# Patient Record
Sex: MALE | Race: OTHER | NOT HISPANIC OR LATINO
[De-identification: names, ages, dates, MRNs, and addresses within clinical notes are randomized per-mention and may not be internally consistent; named-entity substitution may affect disease eponyms.]

---

## 2022-03-03 PROBLEM — Z00.00 ENCOUNTER FOR PREVENTIVE HEALTH EXAMINATION: Status: ACTIVE | Noted: 2022-03-03

## 2022-03-17 PROBLEM — Z86.79 HISTORY OF HYPERTENSION: Status: RESOLVED | Noted: 2022-03-17 | Resolved: 2022-03-17

## 2022-03-17 PROBLEM — Z86.2 HISTORY OF ANEMIA: Status: RESOLVED | Noted: 2022-03-17 | Resolved: 2022-03-17

## 2022-03-17 PROBLEM — F17.200 CURRENT SMOKER: Status: ACTIVE | Noted: 2022-03-17

## 2022-03-17 PROBLEM — Z72.89 ALCOHOL USE: Status: ACTIVE | Noted: 2022-03-17

## 2022-03-17 PROBLEM — Z87.09 HISTORY OF CHRONIC OBSTRUCTIVE LUNG DISEASE: Status: RESOLVED | Noted: 2022-03-17 | Resolved: 2022-03-17

## 2022-03-17 RX ORDER — TIOTROPIUM BROMIDE 18 UG/1
18 CAPSULE ORAL; RESPIRATORY (INHALATION)
Refills: 0 | Status: ACTIVE | COMMUNITY
Start: 2022-03-17

## 2022-03-23 ENCOUNTER — APPOINTMENT (OUTPATIENT)
Dept: CARDIOTHORACIC SURGERY | Facility: CLINIC | Age: 67
End: 2022-03-23
Payer: COMMERCIAL

## 2022-03-23 DIAGNOSIS — Z86.79 PERSONAL HISTORY OF OTHER DISEASES OF THE CIRCULATORY SYSTEM: ICD-10-CM

## 2022-03-23 DIAGNOSIS — F17.200 NICOTINE DEPENDENCE, UNSPECIFIED, UNCOMPLICATED: ICD-10-CM

## 2022-03-23 DIAGNOSIS — Z86.2 PERSONAL HISTORY OF DISEASES OF THE BLOOD AND BLOOD-FORMING ORGANS AND CERTAIN DISORDERS INVOLVING THE IMMUNE MECHANISM: ICD-10-CM

## 2022-03-23 DIAGNOSIS — Z87.09 PERSONAL HISTORY OF OTHER DISEASES OF THE RESPIRATORY SYSTEM: ICD-10-CM

## 2022-03-23 DIAGNOSIS — Z72.89 OTHER PROBLEMS RELATED TO LIFESTYLE: ICD-10-CM

## 2022-03-30 ENCOUNTER — NON-APPOINTMENT (OUTPATIENT)
Age: 67
End: 2022-03-30

## 2022-03-30 ENCOUNTER — APPOINTMENT (OUTPATIENT)
Dept: CARDIOTHORACIC SURGERY | Facility: CLINIC | Age: 67
End: 2022-03-30
Payer: COMMERCIAL

## 2022-03-30 VITALS
RESPIRATION RATE: 16 BRPM | HEIGHT: 75 IN | BODY MASS INDEX: 23.13 KG/M2 | OXYGEN SATURATION: 96 % | WEIGHT: 186 LBS | SYSTOLIC BLOOD PRESSURE: 165 MMHG | TEMPERATURE: 98.1 F | HEART RATE: 69 BPM | DIASTOLIC BLOOD PRESSURE: 71 MMHG

## 2022-03-30 DIAGNOSIS — Z82.49 FAMILY HISTORY OF ISCHEMIC HEART DISEASE AND OTHER DISEASES OF THE CIRCULATORY SYSTEM: ICD-10-CM

## 2022-03-30 DIAGNOSIS — Z83.6 FAMILY HISTORY OF OTHER DISEASES OF THE RESPIRATORY SYSTEM: ICD-10-CM

## 2022-03-30 DIAGNOSIS — Z82.79 FAMILY HISTORY OF OTHER CONGENITAL MALFORMATIONS, DEFORMATIONS AND CHROMOSOMAL ABNORMALITIES: ICD-10-CM

## 2022-03-30 DIAGNOSIS — I35.1 NONRHEUMATIC AORTIC (VALVE) INSUFFICIENCY: ICD-10-CM

## 2022-03-30 DIAGNOSIS — Z82.61 FAMILY HISTORY OF ARTHRITIS: ICD-10-CM

## 2022-03-30 PROCEDURE — 99204 OFFICE O/P NEW MOD 45 MIN: CPT

## 2022-03-31 PROBLEM — Z82.79 FAMILY HISTORY OF MARFAN SYNDROME: Status: ACTIVE | Noted: 2022-03-31

## 2022-03-31 PROBLEM — Z82.49 FAMILY HISTORY OF AORTIC ANEURYSM: Status: ACTIVE | Noted: 2022-03-31

## 2022-03-31 PROBLEM — Z83.6 FAMILY HISTORY OF PULMONARY FIBROSIS: Status: ACTIVE | Noted: 2022-03-31

## 2022-03-31 PROBLEM — I35.1 SEVERE AORTIC INSUFFICIENCY: Status: ACTIVE | Noted: 2022-03-31

## 2022-03-31 PROBLEM — Z82.61 FAMILY HISTORY OF RHEUMATOID ARTHRITIS: Status: ACTIVE | Noted: 2022-03-31

## 2022-03-31 RX ORDER — FLUTICASONE PROPIONATE AND SALMETEROL 250; 50 UG/1; UG/1
250-50 POWDER RESPIRATORY (INHALATION)
Refills: 0 | Status: ACTIVE | COMMUNITY

## 2022-04-07 ENCOUNTER — APPOINTMENT (OUTPATIENT)
Dept: CARDIOLOGY | Facility: CLINIC | Age: 67
End: 2022-04-07

## 2022-04-08 ENCOUNTER — NON-APPOINTMENT (OUTPATIENT)
Age: 67
End: 2022-04-08

## 2022-05-05 ENCOUNTER — NON-APPOINTMENT (OUTPATIENT)
Age: 67
End: 2022-05-05

## 2022-06-02 ENCOUNTER — NON-APPOINTMENT (OUTPATIENT)
Age: 67
End: 2022-06-02

## 2022-06-02 ENCOUNTER — APPOINTMENT (OUTPATIENT)
Dept: CARDIOLOGY | Facility: CLINIC | Age: 67
End: 2022-06-02

## 2022-06-09 ENCOUNTER — NON-APPOINTMENT (OUTPATIENT)
Age: 67
End: 2022-06-09

## 2022-06-10 RX ORDER — FLUTICASONE PROPIONATE AND SALMETEROL 50; 500 UG/1; UG/1
500-50 POWDER RESPIRATORY (INHALATION)
Refills: 0 | Status: COMPLETED | COMMUNITY
Start: 2022-03-17 | End: 2022-06-10

## 2022-06-20 VITALS
SYSTOLIC BLOOD PRESSURE: 144 MMHG | TEMPERATURE: 98 F | OXYGEN SATURATION: 98 % | HEIGHT: 75 IN | DIASTOLIC BLOOD PRESSURE: 75 MMHG | WEIGHT: 175.93 LBS | RESPIRATION RATE: 18 BRPM | HEART RATE: 61 BPM

## 2022-06-20 RX ORDER — CHLORHEXIDINE GLUCONATE 213 G/1000ML
1 SOLUTION TOPICAL ONCE
Refills: 0 | Status: DISCONTINUED | OUTPATIENT
Start: 2022-06-22 | End: 2022-06-23

## 2022-06-20 NOTE — H&P ADULT - NSHPSOCIALHISTORY_GEN_ALL_CORE
current smoker (½ PPD x 40 years) and former ETOH abuse (40yr hx, quit 7 years ago)  denies recreational drug use

## 2022-06-20 NOTE — H&P ADULT - NSICDXPASTMEDICALHX_GEN_ALL_CORE_FT
PAST MEDICAL HISTORY:  Anemia     Aortic regurgitation     Arthritis     COPD, mild     HTN (hypertension)

## 2022-06-20 NOTE — H&P ADULT - HISTORY OF PRESENT ILLNESS
Cardiologist: Dr. Toledo  Pharmacy:   Escort:  COVID:     *Verify Meds*    62 yo M, current smoker (½ PPD x 40 years) and former ETOH abuse (40yr hx, quit 7 years ago), FH of Marfan's Syndrome and Aortic Aneurysms, with PMHx HTN, SEVERE Aortic Regurgitation, dilated ascending aorta (4.4 cm per Dr. Toledo) and dilated descending aorta (4.9x5.5cm), anemia, Arthritis, COPD presents c/o significant fatigue, occasional SOB, and palpitations with activity since 10/2021 who presented to his PCP for pre-op clearance for minimally invasive aortic valve replacement scheduled for 6/23/22. States exercise tolerance is 2 flights of stairs and walking 1 mile, relieved w/ rest. Denies CP, dizziness, diaphoresis, palpitations, orthopnea/PND, BRBPR, hematuria, melena, LE swelling. Echocardiogram (2/3/22): moderate-severe aortic insufficiency, normal LVEF. In light of patient's risk factors and CCS angina equivalent class II sx, patient is referred for cardiac catheterization for pre op clearance prior to aortic valve replacement on 6/23/22.    CT chest 3/25/22: RLL 4mm parenchymal nodule, Triangular density w/in the LLL measuring 5mm. Linear fibrosis at the L lung base. Ascending aorta at the level w/in the proximal margin of the aotic arch measures 3.6cm. Descending aorta at the level of the L pulmonary measures 4.9x5.0 with peripheral plaque Cardiologist: Dr. Toledo  Pharmacy:   Escort:  COVID:     *Verify Meds*    64 yo M, current smoker (½ PPD x 40 years) and former ETOH abuse (40yr hx, quit 7 years ago), FH of Marfan's Syndrome and Aortic Aneurysms, with PMHx HTN, SEVERE Aortic Regurgitation, dilated ascending aorta (4.4 cm per Dr. Toledo) and dilated descending aorta (4.9x5.5cm), anemia, Arthritis, COPD presents c/o significant fatigue, occasional SOB, and palpitations with activity since 10/2021 who presented to his PCP for pre-op clearance for minimally invasive aortic valve replacement scheduled for 6/23/22. States exercise tolerance is 2 flights of stairs and walking 1 mile, relieved w/ rest. Denies CP, dizziness, diaphoresis, palpitations, orthopnea/PND, BRBPR, hematuria, melena, LE swelling. Echocardiogram (2/3/22): moderate-severe aortic insufficiency, normal LVEF. In light of patient's risk factors and CCS angina equivalent class II sx, patient is referred for cardiac catheterization for pre op clearance prior to aortic valve replacement on 6/23/22.    CT chest 3/25/22: RLL 4mm parenchymal nodule, Triangular density w/in the LLL measuring 5mm. Linear fibrosis at the L lung base. Ascending aorta at the level w/in the proximal margin of the aotic arch measures 3.6cm. Descending aorta at the level of the L pulmonary measures 4.9x5.0 with peripheral plaque Cardiologist: Dr. Toledo  Pharmacy:   COVID negative 6/20/22 ([facility])     *Verify Meds*    62 yo M, current smoker (½ PPD x 40 years), former ETOH abuse (40yr hx, quit 7 years ago), FH of Marfan's Syndrome and Aortic Aneurysms, PMHx HTN, SEVERE Aortic Regurgitation, dilated ascending aorta (4.4 cm per Dr. Toledo) and dilated descending aorta (4.9x5.5cm), anemia, arthritis, COPD presents c/o significant fatigue, occasional SOB, and palpitations with activity since 10/2021 who presented to his PCP for pre-op clearance for minimally invasive aortic valve replacement scheduled for 6/23/22. States exercise tolerance is 2 flights of stairs and walking 1 mile, relieved w/ rest. Denies CP, dizziness, diaphoresis, palpitations, orthopnea/PND, BRBPR, hematuria, melena, LE swelling. ECHO (2/3/22): moderate-severe aortic insufficiency, normal LVEF. In light of patient's risk factors and CCS angina equivalent class II sx, patient is referred for cardiac catheterization for pre op clearance prior to aortic valve replacement on 6/23/22.    CT chest 3/25/22: RLL 4mm parenchymal nodule, Triangular density w/in the LLL measuring 5mm. Linear fibrosis at the L lung base. Ascending aorta at the level w/in the proximal margin of the aotic arch measures 3.6cm. Descending aorta at the level of the L pulmonary measures 4.9x5.0 with peripheral plaque Cardiologist: Dr. Toledo  Pharmacy: [facility] [address] [id number], medications verified by pt's report (reliable)  COVID negative 6/20/22 ([facility])       62 yo M, current smoker (½ PPD x 40 years), former ETOH abuse (40yr hx, quit 7 years ago), FH of Marfan's Syndrome and Aortic Aneurysms, PMHx HTN, SEVERE Aortic Regurgitation, dilated ascending aorta (4.4 cm per Dr. Toledo) and dilated descending aorta (4.9x5.5cm), anemia, arthritis, COPD presents c/o significant fatigue, occasional SOB, and palpitations with activity since 10/2021 who presented to his PCP for pre-op clearance for minimally invasive aortic valve replacement scheduled for 6/23/22. States exercise tolerance is 2 flights of stairs and walking 1 mile, relieved w/ rest. Denies CP, dizziness, diaphoresis, palpitations, orthopnea/PND, BRBPR, hematuria, melena, LE swelling. ECHO (2/3/22): moderate-severe aortic insufficiency, normal LVEF. In light of patient's risk factors and CCS angina equivalent class II sx, patient is referred for cardiac catheterization for pre op clearance prior to aortic valve replacement on 6/23/22.    CT chest 3/25/22: RLL 4mm parenchymal nodule, Triangular density w/in the LLL measuring 5mm. Linear fibrosis at the L lung base. Ascending aorta at the level w/in the proximal margin of the aotic arch measures 3.6cm. Descending aorta at the level of the L pulmonary measures 4.9x5.0 with peripheral plaque

## 2022-06-20 NOTE — H&P ADULT - ASSESSMENT
62 yo M, current smoker (½ PPD x 40 years), former ETOH abuse (40yr hx, quit 7 years ago), FH of Marfan's Syndrome and Aortic Aneurysms, PMHx HTN, SEVERE Aortic Regurgitation, dilated ascending aorta (4.4 cm per Dr. Toledo) and dilated descending aorta (4.9x5.5cm), anemia, arthritis, COPD presents c/o significant fatigue, occasional SOB, and palpitations with activity since 10/2021 who presented to his PCP for pre-op clearance for minimally invasive aortic valve replacement scheduled for 6/23/22. States exercise tolerance is 2 flights of stairs and walking 1 mile, relieved w/ rest. In light of patient's risk factors and CCS angina equivalent class II sx, patient is referred for cardiac catheterization for pre op clearance prior to aortic valve replacement on 6/23/22.    ASA Class III    Mallampati Class III    Risks & benefits of procedure and alternative therapy have been explained to the patient including but not limited to: allergic reaction, bleeding with possible need for blood transfusion, infection, renal and vascular compromise, limb damage, arrhythmia, stroke, vessel dissection/perforation, Myocardial infarction, emergent CABG. Informed consent obtained and in chart.       Patient a candidate for sedation: Yes    Sedation Type: Moderate   62 yo M, current smoker (½ PPD x 40 years), former ETOH abuse (40yr hx, quit 7 years ago), FH of Marfan's Syndrome and Aortic Aneurysms, PMHx HTN, SEVERE Aortic Regurgitation, dilated ascending aorta (4.4 cm per Dr. Toledo) and dilated descending aorta (4.9x5.5cm), anemia, arthritis, COPD presents c/o significant fatigue, occasional SOB, and palpitations with activity since 10/2021 who presented to his PCP for pre-op clearance for minimally invasive aortic valve replacement scheduled for 6/23/22. States exercise tolerance is 2 flights of stairs and walking 1 mile, relieved w/ rest. In light of patient's risk factors and CCS angina equivalent class II sx, patient is referred for cardiac catheterization for pre op clearance prior to aortic valve replacement on 6/23/22.    ASA Class III    Mallampati Class III    No IV fluids, ASA/Plavix 2/2 severe AI    Plan to admit to Dr. Toledo's service for AVR scheduled 6/23/22.     Risks & benefits of procedure and alternative therapy have been explained to the patient including but not limited to: allergic reaction, bleeding with possible need for blood transfusion, infection, renal and vascular compromise, limb damage, arrhythmia, stroke, vessel dissection/perforation, Myocardial infarction, emergent CABG. Informed consent obtained and in chart.       Patient a candidate for sedation: Yes    Sedation Type: Moderate

## 2022-06-20 NOTE — H&P ADULT - CARDIOVASCULAR
regular rate and rhythm/S1 S2 present/no murmur/no pedal edema/vascular KAMALA auscultated at R 2nd intercostal space & mitral area/regular rate and rhythm/S1 S2 present/no pedal edema/vascular

## 2022-06-20 NOTE — H&P ADULT - NSICDXFAMILYHX_GEN_ALL_CORE_FT
FAMILY HISTORY:  Uncle  Still living? Unknown  Family history of Marfan syndrome, Age at diagnosis: Age Unknown  FH: aortic aneurysm, Age at diagnosis: Age Unknown

## 2022-06-22 ENCOUNTER — TRANSCRIPTION ENCOUNTER (OUTPATIENT)
Age: 67
End: 2022-06-22

## 2022-06-22 ENCOUNTER — INPATIENT (INPATIENT)
Facility: HOSPITAL | Age: 67
LOS: 12 days | Discharge: HOME HEALTH | DRG: 216 | End: 2022-07-05
Attending: THORACIC SURGERY (CARDIOTHORACIC VASCULAR SURGERY) | Admitting: THORACIC SURGERY (CARDIOTHORACIC VASCULAR SURGERY)
Payer: COMMERCIAL

## 2022-06-22 DIAGNOSIS — I11.0 HYPERTENSIVE HEART DISEASE WITH HEART FAILURE: ICD-10-CM

## 2022-06-22 DIAGNOSIS — Z98.49 CATARACT EXTRACTION STATUS, UNSPECIFIED EYE: Chronic | ICD-10-CM

## 2022-06-22 DIAGNOSIS — I70.0 ATHEROSCLEROSIS OF AORTA: ICD-10-CM

## 2022-06-22 DIAGNOSIS — J44.9 CHRONIC OBSTRUCTIVE PULMONARY DISEASE, UNSPECIFIED: ICD-10-CM

## 2022-06-22 DIAGNOSIS — F17.210 NICOTINE DEPENDENCE, CIGARETTES, UNCOMPLICATED: ICD-10-CM

## 2022-06-22 DIAGNOSIS — I35.1 NONRHEUMATIC AORTIC (VALVE) INSUFFICIENCY: ICD-10-CM
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
## 2022-06-22 DIAGNOSIS — Z98.49 CATARACT EXTRACTION STATUS, UNSPECIFIED EYE: ICD-10-CM

## 2022-06-22 DIAGNOSIS — Z82.49 FAMILY HISTORY OF ISCHEMIC HEART DISEASE AND OTHER DISEASES OF THE CIRCULATORY SYSTEM: ICD-10-CM

## 2022-06-22 DIAGNOSIS — G97.82 OTHER POSTPROCEDURAL COMPLICATIONS AND DISORDERS OF NERVOUS SYSTEM: ICD-10-CM

## 2022-06-22 DIAGNOSIS — G81.94 HEMIPLEGIA, UNSPECIFIED AFFECTING LEFT NONDOMINANT SIDE: ICD-10-CM

## 2022-06-22 DIAGNOSIS — R29.722 NIHSS SCORE 22: ICD-10-CM

## 2022-06-22 DIAGNOSIS — D62 ACUTE POSTHEMORRHAGIC ANEMIA: ICD-10-CM

## 2022-06-22 DIAGNOSIS — I97.820 POSTPROCEDURAL CEREBROVASCULAR INFARCTION FOLLOWING CARDIAC SURGERY: ICD-10-CM

## 2022-06-22 DIAGNOSIS — Z79.51 LONG TERM (CURRENT) USE OF INHALED STEROIDS: ICD-10-CM

## 2022-06-22 DIAGNOSIS — I50.41 ACUTE COMBINED SYSTOLIC (CONGESTIVE) AND DIASTOLIC (CONGESTIVE) HEART FAILURE: ICD-10-CM

## 2022-06-22 DIAGNOSIS — I63.231 CEREBRAL INFARCTION DUE TO UNSPECIFIED OCCLUSION OR STENOSIS OF RIGHT CAROTID ARTERIES: ICD-10-CM

## 2022-06-22 DIAGNOSIS — I25.119 ATHEROSCLEROTIC HEART DISEASE OF NATIVE CORONARY ARTERY WITH UNSPECIFIED ANGINA PECTORIS: ICD-10-CM

## 2022-06-22 DIAGNOSIS — F10.11 ALCOHOL ABUSE, IN REMISSION: ICD-10-CM

## 2022-06-22 DIAGNOSIS — I71.2 THORACIC AORTIC ANEURYSM, WITHOUT RUPTURE: ICD-10-CM
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
## 2022-06-22 LAB
A1C WITH ESTIMATED AVERAGE GLUCOSE RESULT: 4.3 % — SIGNIFICANT CHANGE UP (ref 4–5.6)
A1C WITH ESTIMATED AVERAGE GLUCOSE RESULT: 4.6 % — SIGNIFICANT CHANGE UP (ref 4–5.6)
ALBUMIN SERPL ELPH-MCNC: 4.2 G/DL — SIGNIFICANT CHANGE UP (ref 3.3–5)
ALBUMIN SERPL ELPH-MCNC: 4.5 G/DL — SIGNIFICANT CHANGE UP (ref 3.3–5)
ALP SERPL-CCNC: 59 U/L — SIGNIFICANT CHANGE UP (ref 40–120)
ALP SERPL-CCNC: 60 U/L — SIGNIFICANT CHANGE UP (ref 40–120)
ALT FLD-CCNC: 17 U/L — SIGNIFICANT CHANGE UP (ref 10–45)
ALT FLD-CCNC: 18 U/L — SIGNIFICANT CHANGE UP (ref 10–45)
ANION GAP SERPL CALC-SCNC: 10 MMOL/L — SIGNIFICANT CHANGE UP (ref 5–17)
ANION GAP SERPL CALC-SCNC: 10 MMOL/L — SIGNIFICANT CHANGE UP (ref 5–17)
ANION GAP SERPL CALC-SCNC: 9 MMOL/L — SIGNIFICANT CHANGE UP (ref 5–17)
ANISOCYTOSIS BLD QL: SLIGHT — SIGNIFICANT CHANGE UP
APPEARANCE UR: CLEAR — SIGNIFICANT CHANGE UP
APTT BLD: 26.3 SEC — LOW (ref 27.5–35.5)
APTT BLD: 27.9 SEC — SIGNIFICANT CHANGE UP (ref 27.5–35.5)
APTT BLD: 28.8 SEC — SIGNIFICANT CHANGE UP (ref 27.5–35.5)
AST SERPL-CCNC: 22 U/L — SIGNIFICANT CHANGE UP (ref 10–40)
AST SERPL-CCNC: 22 U/L — SIGNIFICANT CHANGE UP (ref 10–40)
BASOPHILS # BLD AUTO: 0.02 K/UL — SIGNIFICANT CHANGE UP (ref 0–0.2)
BASOPHILS # BLD AUTO: 0.04 K/UL — SIGNIFICANT CHANGE UP (ref 0–0.2)
BASOPHILS NFR BLD AUTO: 0.4 % — SIGNIFICANT CHANGE UP (ref 0–2)
BASOPHILS NFR BLD AUTO: 0.9 % — SIGNIFICANT CHANGE UP (ref 0–2)
BILIRUB SERPL-MCNC: 0.4 MG/DL — SIGNIFICANT CHANGE UP (ref 0.2–1.2)
BILIRUB SERPL-MCNC: 0.5 MG/DL — SIGNIFICANT CHANGE UP (ref 0.2–1.2)
BILIRUB UR-MCNC: NEGATIVE — SIGNIFICANT CHANGE UP
BLD GP AB SCN SERPL QL: NEGATIVE — SIGNIFICANT CHANGE UP
BLD GP AB SCN SERPL QL: NEGATIVE — SIGNIFICANT CHANGE UP
BUN SERPL-MCNC: 13 MG/DL — SIGNIFICANT CHANGE UP (ref 7–23)
BUN SERPL-MCNC: 13 MG/DL — SIGNIFICANT CHANGE UP (ref 7–23)
BUN SERPL-MCNC: 22 MG/DL — SIGNIFICANT CHANGE UP (ref 7–23)
CALCIUM SERPL-MCNC: 8.8 MG/DL — SIGNIFICANT CHANGE UP (ref 8.4–10.5)
CALCIUM SERPL-MCNC: 9.3 MG/DL — SIGNIFICANT CHANGE UP (ref 8.4–10.5)
CALCIUM SERPL-MCNC: 9.5 MG/DL — SIGNIFICANT CHANGE UP (ref 8.4–10.5)
CHLORIDE SERPL-SCNC: 101 MMOL/L — SIGNIFICANT CHANGE UP (ref 96–108)
CHOLEST SERPL-MCNC: 130 MG/DL — SIGNIFICANT CHANGE UP
CHOLEST SERPL-MCNC: 138 MG/DL — SIGNIFICANT CHANGE UP
CK MB CFR SERPL CALC: 2.7 NG/ML — SIGNIFICANT CHANGE UP (ref 0–6.7)
CK SERPL-CCNC: 97 U/L — SIGNIFICANT CHANGE UP (ref 30–200)
CO2 SERPL-SCNC: 24 MMOL/L — SIGNIFICANT CHANGE UP (ref 22–31)
CO2 SERPL-SCNC: 24 MMOL/L — SIGNIFICANT CHANGE UP (ref 22–31)
CO2 SERPL-SCNC: 27 MMOL/L — SIGNIFICANT CHANGE UP (ref 22–31)
COLOR SPEC: YELLOW — SIGNIFICANT CHANGE UP
CREAT SERPL-MCNC: 0.69 MG/DL — SIGNIFICANT CHANGE UP (ref 0.5–1.3)
CREAT SERPL-MCNC: 0.7 MG/DL — SIGNIFICANT CHANGE UP (ref 0.5–1.3)
CREAT SERPL-MCNC: 0.73 MG/DL — SIGNIFICANT CHANGE UP (ref 0.5–1.3)
DACRYOCYTES BLD QL SMEAR: SLIGHT — SIGNIFICANT CHANGE UP
DIFF PNL FLD: NEGATIVE — SIGNIFICANT CHANGE UP
EGFR: 100 ML/MIN/1.73M2 — SIGNIFICANT CHANGE UP
EGFR: 101 ML/MIN/1.73M2 — SIGNIFICANT CHANGE UP
EGFR: 99 ML/MIN/1.73M2 — SIGNIFICANT CHANGE UP
EOSINOPHIL # BLD AUTO: 0.04 K/UL — SIGNIFICANT CHANGE UP (ref 0–0.5)
EOSINOPHIL # BLD AUTO: 0.15 K/UL — SIGNIFICANT CHANGE UP (ref 0–0.5)
EOSINOPHIL NFR BLD AUTO: 0.8 % — SIGNIFICANT CHANGE UP (ref 0–6)
EOSINOPHIL NFR BLD AUTO: 3.2 % — SIGNIFICANT CHANGE UP (ref 0–6)
ESTIMATED AVERAGE GLUCOSE: 77 MG/DL — SIGNIFICANT CHANGE UP (ref 68–114)
ESTIMATED AVERAGE GLUCOSE: 85 MG/DL — SIGNIFICANT CHANGE UP (ref 68–114)
GIANT PLATELETS BLD QL SMEAR: PRESENT — SIGNIFICANT CHANGE UP
GLUCOSE SERPL-MCNC: 108 MG/DL — HIGH (ref 70–99)
GLUCOSE SERPL-MCNC: 108 MG/DL — HIGH (ref 70–99)
GLUCOSE SERPL-MCNC: 97 MG/DL — SIGNIFICANT CHANGE UP (ref 70–99)
GLUCOSE UR QL: NEGATIVE — SIGNIFICANT CHANGE UP
HCT VFR BLD CALC: 32.1 % — LOW (ref 39–50)
HCT VFR BLD CALC: 34.6 % — LOW (ref 39–50)
HCT VFR BLD CALC: 34.6 % — LOW (ref 39–50)
HCV AB S/CO SERPL IA: 0.04 S/CO — SIGNIFICANT CHANGE UP
HCV AB SERPL-IMP: SIGNIFICANT CHANGE UP
HDLC SERPL-MCNC: 41 MG/DL — SIGNIFICANT CHANGE UP
HDLC SERPL-MCNC: 42 MG/DL — SIGNIFICANT CHANGE UP
HGB BLD-MCNC: 10.4 G/DL — LOW (ref 13–17)
HGB BLD-MCNC: 11.2 G/DL — LOW (ref 13–17)
HGB BLD-MCNC: 11.3 G/DL — LOW (ref 13–17)
HYPOCHROMIA BLD QL: SIGNIFICANT CHANGE UP
IMM GRANULOCYTES NFR BLD AUTO: 0.4 % — SIGNIFICANT CHANGE UP (ref 0–1.5)
INR BLD: 1.17 — HIGH (ref 0.88–1.16)
INR BLD: 1.17 — HIGH (ref 0.88–1.16)
INR BLD: 1.19 — HIGH (ref 0.88–1.16)
ISTAT INR: 1 — SIGNIFICANT CHANGE UP (ref 0.88–1.16)
ISTAT PT: 11.6 SEC — SIGNIFICANT CHANGE UP (ref 10–12.9)
KETONES UR-MCNC: NEGATIVE — SIGNIFICANT CHANGE UP
LEUKOCYTE ESTERASE UR-ACNC: NEGATIVE — SIGNIFICANT CHANGE UP
LIPID PNL WITH DIRECT LDL SERPL: 76 MG/DL — SIGNIFICANT CHANGE UP
LIPID PNL WITH DIRECT LDL SERPL: 84 MG/DL — SIGNIFICANT CHANGE UP
LYMPHOCYTES # BLD AUTO: 0.17 K/UL — LOW (ref 1–3.3)
LYMPHOCYTES # BLD AUTO: 1.47 K/UL — SIGNIFICANT CHANGE UP (ref 1–3.3)
LYMPHOCYTES # BLD AUTO: 3.5 % — LOW (ref 13–44)
LYMPHOCYTES # BLD AUTO: 31.4 % — SIGNIFICANT CHANGE UP (ref 13–44)
MAGNESIUM SERPL-MCNC: 2 MG/DL — SIGNIFICANT CHANGE UP (ref 1.6–2.6)
MAGNESIUM SERPL-MCNC: 2.1 MG/DL — SIGNIFICANT CHANGE UP (ref 1.6–2.6)
MANUAL SMEAR VERIFICATION: SIGNIFICANT CHANGE UP
MCHC RBC-ENTMCNC: 31.2 PG — SIGNIFICANT CHANGE UP (ref 27–34)
MCHC RBC-ENTMCNC: 31.5 PG — SIGNIFICANT CHANGE UP (ref 27–34)
MCHC RBC-ENTMCNC: 31.6 PG — SIGNIFICANT CHANGE UP (ref 27–34)
MCHC RBC-ENTMCNC: 32.4 GM/DL — SIGNIFICANT CHANGE UP (ref 32–36)
MCHC RBC-ENTMCNC: 32.4 GM/DL — SIGNIFICANT CHANGE UP (ref 32–36)
MCHC RBC-ENTMCNC: 32.7 GM/DL — SIGNIFICANT CHANGE UP (ref 32–36)
MCV RBC AUTO: 95.6 FL — SIGNIFICANT CHANGE UP (ref 80–100)
MCV RBC AUTO: 97.2 FL — SIGNIFICANT CHANGE UP (ref 80–100)
MCV RBC AUTO: 97.6 FL — SIGNIFICANT CHANGE UP (ref 80–100)
MONOCYTES # BLD AUTO: 0.04 K/UL — SIGNIFICANT CHANGE UP (ref 0–0.9)
MONOCYTES # BLD AUTO: 0.61 K/UL — SIGNIFICANT CHANGE UP (ref 0–0.9)
MONOCYTES NFR BLD AUTO: 0.9 % — LOW (ref 2–14)
MONOCYTES NFR BLD AUTO: 13 % — SIGNIFICANT CHANGE UP (ref 2–14)
NEUTROPHILS # BLD AUTO: 2.41 K/UL — SIGNIFICANT CHANGE UP (ref 1.8–7.4)
NEUTROPHILS # BLD AUTO: 4.47 K/UL — SIGNIFICANT CHANGE UP (ref 1.8–7.4)
NEUTROPHILS NFR BLD AUTO: 51.6 % — SIGNIFICANT CHANGE UP (ref 43–77)
NEUTROPHILS NFR BLD AUTO: 93.9 % — HIGH (ref 43–77)
NITRITE UR-MCNC: NEGATIVE — SIGNIFICANT CHANGE UP
NON HDL CHOLESTEROL: 89 MG/DL — SIGNIFICANT CHANGE UP
NON HDL CHOLESTEROL: 96 MG/DL — SIGNIFICANT CHANGE UP
NRBC # BLD: 0 /100 WBCS — SIGNIFICANT CHANGE UP (ref 0–0)
NRBC # BLD: 0 /100 WBCS — SIGNIFICANT CHANGE UP (ref 0–0)
NT-PROBNP SERPL-SCNC: 286 PG/ML — SIGNIFICANT CHANGE UP (ref 0–300)
OVALOCYTES BLD QL SMEAR: SLIGHT — SIGNIFICANT CHANGE UP
PH UR: 6 — SIGNIFICANT CHANGE UP (ref 5–8)
PHOSPHATE SERPL-MCNC: 3.5 MG/DL — SIGNIFICANT CHANGE UP (ref 2.5–4.5)
PLAT MORPH BLD: NORMAL — SIGNIFICANT CHANGE UP
PLATELET # BLD AUTO: 154 K/UL — SIGNIFICANT CHANGE UP (ref 150–400)
PLATELET # BLD AUTO: 166 K/UL — SIGNIFICANT CHANGE UP (ref 150–400)
PLATELET # BLD AUTO: 179 K/UL — SIGNIFICANT CHANGE UP (ref 150–400)
POCT ISTAT CREATININE: 0.7 MG/DL — SIGNIFICANT CHANGE UP (ref 0.5–1.3)
POIKILOCYTOSIS BLD QL AUTO: SLIGHT — SIGNIFICANT CHANGE UP
POLYCHROMASIA BLD QL SMEAR: SLIGHT — SIGNIFICANT CHANGE UP
POTASSIUM SERPL-MCNC: 3.9 MMOL/L — SIGNIFICANT CHANGE UP (ref 3.5–5.3)
POTASSIUM SERPL-MCNC: 4 MMOL/L — SIGNIFICANT CHANGE UP (ref 3.5–5.3)
POTASSIUM SERPL-MCNC: 4.1 MMOL/L — SIGNIFICANT CHANGE UP (ref 3.5–5.3)
POTASSIUM SERPL-SCNC: 3.9 MMOL/L — SIGNIFICANT CHANGE UP (ref 3.5–5.3)
POTASSIUM SERPL-SCNC: 4 MMOL/L — SIGNIFICANT CHANGE UP (ref 3.5–5.3)
POTASSIUM SERPL-SCNC: 4.1 MMOL/L — SIGNIFICANT CHANGE UP (ref 3.5–5.3)
PROT SERPL-MCNC: 8 G/DL — SIGNIFICANT CHANGE UP (ref 6–8.3)
PROT SERPL-MCNC: 8 G/DL — SIGNIFICANT CHANGE UP (ref 6–8.3)
PROT UR-MCNC: NEGATIVE MG/DL — SIGNIFICANT CHANGE UP
PROTHROM AB SERPL-ACNC: 13.9 SEC — HIGH (ref 10.5–13.4)
PROTHROM AB SERPL-ACNC: 13.9 SEC — HIGH (ref 10.5–13.4)
PROTHROM AB SERPL-ACNC: 14.2 SEC — HIGH (ref 10.5–13.4)
RBC # BLD: 3.29 M/UL — LOW (ref 4.2–5.8)
RBC # BLD: 3.56 M/UL — LOW (ref 4.2–5.8)
RBC # BLD: 3.62 M/UL — LOW (ref 4.2–5.8)
RBC # FLD: 14.9 % — HIGH (ref 10.3–14.5)
RBC # FLD: 15.1 % — HIGH (ref 10.3–14.5)
RBC # FLD: 15.3 % — HIGH (ref 10.3–14.5)
RBC BLD AUTO: ABNORMAL
RH IG SCN BLD-IMP: POSITIVE — SIGNIFICANT CHANGE UP
RH IG SCN BLD-IMP: POSITIVE — SIGNIFICANT CHANGE UP
SARS-COV-2 RNA SPEC QL NAA+PROBE: SIGNIFICANT CHANGE UP
SODIUM SERPL-SCNC: 135 MMOL/L — SIGNIFICANT CHANGE UP (ref 135–145)
SODIUM SERPL-SCNC: 135 MMOL/L — SIGNIFICANT CHANGE UP (ref 135–145)
SODIUM SERPL-SCNC: 137 MMOL/L — SIGNIFICANT CHANGE UP (ref 135–145)
SP GR SPEC: <=1.005 — SIGNIFICANT CHANGE UP (ref 1–1.03)
TRIGL SERPL-MCNC: 61 MG/DL — SIGNIFICANT CHANGE UP
TRIGL SERPL-MCNC: 66 MG/DL — SIGNIFICANT CHANGE UP
TROPONIN T SERPL-MCNC: 0.01 NG/ML — SIGNIFICANT CHANGE UP (ref 0–0.01)
TSH SERPL-MCNC: 1.15 UIU/ML — SIGNIFICANT CHANGE UP (ref 0.27–4.2)
UROBILINOGEN FLD QL: 0.2 E.U./DL — SIGNIFICANT CHANGE UP
WBC # BLD: 3.85 K/UL — SIGNIFICANT CHANGE UP (ref 3.8–10.5)
WBC # BLD: 4.68 K/UL — SIGNIFICANT CHANGE UP (ref 3.8–10.5)
WBC # BLD: 4.76 K/UL — SIGNIFICANT CHANGE UP (ref 3.8–10.5)
WBC # FLD AUTO: 3.85 K/UL — SIGNIFICANT CHANGE UP (ref 3.8–10.5)
WBC # FLD AUTO: 4.68 K/UL — SIGNIFICANT CHANGE UP (ref 3.8–10.5)
WBC # FLD AUTO: 4.76 K/UL — SIGNIFICANT CHANGE UP (ref 3.8–10.5)

## 2022-06-22 PROCEDURE — 93454 CORONARY ARTERY ANGIO S&I: CPT | Mod: 26

## 2022-06-22 PROCEDURE — 94010 BREATHING CAPACITY TEST: CPT | Mod: 26

## 2022-06-22 PROCEDURE — 71046 X-RAY EXAM CHEST 2 VIEWS: CPT | Mod: 26

## 2022-06-22 PROCEDURE — 99152 MOD SED SAME PHYS/QHP 5/>YRS: CPT

## 2022-06-22 PROCEDURE — 93880 EXTRACRANIAL BILAT STUDY: CPT | Mod: 26

## 2022-06-22 RX ORDER — CHLORHEXIDINE GLUCONATE 213 G/1000ML
1 SOLUTION TOPICAL ONCE
Refills: 0 | Status: COMPLETED | OUTPATIENT
Start: 2022-06-22 | End: 2022-06-22

## 2022-06-22 RX ORDER — HEPARIN SODIUM 5000 [USP'U]/ML
5000 INJECTION INTRAVENOUS; SUBCUTANEOUS EVERY 8 HOURS
Refills: 0 | Status: DISCONTINUED | OUTPATIENT
Start: 2022-06-22 | End: 2022-06-23

## 2022-06-22 RX ORDER — FLUTICASONE PROPIONATE AND SALMETEROL 50; 250 UG/1; UG/1
1 POWDER ORAL; RESPIRATORY (INHALATION)
Qty: 0 | Refills: 0 | DISCHARGE

## 2022-06-22 RX ORDER — METOPROLOL TARTRATE 50 MG
12.5 TABLET ORAL EVERY 8 HOURS
Refills: 0 | Status: DISCONTINUED | OUTPATIENT
Start: 2022-06-22 | End: 2022-06-23

## 2022-06-22 RX ORDER — PANTOPRAZOLE SODIUM 20 MG/1
40 TABLET, DELAYED RELEASE ORAL
Refills: 0 | Status: DISCONTINUED | OUTPATIENT
Start: 2022-06-22 | End: 2022-06-23

## 2022-06-22 RX ORDER — CHLORHEXIDINE GLUCONATE 213 G/1000ML
1 SOLUTION TOPICAL ONCE
Refills: 0 | Status: DISCONTINUED | OUTPATIENT
Start: 2022-06-22 | End: 2022-06-23

## 2022-06-22 RX ORDER — INFLUENZA VIRUS VACCINE 15; 15; 15; 15 UG/.5ML; UG/.5ML; UG/.5ML; UG/.5ML
0.7 SUSPENSION INTRAMUSCULAR ONCE
Refills: 0 | Status: DISCONTINUED | OUTPATIENT
Start: 2022-06-22 | End: 2022-06-23

## 2022-06-22 RX ORDER — TIOTROPIUM BROMIDE 18 UG/1
1 CAPSULE ORAL; RESPIRATORY (INHALATION) DAILY
Refills: 0 | Status: DISCONTINUED | OUTPATIENT
Start: 2022-06-22 | End: 2022-07-05

## 2022-06-22 RX ORDER — CHLORHEXIDINE GLUCONATE 213 G/1000ML
25 SOLUTION TOPICAL ONCE
Refills: 0 | Status: COMPLETED | OUTPATIENT
Start: 2022-06-22 | End: 2022-06-23

## 2022-06-22 RX ORDER — SODIUM CHLORIDE 9 MG/ML
3 INJECTION INTRAMUSCULAR; INTRAVENOUS; SUBCUTANEOUS EVERY 8 HOURS
Refills: 0 | Status: DISCONTINUED | OUTPATIENT
Start: 2022-06-22 | End: 2022-06-23

## 2022-06-22 RX ORDER — CHLORHEXIDINE GLUCONATE 213 G/1000ML
1 SOLUTION TOPICAL ONCE
Refills: 0 | Status: COMPLETED | OUTPATIENT
Start: 2022-06-23 | End: 2022-06-23

## 2022-06-22 RX ADMIN — Medication 12.5 MILLIGRAM(S): at 23:05

## 2022-06-22 RX ADMIN — SODIUM CHLORIDE 3 MILLILITER(S): 9 INJECTION INTRAMUSCULAR; INTRAVENOUS; SUBCUTANEOUS at 21:48

## 2022-06-22 RX ADMIN — SODIUM CHLORIDE 3 MILLILITER(S): 9 INJECTION INTRAMUSCULAR; INTRAVENOUS; SUBCUTANEOUS at 13:07

## 2022-06-22 RX ADMIN — CHLORHEXIDINE GLUCONATE 1 APPLICATION(S): 213 SOLUTION TOPICAL at 21:48

## 2022-06-22 RX ADMIN — HEPARIN SODIUM 5000 UNIT(S): 5000 INJECTION INTRAVENOUS; SUBCUTANEOUS at 23:10

## 2022-06-22 RX ADMIN — Medication 12.5 MILLIGRAM(S): at 17:48

## 2022-06-22 NOTE — PROGRESS NOTE ADULT - SUBJECTIVE AND OBJECTIVE BOX
Patient discussed on morning rounds with Dr. Toledo    Operation / Date: Min invasive aortic valve replacement/ 6/23/22    SUBJECTIVE ASSESSMENT:  67y Male seen and examined at bedside post cardiac catheterization with no complaints. Pt states he spoke with Dr. Toledo and is aware of the procedure tomorrow. Pt has a TR band on his right wrist which is managed by cardiology. He has been walking without assistance and lives with his wife. He has been tolerating a PO diet. He is a current smoker but stated he decreased the amount of cigarettes from 1 ppd to 1-2 cigarettes a day. He is a social alcohol drinker but has not had anything recently.       Vital Signs Last 24 Hrs  T(C): --  T(F): --  HR: 58 (22 Jun 2022 12:01) (58 - 64)  BP: 114/57 (22 Jun 2022 12:01) (114/57 - 170/79)  BP(mean): 79 (22 Jun 2022 12:01) (79 - 114)  RR: 16 (22 Jun 2022 12:01) (16 - 18)  SpO2: 94% (22 Jun 2022 12:01) (94% - 96%)  I&O's Detail      CHEST TUBE:    EDMUNDO DRAIN:    EPICARDIAL WIRES:   TIE SOCORRO:   LEELA:     PHYSICAL EXAM:  *****    LABS:                        11.2   4.76  )-----------( 166      ( 22 Jun 2022 11:56 )             34.6       PT/INR - ( 22 Jun 2022 11:56 )   PT: 13.9 sec;   INR: 1.17          PTT - ( 22 Jun 2022 11:56 )  PTT:27.9 sec    06-22    137  |  101  |  13  ----------------------------<  108<H>  4.1   |  27  |  0.70    Ca    9.3      22 Jun 2022 11:56  Phos  3.5     06-22  Mg     2.1     06-22    TPro  8.0  /  Alb  4.2  /  TBili  0.4  /  DBili  x   /  AST  22  /  ALT  17  /  AlkPhos  60  06-22      Urinalysis Basic - ( 22 Jun 2022 11:56 )    Color: Yellow / Appearance: Clear / SG: <=1.005 / pH: x  Gluc: x / Ketone: NEGATIVE  / Bili: Negative / Urobili: 0.2 E.U./dL   Blood: x / Protein: NEGATIVE mg/dL / Nitrite: NEGATIVE   Leuk Esterase: NEGATIVE / RBC: x / WBC x   Sq Epi: x / Non Sq Epi: x / Bacteria: x        MEDICATIONS  (STANDING):  chlorhexidine 0.12% Liquid 25 milliLiter(s) Swish and Spit once  chlorhexidine 4% Liquid 1 Application(s) Topical once  chlorhexidine 4% Liquid 1 Application(s) Topical once  chlorhexidine 4% Liquid 1 Application(s) Topical once  influenza  Vaccine (HIGH DOSE) 0.7 milliLiter(s) IntraMuscular once  sodium chloride 0.9% lock flush 3 milliLiter(s) IV Push every 8 hours    MEDICATIONS  (PRN):        RADIOLOGY & ADDITIONAL TESTS:     Patient discussed on morning rounds with Dr. Toledo    Operation / Date: Min invasive aortic valve replacement/ 6/23/22    SUBJECTIVE ASSESSMENT:  67y Male seen and examined at bedside post cardiac catheterization with no complaints. Pt states he spoke with Dr. Toledo and is aware of the procedure tomorrow. Pt has a TR band on his right wrist which is managed by cardiology. He has been walking without assistance and lives with his wife. He has been tolerating a PO diet. He is a current smoker but stated he decreased the amount of cigarettes from 1 ppd to 1-2 cigarettes a day. He is a social alcohol drinker but has not had anything recently. He denies chest pain, SOB/KAUFMAN, palpitations, dizziness, lightheadedness, n/v, abd pain, palpitations or any further associated symptoms.     Vital Signs Last 24 Hrs  HR: 58 (22 Jun 2022 12:01) (58 - 64)  BP: 114/57 (22 Jun 2022 12:01) (114/57 - 170/79)  BP(mean): 79 (22 Jun 2022 12:01) (79 - 114)  RR: 16 (22 Jun 2022 12:01) (16 - 18)  SpO2: 94% (22 Jun 2022 12:01) (94% - 96%)  I&O's Detail    PHYSICAL EXAM:  GEN: NAD, looks comfortable  Psych: Mood appropriate  Neuro: A&Ox3.  No focal deficits.  Moving all extremities.   HEENT: No obvious abnormalities  CV: S1S2, regular, slight systolic murmur noted at the right second intercostal space.  No carotid bruits.  No JVD  Lungs: Clear B/L.  No wheezing, rales or rhonchi  ABD: Soft, non-tender, non-distended.  +Bowel sounds  EXT: Warm and well perfused.  No peripheral edema noted  Musculoskeletal: Moving all extremities with normal ROM, no joint swelling  PV: Pedal pulses palpable  INCISIONS: Right TR band noted for cardiac cath site, no bleeding noted     LABS:                        11.2   4.76  )-----------( 166      ( 22 Jun 2022 11:56 )             34.6       PT/INR - ( 22 Jun 2022 11:56 )   PT: 13.9 sec;   INR: 1.17          PTT - ( 22 Jun 2022 11:56 )  PTT:27.9 sec    06-22    137  |  101  |  13  ----------------------------<  108<H>  4.1   |  27  |  0.70    Ca    9.3      22 Jun 2022 11:56  Phos  3.5     06-22  Mg     2.1     06-22    TPro  8.0  /  Alb  4.2  /  TBili  0.4  /  DBili  x   /  AST  22  /  ALT  17  /  AlkPhos  60  06-22      Urinalysis Basic - ( 22 Jun 2022 11:56 )    Color: Yellow / Appearance: Clear / SG: <=1.005 / pH: x  Gluc: x / Ketone: NEGATIVE  / Bili: Negative / Urobili: 0.2 E.U./dL   Blood: x / Protein: NEGATIVE mg/dL / Nitrite: NEGATIVE   Leuk Esterase: NEGATIVE / RBC: x / WBC x   Sq Epi: x / Non Sq Epi: x / Bacteria: x        MEDICATIONS  (STANDING):  chlorhexidine 0.12% Liquid 25 milliLiter(s) Swish and Spit once  chlorhexidine 4% Liquid 1 Application(s) Topical once  chlorhexidine 4% Liquid 1 Application(s) Topical once  chlorhexidine 4% Liquid 1 Application(s) Topical once  influenza  Vaccine (HIGH DOSE) 0.7 milliLiter(s) IntraMuscular once  sodium chloride 0.9% lock flush 3 milliLiter(s) IV Push every 8 hours    MEDICATIONS  (PRN):        RADIOLOGY & ADDITIONAL TESTS:    CT chest w/o on 2/19/22:   - descending thoracic aorta of 5 cm, aortic arch and ascending thoracic aorta a re atherosclertoic and ectatic, infrarenal aortic aneurysm. Mild COPD scattered 4 mm and smaller non specific pulmonary nodules     TTE from [facility] 2/3/22  - normal left atrium   - mild left ventricular hypertrophy with overall preserved systolic function   - mildly calcified aortic valve leaflets  - normal right atrium and right ventricle   - no pericardial effusion  - aortic insufficiency appears to be moderate to severe  - mild tricuspid insufficiency     CT angiogram of the thorax and abdomen 3/25/22  Impression:  - Ectatic distal abdominal aorta (infrarenal in location) with appropriate distal tapering.   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 right hepatic cyst       Patient discussed on morning rounds with Dr. Toledo    Operation / Date: Min invasive aortic valve replacement/ 6/23/22    SUBJECTIVE ASSESSMENT:  67y Male seen and examined at bedside post cardiac catheterization with no complaints. Pt states he spoke with Dr. Toledo and is aware of the procedure tomorrow. Pt has a TR band on his right wrist which is managed by cardiology. He has been walking without assistance and lives with his wife. He has been tolerating a PO diet. He is a current smoker but stated he decreased the amount of cigarettes from 1 ppd to 1-2 cigarettes a day. He is a social alcohol drinker but has not had anything recently. He denies chest pain, SOB/KAUFMAN, palpitations, dizziness, lightheadedness, n/v, abd pain, palpitations or any further associated symptoms.     Vital Signs Last 24 Hrs  HR: 58 (22 Jun 2022 12:01) (58 - 64)  BP: 114/57 (22 Jun 2022 12:01) (114/57 - 170/79)  BP(mean): 79 (22 Jun 2022 12:01) (79 - 114)  RR: 16 (22 Jun 2022 12:01) (16 - 18)  SpO2: 94% (22 Jun 2022 12:01) (94% - 96%)  I&O's Detail    PHYSICAL EXAM:  GEN: NAD, looks comfortable  Psych: Mood appropriate  Neuro: A&Ox3.  No focal deficits.  Moving all extremities.   HEENT: No obvious abnormalities  CV: S1S2, regular, slight systolic murmur noted at the right second intercostal space.  No carotid bruits.  No JVD  Lungs: Clear B/L.  No wheezing, rales or rhonchi  ABD: Soft, non-tender, non-distended.  +Bowel sounds  EXT: Warm and well perfused.  No peripheral edema noted  Musculoskeletal: Moving all extremities with normal ROM, no joint swelling  PV: Pedal pulses palpable  INCISIONS: Right TR band noted for cardiac cath site, no bleeding noted     LABS:                        11.2   4.76  )-----------( 166      ( 22 Jun 2022 11:56 )             34.6       PT/INR - ( 22 Jun 2022 11:56 )   PT: 13.9 sec;   INR: 1.17          PTT - ( 22 Jun 2022 11:56 )  PTT:27.9 sec    06-22    137  |  101  |  13  ----------------------------<  108<H>  4.1   |  27  |  0.70    Ca    9.3      22 Jun 2022 11:56  Phos  3.5     06-22  Mg     2.1     06-22    TPro  8.0  /  Alb  4.2  /  TBili  0.4  /  DBili  x   /  AST  22  /  ALT  17  /  AlkPhos  60  06-22      Urinalysis Basic - ( 22 Jun 2022 11:56 )    Color: Yellow / Appearance: Clear / SG: <=1.005 / pH: x  Gluc: x / Ketone: NEGATIVE  / Bili: Negative / Urobili: 0.2 E.U./dL   Blood: x / Protein: NEGATIVE mg/dL / Nitrite: NEGATIVE   Leuk Esterase: NEGATIVE / RBC: x / WBC x   Sq Epi: x / Non Sq Epi: x / Bacteria: x        MEDICATIONS  (STANDING):  chlorhexidine 0.12% Liquid 25 milliLiter(s) Swish and Spit once  chlorhexidine 4% Liquid 1 Application(s) Topical once  chlorhexidine 4% Liquid 1 Application(s) Topical once  chlorhexidine 4% Liquid 1 Application(s) Topical once  influenza  Vaccine (HIGH DOSE) 0.7 milliLiter(s) IntraMuscular once  sodium chloride 0.9% lock flush 3 milliLiter(s) IV Push every 8 hours    MEDICATIONS  (PRN):        RADIOLOGY & ADDITIONAL TESTS:    CT chest w/o on 2/19/22:   - descending thoracic aorta of 5 cm, aortic arch and ascending thoracic aorta a re atherosclertoic and ectatic, infrarenal aortic aneurysm. Mild COPD scattered 4 mm and smaller non specific pulmonary nodules     TTE from [facility] 2/3/22  - normal left atrium   - mild left ventricular hypertrophy with overall preserved systolic function   - mildly calcified aortic valve leaflets  - normal right atrium and right ventricle   - no pericardial effusion  - aortic insufficiency appears to be moderate to severe  - mild tricuspid insufficiency     CT angiogram of the thorax and abdomen 3/25/22  Impression:  - Ectatic distal abdominal aorta (infrarenal in location) with appropriate distal tapering.   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 right hepatic cyst    < from: US Duplex Carotid Arteries Complete, Bilateral (06.22.22 @ 16:37) >  IMPRESSION:    Elevated peak systolic velocity within the left internal carotid artery,   consistent with 50-69% stenosis.    Mild calcified and noncalcified plaque involving the carotid bifurcations   bilaterally, left greater than right.    Measurement of carotid stenosis is based on velocity parameters that   correlate the residual internal carotid diameter with that of the more   distal vessel in accordance with a method such as the [place] [employer]   Symptomatic Carotid Endarterectomy Trial (NASCET).    < end of copied text >  Dr. Toledo aware of results

## 2022-06-22 NOTE — PATIENT PROFILE ADULT - FALL HARM RISK - HARM RISK INTERVENTIONS

## 2022-06-22 NOTE — PROGRESS NOTE ADULT - REASON FOR ADMISSION
cardiac catheterization, pre op for min invasive aortic valve replacement with Dr. Toledo tomorrow 6/23

## 2022-06-22 NOTE — PROGRESS NOTE ADULT - ASSESSMENT
Assessment :   65 y/o male with a pmhx of HTN, anemia, arthritis, COPD, and a family history of marfan disease and aortic aneurysm (nephew) who presents for an aortic valve replacement with Dr. Toledo. Pt is a current smoker who has been smoking for over 40 years (1ppd) who recently decreased his use to 1-2 cigarettes a day. 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 Prior to arrival to [facility], he expressed concerns of significant fatigue and occasional SOB with palpitations with activity since his COVID vaccination in oct/no 2021. He can climb 2 flights of stairs and walk up to 1 mile. NYHA III. He denies blurred vision, dizziness, syncope, chest pain, dyspnea, nausea, vomiting, abd pain, back pain or swelling to the legs. on 6/22/22 he arrived to [facility] to be pre op for the OR tomorrow with Dr. Toledo for a min invasive aortic valve replacement.      Plan:    Neurovascular:   -no pain at this time  -no delirium     Cardiovascular:   #HTN  -started on Metoprolol 12.5 Q8H  -holding home HCTZ   -Hemodynamically stable.   -Monitor: BP, HR, tele    Respiratory:   #COPD  -will continue pts home inhaler Spiriva   -Oxygenating well on room air  -Encourage continued use of IS 10x/hr and frequent ambulation  -CXR: pending     GI:  -GI PPX: Protonix  -PO Diet  -Bowel Regimen: senna and miralax      Renal / :  -Continue to monitor renal function: BUN/Cr- pending   -Monitor I/O's daily     Endocrine:    -No hx of DM or thyroid dx  -A1c: pending   -TSH: pending     Hematologic:  -CBC: H/H-pending   -Coagulation Panel- pending    ID:  -Temperature: afebrile   -CBC: WBC-pending   -UA: negative   -Continue to observe for SIRS/Sepsis Syndrome.    Prophylaxis:  -DVT prophylaxis with 5000 SubQ Heparin q8h.  -Continue with SCD's b/l while patient is at rest     Disposition:  -Pre op for OR tomorrow with Dr. Toledo   Assessment :   67 y/o male with a pmhx of HTN, anemia, arthritis, COPD, and a family history of marfan disease and aortic aneurysm (nephew) who presents for an aortic valve replacement with Dr. Toledo. Pt is a current smoker who has been smoking for over 40 years (1ppd) who recently decreased his use to 1-2 cigarettes a day. 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 Prior to arrival to [facility], he expressed concerns of significant fatigue and occasional SOB with palpitations with activity since his COVID vaccination in oct/no 2021. He can climb 2 flights of stairs and walk up to 1 mile. NYHA III. He denies blurred vision, dizziness, syncope, chest pain, dyspnea, nausea, vomiting, abd pain, back pain or swelling to the legs. on 6/22/22 he arrived to [facility] to be pre op for the OR tomorrow with Dr. Toledo for a min invasive aortic valve replacement.      Plan:    Neurovascular:   -no pain at this time  -no delirium     Cardiovascular:   #HTN  -started on Metoprolol 12.5 Q8H  -holding home HCTZ   -Hemodynamically stable.   -Monitor: BP, HR, tele    Respiratory:   #COPD  -will continue pts home inhaler Spiriva   -Oxygenating well on room air  -Encourage continued use of IS 10x/hr and frequent ambulation  -CXR: pending     GI:  -GI PPX: Protonix  -PO Diet  -Bowel Regimen: senna and miralax      Renal / :  -Continue to monitor renal function: BUN/Cr- 13/0.70  -Monitor I/O's daily     Endocrine:    -No hx of DM or thyroid dx  -A1c: 4.3  -TSH: 1.150     Hematologic:  -CBC: H/H-11.2/34.6   -Coagulation Panel- pending    ID:  -Temperature: afebrile   -CBC: WBC-4.76  -UA: negative   -Continue to observe for SIRS/Sepsis Syndrome.    Prophylaxis:  -DVT prophylaxis with 5000 SubQ Heparin q8h.  -Continue with SCD's b/l while patient is at rest     Disposition:  -Pre op for OR tomorrow with Dr. Toledo   Assessment :   65 y/o male with a pmhx of HTN, anemia, arthritis, COPD, and a family history of marfan disease and aortic aneurysm (nephew) who presents for an aortic valve replacement with Dr. Toledo. Pt is a current smoker who has been smoking for over 40 years (1ppd) who recently decreased his use to 1-2 cigarettes a day. 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 Prior to arrival to [facility], he expressed concerns of significant fatigue and occasional SOB with palpitations with activity since his COVID vaccination in oct/no 2021. He can climb 2 flights of stairs and walk up to 1 mile. NYHA III. He denies blurred vision, dizziness, syncope, chest pain, dyspnea, nausea, vomiting, abd pain, back pain or swelling to the legs. on 6/22/22 he arrived to [facility] to be pre op for the OR tomorrow with Dr. Toledo for a min invasive aortic valve replacement.      Plan:    Neurovascular:   -no pain at this time  -no delirium     Cardiovascular:   #HTN  -started on Metoprolol 12.5 Q8H  -holding home HCTZ   -Hemodynamically stable.   -Monitor: BP, HR, tele  -US of Carotid arteries done today 6/22: Dr. Toledo aware of the results    Respiratory:   #COPD  -will continue pts home inhaler Spiriva   -Oxygenating well on room air  -Encourage continued use of IS 10x/hr and frequent ambulation  -CXR: pending     GI:  -GI PPX: Protonix  -PO Diet  -Bowel Regimen: senna and miralax      Renal / :  -Continue to monitor renal function: BUN/Cr- 13/0.70  -Monitor I/O's daily     Endocrine:    -No hx of DM or thyroid dx  -A1c: 4.3  -TSH: 1.150     Hematologic:  -CBC: H/H-11.2/34.6   -Coagulation Panel- pending    ID:  -Temperature: afebrile   -CBC: WBC-4.76  -UA: negative   -Continue to observe for SIRS/Sepsis Syndrome.    Prophylaxis:  -DVT prophylaxis with 5000 SubQ Heparin q8h.  -Continue with SCD's b/l while patient is at rest     Disposition:  -Pre op for OR tomorrow with Dr. Toledo

## 2022-06-23 ENCOUNTER — APPOINTMENT (OUTPATIENT)
Dept: CARDIOTHORACIC SURGERY | Facility: HOSPITAL | Age: 67
End: 2022-06-23

## 2022-06-23 ENCOUNTER — TRANSCRIPTION ENCOUNTER (OUTPATIENT)
Age: 67
End: 2022-06-23

## 2022-06-23 ENCOUNTER — RESULT REVIEW (OUTPATIENT)
Age: 67
End: 2022-06-23

## 2022-06-23 LAB
ALBUMIN SERPL ELPH-MCNC: 3.5 G/DL — SIGNIFICANT CHANGE UP (ref 3.3–5)
ALBUMIN SERPL ELPH-MCNC: 3.7 G/DL — SIGNIFICANT CHANGE UP (ref 3.3–5)
ALP SERPL-CCNC: 46 U/L — SIGNIFICANT CHANGE UP (ref 40–120)
ALP SERPL-CCNC: 51 U/L — SIGNIFICANT CHANGE UP (ref 40–120)
ALT FLD-CCNC: 18 U/L — SIGNIFICANT CHANGE UP (ref 10–45)
ALT FLD-CCNC: 20 U/L — SIGNIFICANT CHANGE UP (ref 10–45)
ANION GAP SERPL CALC-SCNC: 10 MMOL/L — SIGNIFICANT CHANGE UP (ref 5–17)
ANION GAP SERPL CALC-SCNC: 11 MMOL/L — SIGNIFICANT CHANGE UP (ref 5–17)
ANION GAP SERPL CALC-SCNC: 12 MMOL/L — SIGNIFICANT CHANGE UP (ref 5–17)
ANION GAP SERPL CALC-SCNC: 9 MMOL/L — SIGNIFICANT CHANGE UP (ref 5–17)
APTT BLD: 25.6 SEC — LOW (ref 27.5–35.5)
APTT BLD: 28.1 SEC — SIGNIFICANT CHANGE UP (ref 27.5–35.5)
APTT BLD: 29.4 SEC — SIGNIFICANT CHANGE UP (ref 27.5–35.5)
APTT BLD: 31.4 SEC — SIGNIFICANT CHANGE UP (ref 27.5–35.5)
AST SERPL-CCNC: 46 U/L — HIGH (ref 10–40)
AST SERPL-CCNC: 58 U/L — HIGH (ref 10–40)
BASE EXCESS BLDV CALC-SCNC: 5.5 MMOL/L — HIGH (ref -2–3)
BASOPHILS # BLD AUTO: 0.01 K/UL — SIGNIFICANT CHANGE UP (ref 0–0.2)
BASOPHILS NFR BLD AUTO: 0.2 % — SIGNIFICANT CHANGE UP (ref 0–2)
BILIRUB SERPL-MCNC: 0.8 MG/DL — SIGNIFICANT CHANGE UP (ref 0.2–1.2)
BILIRUB SERPL-MCNC: 0.9 MG/DL — SIGNIFICANT CHANGE UP (ref 0.2–1.2)
BUN SERPL-MCNC: 11 MG/DL — SIGNIFICANT CHANGE UP (ref 7–23)
BUN SERPL-MCNC: 12 MG/DL — SIGNIFICANT CHANGE UP (ref 7–23)
BUN SERPL-MCNC: 13 MG/DL — SIGNIFICANT CHANGE UP (ref 7–23)
BUN SERPL-MCNC: 16 MG/DL — SIGNIFICANT CHANGE UP (ref 7–23)
CALCIUM SERPL-MCNC: 8.3 MG/DL — LOW (ref 8.4–10.5)
CALCIUM SERPL-MCNC: 8.3 MG/DL — LOW (ref 8.4–10.5)
CALCIUM SERPL-MCNC: 8.5 MG/DL — SIGNIFICANT CHANGE UP (ref 8.4–10.5)
CALCIUM SERPL-MCNC: 9.1 MG/DL — SIGNIFICANT CHANGE UP (ref 8.4–10.5)
CHLORIDE SERPL-SCNC: 101 MMOL/L — SIGNIFICANT CHANGE UP (ref 96–108)
CHLORIDE SERPL-SCNC: 101 MMOL/L — SIGNIFICANT CHANGE UP (ref 96–108)
CHLORIDE SERPL-SCNC: 103 MMOL/L — SIGNIFICANT CHANGE UP (ref 96–108)
CHLORIDE SERPL-SCNC: 104 MMOL/L — SIGNIFICANT CHANGE UP (ref 96–108)
CO2 BLDV-SCNC: 32.6 MMOL/L — HIGH (ref 22–26)
CO2 SERPL-SCNC: 24 MMOL/L — SIGNIFICANT CHANGE UP (ref 22–31)
CO2 SERPL-SCNC: 24 MMOL/L — SIGNIFICANT CHANGE UP (ref 22–31)
CO2 SERPL-SCNC: 26 MMOL/L — SIGNIFICANT CHANGE UP (ref 22–31)
CO2 SERPL-SCNC: 27 MMOL/L — SIGNIFICANT CHANGE UP (ref 22–31)
CREAT SERPL-MCNC: 0.63 MG/DL — SIGNIFICANT CHANGE UP (ref 0.5–1.3)
CREAT SERPL-MCNC: 0.65 MG/DL — SIGNIFICANT CHANGE UP (ref 0.5–1.3)
CREAT SERPL-MCNC: 0.68 MG/DL — SIGNIFICANT CHANGE UP (ref 0.5–1.3)
CREAT SERPL-MCNC: 0.7 MG/DL — SIGNIFICANT CHANGE UP (ref 0.5–1.3)
EGFR: 100 ML/MIN/1.73M2 — SIGNIFICANT CHANGE UP
EGFR: 101 ML/MIN/1.73M2 — SIGNIFICANT CHANGE UP
EGFR: 103 ML/MIN/1.73M2 — SIGNIFICANT CHANGE UP
EGFR: 104 ML/MIN/1.73M2 — SIGNIFICANT CHANGE UP
EOSINOPHIL # BLD AUTO: 0.01 K/UL — SIGNIFICANT CHANGE UP (ref 0–0.5)
EOSINOPHIL NFR BLD AUTO: 0.2 % — SIGNIFICANT CHANGE UP (ref 0–6)
GAS PNL BLDA: SIGNIFICANT CHANGE UP
GLUCOSE BLDC GLUCOMTR-MCNC: 126 MG/DL — HIGH (ref 70–99)
GLUCOSE BLDC GLUCOMTR-MCNC: 173 MG/DL — HIGH (ref 70–99)
GLUCOSE SERPL-MCNC: 143 MG/DL — HIGH (ref 70–99)
GLUCOSE SERPL-MCNC: 146 MG/DL — HIGH (ref 70–99)
GLUCOSE SERPL-MCNC: 158 MG/DL — HIGH (ref 70–99)
GLUCOSE SERPL-MCNC: 97 MG/DL — SIGNIFICANT CHANGE UP (ref 70–99)
HCO3 BLDV-SCNC: 31 MMOL/L — HIGH (ref 22–29)
HCT VFR BLD CALC: 26 % — LOW (ref 39–50)
HCT VFR BLD CALC: 27.4 % — LOW (ref 39–50)
HCT VFR BLD CALC: 27.5 % — LOW (ref 39–50)
HCT VFR BLD CALC: 32 % — LOW (ref 39–50)
HGB BLD-MCNC: 10.5 G/DL — LOW (ref 13–17)
HGB BLD-MCNC: 8.5 G/DL — LOW (ref 13–17)
HGB BLD-MCNC: 9 G/DL — LOW (ref 13–17)
HGB BLD-MCNC: 9.2 G/DL — LOW (ref 13–17)
IMM GRANULOCYTES NFR BLD AUTO: 0.5 % — SIGNIFICANT CHANGE UP (ref 0–1.5)
INR BLD: 0.94 — SIGNIFICANT CHANGE UP (ref 0.88–1.16)
INR BLD: 1.02 — SIGNIFICANT CHANGE UP (ref 0.88–1.16)
INR BLD: 1.08 — SIGNIFICANT CHANGE UP (ref 0.88–1.16)
INR BLD: 1.1 — SIGNIFICANT CHANGE UP (ref 0.88–1.16)
ISTAT ARTERIAL BE: 7 MMOL/L — HIGH (ref -2–3)
ISTAT ARTERIAL GLUCOSE: 140 MG/DL — HIGH (ref 70–99)
ISTAT ARTERIAL HCO3: 32 MMOL/L — HIGH (ref 22–26)
ISTAT ARTERIAL HEMATOCRIT: 27 % — LOW (ref 39–50)
ISTAT ARTERIAL HEMOGLOBIN: 9.2 G/DL — LOW (ref 13–17)
ISTAT ARTERIAL IONIZED CALCIUM: 1.11 MMOL/L — LOW (ref 1.12–1.3)
ISTAT ARTERIAL PCO2: 49 MMHG — HIGH (ref 35–45)
ISTAT ARTERIAL PH: 7.42 — SIGNIFICANT CHANGE UP (ref 7.35–7.45)
ISTAT ARTERIAL PO2: 394 MMHG — HIGH (ref 80–105)
ISTAT ARTERIAL POTASSIUM: 4 MMOL/L — SIGNIFICANT CHANGE UP (ref 3.5–5.3)
ISTAT ARTERIAL SO2: 100 % — HIGH (ref 95–98)
ISTAT ARTERIAL SODIUM: 140 MMOL/L — SIGNIFICANT CHANGE UP (ref 135–145)
ISTAT ARTERIAL TCO2: 33 MMOL/L — HIGH (ref 22–31)
LACTATE SERPL-SCNC: 0.9 MMOL/L — SIGNIFICANT CHANGE UP (ref 0.5–2)
LACTATE SERPL-SCNC: 1.5 MMOL/L — SIGNIFICANT CHANGE UP (ref 0.5–2)
LACTATE SERPL-SCNC: 1.5 MMOL/L — SIGNIFICANT CHANGE UP (ref 0.5–2)
LYMPHOCYTES # BLD AUTO: 0.45 K/UL — LOW (ref 1–3.3)
LYMPHOCYTES # BLD AUTO: 10.3 % — LOW (ref 13–44)
MAGNESIUM SERPL-MCNC: 2.1 MG/DL — SIGNIFICANT CHANGE UP (ref 1.6–2.6)
MAGNESIUM SERPL-MCNC: 2.2 MG/DL — SIGNIFICANT CHANGE UP (ref 1.6–2.6)
MAGNESIUM SERPL-MCNC: 2.4 MG/DL — SIGNIFICANT CHANGE UP (ref 1.6–2.6)
MAGNESIUM SERPL-MCNC: 2.8 MG/DL — HIGH (ref 1.6–2.6)
MCHC RBC-ENTMCNC: 31.3 PG — SIGNIFICANT CHANGE UP (ref 27–34)
MCHC RBC-ENTMCNC: 31.4 PG — SIGNIFICANT CHANGE UP (ref 27–34)
MCHC RBC-ENTMCNC: 31.6 PG — SIGNIFICANT CHANGE UP (ref 27–34)
MCHC RBC-ENTMCNC: 32 PG — SIGNIFICANT CHANGE UP (ref 27–34)
MCHC RBC-ENTMCNC: 32.7 GM/DL — SIGNIFICANT CHANGE UP (ref 32–36)
MCHC RBC-ENTMCNC: 32.8 GM/DL — SIGNIFICANT CHANGE UP (ref 32–36)
MCHC RBC-ENTMCNC: 32.8 GM/DL — SIGNIFICANT CHANGE UP (ref 32–36)
MCHC RBC-ENTMCNC: 33.5 GM/DL — SIGNIFICANT CHANGE UP (ref 32–36)
MCV RBC AUTO: 94.5 FL — SIGNIFICANT CHANGE UP (ref 80–100)
MCV RBC AUTO: 95.5 FL — SIGNIFICANT CHANGE UP (ref 80–100)
MCV RBC AUTO: 95.6 FL — SIGNIFICANT CHANGE UP (ref 80–100)
MCV RBC AUTO: 97.6 FL — SIGNIFICANT CHANGE UP (ref 80–100)
MONOCYTES # BLD AUTO: 0.42 K/UL — SIGNIFICANT CHANGE UP (ref 0–0.9)
MONOCYTES NFR BLD AUTO: 9.6 % — SIGNIFICANT CHANGE UP (ref 2–14)
NEUTROPHILS # BLD AUTO: 3.45 K/UL — SIGNIFICANT CHANGE UP (ref 1.8–7.4)
NEUTROPHILS NFR BLD AUTO: 79.2 % — HIGH (ref 43–77)
NRBC # BLD: 0 /100 WBCS — SIGNIFICANT CHANGE UP (ref 0–0)
PCO2 BLDV: 48 MMHG — SIGNIFICANT CHANGE UP (ref 42–55)
PH BLDV: 7.42 — SIGNIFICANT CHANGE UP (ref 7.32–7.43)
PHOSPHATE SERPL-MCNC: 3.1 MG/DL — SIGNIFICANT CHANGE UP (ref 2.5–4.5)
PHOSPHATE SERPL-MCNC: 3.2 MG/DL — SIGNIFICANT CHANGE UP (ref 2.5–4.5)
PHOSPHATE SERPL-MCNC: 3.6 MG/DL — SIGNIFICANT CHANGE UP (ref 2.5–4.5)
PHOSPHATE SERPL-MCNC: 3.9 MG/DL — SIGNIFICANT CHANGE UP (ref 2.5–4.5)
PLATELET # BLD AUTO: 128 K/UL — LOW (ref 150–400)
PLATELET # BLD AUTO: 132 K/UL — LOW (ref 150–400)
PLATELET # BLD AUTO: 138 K/UL — LOW (ref 150–400)
PLATELET # BLD AUTO: 163 K/UL — SIGNIFICANT CHANGE UP (ref 150–400)
PO2 BLDV: 43 MMHG — SIGNIFICANT CHANGE UP (ref 25–45)
POTASSIUM SERPL-MCNC: 3.6 MMOL/L — SIGNIFICANT CHANGE UP (ref 3.5–5.3)
POTASSIUM SERPL-MCNC: 4 MMOL/L — SIGNIFICANT CHANGE UP (ref 3.5–5.3)
POTASSIUM SERPL-MCNC: 4 MMOL/L — SIGNIFICANT CHANGE UP (ref 3.5–5.3)
POTASSIUM SERPL-MCNC: 4.2 MMOL/L — SIGNIFICANT CHANGE UP (ref 3.5–5.3)
POTASSIUM SERPL-SCNC: 3.6 MMOL/L — SIGNIFICANT CHANGE UP (ref 3.5–5.3)
POTASSIUM SERPL-SCNC: 4 MMOL/L — SIGNIFICANT CHANGE UP (ref 3.5–5.3)
POTASSIUM SERPL-SCNC: 4 MMOL/L — SIGNIFICANT CHANGE UP (ref 3.5–5.3)
POTASSIUM SERPL-SCNC: 4.2 MMOL/L — SIGNIFICANT CHANGE UP (ref 3.5–5.3)
PROT SERPL-MCNC: 6.1 G/DL — SIGNIFICANT CHANGE UP (ref 6–8.3)
PROT SERPL-MCNC: 6.9 G/DL — SIGNIFICANT CHANGE UP (ref 6–8.3)
PROTHROM AB SERPL-ACNC: 11.2 SEC — SIGNIFICANT CHANGE UP (ref 10.5–13.4)
PROTHROM AB SERPL-ACNC: 12.2 SEC — SIGNIFICANT CHANGE UP (ref 10.5–13.4)
PROTHROM AB SERPL-ACNC: 12.9 SEC — SIGNIFICANT CHANGE UP (ref 10.5–13.4)
PROTHROM AB SERPL-ACNC: 13.1 SEC — SIGNIFICANT CHANGE UP (ref 10.5–13.4)
RBC # BLD: 2.72 M/UL — LOW (ref 4.2–5.8)
RBC # BLD: 2.87 M/UL — LOW (ref 4.2–5.8)
RBC # BLD: 2.91 M/UL — LOW (ref 4.2–5.8)
RBC # BLD: 3.28 M/UL — LOW (ref 4.2–5.8)
RBC # FLD: 15.4 % — HIGH (ref 10.3–14.5)
RBC # FLD: 15.9 % — HIGH (ref 10.3–14.5)
RBC # FLD: 16.3 % — HIGH (ref 10.3–14.5)
RBC # FLD: 16.5 % — HIGH (ref 10.3–14.5)
SAO2 % BLDV: 72.4 % — SIGNIFICANT CHANGE UP (ref 67–88)
SODIUM SERPL-SCNC: 135 MMOL/L — SIGNIFICANT CHANGE UP (ref 135–145)
SODIUM SERPL-SCNC: 137 MMOL/L — SIGNIFICANT CHANGE UP (ref 135–145)
SODIUM SERPL-SCNC: 140 MMOL/L — SIGNIFICANT CHANGE UP (ref 135–145)
SODIUM SERPL-SCNC: 140 MMOL/L — SIGNIFICANT CHANGE UP (ref 135–145)
WBC # BLD: 4.36 K/UL — SIGNIFICANT CHANGE UP (ref 3.8–10.5)
WBC # BLD: 4.39 K/UL — SIGNIFICANT CHANGE UP (ref 3.8–10.5)
WBC # BLD: 4.53 K/UL — SIGNIFICANT CHANGE UP (ref 3.8–10.5)
WBC # BLD: 5.31 K/UL — SIGNIFICANT CHANGE UP (ref 3.8–10.5)
WBC # FLD AUTO: 4.36 K/UL — SIGNIFICANT CHANGE UP (ref 3.8–10.5)
WBC # FLD AUTO: 4.39 K/UL — SIGNIFICANT CHANGE UP (ref 3.8–10.5)
WBC # FLD AUTO: 4.53 K/UL — SIGNIFICANT CHANGE UP (ref 3.8–10.5)
WBC # FLD AUTO: 5.31 K/UL — SIGNIFICANT CHANGE UP (ref 3.8–10.5)

## 2022-06-23 PROCEDURE — 33405 REPLACEMENT AORTIC VALVE OPN: CPT

## 2022-06-23 PROCEDURE — 33859 AS-AORT GRF F/DS OTH/THN DSJ: CPT

## 2022-06-23 PROCEDURE — 88305 TISSUE EXAM BY PATHOLOGIST: CPT | Mod: 26

## 2022-06-23 PROCEDURE — 33859 AS-AORT GRF F/DS OTH/THN DSJ: CPT | Mod: 80

## 2022-06-23 PROCEDURE — 99291 CRITICAL CARE FIRST HOUR: CPT

## 2022-06-23 PROCEDURE — 99233 SBSQ HOSP IP/OBS HIGH 50: CPT

## 2022-06-23 PROCEDURE — 93010 ELECTROCARDIOGRAM REPORT: CPT

## 2022-06-23 PROCEDURE — 99292 CRITICAL CARE ADDL 30 MIN: CPT

## 2022-06-23 PROCEDURE — 0042T: CPT

## 2022-06-23 PROCEDURE — 33866 AORTIC HEMIARCH GRAFT: CPT

## 2022-06-23 PROCEDURE — 33405 REPLACEMENT AORTIC VALVE OPN: CPT | Mod: 80

## 2022-06-23 PROCEDURE — 70498 CT ANGIOGRAPHY NECK: CPT | Mod: 26

## 2022-06-23 PROCEDURE — 33866 AORTIC HEMIARCH GRAFT: CPT | Mod: 80

## 2022-06-23 PROCEDURE — 70496 CT ANGIOGRAPHY HEAD: CPT | Mod: 26

## 2022-06-23 PROCEDURE — 71045 X-RAY EXAM CHEST 1 VIEW: CPT | Mod: 26

## 2022-06-23 RX ORDER — CEFAZOLIN SODIUM 1 G
2000 VIAL (EA) INJECTION EVERY 8 HOURS
Refills: 0 | Status: DISCONTINUED | OUTPATIENT
Start: 2022-06-23 | End: 2022-06-23

## 2022-06-23 RX ORDER — MEPERIDINE HYDROCHLORIDE 50 MG/ML
25 INJECTION INTRAMUSCULAR; INTRAVENOUS; SUBCUTANEOUS ONCE
Refills: 0 | Status: DISCONTINUED | OUTPATIENT
Start: 2022-06-23 | End: 2022-06-23

## 2022-06-23 RX ORDER — CHLORHEXIDINE GLUCONATE 213 G/1000ML
1 SOLUTION TOPICAL
Refills: 0 | Status: DISCONTINUED | OUTPATIENT
Start: 2022-06-23 | End: 2022-07-04

## 2022-06-23 RX ORDER — DEXMEDETOMIDINE HYDROCHLORIDE IN 0.9% SODIUM CHLORIDE 4 UG/ML
0.2 INJECTION INTRAVENOUS
Qty: 400 | Refills: 0 | Status: DISCONTINUED | OUTPATIENT
Start: 2022-06-23 | End: 2022-06-24

## 2022-06-23 RX ORDER — CHLORHEXIDINE GLUCONATE 213 G/1000ML
15 SOLUTION TOPICAL EVERY 12 HOURS
Refills: 0 | Status: DISCONTINUED | OUTPATIENT
Start: 2022-06-23 | End: 2022-06-25

## 2022-06-23 RX ORDER — FENTANYL CITRATE 50 UG/ML
25 INJECTION INTRAVENOUS
Refills: 0 | Status: DISCONTINUED | OUTPATIENT
Start: 2022-06-23 | End: 2022-06-24

## 2022-06-23 RX ORDER — HEPARIN SODIUM 5000 [USP'U]/ML
5000 INJECTION INTRAVENOUS; SUBCUTANEOUS EVERY 8 HOURS
Refills: 0 | Status: DISCONTINUED | OUTPATIENT
Start: 2022-06-23 | End: 2022-07-05

## 2022-06-23 RX ORDER — ATORVASTATIN CALCIUM 80 MG/1
40 TABLET, FILM COATED ORAL AT BEDTIME
Refills: 0 | Status: DISCONTINUED | OUTPATIENT
Start: 2022-06-23 | End: 2022-06-27

## 2022-06-23 RX ORDER — DEXTROSE 50 % IN WATER 50 %
50 SYRINGE (ML) INTRAVENOUS
Refills: 0 | Status: DISCONTINUED | OUTPATIENT
Start: 2022-06-23 | End: 2022-07-05

## 2022-06-23 RX ORDER — CHLORHEXIDINE GLUCONATE 213 G/1000ML
5 SOLUTION TOPICAL
Refills: 0 | Status: DISCONTINUED | OUTPATIENT
Start: 2022-06-23 | End: 2022-06-25

## 2022-06-23 RX ORDER — VANCOMYCIN HCL 1 G
1000 VIAL (EA) INTRAVENOUS EVERY 12 HOURS
Refills: 0 | Status: COMPLETED | OUTPATIENT
Start: 2022-06-23 | End: 2022-06-24

## 2022-06-23 RX ORDER — PANTOPRAZOLE SODIUM 20 MG/1
40 TABLET, DELAYED RELEASE ORAL DAILY
Refills: 0 | Status: DISCONTINUED | OUTPATIENT
Start: 2022-06-23 | End: 2022-06-27

## 2022-06-23 RX ORDER — DIPHENHYDRAMINE HCL 50 MG
50 CAPSULE ORAL ONCE
Refills: 0 | Status: COMPLETED | OUTPATIENT
Start: 2022-06-23 | End: 2022-06-23

## 2022-06-23 RX ORDER — DIPHENHYDRAMINE HCL 50 MG
50 CAPSULE ORAL ONCE
Refills: 0 | Status: DISCONTINUED | OUTPATIENT
Start: 2022-06-23 | End: 2022-06-23

## 2022-06-23 RX ORDER — ACETAMINOPHEN 500 MG
1000 TABLET ORAL ONCE
Refills: 0 | Status: COMPLETED | OUTPATIENT
Start: 2022-06-23 | End: 2022-06-26

## 2022-06-23 RX ORDER — INSULIN HUMAN 100 [IU]/ML
1 INJECTION, SOLUTION SUBCUTANEOUS
Qty: 50 | Refills: 0 | Status: DISCONTINUED | OUTPATIENT
Start: 2022-06-23 | End: 2022-06-25

## 2022-06-23 RX ORDER — PROPOFOL 10 MG/ML
5 INJECTION, EMULSION INTRAVENOUS
Qty: 1000 | Refills: 0 | Status: DISCONTINUED | OUTPATIENT
Start: 2022-06-23 | End: 2022-06-26

## 2022-06-23 RX ORDER — DEXMEDETOMIDINE HYDROCHLORIDE IN 0.9% SODIUM CHLORIDE 4 UG/ML
0.2 INJECTION INTRAVENOUS
Qty: 200 | Refills: 0 | Status: DISCONTINUED | OUTPATIENT
Start: 2022-06-23 | End: 2022-06-23

## 2022-06-23 RX ORDER — SODIUM CHLORIDE 9 MG/ML
1000 INJECTION INTRAMUSCULAR; INTRAVENOUS; SUBCUTANEOUS
Refills: 0 | Status: DISCONTINUED | OUTPATIENT
Start: 2022-06-23 | End: 2022-07-05

## 2022-06-23 RX ORDER — NICARDIPINE HYDROCHLORIDE 30 MG/1
5 CAPSULE, EXTENDED RELEASE ORAL
Qty: 40 | Refills: 0 | Status: DISCONTINUED | OUTPATIENT
Start: 2022-06-23 | End: 2022-06-24

## 2022-06-23 RX ORDER — ASPIRIN/CALCIUM CARB/MAGNESIUM 324 MG
81 TABLET ORAL DAILY
Refills: 0 | Status: DISCONTINUED | OUTPATIENT
Start: 2022-06-24 | End: 2022-06-24

## 2022-06-23 RX ORDER — DEXTROSE 50 % IN WATER 50 %
25 SYRINGE (ML) INTRAVENOUS
Refills: 0 | Status: DISCONTINUED | OUTPATIENT
Start: 2022-06-23 | End: 2022-07-05

## 2022-06-23 RX ADMIN — SODIUM CHLORIDE 10 MILLILITER(S): 9 INJECTION INTRAMUSCULAR; INTRAVENOUS; SUBCUTANEOUS at 16:34

## 2022-06-23 RX ADMIN — Medication 120 MILLIGRAM(S): at 16:44

## 2022-06-23 RX ADMIN — PROPOFOL 2.39 MICROGRAM(S)/KG/MIN: 10 INJECTION, EMULSION INTRAVENOUS at 16:34

## 2022-06-23 RX ADMIN — FENTANYL CITRATE 25 MICROGRAM(S): 50 INJECTION INTRAVENOUS at 21:30

## 2022-06-23 RX ADMIN — FENTANYL CITRATE 25 MICROGRAM(S): 50 INJECTION INTRAVENOUS at 19:13

## 2022-06-23 RX ADMIN — Medication 250 MILLIGRAM(S): at 18:36

## 2022-06-23 RX ADMIN — FENTANYL CITRATE 25 MICROGRAM(S): 50 INJECTION INTRAVENOUS at 21:50

## 2022-06-23 RX ADMIN — CHLORHEXIDINE GLUCONATE 15 MILLILITER(S): 213 SOLUTION TOPICAL at 18:36

## 2022-06-23 RX ADMIN — CHLORHEXIDINE GLUCONATE 15 MILLILITER(S): 213 SOLUTION TOPICAL at 04:57

## 2022-06-23 RX ADMIN — CHLORHEXIDINE GLUCONATE 1 APPLICATION(S): 213 SOLUTION TOPICAL at 04:53

## 2022-06-23 RX ADMIN — FENTANYL CITRATE 25 MICROGRAM(S): 50 INJECTION INTRAVENOUS at 21:11

## 2022-06-23 RX ADMIN — Medication 50 MILLIGRAM(S): at 19:09

## 2022-06-23 RX ADMIN — NICARDIPINE HYDROCHLORIDE 25 MG/HR: 30 CAPSULE, EXTENDED RELEASE ORAL at 16:34

## 2022-06-23 RX ADMIN — DEXMEDETOMIDINE HYDROCHLORIDE IN 0.9% SODIUM CHLORIDE 3.99 MICROGRAM(S)/KG/HR: 4 INJECTION INTRAVENOUS at 19:09

## 2022-06-23 NOTE — PROVIDER CONTACT NOTE (OTHER) - SITUATION
star moore made aware, pt not moving left arm/ no squeeze and no elevation of the left arm, per star moore to monitor for now no further orders.

## 2022-06-23 NOTE — CONSULT NOTE ADULT - ASSESSMENT
62 yo M, current smoker (½ PPD x 40 years), former ETOH abuse (40yr hx, quit 7 years ago), FH of Marfan's Syndrome and Aortic Aneurysms, PMHx HTN, severe aortic regurgitation, dilated ascending aorta and dilated descending aorta, anemia, arthritis, COPD, now s/p upper hemisternotomy, AVR, ascending and hemiarch replacement on 6/23 c/b left sided paralysis, NIHSS 22. CTA significant for MELISSA occlusion with partial reconstitution of RMCA from R ACOMM. 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    - Stroke will continue to follow  - Please maintain MAP >100mm Hg and assess for improvement in exam; questioning if hypoperfusion may be contributing to symptoms  - q1h stroke neuro checks  - Continue with ASA 81mg daily  - Recommend MRI brain without contrast once stable  - Stroke education    Case discussed with Dr. Temitope Polanco 62 yo M, current smoker (½ PPD x 40 years), former ETOH abuse (40yr hx, quit 7 years ago), FH of Marfan's Syndrome and Aortic Aneurysms, PMHx HTN, severe aortic regurgitation, dilated ascending aorta and dilated descending aorta, anemia, arthritis, COPD, now s/p upper hemisternotomy, AVR, ascending and hemiarch replacement on 6/23 c/b left sided paralysis, NIHSS 22. CTA significant for MELISSA occlusion with partial reconstitution of RMCA from R ACOMM. 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    - Stroke will continue to follow  - Please maintain MAP between 100-110mmHg and assess for improvement in exam; questioning if hypoperfusion may be contributing to symptoms  - q1h stroke neuro checks  - Continue with ASA 81mg daily  - Recommend MRI brain without contrast once stable  - Stroke education    Case discussed with Dr. Temitope Polanco

## 2022-06-23 NOTE — BRIEF OPERATIVE NOTE - COMMENTS
Dr. Wilson was the first assistant for this case including but not limited to upper hemisternotomy, AVR, ascending and hemiarch replacement     I was present for this procedure and participated as first assistant as described by the PA above, unless otherwise noted below.

## 2022-06-23 NOTE — PROGRESS NOTE ADULT - SUBJECTIVE AND OBJECTIVE BOX
CTICU  CRITICAL  CARE  attending     Hand off received 					   Pertinent clinical, laboratory, radiographic, hemodynamic, echocardiographic, respiratory data, microbiologic data and chart were reviewed and analyzed frequently throughout the course of the day and night  Patient seen and examined with CTS/ SH attending at bedside    Pt is a 67y , Male, operative day s/p minimally invasive AVR; replacement of ascending aorta and rony arch    intraop:    uneventful    post op:    intubated/sedated  volume resuscitated  woke up non focal  groin lines d/cd in prep for weaning and extubation        , FAMILY HISTORY:  Family history of Marfan syndrome (Uncle)    FH: aortic aneurysm (Uncle)    PAST MEDICAL & SURGICAL HISTORY:  HTN (hypertension)      Aortic regurgitation      Anemia      Arthritis      COPD, mild      H/O cataract extraction        Patient is a 67y old  Male who presents  for min invasive aortic valve replacement  (22 Jun 2022 16:24)      14 system review unable to assess    Vital signs, hemodynamic and respiratory parameters were reviewed from the bedside nursing flowsheet.  ICU Vital Signs Last 24 Hrs  T(C): 36.1 (23 Jun 2022 17:28), Max: 36.8 (22 Jun 2022 22:20)  T(F): 97 (23 Jun 2022 17:28), Max: 98.3 (23 Jun 2022 05:08)  HR: 68 (23 Jun 2022 19:00) (49 - 82)  BP: 165/81 (23 Jun 2022 05:08) (141/63 - 165/81)  BP(mean): 117 (23 Jun 2022 04:45) (91 - 117)  ABP: 118/61 (23 Jun 2022 19:00) (109/58 - 147/71)  ABP(mean): 83 (23 Jun 2022 19:00) (76 - 102)  RR: 14 (23 Jun 2022 19:00) (14 - 21)  SpO2: 98% (23 Jun 2022 19:00) (94% - 100%)    Adult Advanced Hemodynamics Last 24 Hrs  CVP(mm Hg): 7 (23 Jun 2022 19:00) (3 - 86)  CVP(cm H2O): --  CO: --  CI: --  PA: 34/14 (23 Jun 2022 19:00) (20/9 - 265/265)  PA(mean): 21 (23 Jun 2022 19:00) (14 - 265)  PCWP: --  SVR: --  SVRI: --  PVR: --  PVRI: --, ABG - ( 23 Jun 2022 18:32 )  pH, Arterial: 7.45  pH, Blood: x     /  pCO2: 41    /  pO2: 181   / HCO3: 28    / Base Excess: 4.1   /  SaO2: 98.7              Mode: AC/ CMV (Assist Control/ Continuous Mandatory Ventilation)  RR (machine): 14  TV (machine): 600  FiO2: 50  PEEP: 5  ITime: 1  MAP: 8  PIP: 17    Intake and output was reviewed and the fluid balance was calculated  Daily Height in cm: 187.96 (23 Jun 2022 05:08)    Daily   I&O's Summary    22 Jun 2022 07:01  -  23 Jun 2022 07:00  --------------------------------------------------------  IN: 417 mL / OUT: 0 mL / NET: 417 mL    23 Jun 2022 07:01  -  23 Jun 2022 20:18  --------------------------------------------------------  IN: 456.3 mL / OUT: 1190 mL / NET: -733.7 mL        All lines and drain sites were assessed  Glycemic trend was reviewedCAPILLARY BLOOD GLUCOSE      POCT Blood Glucose.: 126 mg/dL (23 Jun 2022 18:18)    No acute change in mental status  Auscultation of the chest reveals equal bs  Abdomen is soft  Extremities are warm and well perfused  Wounds appear clean and unremarkable  Antibiotics are periop    labs  CBC Full  -  ( 23 Jun 2022 18:36 )  WBC Count : 4.53 K/uL  RBC Count : 2.87 M/uL  Hemoglobin : 9.0 g/dL  Hematocrit : 27.4 %  Platelet Count - Automated : 138 K/uL  Mean Cell Volume : 95.5 fl  Mean Cell Hemoglobin : 31.4 pg  Mean Cell Hemoglobin Concentration : 32.8 gm/dL  Auto Neutrophil # : x  Auto Lymphocyte # : x  Auto Monocyte # : x  Auto Eosinophil # : x  Auto Basophil # : x  Auto Neutrophil % : x  Auto Lymphocyte % : x  Auto Monocyte % : x  Auto Eosinophil % : x  Auto Basophil % : x    06-23    140  |  103  |  13  ----------------------------<  146<H>  4.0   |  26  |  0.65    Ca    8.5      23 Jun 2022 18:36  Phos  3.1     06-23  Mg     2.4     06-23    TPro  6.1  /  Alb  3.5  /  TBili  0.8  /  DBili  x   /  AST  46<H>  /  ALT  18  /  AlkPhos  46  06-23    PT/INR - ( 23 Jun 2022 18:36 )   PT: 12.2 sec;   INR: 1.02          PTT - ( 23 Jun 2022 18:36 )  PTT:31.4 sec  The current medications were reviewed   MEDICATIONS  (STANDING):  atorvastatin 40 milliGRAM(s) Oral at bedtime  chlorhexidine 0.12% Liquid 5 milliLiter(s) Oral Mucosa two times a day  chlorhexidine 0.12% Liquid 15 milliLiter(s) Oral Mucosa every 12 hours  chlorhexidine 2% Cloths 1 Application(s) Topical <User Schedule>  dexMEDEtomidine Infusion 0.2 MICROgram(s)/kG/Hr (3.99 mL/Hr) IV Continuous <Continuous>  dextrose 50% Injectable 50 milliLiter(s) IV Push every 15 minutes  dextrose 50% Injectable 25 milliLiter(s) IV Push every 15 minutes  fentaNYL    Injectable 25 MICROGram(s) IV Push every 3 hours  heparin   Injectable 5000 Unit(s) SubCutaneous every 8 hours  insulin regular Infusion 1 Unit(s)/Hr (1 mL/Hr) IV Continuous <Continuous>  niCARdipine Infusion 5 mG/Hr (25 mL/Hr) IV Continuous <Continuous>  pantoprazole  Injectable 40 milliGRAM(s) IV Push daily  propofol Infusion 5 MICROgram(s)/kG/Min (2.39 mL/Hr) IV Continuous <Continuous>  sodium chloride 0.9%. 1000 milliLiter(s) (10 mL/Hr) IV Continuous <Continuous>  tiotropium 18 MICROgram(s) Capsule 1 Capsule(s) Inhalation daily  vancomycin  IVPB 1000 milliGRAM(s) IV Intermittent every 12 hours    MEDICATIONS  (PRN):  acetaminophen   IVPB .. 1000 milliGRAM(s) IV Intermittent once PRN Mild Pain (1 - 3)       PROBLEM LIST/ ASSESSMENT:  HEALTH ISSUES - PROBLEM Dx:      Active:    encounter for weaning from respirator  acute post H'gic anemia  s/p AVR  s/p repl of ascending aorta    stable/established:    HTN (hypertension)  Aortic regurgitation  Anemia  Arthritis  COPD, mild  H/O cataract extraction    ,   Patient is a 67y old  Male who presents  for min invasive aortic valve replacement  (22 Jun 2022 16:24)     s/p minimally invasive AVR; replacement of ascending aorta and rony arch      My plan includes :    titrate nicardipine infusion to maintain MAP <80  wean to extubate  titrate Fio2 to maintain Sao2 >95  trend CBC; transfuse if indicated    close hemodynamic, ventilatory and drain monitoring and management per post op routine    Monitor for arrhythmias and monitor parameters for organ perfusion  monitor neurologic status  Head of the bed should remain elevated to 45 deg .   chest PT and IS will be encouraged  monitor adequacy of oxygenation and ventilation and attempt to wean oxygen  Nutritional goals will be met using po eventually , ensure adequate caloric intake and montior the same  Stress ulcer and VTE prophylaxis will be achieved    Glycemic control is satisfactory  Electrolytes have been repleted as necessary and wound care has been carried out. Pain control has been achieved.   agressive physical therapy and early mobility and ambulation goals will be met   The family was updated about the course and plan  CRITICAL CARE TIME SPENT in evaluation and management, reassessments, review and interpretation of labs and x-rays, ventilator and hemodynamic management, formulating a plan and coordinating care: ___90____ MIN.  Time does not include procedural time.  CTICU ATTENDING     					    Jamaal Figueredo MD

## 2022-06-23 NOTE — CONSULT NOTE ADULT - SUBJECTIVE AND OBJECTIVE BOX
**STROKE CODE CONSULT NOTE**    Time of onset of symptoms: 6/23 at approximately 2015    HPI: 62 yo M, current smoker (½ PPD x 40 years), former ETOH abuse (40yr hx, quit 7 years ago), FH of Marfan's Syndrome and Aortic Aneurysms, PMHx HTN, severe aortic regurgitation, dilated ascending aorta and dilated descending aorta, anemia, arthritis, COPD presents c/o significant fatigue, occasional SOB, and palpitations with activity since 10/2021 who presented to his PCP for pre-op clearance for minimally invasive aortic valve replacement scheduled for 6/23/22. States exercise tolerance is 2 flights of stairs and walking 1 mile, relieved w/ rest. Echo from February 2022 significant for moderate-severe aortic insufficiency, normal LVEF. Patient was referred for cardiac catheterization for pre op clearance prior to aortic valve replacement on 6/23/22.  Patient underwent an upper hemisternotomy, AVR, ascending and hemiarch replacement on 6/23. Patient was noted to move moving all extremities spontaenously postoperativaly, however, not to command. Stroke code was initiated at 2015 for left upper and lower extremity weakness. Initial NIHSS 22, patient was brought to CT scanner for imaging. NCHCT negative for hemorrhage, CT perfusion study with poor bolus infusion. CTA head and neck imaging significant complete occlusion of right ICA with at least partial reconstitution of flow to the right anterior and middle cerebral arteries via an enlarged anterior communicating artery. Case discussed with Dr. Polanco and decision was made to communicate findings to endovascular team and patient to be transported for possible thrombectomy. Patient remains hemodynamically stable and intubated.       T(C): 36.4 (06-23-22 @ 21:24), Max: 36.8 (06-22-22 @ 22:20)  HR: 64 (06-23-22 @ 22:00) (49 - 82)  BP: 165/81 (06-23-22 @ 05:08) (141/63 - 165/81)  RR: 12 (06-23-22 @ 22:00) (12 - 21)  SpO2: 98% (06-23-22 @ 22:00) (94% - 100%)    PAST MEDICAL & SURGICAL HISTORY:  HTN (hypertension)      Aortic regurgitation      Anemia      Arthritis      COPD, mild      H/O cataract extraction          FAMILY HISTORY:  Family history of Marfan syndrome (Uncle)    FH: aortic aneurysm (Uncle)        SOCIAL HISTORY:   Patient lives with *** at ***.   Smoking status:  Drinking:  Drug Use:     ROS: ***  Constitutional: No fever, weight loss or fatigue  Eyes: No eye pain, visual disturbances, or discharge  ENMT:  No difficulty hearing, tinnitus; No sinus or throat pain  Neck: No pain or stiffness  Respiratory: No cough, wheezing, chills or hemoptysis  Cardiovascular: No chest pain, palpitations, shortness of breath, or leg swelling  Gastrointestinal: No abdominal pain. No nausea, vomiting or hematemesis; No diarrhea or constipation. Nohematochezia.  Genitourinary: No dysuria, frequency, hematuria or incontinence  Neurological: As per HPI  Skin: No itching, burning, rashes or lesions   Endocrine: No heat or cold intolerance; No hair loss  Musculoskeletal: No joint pain or swelling; No muscle, back or extremity pain  Heme/Lymph: No easy bruising or bleeding gums    MEDICATIONS  (STANDING):  atorvastatin 40 milliGRAM(s) Oral at bedtime  chlorhexidine 0.12% Liquid 5 milliLiter(s) Oral Mucosa two times a day  chlorhexidine 0.12% Liquid 15 milliLiter(s) Oral Mucosa every 12 hours  chlorhexidine 2% Cloths 1 Application(s) Topical <User Schedule>  dexMEDEtomidine Infusion 0.2 MICROgram(s)/kG/Hr (3.99 mL/Hr) IV Continuous <Continuous>  dextrose 50% Injectable 50 milliLiter(s) IV Push every 15 minutes  dextrose 50% Injectable 25 milliLiter(s) IV Push every 15 minutes  fentaNYL    Injectable 25 MICROGram(s) IV Push every 3 hours  heparin   Injectable 5000 Unit(s) SubCutaneous every 8 hours  insulin regular Infusion 1 Unit(s)/Hr (1 mL/Hr) IV Continuous <Continuous>  niCARdipine Infusion 5 mG/Hr (25 mL/Hr) IV Continuous <Continuous>  pantoprazole  Injectable 40 milliGRAM(s) IV Push daily  propofol Infusion 5 MICROgram(s)/kG/Min (2.39 mL/Hr) IV Continuous <Continuous>  sodium chloride 0.9%. 1000 milliLiter(s) (10 mL/Hr) IV Continuous <Continuous>  tiotropium 18 MICROgram(s) Capsule 1 Capsule(s) Inhalation daily  vancomycin  IVPB 1000 milliGRAM(s) IV Intermittent every 12 hours    MEDICATIONS  (PRN):  acetaminophen   IVPB .. 1000 milliGRAM(s) IV Intermittent once PRN Mild Pain (1 - 3)    Allergies    No Known Allergies    Intolerances      Vital Signs Last 24 Hrs  T(C): 36.4 (23 Jun 2022 21:24), Max: 36.8 (22 Jun 2022 22:20)  T(F): 97.5 (23 Jun 2022 21:24), Max: 98.3 (23 Jun 2022 05:08)  HR: 64 (23 Jun 2022 22:00) (49 - 82)  BP: 165/81 (23 Jun 2022 05:08) (141/63 - 165/81)  BP(mean): 117 (23 Jun 2022 04:45) (91 - 117)  RR: 12 (23 Jun 2022 22:00) (12 - 21)  SpO2: 98% (23 Jun 2022 22:00) (94% - 100%)    Physical exam:  Constitutional: No acute distress, conversant  Eyes: Anicteric sclerae, moist conjunctivae, see below for CNs  Neck: trachea midline, FROM, supple, no thyromegaly or lymphadenopathy  Cardiovascular: Regular rate and rhythm, no murmurs, rubs, or gallops. No carotid bruits.   Pulmonary: Anterior breath sounds clear bilaterally, no crackles or wheezing. No use of accessory muscles  GI: Abdomen soft, non-distended, non-tender  Extremities: Radial and DP pulses +2, no edema    Neurologic:  -Mental status: Awake, alert, oriented to person, place, and time. Speech is fluent with intact naming, repetition, and comprehension, no dysarthria. Recent and remote memory intact. Follows commands. Attention/concentration intact. Fund of knowledge appropriate.  -Cranial nerves:   II: Visual fields are full to confrontation.  III, IV, VI: Extraocular movements are intact without nystagmus. Pupils equally round and reactive to light  V:  Facial sensation V1-V3 equal and intact   VII: Face is symmetric with normal eye closure and smile  VIII: Hearing is bilaterally intact to finger rub  IX, X: Uvula is midline and soft palate rises symmetrically  XI: Head turning and shoulder shrug are intact.  XII: Tongue protrudes midline  Motor: Normal bulk and tone. No pronator drift. Strength bilateral upper extremity 5/5, bilateral lower extremities 5/5.  Rapid alternating movements intact and symmetric  Sensation: Intact to light touch bilaterally. No neglect or extinction on double simultaneous testing.  Coordination: No dysmetria on finger-to-nose and heel-to-shin bilaterally  Reflexes: Downgoing toes bilaterally   Gait: Narrow gait and steady    NIHSS: **** ASPECT Score: ***** ICH Score: ****** (GCS)    Fingerstick Blood Glucose: CAPILLARY BLOOD GLUCOSE      POCT Blood Glucose.: 173 mg/dL (23 Jun 2022 20:18)    LABS:                        9.2    5.31  )-----------( 132      ( 23 Jun 2022 21:31 )             27.5     06-23    137  |  101  |  12  ----------------------------<  158<H>  4.0   |  24  |  0.68    Ca    8.3<L>      23 Jun 2022 21:31  Phos  3.9     06-23  Mg     2.2     06-23    TPro  6.9  /  Alb  3.7  /  TBili  0.9  /  DBili  x   /  AST  58<H>  /  ALT  20  /  AlkPhos  51  06-23    PT/INR - ( 23 Jun 2022 21:31 )   PT: 12.9 sec;   INR: 1.08          PTT - ( 23 Jun 2022 21:31 )  PTT:29.4 sec  CARDIAC MARKERS ( 22 Jun 2022 11:56 )  x     / 0.01 ng/mL / x     / x     / x      CARDIAC MARKERS ( 22 Jun 2022 07:22 )  x     / x     / 97 U/L / x     / 2.7 ng/mL      Urinalysis Basic - ( 22 Jun 2022 11:56 )    Color: Yellow / Appearance: Clear / SG: <=1.005 / pH: x  Gluc: x / Ketone: NEGATIVE  / Bili: Negative / Urobili: 0.2 E.U./dL   Blood: x / Protein: NEGATIVE mg/dL / Nitrite: NEGATIVE   Leuk Esterase: NEGATIVE / RBC: x / WBC x   Sq Epi: x / Non Sq Epi: x / Bacteria: x        RADIOLOGY & ADDITIONAL STUDIES:      -----------------------------------------------------------------------------------------------------------------  IV-tPA (Y/N):    ***                              Bolus time:    Alteplase Dose Verification w/ RN:  Reason IV-tPA not given: ***    **STROKE CODE CONSULT NOTE**    Time of onset of symptoms: 6/23 at approximately 2015    HPI: 62 yo M, current smoker (½ PPD x 40 years), former ETOH abuse (40yr hx, quit 7 years ago), FH of Marfan's Syndrome and Aortic Aneurysms, PMHx HTN, severe aortic regurgitation, dilated ascending aorta and dilated descending aorta, anemia, arthritis, COPD presents c/o significant fatigue, occasional SOB, and palpitations with activity since 10/2021 who presented to his PCP for pre-op clearance for minimally invasive aortic valve replacement scheduled for 6/23/22. States exercise tolerance is 2 flights of stairs and walking 1 mile, relieved w/ rest. Echo from February 2022 significant for moderate-severe aortic insufficiency, normal LVEF. Patient was referred for cardiac catheterization for pre op clearance prior to aortic valve replacement on 6/23/22.  Patient underwent an upper hemisternotomy, AVR, ascending and hemiarch replacement on 6/23. Patient was noted to move moving all extremities spontaenously postoperativaly, however, not to command. Stroke code was initiated at 2015 for left upper and lower extremity weakness. Initial NIHSS 22, patient was brought to CT scanner for imaging. NCHCT negative for hemorrhage, CT perfusion study with poor bolus infusion. CTA head and neck imaging significant complete occlusion of right ICA with at least partial reconstitution of flow to the right anterior and middle cerebral arteries via an enlarged anterior communicating artery. Case discussed with Dr. Polanco and decision was made to communicate findings to endovascular team and patient to be transported for possible thrombectomy. Patient remains hemodynamically stable and intubated.       T(C): 36.4 (06-23-22 @ 21:24), Max: 36.8 (06-22-22 @ 22:20)  HR: 64 (06-23-22 @ 22:00) (49 - 82)  BP: 165/81 (06-23-22 @ 05:08) (141/63 - 165/81)  RR: 12 (06-23-22 @ 22:00) (12 - 21)  SpO2: 98% (06-23-22 @ 22:00) (94% - 100%)    PAST MEDICAL & SURGICAL HISTORY:  HTN (hypertension)      Aortic regurgitation      Anemia      Arthritis      COPD, mild      H/O cataract extraction          FAMILY HISTORY:  Family history of Marfan syndrome (Uncle)    FH: aortic aneurysm (Uncle)        SOCIAL HISTORY:   Smoking status: Current smoker  Drinking: h/o ETOH abuse, quit 7 years ago    ROS: Unable to obtain 2/2 patient intubated    MEDICATIONS  (STANDING):  atorvastatin 40 milliGRAM(s) Oral at bedtime  chlorhexidine 0.12% Liquid 5 milliLiter(s) Oral Mucosa two times a day  chlorhexidine 0.12% Liquid 15 milliLiter(s) Oral Mucosa every 12 hours  chlorhexidine 2% Cloths 1 Application(s) Topical <User Schedule>  dexMEDEtomidine Infusion 0.2 MICROgram(s)/kG/Hr (3.99 mL/Hr) IV Continuous <Continuous>  dextrose 50% Injectable 50 milliLiter(s) IV Push every 15 minutes  dextrose 50% Injectable 25 milliLiter(s) IV Push every 15 minutes  fentaNYL    Injectable 25 MICROGram(s) IV Push every 3 hours  heparin   Injectable 5000 Unit(s) SubCutaneous every 8 hours  insulin regular Infusion 1 Unit(s)/Hr (1 mL/Hr) IV Continuous <Continuous>  niCARdipine Infusion 5 mG/Hr (25 mL/Hr) IV Continuous <Continuous>  pantoprazole  Injectable 40 milliGRAM(s) IV Push daily  propofol Infusion 5 MICROgram(s)/kG/Min (2.39 mL/Hr) IV Continuous <Continuous>  sodium chloride 0.9%. 1000 milliLiter(s) (10 mL/Hr) IV Continuous <Continuous>  tiotropium 18 MICROgram(s) Capsule 1 Capsule(s) Inhalation daily  vancomycin  IVPB 1000 milliGRAM(s) IV Intermittent every 12 hours    MEDICATIONS  (PRN):  acetaminophen   IVPB .. 1000 milliGRAM(s) IV Intermittent once PRN Mild Pain (1 - 3)    Allergies    No Known Allergies    Intolerances      Vital Signs Last 24 Hrs  T(C): 36.4 (23 Jun 2022 21:24), Max: 36.8 (22 Jun 2022 22:20)  T(F): 97.5 (23 Jun 2022 21:24), Max: 98.3 (23 Jun 2022 05:08)  HR: 64 (23 Jun 2022 22:00) (49 - 82)  BP: 165/81 (23 Jun 2022 05:08) (141/63 - 165/81)  BP(mean): 117 (23 Jun 2022 04:45) (91 - 117)  RR: 12 (23 Jun 2022 22:00) (12 - 21)  SpO2: 98% (23 Jun 2022 22:00) (94% - 100%)    Physical exam:  Constitutional: Intubated and sedated on propofol, easily arousable with verbal stimuli  Eyes: Anicteric sclerae, moist conjunctivae, see below for CNs  Neck: trachea midline, ET tube in place  Cardiovascular: Regular rate and rhythm on monitor, sternal incision open to air, clean and dry  Pulmonary: No use of accessory muscles  GI: Abdomen soft  Extremities: no edema    Neurologic:  -Mental status: Sedated, arousable with verbal stimuli. Able to attempt to show two fingers with right hand when examiner asks. Intermittently lifts right arm and leg to command.   -Cranial nerves:   II: Visual fields are full to confrontation.  III, IV, VI:  Pupils equally round and reactive to light, left eye ptosis present  VII: Face appears symmetrical, difficult to determine with ET tube and molina in place  Motor: Normal bulk and tone. No pronator drift. Strength bilateral upper extremity 5/5, bilateral lower extremities 5/5.  Rapid alternating movements intact and symmetric  Sensation: Intact to light touch bilaterally. No neglect or extinction on double simultaneous testing.  Coordination: No dysmetria on finger-to-nose and heel-to-shin bilaterally  Reflexes: Downgoing toes bilaterally   Gait: Narrow gait and steady    NIHSS: 22    Fingerstick Blood Glucose: CAPILLARY BLOOD GLUCOSE      POCT Blood Glucose.: 173 mg/dL (23 Jun 2022 20:18)    LABS:                        9.2    5.31  )-----------( 132      ( 23 Jun 2022 21:31 )             27.5     06-23    137  |  101  |  12  ----------------------------<  158<H>  4.0   |  24  |  0.68    Ca    8.3<L>      23 Jun 2022 21:31  Phos  3.9     06-23  Mg     2.2     06-23    TPro  6.9  /  Alb  3.7  /  TBili  0.9  /  DBili  x   /  AST  58<H>  /  ALT  20  /  AlkPhos  51  06-23    PT/INR - ( 23 Jun 2022 21:31 )   PT: 12.9 sec;   INR: 1.08          PTT - ( 23 Jun 2022 21:31 )  PTT:29.4 sec  CARDIAC MARKERS ( 22 Jun 2022 11:56 )  x     / 0.01 ng/mL / x     / x     / x      CARDIAC MARKERS ( 22 Jun 2022 07:22 )  x     / x     / 97 U/L / x     / 2.7 ng/mL      Urinalysis Basic - ( 22 Jun 2022 11:56 )    Color: Yellow / Appearance: Clear / SG: <=1.005 / pH: x  Gluc: x / Ketone: NEGATIVE  / Bili: Negative / Urobili: 0.2 E.U./dL   Blood: x / Protein: NEGATIVE mg/dL / Nitrite: NEGATIVE   Leuk Esterase: NEGATIVE / RBC: x / WBC x   Sq Epi: x / Non Sq Epi: x / Bacteria: x        RADIOLOGY & ADDITIONAL STUDIES:      -----------------------------------------------------------------------------------------------------------------  IV-tPA (Y/N):    ***                              Bolus time:    Alteplase Dose Verification w/ RN:  Reason IV-tPA not given: ***    **STROKE CODE CONSULT NOTE**    Time of onset of symptoms: 6/23 at approximately 2015    HPI: 62 yo M, current smoker (½ PPD x 40 years), former ETOH abuse (40yr hx, quit 7 years ago), FH of Marfan's Syndrome and Aortic Aneurysms, PMHx HTN, severe aortic regurgitation, dilated ascending aorta and dilated descending aorta, anemia, arthritis, COPD presents c/o significant fatigue, occasional SOB, and palpitations with activity since 10/2021 who presented to his PCP for pre-op clearance for minimally invasive aortic valve replacement scheduled for 6/23/22. States exercise tolerance is 2 flights of stairs and walking 1 mile, relieved w/ rest. Echo from February 2022 significant for moderate-severe aortic insufficiency, normal LVEF. Patient was referred for cardiac catheterization for pre op clearance prior to aortic valve replacement on 6/23/22.  Patient underwent an upper hemisternotomy, AVR, ascending and hemiarch replacement on 6/23. Patient was noted to move moving all extremities spontaenously postoperativaly, however, not to command. Stroke code was initiated at 2015 for left upper and lower extremity weakness. Initial NIHSS 22, patient was brought to CT scanner for imaging. NCHCT negative for hemorrhage, CT perfusion study with poor bolus infusion. CTA head and neck imaging significant complete occlusion of right ICA with at least partial reconstitution of flow to the right anterior and middle cerebral arteries via an enlarged anterior communicating artery. Case discussed with Dr. Polanco and decision was made to communicate findings to endovascular team and patient to be transported for possible thrombectomy. Patient remains hemodynamically stable and intubated.       T(C): 36.4 (06-23-22 @ 21:24), Max: 36.8 (06-22-22 @ 22:20)  HR: 64 (06-23-22 @ 22:00) (49 - 82)  BP: 165/81 (06-23-22 @ 05:08) (141/63 - 165/81)  RR: 12 (06-23-22 @ 22:00) (12 - 21)  SpO2: 98% (06-23-22 @ 22:00) (94% - 100%)    PAST MEDICAL & SURGICAL HISTORY:  HTN (hypertension)      Aortic regurgitation      Anemia      Arthritis      COPD, mild      H/O cataract extraction          FAMILY HISTORY:  Family history of Marfan syndrome (Uncle)    FH: aortic aneurysm (Uncle)        SOCIAL HISTORY:   Smoking status: Current smoker  Drinking: h/o ETOH abuse, quit 7 years ago    ROS: Unable to obtain 2/2 patient intubated    MEDICATIONS  (STANDING):  atorvastatin 40 milliGRAM(s) Oral at bedtime  chlorhexidine 0.12% Liquid 5 milliLiter(s) Oral Mucosa two times a day  chlorhexidine 0.12% Liquid 15 milliLiter(s) Oral Mucosa every 12 hours  chlorhexidine 2% Cloths 1 Application(s) Topical <User Schedule>  dexMEDEtomidine Infusion 0.2 MICROgram(s)/kG/Hr (3.99 mL/Hr) IV Continuous <Continuous>  dextrose 50% Injectable 50 milliLiter(s) IV Push every 15 minutes  dextrose 50% Injectable 25 milliLiter(s) IV Push every 15 minutes  fentaNYL    Injectable 25 MICROGram(s) IV Push every 3 hours  heparin   Injectable 5000 Unit(s) SubCutaneous every 8 hours  insulin regular Infusion 1 Unit(s)/Hr (1 mL/Hr) IV Continuous <Continuous>  niCARdipine Infusion 5 mG/Hr (25 mL/Hr) IV Continuous <Continuous>  pantoprazole  Injectable 40 milliGRAM(s) IV Push daily  propofol Infusion 5 MICROgram(s)/kG/Min (2.39 mL/Hr) IV Continuous <Continuous>  sodium chloride 0.9%. 1000 milliLiter(s) (10 mL/Hr) IV Continuous <Continuous>  tiotropium 18 MICROgram(s) Capsule 1 Capsule(s) Inhalation daily  vancomycin  IVPB 1000 milliGRAM(s) IV Intermittent every 12 hours    MEDICATIONS  (PRN):  acetaminophen   IVPB .. 1000 milliGRAM(s) IV Intermittent once PRN Mild Pain (1 - 3)    Allergies    No Known Allergies    Intolerances      Vital Signs Last 24 Hrs  T(C): 36.4 (23 Jun 2022 21:24), Max: 36.8 (22 Jun 2022 22:20)  T(F): 97.5 (23 Jun 2022 21:24), Max: 98.3 (23 Jun 2022 05:08)  HR: 64 (23 Jun 2022 22:00) (49 - 82)  BP: 165/81 (23 Jun 2022 05:08) (141/63 - 165/81)  BP(mean): 117 (23 Jun 2022 04:45) (91 - 117)  RR: 12 (23 Jun 2022 22:00) (12 - 21)  SpO2: 98% (23 Jun 2022 22:00) (94% - 100%)    Physical exam:  Constitutional: Intubated and sedated on propofol, easily arousable with verbal stimuli  Eyes: Anicteric sclerae, moist conjunctivae, see below for CNs  Neck: trachea midline, ET tube in place  Cardiovascular: Regular rate and rhythm on monitor, sternal incision open to air, clean and dry  Pulmonary: No use of accessory muscles  GI: Abdomen soft  Extremities: no edema    Neurologic:  -Mental status: Sedated, arousable with verbal stimuli. Able to attempt to show two fingers with right hand when examiner asks. Intermittently lifts right arm and leg to command.   -Cranial nerves:   II: Visual fields are full to confrontation.  III, IV, VI:  Pupils equally round and reactive to light, left eye ptosis present  VII: Face appears symmetrical, difficult to determine with ET tube and molina in place  Motor: Normal bulk, decreased tone on left upper and lower extremities. RUE with drift but does not hit bed 4/5, RLE 2/5, unable to maintain antigravity distally but able to bend knee. LUE and LLE 0/5.   Sensation: Absent withdrawal to noxious stimuli applied to left upper and lower extremities.   Coordination: Unable to perform FTN with left side 2/2 paralysis. Right arm unable to bend due to lines in place.     NIHSS: 22    Fingerstick Blood Glucose: CAPILLARY BLOOD GLUCOSE      POCT Blood Glucose.: 173 mg/dL (23 Jun 2022 20:18)    LABS:                        9.2    5.31  )-----------( 132      ( 23 Jun 2022 21:31 )             27.5     06-23    137  |  101  |  12  ----------------------------<  158<H>  4.0   |  24  |  0.68    Ca    8.3<L>      23 Jun 2022 21:31  Phos  3.9     06-23  Mg     2.2     06-23    TPro  6.9  /  Alb  3.7  /  TBili  0.9  /  DBili  x   /  AST  58<H>  /  ALT  20  /  AlkPhos  51  06-23    PT/INR - ( 23 Jun 2022 21:31 )   PT: 12.9 sec;   INR: 1.08          PTT - ( 23 Jun 2022 21:31 )  PTT:29.4 sec  CARDIAC MARKERS ( 22 Jun 2022 11:56 )  x     / 0.01 ng/mL / x     / x     / x      CARDIAC MARKERS ( 22 Jun 2022 07:22 )  x     / x     / 97 U/L / x     / 2.7 ng/mL      Urinalysis Basic - ( 22 Jun 2022 11:56 )    Color: Yellow / Appearance: Clear / SG: <=1.005 / pH: x  Gluc: x / Ketone: NEGATIVE  / Bili: Negative / Urobili: 0.2 E.U./dL   Blood: x / Protein: NEGATIVE mg/dL / Nitrite: NEGATIVE   Leuk Esterase: NEGATIVE / RBC: x / WBC x   Sq Epi: x / Non Sq Epi: x / Bacteria: x        RADIOLOGY & ADDITIONAL STUDIES:  < from: CT Brain Stroke Protocol (06.23.22 @ 21:12) >  IMPRESSION:    No acute intracranial hemorrhage or demarcated territorial infarction.    < end of copied text >    < from: CT Angio Head w/ IV Cont (06.23.22 @ 21:15) >    IMPRESSION:  1.  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  2.  Right greater than left pleural effusion.    < end of copied text >    < from: CT Angio Head w/ IV Cont (06.23.22 @ 21:15) >    IMPRESSION: Occlusion of the right internal carotid artery with at least   partial reconstitution of flow to the right anterior and middle cerebral   arteries via an enlarged anterior communicating artery.    < end of copied text >    -----------------------------------------------------------------------------------------------------------------  IV-tPA (Y/N):  N                       Reason IV-tPA not given: s/p major cardiac surgery

## 2022-06-23 NOTE — BRIEF OPERATIVE NOTE - NSICDXBRIEFPROCEDURE_GEN_ALL_CORE_FT
PROCEDURES:  Replacement of aortic valve with tissue valve 23-Jun-2022 17:10:34 27mm bio Insipiris Loki Tidwell  Replacement, ascending aorta and hemiarch 23-Jun-2022 17:11:48 30mm antiflow graft  Total bypass time: 179 mins   aortic clamp time: 130 mins  Circulatory arrest time 16 mins (ACP 13 mins and RCP 3 mins) Loki Tidwell   PROCEDURES:  Replacement of aortic valve with tissue valve 23-Jun-2022 17:10:34 27mm bio Inspiris, Gorman Loki Tidwell  Replacement, ascending aorta and hemiarch 23-Jun-2022 17:11:48 30mm antiflow graft  Total bypass time: 179 mins   aortic clamp time: 130 mins  Circulatory arrest time 16 mins (ACP 13 mins and RCP 3 mins) Loki Tidwell

## 2022-06-24 DIAGNOSIS — I63.9 CEREBRAL INFARCTION, UNSPECIFIED: ICD-10-CM

## 2022-06-24 LAB
ALBUMIN SERPL ELPH-MCNC: 3.5 G/DL — SIGNIFICANT CHANGE UP (ref 3.3–5)
ALP SERPL-CCNC: 46 U/L — SIGNIFICANT CHANGE UP (ref 40–120)
ALP SERPL-CCNC: 48 U/L — SIGNIFICANT CHANGE UP (ref 40–120)
ALP SERPL-CCNC: 48 U/L — SIGNIFICANT CHANGE UP (ref 40–120)
ALT FLD-CCNC: 17 U/L — SIGNIFICANT CHANGE UP (ref 10–45)
ALT FLD-CCNC: 18 U/L — SIGNIFICANT CHANGE UP (ref 10–45)
ALT FLD-CCNC: 19 U/L — SIGNIFICANT CHANGE UP (ref 10–45)
ANION GAP SERPL CALC-SCNC: 8 MMOL/L — SIGNIFICANT CHANGE UP (ref 5–17)
ANION GAP SERPL CALC-SCNC: 8 MMOL/L — SIGNIFICANT CHANGE UP (ref 5–17)
ANION GAP SERPL CALC-SCNC: 9 MMOL/L — SIGNIFICANT CHANGE UP (ref 5–17)
ANION GAP SERPL CALC-SCNC: 9 MMOL/L — SIGNIFICANT CHANGE UP (ref 5–17)
APTT BLD: 25.3 SEC — LOW (ref 27.5–35.5)
APTT BLD: 25.6 SEC — LOW (ref 27.5–35.5)
APTT BLD: 27 SEC — LOW (ref 27.5–35.5)
APTT BLD: 41.1 SEC — HIGH (ref 27.5–35.5)
AST SERPL-CCNC: 38 U/L — SIGNIFICANT CHANGE UP (ref 10–40)
AST SERPL-CCNC: 46 U/L — HIGH (ref 10–40)
AST SERPL-CCNC: 54 U/L — HIGH (ref 10–40)
BASE EXCESS BLDV CALC-SCNC: 2.2 MMOL/L — SIGNIFICANT CHANGE UP (ref -2–3)
BASE EXCESS BLDV CALC-SCNC: 2.2 MMOL/L — SIGNIFICANT CHANGE UP (ref -2–3)
BASE EXCESS BLDV CALC-SCNC: 4.5 MMOL/L — HIGH (ref -2–3)
BASE EXCESS BLDV CALC-SCNC: 6 MMOL/L — HIGH (ref -2–3)
BILIRUB SERPL-MCNC: 0.5 MG/DL — SIGNIFICANT CHANGE UP (ref 0.2–1.2)
BILIRUB SERPL-MCNC: 0.6 MG/DL — SIGNIFICANT CHANGE UP (ref 0.2–1.2)
BILIRUB SERPL-MCNC: 0.7 MG/DL — SIGNIFICANT CHANGE UP (ref 0.2–1.2)
BUN SERPL-MCNC: 10 MG/DL — SIGNIFICANT CHANGE UP (ref 7–23)
BUN SERPL-MCNC: 11 MG/DL — SIGNIFICANT CHANGE UP (ref 7–23)
BUN SERPL-MCNC: 11 MG/DL — SIGNIFICANT CHANGE UP (ref 7–23)
BUN SERPL-MCNC: 9 MG/DL — SIGNIFICANT CHANGE UP (ref 7–23)
CA-I SERPL-SCNC: 1.15 MMOL/L — SIGNIFICANT CHANGE UP (ref 1.15–1.33)
CALCIUM SERPL-MCNC: 8 MG/DL — LOW (ref 8.4–10.5)
CALCIUM SERPL-MCNC: 8.5 MG/DL — SIGNIFICANT CHANGE UP (ref 8.4–10.5)
CALCIUM SERPL-MCNC: 8.6 MG/DL — SIGNIFICANT CHANGE UP (ref 8.4–10.5)
CALCIUM SERPL-MCNC: 8.7 MG/DL — SIGNIFICANT CHANGE UP (ref 8.4–10.5)
CHLORIDE SERPL-SCNC: 102 MMOL/L — SIGNIFICANT CHANGE UP (ref 96–108)
CHLORIDE SERPL-SCNC: 104 MMOL/L — SIGNIFICANT CHANGE UP (ref 96–108)
CHLORIDE SERPL-SCNC: 104 MMOL/L — SIGNIFICANT CHANGE UP (ref 96–108)
CHLORIDE SERPL-SCNC: 105 MMOL/L — SIGNIFICANT CHANGE UP (ref 96–108)
CO2 BLDV-SCNC: 29.2 MMOL/L — HIGH (ref 22–26)
CO2 BLDV-SCNC: 29.2 MMOL/L — HIGH (ref 22–26)
CO2 BLDV-SCNC: 31.2 MMOL/L — HIGH (ref 22–26)
CO2 BLDV-SCNC: 31.9 MMOL/L — HIGH (ref 22–26)
CO2 SERPL-SCNC: 25 MMOL/L — SIGNIFICANT CHANGE UP (ref 22–31)
CO2 SERPL-SCNC: 26 MMOL/L — SIGNIFICANT CHANGE UP (ref 22–31)
CO2 SERPL-SCNC: 27 MMOL/L — SIGNIFICANT CHANGE UP (ref 22–31)
CO2 SERPL-SCNC: 29 MMOL/L — SIGNIFICANT CHANGE UP (ref 22–31)
CREAT SERPL-MCNC: 0.6 MG/DL — SIGNIFICANT CHANGE UP (ref 0.5–1.3)
CREAT SERPL-MCNC: 0.63 MG/DL — SIGNIFICANT CHANGE UP (ref 0.5–1.3)
CREAT SERPL-MCNC: 0.65 MG/DL — SIGNIFICANT CHANGE UP (ref 0.5–1.3)
CREAT SERPL-MCNC: 0.67 MG/DL — SIGNIFICANT CHANGE UP (ref 0.5–1.3)
EGFR: 102 ML/MIN/1.73M2 — SIGNIFICANT CHANGE UP
EGFR: 103 ML/MIN/1.73M2 — SIGNIFICANT CHANGE UP
EGFR: 104 ML/MIN/1.73M2 — SIGNIFICANT CHANGE UP
EGFR: 105 ML/MIN/1.73M2 — SIGNIFICANT CHANGE UP
GAS PNL BLDA: SIGNIFICANT CHANGE UP
GAS PNL BLDV: 139 MMOL/L — SIGNIFICANT CHANGE UP (ref 136–145)
GAS PNL BLDV: SIGNIFICANT CHANGE UP
GLUCOSE BLDC GLUCOMTR-MCNC: 148 MG/DL — HIGH (ref 70–99)
GLUCOSE SERPL-MCNC: 115 MG/DL — HIGH (ref 70–99)
GLUCOSE SERPL-MCNC: 120 MG/DL — HIGH (ref 70–99)
GLUCOSE SERPL-MCNC: 153 MG/DL — HIGH (ref 70–99)
GLUCOSE SERPL-MCNC: 165 MG/DL — HIGH (ref 70–99)
HCO3 BLDV-SCNC: 28 MMOL/L — SIGNIFICANT CHANGE UP (ref 22–29)
HCO3 BLDV-SCNC: 28 MMOL/L — SIGNIFICANT CHANGE UP (ref 22–29)
HCO3 BLDV-SCNC: 30 MMOL/L — HIGH (ref 22–29)
HCO3 BLDV-SCNC: 31 MMOL/L — HIGH (ref 22–29)
HCT VFR BLD CALC: 25.6 % — LOW (ref 39–50)
HCT VFR BLD CALC: 25.6 % — LOW (ref 39–50)
HCT VFR BLD CALC: 26.5 % — LOW (ref 39–50)
HCT VFR BLD CALC: 26.6 % — LOW (ref 39–50)
HGB BLD-MCNC: 8.4 G/DL — LOW (ref 13–17)
HGB BLD-MCNC: 8.6 G/DL — LOW (ref 13–17)
HGB BLD-MCNC: 8.8 G/DL — LOW (ref 13–17)
HGB BLD-MCNC: 8.8 G/DL — LOW (ref 13–17)
INR BLD: 1.12 — SIGNIFICANT CHANGE UP (ref 0.88–1.16)
INR BLD: 1.18 — HIGH (ref 0.88–1.16)
INR BLD: 1.18 — HIGH (ref 0.88–1.16)
INR BLD: 1.22 — HIGH (ref 0.88–1.16)
LACTATE SERPL-SCNC: 0.7 MMOL/L — SIGNIFICANT CHANGE UP (ref 0.5–2)
LACTATE SERPL-SCNC: 0.9 MMOL/L — SIGNIFICANT CHANGE UP (ref 0.5–2)
LACTATE SERPL-SCNC: 1 MMOL/L — SIGNIFICANT CHANGE UP (ref 0.5–2)
LACTATE SERPL-SCNC: 1.8 MMOL/L — SIGNIFICANT CHANGE UP (ref 0.5–2)
MAGNESIUM SERPL-MCNC: 2 MG/DL — SIGNIFICANT CHANGE UP (ref 1.6–2.6)
MAGNESIUM SERPL-MCNC: 2.1 MG/DL — SIGNIFICANT CHANGE UP (ref 1.6–2.6)
MCHC RBC-ENTMCNC: 31.4 PG — SIGNIFICANT CHANGE UP (ref 27–34)
MCHC RBC-ENTMCNC: 31.5 PG — SIGNIFICANT CHANGE UP (ref 27–34)
MCHC RBC-ENTMCNC: 31.6 PG — SIGNIFICANT CHANGE UP (ref 27–34)
MCHC RBC-ENTMCNC: 31.9 PG — SIGNIFICANT CHANGE UP (ref 27–34)
MCHC RBC-ENTMCNC: 32.8 GM/DL — SIGNIFICANT CHANGE UP (ref 32–36)
MCHC RBC-ENTMCNC: 33.1 GM/DL — SIGNIFICANT CHANGE UP (ref 32–36)
MCHC RBC-ENTMCNC: 33.2 GM/DL — SIGNIFICANT CHANGE UP (ref 32–36)
MCHC RBC-ENTMCNC: 33.6 GM/DL — SIGNIFICANT CHANGE UP (ref 32–36)
MCV RBC AUTO: 94.6 FL — SIGNIFICANT CHANGE UP (ref 80–100)
MCV RBC AUTO: 94.8 FL — SIGNIFICANT CHANGE UP (ref 80–100)
MCV RBC AUTO: 95.3 FL — SIGNIFICANT CHANGE UP (ref 80–100)
MCV RBC AUTO: 96.2 FL — SIGNIFICANT CHANGE UP (ref 80–100)
NRBC # BLD: 0 /100 WBCS — SIGNIFICANT CHANGE UP (ref 0–0)
PCO2 BLDV: 43 MMHG — SIGNIFICANT CHANGE UP (ref 42–55)
PCO2 BLDV: 46 MMHG — SIGNIFICANT CHANGE UP (ref 42–55)
PH BLDV: 7.39 — SIGNIFICANT CHANGE UP (ref 7.32–7.43)
PH BLDV: 7.39 — SIGNIFICANT CHANGE UP (ref 7.32–7.43)
PH BLDV: 7.42 — SIGNIFICANT CHANGE UP (ref 7.32–7.43)
PH BLDV: 7.46 — HIGH (ref 7.32–7.43)
PHOSPHATE SERPL-MCNC: 2.6 MG/DL — SIGNIFICANT CHANGE UP (ref 2.5–4.5)
PHOSPHATE SERPL-MCNC: 3.5 MG/DL — SIGNIFICANT CHANGE UP (ref 2.5–4.5)
PHOSPHATE SERPL-MCNC: 4.6 MG/DL — HIGH (ref 2.5–4.5)
PHOSPHATE SERPL-MCNC: 5.7 MG/DL — HIGH (ref 2.5–4.5)
PLATELET # BLD AUTO: 115 K/UL — LOW (ref 150–400)
PLATELET # BLD AUTO: 121 K/UL — LOW (ref 150–400)
PLATELET # BLD AUTO: 136 K/UL — LOW (ref 150–400)
PLATELET # BLD AUTO: 156 K/UL — SIGNIFICANT CHANGE UP (ref 150–400)
PO2 BLDV: 36 MMHG — SIGNIFICANT CHANGE UP (ref 25–45)
PO2 BLDV: 41 MMHG — SIGNIFICANT CHANGE UP (ref 25–45)
PO2 BLDV: 44 MMHG — SIGNIFICANT CHANGE UP (ref 25–45)
PO2 BLDV: 45 MMHG — SIGNIFICANT CHANGE UP (ref 25–45)
POTASSIUM BLDV-SCNC: 4.3 MMOL/L — SIGNIFICANT CHANGE UP (ref 3.5–5.1)
POTASSIUM SERPL-MCNC: 3.8 MMOL/L — SIGNIFICANT CHANGE UP (ref 3.5–5.3)
POTASSIUM SERPL-MCNC: 3.8 MMOL/L — SIGNIFICANT CHANGE UP (ref 3.5–5.3)
POTASSIUM SERPL-MCNC: 4 MMOL/L — SIGNIFICANT CHANGE UP (ref 3.5–5.3)
POTASSIUM SERPL-MCNC: 4.3 MMOL/L — SIGNIFICANT CHANGE UP (ref 3.5–5.3)
POTASSIUM SERPL-SCNC: 3.8 MMOL/L — SIGNIFICANT CHANGE UP (ref 3.5–5.3)
POTASSIUM SERPL-SCNC: 3.8 MMOL/L — SIGNIFICANT CHANGE UP (ref 3.5–5.3)
POTASSIUM SERPL-SCNC: 4 MMOL/L — SIGNIFICANT CHANGE UP (ref 3.5–5.3)
POTASSIUM SERPL-SCNC: 4.3 MMOL/L — SIGNIFICANT CHANGE UP (ref 3.5–5.3)
PROT SERPL-MCNC: 6.5 G/DL — SIGNIFICANT CHANGE UP (ref 6–8.3)
PROT SERPL-MCNC: 6.5 G/DL — SIGNIFICANT CHANGE UP (ref 6–8.3)
PROT SERPL-MCNC: 6.7 G/DL — SIGNIFICANT CHANGE UP (ref 6–8.3)
PROTHROM AB SERPL-ACNC: 13.4 SEC — SIGNIFICANT CHANGE UP (ref 10.5–13.4)
PROTHROM AB SERPL-ACNC: 14.1 SEC — HIGH (ref 10.5–13.4)
PROTHROM AB SERPL-ACNC: 14.1 SEC — HIGH (ref 10.5–13.4)
PROTHROM AB SERPL-ACNC: 14.6 SEC — HIGH (ref 10.5–13.4)
RBC # BLD: 2.66 M/UL — LOW (ref 4.2–5.8)
RBC # BLD: 2.7 M/UL — LOW (ref 4.2–5.8)
RBC # BLD: 2.79 M/UL — LOW (ref 4.2–5.8)
RBC # BLD: 2.8 M/UL — LOW (ref 4.2–5.8)
RBC # FLD: 16.5 % — HIGH (ref 10.3–14.5)
RBC # FLD: 16.8 % — HIGH (ref 10.3–14.5)
RBC # FLD: 16.8 % — HIGH (ref 10.3–14.5)
RBC # FLD: 17 % — HIGH (ref 10.3–14.5)
SAO2 % BLDV: 57.8 % — LOW (ref 67–88)
SAO2 % BLDV: 71.2 % — SIGNIFICANT CHANGE UP (ref 67–88)
SAO2 % BLDV: 73.5 % — SIGNIFICANT CHANGE UP (ref 67–88)
SAO2 % BLDV: 73.6 % — SIGNIFICANT CHANGE UP (ref 67–88)
SODIUM SERPL-SCNC: 138 MMOL/L — SIGNIFICANT CHANGE UP (ref 135–145)
SODIUM SERPL-SCNC: 139 MMOL/L — SIGNIFICANT CHANGE UP (ref 135–145)
SODIUM SERPL-SCNC: 139 MMOL/L — SIGNIFICANT CHANGE UP (ref 135–145)
SODIUM SERPL-SCNC: 140 MMOL/L — SIGNIFICANT CHANGE UP (ref 135–145)
WBC # BLD: 5.87 K/UL — SIGNIFICANT CHANGE UP (ref 3.8–10.5)
WBC # BLD: 7.09 K/UL — SIGNIFICANT CHANGE UP (ref 3.8–10.5)
WBC # BLD: 7.23 K/UL — SIGNIFICANT CHANGE UP (ref 3.8–10.5)
WBC # BLD: 9 K/UL — SIGNIFICANT CHANGE UP (ref 3.8–10.5)
WBC # FLD AUTO: 5.87 K/UL — SIGNIFICANT CHANGE UP (ref 3.8–10.5)
WBC # FLD AUTO: 7.09 K/UL — SIGNIFICANT CHANGE UP (ref 3.8–10.5)
WBC # FLD AUTO: 7.23 K/UL — SIGNIFICANT CHANGE UP (ref 3.8–10.5)
WBC # FLD AUTO: 9 K/UL — SIGNIFICANT CHANGE UP (ref 3.8–10.5)

## 2022-06-24 PROCEDURE — 99291 CRITICAL CARE FIRST HOUR: CPT

## 2022-06-24 PROCEDURE — 70450 CT HEAD/BRAIN W/O DYE: CPT | Mod: 26

## 2022-06-24 PROCEDURE — 99292 CRITICAL CARE ADDL 30 MIN: CPT

## 2022-06-24 PROCEDURE — 99221 1ST HOSP IP/OBS SF/LOW 40: CPT

## 2022-06-24 PROCEDURE — 0042T: CPT

## 2022-06-24 PROCEDURE — 99233 SBSQ HOSP IP/OBS HIGH 50: CPT

## 2022-06-24 PROCEDURE — 71045 X-RAY EXAM CHEST 1 VIEW: CPT | Mod: 26

## 2022-06-24 RX ORDER — FENTANYL CITRATE 50 UG/ML
25 INJECTION INTRAVENOUS
Refills: 0 | Status: DISCONTINUED | OUTPATIENT
Start: 2022-06-24 | End: 2022-06-28

## 2022-06-24 RX ORDER — NOREPINEPHRINE BITARTRATE/D5W 8 MG/250ML
0.03 PLASTIC BAG, INJECTION (ML) INTRAVENOUS
Qty: 8 | Refills: 0 | Status: DISCONTINUED | OUTPATIENT
Start: 2022-06-24 | End: 2022-06-24

## 2022-06-24 RX ORDER — MAGNESIUM SULFATE 500 MG/ML
2 VIAL (ML) INJECTION ONCE
Refills: 0 | Status: COMPLETED | OUTPATIENT
Start: 2022-06-24 | End: 2022-06-24

## 2022-06-24 RX ORDER — ASPIRIN/CALCIUM CARB/MAGNESIUM 324 MG
81 TABLET ORAL DAILY
Refills: 0 | Status: DISCONTINUED | OUTPATIENT
Start: 2022-06-24 | End: 2022-07-05

## 2022-06-24 RX ORDER — DEXMEDETOMIDINE HYDROCHLORIDE IN 0.9% SODIUM CHLORIDE 4 UG/ML
0.2 INJECTION INTRAVENOUS
Qty: 400 | Refills: 0 | Status: DISCONTINUED | OUTPATIENT
Start: 2022-06-24 | End: 2022-06-27

## 2022-06-24 RX ORDER — PHENYLEPHRINE HYDROCHLORIDE 10 MG/ML
0.2 INJECTION INTRAVENOUS
Qty: 40 | Refills: 0 | Status: DISCONTINUED | OUTPATIENT
Start: 2022-06-24 | End: 2022-06-27

## 2022-06-24 RX ORDER — SODIUM CHLORIDE 9 MG/ML
1000 INJECTION INTRAMUSCULAR; INTRAVENOUS; SUBCUTANEOUS
Refills: 0 | Status: DISCONTINUED | OUTPATIENT
Start: 2022-06-24 | End: 2022-06-24

## 2022-06-24 RX ORDER — POTASSIUM CHLORIDE 20 MEQ
20 PACKET (EA) ORAL ONCE
Refills: 0 | Status: COMPLETED | OUTPATIENT
Start: 2022-06-24 | End: 2022-06-24

## 2022-06-24 RX ORDER — NOREPINEPHRINE BITARTRATE/D5W 8 MG/250ML
0.03 PLASTIC BAG, INJECTION (ML) INTRAVENOUS
Qty: 8 | Refills: 0 | Status: DISCONTINUED | OUTPATIENT
Start: 2022-06-24 | End: 2022-06-27

## 2022-06-24 RX ORDER — CALCIUM GLUCONATE 100 MG/ML
2 VIAL (ML) INTRAVENOUS ONCE
Refills: 0 | Status: COMPLETED | OUTPATIENT
Start: 2022-06-24 | End: 2022-06-24

## 2022-06-24 RX ADMIN — FENTANYL CITRATE 25 MICROGRAM(S): 50 INJECTION INTRAVENOUS at 20:12

## 2022-06-24 RX ADMIN — PROPOFOL 2.39 MICROGRAM(S)/KG/MIN: 10 INJECTION, EMULSION INTRAVENOUS at 19:05

## 2022-06-24 RX ADMIN — Medication 25 GRAM(S): at 23:07

## 2022-06-24 RX ADMIN — PANTOPRAZOLE SODIUM 40 MILLIGRAM(S): 20 TABLET, DELAYED RELEASE ORAL at 12:56

## 2022-06-24 RX ADMIN — CHLORHEXIDINE GLUCONATE 5 MILLILITER(S): 213 SOLUTION TOPICAL at 17:52

## 2022-06-24 RX ADMIN — Medication 250 MILLIGRAM(S): at 17:42

## 2022-06-24 RX ADMIN — FENTANYL CITRATE 25 MICROGRAM(S): 50 INJECTION INTRAVENOUS at 19:57

## 2022-06-24 RX ADMIN — Medication 100 MILLIEQUIVALENT(S): at 23:08

## 2022-06-24 RX ADMIN — Medication 81 MILLIGRAM(S): at 14:35

## 2022-06-24 RX ADMIN — HEPARIN SODIUM 5000 UNIT(S): 5000 INJECTION INTRAVENOUS; SUBCUTANEOUS at 13:35

## 2022-06-24 RX ADMIN — HEPARIN SODIUM 5000 UNIT(S): 5000 INJECTION INTRAVENOUS; SUBCUTANEOUS at 21:38

## 2022-06-24 RX ADMIN — PHENYLEPHRINE HYDROCHLORIDE 5.99 MICROGRAM(S)/KG/MIN: 10 INJECTION INTRAVENOUS at 20:04

## 2022-06-24 RX ADMIN — ATORVASTATIN CALCIUM 40 MILLIGRAM(S): 80 TABLET, FILM COATED ORAL at 22:00

## 2022-06-24 RX ADMIN — HEPARIN SODIUM 5000 UNIT(S): 5000 INJECTION INTRAVENOUS; SUBCUTANEOUS at 05:14

## 2022-06-24 RX ADMIN — PROPOFOL 2.39 MICROGRAM(S)/KG/MIN: 10 INJECTION, EMULSION INTRAVENOUS at 23:19

## 2022-06-24 RX ADMIN — PROPOFOL 2.39 MICROGRAM(S)/KG/MIN: 10 INJECTION, EMULSION INTRAVENOUS at 18:35

## 2022-06-24 RX ADMIN — Medication 200 GRAM(S): at 04:49

## 2022-06-24 RX ADMIN — CHLORHEXIDINE GLUCONATE 5 MILLILITER(S): 213 SOLUTION TOPICAL at 05:12

## 2022-06-24 RX ADMIN — CHLORHEXIDINE GLUCONATE 1 APPLICATION(S): 213 SOLUTION TOPICAL at 05:12

## 2022-06-24 RX ADMIN — CHLORHEXIDINE GLUCONATE 15 MILLILITER(S): 213 SOLUTION TOPICAL at 17:52

## 2022-06-24 RX ADMIN — Medication 4.49 MICROGRAM(S)/KG/MIN: at 06:24

## 2022-06-24 RX ADMIN — Medication 250 MILLIGRAM(S): at 05:14

## 2022-06-24 NOTE — CONSULT NOTE ADULT - ASSESSMENT
64 yo M, current smoker (½ PPD x 40 years), former ETOH abuse (40yr hx, quit 7 years ago), FH of Marfan's Syndrome and Aortic Aneurysms, PMHx HTN, SEVERE Aortic Regurgitation, dilated ascending aorta (4.4 cm per Dr. [place]) and dilated descending aorta (4.9x5.5cm), anemia, arthritis, COPD presents c/o significant fatigue, occasional SOB, and palpitations with activity since 10/2021, presented for aortic valve replacement and ascending aorta/hemiarch replacement on 6/24/22 with post-op stroke code, NIHSS 22, concern for right ICA occlusion.

## 2022-06-24 NOTE — PROGRESS NOTE ADULT - ASSESSMENT
ASSESSMENT AND PLAN :    Assessment :   62 yo M, current smoker (½ PPD x 40 years), former ETOH abuse (40yr hx, quit 7 years ago), FH of Marfan's Syndrome and Aortic Aneurysms, PMHx HTN, severe aortic regurgitation, dilated ascending aorta and dilated descending aorta, anemia, arthritis, COPD, now s/p upper hemisternotomy, AVR, ascending and hemiarch replacement on 6/23 c/b left sided paralysis, NIHSS 22. CTA significant for MELISSA occlusion with partial reconstitution of RMCA from R ACOMM. 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 Pt s/p rpt CTP this am with no perfusion deficit and rpt CTH showing recent infarct @ R precentral gyrus.    - Stroke will continue to follow  - Please maintain MAP between 100-110mmHg and assess for improvement in exam; questioning if hypoperfusion may be contributing to symptoms  - q1h stroke neuro checks  - Continue with ASA 81mg daily  - Rpt CTH with recent infarct R precentral gyrus.  - Rpt CTP with no perfusion deficits.  - Recommend MRI brain without contrast once stable  - Stroke education.      The above mentioned plan was D/W Dr. Kurtz during rounds. radiology disc/DC instructions

## 2022-06-24 NOTE — BRIEF OPERATIVE NOTE - OPERATION/FINDINGS
aortic insufficiency  aortic aneurysm with calefaction in nature  circulatory arrest: 16 mins (ACP 13mins and RCP 3 mins)    Aortic cross clamp: 130 mins
Femoral cerebral angiogram performed under GA via right CFA using an 8Fr short sheath. Right CCA injection demonstrates robust ECA and absent right ICA. Right vert injection without evidence of LVO. Left ICA injection demonstrates contralateral flow into right MCA territory via robust ACOMM. No evidence of MCA occlusion. No thrombectomy performed. Right groin closed with angioseal with hemostasis obtained.    Case d/w Dr. Heath, full report to follow.

## 2022-06-24 NOTE — BRIEF OPERATIVE NOTE - NSICDXBRIEFPREOP_GEN_ALL_CORE_FT
PRE-OP DIAGNOSIS:  Internal carotid artery occlusion, right 24-Jun-2022 01:02:31  Figueroa Guerrero  
PRE-OP DIAGNOSIS:  Aortic valve insufficiency 23-Jun-2022 17:12:43  Loki Tidwell

## 2022-06-24 NOTE — PROGRESS NOTE ADULT - SUBJECTIVE AND OBJECTIVE BOX
INTERVAL HPI/OVERNIGHT EVENTS:    OpDay: Angiogram, carotid and cerebral, bilateral  6/23: Min inv AVR ascending/hemiarch placement   EF normal     68 yo Male - active tobacco use, former ETOH - quit 7 y ago), Fam Hx Marfan's/aortic Aneurysms, Mild COPD, HTN, known Severe Aortic Regurgitation & dilated ascending aorta (4.4 cm), dilated descending aorta (4.9 x 5.5cm), anemia, arthritis, COPD w/ sxs exertional fatigue/SOB/Palpitation for several months     ECHO 2/3/22: mod-severe AI, Normal EF    CT Chest 3/25: RLL 4mm parenchymal nodule, Triangular density w/in the LLL measuring 5mm. Linear fibrosis at the L lung base. Ascending aorta at the level w/in the proximal margin of the aotic arch measures 3.6cm. Descending aorta at the level of the L pulmonary measures 4.9 x 5.0 with peripheral plaque    Cath 6/22 which was Non-obstructive. Access Right Radial TR band.    OR 6/23: no intraop blood products given   cardene post-op - initially spontaneously moving all extremities - not yet following commands    femoral lines removed - off sedation post-line removal     Code stroke called - LUE/LLE not moving   CT Brain 6/23: no acute intracranial abnormality  CT perfusion 6/23: unable to perform perfusion analysis (motion artifact)    CTa H/N 6/23: Occlusion of the right internal carotid artery with at least partial reconstitution of flow to the right anterior and middle cerebral arteries via an enlarged anterior communicating artery.  Complete occlusion of the right internal carotid artery from the level of the carotid bifurcation to the carotid terminus. Right greater than left pleural effusion.    Neurosurgery emergently consulted - To OR - Angiogram, carotid and cerebral, bilateral; not a tPA candidate    OR findings : Right CCA injection demonstrates robust ECA and absent right ICA. Right vert injection without evidence of LVO. Left ICA injection demonstrates contralateral flow into right MCA territory via robust ACOMM. No evidence of MCA occlusion. No thrombectomy performed. Right groin closed with angioseal with hemostasis obtained.    No intervention performed - right side chronic ICA occlusion with collateral flow via ECA and left ICA cross-filling via ACOMM. No thrombectomy performed,    pressors to drive MAP up utilized    off sedation this am - patient continues to not move left UE or LE; able to understand and indicates no pain when asked directly     d/w Neuro/CTS - to repeat stat CT perfusion this am as exam remains significant and chronicity seems unlikely to explain sxs at this time     PMHx includes but is not limited to:   HTN (hypertension)  Aortic regurgitation  Anemia  Arthritis  COPD, mild  H/O cataract extraction    ICU Vital Signs Last 24 Hrs  T(C): 37 (24 Jun 2022 05:01), Max: 37 (24 Jun 2022 05:01)  T(F): 98.6 (24 Jun 2022 05:01), Max: 98.6 (24 Jun 2022 05:01)  HR: 64 (24 Jun 2022 07:00) (62 - 82) sinus   BP: 111/60 (24 Jun 2022 05:00) (111/60 - 111/60)  BP(mean): 81 (24 Jun 2022 05:00) (81 - 81)  ABP: 148/68 (24 Jun 2022 07:00) (108/44 - 163/75)  ABP(mean): 96 (24 Jun 2022 07:00) (69 - 108)  RR: 14 (24 Jun 2022 07:00) (12 - 20)  SpO2: 100% (24 Jun 2022 07:00) (96% - 100%) Fi02 40%    Qtts:   Levophed 0.04  Propofol     I&O's Summary    23 Jun 2022 07:01  -  24 Jun 2022 07:00  --------------------------------------------------------  IN: 1120.3 mL / OUT: 3075 mL / NET: -1954.7 mL    Vent settings: AC 14/600/40/5    Physical Exam    Heart - regular (-)rub/gallop  Lungs - BS appreciated bilaterally (-)rhonchi/wheeze  Abd - (+)BS soft NTND (-)r/r/g  Ext - warm to touch; no cyanosis/clubbing  Chest - op bandage in place  Neuro - opens eyes to voice - moving right side ; no movement on left when asked; following simple commands on right - 4-5/5 on right   Skin - no rash     LABS:                        8.6    7.09  )-----------( 136      ( 24 Jun 2022 06:08 )             25.6     06-24    139  |  105  |  11  ----------------------------<  165<H>  3.8   |  26  |  0.63    Ca    8.7      24 Jun 2022 06:08  Phos  4.6     06-24  Mg     2.1     06-24    TPro  6.5  /  Alb  3.5  /  TBili  0.7  /  DBili  x   /  AST  54<H>  /  ALT  19  /  AlkPhos  48  06-24    PT/INR - ( 24 Jun 2022 06:08 )   PT: 14.1 sec;   INR: 1.18     PTT - ( 24 Jun 2022 06:08 )  PTT:27.0 sec    ABG - ( 24 Jun 2022 06:11 )  pH, Arterial: 7.45  pH, Blood: x     /  pCO2: 41    /  pO2: 142   / HCO3: 28    / Base Excess: 4.1   /  SaO2: 99.2      RADIOLOGY & ADDITIONAL STUDIES: reviewed     Patient with known AR/aortic aneurysm now s/p operative repair - 6/23 with significant neuro deficits post-op noted few hours post - initially moving left side spontaneously by report - code stroke called - imaging demonstrating MELISSA occlusion - NS emergently took patient to OR - suspect chronic findings - no intervention - MAP driven up     1. Neuro/Neurosurgery       I have spent/provided stated minutes of critical care time to this patient: 2 hour INTERVAL HPI/OVERNIGHT EVENTS:    OpDay: Angiogram, carotid and cerebral, bilateral  6/23: Min inv AVR ascending/hemiarch placement   EF normal     68 yo Male - active tobacco use, former ETOH - quit 7 y ago), Fam Hx Marfan's/aortic Aneurysms, Mild COPD, HTN, known Severe Aortic Regurgitation & dilated ascending aorta (4.4 cm), dilated descending aorta (4.9 x 5.5cm), anemia, arthritis, COPD w/ sxs exertional fatigue/SOB/Palpitation for several months     ECHO 2/3/22: mod-severe AI, Normal EF    CT Chest 3/25: RLL 4mm parenchymal nodule, Triangular density w/in the LLL measuring 5mm. Linear fibrosis at the L lung base. Ascending aorta at the level w/in the proximal margin of the aotic arch measures 3.6cm. Descending aorta at the level of the L pulmonary measures 4.9 x 5.0 with peripheral plaque    Cath 6/22 which was Non-obstructive. Access Right Radial TR band.    OR 6/23: no intraop blood products given   cardene post-op - initially spontaneously moving all extremities - not yet following commands    femoral lines removed - off sedation post-line removal     Code stroke called - LUE/LLE not moving   CT Brain 6/23: no acute intracranial abnormality  CT perfusion 6/23: unable to perform perfusion analysis (motion artifact)    CTa H/N 6/23: Occlusion of the right internal carotid artery with at least partial reconstitution of flow to the right anterior and middle cerebral arteries via an enlarged anterior communicating artery.  Complete occlusion of the right internal carotid artery from the level of the carotid bifurcation to the carotid terminus. Right greater than left pleural effusion.    Neurosurgery emergently consulted - To OR - Angiogram, carotid and cerebral, bilateral; not a tPA candidate    OR findings : Right CCA injection demonstrates robust ECA and absent right ICA. Right vert injection without evidence of LVO. Left ICA injection demonstrates contralateral flow into right MCA territory via robust ACOMM. No evidence of MCA occlusion. No thrombectomy performed. Right groin closed with angioseal with hemostasis obtained.    No intervention performed - right side chronic ICA occlusion with collateral flow via ECA and left ICA cross-filling via ACOMM. No thrombectomy performed,    pressors to drive MAP up utilized    off sedation this am - patient continues to not move left UE or LE; able to understand and indicates no pain when asked directly     d/w Neuro/CTS - to repeat stat CT perfusion this am as exam remains significant and chronicity seems unlikely to explain sxs at this time     PMHx includes but is not limited to:   HTN (hypertension)  Aortic regurgitation  Anemia  Arthritis  COPD, mild  H/O cataract extraction    ICU Vital Signs Last 24 Hrs  T(C): 37 (24 Jun 2022 05:01), Max: 37 (24 Jun 2022 05:01)  T(F): 98.6 (24 Jun 2022 05:01), Max: 98.6 (24 Jun 2022 05:01)  HR: 64 (24 Jun 2022 07:00) (62 - 82) sinus   BP: 111/60 (24 Jun 2022 05:00) (111/60 - 111/60)  BP(mean): 81 (24 Jun 2022 05:00) (81 - 81)  ABP: 148/68 (24 Jun 2022 07:00) (108/44 - 163/75)  ABP(mean): 96 (24 Jun 2022 07:00) (69 - 108)  RR: 14 (24 Jun 2022 07:00) (12 - 20)  SpO2: 100% (24 Jun 2022 07:00) (96% - 100%) Fi02 40%    Qtts:   Levophed 0.04  Propofol     I&O's Summary    23 Jun 2022 07:01  -  24 Jun 2022 07:00  --------------------------------------------------------  IN: 1120.3 mL / OUT: 3075 mL / NET: -1954.7 mL    Vent settings: AC 14/600/40/5    Physical Exam    Heart - regular (-)rub/gallop  Lungs - BS appreciated bilaterally (-)rhonchi/wheeze  Abd - (+)BS soft NTND (-)r/r/g  Ext - warm to touch; no cyanosis/clubbing  Chest - op bandage in place  Neuro - opens eyes to voice - moving right side ; no movement on left when asked; following simple commands on right - 4-5/5 on right   Skin - no rash     LABS:                        8.6    7.09  )-----------( 136      ( 24 Jun 2022 06:08 )             25.6     06-24    139  |  105  |  11  ----------------------------<  165<H>  3.8   |  26  |  0.63    Ca    8.7      24 Jun 2022 06:08  Phos  4.6     06-24  Mg     2.1     06-24    TPro  6.5  /  Alb  3.5  /  TBili  0.7  /  DBili  x   /  AST  54<H>  /  ALT  19  /  AlkPhos  48  06-24    PT/INR - ( 24 Jun 2022 06:08 )   PT: 14.1 sec;   INR: 1.18     PTT - ( 24 Jun 2022 06:08 )  PTT:27.0 sec    ABG - ( 24 Jun 2022 06:11 )  pH, Arterial: 7.45  pH, Blood: x     /  pCO2: 41    /  pO2: 142   / HCO3: 28    / Base Excess: 4.1   /  SaO2: 99.2      RADIOLOGY & ADDITIONAL STUDIES: reviewed     Patient with known AR/aortic aneurysm now s/p operative repair - 6/23 with significant neuro deficits post-op noted few hours post - initially moving left side spontaneously by report - code stroke called - imaging demonstrating MELISSA occlusion - NS emergently took patient to OR - suspect chronic findings - no intervention - MAP driven up     1. Neuro/Neurosurgery   acute deficits post-op   code stroke called - perfusion suboptimal and CTa demonstrating MELISSA occlusion   to OR overnight for mechanical thrombectomy; not a tPA candidate  pressors as needed to inc MAP - goal per Neuro 100-110 suspecting hypoperfusion as NS suspects chronicity but no change demonstrated to date [facility] MAP   reorder CT perfusion -   Neuro consulted following    2. CV  stable hemodynamics  sinus rhythm   ASA/statin   complete periop Abx prophylaxis     3. Pulm   serial ABG to optimize oxygenation and ventilation   neuro status will remain rate limiting step to liberation from vent   monitor chest tube output    I have spent/provided stated minutes of critical care time to this patient: 2 hour INTERVAL HPI/OVERNIGHT EVENTS:    OpDay: Angiogram, carotid and cerebral, bilateral  6/23: Min inv AVR ascending/hemiarch placement   EF normal     66 yo Male - active tobacco use, former ETOH - quit 7 y ago), Fam Hx Marfan's/aortic Aneurysms, Mild COPD, HTN, known Severe Aortic Regurgitation & dilated ascending aorta (4.4 cm), dilated descending aorta (4.9 x 5.5cm), anemia, arthritis, COPD w/ sxs exertional fatigue/SOB/Palpitation for several months     ECHO 2/3/22: mod-severe AI, Normal EF    CT Chest 3/25: RLL 4mm parenchymal nodule, Triangular density w/in the LLL measuring 5mm. Linear fibrosis at the L lung base. Ascending aorta at the level w/in the proximal margin of the aotic arch measures 3.6cm. Descending aorta at the level of the L pulmonary measures 4.9 x 5.0 with peripheral plaque    Cath 6/22 which was Non-obstructive. Access Right Radial TR band.    OR 6/23: no intraop blood products given   cardene post-op - initially spontaneously moving all extremities - not yet following commands    femoral lines removed - off sedation post-line removal     Code stroke called - LUE/LLE not moving   CT Brain 6/23: no acute intracranial abnormality  CT perfusion 6/23: unable to perform perfusion analysis (motion artifact)    CTa H/N 6/23: Occlusion of the right internal carotid artery with at least partial reconstitution of flow to the right anterior and middle cerebral arteries via an enlarged anterior communicating artery.  Complete occlusion of the right internal carotid artery from the level of the carotid bifurcation to the carotid terminus. Right greater than left pleural effusion.    Neurosurgery emergently consulted - To OR - Angiogram, carotid and cerebral, bilateral; not a tPA candidate    OR findings : Right CCA injection demonstrates robust ECA and absent right ICA. Right vert injection without evidence of LVO. Left ICA injection demonstrates contralateral flow into right MCA territory via robust ACOMM. No evidence of MCA occlusion. No thrombectomy performed. Right groin closed with angioseal with hemostasis obtained.    No intervention performed - right side chronic ICA occlusion with collateral flow via ECA and left ICA cross-filling via ACOMM. No thrombectomy performed,    pressors to drive MAP up utilized    off sedation this am - patient continues to not move left UE or LE; able to understand and indicates no pain when asked directly     d/w Neuro/CTS - to repeat stat CT perfusion this am as exam remains significant and chronicity seems unlikely to explain sxs at this time     PMHx includes but is not limited to:   HTN (hypertension)  Aortic regurgitation  Anemia  Arthritis  COPD, mild  H/O cataract extraction    ICU Vital Signs Last 24 Hrs  T(C): 37 (24 Jun 2022 05:01), Max: 37 (24 Jun 2022 05:01)  T(F): 98.6 (24 Jun 2022 05:01), Max: 98.6 (24 Jun 2022 05:01)  HR: 64 (24 Jun 2022 07:00) (62 - 82) sinus   BP: 111/60 (24 Jun 2022 05:00) (111/60 - 111/60)  BP(mean): 81 (24 Jun 2022 05:00) (81 - 81)  ABP: 148/68 (24 Jun 2022 07:00) (108/44 - 163/75)  ABP(mean): 96 (24 Jun 2022 07:00) (69 - 108)  RR: 14 (24 Jun 2022 07:00) (12 - 20)  SpO2: 100% (24 Jun 2022 07:00) (96% - 100%) Fi02 40%    Qtts:   Levophed 0.04  Propofol     I&O's Summary    23 Jun 2022 07:01  -  24 Jun 2022 07:00  --------------------------------------------------------  IN: 1120.3 mL / OUT: 3075 mL / NET: -1954.7 mL    Vent settings: AC 14/600/40/5    Physical Exam    Heart - regular (-)rub/gallop  Lungs - BS appreciated bilaterally (-)rhonchi/wheeze  Abd - (+)BS soft NTND (-)r/r/g  Ext - warm to touch; no cyanosis/clubbing  Chest - op bandage in place  Neuro - opens eyes to voice - moving right side ; no movement on left when asked; following simple commands on right - 4-5/5 on right   Skin - no rash     LABS:                        8.6    7.09  )-----------( 136      ( 24 Jun 2022 06:08 )             25.6     06-24    139  |  105  |  11  ----------------------------<  165<H>  3.8   |  26  |  0.63    Ca    8.7      24 Jun 2022 06:08  Phos  4.6     06-24  Mg     2.1     06-24    TPro  6.5  /  Alb  3.5  /  TBili  0.7  /  DBili  x   /  AST  54<H>  /  ALT  19  /  AlkPhos  48  06-24    PT/INR - ( 24 Jun 2022 06:08 )   PT: 14.1 sec;   INR: 1.18     PTT - ( 24 Jun 2022 06:08 )  PTT:27.0 sec    ABG - ( 24 Jun 2022 06:11 )  pH, Arterial: 7.45  pH, Blood: x     /  pCO2: 41    /  pO2: 142   / HCO3: 28    / Base Excess: 4.1   /  SaO2: 99.2      RADIOLOGY & ADDITIONAL STUDIES: reviewed     Patient with known AR/aortic aneurysm now s/p operative repair - 6/23 with significant neuro deficits post-op noted few hours post - initially moving left side spontaneously by report - code stroke called - imaging demonstrating MELISSA occlusion - NS emergently took patient to OR - suspect chronic findings - no intervention - MAP driven up     1. Neuro/Neurosurgery   acute deficits post-op   code stroke called - perfusion suboptimal and CTa demonstrating MELISSA occlusion   to OR overnight for mechanical thrombectomy; not a tPA candidate  pressors as needed to inc MAP - goal per Neuro 100-110 suspecting hypoperfusion as NS suspects chronicity but no change demonstrated to date [facility] MAP   reorder CT perfusion -   Neuro consulted following    2. CV  stable hemodynamics  sinus rhythm   ASA/statin   complete periop Abx prophylaxis   LA 1.0    3. Pulm   serial ABG to optimize oxygenation and ventilation   neuro status will remain rate limiting step to liberation from vent   monitor chest tube output    4. Endocrine  maintain glycemic control     HgA1c 4.3    DVT and GI prophylaxis    d/w patient/staff/CTS - no d/w neuro    I have spent/provided stated minutes of critical care time to this patient: 2 hour

## 2022-06-24 NOTE — CONSULT NOTE ADULT - SUBJECTIVE AND OBJECTIVE BOX
HISTORY OF PRESENT ILLNESS: 64 yo M, current smoker (½ PPD x 40 years), former ETOH abuse (40yr hx, quit 7 years ago), FH of Marfan's Syndrome and Aortic Aneurysms, PMHx HTN, SEVERE Aortic Regurgitation, dilated ascending aorta (4.4 cm per Dr. Toledo) and dilated descending aorta (4.9x5.5cm), anemia, arthritis, COPD presents c/o significant fatigue, occasional SOB, and palpitations with activity since 10/2021, presented for aortic valve replacement and ascending aorta/hemiarch replacement on 6/24/22. Post-operatively stroke code called with left hemiplegia/neglect and sensory deficit. CTH without acute stroke and CTA concerning for a right ICA occlusion. Neuroendovascular activated for thrombectomy consideration. NIHSS 22, unable to get TPA due to recent cardiothoracic surgery.      PAST MEDICAL & SURGICAL HISTORY:  HTN (hypertension)  Aortic regurgitation  Anemia  Arthritis  COPD, mild  H/O cataract extraction    FAMILY HISTORY:  Family history of Marfan syndrome (Uncle)    FH: aortic aneurysm (Uncle)        SOCIAL HISTORY:  Tobacco Use: Current smoker  EtOH use: Quit 7 years ago, former ETOH abuse  Substance: None reported.    Allergies:  No Known Allergies      REVIEW OF SYSTEMS  See HPI, otherwise non-contributory.      MEDICATIONS:  Antibiotics:  vancomycin  IVPB 1000 milliGRAM(s) IV Intermittent every 12 hours    Neuro:  acetaminophen   IVPB .. 1000 milliGRAM(s) IV Intermittent once PRN  dexMEDEtomidine Infusion 0.2 MICROgram(s)/kG/Hr IV Continuous <Continuous>  fentaNYL    Injectable 25 MICROGram(s) IV Push every 3 hours  propofol Infusion 5 MICROgram(s)/kG/Min IV Continuous <Continuous>    Anticoagulation:  aspirin enteric coated 81 milliGRAM(s) Oral daily  heparin   Injectable 5000 Unit(s) SubCutaneous every 8 hours    OTHER:  atorvastatin 40 milliGRAM(s) Oral at bedtime  chlorhexidine 0.12% Liquid 5 milliLiter(s) Oral Mucosa two times a day  chlorhexidine 0.12% Liquid 15 milliLiter(s) Oral Mucosa every 12 hours  chlorhexidine 2% Cloths 1 Application(s) Topical <User Schedule>  dextrose 50% Injectable 50 milliLiter(s) IV Push every 15 minutes  dextrose 50% Injectable 25 milliLiter(s) IV Push every 15 minutes  insulin regular Infusion 1 Unit(s)/Hr IV Continuous <Continuous>  niCARdipine Infusion 5 mG/Hr IV Continuous <Continuous>  pantoprazole  Injectable 40 milliGRAM(s) IV Push daily  tiotropium 18 MICROgram(s) Capsule 1 Capsule(s) Inhalation daily    IVF:  sodium chloride 0.9%. 1000 milliLiter(s) IV Continuous <Continuous>  sodium chloride 0.9%. 1000 milliLiter(s) IV Continuous <Continuous>      Vital Signs Last 24 Hrs  T(C): 36.1 (24 Jun 2022 01:01), Max: 36.8 (23 Jun 2022 05:08)  T(F): 97 (24 Jun 2022 01:01), Max: 98.3 (23 Jun 2022 05:08)  HR: 67 (24 Jun 2022 01:00) (50 - 82)  BP: 165/81 (23 Jun 2022 05:08) (165/81 - 165/81)  BP(mean): 117 (23 Jun 2022 04:45) (117 - 117)  RR: 14 (24 Jun 2022 01:15) (12 - 20)  SpO2: 100% (24 Jun 2022 01:00) (94% - 100%)    PHYSICAL EXAM:  Gen: Intubated on ventilator, sedated but arousable, in NAD.  HEENT: PERRL. EOMI, no gaze preference.   Neck: Right central line  Chest: midline incision C/D/I.  Exts: Bilateral groin dressing C/D/I with good femoral pulses. 1+ DP b/l, absent PTs.  Neuro: CNs limited. Left hemiplegia. Right UE 4/5, Right LE 3/5. Following simple commands on right side. Eye opening to verbal stimulus.      LABS:                        8.8    5.87  )-----------( 121      ( 24 Jun 2022 00:53 )             26.5     06-23    137  |  101  |  12  ----------------------------<  158<H>  4.0   |  24  |  0.68    Ca    8.3<L>      23 Jun 2022 21:31  Phos  3.9     06-23  Mg     2.2     06-23    TPro  6.9  /  Alb  3.7  /  TBili  0.9  /  DBili  x   /  AST  58<H>  /  ALT  20  /  AlkPhos  51  06-23    PT/INR - ( 24 Jun 2022 00:43 )   PT: 13.4 sec;   INR: 1.12          PTT - ( 24 Jun 2022 00:43 )  PTT:41.1 sec  Urinalysis Basic - ( 22 Jun 2022 11:56 )    Color: Yellow / Appearance: Clear / SG: <=1.005 / pH: x  Gluc: x / Ketone: NEGATIVE  / Bili: Negative / Urobili: 0.2 E.U./dL   Blood: x / Protein: NEGATIVE mg/dL / Nitrite: NEGATIVE   Leuk Esterase: NEGATIVE / RBC: x / WBC x   Sq Epi: x / Non Sq Epi: x / Bacteria: x      CULTURES:      RADIOLOGY & ADDITIONAL STUDIES:   < from: CT Angio Neck w/ IV Cont (06.23.22 @ 21:16) >  IMPRESSION: Occlusion of the right internal carotid artery with at least   partial reconstitution of flow to the right anterior and middle cerebral   arteries via an enlarged anterior communicating artery.

## 2022-06-24 NOTE — BRIEF OPERATIVE NOTE - NSICDXBRIEFPOSTOP_GEN_ALL_CORE_FT
POST-OP DIAGNOSIS:  Internal carotid artery occlusion, right 24-Jun-2022 01:03:34  Figueroa Guerrero  
POST-OP DIAGNOSIS:  Severe aortic insufficiency 23-Jun-2022 17:12:58  Loki Tidwell  Aneurysm, aortic 23-Jun-2022 17:13:12 calcified in nature Loki Tidwell

## 2022-06-24 NOTE — PROGRESS NOTE ADULT - SUBJECTIVE AND OBJECTIVE BOX
Neurology Stroke Progress Note    INTERVAL HPI/OVERNIGHT EVENTS:    Patient seen and examined @ bedside. Still intubated and sedated on Propofol which was briefly held prior to exam. Exam may be confounded by sedation. Pt just returned from repeat CTH. Pt had repeat CTP this AM as the study last night stated poor bolus.    MEDICATIONS  (STANDING):  aspirin enteric coated 81 milliGRAM(s) Oral daily  atorvastatin 40 milliGRAM(s) Oral at bedtime  chlorhexidine 0.12% Liquid 5 milliLiter(s) Oral Mucosa two times a day  chlorhexidine 0.12% Liquid 15 milliLiter(s) Oral Mucosa every 12 hours  chlorhexidine 2% Cloths 1 Application(s) Topical <User Schedule>  dextrose 50% Injectable 50 milliLiter(s) IV Push every 15 minutes  dextrose 50% Injectable 25 milliLiter(s) IV Push every 15 minutes  heparin   Injectable 5000 Unit(s) SubCutaneous every 8 hours  insulin regular Infusion 1 Unit(s)/Hr (1 mL/Hr) IV Continuous <Continuous>  norepinephrine Infusion 0.03 MICROgram(s)/kG/Min (4.49 mL/Hr) IV Continuous <Continuous>  pantoprazole  Injectable 40 milliGRAM(s) IV Push daily  phenylephrine    Infusion 0.2 MICROgram(s)/kG/Min (5.99 mL/Hr) IV Continuous <Continuous>  propofol Infusion 5 MICROgram(s)/kG/Min (2.39 mL/Hr) IV Continuous <Continuous>  sodium chloride 0.9%. 1000 milliLiter(s) (10 mL/Hr) IV Continuous <Continuous>  tiotropium 18 MICROgram(s) Capsule 1 Capsule(s) Inhalation daily  vancomycin  IVPB 1000 milliGRAM(s) IV Intermittent every 12 hours    MEDICATIONS  (PRN):  acetaminophen   IVPB .. 1000 milliGRAM(s) IV Intermittent once PRN Mild Pain (1 - 3)      Allergies  No Known Allergies    Physical exam:  Constitutional: Intubated and sedated on propofol, easily arousable with verbal stimuli.  Eyes: Anicteric sclerae, moist conjunctivae, see below for CNs  Neck: trachea midline, ET tube in place.  Cardiovascular: Regular rate and rhythm on monitor, sternal incision open to air, clean and dry  Pulmonary: No use of accessory muscles  GI: Abdomen soft  Extremities: no edema    Neurologic:  -Mental status: Sedated, arousable with verbal stimuli. Spontaneous movements of RUE and RLE noted, unable to discern if it is for command. Pt AAO X 2-3 (nods head with appropriate answers for his name, age, place).  -Cranial nerves:   II: Visual fields are full to confrontation(able to track the examiner walking across the bed).  III, IV, VI:  Pupils equally round and reactive to light, left eye ptosis+.  VII: Face appears symmetrical, difficult to determine with ET tube and molina in place.  Motor: Normal bulk, decreased tone on left upper and lower extremities. RUE with drift but does not hit bed 4/5, RLE 2/5, unable to maintain antigravity distally but able to bend knee. RUE decreased  strength. LUE and LLE 0/5.   Sensation: Grimaces to noxious on RUE/RLE. Withdrawal to noxious + LUE. No withdrawal ot noxious on LLE.  Coordination: Unable to perform FTN with left side 2/2 paralysis. Right arm unable to bend due to lines in place.       Vital Signs Last 24 Hrs  T(C): 37 (24 Jun 2022 05:01), Max: 37 (24 Jun 2022 05:01)  T(F): 98.6 (24 Jun 2022 05:01), Max: 98.6 (24 Jun 2022 05:01)  HR: 71 (24 Jun 2022 13:00) (62 - 82)  BP: 111/60 (24 Jun 2022 05:00) (111/60 - 111/60)  BP(mean): 81 (24 Jun 2022 05:00) (81 - 81)  RR: 14 (24 Jun 2022 13:00) (12 - 20)  SpO2: 99% (24 Jun 2022 13:00) (96% - 100%)      LABS:                        8.8    9.00  )-----------( 156      ( 24 Jun 2022 11:57 )             26.6     06-24    140  |  104  |  10  ----------------------------<  120<H>  4.0   |  27  |  0.65    Ca    8.6      24 Jun 2022 11:57  Phos  3.5     06-24  Mg     2.1     06-24    TPro  6.7  /  Alb  3.5  /  TBili  0.6  /  DBili  x   /  AST  46<H>  /  ALT  18  /  AlkPhos  48  06-24    PT/INR - ( 24 Jun 2022 11:57 )   PT: 14.1 sec;   INR: 1.18          PTT - ( 24 Jun 2022 11:57 )  PTT:25.3 sec      RADIOLOGY & ADDITIONAL TESTS:    CT Head No Cont (06.24.22 @ 12:14)   IMPRESSION:    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   No hemorrhage or major mass effect.    CT Perfusion w/ Maps w/ IV Cont (06.24.22 @ 09:11)   IMPRESSION:    No evidence of focal perfusion deficit.

## 2022-06-24 NOTE — CONSULT NOTE ADULT - PROBLEM SELECTOR RECOMMENDATION 9
Patient was taken for emergent cerebral angiogram for mechanical thrombectomy,  Consent was obtained from patient's wife over the phone with his son present and all risks/benefits discussed,  During angiogram, found to have right side chronic ICA occlusion with collateral flow via ECA and left ICA cross-filling via ACOMM. No thrombectomy performed,  Patient to resume care under cardiothoracic surgery with further neurological monitoring,  D/w Dr. Heath and CT surgery team.

## 2022-06-25 LAB
ALBUMIN SERPL ELPH-MCNC: 3.3 G/DL — SIGNIFICANT CHANGE UP (ref 3.3–5)
ALBUMIN SERPL ELPH-MCNC: 3.4 G/DL — SIGNIFICANT CHANGE UP (ref 3.3–5)
ALP SERPL-CCNC: 48 U/L — SIGNIFICANT CHANGE UP (ref 40–120)
ALP SERPL-CCNC: 48 U/L — SIGNIFICANT CHANGE UP (ref 40–120)
ALT FLD-CCNC: 14 U/L — SIGNIFICANT CHANGE UP (ref 10–45)
ALT FLD-CCNC: 16 U/L — SIGNIFICANT CHANGE UP (ref 10–45)
ANION GAP SERPL CALC-SCNC: 8 MMOL/L — SIGNIFICANT CHANGE UP (ref 5–17)
ANION GAP SERPL CALC-SCNC: 9 MMOL/L — SIGNIFICANT CHANGE UP (ref 5–17)
APTT BLD: 26.3 SEC — LOW (ref 27.5–35.5)
APTT BLD: 27 SEC — LOW (ref 27.5–35.5)
AST SERPL-CCNC: 33 U/L — SIGNIFICANT CHANGE UP (ref 10–40)
AST SERPL-CCNC: 36 U/L — SIGNIFICANT CHANGE UP (ref 10–40)
BASE EXCESS BLDV CALC-SCNC: 7.8 MMOL/L — HIGH (ref -2–3)
BILIRUB SERPL-MCNC: 0.5 MG/DL — SIGNIFICANT CHANGE UP (ref 0.2–1.2)
BILIRUB SERPL-MCNC: 0.6 MG/DL — SIGNIFICANT CHANGE UP (ref 0.2–1.2)
BLD GP AB SCN SERPL QL: NEGATIVE — SIGNIFICANT CHANGE UP
BUN SERPL-MCNC: 11 MG/DL — SIGNIFICANT CHANGE UP (ref 7–23)
BUN SERPL-MCNC: 9 MG/DL — SIGNIFICANT CHANGE UP (ref 7–23)
CALCIUM SERPL-MCNC: 8.5 MG/DL — SIGNIFICANT CHANGE UP (ref 8.4–10.5)
CALCIUM SERPL-MCNC: 8.5 MG/DL — SIGNIFICANT CHANGE UP (ref 8.4–10.5)
CHLORIDE SERPL-SCNC: 100 MMOL/L — SIGNIFICANT CHANGE UP (ref 96–108)
CHLORIDE SERPL-SCNC: 102 MMOL/L — SIGNIFICANT CHANGE UP (ref 96–108)
CO2 BLDV-SCNC: 33.3 MMOL/L — HIGH (ref 22–26)
CO2 SERPL-SCNC: 27 MMOL/L — SIGNIFICANT CHANGE UP (ref 22–31)
CO2 SERPL-SCNC: 29 MMOL/L — SIGNIFICANT CHANGE UP (ref 22–31)
CREAT SERPL-MCNC: 0.61 MG/DL — SIGNIFICANT CHANGE UP (ref 0.5–1.3)
CREAT SERPL-MCNC: 0.67 MG/DL — SIGNIFICANT CHANGE UP (ref 0.5–1.3)
EGFR: 102 ML/MIN/1.73M2 — SIGNIFICANT CHANGE UP
EGFR: 105 ML/MIN/1.73M2 — SIGNIFICANT CHANGE UP
GAS PNL BLDA: SIGNIFICANT CHANGE UP
GAS PNL BLDA: SIGNIFICANT CHANGE UP
GAS PNL BLDV: SIGNIFICANT CHANGE UP
GLUCOSE SERPL-MCNC: 110 MG/DL — HIGH (ref 70–99)
GLUCOSE SERPL-MCNC: 110 MG/DL — HIGH (ref 70–99)
HCO3 BLDV-SCNC: 32 MMOL/L — HIGH (ref 22–29)
HCT VFR BLD CALC: 25.1 % — LOW (ref 39–50)
HCT VFR BLD CALC: 26.2 % — LOW (ref 39–50)
HGB BLD-MCNC: 8.2 G/DL — LOW (ref 13–17)
HGB BLD-MCNC: 8.6 G/DL — LOW (ref 13–17)
INR BLD: 1.22 — HIGH (ref 0.88–1.16)
INR BLD: 1.22 — HIGH (ref 0.88–1.16)
LACTATE SERPL-SCNC: 0.6 MMOL/L — SIGNIFICANT CHANGE UP (ref 0.5–2)
LACTATE SERPL-SCNC: 0.8 MMOL/L — SIGNIFICANT CHANGE UP (ref 0.5–2)
MAGNESIUM SERPL-MCNC: 2 MG/DL — SIGNIFICANT CHANGE UP (ref 1.6–2.6)
MAGNESIUM SERPL-MCNC: 2.2 MG/DL — SIGNIFICANT CHANGE UP (ref 1.6–2.6)
MCHC RBC-ENTMCNC: 31.2 PG — SIGNIFICANT CHANGE UP (ref 27–34)
MCHC RBC-ENTMCNC: 31.7 PG — SIGNIFICANT CHANGE UP (ref 27–34)
MCHC RBC-ENTMCNC: 32.7 GM/DL — SIGNIFICANT CHANGE UP (ref 32–36)
MCHC RBC-ENTMCNC: 32.8 GM/DL — SIGNIFICANT CHANGE UP (ref 32–36)
MCV RBC AUTO: 94.9 FL — SIGNIFICANT CHANGE UP (ref 80–100)
MCV RBC AUTO: 96.9 FL — SIGNIFICANT CHANGE UP (ref 80–100)
NRBC # BLD: 0 /100 WBCS — SIGNIFICANT CHANGE UP (ref 0–0)
NRBC # BLD: 0 /100 WBCS — SIGNIFICANT CHANGE UP (ref 0–0)
PCO2 BLDV: 42 MMHG — SIGNIFICANT CHANGE UP (ref 42–55)
PH BLDV: 7.49 — HIGH (ref 7.32–7.43)
PHOSPHATE SERPL-MCNC: 2.3 MG/DL — LOW (ref 2.5–4.5)
PHOSPHATE SERPL-MCNC: 2.5 MG/DL — SIGNIFICANT CHANGE UP (ref 2.5–4.5)
PLATELET # BLD AUTO: 118 K/UL — LOW (ref 150–400)
PLATELET # BLD AUTO: 131 K/UL — LOW (ref 150–400)
PO2 BLDV: 38 MMHG — SIGNIFICANT CHANGE UP (ref 25–45)
POTASSIUM SERPL-MCNC: 3.7 MMOL/L — SIGNIFICANT CHANGE UP (ref 3.5–5.3)
POTASSIUM SERPL-MCNC: 3.8 MMOL/L — SIGNIFICANT CHANGE UP (ref 3.5–5.3)
POTASSIUM SERPL-SCNC: 3.7 MMOL/L — SIGNIFICANT CHANGE UP (ref 3.5–5.3)
POTASSIUM SERPL-SCNC: 3.8 MMOL/L — SIGNIFICANT CHANGE UP (ref 3.5–5.3)
PROT SERPL-MCNC: 6.4 G/DL — SIGNIFICANT CHANGE UP (ref 6–8.3)
PROT SERPL-MCNC: 6.9 G/DL — SIGNIFICANT CHANGE UP (ref 6–8.3)
PROTHROM AB SERPL-ACNC: 14.6 SEC — HIGH (ref 10.5–13.4)
PROTHROM AB SERPL-ACNC: 14.6 SEC — HIGH (ref 10.5–13.4)
RBC # BLD: 2.59 M/UL — LOW (ref 4.2–5.8)
RBC # BLD: 2.76 M/UL — LOW (ref 4.2–5.8)
RBC # FLD: 15.9 % — HIGH (ref 10.3–14.5)
RBC # FLD: 16.6 % — HIGH (ref 10.3–14.5)
RH IG SCN BLD-IMP: POSITIVE — SIGNIFICANT CHANGE UP
SAO2 % BLDV: 69.4 % — SIGNIFICANT CHANGE UP (ref 67–88)
SODIUM SERPL-SCNC: 137 MMOL/L — SIGNIFICANT CHANGE UP (ref 135–145)
SODIUM SERPL-SCNC: 138 MMOL/L — SIGNIFICANT CHANGE UP (ref 135–145)
WBC # BLD: 9.43 K/UL — SIGNIFICANT CHANGE UP (ref 3.8–10.5)
WBC # BLD: 9.65 K/UL — SIGNIFICANT CHANGE UP (ref 3.8–10.5)
WBC # FLD AUTO: 9.43 K/UL — SIGNIFICANT CHANGE UP (ref 3.8–10.5)
WBC # FLD AUTO: 9.65 K/UL — SIGNIFICANT CHANGE UP (ref 3.8–10.5)

## 2022-06-25 PROCEDURE — 95720 EEG PHY/QHP EA INCR W/VEEG: CPT

## 2022-06-25 PROCEDURE — 99291 CRITICAL CARE FIRST HOUR: CPT

## 2022-06-25 PROCEDURE — 99233 SBSQ HOSP IP/OBS HIGH 50: CPT

## 2022-06-25 PROCEDURE — 71045 X-RAY EXAM CHEST 1 VIEW: CPT | Mod: 26

## 2022-06-25 PROCEDURE — 99292 CRITICAL CARE ADDL 30 MIN: CPT

## 2022-06-25 RX ORDER — POTASSIUM CHLORIDE 20 MEQ
20 PACKET (EA) ORAL
Refills: 0 | Status: COMPLETED | OUTPATIENT
Start: 2022-06-25 | End: 2022-06-25

## 2022-06-25 RX ORDER — FUROSEMIDE 40 MG
20 TABLET ORAL ONCE
Refills: 0 | Status: COMPLETED | OUTPATIENT
Start: 2022-06-25 | End: 2022-06-25

## 2022-06-25 RX ORDER — POTASSIUM CHLORIDE 20 MEQ
20 PACKET (EA) ORAL ONCE
Refills: 0 | Status: COMPLETED | OUTPATIENT
Start: 2022-06-25 | End: 2022-06-25

## 2022-06-25 RX ADMIN — DEXMEDETOMIDINE HYDROCHLORIDE IN 0.9% SODIUM CHLORIDE 3.99 MICROGRAM(S)/KG/HR: 4 INJECTION INTRAVENOUS at 09:31

## 2022-06-25 RX ADMIN — PROPOFOL 2.39 MICROGRAM(S)/KG/MIN: 10 INJECTION, EMULSION INTRAVENOUS at 03:26

## 2022-06-25 RX ADMIN — CHLORHEXIDINE GLUCONATE 5 MILLILITER(S): 213 SOLUTION TOPICAL at 06:04

## 2022-06-25 RX ADMIN — Medication 50 MILLIEQUIVALENT(S): at 16:46

## 2022-06-25 RX ADMIN — PROPOFOL 2.39 MICROGRAM(S)/KG/MIN: 10 INJECTION, EMULSION INTRAVENOUS at 09:31

## 2022-06-25 RX ADMIN — FENTANYL CITRATE 25 MICROGRAM(S): 50 INJECTION INTRAVENOUS at 13:07

## 2022-06-25 RX ADMIN — Medication 81 MILLIGRAM(S): at 12:03

## 2022-06-25 RX ADMIN — PHENYLEPHRINE HYDROCHLORIDE 5.99 MICROGRAM(S)/KG/MIN: 10 INJECTION INTRAVENOUS at 10:45

## 2022-06-25 RX ADMIN — Medication 20 MILLIGRAM(S): at 02:04

## 2022-06-25 RX ADMIN — ATORVASTATIN CALCIUM 40 MILLIGRAM(S): 80 TABLET, FILM COATED ORAL at 22:43

## 2022-06-25 RX ADMIN — Medication 10 MILLIGRAM(S): at 13:30

## 2022-06-25 RX ADMIN — HEPARIN SODIUM 5000 UNIT(S): 5000 INJECTION INTRAVENOUS; SUBCUTANEOUS at 06:04

## 2022-06-25 RX ADMIN — PANTOPRAZOLE SODIUM 40 MILLIGRAM(S): 20 TABLET, DELAYED RELEASE ORAL at 12:03

## 2022-06-25 RX ADMIN — HEPARIN SODIUM 5000 UNIT(S): 5000 INJECTION INTRAVENOUS; SUBCUTANEOUS at 22:43

## 2022-06-25 RX ADMIN — DEXMEDETOMIDINE HYDROCHLORIDE IN 0.9% SODIUM CHLORIDE 3.99 MICROGRAM(S)/KG/HR: 4 INJECTION INTRAVENOUS at 15:53

## 2022-06-25 RX ADMIN — CHLORHEXIDINE GLUCONATE 15 MILLILITER(S): 213 SOLUTION TOPICAL at 06:06

## 2022-06-25 RX ADMIN — Medication 50 MILLIEQUIVALENT(S): at 15:53

## 2022-06-25 RX ADMIN — CHLORHEXIDINE GLUCONATE 1 APPLICATION(S): 213 SOLUTION TOPICAL at 06:06

## 2022-06-25 RX ADMIN — PROPOFOL 2.39 MICROGRAM(S)/KG/MIN: 10 INJECTION, EMULSION INTRAVENOUS at 06:31

## 2022-06-25 RX ADMIN — FENTANYL CITRATE 25 MICROGRAM(S): 50 INJECTION INTRAVENOUS at 13:27

## 2022-06-25 RX ADMIN — HEPARIN SODIUM 5000 UNIT(S): 5000 INJECTION INTRAVENOUS; SUBCUTANEOUS at 13:31

## 2022-06-25 RX ADMIN — Medication 100 MILLIEQUIVALENT(S): at 06:05

## 2022-06-25 NOTE — PROGRESS NOTE ADULT - ASSESSMENT
ASSESSMENT AND PLAN :    Assessment :   64 yo M, current smoker (½ PPD x 40 years), former ETOH abuse (40yr hx, quit 7 years ago), FH of Marfan's Syndrome and Aortic Aneurysms, PMHx HTN, severe aortic regurgitation, dilated ascending aorta and dilated descending aorta, anemia, arthritis, COPD, now s/p upper hemisternotomy, AVR, ascending and hemiarch replacement on 6/23 c/b left sided paralysis, NIHSS 22. CTA significant for MELISSA occlusion with partial reconstitution of RMCA from R ACOMM. 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 Pt s/p rpt CTP this am with no perfusion deficit and rpt CTH showing recent infarct @ R precentral gyrus.    - Stroke will continue to follow  - Please maintain MAP between 100-110mmHg and assess for improvement in exam; questioning if hypoperfusion may be contributing to symptoms  - q1h stroke neuro checks  - Continue with ASA 81mg daily  - Rpt CTH with recent infarct R precentral gyrus.  - Rpt CTP with no perfusion deficits.  - Recommend MRI brain without contrast once stable  - Stroke education.      The above mentioned plan was D/W Dr. Polanco during rounds. ASSESSMENT AND PLAN :    Assessment :   62 yo M, current smoker (½ PPD x 40 years), former ETOH abuse (40yr hx, quit 7 years ago), FH of Marfan's Syndrome and Aortic Aneurysms, PMHx HTN, severe aortic regurgitation, dilated ascending aorta and dilated descending aorta, anemia, arthritis, COPD, now s/p upper hemisternotomy, AVR, ascending and hemiarch replacement on 6/23 c/b left sided paralysis, NIHSS 22. CTA significant for MELISSA occlusion with partial reconstitution of RMCA from R ACOMM. 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 Pt s/p rpt CTP this am with no perfusion deficit and rpt CTH showing recent infarct @ R precentral gyrus.    - Stroke will continue to follow  - Please maintain MAP between 100-110mmHg and assess for improvement in exam; questioning if hypoperfusion may be contributing to symptoms  - q1h stroke neuro checks  - Continue with ASA 81mg daily  - Rpt CTH with recent infarct R precentral gyrus.  - Rpt CTP with no perfusion deficits.  - Recommend MRI brain without contrast once stable  - EEG suggestive of mild ot moderate degree of diffuse / multifocal cerebral dysfunction and no epileptiform activity or electrographic seizures recorded.  - Stroke education.      The above mentioned plan was D/W Dr. Polanco during rounds. ASSESSMENT AND PLAN :    Assessment :   62 yo M, current smoker (½ PPD x 40 years), former ETOH abuse (40yr hx, quit 7 years ago), FH of Marfan's Syndrome and Aortic Aneurysms, PMHx HTN, severe aortic regurgitation, dilated ascending aorta and dilated descending aorta, anemia, arthritis, COPD, now s/p upper hemisternotomy, AVR, ascending and hemiarch replacement on 6/23 c/b left sided paralysis, NIHSS 22. CTA significant for MELISSA occlusion with partial reconstitution of RMCA from R ACOMM. 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 Pt s/p rpt CTP this am with no perfusion deficit and rpt CTH showing recent infarct @ R precentral gyrus.    - Stroke will continue to follow  - Please maintain MAP >95 .  - q1h stroke neuro checks  - Continue with ASA 81mg daily  - Rpt CTH with recent infarct R precentral gyrus.  - Rpt CTP with no perfusion deficits.  - Recommend MRI brain without contrast once stable  - EEG suggestive of mild ot moderate degree of diffuse / multifocal cerebral dysfunction and no epileptiform activity or electrographic seizures recorded.  - Can D/C EEG.  - Stroke education.      The above mentioned plan was D/W Dr. Polanco during rounds. ASSESSMENT AND PLAN :    Assessment :   62 yo M, current smoker (½ PPD x 40 years), former ETOH abuse (40yr hx, quit 7 years ago), FH of Marfan's Syndrome and Aortic Aneurysms, PMHx HTN, severe aortic regurgitation, dilated ascending aorta and dilated descending aorta, anemia, arthritis, COPD, now s/p upper hemisternotomy, AVR, ascending and hemiarch replacement on 6/23 c/b left sided paralysis, NIHSS 22. CTA significant for MELISSA occlusion with partial reconstitution of RMCA from R ACOMM. 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 Pt s/p rpt CTP this am with no perfusion deficit and rpt CTH showing recent infarct @ R precentral gyrus.    - Stroke will continue to follow  - Please maintain MAP >95 .  - q1h stroke neuro checks  - Continue with ASA 81mg daily  - Rpt CTH with recent infarct R precentral gyrus.  - Rpt CTP with no perfusion deficits.  - Recommend MRI brain without contrast once stable  - EEG suggestive of mild ot moderate degree of diffuse / multifocal cerebral dysfunction(possibly from sedation) and no epileptiform activity or electrographic seizures recorded.  - Can D/C EEG.  - Stroke education.      The above mentioned plan was D/W Dr. Polanco during rounds.

## 2022-06-25 NOTE — PROGRESS NOTE ADULT - SUBJECTIVE AND OBJECTIVE BOX
Neurology Stroke Progress Note    INTERVAL HPI/OVERNIGHT EVENTS:    Patient seen and examined @ bedside. Still intubated and sedated on Propofol and precedex which was briefly held prior to exam. Exam may be confounded by sedation. Neuro exam still notable for L sided weakness with no movement LUE/LLE. Pt briefly opens eyes for verbal stim. spontaneous movement noted RUE/RLE.    MEDICATIONS  (STANDING):  aspirin  chewable 81 milliGRAM(s) Oral daily  atorvastatin 40 milliGRAM(s) Oral at bedtime  chlorhexidine 2% Cloths 1 Application(s) Topical <User Schedule>  dexMEDEtomidine Infusion 0.2 MICROgram(s)/kG/Hr (3.99 mL/Hr) IV Continuous <Continuous>  dextrose 50% Injectable 50 milliLiter(s) IV Push every 15 minutes  dextrose 50% Injectable 25 milliLiter(s) IV Push every 15 minutes  heparin   Injectable 5000 Unit(s) SubCutaneous every 8 hours  norepinephrine Infusion 0.03 MICROgram(s)/kG/Min (4.49 mL/Hr) IV Continuous <Continuous>  pantoprazole  Injectable 40 milliGRAM(s) IV Push daily  phenylephrine    Infusion 0.2 MICROgram(s)/kG/Min (5.99 mL/Hr) IV Continuous <Continuous>  propofol Infusion 5 MICROgram(s)/kG/Min (2.39 mL/Hr) IV Continuous <Continuous>  sodium chloride 0.9%. 1000 milliLiter(s) (10 mL/Hr) IV Continuous <Continuous>  tiotropium 18 MICROgram(s) Capsule 1 Capsule(s) Inhalation daily    MEDICATIONS  (PRN):  acetaminophen   IVPB .. 1000 milliGRAM(s) IV Intermittent once PRN Mild Pain (1 - 3)  fentaNYL    Injectable 25 MICROGram(s) IV Push every 3 hours PRN Severe Pain (7 - 10)      Allergies  No Known Allergies    Physical exam:  Constitutional: Intubated and sedated, easily arousable with verbal stimuli.  Neck: trachea midline, ET tube in place.  Cardiovascular: Regular rate and rhythm on monitor, sternal incision open to air, clean and dry  GI: Abdomen soft      Neurologic:  -Mental status: Sedated, arousable with verbal stimuli. Spontaneous movements of RUE and RLE noted, unable to discern if it is for command. Pt sedated and exam may be confounded by sedation. But appearing to be tracking the examiner when moved over to other side of the bed and opens his eyes upon calling his name.    -Cranial nerves:   II: Visual fields are full to confrontation(able to track the examiner walking across the bed).  III, IV, VI:  Pupils equally round and reactive to light, left eye ptosis+.  VII: Face appears symmetrical, difficult to determine with ET tube and molina in place.  Motor: Normal bulk, decreased tone on left upper and lower extremities. RUE with drift but does not hit bed 4/5, RLE 2/5, unable to maintain antigravity distally but able to bend knee. RUE decreased  strength. LUE and LLE 0/5.   Sensation: Grimaces to noxious on RUE/RLE. Withdrawal to noxious + LUE. No withdrawal ot noxious on LLE.  Coordination: Unable to perform FTN with left side 2/2 paralysis. Right arm unable to bend due to lines in place.     Vital Signs Last 24 Hrs  T(C): 37.8 (25 Jun 2022 11:00), Max: 38 (25 Jun 2022 09:35)  T(F): 100 (25 Jun 2022 11:00), Max: 100.4 (25 Jun 2022 09:35)  HR: 65 (25 Jun 2022 14:00) (65 - 88)  BP: 163/82 (25 Jun 2022 13:00) (138/78 - 166/83)  BP(mean): 112 (25 Jun 2022 13:00) (96 - 116)  RR: 10 (25 Jun 2022 14:00) (10 - 22)  SpO2: 100% (25 Jun 2022 14:00) (97% - 100%)      LABS:       ICU Vital Signs Last 24 Hrs  T(C): 37.8 (25 Jun 2022 11:00), Max: 38 (25 Jun 2022 09:35)  T(F): 100 (25 Jun 2022 11:00), Max: 100.4 (25 Jun 2022 09:35)  HR: 65 (25 Jun 2022 14:00) (65 - 88)  BP: 163/82 (25 Jun 2022 13:00) (138/78 - 166/83)  BP(mean): 112 (25 Jun 2022 13:00) (96 - 116)  ABP: 163/74 (25 Jun 2022 14:00) (133/61 - 168/74)  ABP(mean): 105 (25 Jun 2022 14:00) (85 - 109)  RR: 10 (25 Jun 2022 14:00) (10 - 22)  SpO2: 100% (25 Jun 2022 14:00) (97% - 100%)      RADIOLOGY & ADDITIONAL TESTS:    CT Head No Cont (06.24.22 @ 12:14)   IMPRESSION:    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   No hemorrhage or major mass effect.    CT Perfusion w/ Maps w/ IV Cont (06.24.22 @ 09:11)   IMPRESSION:    No evidence of focal perfusion deficit.                 Neurology Stroke Progress Note    INTERVAL HPI/OVERNIGHT EVENTS:    Patient seen and examined @ bedside. Still intubated and sedated on Propofol and precedex which was briefly held prior to exam. Exam may be confounded by sedation. Neuro exam still notable for L sided weakness with no movement LUE/LLE. Pt briefly opens eyes for verbal stim. spontaneous movement noted RUE/RLE.    MEDICATIONS  (STANDING):  aspirin  chewable 81 milliGRAM(s) Oral daily  atorvastatin 40 milliGRAM(s) Oral at bedtime  chlorhexidine 2% Cloths 1 Application(s) Topical <User Schedule>  dexMEDEtomidine Infusion 0.2 MICROgram(s)/kG/Hr (3.99 mL/Hr) IV Continuous <Continuous>  dextrose 50% Injectable 50 milliLiter(s) IV Push every 15 minutes  dextrose 50% Injectable 25 milliLiter(s) IV Push every 15 minutes  heparin   Injectable 5000 Unit(s) SubCutaneous every 8 hours  norepinephrine Infusion 0.03 MICROgram(s)/kG/Min (4.49 mL/Hr) IV Continuous <Continuous>  pantoprazole  Injectable 40 milliGRAM(s) IV Push daily  phenylephrine    Infusion 0.2 MICROgram(s)/kG/Min (5.99 mL/Hr) IV Continuous <Continuous>  propofol Infusion 5 MICROgram(s)/kG/Min (2.39 mL/Hr) IV Continuous <Continuous>  sodium chloride 0.9%. 1000 milliLiter(s) (10 mL/Hr) IV Continuous <Continuous>  tiotropium 18 MICROgram(s) Capsule 1 Capsule(s) Inhalation daily    MEDICATIONS  (PRN):  acetaminophen   IVPB .. 1000 milliGRAM(s) IV Intermittent once PRN Mild Pain (1 - 3)  fentaNYL    Injectable 25 MICROGram(s) IV Push every 3 hours PRN Severe Pain (7 - 10)      Allergies  No Known Allergies    Physical exam:  Constitutional: Intubated and sedated, easily arousable with verbal stimuli.  Neck: trachea midline, ET tube in place.  Cardiovascular: Regular rate and rhythm on monitor, sternal incision open to air, clean and dry  GI: Abdomen soft      Neurologic:  -Mental status: Sedated, arousable with verbal stimuli. Spontaneous movements of RUE and RLE noted, unable to discern if it is for command. Pt sedated and exam may be confounded by sedation. But appearing to be tracking the examiner when moved over to other side of the bed and opens his eyes upon calling his name.    -Cranial nerves:   II: Visual fields are full to confrontation(able to track the examiner walking across the bed).  III, IV, VI:  Pupils equally round and reactive to light, left eye ptosis+.  VII: Face appears symmetrical, difficult to determine with ET tube and molina in place.  Motor: Normal bulk, decreased tone on left upper and lower extremities. RUE with drift but does not hit bed 4/5, RLE 2/5, unable to maintain antigravity distally but able to bend knee. RUE decreased  strength. LUE and LLE 0/5.   Sensation: Grimaces to noxious on RUE/RLE. Withdrawal to noxious + LUE. No withdrawal ot noxious on LLE.  Coordination: Unable to perform FTN with left side 2/2 paralysis. Right arm unable to bend due to lines in place.     Vital Signs Last 24 Hrs  T(C): 37.8 (25 Jun 2022 11:00), Max: 38 (25 Jun 2022 09:35)  T(F): 100 (25 Jun 2022 11:00), Max: 100.4 (25 Jun 2022 09:35)  HR: 65 (25 Jun 2022 14:00) (65 - 88)  BP: 163/82 (25 Jun 2022 13:00) (138/78 - 166/83)  BP(mean): 112 (25 Jun 2022 13:00) (96 - 116)  RR: 10 (25 Jun 2022 14:00) (10 - 22)  SpO2: 100% (25 Jun 2022 14:00) (97% - 100%)      LABS:       ICU Vital Signs Last 24 Hrs  T(C): 37.8 (25 Jun 2022 11:00), Max: 38 (25 Jun 2022 09:35)  T(F): 100 (25 Jun 2022 11:00), Max: 100.4 (25 Jun 2022 09:35)  HR: 65 (25 Jun 2022 14:00) (65 - 88)  BP: 163/82 (25 Jun 2022 13:00) (138/78 - 166/83)  BP(mean): 112 (25 Jun 2022 13:00) (96 - 116)  ABP: 163/74 (25 Jun 2022 14:00) (133/61 - 168/74)  ABP(mean): 105 (25 Jun 2022 14:00) (85 - 109)  RR: 10 (25 Jun 2022 14:00) (10 - 22)  SpO2: 100% (25 Jun 2022 14:00) (97% - 100%)      RADIOLOGY & ADDITIONAL TESTS:    CT Head No Cont (06.24.22 @ 12:14)   IMPRESSION:    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   No hemorrhage or major mass effect.    CT Perfusion w/ Maps w/ IV Cont (06.24.22 @ 09:11)   IMPRESSION:    No evidence of focal perfusion deficit.    EEG :   Daily Summary:    1)	Continuous mild-to-moderate generalized  slowing suggestive of a mild-to-moderate degree of diffuse or multifocal cerebral dysfunction.  2)	No epileptiform activity or electrographic seizures recorded.                  Neurology Stroke Progress Note    INTERVAL HPI/OVERNIGHT EVENTS:    Patient seen and examined @ bedside. Still intubated and sedated on Propofol and precedex which was briefly held prior to exam. Exam may be confounded by sedation. Neuro exam still notable for L sided weakness with no movement LUE/LLE. Pt briefly opens eyes for verbal stim. spontaneous movement noted RUE/RLE.    During rounds, pt was examined with attending, sedation (propofol and precedex) held prior to evaluation and pt noted to be responding by opening eyes to verbal stim, able to track, follows some simple commands, able to lift RUE and RLE atleast antigravity, LUE with no movement, grimaces for noxious on LUE, LLE antigravity , able to lift and hold for command.    MEDICATIONS  (STANDING):  aspirin  chewable 81 milliGRAM(s) Oral daily  atorvastatin 40 milliGRAM(s) Oral at bedtime  chlorhexidine 2% Cloths 1 Application(s) Topical <User Schedule>  dexMEDEtomidine Infusion 0.2 MICROgram(s)/kG/Hr (3.99 mL/Hr) IV Continuous <Continuous>  dextrose 50% Injectable 50 milliLiter(s) IV Push every 15 minutes  dextrose 50% Injectable 25 milliLiter(s) IV Push every 15 minutes  heparin   Injectable 5000 Unit(s) SubCutaneous every 8 hours  norepinephrine Infusion 0.03 MICROgram(s)/kG/Min (4.49 mL/Hr) IV Continuous <Continuous>  pantoprazole  Injectable 40 milliGRAM(s) IV Push daily  phenylephrine    Infusion 0.2 MICROgram(s)/kG/Min (5.99 mL/Hr) IV Continuous <Continuous>  propofol Infusion 5 MICROgram(s)/kG/Min (2.39 mL/Hr) IV Continuous <Continuous>  sodium chloride 0.9%. 1000 milliLiter(s) (10 mL/Hr) IV Continuous <Continuous>  tiotropium 18 MICROgram(s) Capsule 1 Capsule(s) Inhalation daily    MEDICATIONS  (PRN):  acetaminophen   IVPB .. 1000 milliGRAM(s) IV Intermittent once PRN Mild Pain (1 - 3)  fentaNYL    Injectable 25 MICROGram(s) IV Push every 3 hours PRN Severe Pain (7 - 10)      Allergies  No Known Allergies    Physical exam:  Constitutional: Intubated and sedated, easily arousable with verbal stimuli.  Neck: trachea midline, ET tube in place.  Cardiovascular: Regular rate and rhythm on monitor, sternal incision open to air, clean and dry  GI: Abdomen soft      Neurologic:  -Mental status: Sedated, arousable with verbal stimuli. Spontaneous movements of RUE and RLE noted, unable to discern if it is for command. Pt sedated and exam may be confounded by sedation. But appearing to be tracking the examiner when moved over to other side of the bed and opens his eyes upon calling his name.    -Cranial nerves:   II: Visual fields are full to confrontation(able to track the examiner walking across the bed).  III, IV, VI:  Pupils equally round and reactive to light, left eye ptosis+.  VII: Face appears symmetrical, difficult to determine with ET tube and molina in place.  Motor: Normal bulk, decreased tone on left upper and lower extremities. RUE with drift but does not hit bed 4/5, RLE 2/5, unable to maintain antigravity distally but able to bend knee. RUE decreased  strength. LUE and LLE 0/5.   Sensation: Grimaces to noxious on RUE/RLE. Withdrawal to noxious + LUE. No withdrawal ot noxious on LLE.  Coordination: Unable to perform FTN with left side 2/2 paralysis. Right arm unable to bend due to lines in place.     Vital Signs Last 24 Hrs  T(C): 37.8 (25 Jun 2022 11:00), Max: 38 (25 Jun 2022 09:35)  T(F): 100 (25 Jun 2022 11:00), Max: 100.4 (25 Jun 2022 09:35)  HR: 65 (25 Jun 2022 14:00) (65 - 88)  BP: 163/82 (25 Jun 2022 13:00) (138/78 - 166/83)  BP(mean): 112 (25 Jun 2022 13:00) (96 - 116)  RR: 10 (25 Jun 2022 14:00) (10 - 22)  SpO2: 100% (25 Jun 2022 14:00) (97% - 100%)      LABS:       ICU Vital Signs Last 24 Hrs  T(C): 37.8 (25 Jun 2022 11:00), Max: 38 (25 Jun 2022 09:35)  T(F): 100 (25 Jun 2022 11:00), Max: 100.4 (25 Jun 2022 09:35)  HR: 65 (25 Jun 2022 14:00) (65 - 88)  BP: 163/82 (25 Jun 2022 13:00) (138/78 - 166/83)  BP(mean): 112 (25 Jun 2022 13:00) (96 - 116)  ABP: 163/74 (25 Jun 2022 14:00) (133/61 - 168/74)  ABP(mean): 105 (25 Jun 2022 14:00) (85 - 109)  RR: 10 (25 Jun 2022 14:00) (10 - 22)  SpO2: 100% (25 Jun 2022 14:00) (97% - 100%)      RADIOLOGY & ADDITIONAL TESTS:    CT Head No Cont (06.24.22 @ 12:14)   IMPRESSION:    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   No hemorrhage or major mass effect.    CT Perfusion w/ Maps w/ IV Cont (06.24.22 @ 09:11)   IMPRESSION:    No evidence of focal perfusion deficit.    EEG :   Daily Summary:    1)	Continuous mild-to-moderate generalized  slowing suggestive of a mild-to-moderate degree of diffuse or multifocal cerebral dysfunction.  2)	No epileptiform activity or electrographic seizures recorded.                  Neurology Stroke Progress Note    INTERVAL HPI/OVERNIGHT EVENTS:    Patient seen and examined @ bedside. Still intubated and sedated on Propofol and precedex which was briefly held prior to exam. Exam may be confounded by sedation. Neuro exam still notable for L sided weakness with no movement LUE/LLE. Pt briefly opens eyes for verbal stim. spontaneous movement noted RUE/RLE.    During rounds, pt was examined with attending, sedation (propofol and precedex) held prior to evaluation and pt noted to be responding by opening eyes to verbal stim, able to track, follows some simple commands, able to lift RUE and RLE atleast antigravity, LUE with no movement, grimaces for noxious on LUE, LLE antigravity , able to lift and hold for command.    MEDICATIONS  (STANDING):  aspirin  chewable 81 milliGRAM(s) Oral daily  atorvastatin 40 milliGRAM(s) Oral at bedtime  chlorhexidine 2% Cloths 1 Application(s) Topical <User Schedule>  dexMEDEtomidine Infusion 0.2 MICROgram(s)/kG/Hr (3.99 mL/Hr) IV Continuous <Continuous>  dextrose 50% Injectable 50 milliLiter(s) IV Push every 15 minutes  dextrose 50% Injectable 25 milliLiter(s) IV Push every 15 minutes  heparin   Injectable 5000 Unit(s) SubCutaneous every 8 hours  norepinephrine Infusion 0.03 MICROgram(s)/kG/Min (4.49 mL/Hr) IV Continuous <Continuous>  pantoprazole  Injectable 40 milliGRAM(s) IV Push daily  phenylephrine    Infusion 0.2 MICROgram(s)/kG/Min (5.99 mL/Hr) IV Continuous <Continuous>  propofol Infusion 5 MICROgram(s)/kG/Min (2.39 mL/Hr) IV Continuous <Continuous>  sodium chloride 0.9%. 1000 milliLiter(s) (10 mL/Hr) IV Continuous <Continuous>  tiotropium 18 MICROgram(s) Capsule 1 Capsule(s) Inhalation daily    MEDICATIONS  (PRN):  acetaminophen   IVPB .. 1000 milliGRAM(s) IV Intermittent once PRN Mild Pain (1 - 3)  fentaNYL    Injectable 25 MICROGram(s) IV Push every 3 hours PRN Severe Pain (7 - 10)      Allergies  No Known Allergies    Physical exam:  Constitutional: Intubated and sedated, easily arousable with verbal stimuli.  Neck: trachea midline, ET tube in place.  Cardiovascular: Regular rate and rhythm on monitor, sternal incision open to air, clean and dry  GI: Abdomen soft      Neurologic:  -Mental status: Sedated, arousable with verbal stimuli. Spontaneous movements of RUE and RLE noted, unable to discern if it is for command. Pt sedated and exam may be confounded by sedation. But appearing to be tracking the examiner when moved over to other side of the bed and opens his eyes upon calling his name.    -Cranial nerves:   II: Visual fields are full to confrontation(able to track the examiner walking across the bed).  III, IV, VI:  Pupils equally round and reactive to light, left eye ptosis+.  VII: Face appears symmetrical, difficult to determine with ET tube and molina in place.  Motor: Normal bulk, decreased tone on left upper and lower extremities. RUE with drift but does not hit bed 4/5, RLE 2/5, unable to maintain antigravity distally but able to bend knee. RUE decreased  strength. LUE and LLE 0/5.   Sensation: Grimaces to noxious on all 4.  Coordination: Unable to perform FTN with left side 2/2 paralysis. Right arm unable to bend due to lines in place.     Vital Signs Last 24 Hrs  T(C): 37.8 (25 Jun 2022 11:00), Max: 38 (25 Jun 2022 09:35)  T(F): 100 (25 Jun 2022 11:00), Max: 100.4 (25 Jun 2022 09:35)  HR: 65 (25 Jun 2022 14:00) (65 - 88)  BP: 163/82 (25 Jun 2022 13:00) (138/78 - 166/83)  BP(mean): 112 (25 Jun 2022 13:00) (96 - 116)  RR: 10 (25 Jun 2022 14:00) (10 - 22)  SpO2: 100% (25 Jun 2022 14:00) (97% - 100%)      LABS:       ICU Vital Signs Last 24 Hrs  T(C): 37.8 (25 Jun 2022 11:00), Max: 38 (25 Jun 2022 09:35)  T(F): 100 (25 Jun 2022 11:00), Max: 100.4 (25 Jun 2022 09:35)  HR: 65 (25 Jun 2022 14:00) (65 - 88)  BP: 163/82 (25 Jun 2022 13:00) (138/78 - 166/83)  BP(mean): 112 (25 Jun 2022 13:00) (96 - 116)  ABP: 163/74 (25 Jun 2022 14:00) (133/61 - 168/74)  ABP(mean): 105 (25 Jun 2022 14:00) (85 - 109)  RR: 10 (25 Jun 2022 14:00) (10 - 22)  SpO2: 100% (25 Jun 2022 14:00) (97% - 100%)      RADIOLOGY & ADDITIONAL TESTS:    CT Head No Cont (06.24.22 @ 12:14)   IMPRESSION:    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   No hemorrhage or major mass effect.    CT Perfusion w/ Maps w/ IV Cont (06.24.22 @ 09:11)   IMPRESSION:    No evidence of focal perfusion deficit.    EEG :   Daily Summary:    1)	Continuous mild-to-moderate generalized  slowing suggestive of a mild-to-moderate degree of diffuse or multifocal cerebral dysfunction.  2)	No epileptiform activity or electrographic seizures recorded.                  Neurology Stroke Progress Note    INTERVAL HPI/OVERNIGHT EVENTS:    Patient seen and examined @ bedside. Still intubated and sedated on Propofol and precedex which was briefly held prior to exam. Exam may be confounded by sedation. Neuro exam still notable for L sided weakness with no movement LUE/LLE. Pt briefly opens eyes for verbal stim. spontaneous movement noted RUE/RLE.    During rounds, pt was examined with attending, sedation (propofol and precedex) held prior to evaluation and pt noted to be responding by opening eyes to verbal stim, able to track, follows some simple commands, able to lift RUE and RLE atleast antigravity, LUE with no movement, grimaces for noxious on LUE, LLE antigravity , able to lift and hold for command, bend @ knee level. Dorsi and plantar flexion weaker on L compared to R.    MEDICATIONS  (STANDING):  aspirin  chewable 81 milliGRAM(s) Oral daily  atorvastatin 40 milliGRAM(s) Oral at bedtime  chlorhexidine 2% Cloths 1 Application(s) Topical <User Schedule>  dexMEDEtomidine Infusion 0.2 MICROgram(s)/kG/Hr (3.99 mL/Hr) IV Continuous <Continuous>  dextrose 50% Injectable 50 milliLiter(s) IV Push every 15 minutes  dextrose 50% Injectable 25 milliLiter(s) IV Push every 15 minutes  heparin   Injectable 5000 Unit(s) SubCutaneous every 8 hours  norepinephrine Infusion 0.03 MICROgram(s)/kG/Min (4.49 mL/Hr) IV Continuous <Continuous>  pantoprazole  Injectable 40 milliGRAM(s) IV Push daily  phenylephrine    Infusion 0.2 MICROgram(s)/kG/Min (5.99 mL/Hr) IV Continuous <Continuous>  propofol Infusion 5 MICROgram(s)/kG/Min (2.39 mL/Hr) IV Continuous <Continuous>  sodium chloride 0.9%. 1000 milliLiter(s) (10 mL/Hr) IV Continuous <Continuous>  tiotropium 18 MICROgram(s) Capsule 1 Capsule(s) Inhalation daily    MEDICATIONS  (PRN):  acetaminophen   IVPB .. 1000 milliGRAM(s) IV Intermittent once PRN Mild Pain (1 - 3)  fentaNYL    Injectable 25 MICROGram(s) IV Push every 3 hours PRN Severe Pain (7 - 10)      Allergies  No Known Allergies    Physical exam:  Constitutional: Intubated and sedated, easily arousable with verbal stimuli.  Neck: trachea midline, ET tube in place.  Cardiovascular: Regular rate and rhythm on monitor, sternal incision open to air, clean and dry  GI: Abdomen soft      Neurologic:  -Mental status: Sedated, arousable with verbal stimuli. Spontaneous movements of RUE and RLE noted, unable to discern if it is for command. Pt sedated and exam may be confounded by sedation. But appearing to be tracking the examiner when moved over to other side of the bed and opens his eyes upon calling his name.    -Cranial nerves:   II: Visual fields are full to confrontation(able to track the examiner walking across the bed).  III, IV, VI:  Pupils equally round and reactive to light, left eye ptosis+.  VII: Face appears symmetrical, difficult to determine with ET tube and molina in place.  Motor: Normal bulk, decreased tone on left upper and lower extremities. RUE with drift but does not hit bed 4/5, RLE 2/5, unable to maintain antigravity distally but able to bend knee. RUE decreased  strength. LUE and LLE 0/5.   Sensation: Grimaces to noxious on all 4.  Coordination: Unable to perform FTN with left side 2/2 paralysis. Right arm unable to bend due to lines in place.     Vital Signs Last 24 Hrs  T(C): 37.8 (25 Jun 2022 11:00), Max: 38 (25 Jun 2022 09:35)  T(F): 100 (25 Jun 2022 11:00), Max: 100.4 (25 Jun 2022 09:35)  HR: 65 (25 Jun 2022 14:00) (65 - 88)  BP: 163/82 (25 Jun 2022 13:00) (138/78 - 166/83)  BP(mean): 112 (25 Jun 2022 13:00) (96 - 116)  RR: 10 (25 Jun 2022 14:00) (10 - 22)  SpO2: 100% (25 Jun 2022 14:00) (97% - 100%)      LABS:       ICU Vital Signs Last 24 Hrs  T(C): 37.8 (25 Jun 2022 11:00), Max: 38 (25 Jun 2022 09:35)  T(F): 100 (25 Jun 2022 11:00), Max: 100.4 (25 Jun 2022 09:35)  HR: 65 (25 Jun 2022 14:00) (65 - 88)  BP: 163/82 (25 Jun 2022 13:00) (138/78 - 166/83)  BP(mean): 112 (25 Jun 2022 13:00) (96 - 116)  ABP: 163/74 (25 Jun 2022 14:00) (133/61 - 168/74)  ABP(mean): 105 (25 Jun 2022 14:00) (85 - 109)  RR: 10 (25 Jun 2022 14:00) (10 - 22)  SpO2: 100% (25 Jun 2022 14:00) (97% - 100%)      RADIOLOGY & ADDITIONAL TESTS:    CT Head No Cont (06.24.22 @ 12:14)   IMPRESSION:    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   No hemorrhage or major mass effect.    CT Perfusion w/ Maps w/ IV Cont (06.24.22 @ 09:11)   IMPRESSION:    No evidence of focal perfusion deficit.    EEG :   Daily Summary:    1)	Continuous mild-to-moderate generalized  slowing suggestive of a mild-to-moderate degree of diffuse or multifocal cerebral dysfunction.  2)	No epileptiform activity or electrographic seizures recorded.

## 2022-06-25 NOTE — PROGRESS NOTE ADULT - SUBJECTIVE AND OBJECTIVE BOX
CTICU  CRITICAL  CARE  attending     Hand off received 					   Pertinent clinical, laboratory, radiographic, hemodynamic, echocardiographic, respiratory data, microbiologic data and chart were reviewed and analyzed frequently throughout the course of the day and night      63 years old male with HTN, anemia, arthritis, COPD.  He is a current smoker (½ PPD x 40 years), former ETOH abuse (40yr hx, quit 7 years ago),   He has Family History of Marfan's Syndrome and Aortic Aneurysms.  He has severe Aortic Regurgitation, dilated ascending aorta (4.4 cm) and dilated descending aorta (4.9x5.5cm).  He was evaluated for fatigue, worsening SOB, and palpitations with activity.  CT chest 3/25/22: Ascending aorta at the level w/in the proximal margin of the aortic arch measures 3.6cm.   Descending aorta at the level of the L pulmonary measures 4.9 x 5.0 with peripheral plaque formation.   RLL 4mm parenchymal nodule, Triangular density w/in the LLL measuring 5mm. Linear fibrosis at the L lung base.  LHC: No CAD.       S/P AVR  S/P Replacement of ascending aorta and hemiarch.        FAMILY HISTORY:  Family history of Marfan syndrome (Uncle)    FH: aortic aneurysm (Uncle)    PAST MEDICAL & SURGICAL HISTORY:  HTN (hypertension)  Aortic regurgitation  Anemia  Arthritis  COPD  H/O cataract extraction    14 system review was unremarkable    Vital signs, hemodynamic and respiratory parameters were reviewed from the bedside nursing flow sheet.  ICU Vital Signs Last 24 Hrs  T(C): 36.6 (25 Jun 2022 19:13), Max: 38 (25 Jun 2022 09:35)  T(F): 97.8 (25 Jun 2022 19:13), Max: 100.4 (25 Jun 2022 09:35)  HR: 65 (25 Jun 2022 21:00) (58 - 88)  BP: 138/73 (25 Jun 2022 21:00) (138/73 - 169/76)  BP(mean): 100 (25 Jun 2022 21:00) (96 - 116)  ABP: 148/70 (25 Jun 2022 21:00) (133/61 - 168/74)  ABP(mean): 96 (25 Jun 2022 21:00) (85 - 109)  RR: 10 (25 Jun 2022 21:00) (10 - 22)  SpO2: 98% (25 Jun 2022 21:00) (97% - 100%)    Adult Advanced Hemodynamics Last 24 Hrs  CVP(mm Hg): 15 (25 Jun 2022 21:00) (1 - 15)  CVP(cm H2O): --  CO: --  CI: --  PA: 23/11 (25 Jun 2022 12:00) (14/3 - 23/11)  PA(mean): 16 (25 Jun 2022 12:00) (7 - 16)  PCWP: --  SVR: --  SVRI: --  PVR: --  PVRI: --, ABG - ( 25 Jun 2022 14:09 )  pH, Arterial: 7.46  pH, Blood: x     /  pCO2: 42    /  pO2: 168   / HCO3: 30    / Base Excess: 5.4   /  SaO2: 99.4              Mode: AC/ CMV (Assist Control/ Continuous Mandatory Ventilation)  RR (machine): 10  TV (machine): 600  FiO2: 40  PEEP: 5  ITime: 1  MAP: 10  PIP: 27    Intake and output was reviewed and the fluid balance was calculated  Daily     Daily   I&O's Summary    24 Jun 2022 07:01  -  25 Jun 2022 07:00  --------------------------------------------------------  IN: 1866.4 mL / OUT: 3075 mL / NET: -1208.6 mL    25 Jun 2022 07:01  -  25 Jun 2022 21:40  --------------------------------------------------------  IN: 1459.6 mL / OUT: 1150 mL / NET: 309.6 mL        All lines and drain sites were assessed    Neuro: Left hemiparesis. Pupils 2 mm (Reactive) Follows simple commands. Weak hand  in right hand. Flexes right knee without any difficulty.  Neck: No JVD.  CVS: S1, S2, No S3.  Lungs: Good air entry bilaterally.  Abd: Soft. No tenderness. + Bowel sounds.  Vascular: + DP/PT.  Extremities: No edema.  Lymphatic: Normal.  Skin: No abnormalities.      labs  CBC Full  -  ( 25 Jun 2022 13:41 )  WBC Count : 9.65 K/uL  RBC Count : 2.59 M/uL  Hemoglobin : 8.2 g/dL  Hematocrit : 25.1 %  Platelet Count - Automated : 118 K/uL  Mean Cell Volume : 96.9 fl  Mean Cell Hemoglobin : 31.7 pg  Mean Cell Hemoglobin Concentration : 32.7 gm/dL  Auto Neutrophil # : x  Auto Lymphocyte # : x  Auto Monocyte # : x  Auto Eosinophil # : x  Auto Basophil # : x  Auto Neutrophil % : x  Auto Lymphocyte % : x  Auto Monocyte % : x  Auto Eosinophil % : x  Auto Basophil % : x    06-25    138  |  102  |  11  ----------------------------<  110<H>  3.7   |  27  |  0.61    Ca    8.5      25 Jun 2022 13:41  Phos  2.5     06-25  Mg     2.0     06-25    TPro  6.4  /  Alb  3.3  /  TBili  0.6  /  DBili  x   /  AST  33  /  ALT  14  /  AlkPhos  48  06-25    PT/INR - ( 25 Jun 2022 13:42 )   PT: 14.6 sec;   INR: 1.22          PTT - ( 25 Jun 2022 13:40 )  PTT:27.0 sec  The current medications were reviewed   MEDICATIONS  (STANDING):  aspirin  chewable 81 milliGRAM(s) Oral daily  atorvastatin 40 milliGRAM(s) Oral at bedtime  chlorhexidine 2% Cloths 1 Application(s) Topical <User Schedule>  dexMEDEtomidine Infusion 0.2 MICROgram(s)/kG/Hr (3.99 mL/Hr) IV Continuous <Continuous>  dextrose 50% Injectable 50 milliLiter(s) IV Push every 15 minutes  dextrose 50% Injectable 25 milliLiter(s) IV Push every 15 minutes  heparin   Injectable 5000 Unit(s) SubCutaneous every 8 hours  norepinephrine Infusion 0.03 MICROgram(s)/kG/Min (4.49 mL/Hr) IV Continuous <Continuous>  pantoprazole  Injectable 40 milliGRAM(s) IV Push daily  phenylephrine    Infusion 0.2 MICROgram(s)/kG/Min (5.99 mL/Hr) IV Continuous <Continuous>  propofol Infusion 5 MICROgram(s)/kG/Min (2.39 mL/Hr) IV Continuous <Continuous>  sodium chloride 0.9%. 1000 milliLiter(s) (10 mL/Hr) IV Continuous <Continuous>  tiotropium 18 MICROgram(s) Capsule 1 Capsule(s) Inhalation daily    MEDICATIONS  (PRN):  acetaminophen   IVPB .. 1000 milliGRAM(s) IV Intermittent once PRN Mild Pain (1 - 3)  fentaNYL    Injectable 25 MICROGram(s) IV Push every 3 hours PRN Severe Pain (7 - 10)        67y old  Male with aortic regurgitation.  S/P AVR  S/P Replacement of ascending aorta and hemiarch.  Postoperative course complicated by left hemiparesis.  Stroke Code called.  The Patient was taken for emergent cerebral angiogram for mechanical thrombectomy.  During angiogram, found to have right side chronic ICA occlusion with collateral flow via ECA and left ICA cross-filling via ACOMM.   No thrombectomy performed.  EEG: Continuous mild-to-moderate generalized  slowing suggestive of a mild-to-moderate degree of diffuse or multifocal cerebral dysfunction.  No epileptiform activity or electrographic seizures   Hemodynamically stable.  Good oxygenation.  Fair urine out put.        My plan includes :  Continue Full vent support.  Keep MAP > 90.  Statin Rx  Betablocker Rx when off vasopressors.  Enteral Nutrition.  Close hemodynamic, ventilatory and drain monitoring and management  Monitor for arrhythmias and monitor parameters for organ perfusion  Monitor neurologic status  Monitor renal function.  Head of the bed should remain elevated to 45 deg .   Chest PT and IS will be encouraged  Monitor adequacy of oxygenation and ventilation and attempt to wean oxygen  Nutritional goals will be met using po eventually , ensure adequate caloric intake and monitor the same  Stress ulcer and VTE prophylaxis will be achieved    Glycemic control is satisfactory  Electrolytes have been repleted as necessary and wound care has been carried out. Pain control has been achieved.   Aggressive physical therapy and early mobility and ambulation goals will be met   The family was updated about the course and plan  CRITICAL CARE TIME SPENT in evaluation and management, reassessments, review and interpretation of labs and x-rays, ventilator and hemodynamic management, formulating a plan and coordinating care: ___30____ MIN.  Time does not include procedural time.  CTICU ATTENDING     					    Stefan Henao MD                        	 CTICU  CRITICAL  CARE  attending     Hand off received 					   Pertinent clinical, laboratory, radiographic, hemodynamic, echocardiographic, respiratory data, microbiologic data and chart were reviewed and analyzed frequently throughout the course of the day and night      63 years old male with HTN, anemia, arthritis, COPD.  He is a current smoker (½ PPD x 40 years), former ETOH abuse (40yr hx, quit 7 years ago),   He has Family History of Marfan's Syndrome and Aortic Aneurysms.  He has severe Aortic Regurgitation, dilated ascending aorta (4.4 cm) and dilated descending aorta (4.9x5.5cm).  He was evaluated for fatigue, worsening SOB, and palpitations with activity.  CT chest 3/25/22: Ascending aorta at the level w/in the proximal margin of the aortic arch measures 3.6cm.   Descending aorta at the level of the L pulmonary measures 4.9 x 5.0 with peripheral plaque formation.   RLL 4mm parenchymal nodule, Triangular density w/in the LLL measuring 5mm. Linear fibrosis at the L lung base.  LHC: No CAD.       S/P AVR  S/P Replacement of ascending aorta and hemiarch.        FAMILY HISTORY:  Family history of Marfan syndrome (Uncle)    FH: aortic aneurysm (Uncle)    PAST MEDICAL & SURGICAL HISTORY:  HTN (hypertension)  Aortic regurgitation  Anemia  Arthritis  COPD  H/O cataract extraction    14 system review was unremarkable    Vital signs, hemodynamic and respiratory parameters were reviewed from the bedside nursing flow sheet.  ICU Vital Signs Last 24 Hrs  T(C): 36.6 (25 Jun 2022 19:13), Max: 38 (25 Jun 2022 09:35)  T(F): 97.8 (25 Jun 2022 19:13), Max: 100.4 (25 Jun 2022 09:35)  HR: 65 (25 Jun 2022 21:00) (58 - 88)  BP: 138/73 (25 Jun 2022 21:00) (138/73 - 169/76)  BP(mean): 100 (25 Jun 2022 21:00) (96 - 116)  ABP: 148/70 (25 Jun 2022 21:00) (133/61 - 168/74)  ABP(mean): 96 (25 Jun 2022 21:00) (85 - 109)  RR: 10 (25 Jun 2022 21:00) (10 - 22)  SpO2: 98% (25 Jun 2022 21:00) (97% - 100%)    Adult Advanced Hemodynamics Last 24 Hrs  CVP(mm Hg): 15 (25 Jun 2022 21:00) (1 - 15)  CVP(cm H2O): --  CO: --  CI: --  PA: 23/11 (25 Jun 2022 12:00) (14/3 - 23/11)  PA(mean): 16 (25 Jun 2022 12:00) (7 - 16)  PCWP: --  SVR: --  SVRI: --  PVR: --  PVRI: --, ABG - ( 25 Jun 2022 14:09 )  pH, Arterial: 7.46  pH, Blood: x     /  pCO2: 42    /  pO2: 168   / HCO3: 30    / Base Excess: 5.4   /  SaO2: 99.4              Mode: AC/ CMV (Assist Control/ Continuous Mandatory Ventilation)  RR (machine): 10  TV (machine): 600  FiO2: 40  PEEP: 5  ITime: 1  MAP: 10  PIP: 27    Intake and output was reviewed and the fluid balance was calculated  Daily     Daily   I&O's Summary    24 Jun 2022 07:01  -  25 Jun 2022 07:00  --------------------------------------------------------  IN: 1866.4 mL / OUT: 3075 mL / NET: -1208.6 mL    25 Jun 2022 07:01  -  25 Jun 2022 21:40  --------------------------------------------------------  IN: 1459.6 mL / OUT: 1150 mL / NET: 309.6 mL        All lines and drain sites were assessed    Neuro: Left hemiparesis. Pupils 2 mm (Reactive) Follows simple commands. Weak hand  in right hand. Flexes right knee without any difficulty.  Neck: No JVD.  CVS: S1, S2, No S3.  Lungs: Good air entry bilaterally.  Abd: Soft. No tenderness. + Bowel sounds.  Vascular: + DP/PT.  Extremities: No edema.  Lymphatic: Normal.  Skin: No abnormalities.      labs  CBC Full  -  ( 25 Jun 2022 13:41 )  WBC Count : 9.65 K/uL  RBC Count : 2.59 M/uL  Hemoglobin : 8.2 g/dL  Hematocrit : 25.1 %  Platelet Count - Automated : 118 K/uL  Mean Cell Volume : 96.9 fl  Mean Cell Hemoglobin : 31.7 pg  Mean Cell Hemoglobin Concentration : 32.7 gm/dL  Auto Neutrophil # : x  Auto Lymphocyte # : x  Auto Monocyte # : x  Auto Eosinophil # : x  Auto Basophil # : x  Auto Neutrophil % : x  Auto Lymphocyte % : x  Auto Monocyte % : x  Auto Eosinophil % : x  Auto Basophil % : x    06-25    138  |  102  |  11  ----------------------------<  110<H>  3.7   |  27  |  0.61    Ca    8.5      25 Jun 2022 13:41  Phos  2.5     06-25  Mg     2.0     06-25    TPro  6.4  /  Alb  3.3  /  TBili  0.6  /  DBili  x   /  AST  33  /  ALT  14  /  AlkPhos  48  06-25    PT/INR - ( 25 Jun 2022 13:42 )   PT: 14.6 sec;   INR: 1.22          PTT - ( 25 Jun 2022 13:40 )  PTT:27.0 sec  The current medications were reviewed   MEDICATIONS  (STANDING):  aspirin  chewable 81 milliGRAM(s) Oral daily  atorvastatin 40 milliGRAM(s) Oral at bedtime  chlorhexidine 2% Cloths 1 Application(s) Topical <User Schedule>  dexMEDEtomidine Infusion 0.2 MICROgram(s)/kG/Hr (3.99 mL/Hr) IV Continuous <Continuous>  dextrose 50% Injectable 50 milliLiter(s) IV Push every 15 minutes  dextrose 50% Injectable 25 milliLiter(s) IV Push every 15 minutes  heparin   Injectable 5000 Unit(s) SubCutaneous every 8 hours  norepinephrine Infusion 0.03 MICROgram(s)/kG/Min (4.49 mL/Hr) IV Continuous <Continuous>  pantoprazole  Injectable 40 milliGRAM(s) IV Push daily  phenylephrine    Infusion 0.2 MICROgram(s)/kG/Min (5.99 mL/Hr) IV Continuous <Continuous>  propofol Infusion 5 MICROgram(s)/kG/Min (2.39 mL/Hr) IV Continuous <Continuous>  sodium chloride 0.9%. 1000 milliLiter(s) (10 mL/Hr) IV Continuous <Continuous>  tiotropium 18 MICROgram(s) Capsule 1 Capsule(s) Inhalation daily    MEDICATIONS  (PRN):  acetaminophen   IVPB .. 1000 milliGRAM(s) IV Intermittent once PRN Mild Pain (1 - 3)  fentaNYL    Injectable 25 MICROGram(s) IV Push every 3 hours PRN Severe Pain (7 - 10)        67y old  Male with aortic regurgitation.  S/P AVR  S/P Replacement of ascending aorta and hemiarch.  Postoperative course complicated by left hemiparesis.  Stroke Code called.  CT Head: Acute Right precentral Gyrus Infarct.  The Patient was taken for emergent cerebral angiogram for mechanical thrombectomy.  During angiogram, found to have right side chronic ICA occlusion with collateral flow via ECA and left ICA cross-filling via ACOMM.   No thrombectomy performed.  EEG: Continuous mild-to-moderate generalized  slowing suggestive of a mild-to-moderate degree of diffuse or multifocal cerebral dysfunction.  No epileptiform activity or electrographic seizures   Hemodynamically stable.  Good oxygenation.  Fair urine out put.        My plan includes :  Continue Full vent support.  Keep MAP > 90.  Statin Rx  Betablocker Rx when off vasopressors.  Enteral Nutrition.  Close hemodynamic, ventilatory and drain monitoring and management  Monitor for arrhythmias and monitor parameters for organ perfusion  Monitor neurologic status  Monitor renal function.  Head of the bed should remain elevated to 45 deg .   Chest PT and IS will be encouraged  Monitor adequacy of oxygenation and ventilation and attempt to wean oxygen  Nutritional goals will be met using po eventually , ensure adequate caloric intake and monitor the same  Stress ulcer and VTE prophylaxis will be achieved    Glycemic control is satisfactory  Electrolytes have been repleted as necessary and wound care has been carried out. Pain control has been achieved.   Aggressive physical therapy and early mobility and ambulation goals will be met   The family was updated about the course and plan  CRITICAL CARE TIME SPENT in evaluation and management, reassessments, review and interpretation of labs and x-rays, ventilator and hemodynamic management, formulating a plan and coordinating care: ___30____ MIN.  Time does not include procedural time.  CTICU ATTENDING     					    Stefan Henao MD

## 2022-06-25 NOTE — PROGRESS NOTE ADULT - SUBJECTIVE AND OBJECTIVE BOX
INTERVAL HPI/OVERNIGHT EVENTS:    6/24: Angiogram, carotid and cerebral, bilateral  6/23: Min inv AVR ascending/hemiarch placement   EF normal     68 yo Male - active tobacco use, former ETOH - quit 7 y ago), Fam Hx Marfan's/aortic Aneurysms, Mild COPD, HTN, known Severe Aortic Regurgitation & dilated ascending aorta (4.4 cm), dilated descending aorta (4.9 x 5.5cm), anemia, arthritis, COPD w/ sxs exertional fatigue/SOB/Palpitation for several months     ECHO 2/3/22: mod-severe AI, Normal EF    CT Chest 3/25: RLL 4mm parenchymal nodule, Triangular density w/in the LLL measuring 5mm. Linear fibrosis at the L lung base. Ascending aorta at the level w/in the proximal margin of the aotic arch measures 3.6cm. Descending aorta at the level of the L pulmonary measures 4.9 x 5.0 with peripheral plaque    Cath 6/22 which was Non-obstructive. Access Right Radial TR band.    OR 6/23: no intraop blood products given   cardene post-op - initially spontaneously moving all extremities - not yet following commands    femoral lines removed - off sedation post-line removal     Code stroke called - LUE/LLE not moving   CT Brain 6/23: no acute intracranial abnormality  CT perfusion 6/23: unable to perform perfusion analysis (motion artifact)    CTa H/N 6/23: Occlusion of the right internal carotid artery with at least partial reconstitution of flow to the right anterior and middle cerebral arteries via an enlarged anterior communicating artery.  Complete occlusion of the right internal carotid artery from the level of the carotid bifurcation to the carotid terminus. Right greater than left pleural effusion.    Neurosurgery emergently consulted - To OR - Angiogram, carotid and cerebral, bilateral; not a tPA candidate    OR findings : Right CCA injection demonstrates robust ECA and absent right ICA. Right vert injection without evidence of LVO. Left ICA injection demonstrates contralateral flow into right MCA territory via robust ACOMM. No evidence of MCA occlusion. No thrombectomy performed. Right groin closed with angioseal with hemostasis obtained.    No intervention performed - right side chronic ICA occlusion with collateral flow via ECA and left ICA cross-filling via ACOMM. No thrombectomy performed,    pressors to drive MAP up utilized  off sedation not moving left UE or LE; able to understand and indicates no pain when asked directly     d/w Neuro/CTS - repeated perfusion -  and   CT Head 6/24: Recent infarct at the right precentral gyrus, similar to slightly more conspicuous on 06/23/2022 and likely explanation for left-sided weakness. No hemorrhage or major mass effect    EEG placed 6/24   no acute events overnight - cont not to move left side at all; able to move right to commands     PMHx includes but is not limited to:   HTN (hypertension)  Aortic regurgitation  Anemia  Arthritis  COPD, mild  H/O cataract extraction    ICU Vital Signs Last 24 Hrs  T(C): 38 (25 Jun 2022 09:35), Max: 38 (25 Jun 2022 09:35)  T(F): 100.4 (25 Jun 2022 09:35), Max: 100.4 (25 Jun 2022 09:35)  HR: 67 (25 Jun 2022 11:21) (66 - 88) sinus   BP: 142/67 (25 Jun 2022 11:00) (138/78 - 166/83)  BP(mean): 96 (25 Jun 2022 11:00) (96 - 116)  ABP: 136/63 (25 Jun 2022 11:00) (133/61 - 170/80)  ABP(mean): 87 (25 Jun 2022 11:00) (85 - 112)  RR: 14 (25 Jun 2022 11:21) (14 - 22)  SpO2: 99% (25 Jun 2022 11:21) (97% - 100%) Fi02 40%    Qtts:  Gordy 0.7  Propofol 30  Precedex 0.4    I&O's Summary    24 Jun 2022 07:01  -  25 Jun 2022 07:00  --------------------------------------------------------  IN: 1866.4 mL / OUT: 3075 mL / NET: -1208.6 mL    25 Jun 2022 07:01  -  25 Jun 2022 11:57  --------------------------------------------------------  IN: 260.4 mL / OUT: 480 mL / NET: -219.6 mL    Vent settings: AC 14/600/40/5    Physical Exam    Heart - regular (-)rub/gallop  Lungs - BS appreciated bilaterally - no rhonchi/wheeze   Abd - (+)BS soft NTND (-)r/r/g  Ext - warm to touch; no cyanosis/clubbing   Chest - op bandage in place   Neuro - sedate at this time (propofol/precedex infusing) - propofol to be held for assessment  Skin - no rash     LABS:                        8.6    9.43  )-----------( 131      ( 25 Jun 2022 04:42 )             26.2     06-25    137  |  100  |  9   ----------------------------<  110<H>  3.8   |  29  |  0.67    Ca    8.5      25 Jun 2022 04:42  Phos  2.3     06-25  Mg     2.2     06-25    TPro  6.9  /  Alb  3.4  /  TBili  0.5  /  DBili  x   /  AST  36  /  ALT  16  /  AlkPhos  48  06-25    PT/INR - ( 25 Jun 2022 04:42 )   PT: 14.6 sec;   INR: 1.22     PTT - ( 25 Jun 2022 04:42 )  PTT:26.3 sec    ABG - ( 25 Jun 2022 04:39 )  pH, Arterial: 7.52  pH, Blood: x     /  pCO2: 36    /  pO2: 132   / HCO3: 29    / Base Excess: 6.4   /  SaO2: 98.6      RADIOLOGY & ADDITIONAL STUDIES: reviewed     Patient with known AR/aortic aneurysm now s/p operative repair - 6/23 with significant neuro deficits post-op noted few hours post - initially moving left side spontaneously by report - code stroke called - imaging demonstrating MELISSA occlusion - NS emergently took patient to OR - suspect chronic findings - no intervention - MAP driven up - no improvement to date repeat imaging performed 6/24    1. Neuro/Neurosurgery   acute deficits post-op   code stroke called - perfusion suboptimal and CTa demonstrating MELISSA occlusion   to OR 6/24 for mechanical thrombectomy; not a tPA candidate - no intervention: unable to pass wire   pressors as needed to inc MAP - goal per Neuro 100-110   Neuro consulted following  repeat CT Head 6/24 - final reading - Recent infarct at the right precentral gyrus, similar to slightly more conspicuous on 6/23/2022 and likely explanation for left-sided weakness. No hemorrhage or major mass effect  await EEG reading     2. CV  stable hemodynamics  remove swan   sinus rhythm   ASA/statin   complete periop Abx prophylaxis   LA 1.0    3. Pulm   serial ABG to optimize oxygenation and ventilation   neuro status will remain rate limiting step to liberation from vent   lift sedation and assess mental status and ability to trial PS ventilation   monitor chest tube output    4. Endocrine  maintain glycemic control     HgA1c 4.3    DVT and GI prophylaxis    d/w patient and wife updated at bedside/staff/Neuro team & CTS     I have spent/provided stated minutes of critical care time to this patient: 90

## 2022-06-25 NOTE — EEG REPORT - NS EEG TEXT BOX
[facility] Department of Neurology  Inpatient Continuous video-Electroencephalogram    Patient Name:	PADMINI ABEL    :	1955  MRN:	[id number]    Study Start Date/Time:  2022, 3:00 PM  Study End Date/Time:	IN PROGRESS    Referred by: Mandeep Kurtz MD    Brief Clinical History:  PADMINI ABEL is a 67 year-old male with history of severe aortic valve regurgitation s/p AVR and aortic arch surgery with periop course c/b left hemiparesis due to R ICA occlusion and R precentral gyrus infarct; study performed to investigate for seizures or markers of epilepsy.    Diagnosis Code:   R56.9 convulsions/seizure  CPT: 58346 EEG with video 12-26h  Technical CPT: 37762 set-up +  18252 vEEG unmonitored 12-26h    Pertinent Medications:  Propofol and Precedex drips    Acquisition Details:  Electroencephalography was acquired using a minimum of 21 channels on an [employer] system v 8.5.1 with electrode placement according to the standard International 10-20 system following ACNS (American Clinical Neurophysiology Society) guidelines for Long-Term Video EEG monitoring.  Anterior temporal T1 and T2 electrodes were utilized whenever possible.   The [employer] automated spike & seizure detections were all reviewed in detail, in addition to extensive portions of raw EEG.    Day 1: 2022 @ 3:00 PM to the next morning @ 7:00 AM  Background:  continuous, with predominantly theta>delta frequencies.  Symmetry:  No persistent asymmetries of voltage or frequency.  Posterior Dominant Rhythm:  7 Hz poorly regulated.  Organization: Absent  Voltage:  Normal (20+ uV)  Variability: Yes 		Reactivity: Yes  State changes: Present without N2 sleep transients.   Spontaneous Activity:  No epileptiform discharges.  Periodic/rhythmic activity:  None  Events:  No electrographic seizures or significant clinical events.  EKG: Regular    Daily Summary:    1)	Continuous mild-to-moderate generalized  slowing suggestive of a mild-to-moderate degree of diffuse or multifocal cerebral dysfunction.  2)	No epileptiform activity or electrographic seizures recorded.     Kunal Lynn DO  CNP Fellow    Faisal Maldonado MD  Attending Neurologist, [facility]

## 2022-06-26 LAB
ALBUMIN SERPL ELPH-MCNC: 2.9 G/DL — LOW (ref 3.3–5)
ALP SERPL-CCNC: 47 U/L — SIGNIFICANT CHANGE UP (ref 40–120)
ALT FLD-CCNC: 12 U/L — SIGNIFICANT CHANGE UP (ref 10–45)
ANION GAP SERPL CALC-SCNC: 12 MMOL/L — SIGNIFICANT CHANGE UP (ref 5–17)
ANION GAP SERPL CALC-SCNC: 7 MMOL/L — SIGNIFICANT CHANGE UP (ref 5–17)
APTT BLD: 25.3 SEC — LOW (ref 27.5–35.5)
APTT BLD: 26.2 SEC — LOW (ref 27.5–35.5)
AST SERPL-CCNC: 26 U/L — SIGNIFICANT CHANGE UP (ref 10–40)
BILIRUB SERPL-MCNC: 0.4 MG/DL — SIGNIFICANT CHANGE UP (ref 0.2–1.2)
BUN SERPL-MCNC: 13 MG/DL — SIGNIFICANT CHANGE UP (ref 7–23)
BUN SERPL-MCNC: 9 MG/DL — SIGNIFICANT CHANGE UP (ref 7–23)
CALCIUM SERPL-MCNC: 8.6 MG/DL — SIGNIFICANT CHANGE UP (ref 8.4–10.5)
CALCIUM SERPL-MCNC: 9 MG/DL — SIGNIFICANT CHANGE UP (ref 8.4–10.5)
CHLORIDE SERPL-SCNC: 100 MMOL/L — SIGNIFICANT CHANGE UP (ref 96–108)
CHLORIDE SERPL-SCNC: 102 MMOL/L — SIGNIFICANT CHANGE UP (ref 96–108)
CO2 SERPL-SCNC: 24 MMOL/L — SIGNIFICANT CHANGE UP (ref 22–31)
CO2 SERPL-SCNC: 26 MMOL/L — SIGNIFICANT CHANGE UP (ref 22–31)
CREAT SERPL-MCNC: 0.47 MG/DL — LOW (ref 0.5–1.3)
CREAT SERPL-MCNC: 0.57 MG/DL — SIGNIFICANT CHANGE UP (ref 0.5–1.3)
EGFR: 107 ML/MIN/1.73M2 — SIGNIFICANT CHANGE UP
EGFR: 113 ML/MIN/1.73M2 — SIGNIFICANT CHANGE UP
GAS PNL BLDA: SIGNIFICANT CHANGE UP
GLUCOSE SERPL-MCNC: 123 MG/DL — HIGH (ref 70–99)
GLUCOSE SERPL-MCNC: 98 MG/DL — SIGNIFICANT CHANGE UP (ref 70–99)
HCT VFR BLD CALC: 24.8 % — LOW (ref 39–50)
HCT VFR BLD CALC: 25.7 % — LOW (ref 39–50)
HGB BLD-MCNC: 8.1 G/DL — LOW (ref 13–17)
HGB BLD-MCNC: 8.6 G/DL — LOW (ref 13–17)
INR BLD: 1.12 — SIGNIFICANT CHANGE UP (ref 0.88–1.16)
INR BLD: 1.2 — HIGH (ref 0.88–1.16)
LACTATE SERPL-SCNC: 0.5 MMOL/L — SIGNIFICANT CHANGE UP (ref 0.5–2)
MAGNESIUM SERPL-MCNC: 1.8 MG/DL — SIGNIFICANT CHANGE UP (ref 1.6–2.6)
MAGNESIUM SERPL-MCNC: 2 MG/DL — SIGNIFICANT CHANGE UP (ref 1.6–2.6)
MCHC RBC-ENTMCNC: 31.3 PG — SIGNIFICANT CHANGE UP (ref 27–34)
MCHC RBC-ENTMCNC: 31.7 PG — SIGNIFICANT CHANGE UP (ref 27–34)
MCHC RBC-ENTMCNC: 32.7 GM/DL — SIGNIFICANT CHANGE UP (ref 32–36)
MCHC RBC-ENTMCNC: 33.5 GM/DL — SIGNIFICANT CHANGE UP (ref 32–36)
MCV RBC AUTO: 94.8 FL — SIGNIFICANT CHANGE UP (ref 80–100)
MCV RBC AUTO: 95.8 FL — SIGNIFICANT CHANGE UP (ref 80–100)
NRBC # BLD: 0 /100 WBCS — SIGNIFICANT CHANGE UP (ref 0–0)
NRBC # BLD: 0 /100 WBCS — SIGNIFICANT CHANGE UP (ref 0–0)
PHOSPHATE SERPL-MCNC: 2.5 MG/DL — SIGNIFICANT CHANGE UP (ref 2.5–4.5)
PHOSPHATE SERPL-MCNC: 3.1 MG/DL — SIGNIFICANT CHANGE UP (ref 2.5–4.5)
PLATELET # BLD AUTO: 108 K/UL — LOW (ref 150–400)
PLATELET # BLD AUTO: 115 K/UL — LOW (ref 150–400)
POTASSIUM SERPL-MCNC: 3.7 MMOL/L — SIGNIFICANT CHANGE UP (ref 3.5–5.3)
POTASSIUM SERPL-MCNC: 3.9 MMOL/L — SIGNIFICANT CHANGE UP (ref 3.5–5.3)
POTASSIUM SERPL-SCNC: 3.7 MMOL/L — SIGNIFICANT CHANGE UP (ref 3.5–5.3)
POTASSIUM SERPL-SCNC: 3.9 MMOL/L — SIGNIFICANT CHANGE UP (ref 3.5–5.3)
PROT SERPL-MCNC: 6.1 G/DL — SIGNIFICANT CHANGE UP (ref 6–8.3)
PROTHROM AB SERPL-ACNC: 13.4 SEC — SIGNIFICANT CHANGE UP (ref 10.5–13.4)
PROTHROM AB SERPL-ACNC: 14.3 SEC — HIGH (ref 10.5–13.4)
RBC # BLD: 2.59 M/UL — LOW (ref 4.2–5.8)
RBC # BLD: 2.71 M/UL — LOW (ref 4.2–5.8)
RBC # FLD: 15.1 % — HIGH (ref 10.3–14.5)
RBC # FLD: 15.2 % — HIGH (ref 10.3–14.5)
SODIUM SERPL-SCNC: 135 MMOL/L — SIGNIFICANT CHANGE UP (ref 135–145)
SODIUM SERPL-SCNC: 136 MMOL/L — SIGNIFICANT CHANGE UP (ref 135–145)
WBC # BLD: 5.84 K/UL — SIGNIFICANT CHANGE UP (ref 3.8–10.5)
WBC # BLD: 7.99 K/UL — SIGNIFICANT CHANGE UP (ref 3.8–10.5)
WBC # FLD AUTO: 5.84 K/UL — SIGNIFICANT CHANGE UP (ref 3.8–10.5)
WBC # FLD AUTO: 7.99 K/UL — SIGNIFICANT CHANGE UP (ref 3.8–10.5)

## 2022-06-26 PROCEDURE — 99292 CRITICAL CARE ADDL 30 MIN: CPT

## 2022-06-26 PROCEDURE — 99291 CRITICAL CARE FIRST HOUR: CPT

## 2022-06-26 PROCEDURE — 71045 X-RAY EXAM CHEST 1 VIEW: CPT | Mod: 26

## 2022-06-26 PROCEDURE — ZZZZZ: CPT

## 2022-06-26 PROCEDURE — 99233 SBSQ HOSP IP/OBS HIGH 50: CPT

## 2022-06-26 RX ORDER — POTASSIUM CHLORIDE 20 MEQ
20 PACKET (EA) ORAL ONCE
Refills: 0 | Status: COMPLETED | OUTPATIENT
Start: 2022-06-26 | End: 2022-06-26

## 2022-06-26 RX ORDER — MAGNESIUM SULFATE 500 MG/ML
2 VIAL (ML) INJECTION ONCE
Refills: 0 | Status: COMPLETED | OUTPATIENT
Start: 2022-06-26 | End: 2022-06-27

## 2022-06-26 RX ORDER — FUROSEMIDE 40 MG
20 TABLET ORAL ONCE
Refills: 0 | Status: COMPLETED | OUTPATIENT
Start: 2022-06-26 | End: 2022-06-26

## 2022-06-26 RX ORDER — ACETAMINOPHEN 500 MG
1000 TABLET ORAL ONCE
Refills: 0 | Status: COMPLETED | OUTPATIENT
Start: 2022-06-26 | End: 2022-06-26

## 2022-06-26 RX ORDER — POTASSIUM CHLORIDE 20 MEQ
20 PACKET (EA) ORAL
Refills: 0 | Status: COMPLETED | OUTPATIENT
Start: 2022-06-26 | End: 2022-06-26

## 2022-06-26 RX ADMIN — PANTOPRAZOLE SODIUM 40 MILLIGRAM(S): 20 TABLET, DELAYED RELEASE ORAL at 12:28

## 2022-06-26 RX ADMIN — Medication 20 MILLIGRAM(S): at 19:13

## 2022-06-26 RX ADMIN — Medication 1000 MILLIGRAM(S): at 23:40

## 2022-06-26 RX ADMIN — DEXMEDETOMIDINE HYDROCHLORIDE IN 0.9% SODIUM CHLORIDE 3.99 MICROGRAM(S)/KG/HR: 4 INJECTION INTRAVENOUS at 09:45

## 2022-06-26 RX ADMIN — PHENYLEPHRINE HYDROCHLORIDE 5.99 MICROGRAM(S)/KG/MIN: 10 INJECTION INTRAVENOUS at 15:49

## 2022-06-26 RX ADMIN — CHLORHEXIDINE GLUCONATE 1 APPLICATION(S): 213 SOLUTION TOPICAL at 06:53

## 2022-06-26 RX ADMIN — HEPARIN SODIUM 5000 UNIT(S): 5000 INJECTION INTRAVENOUS; SUBCUTANEOUS at 13:33

## 2022-06-26 RX ADMIN — HEPARIN SODIUM 5000 UNIT(S): 5000 INJECTION INTRAVENOUS; SUBCUTANEOUS at 21:20

## 2022-06-26 RX ADMIN — TIOTROPIUM BROMIDE 1 CAPSULE(S): 18 CAPSULE ORAL; RESPIRATORY (INHALATION) at 12:50

## 2022-06-26 RX ADMIN — Medication 100 MILLIEQUIVALENT(S): at 23:14

## 2022-06-26 RX ADMIN — Medication 1000 MILLIGRAM(S): at 16:59

## 2022-06-26 RX ADMIN — HEPARIN SODIUM 5000 UNIT(S): 5000 INJECTION INTRAVENOUS; SUBCUTANEOUS at 05:29

## 2022-06-26 RX ADMIN — PHENYLEPHRINE HYDROCHLORIDE 5.99 MICROGRAM(S)/KG/MIN: 10 INJECTION INTRAVENOUS at 02:29

## 2022-06-26 RX ADMIN — Medication 100 MILLIEQUIVALENT(S): at 22:12

## 2022-06-26 RX ADMIN — Medication 400 MILLIGRAM(S): at 23:35

## 2022-06-26 RX ADMIN — Medication 400 MILLIGRAM(S): at 15:49

## 2022-06-26 RX ADMIN — Medication 10 MILLIGRAM(S): at 09:44

## 2022-06-26 RX ADMIN — DEXMEDETOMIDINE HYDROCHLORIDE IN 0.9% SODIUM CHLORIDE 3.99 MICROGRAM(S)/KG/HR: 4 INJECTION INTRAVENOUS at 02:29

## 2022-06-26 RX ADMIN — PROPOFOL 2.39 MICROGRAM(S)/KG/MIN: 10 INJECTION, EMULSION INTRAVENOUS at 02:29

## 2022-06-26 RX ADMIN — Medication 100 MILLIEQUIVALENT(S): at 05:29

## 2022-06-26 NOTE — OCCUPATIONAL THERAPY INITIAL EVALUATION ADULT - PRECAUTIONS/LIMITATIONS, REHAB EVAL
4L O2 via NC/cardiac precautions/fall precautions/oxygen therapy device and L/min/sternal precautions

## 2022-06-26 NOTE — PHYSICAL THERAPY INITIAL EVALUATION ADULT - MODALITIES TREATMENT COMMENTS
CN testing - patient does not actively track to left, can attend to left visual field with prompts, face symmetric, tongue midline, weak L shoulder shrug, decreased cervical rotation Left

## 2022-06-26 NOTE — OCCUPATIONAL THERAPY INITIAL EVALUATION ADULT - MANUAL MUSCLE TESTING RESULTS, REHAB EVAL
decreased command following, RUE >3+/5 due to functional observation against gravity; LUE 0/5; LLE 3-/5/grossly assessed due to

## 2022-06-26 NOTE — PROGRESS NOTE ADULT - SUBJECTIVE AND OBJECTIVE BOX
CTICU  CRITICAL  CARE  attending     Hand off received 					   Pertinent clinical, laboratory, radiographic, hemodynamic, echocardiographic, respiratory data, microbiologic data and chart were reviewed and analyzed frequently throughout the course of the day and night        63 years old male with HTN, anemia, arthritis, COPD.  He is a current smoker (½ PPD x 40 years), former ETOH abuse (40yr hx, quit 7 years ago),   He has Family History of Marfan's Syndrome and Aortic Aneurysms.  He has severe Aortic Regurgitation, dilated ascending aorta (4.4 cm) and dilated descending aorta (4.9x5.5cm).  He was evaluated for fatigue, worsening SOB, and palpitations with activity.  CT chest 3/25/22: Ascending aorta at the level w/in the proximal margin of the aortic arch measures 3.6cm.   Descending aorta at the level of the L pulmonary measures 4.9 x 5.0 with peripheral plaque formation.   RLL 4mm parenchymal nodule, Triangular density w/in the LLL measuring 5mm. Linear fibrosis at the L lung base.  LHC: No CAD.       S/P AVR  S/P Replacement of ascending aorta and hemiarch.      FAMILY HISTORY:  Family history of Marfan syndrome (Uncle)    FH: aortic aneurysm (Uncle)    PAST MEDICAL & SURGICAL HISTORY:  HTN (hypertension)  Aortic regurgitation  Anemia  Arthritis  COPD  H/O cataract extraction            14 system review was unremarkable    Vital signs, hemodynamic and respiratory parameters were reviewed from the bedside nursing flow sheet.  ICU Vital Signs Last 24 Hrs  T(C): 37.3 (26 Jun 2022 21:17), Max: 37.7 (26 Jun 2022 14:44)  T(F): 99.1 (26 Jun 2022 21:17), Max: 99.9 (26 Jun 2022 14:44)  HR: 80 (26 Jun 2022 21:00) (62 - 118)  BP: 129/69 (26 Jun 2022 15:20) (118/65 - 153/74)  BP(mean): 93 (26 Jun 2022 15:20) (87 - 107)  ABP: 162/63 (26 Jun 2022 21:00) (125/53 - 186/98)  ABP(mean): 96 (26 Jun 2022 21:00) (76 - 127)  RR: 18 (26 Jun 2022 21:00) (10 - 28)  SpO2: 96% (26 Jun 2022 21:00) (93% - 100%)    Adult Advanced Hemodynamics Last 24 Hrs  CVP(mm Hg): 1 (26 Jun 2022 21:00) (1 - 47)  CVP(cm H2O): --  CO: --  CI: --  PA: --  PA(mean): --  PCWP: --  SVR: --  SVRI: --  PVR: --  PVRI: --, ABG - ( 26 Jun 2022 21:06 )  pH, Arterial: 7.51  pH, Blood: x     /  pCO2: 33    /  pO2: 74    / HCO3: 26    / Base Excess: 3.6   /  SaO2: 96.6              Mode: SIMV with PS  RR (machine): 10  TV (machine): 600  FiO2: 40  PEEP: 5  PS: 15  ITime: 1  MAP: 10  PIP: 17    Intake and output was reviewed and the fluid balance was calculated  Daily     Daily   I&O's Summary    25 Jun 2022 07:01  -  26 Jun 2022 07:00  --------------------------------------------------------  IN: 2098.8 mL / OUT: 2050 mL / NET: 48.8 mL    26 Jun 2022 07:01  -  26 Jun 2022 21:47  --------------------------------------------------------  IN: 564.7 mL / OUT: 1720 mL / NET: -1155.3 mL        All lines and drain sites were assessed    Neuro: LUE paresis. LLE weakness (Now able to flex the left knee without any problem).  Neck: No JVD.  CVS: S1, S2, No S3.  Lungs: Good air entry bilaterally.  Abd: Soft. No tenderness. + Bowel sounds.  Vascular: + DP/PT.  Extremities: No edema.  Lymphatic: Normal.  Skin: No abnormalities.      labs  CBC Full  -  ( 26 Jun 2022 21:09 )  WBC Count : 5.84 K/uL  RBC Count : 2.71 M/uL  Hemoglobin : 8.6 g/dL  Hematocrit : 25.7 %  Platelet Count - Automated : 115 K/uL  Mean Cell Volume : 94.8 fl  Mean Cell Hemoglobin : 31.7 pg  Mean Cell Hemoglobin Concentration : 33.5 gm/dL  Auto Neutrophil # : x  Auto Lymphocyte # : x  Auto Monocyte # : x  Auto Eosinophil # : x  Auto Basophil # : x  Auto Neutrophil % : x  Auto Lymphocyte % : x  Auto Monocyte % : x  Auto Eosinophil % : x  Auto Basophil % : x    06-26    136  |  100  |  x   ----------------------------<  98  3.7   |  24  |  0.57    Ca    9.0      26 Jun 2022 21:09  Phos  3.1     06-26  Mg     1.8     06-26    TPro  6.1  /  Alb  2.9<L>  /  TBili  0.4  /  DBili  x   /  AST  26  /  ALT  12  /  AlkPhos  47  06-26    PT/INR - ( 26 Jun 2022 21:00 )   PT: 14.3 sec;   INR: 1.20          PTT - ( 26 Jun 2022 21:00 )  PTT:26.2 sec  The current medications were reviewed   MEDICATIONS  (STANDING):  aspirin  chewable 81 milliGRAM(s) Oral daily  atorvastatin 40 milliGRAM(s) Oral at bedtime  chlorhexidine 2% Cloths 1 Application(s) Topical <User Schedule>  dexMEDEtomidine Infusion 0.2 MICROgram(s)/kG/Hr (3.99 mL/Hr) IV Continuous <Continuous>  dextrose 50% Injectable 50 milliLiter(s) IV Push every 15 minutes  dextrose 50% Injectable 25 milliLiter(s) IV Push every 15 minutes  heparin   Injectable 5000 Unit(s) SubCutaneous every 8 hours  norepinephrine Infusion 0.03 MICROgram(s)/kG/Min (4.49 mL/Hr) IV Continuous <Continuous>  pantoprazole  Injectable 40 milliGRAM(s) IV Push daily  phenylephrine    Infusion 0.2 MICROgram(s)/kG/Min (5.99 mL/Hr) IV Continuous <Continuous>  potassium chloride  20 mEq/100 mL IVPB 20 milliEquivalent(s) IV Intermittent once  sodium chloride 0.9%. 1000 milliLiter(s) (10 mL/Hr) IV Continuous <Continuous>  tiotropium 18 MICROgram(s) Capsule 1 Capsule(s) Inhalation daily    MEDICATIONS  (PRN):  bisacodyl Suppository 10 milliGRAM(s) Rectal daily PRN Constipation  fentaNYL    Injectable 25 MICROGram(s) IV Push every 3 hours PRN Severe Pain (7 - 10)            67y old  Male with aortic regurgitation.  S/P AVR  S/P Replacement of ascending aorta and hemiarch.  Postoperative course complicated by left hemiparesis.  Stroke Code called.  CT Head: Acute Right precentral Gyrus Infarct.  CT perfusion was negative.   The Patient was taken for emergent cerebral angiogram for mechanical thrombectomy.  During angiogram, found to have right side chronic ICA occlusion with collateral flow via ECA and left ICA cross-filling via ACOMM.   No thrombectomy performed.  EEG: Continuous mild-to-moderate generalized  slowing suggestive of a mild-to-moderate degree of diffuse or multifocal cerebral dysfunction.  No epileptiform activity or electrographic seizures   Hemodynamically stable.  Good oxygenation.  Diuresing well.      My plan includes :  Repeat CT head in AM.  Bronchodilator Rx.  Statin and Betablocker.  Physical Therapy and Neuro Rehab.  Close hemodynamic, ventilatory and drain monitoring and management  Monitor for arrhythmias and monitor parameters for organ perfusion  Monitor neurologic status  Monitor renal function.  Head of the bed should remain elevated to 45 deg .   Chest PT and IS will be encouraged  Monitor adequacy of oxygenation and ventilation and attempt to wean oxygen  Nutritional goals will be met using po eventually , ensure adequate caloric intake and monitor the same  Stress ulcer and VTE prophylaxis will be achieved    Glycemic control is satisfactory  Electrolytes have been repleted as necessary and wound care has been carried out. Pain control has been achieved.   Aggressive physical therapy and early mobility and ambulation goals will be met   The family was updated about the course and plan  CRITICAL CARE TIME SPENT in evaluation and management, reassessments, review and interpretation of labs and x-rays, ventilator and hemodynamic management, formulating a plan and coordinating care: ___30____ MIN.  Time does not include procedural time.  CTICU ATTENDING     					    Stefan Henao MD

## 2022-06-26 NOTE — OCCUPATIONAL THERAPY INITIAL EVALUATION ADULT - DIAGNOSIS, OT EVAL
OT deficits include L hemiparesis, impaired balance, decreased sensation, affecting ability to complete ADL, functional mobility and transfers.

## 2022-06-26 NOTE — OCCUPATIONAL THERAPY INITIAL EVALUATION ADULT - RANGE OF MOTION EXAMINATION, LOWER EXTREMITY
Last VISIT 01/22/22    Last CPE 01/22/22     Last REFILL 06/01/21 qty 40 mL w/1 refill    Last LABS 01/15/22 CMP done    Future Appointments   Date Time Provider Margoth Jordana                               7/19/2022  5:00 PM Samira Stinson MD EMG 35 75T bilateral LE Active ROM was WNL (within normal limits)

## 2022-06-26 NOTE — PROGRESS NOTE ADULT - ASSESSMENT
ASSESSMENT AND PLAN :    Assessment :   64 yo M, current smoker (½ PPD x 40 years), former ETOH abuse (40yr hx, quit 7 years ago), FH of Marfan's Syndrome and Aortic Aneurysms, PMHx HTN, severe aortic regurgitation, dilated ascending aorta and dilated descending aorta, anemia, arthritis, COPD, now s/p upper hemisternotomy, AVR, ascending and hemiarch replacement on 6/23 c/b left sided paralysis, NIHSS 22. CTA significant for MELISSA occlusion with partial reconstitution of RMCA from R ACOMM. 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 Pt s/p rpt CTP this am with no perfusion deficit and rpt CTH showing recent infarct @ R precentral gyrus.    - Stroke will continue to follow  - Please maintain MAP >95 .  - q1h stroke neuro checks  - Continue with ASA 81mg daily  - Rpt CTH with recent infarct R precentral gyrus.  - Rpt CTP with no perfusion deficits.  - Recommend MRI brain without contrast once stable  - EEG suggestive of mild ot moderate degree of diffuse / multifocal cerebral dysfunction(possibly from sedation) and no epileptiform activity or electrographic seizures recorded.  - Can D/C EEG.  - Stroke education.      The above mentioned plan was D/W Dr. Polanco during rounds. ASSESSMENT AND PLAN :    Assessment :   62 yo M, current smoker (½ PPD x 40 years), former ETOH abuse (40yr hx, quit 7 years ago), FH of Marfan's Syndrome and Aortic Aneurysms, PMHx HTN, severe aortic regurgitation, dilated ascending aorta and dilated descending aorta, anemia, arthritis, COPD, now s/p upper hemisternotomy, AVR, ascending and hemiarch replacement on 6/23 c/b left sided paralysis, NIHSS 22. CTA significant for MELISSA occlusion with partial reconstitution of RMCA from R ACOMM. 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 Pt s/p rpt CTP this am with no perfusion deficit and rpt CTH showing recent infarct @ R precentral gyrus.    - Stroke will continue to follow  - May relax MAP goals from Neuro perspective.  - q1h stroke neuro checks  - Would recommend considering DAPT, pt is on ASA, recommend Plavix if OK from CTSx standpoint.  - Rpt CTH with recent infarct R precentral gyrus.  - Rpt CTP with no perfusion deficits.  - Recommend MRI brain without contrast once stable  - EEG suggestive of mild ot moderate degree of diffuse / multifocal cerebral dysfunction(possibly from sedation) and no epileptiform activity or electrographic seizures recorded.  - EEG D/C'd 06/25  - Stroke education.      The above mentioned plan was D/W Dr. Polanco during rounds.

## 2022-06-26 NOTE — PHYSICAL THERAPY INITIAL EVALUATION ADULT - BALANCE DISTURBANCE, SYSTEM IMPAIRMENT CONTRIBUTE, REHAB EVAL
cognitive/neuromuscular/musculoskeletal patient with brannon catheter, no drainage of urine, c/o lower abdominal pain

## 2022-06-26 NOTE — PROGRESS NOTE ADULT - SUBJECTIVE AND OBJECTIVE BOX
INTERVAL HPI/OVERNIGHT EVENTS:    6/24: Angiogram, carotid and cerebral, bilateral  6/23: Min inv AVR ascending/hemiarch placement   EF normal     68 yo Male - active tobacco use, former ETOH - quit 7 y ago), Fam Hx Marfan's/aortic Aneurysms, Mild COPD, HTN, known Severe Aortic Regurgitation & dilated ascending aorta (4.4 cm), dilated descending aorta (4.9 x 5.5cm), anemia, arthritis, COPD w/ sxs exertional fatigue/SOB/Palpitation for several months     ECHO 2/3/22: mod-severe AI, Normal EF    CT Chest 3/25: RLL 4mm parenchymal nodule, Triangular density w/in the LLL measuring 5mm. Linear fibrosis at the L lung base. Ascending aorta at the level w/in the proximal margin of the aotic arch measures 3.6cm. Descending aorta at the level of the L pulmonary measures 4.9 x 5.0 with peripheral plaque    Cath 6/22 which was Non-obstructive. Access Right Radial TR band.    OR 6/23: no intraop blood products given   cardene post-op - initially spontaneously moving all extremities - not yet following commands    femoral lines removed - off sedation post-line removal     Code stroke called - LUE/LLE not moving   CT Brain 6/23: no acute intracranial abnormality  CT perfusion 6/23: unable to perform perfusion analysis (motion artifact)    CTa H/N 6/23: Occlusion of the right internal carotid artery with at least partial reconstitution of flow to the right anterior and middle cerebral arteries via an enlarged anterior communicating artery.  Complete occlusion of the right internal carotid artery from the level of the carotid bifurcation to the carotid terminus. Right greater than left pleural effusion.    Neurosurgery emergently consulted - To OR - Angiogram, carotid and cerebral, bilateral; not a tPA candidate    OR findings : Right CCA injection demonstrates robust ECA and absent right ICA. Right vert injection without evidence of LVO. Left ICA injection demonstrates contralateral flow into right MCA territory via robust ACOMM. No evidence of MCA occlusion. No thrombectomy performed. Right groin closed with angioseal with hemostasis obtained.    No intervention performed - right side chronic ICA occlusion with collateral flow via ECA and left ICA cross-filling via ACOMM. No thrombectomy performed,    pressors to drive MAP up utilized  off sedation not moving left UE or LE; able to understand and indicates no pain when asked directly     d/w Neuro/CTS - repeated perfusion -   CT Head 6/24: Recent infarct at the right precentral gyrus, similar to slightly more conspicuous on 6/23/22 and likely explanation for left-sided weakness. No hemorrhage or major mass effect    EEG placed 6/24   no acute events overnight - cont not to move left side at all; able to move right to commands     later in afternoon - able to move left leg - wiggle toes and lift leg against gravity to command repetitively    No acute events overnight - on precedex and propofol  propofol held this am - to assess mental status and assess for ability to liberate from vent today      PMHx includes but is not limited to:   HTN (hypertension)  Aortic regurgitation  Anemia  Arthritis  COPD, mild  H/O cataract extraction    ICU Vital Signs Last 24 Hrs  T(C): 36.9 (26 Jun 2022 05:01), Max: 38 (25 Jun 2022 09:35)  T(F): 98.4 (26 Jun 2022 05:01), Max: 100.4 (25 Jun 2022 09:35)  HR: 118 (26 Jun 2022 08:10) (58 - 118) sinus   BP: 118/65 (26 Jun 2022 07:00) (118/65 - 169/76)  BP(mean): 87 (26 Jun 2022 07:00) (87 - 113)  ABP: 186/98 (26 Jun 2022 08:10) (132/61 - 186/98)  ABP(mean): 127 (26 Jun 2022 08:10) (85 - 127)  RR: 28 (26 Jun 2022 08:10) (10 - 28)  SpO2: 95% (26 Jun 2022 08:10) (95% - 100%) Fi02 40%    Qtts:   Precedex 1.2  Gordy 0.4    I&O's Summary    25 Jun 2022 07:01  -  26 Jun 2022 07:00  --------------------------------------------------------  IN: 1909 mL / OUT: 1795 mL / NET: 114 mL    26 Jun 2022 07:01  -  26 Jun 2022 08:55  --------------------------------------------------------  IN: 62.6 mL / OUT: 15 mL / NET: 47.6 mL    Vent settings: SIMV 10/600/40/5 PS 15/5    Physical Exam    Heart - regular (-)rub./gallop  Lungs - BS appreciated bilaterally - no rhonchi/wheeze  Abd - (+)BS soft NTND (-)r/r/g  Ext - warm to touch; no cyanosis/clubbing  Chest - op bandage in place  Neuro - arousable to voice - following simple commands - left LE able to move off bed against gravity to command - no movement in LUE  Skin - no rash     LABS:                        8.1    7.99  )-----------( 108      ( 26 Jun 2022 02:36 )             24.8     06-26    135  |  102  |  9   ----------------------------<  123<H>  3.9   |  26  |  0.47<L>    Ca    8.6      26 Jun 2022 02:36  Phos  2.5     06-26  Mg     2.0     06-26    TPro  6.1  /  Alb  2.9<L>  /  TBili  0.4  /  DBili  x   /  AST  26  /  ALT  12  /  AlkPhos  47  06-26    PT/INR - ( 26 Jun 2022 02:36 )   PT: 13.4 sec;   INR: 1.12          PTT - ( 26 Jun 2022 02:36 )  PTT:25.3 sec    ABG - ( 26 Jun 2022 02:36 )  pH, Arterial: 7.46  pH, Blood: x     /  pCO2: 39    /  pO2: 154   / HCO3: 28    / Base Excess: 3.7   /  SaO2: 97.2      RADIOLOGY & ADDITIONAL STUDIES:    Patient with known AR/aortic aneurysm now s/p operative repair - 6/23 with significant neuro deficits post-op noted few hours post - initially moving left side spontaneously by report - code stroke called - imaging demonstrating MELISSA occlusion - NS emergently took patient to OR - suspect chronic findings - no intervention - MAP driven up - repeat scan 6/24 - Recent infarct at the right precentral gyrus that may explain deficits per d/w neuro - improvement noted on exam as of afternoon 6/25 - able to move LLE     1. Neuro  acute deficits post-op s/p code stroke  perfusion suboptimal and CTa demonstrating MELISSA occlusion   to OR 6/24 for mechanical thrombectomy; not a tPA candidate - no intervention: unable to pass wire   pressors as needed to inc MAP - goal per Neuro    repeat CT Head 6/24 - final reading - Recent infarct at the right precentral gyrus, similar to slightly more conspicuous on 6/23/2022 and likely explanation for left-sided weakness. No hemorrhage or major mass effect  EEG - no seizures  cont to maintain higher MAP and monitor neuro exam - hope to see continued improvement  PT/OT    2. CV  stable hemodynamics  sinus rhythm   ASA/statin   LA 1.0    3. Pulm   serial ABG to optimize oxygenation and ventilation   lift sedation and trial PS today  gag intact  hope to liberate from vent which will facilitate neuro recovery  monitor chest tube output    4. Endocrine  maintain glycemic control     HgA1c 4.3    Bowel regimen - prone to constipation per wife -  suppository ordered   DVT and GI prophylaxis    d/w patient and wife updated at bedside/staff/Neuro team & CTS     I have spent/provided stated minutes of critical care time to this patient: 2 hour

## 2022-06-26 NOTE — OCCUPATIONAL THERAPY INITIAL EVALUATION ADULT - PLANNED THERAPY INTERVENTIONS, OT EVAL
Patient sees Dr Brito as PCP.  Today Ludy is calling for an IUD insert as yesterday was Day 1 of her cycle.  Ludy is on the ocp so her period is usually light and does not have heavy flow days.  Appt for IUD tomorrow afternoon.   ADL retraining/balance training/bed mobility training/neuromuscular re-education/transfer training

## 2022-06-26 NOTE — OCCUPATIONAL THERAPY INITIAL EVALUATION ADULT - PERTINENT HX OF CURRENT PROBLEM, REHAB EVAL
67 Male now s/p upper hemisternotomy, AVR, ascending and hemiarch replacement on 6/23 c/b left sided paralysis, NIHSS 22. CTA significant for MELISSA occlusion with partial reconstitution of RMCA from R ACOMM. 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 Pt s/p rpt CTP this am with no perfusion deficit and rpt CTH showing recent infarct @ R precentral gyrus.

## 2022-06-26 NOTE — PROGRESS NOTE ADULT - SUBJECTIVE AND OBJECTIVE BOX
Neurology Stroke Progress Note    INTERVAL HPI/OVERNIGHT EVENTS:    Patient seen and examined @ bedside. Still intubated and sedated only on precedex. Pt lethargic, yet easily arousable. Nods head appropriately for questions asked. Neuro exam still notable for LUE with no movement, LLE some movement atleast antigravity. Spontaneous movement + RUE/RLE. Grimaces for noxious in LUE/LLE.      MEDICATIONS  (STANDING):  aspirin  chewable 81 milliGRAM(s) Oral daily  atorvastatin 40 milliGRAM(s) Oral at bedtime  chlorhexidine 2% Cloths 1 Application(s) Topical <User Schedule>  dexMEDEtomidine Infusion 0.2 MICROgram(s)/kG/Hr (3.99 mL/Hr) IV Continuous <Continuous>  dextrose 50% Injectable 50 milliLiter(s) IV Push every 15 minutes  dextrose 50% Injectable 25 milliLiter(s) IV Push every 15 minutes  heparin   Injectable 5000 Unit(s) SubCutaneous every 8 hours  norepinephrine Infusion 0.03 MICROgram(s)/kG/Min (4.49 mL/Hr) IV Continuous <Continuous>  pantoprazole  Injectable 40 milliGRAM(s) IV Push daily  phenylephrine    Infusion 0.2 MICROgram(s)/kG/Min (5.99 mL/Hr) IV Continuous <Continuous>  propofol Infusion 5 MICROgram(s)/kG/Min (2.39 mL/Hr) IV Continuous <Continuous>  sodium chloride 0.9%. 1000 milliLiter(s) (10 mL/Hr) IV Continuous <Continuous>  tiotropium 18 MICROgram(s) Capsule 1 Capsule(s) Inhalation daily    MEDICATIONS  (PRN):  acetaminophen   IVPB .. 1000 milliGRAM(s) IV Intermittent once PRN Mild Pain (1 - 3)  fentaNYL    Injectable 25 MICROGram(s) IV Push every 3 hours PRN Severe Pain (7 - 10)      Allergies  No Known Allergies    Physical exam:  Constitutional: Intubated and sedated, easily arousable with verbal stimuli.  Neck: trachea midline, ET tube in place.  Cardiovascular: Regular rate and rhythm on monitor, sternal incision open to air, clean and dry  GI: Abdomen soft      Neurologic:  -Mental status: AAO X 3(Nods head appropriately with choices). Sedated, arousable with verbal stimuli. Spontaneous movements of RUE and RLE noted, following some simple commands. Unable to speak 2/2 intubation.  -Cranial nerves:   II: Visual fields are full to confrontation- able to track.  III, IV, VI:  Pupils equally round and reactive to light, left eye ptosis+.  VII: Face appears symmetrical, difficult to determine with ET tube and molina in place.  Motor: Normal bulk, RUE with no drift, able to keep it up for count of 10, 5/5, RLE 4/5- able to lift and keep it up for count of 5, unable to maintain antigravity distally but able to bend knee. RUE decreased  strength. LUE and LLE 0/5.   Sensation: Grimaces to noxious on all 4.  Coordination: Unable to perform FTN with left side 2/2 paralysis. Right arm unable to bend due to lines in place.     Vital Signs Last 24 Hrs  T(C): 37.8 (25 Jun 2022 11:00), Max: 38 (25 Jun 2022 09:35)  T(F): 100 (25 Jun 2022 11:00), Max: 100.4 (25 Jun 2022 09:35)  HR: 65 (25 Jun 2022 14:00) (65 - 88)  BP: 163/82 (25 Jun 2022 13:00) (138/78 - 166/83)  BP(mean): 112 (25 Jun 2022 13:00) (96 - 116)  RR: 10 (25 Jun 2022 14:00) (10 - 22)  SpO2: 100% (25 Jun 2022 14:00) (97% - 100%)      LABS:       ICU Vital Signs Last 24 Hrs  T(C): 37.8 (25 Jun 2022 11:00), Max: 38 (25 Jun 2022 09:35)  T(F): 100 (25 Jun 2022 11:00), Max: 100.4 (25 Jun 2022 09:35)  HR: 65 (25 Jun 2022 14:00) (65 - 88)  BP: 163/82 (25 Jun 2022 13:00) (138/78 - 166/83)  BP(mean): 112 (25 Jun 2022 13:00) (96 - 116)  ABP: 163/74 (25 Jun 2022 14:00) (133/61 - 168/74)  ABP(mean): 105 (25 Jun 2022 14:00) (85 - 109)  RR: 10 (25 Jun 2022 14:00) (10 - 22)  SpO2: 100% (25 Jun 2022 14:00) (97% - 100%)      RADIOLOGY & ADDITIONAL TESTS:    CT Head No Cont (06.24.22 @ 12:14)   IMPRESSION:    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   No hemorrhage or major mass effect.    CT Perfusion w/ Maps w/ IV Cont (06.24.22 @ 09:11)   IMPRESSION:    No evidence of focal perfusion deficit.    EEG :   Daily Summary:    1)	Continuous mild-to-moderate generalized  slowing suggestive of a mild-to-moderate degree of diffuse or multifocal cerebral dysfunction.  2)	No epileptiform activity or electrographic seizures recorded.                  Neurology Stroke Progress Note    INTERVAL HPI/OVERNIGHT EVENTS:    Patient seen and examined @ bedside. Still intubated and sedated only on precedex. Pt lethargic, yet easily arousable. Nods head appropriately for questions asked. Neuro exam still notable for LUE with no movement, LLE some movement atleast antigravity. Spontaneous movement + RUE/RLE. Grimaces for noxious in LUE/LLE. Plan for possible extubation by primary team today.      MEDICATIONS  (STANDING):  aspirin  chewable 81 milliGRAM(s) Oral daily  atorvastatin 40 milliGRAM(s) Oral at bedtime  chlorhexidine 2% Cloths 1 Application(s) Topical <User Schedule>  dexMEDEtomidine Infusion 0.2 MICROgram(s)/kG/Hr (3.99 mL/Hr) IV Continuous <Continuous>  dextrose 50% Injectable 50 milliLiter(s) IV Push every 15 minutes  dextrose 50% Injectable 25 milliLiter(s) IV Push every 15 minutes  heparin   Injectable 5000 Unit(s) SubCutaneous every 8 hours  norepinephrine Infusion 0.03 MICROgram(s)/kG/Min (4.49 mL/Hr) IV Continuous <Continuous>  pantoprazole  Injectable 40 milliGRAM(s) IV Push daily  phenylephrine    Infusion 0.2 MICROgram(s)/kG/Min (5.99 mL/Hr) IV Continuous <Continuous>  propofol Infusion 5 MICROgram(s)/kG/Min (2.39 mL/Hr) IV Continuous <Continuous>  sodium chloride 0.9%. 1000 milliLiter(s) (10 mL/Hr) IV Continuous <Continuous>  tiotropium 18 MICROgram(s) Capsule 1 Capsule(s) Inhalation daily    MEDICATIONS  (PRN):  acetaminophen   IVPB .. 1000 milliGRAM(s) IV Intermittent once PRN Mild Pain (1 - 3)  bisacodyl Suppository 10 milliGRAM(s) Rectal daily PRN Constipation  fentaNYL    Injectable 25 MICROGram(s) IV Push every 3 hours PRN Severe Pain (7 - 10)        Allergies  No Known Allergies    Physical exam:  Constitutional: Intubated and sedated, easily arousable with verbal stimuli.  Neck: trachea midline, ET tube in place.  Cardiovascular: Regular rate and rhythm on monitor, sternal incision open to air, clean and dry  GI: Abdomen soft      Neurologic:  -Mental status: AAO X 3(Nods head appropriately with choices). Sedated, arousable with verbal stimuli. Spontaneous movements of RUE and RLE noted, following some simple commands. Unable to speak 2/2 intubation.  -Cranial nerves:   II: Visual fields are full to confrontation- able to track.  III, IV, VI:  Pupils equally round and reactive to light, left eye ptosis+.  VII: Face appears symmetrical, difficult to determine with ET tube and molina in place.  Motor: Normal bulk, RUE with no drift, able to keep it up for count of 10, strength 5/5,RUE decreased  strength 4/5, RLE 4/5- able to lift and keep it up for count of 5. LUE 0/5. LLE :  atleast antigravity, able to lift and hold it up for count of 5, but not against gravity, able to bend @ knee.   Sensation: Grimaces to noxious on all 4.  Coordination: Unable to perform FTN with left side 2/2 paralysis. Right arm unable to bend due to lines in place.     Vital Signs Last 24 Hrs  T(C): 37.8 (25 Jun 2022 11:00), Max: 38 (25 Jun 2022 09:35)  T(F): 100 (25 Jun 2022 11:00), Max: 100.4 (25 Jun 2022 09:35)  HR: 65 (25 Jun 2022 14:00) (65 - 88)  BP: 163/82 (25 Jun 2022 13:00) (138/78 - 166/83)  BP(mean): 112 (25 Jun 2022 13:00) (96 - 116)  RR: 10 (25 Jun 2022 14:00) (10 - 22)  SpO2: 100% (25 Jun 2022 14:00) (97% - 100%)      LABS:       ICU Vital Signs Last 24 Hrs  T(C): 37.8 (25 Jun 2022 11:00), Max: 38 (25 Jun 2022 09:35)  T(F): 100 (25 Jun 2022 11:00), Max: 100.4 (25 Jun 2022 09:35)  HR: 65 (25 Jun 2022 14:00) (65 - 88)  BP: 163/82 (25 Jun 2022 13:00) (138/78 - 166/83)  BP(mean): 112 (25 Jun 2022 13:00) (96 - 116)  ABP: 163/74 (25 Jun 2022 14:00) (133/61 - 168/74)  ABP(mean): 105 (25 Jun 2022 14:00) (85 - 109)  RR: 10 (25 Jun 2022 14:00) (10 - 22)  SpO2: 100% (25 Jun 2022 14:00) (97% - 100%)      RADIOLOGY & ADDITIONAL TESTS:    CT Head No Cont (06.24.22 @ 12:14)   IMPRESSION:    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   No hemorrhage or major mass effect.    CT Perfusion w/ Maps w/ IV Cont (06.24.22 @ 09:11)   IMPRESSION:    No evidence of focal perfusion deficit.    EEG :   Daily Summary:    1)	Continuous mild-to-moderate generalized  slowing suggestive of a mild-to-moderate degree of diffuse or multifocal cerebral dysfunction.  2)	No epileptiform activity or electrographic seizures recorded.                  Neurology Stroke Progress Note    INTERVAL HPI/OVERNIGHT EVENTS:    Patient seen and examined @ bedside. Still intubated and sedated only on precedex. Pt lethargic, yet easily arousable. Nods head appropriately for questions asked. Neuro exam still notable for LUE with no movement, LLE some movement atleast antigravity. Spontaneous movement + RUE/RLE. Grimaces for noxious in LUE/LLE. Plan for possible extubation by primary team today.      Later during rounds with attending, pt was extubated, still on pressors, alert, awake, able to speak- hypophonic given recent extubation, able to state his name, month year and place. follows commands. LUE still with no movement but sensation intact. Would recommend MRI when stable from CTS standpoint and DAPT if ok with ct surgery and the same was communicated to CTICU PA.      MEDICATIONS  (STANDING):  aspirin  chewable 81 milliGRAM(s) Oral daily  atorvastatin 40 milliGRAM(s) Oral at bedtime  chlorhexidine 2% Cloths 1 Application(s) Topical <User Schedule>  dexMEDEtomidine Infusion 0.2 MICROgram(s)/kG/Hr (3.99 mL/Hr) IV Continuous <Continuous>  dextrose 50% Injectable 50 milliLiter(s) IV Push every 15 minutes  dextrose 50% Injectable 25 milliLiter(s) IV Push every 15 minutes  heparin   Injectable 5000 Unit(s) SubCutaneous every 8 hours  norepinephrine Infusion 0.03 MICROgram(s)/kG/Min (4.49 mL/Hr) IV Continuous <Continuous>  pantoprazole  Injectable 40 milliGRAM(s) IV Push daily  phenylephrine    Infusion 0.2 MICROgram(s)/kG/Min (5.99 mL/Hr) IV Continuous <Continuous>  propofol Infusion 5 MICROgram(s)/kG/Min (2.39 mL/Hr) IV Continuous <Continuous>  sodium chloride 0.9%. 1000 milliLiter(s) (10 mL/Hr) IV Continuous <Continuous>  tiotropium 18 MICROgram(s) Capsule 1 Capsule(s) Inhalation daily    MEDICATIONS  (PRN):  acetaminophen   IVPB .. 1000 milliGRAM(s) IV Intermittent once PRN Mild Pain (1 - 3)  bisacodyl Suppository 10 milliGRAM(s) Rectal daily PRN Constipation  fentaNYL    Injectable 25 MICROGram(s) IV Push every 3 hours PRN Severe Pain (7 - 10)        Allergies  No Known Allergies    Physical exam:  Constitutional: Intubated and sedated, easily arousable with verbal stimuli.  Neck: trachea midline, ET tube in place.  Cardiovascular: Regular rate and rhythm on monitor, sternal incision open to air, clean and dry  GI: Abdomen soft      Neurologic:  -Mental status: AAO X 3(Nods head appropriately with choices). Sedated, arousable with verbal stimuli. Spontaneous movements of RUE and RLE noted, following some simple commands. Unable to speak 2/2 intubation.  -Cranial nerves:   II: Visual fields are full to confrontation- able to track.  III, IV, VI:  Pupils equally round and reactive to light, left eye ptosis+.  VII: Face appears symmetrical, difficult to determine with ET tube and molina in place.  Motor: Normal bulk, RUE with no drift, able to keep it up for count of 10, strength 5/5,RUE decreased  strength 4/5, RLE 4/5- able to lift and keep it up for count of 5. LUE 0/5. LLE :  atleast antigravity, able to lift and hold it up for count of 5, but not against gravity, able to bend @ knee.   Sensation: Grimaces to noxious on all 4.  Coordination: Unable to perform FTN with left side 2/2 paralysis. Right arm unable to bend due to lines in place.     Vital Signs Last 24 Hrs  T(C): 37.8 (25 Jun 2022 11:00), Max: 38 (25 Jun 2022 09:35)  T(F): 100 (25 Jun 2022 11:00), Max: 100.4 (25 Jun 2022 09:35)  HR: 65 (25 Jun 2022 14:00) (65 - 88)  BP: 163/82 (25 Jun 2022 13:00) (138/78 - 166/83)  BP(mean): 112 (25 Jun 2022 13:00) (96 - 116)  RR: 10 (25 Jun 2022 14:00) (10 - 22)  SpO2: 100% (25 Jun 2022 14:00) (97% - 100%)      LABS:       ICU Vital Signs Last 24 Hrs  T(C): 37.8 (25 Jun 2022 11:00), Max: 38 (25 Jun 2022 09:35)  T(F): 100 (25 Jun 2022 11:00), Max: 100.4 (25 Jun 2022 09:35)  HR: 65 (25 Jun 2022 14:00) (65 - 88)  BP: 163/82 (25 Jun 2022 13:00) (138/78 - 166/83)  BP(mean): 112 (25 Jun 2022 13:00) (96 - 116)  ABP: 163/74 (25 Jun 2022 14:00) (133/61 - 168/74)  ABP(mean): 105 (25 Jun 2022 14:00) (85 - 109)  RR: 10 (25 Jun 2022 14:00) (10 - 22)  SpO2: 100% (25 Jun 2022 14:00) (97% - 100%)      RADIOLOGY & ADDITIONAL TESTS:    CT Head No Cont (06.24.22 @ 12:14)   IMPRESSION:    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   No hemorrhage or major mass effect.    CT Perfusion w/ Maps w/ IV Cont (06.24.22 @ 09:11)   IMPRESSION:    No evidence of focal perfusion deficit.    EEG :   Daily Summary:    1)	Continuous mild-to-moderate generalized  slowing suggestive of a mild-to-moderate degree of diffuse or multifocal cerebral dysfunction.  2)	No epileptiform activity or electrographic seizures recorded.

## 2022-06-26 NOTE — OCCUPATIONAL THERAPY INITIAL EVALUATION ADULT - FINE MOTOR COORDINATION, LEFT HAND, FINGER TO NOSE, OT EVAL
Foot Laceration: All Closures  A laceration is a cut through the skin. You have a cut on your foot. Deep cuts may require stitches (sutures). Minor cuts may be treated with surgical tape closures or skin glue.  X-rays may be done if something may have entered the skin through the cut. Your may also need a tetanus shot. This is given if you are not up to date on this vaccination and the object that caused the cut may lead to tetanus.    Home care  · Your healthcare provider may prescribe an antibiotic. This is to help prevent infection. Follow all instructions for taking this medicine. Take the medicine every day until it is gone or you are told to stop. You should not have any left over.  · The healthcare provider may prescribe medicines for pain. Follow instructions for taking them.  · Follow the healthcare provider’s instructions on how to care for the cut.  · You may be given instructions for keeping weight off of the area to allow the injury to heal.   · Follow the healthcare provider’s instructions on how to care for the cut.   · Keep the wound clean and dry. Do not get the wound wet until you are told it is okay to do so. If the area gets wet, gently pat it dry with a clean cloth. Replace the wet bandage with a dry one.  · To help prevent infection, wash your hands with soap and water before and after caring for the wound.   · Caring for stitches or staples: Once you no longer need to keep them dry, clean the wound daily. First, remove the bandage. Then wash the area gently with soap and warm water, or as directed by the health care provider. Use a wet cotton swab to loosen and remove any blood or crust that forms. After cleaning, apply a thin layer of antibiotic ointment if advised. Then put on a new bandage unless you are told not to.  · Caring for skin glue: Don’t put apply liquid, ointment, or cream on the wound while the glue is in place. Avoid activities that cause heavy sweating. Protect the wound  from sunlight. Do not scratch, rub, or pick at the adhesive film. Do not place tape directly over the film. The glue should peel off within 5 to 10 days.   · Caring for surgical tape: Keep the area dry. If it gets wet, blot it dry with a clean towel. Surgical tape usually falls off within 7 to 10 days. If it has not fallen off after 10 days, you can take it off yourself. Put mineral oil or petroleum jelly on a cotton ball and gently rub the tape until it is removed.  · Once you can get the wound wet, you may shower as usual but do not soak the wound in water (no tub baths or swimming)  · Even with proper treatment, a wound infection may sometimes occur. Check the wound daily for signs of infection listed below.  Follow-up care  Follow up with your healthcare provider as advised. Return to have stitches or staples removed as directed.  When to seek medical advice  Call your healthcare provider right away if any of these occur:  · Wound bleeding not controlled by direct pressure  · Signs of infection, including increasing pain in the wound, increasing wound redness or swelling, or pus or bad odor coming from the wound  · Fever of 100.4°F (38.ºC) or as directed by your healthcare provider  · Stitches or staples come apart or fall out or surgical tape falls off before 7 days  · Wound edges re-open  · Wound changes colors  · Numbness or weakness in the affected foot   · Decreased movement of the foot  © 2775-0222 [employer]. [address] [place] [address], [place] [address]. All rights reserved. This information is not intended as a substitute for professional medical care. Always follow your healthcare professional's instructions.         unable to perform

## 2022-06-26 NOTE — PHYSICAL THERAPY INITIAL EVALUATION ADULT - ADDITIONAL COMMENTS
Pt resides in private residence with steps, is right handed, wears glasses, no device use or DME prior to admission.

## 2022-06-26 NOTE — OCCUPATIONAL THERAPY INITIAL EVALUATION ADULT - GENERAL OBSERVATIONS, REHAB EVAL
Patient received in bed, +A-line, TPM, +fuchs, +B/L SCD, cordis, blakes x 3, +tele, +4L O2 via NC in NAD and agreeable to OT session.

## 2022-06-26 NOTE — OCCUPATIONAL THERAPY INITIAL EVALUATION ADULT - ADDITIONAL COMMENTS
Patient reports living in a private residence with steps to enter. Patient is right handed and reports being independent with ADL and IADL prior to this admission.

## 2022-06-26 NOTE — PHYSICAL THERAPY INITIAL EVALUATION ADULT - PERTINENT HX OF CURRENT PROBLEM, REHAB EVAL
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 CTA significant for MELISSA occlusion with partial reconstitution of RMCA from R ACOMM. 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 Pt s/p rpt CTP this am with no perfusion deficit and rpt CTH showing recent infarct @ R precentral gyrus.

## 2022-06-27 LAB
ALBUMIN SERPL ELPH-MCNC: 2.6 G/DL — LOW (ref 3.3–5)
ALBUMIN SERPL ELPH-MCNC: 3.2 G/DL — LOW (ref 3.3–5)
ALP SERPL-CCNC: 45 U/L — SIGNIFICANT CHANGE UP (ref 40–120)
ALP SERPL-CCNC: 56 U/L — SIGNIFICANT CHANGE UP (ref 40–120)
ALT FLD-CCNC: 27 U/L — SIGNIFICANT CHANGE UP (ref 10–45)
ALT FLD-CCNC: 33 U/L — SIGNIFICANT CHANGE UP (ref 10–45)
ANION GAP SERPL CALC-SCNC: 10 MMOL/L — SIGNIFICANT CHANGE UP (ref 5–17)
ANION GAP SERPL CALC-SCNC: 13 MMOL/L — SIGNIFICANT CHANGE UP (ref 5–17)
APTT BLD: 28.5 SEC — SIGNIFICANT CHANGE UP (ref 27.5–35.5)
AST SERPL-CCNC: 35 U/L — SIGNIFICANT CHANGE UP (ref 10–40)
AST SERPL-CCNC: 55 U/L — HIGH (ref 10–40)
BILIRUB SERPL-MCNC: 0.3 MG/DL — SIGNIFICANT CHANGE UP (ref 0.2–1.2)
BILIRUB SERPL-MCNC: 0.4 MG/DL — SIGNIFICANT CHANGE UP (ref 0.2–1.2)
BUN SERPL-MCNC: 15 MG/DL — SIGNIFICANT CHANGE UP (ref 7–23)
BUN SERPL-MCNC: 20 MG/DL — SIGNIFICANT CHANGE UP (ref 7–23)
CALCIUM SERPL-MCNC: 7.5 MG/DL — LOW (ref 8.4–10.5)
CALCIUM SERPL-MCNC: 9.2 MG/DL — SIGNIFICANT CHANGE UP (ref 8.4–10.5)
CHLORIDE SERPL-SCNC: 101 MMOL/L — SIGNIFICANT CHANGE UP (ref 96–108)
CHLORIDE SERPL-SCNC: 108 MMOL/L — SIGNIFICANT CHANGE UP (ref 96–108)
CO2 SERPL-SCNC: 20 MMOL/L — LOW (ref 22–31)
CO2 SERPL-SCNC: 24 MMOL/L — SIGNIFICANT CHANGE UP (ref 22–31)
CREAT SERPL-MCNC: 0.52 MG/DL — SIGNIFICANT CHANGE UP (ref 0.5–1.3)
CREAT SERPL-MCNC: 0.64 MG/DL — SIGNIFICANT CHANGE UP (ref 0.5–1.3)
EGFR: 103 ML/MIN/1.73M2 — SIGNIFICANT CHANGE UP
EGFR: 110 ML/MIN/1.73M2 — SIGNIFICANT CHANGE UP
GAS PNL BLDA: SIGNIFICANT CHANGE UP
GLUCOSE SERPL-MCNC: 101 MG/DL — HIGH (ref 70–99)
GLUCOSE SERPL-MCNC: 93 MG/DL — SIGNIFICANT CHANGE UP (ref 70–99)
HCT VFR BLD CALC: 24.4 % — LOW (ref 39–50)
HCT VFR BLD CALC: 24.5 % — LOW (ref 39–50)
HCT VFR BLD CALC: 26.2 % — LOW (ref 39–50)
HGB BLD-MCNC: 8.1 G/DL — LOW (ref 13–17)
HGB BLD-MCNC: 8.1 G/DL — LOW (ref 13–17)
HGB BLD-MCNC: 8.6 G/DL — LOW (ref 13–17)
INR BLD: 1.17 — HIGH (ref 0.88–1.16)
ISTAT ACTK (ACTIVATED CLOTTING TIME KAOLIN): 115 SEC — SIGNIFICANT CHANGE UP (ref 74–137)
ISTAT ACTK (ACTIVATED CLOTTING TIME KAOLIN): 115 SEC — SIGNIFICANT CHANGE UP (ref 74–137)
ISTAT ACTK (ACTIVATED CLOTTING TIME KAOLIN): 121 SEC — SIGNIFICANT CHANGE UP (ref 74–137)
ISTAT ACTK (ACTIVATED CLOTTING TIME KAOLIN): 121 SEC — SIGNIFICANT CHANGE UP (ref 74–137)
ISTAT ACTK (ACTIVATED CLOTTING TIME KAOLIN): 126 SEC — SIGNIFICANT CHANGE UP (ref 74–137)
ISTAT ACTK (ACTIVATED CLOTTING TIME KAOLIN): 126 SEC — SIGNIFICANT CHANGE UP (ref 74–137)
ISTAT ACTK (ACTIVATED CLOTTING TIME KAOLIN): 248 SEC — HIGH (ref 74–137)
ISTAT ACTK (ACTIVATED CLOTTING TIME KAOLIN): 248 SEC — HIGH (ref 74–137)
ISTAT ACTK (ACTIVATED CLOTTING TIME KAOLIN): 254 SEC — HIGH (ref 74–137)
ISTAT ACTK (ACTIVATED CLOTTING TIME KAOLIN): 254 SEC — HIGH (ref 74–137)
ISTAT ACTK (ACTIVATED CLOTTING TIME KAOLIN): 294 SEC — HIGH (ref 74–137)
ISTAT ACTK (ACTIVATED CLOTTING TIME KAOLIN): 294 SEC — HIGH (ref 74–137)
ISTAT ACTK (ACTIVATED CLOTTING TIME KAOLIN): 341 SEC — HIGH (ref 74–137)
ISTAT ACTK (ACTIVATED CLOTTING TIME KAOLIN): 341 SEC — HIGH (ref 74–137)
ISTAT ACTK (ACTIVATED CLOTTING TIME KAOLIN): 364 SEC — HIGH (ref 74–137)
ISTAT ACTK (ACTIVATED CLOTTING TIME KAOLIN): 364 SEC — HIGH (ref 74–137)
ISTAT ACTK (ACTIVATED CLOTTING TIME KAOLIN): 416 SEC — HIGH (ref 74–137)
ISTAT ACTK (ACTIVATED CLOTTING TIME KAOLIN): 416 SEC — HIGH (ref 74–137)
ISTAT ACTK (ACTIVATED CLOTTING TIME KAOLIN): 427 SEC — HIGH (ref 74–137)
ISTAT ACTK (ACTIVATED CLOTTING TIME KAOLIN): 427 SEC — HIGH (ref 74–137)
ISTAT ACTK (ACTIVATED CLOTTING TIME KAOLIN): 451 SEC — HIGH (ref 74–137)
ISTAT ACTK (ACTIVATED CLOTTING TIME KAOLIN): 451 SEC — HIGH (ref 74–137)
ISTAT ACTK (ACTIVATED CLOTTING TIME KAOLIN): 572 SEC — HIGH (ref 74–137)
ISTAT ACTK (ACTIVATED CLOTTING TIME KAOLIN): 572 SEC — HIGH (ref 74–137)
ISTAT ACTK (ACTIVATED CLOTTING TIME KAOLIN): 613 SEC — HIGH (ref 74–137)
ISTAT ACTK (ACTIVATED CLOTTING TIME KAOLIN): 613 SEC — HIGH (ref 74–137)
ISTAT ACTK (ACTIVATED CLOTTING TIME KAOLIN): 659 SEC — HIGH (ref 74–137)
ISTAT ACTK (ACTIVATED CLOTTING TIME KAOLIN): 659 SEC — HIGH (ref 74–137)
ISTAT ACTK (ACTIVATED CLOTTING TIME KAOLIN): 793 SEC — HIGH (ref 74–137)
ISTAT ACTK (ACTIVATED CLOTTING TIME KAOLIN): 793 SEC — HIGH (ref 74–137)
MAGNESIUM SERPL-MCNC: 1.6 MG/DL — SIGNIFICANT CHANGE UP (ref 1.6–2.6)
MAGNESIUM SERPL-MCNC: 2.2 MG/DL — SIGNIFICANT CHANGE UP (ref 1.6–2.6)
MCHC RBC-ENTMCNC: 30.8 PG — SIGNIFICANT CHANGE UP (ref 27–34)
MCHC RBC-ENTMCNC: 30.9 PG — SIGNIFICANT CHANGE UP (ref 27–34)
MCHC RBC-ENTMCNC: 31.6 PG — SIGNIFICANT CHANGE UP (ref 27–34)
MCHC RBC-ENTMCNC: 32.8 GM/DL — SIGNIFICANT CHANGE UP (ref 32–36)
MCHC RBC-ENTMCNC: 33.1 GM/DL — SIGNIFICANT CHANGE UP (ref 32–36)
MCHC RBC-ENTMCNC: 33.2 GM/DL — SIGNIFICANT CHANGE UP (ref 32–36)
MCV RBC AUTO: 93.5 FL — SIGNIFICANT CHANGE UP (ref 80–100)
MCV RBC AUTO: 93.9 FL — SIGNIFICANT CHANGE UP (ref 80–100)
MCV RBC AUTO: 95.3 FL — SIGNIFICANT CHANGE UP (ref 80–100)
NRBC # BLD: 0 /100 WBCS — SIGNIFICANT CHANGE UP (ref 0–0)
PHOSPHATE SERPL-MCNC: 3 MG/DL — SIGNIFICANT CHANGE UP (ref 2.5–4.5)
PHOSPHATE SERPL-MCNC: 3.8 MG/DL — SIGNIFICANT CHANGE UP (ref 2.5–4.5)
PLATELET # BLD AUTO: 103 K/UL — LOW (ref 150–400)
PLATELET # BLD AUTO: 107 K/UL — LOW (ref 150–400)
PLATELET # BLD AUTO: 123 K/UL — LOW (ref 150–400)
POTASSIUM SERPL-MCNC: 3.1 MMOL/L — LOW (ref 3.5–5.3)
POTASSIUM SERPL-MCNC: 3.6 MMOL/L — SIGNIFICANT CHANGE UP (ref 3.5–5.3)
POTASSIUM SERPL-SCNC: 3.1 MMOL/L — LOW (ref 3.5–5.3)
POTASSIUM SERPL-SCNC: 3.6 MMOL/L — SIGNIFICANT CHANGE UP (ref 3.5–5.3)
PROT SERPL-MCNC: 5.7 G/DL — LOW (ref 6–8.3)
PROT SERPL-MCNC: 6.9 G/DL — SIGNIFICANT CHANGE UP (ref 6–8.3)
PROTHROM AB SERPL-ACNC: 13.9 SEC — HIGH (ref 10.5–13.4)
RBC # BLD: 2.56 M/UL — LOW (ref 4.2–5.8)
RBC # BLD: 2.62 M/UL — LOW (ref 4.2–5.8)
RBC # BLD: 2.79 M/UL — LOW (ref 4.2–5.8)
RBC # FLD: 15.1 % — HIGH (ref 10.3–14.5)
RBC # FLD: 15.6 % — HIGH (ref 10.3–14.5)
RBC # FLD: 15.7 % — HIGH (ref 10.3–14.5)
SODIUM SERPL-SCNC: 138 MMOL/L — SIGNIFICANT CHANGE UP (ref 135–145)
SODIUM SERPL-SCNC: 138 MMOL/L — SIGNIFICANT CHANGE UP (ref 135–145)
WBC # BLD: 3.29 K/UL — LOW (ref 3.8–10.5)
WBC # BLD: 3.81 K/UL — SIGNIFICANT CHANGE UP (ref 3.8–10.5)
WBC # BLD: 5.46 K/UL — SIGNIFICANT CHANGE UP (ref 3.8–10.5)
WBC # FLD AUTO: 3.29 K/UL — LOW (ref 3.8–10.5)
WBC # FLD AUTO: 3.81 K/UL — SIGNIFICANT CHANGE UP (ref 3.8–10.5)
WBC # FLD AUTO: 5.46 K/UL — SIGNIFICANT CHANGE UP (ref 3.8–10.5)

## 2022-06-27 PROCEDURE — 71045 X-RAY EXAM CHEST 1 VIEW: CPT | Mod: 26,77

## 2022-06-27 PROCEDURE — 71045 X-RAY EXAM CHEST 1 VIEW: CPT | Mod: 26

## 2022-06-27 PROCEDURE — 99232 SBSQ HOSP IP/OBS MODERATE 35: CPT

## 2022-06-27 PROCEDURE — 93306 TTE W/DOPPLER COMPLETE: CPT | Mod: 26

## 2022-06-27 RX ORDER — MAGNESIUM SULFATE 500 MG/ML
2 VIAL (ML) INJECTION ONCE
Refills: 0 | Status: COMPLETED | OUTPATIENT
Start: 2022-06-27 | End: 2022-06-27

## 2022-06-27 RX ORDER — FUROSEMIDE 40 MG
20 TABLET ORAL ONCE
Refills: 0 | Status: COMPLETED | OUTPATIENT
Start: 2022-06-27 | End: 2022-06-27

## 2022-06-27 RX ORDER — POTASSIUM CHLORIDE 20 MEQ
20 PACKET (EA) ORAL
Refills: 0 | Status: COMPLETED | OUTPATIENT
Start: 2022-06-27 | End: 2022-06-27

## 2022-06-27 RX ORDER — ATORVASTATIN CALCIUM 80 MG/1
80 TABLET, FILM COATED ORAL AT BEDTIME
Refills: 0 | Status: DISCONTINUED | OUTPATIENT
Start: 2022-06-27 | End: 2022-07-05

## 2022-06-27 RX ORDER — ACETAMINOPHEN 500 MG
1000 TABLET ORAL ONCE
Refills: 0 | Status: COMPLETED | OUTPATIENT
Start: 2022-06-27 | End: 2022-06-27

## 2022-06-27 RX ORDER — PANTOPRAZOLE SODIUM 20 MG/1
40 TABLET, DELAYED RELEASE ORAL
Refills: 0 | Status: DISCONTINUED | OUTPATIENT
Start: 2022-06-28 | End: 2022-07-05

## 2022-06-27 RX ORDER — KETOROLAC TROMETHAMINE 30 MG/ML
15 SYRINGE (ML) INJECTION ONCE
Refills: 0 | Status: DISCONTINUED | OUTPATIENT
Start: 2022-06-27 | End: 2022-06-27

## 2022-06-27 RX ORDER — POTASSIUM CHLORIDE 20 MEQ
20 PACKET (EA) ORAL
Refills: 0 | Status: COMPLETED | OUTPATIENT
Start: 2022-06-27 | End: 2022-06-28

## 2022-06-27 RX ADMIN — HEPARIN SODIUM 5000 UNIT(S): 5000 INJECTION INTRAVENOUS; SUBCUTANEOUS at 14:53

## 2022-06-27 RX ADMIN — Medication 25 GRAM(S): at 00:57

## 2022-06-27 RX ADMIN — HEPARIN SODIUM 5000 UNIT(S): 5000 INJECTION INTRAVENOUS; SUBCUTANEOUS at 22:28

## 2022-06-27 RX ADMIN — Medication 10 MILLIGRAM(S): at 07:04

## 2022-06-27 RX ADMIN — TIOTROPIUM BROMIDE 1 CAPSULE(S): 18 CAPSULE ORAL; RESPIRATORY (INHALATION) at 14:53

## 2022-06-27 RX ADMIN — Medication 20 MILLIGRAM(S): at 10:27

## 2022-06-27 RX ADMIN — Medication 81 MILLIGRAM(S): at 18:16

## 2022-06-27 RX ADMIN — Medication 15 MILLIGRAM(S): at 11:28

## 2022-06-27 RX ADMIN — Medication 20 MILLIGRAM(S): at 03:10

## 2022-06-27 RX ADMIN — PANTOPRAZOLE SODIUM 40 MILLIGRAM(S): 20 TABLET, DELAYED RELEASE ORAL at 11:28

## 2022-06-27 RX ADMIN — Medication 400 MILLIGRAM(S): at 10:00

## 2022-06-27 RX ADMIN — Medication 100 MILLIEQUIVALENT(S): at 23:00

## 2022-06-27 RX ADMIN — HEPARIN SODIUM 5000 UNIT(S): 5000 INJECTION INTRAVENOUS; SUBCUTANEOUS at 07:06

## 2022-06-27 RX ADMIN — ATORVASTATIN CALCIUM 80 MILLIGRAM(S): 80 TABLET, FILM COATED ORAL at 22:28

## 2022-06-27 RX ADMIN — Medication 100 MILLIEQUIVALENT(S): at 05:08

## 2022-06-27 RX ADMIN — PHENYLEPHRINE HYDROCHLORIDE 5.99 MICROGRAM(S)/KG/MIN: 10 INJECTION INTRAVENOUS at 02:02

## 2022-06-27 RX ADMIN — Medication 25 GRAM(S): at 23:00

## 2022-06-27 RX ADMIN — Medication 15 MILLIGRAM(S): at 11:58

## 2022-06-27 RX ADMIN — Medication 100 MILLIEQUIVALENT(S): at 07:05

## 2022-06-27 RX ADMIN — CHLORHEXIDINE GLUCONATE 1 APPLICATION(S): 213 SOLUTION TOPICAL at 07:04

## 2022-06-27 RX ADMIN — Medication 1000 MILLIGRAM(S): at 10:30

## 2022-06-27 NOTE — PROGRESS NOTE ADULT - SUBJECTIVE AND OBJECTIVE BOX
Neurology Stroke Progress Note    INTERVAL HPI/OVERNIGHT EVENTS:  Patient seen and examined.     MEDICATIONS  (STANDING):  aspirin  chewable 81 milliGRAM(s) Oral daily  atorvastatin 80 milliGRAM(s) Oral at bedtime  chlorhexidine 2% Cloths 1 Application(s) Topical <User Schedule>  dextrose 50% Injectable 50 milliLiter(s) IV Push every 15 minutes  dextrose 50% Injectable 25 milliLiter(s) IV Push every 15 minutes  heparin   Injectable 5000 Unit(s) SubCutaneous every 8 hours  pantoprazole  Injectable 40 milliGRAM(s) IV Push daily  phenylephrine    Infusion 0.2 MICROgram(s)/kG/Min (5.99 mL/Hr) IV Continuous <Continuous>  sodium chloride 0.9%. 1000 milliLiter(s) (10 mL/Hr) IV Continuous <Continuous>  tiotropium 18 MICROgram(s) Capsule 1 Capsule(s) Inhalation daily    MEDICATIONS  (PRN):  bisacodyl Suppository 10 milliGRAM(s) Rectal daily PRN Constipation  fentaNYL    Injectable 25 MICROGram(s) IV Push every 3 hours PRN Severe Pain (7 - 10)      Allergies    No Known Allergies    Intolerances        ROS: As per HPI, otherwise negative    Vital Signs Last 24 Hrs  T(C): 36.7 (27 Jun 2022 14:00), Max: 37.7 (26 Jun 2022 14:44)  T(F): 98.1 (27 Jun 2022 14:00), Max: 99.9 (26 Jun 2022 14:44)  HR: 82 (27 Jun 2022 14:00) (51 - 91)  BP: 129/69 (26 Jun 2022 15:20) (129/69 - 129/69)  BP(mean): 93 (26 Jun 2022 15:20) (93 - 93)  RR: 18 (27 Jun 2022 14:00) (15 - 20)  SpO2: 99% (27 Jun 2022 14:00) (94% - 100%)    Physical exam:  General: No acute distress, awake and alert  Cardiovascular: Regular rate and rhythm, no murmurs, rubs, or gallops. No bruits  Pulmonary: Anterior breath sounds clear bilaterally, no crackles or wheezing. No use of accessory muscles  Extremities: Radial and DP pulses +2, no edema    Neurologic:  -Mental status: Awake, alert, oriented to person, place, and time. Speech is fluent with intact naming, repetition, and comprehension, no dysarthria. Recent and remote memory intact. Follows commands. Attention/concentration intact. Fund of knowledge appropriate.  -Cranial nerves:   II: Visual fields are full to confrontation.  III, IV, VI: Extraocular movements are intact without nystagmus. Pupils equally round and reactive to light  V:  Facial sensation V1-V3 equal and intact   VII: Face is symmetric with normal eye closure and smile  VIII: Hearing is bilaterally intact to finger rub  IX, X: Uvula is midline and soft palate rises symmetrically  XI: Head turning and shoulder shrug are intact.  XII: Tongue protrudes midline  Motor: Normal bulk and tone. No pronator drift. Strength bilateral upper extremity 5/5, bilateral lower extremities 5/5.  Rapid alternating movements intact and symmetric  Sensation: Intact to light touch bilaterally. No neglect or extinction on double simultaneous testing.  Coordination: No dysmetria on finger-to-nose and heel-to-shin bilaterally  Reflexes: Downgoing toes bilaterally   Gait: Narrow gait and steady    LABS:                        8.6    3.81  )-----------( 107      ( 27 Jun 2022 12:53 )             26.2     06-27    138  |  101  |  15  ----------------------------<  101<H>  3.6   |  24  |  0.64    Ca    9.2      27 Jun 2022 03:15  Phos  3.8     06-27  Mg     2.2     06-27    TPro  6.9  /  Alb  3.2<L>  /  TBili  0.4  /  DBili  x   /  AST  55<H>  /  ALT  33  /  AlkPhos  56  06-27    PT/INR - ( 27 Jun 2022 03:15 )   PT: 13.9 sec;   INR: 1.17          PTT - ( 27 Jun 2022 03:15 )  PTT:28.5 sec      RADIOLOGY & ADDITIONAL TESTS:      Assessment and Plan  62 yo M, current smoker (½ PPD x 40 years), former ETOH abuse (40yr hx, quit 7 years ago), FH of Marfan's Syndrome and Aortic Aneurysms, PMHx HTN, severe aortic regurgitation, dilated ascending aorta and dilated descending aorta, anemia, arthritis, COPD, now s/p upper hemisternotomy, AVR, ascending and hemiarch replacement on 6/23 c/b left sided paralysis, NIHSS 22. CTA significant for MELISSA occlusion with partial reconstitution of RMCA from R ACOMM. 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 Pt s/p rpt CTP this am with no perfusion deficit and rpt CTH showing recent infarct @ R precentral gyrus. EEG with no evidence of seizures.     - Stroke will continue to follow  - May relax MAP goals from Neuro perspective.  - q1h stroke neuro checks, okay to transition to q4 neurocheck from stroke perspective when patient is stepped down to telemetry   - Would recommend considering DAPT, pt is on ASA, recommend adding Plavix if OK from CTSx standpoint.  - Recommend MRI brain without contrast once stable  - Stroke education.  - PT/OT recommending acute rehab, continue PT/OT     The above mentioned plan was D/W Dr. Johnson and CT surgery during rounds.   Neurology Stroke Progress Note    INTERVAL HPI/OVERNIGHT EVENTS:  Patient seen and examined. His left leg is getting stronger. His left arm is very weak but he feels flickers of movement and feels it has potential to improve. He is motivated to work with therapy to improve strength. Denies any new or worsening symptoms.     MEDICATIONS  (STANDING):  aspirin  chewable 81 milliGRAM(s) Oral daily  atorvastatin 80 milliGRAM(s) Oral at bedtime  chlorhexidine 2% Cloths 1 Application(s) Topical <User Schedule>  dextrose 50% Injectable 50 milliLiter(s) IV Push every 15 minutes  dextrose 50% Injectable 25 milliLiter(s) IV Push every 15 minutes  heparin   Injectable 5000 Unit(s) SubCutaneous every 8 hours  pantoprazole  Injectable 40 milliGRAM(s) IV Push daily  phenylephrine    Infusion 0.2 MICROgram(s)/kG/Min (5.99 mL/Hr) IV Continuous <Continuous>  sodium chloride 0.9%. 1000 milliLiter(s) (10 mL/Hr) IV Continuous <Continuous>  tiotropium 18 MICROgram(s) Capsule 1 Capsule(s) Inhalation daily    MEDICATIONS  (PRN):  bisacodyl Suppository 10 milliGRAM(s) Rectal daily PRN Constipation  fentaNYL    Injectable 25 MICROGram(s) IV Push every 3 hours PRN Severe Pain (7 - 10)      Allergies    No Known Allergies    Intolerances        ROS: As per HPI, otherwise negative    Vital Signs Last 24 Hrs  T(C): 36.7 (27 Jun 2022 14:00), Max: 37.7 (26 Jun 2022 14:44)  T(F): 98.1 (27 Jun 2022 14:00), Max: 99.9 (26 Jun 2022 14:44)  HR: 82 (27 Jun 2022 14:00) (51 - 91)  BP: 129/69 (26 Jun 2022 15:20) (129/69 - 129/69)  BP(mean): 93 (26 Jun 2022 15:20) (93 - 93)  RR: 18 (27 Jun 2022 14:00) (15 - 20)  SpO2: 99% (27 Jun 2022 14:00) (94% - 100%)    Physical exam:  Physical exam:  Constitutional: Intubated and sedated, easily arousable with verbal stimuli.  Neck: trachea midline, ET tube in place.  Cardiovascular: Regular rate and rhythm on monitor, sternal incision open to air, clean and dry  GI: Abdomen soft      Neurologic:  -Mental status: AAO X 3(Nods head appropriately with choices). Sedated, arousable with verbal stimuli. Spontaneous movements of RUE and RLE noted, following some simple commands. Unable to speak 2/2 intubation.  -Cranial nerves:   II: Visual fields are full to confrontation- able to track.  III, IV, VI:  Pupils equally round and reactive to light, left eye ptosis+.  VII: Face appears symmetrical, difficult to determine with ET tube and molina in place.  Motor: Normal bulk, RUE with no drift, able to keep it up for count of 10, strength 5/5,RUE decreased  strength 4/5, RLE 4/5- able to lift and keep it up for count of 5. LUE 0/5. LLE: 4/5 strength, with drift does not hit bed (with effort can maintain for 5-7 seconds without drift) Sensation: Grimaces to noxious on all 4.  Coordination: Unable to perform FTN with left side 2/2 paralysis. Right arm unable to bend due to lines in place.     LABS:                        8.6    3.81  )-----------( 107      ( 27 Jun 2022 12:53 )             26.2     06-27    138  |  101  |  15  ----------------------------<  101<H>  3.6   |  24  |  0.64    Ca    9.2      27 Jun 2022 03:15  Phos  3.8     06-27  Mg     2.2     06-27    TPro  6.9  /  Alb  3.2<L>  /  TBili  0.4  /  DBili  x   /  AST  55<H>  /  ALT  33  /  AlkPhos  56  06-27    PT/INR - ( 27 Jun 2022 03:15 )   PT: 13.9 sec;   INR: 1.17          PTT - ( 27 Jun 2022 03:15 )  PTT:28.5 sec      RADIOLOGY & ADDITIONAL TESTS:      Assessment and Plan  62 yo M, current smoker (½ PPD x 40 years), former ETOH abuse (40yr hx, quit 7 years ago), FH of Marfan's Syndrome and Aortic Aneurysms, PMHx HTN, severe aortic regurgitation, dilated ascending aorta and dilated descending aorta, anemia, arthritis, COPD, now s/p upper hemisternotomy, AVR, ascending and hemiarch replacement on 6/23 c/b left sided paralysis, NIHSS 22. CTA significant for MELISSA occlusion with partial reconstitution of RMCA from R ACOMM. 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 Pt s/p rpt CTP this am with no perfusion deficit and rpt CTH showing recent infarct @ R precentral gyrus. EEG with no evidence of seizures.     - Stroke will continue to follow  - May relax MAP goals from Neuro perspective.  - q1h stroke neuro checks, okay to transition to q4 neurocheck from stroke perspective when patient is stepped down to telemetry   - Would recommend considering DAPT, pt is on ASA, recommend adding Plavix if OK from CTSx standpoint.  - Recommend MRI brain without contrast once stable  - Stroke education.  - PT/OT recommending acute rehab, continue PT/OT     The above mentioned plan was D/W Dr. Johnson and CT surgery during rounds.

## 2022-06-27 NOTE — DIETITIAN INITIAL EVALUATION ADULT - ADD RECOMMEND
1. Continue with current diet order; consistency per SLP (monitor need for ONS) 2. Encourage pt to meet nutritional needs as able 3. Encourage adherence to diet education (reinforce as able) 4. Pain and bowel regimen per team 5. Will assess/honor preferences as able

## 2022-06-27 NOTE — DIETITIAN INITIAL EVALUATION ADULT - NS FNS DIET ORDER
Diet, Minced and Moist:   Consistent Carbohydrate {No Snacks} (CSTCHO)  Moderately Thick Liquids (MODTHICKLIQS) (06-27-22 @ 13:34)

## 2022-06-27 NOTE — SWALLOW BEDSIDE ASSESSMENT ADULT - SWALLOW EVAL: DIAGNOSIS
Pt presented with mild oropharyngeal dysphagia, characterized by reduced coordination of respiration/deglutition, in setting of respiratory insufficiency. Recs for a modified oral diet with aspiration precautions. Will follow to assess tolerance vs readiness for upgrade as respiratory status improves.

## 2022-06-27 NOTE — DIETITIAN INITIAL EVALUATION ADULT - OTHER CALCULATIONS
Based on Standards of Care pt within % IBW thus actual body weight used for all calculations. Needs adjusted for advanced age and post op.

## 2022-06-27 NOTE — DIETITIAN INITIAL EVALUATION ADULT - PERTINENT MEDS FT
MEDICATIONS  (STANDING):  aspirin  chewable 81 milliGRAM(s) Oral daily  atorvastatin 80 milliGRAM(s) Oral at bedtime  chlorhexidine 2% Cloths 1 Application(s) Topical <User Schedule>  dextrose 50% Injectable 50 milliLiter(s) IV Push every 15 minutes  dextrose 50% Injectable 25 milliLiter(s) IV Push every 15 minutes  heparin   Injectable 5000 Unit(s) SubCutaneous every 8 hours  pantoprazole  Injectable 40 milliGRAM(s) IV Push daily  phenylephrine    Infusion 0.2 MICROgram(s)/kG/Min (5.99 mL/Hr) IV Continuous <Continuous>  sodium chloride 0.9%. 1000 milliLiter(s) (10 mL/Hr) IV Continuous <Continuous>  tiotropium 18 MICROgram(s) Capsule 1 Capsule(s) Inhalation daily    MEDICATIONS  (PRN):  bisacodyl Suppository 10 milliGRAM(s) Rectal daily PRN Constipation  fentaNYL    Injectable 25 MICROGram(s) IV Push every 3 hours PRN Severe Pain (7 - 10)

## 2022-06-27 NOTE — SWALLOW BEDSIDE ASSESSMENT ADULT - SLP PERTINENT HISTORY OF CURRENT PROBLEM
62 yo M, current smoker (½ PPD x 40 years), former ETOH abuse (40yr hx, quit 7 years ago), FH of Marfan's Syndrome and Aortic Aneurysms, PMHx HTN, severe aortic regurgitation, dilated ascending aorta and dilated descending aorta, anemia, arthritis, COPD, now s/p upper hemisternotomy, AVR, ascending and hemiarch replacement on 6/23 c/b left sided paralysis, NIHSS 22. CTA significant for MELISSA occlusion with partial reconstitution of RMCA from R ACOMM. 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 Pt s/p rpt CTP this am with no perfusion deficit and rpt CTH showing recent infarct @ R precentral gyrus. EEG with no evidence of seizures.

## 2022-06-27 NOTE — DIETITIAN INITIAL EVALUATION ADULT - OTHER INFO
65 y/o male with a pmhx of HTN, anemia, arthritis, COPD, and a family history of marfan disease and aortic aneurysm (nephew) who presents for an aortic valve replacement with Dr. Toledo. Pt is a current smoker who has been smoking for over 40 years (1ppd) who recently decreased his use to 1-2 cigarettes a day. 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 Prior to arrival to [facility], he expressed concerns of significant fatigue and occasional SOB with palpitations with activity since his COVID vaccination in oct/no 2021. Now s/p Min inv AVR ascending/hemiarch placement 6/23. Code stroke called 6/23; no acute intracranial abnormality per brain CT. 6/24 s/p angiogram. Per angiogram; Right CCA injection demonstrates robust ECA and absent right ICA. Right vert injection without evidence of LVO. Left ICA injection demonstrates contralateral flow into right MCA territory via robust ACOMM. No evidence of MCA occlusion. 6/24; CT head showing no hemorrhage or major mass. 6/24; EGG placed no acute events overnight. 6/26; able to wean off vent.       Pt seen at bedside for initial assessment- on NC/HF. All drips held at bedside- MAP 91. Last documented bowel movement 6/21; noted w/ constipation. Confirms NKFA. Denies change in appetite PTA. Reports usual body weight 180 pounds (relatively consistent with dosing weight 176 pounds). Left arm, left hand edema 1+. No pressure ulcers documented at this time. Surgical incisions per chart. Uvaldo score=14. Labs reviewed 6/27; electrolytes within normal limits at this time. Observed pt with no overt signs of muscle or fat wasting. Based on ASPEN guidelines, pt does not meet criteria for malnutrition at this time. Provided pt with diet education regarding importance of meeting nutritional needs post operatively ; amenable to education. Pt NPO at time of assessment, RD discussed likelihood of diet advancement pending SLP evaluation (pt diet has since been advanced); amenable to education. RD provided pt w/ diet education on role of RD and plan for follow up w/ additional diet ed as able. Made aware RD remains available. RD to follow up per protocol. See nutrition recommendations below.

## 2022-06-27 NOTE — DIETITIAN INITIAL EVALUATION ADULT - PERTINENT LABORATORY DATA
06-27    138  |  101  |  15  ----------------------------<  101<H>  3.6   |  24  |  0.64    Ca    9.2      27 Jun 2022 03:15  Phos  3.8     06-27  Mg     2.2     06-27    TPro  6.9  /  Alb  3.2<L>  /  TBili  0.4  /  DBili  x   /  AST  55<H>  /  ALT  33  /  AlkPhos  56  06-27  A1C with Estimated Average Glucose Result: 4.3 % (06-22-22 @ 11:56)  A1C with Estimated Average Glucose Result: 4.6 % (06-22-22 @ 07:22)

## 2022-06-27 NOTE — SWALLOW BEDSIDE ASSESSMENT ADULT - PHARYNGEAL PHASE
+mild SOB following PO intake, worse with chewable vs puree solids and thin liquids. Pt benefited from rest breaks. No overt signs of aspiration observed.

## 2022-06-28 LAB
ALBUMIN SERPL ELPH-MCNC: 3.2 G/DL — LOW (ref 3.3–5)
ALBUMIN SERPL ELPH-MCNC: 3.3 G/DL — SIGNIFICANT CHANGE UP (ref 3.3–5)
ALP SERPL-CCNC: 50 U/L — SIGNIFICANT CHANGE UP (ref 40–120)
ALP SERPL-CCNC: 52 U/L — SIGNIFICANT CHANGE UP (ref 40–120)
ALT FLD-CCNC: 29 U/L — SIGNIFICANT CHANGE UP (ref 10–45)
ALT FLD-CCNC: 29 U/L — SIGNIFICANT CHANGE UP (ref 10–45)
ANION GAP SERPL CALC-SCNC: 10 MMOL/L — SIGNIFICANT CHANGE UP (ref 5–17)
ANION GAP SERPL CALC-SCNC: 10 MMOL/L — SIGNIFICANT CHANGE UP (ref 5–17)
APTT BLD: 31.9 SEC — SIGNIFICANT CHANGE UP (ref 27.5–35.5)
APTT BLD: 32 SEC — SIGNIFICANT CHANGE UP (ref 27.5–35.5)
AST SERPL-CCNC: 39 U/L — SIGNIFICANT CHANGE UP (ref 10–40)
AST SERPL-CCNC: 40 U/L — SIGNIFICANT CHANGE UP (ref 10–40)
BILIRUB SERPL-MCNC: 0.4 MG/DL — SIGNIFICANT CHANGE UP (ref 0.2–1.2)
BILIRUB SERPL-MCNC: 0.5 MG/DL — SIGNIFICANT CHANGE UP (ref 0.2–1.2)
BUN SERPL-MCNC: 18 MG/DL — SIGNIFICANT CHANGE UP (ref 7–23)
BUN SERPL-MCNC: 20 MG/DL — SIGNIFICANT CHANGE UP (ref 7–23)
CALCIUM SERPL-MCNC: 8.7 MG/DL — SIGNIFICANT CHANGE UP (ref 8.4–10.5)
CALCIUM SERPL-MCNC: 8.7 MG/DL — SIGNIFICANT CHANGE UP (ref 8.4–10.5)
CHLORIDE SERPL-SCNC: 103 MMOL/L — SIGNIFICANT CHANGE UP (ref 96–108)
CHLORIDE SERPL-SCNC: 103 MMOL/L — SIGNIFICANT CHANGE UP (ref 96–108)
CO2 SERPL-SCNC: 23 MMOL/L — SIGNIFICANT CHANGE UP (ref 22–31)
CO2 SERPL-SCNC: 25 MMOL/L — SIGNIFICANT CHANGE UP (ref 22–31)
CREAT SERPL-MCNC: 0.49 MG/DL — LOW (ref 0.5–1.3)
CREAT SERPL-MCNC: 0.59 MG/DL — SIGNIFICANT CHANGE UP (ref 0.5–1.3)
EGFR: 106 ML/MIN/1.73M2 — SIGNIFICANT CHANGE UP
EGFR: 112 ML/MIN/1.73M2 — SIGNIFICANT CHANGE UP
GAS PNL BLDA: SIGNIFICANT CHANGE UP
GLUCOSE SERPL-MCNC: 109 MG/DL — HIGH (ref 70–99)
GLUCOSE SERPL-MCNC: 99 MG/DL — SIGNIFICANT CHANGE UP (ref 70–99)
HCT VFR BLD CALC: 23.9 % — LOW (ref 39–50)
HCT VFR BLD CALC: 25.5 % — LOW (ref 39–50)
HGB BLD-MCNC: 7.9 G/DL — LOW (ref 13–17)
HGB BLD-MCNC: 8.3 G/DL — LOW (ref 13–17)
INR BLD: 1.15 — SIGNIFICANT CHANGE UP (ref 0.88–1.16)
INR BLD: 1.22 — HIGH (ref 0.88–1.16)
MAGNESIUM SERPL-MCNC: 2 MG/DL — SIGNIFICANT CHANGE UP (ref 1.6–2.6)
MAGNESIUM SERPL-MCNC: 2.2 MG/DL — SIGNIFICANT CHANGE UP (ref 1.6–2.6)
MCHC RBC-ENTMCNC: 30.3 PG — SIGNIFICANT CHANGE UP (ref 27–34)
MCHC RBC-ENTMCNC: 30.7 PG — SIGNIFICANT CHANGE UP (ref 27–34)
MCHC RBC-ENTMCNC: 32.5 GM/DL — SIGNIFICANT CHANGE UP (ref 32–36)
MCHC RBC-ENTMCNC: 33.1 GM/DL — SIGNIFICANT CHANGE UP (ref 32–36)
MCV RBC AUTO: 93 FL — SIGNIFICANT CHANGE UP (ref 80–100)
MCV RBC AUTO: 93.1 FL — SIGNIFICANT CHANGE UP (ref 80–100)
NRBC # BLD: 0 /100 WBCS — SIGNIFICANT CHANGE UP (ref 0–0)
NRBC # BLD: 0 /100 WBCS — SIGNIFICANT CHANGE UP (ref 0–0)
PHOSPHATE SERPL-MCNC: 3.5 MG/DL — SIGNIFICANT CHANGE UP (ref 2.5–4.5)
PLATELET # BLD AUTO: 106 K/UL — LOW (ref 150–400)
PLATELET # BLD AUTO: 126 K/UL — LOW (ref 150–400)
POTASSIUM SERPL-MCNC: 3.9 MMOL/L — SIGNIFICANT CHANGE UP (ref 3.5–5.3)
POTASSIUM SERPL-MCNC: 4 MMOL/L — SIGNIFICANT CHANGE UP (ref 3.5–5.3)
POTASSIUM SERPL-SCNC: 3.9 MMOL/L — SIGNIFICANT CHANGE UP (ref 3.5–5.3)
POTASSIUM SERPL-SCNC: 4 MMOL/L — SIGNIFICANT CHANGE UP (ref 3.5–5.3)
PROT SERPL-MCNC: 6.7 G/DL — SIGNIFICANT CHANGE UP (ref 6–8.3)
PROT SERPL-MCNC: 6.7 G/DL — SIGNIFICANT CHANGE UP (ref 6–8.3)
PROTHROM AB SERPL-ACNC: 13.7 SEC — HIGH (ref 10.5–13.4)
PROTHROM AB SERPL-ACNC: 14.6 SEC — HIGH (ref 10.5–13.4)
RBC # BLD: 2.57 M/UL — LOW (ref 4.2–5.8)
RBC # BLD: 2.74 M/UL — LOW (ref 4.2–5.8)
RBC # FLD: 15.4 % — HIGH (ref 10.3–14.5)
RBC # FLD: 15.7 % — HIGH (ref 10.3–14.5)
SODIUM SERPL-SCNC: 136 MMOL/L — SIGNIFICANT CHANGE UP (ref 135–145)
SODIUM SERPL-SCNC: 138 MMOL/L — SIGNIFICANT CHANGE UP (ref 135–145)
WBC # BLD: 4.27 K/UL — SIGNIFICANT CHANGE UP (ref 3.8–10.5)
WBC # BLD: 4.61 K/UL — SIGNIFICANT CHANGE UP (ref 3.8–10.5)
WBC # FLD AUTO: 4.27 K/UL — SIGNIFICANT CHANGE UP (ref 3.8–10.5)
WBC # FLD AUTO: 4.61 K/UL — SIGNIFICANT CHANGE UP (ref 3.8–10.5)

## 2022-06-28 PROCEDURE — 71045 X-RAY EXAM CHEST 1 VIEW: CPT | Mod: 26

## 2022-06-28 PROCEDURE — 71045 X-RAY EXAM CHEST 1 VIEW: CPT | Mod: 26,77

## 2022-06-28 PROCEDURE — 99233 SBSQ HOSP IP/OBS HIGH 50: CPT

## 2022-06-28 PROCEDURE — 99291 CRITICAL CARE FIRST HOUR: CPT

## 2022-06-28 PROCEDURE — G0407: CPT | Mod: 95

## 2022-06-28 PROCEDURE — 99292 CRITICAL CARE ADDL 30 MIN: CPT

## 2022-06-28 RX ORDER — POLYETHYLENE GLYCOL 3350 17 G/17G
17 POWDER, FOR SOLUTION ORAL DAILY
Refills: 0 | Status: DISCONTINUED | OUTPATIENT
Start: 2022-06-28 | End: 2022-07-05

## 2022-06-28 RX ORDER — CLOPIDOGREL BISULFATE 75 MG/1
75 TABLET, FILM COATED ORAL DAILY
Refills: 0 | Status: DISCONTINUED | OUTPATIENT
Start: 2022-06-28 | End: 2022-07-05

## 2022-06-28 RX ORDER — FUROSEMIDE 40 MG
20 TABLET ORAL EVERY 12 HOURS
Refills: 0 | Status: DISCONTINUED | OUTPATIENT
Start: 2022-06-28 | End: 2022-07-01

## 2022-06-28 RX ORDER — ACETAMINOPHEN 500 MG
650 TABLET ORAL EVERY 6 HOURS
Refills: 0 | Status: DISCONTINUED | OUTPATIENT
Start: 2022-06-28 | End: 2022-07-05

## 2022-06-28 RX ORDER — ALBUMIN HUMAN 25 %
250 VIAL (ML) INTRAVENOUS
Refills: 0 | Status: COMPLETED | OUTPATIENT
Start: 2022-06-28 | End: 2022-06-28

## 2022-06-28 RX ORDER — SENNA PLUS 8.6 MG/1
2 TABLET ORAL AT BEDTIME
Refills: 0 | Status: DISCONTINUED | OUTPATIENT
Start: 2022-06-28 | End: 2022-07-05

## 2022-06-28 RX ORDER — ACETAMINOPHEN 500 MG
650 TABLET ORAL EVERY 6 HOURS
Refills: 0 | Status: DISCONTINUED | OUTPATIENT
Start: 2022-06-28 | End: 2022-06-28

## 2022-06-28 RX ORDER — OXYCODONE HYDROCHLORIDE 5 MG/1
5 TABLET ORAL EVERY 6 HOURS
Refills: 0 | Status: DISCONTINUED | OUTPATIENT
Start: 2022-06-28 | End: 2022-07-03

## 2022-06-28 RX ORDER — OXYCODONE HYDROCHLORIDE 5 MG/1
10 TABLET ORAL EVERY 6 HOURS
Refills: 0 | Status: DISCONTINUED | OUTPATIENT
Start: 2022-06-28 | End: 2022-07-04

## 2022-06-28 RX ADMIN — POLYETHYLENE GLYCOL 3350 17 GRAM(S): 17 POWDER, FOR SOLUTION ORAL at 14:01

## 2022-06-28 RX ADMIN — Medication 650 MILLIGRAM(S): at 17:01

## 2022-06-28 RX ADMIN — Medication 650 MILLIGRAM(S): at 16:01

## 2022-06-28 RX ADMIN — OXYCODONE HYDROCHLORIDE 5 MILLIGRAM(S): 5 TABLET ORAL at 11:32

## 2022-06-28 RX ADMIN — OXYCODONE HYDROCHLORIDE 5 MILLIGRAM(S): 5 TABLET ORAL at 23:30

## 2022-06-28 RX ADMIN — TIOTROPIUM BROMIDE 1 CAPSULE(S): 18 CAPSULE ORAL; RESPIRATORY (INHALATION) at 11:32

## 2022-06-28 RX ADMIN — HEPARIN SODIUM 5000 UNIT(S): 5000 INJECTION INTRAVENOUS; SUBCUTANEOUS at 06:57

## 2022-06-28 RX ADMIN — OXYCODONE HYDROCHLORIDE 5 MILLIGRAM(S): 5 TABLET ORAL at 18:34

## 2022-06-28 RX ADMIN — PANTOPRAZOLE SODIUM 40 MILLIGRAM(S): 20 TABLET, DELAYED RELEASE ORAL at 06:58

## 2022-06-28 RX ADMIN — CHLORHEXIDINE GLUCONATE 1 APPLICATION(S): 213 SOLUTION TOPICAL at 06:00

## 2022-06-28 RX ADMIN — Medication 20 MILLIGRAM(S): at 09:58

## 2022-06-28 RX ADMIN — HEPARIN SODIUM 5000 UNIT(S): 5000 INJECTION INTRAVENOUS; SUBCUTANEOUS at 21:36

## 2022-06-28 RX ADMIN — Medication 125 MILLILITER(S): at 01:10

## 2022-06-28 RX ADMIN — ATORVASTATIN CALCIUM 80 MILLIGRAM(S): 80 TABLET, FILM COATED ORAL at 21:37

## 2022-06-28 RX ADMIN — Medication 81 MILLIGRAM(S): at 17:34

## 2022-06-28 RX ADMIN — SENNA PLUS 2 TABLET(S): 8.6 TABLET ORAL at 21:36

## 2022-06-28 RX ADMIN — Medication 100 MILLIEQUIVALENT(S): at 03:26

## 2022-06-28 RX ADMIN — OXYCODONE HYDROCHLORIDE 5 MILLIGRAM(S): 5 TABLET ORAL at 17:34

## 2022-06-28 RX ADMIN — OXYCODONE HYDROCHLORIDE 5 MILLIGRAM(S): 5 TABLET ORAL at 12:32

## 2022-06-28 RX ADMIN — CLOPIDOGREL BISULFATE 75 MILLIGRAM(S): 75 TABLET, FILM COATED ORAL at 11:32

## 2022-06-28 RX ADMIN — Medication 20 MILLIGRAM(S): at 17:34

## 2022-06-28 RX ADMIN — Medication 100 MILLIEQUIVALENT(S): at 00:19

## 2022-06-28 RX ADMIN — HEPARIN SODIUM 5000 UNIT(S): 5000 INJECTION INTRAVENOUS; SUBCUTANEOUS at 14:00

## 2022-06-28 NOTE — PROGRESS NOTE ADULT - SUBJECTIVE AND OBJECTIVE BOX
· Subjective and Objective:   Neurology Stroke Progress Note    INTERVAL HPI/OVERNIGHT EVENTS:  Patient seen and examined. His left leg is getting stronger. His left arm is very weak but he feels flickers of movement and feels it has potential to improve. He is motivated to work with therapy to improve strength. Denies any new or worsening symptoms.     MEDICATIONS  (STANDING):  aspirin  chewable 81 milliGRAM(s) Oral daily  atorvastatin 80 milliGRAM(s) Oral at bedtime  chlorhexidine 2% Cloths 1 Application(s) Topical <User Schedule>  dextrose 50% Injectable 50 milliLiter(s) IV Push every 15 minutes  dextrose 50% Injectable 25 milliLiter(s) IV Push every 15 minutes  heparin   Injectable 5000 Unit(s) SubCutaneous every 8 hours  pantoprazole  Injectable 40 milliGRAM(s) IV Push daily  phenylephrine    Infusion 0.2 MICROgram(s)/kG/Min (5.99 mL/Hr) IV Continuous <Continuous>  sodium chloride 0.9%. 1000 milliLiter(s) (10 mL/Hr) IV Continuous <Continuous>  tiotropium 18 MICROgram(s) Capsule 1 Capsule(s) Inhalation daily    MEDICATIONS  (PRN):  bisacodyl Suppository 10 milliGRAM(s) Rectal daily PRN Constipation  fentaNYL    Injectable 25 MICROGram(s) IV Push every 3 hours PRN Severe Pain (7 - 10)      Allergies    No Known Allergies    Intolerances        ROS: As per HPI, otherwise negative    Vital Signs Last 24 Hrs  T(C): 36.7 (27 Jun 2022 14:00), Max: 37.7 (26 Jun 2022 14:44)  T(F): 98.1 (27 Jun 2022 14:00), Max: 99.9 (26 Jun 2022 14:44)  HR: 82 (27 Jun 2022 14:00) (51 - 91)  BP: 129/69 (26 Jun 2022 15:20) (129/69 - 129/69)  BP(mean): 93 (26 Jun 2022 15:20) (93 - 93)  RR: 18 (27 Jun 2022 14:00) (15 - 20)  SpO2: 99% (27 Jun 2022 14:00) (94% - 100%)    Physical exam:  Physical exam:  Constitutional: Intubated and sedated, easily arousable with verbal stimuli.  Neck: trachea midline, ET tube in place.  Cardiovascular: Regular rate and rhythm on monitor, sternal incision open to air, clean and dry  GI: Abdomen soft      Neurologic:  -Mental status: AAO X 3(Nods head appropriately with choices). Sedated, arousable with verbal stimuli. Spontaneous movements of RUE and RLE noted, following some simple commands. Unable to speak 2/2 intubation.  -Cranial nerves:   II: Visual fields are full to confrontation- able to track.  III, IV, VI:  Pupils equally round and reactive to light, left eye ptosis+.  VII: Face appears symmetrical, difficult to determine with ET tube and molina in place.  Motor: Normal bulk, RUE with no drift, able to keep it up for count of 10, strength 5/5,RUE decreased  strength 4/5, RLE 4/5- able to lift and keep it up for count of 5. LUE 2/5. LLE: 4/5 strength, with drift does not hit bed (with effort can maintain for 5-7 seconds without drift) Sensation: intact  Coordination: Unable to perform FTN with left side 2/2 paralysis. Right arm unable to bend due to lines in place.     LABS:                        8.6    3.81  )-----------( 107      ( 27 Jun 2022 12:53 )             26.2     06-27    138  |  101  |  15  ----------------------------<  101<H>  3.6   |  24  |  0.64    Ca    9.2      27 Jun 2022 03:15  Phos  3.8     06-27  Mg     2.2     06-27    TPro  6.9  /  Alb  3.2<L>  /  TBili  0.4  /  DBili  x   /  AST  55<H>  /  ALT  33  /  AlkPhos  56  06-27    PT/INR - ( 27 Jun 2022 03:15 )   PT: 13.9 sec;   INR: 1.17          PTT - ( 27 Jun 2022 03:15 )  PTT:28.5 sec      RADIOLOGY & ADDITIONAL TESTS:

## 2022-06-28 NOTE — PROGRESS NOTE ADULT - NS ATTEND AMEND GEN_ALL_CORE FT
The patient is a 67-year-old male with a history of tobacco dependence, hypertension, COPD, severe AR, and Marfan syndrome with secondary aortic aneurysm status post replacement 6/23.  Course complicated by postoperative left-sided hemiparesis with CT head showing a recent right precentral gyrus infarct and CTA/angiogram showing a chronic right ICA occlusion with partial reconstitution of the MCA from the right a comm.  He was taken for potential thrombectomy but given the chronic nature of the occlusion, no thrombectomy was pursued. EEG was unremarkable MAP goal >85, gradually weaning. Consider adding Plavix to patient's regimen of aspirin.
The patient is a 67-year-old male with a history of tobacco dependence, hypertension, COPD, severe AR, and Marfan syndrome with secondary aortic aneurysm status post replacement 6/23.  Course complicated by postoperative left-sided hemiparesis with CT head showing a recent right precentral gyrus infarct and CTA/angiogram showing a chronic right ICA occlusion with partial reconstitution of the MCA from the right a comm.  He was taken for potential thrombectomy but given the chronic nature of the occlusion, no thrombectomy was pursued. EEG was unremarkable MAP goal >85, gradually weaning. Continue DAPT, statin.

## 2022-06-28 NOTE — PROGRESS NOTE ADULT - ASSESSMENT
Assessment and Plan  64 yo M, current smoker (½ PPD x 40 years), former ETOH abuse (40yr hx, quit 7 years ago), FH of Marfan's Syndrome and Aortic Aneurysms, PMHx HTN, severe aortic regurgitation, dilated ascending aorta and dilated descending aorta, anemia, arthritis, COPD, now s/p upper hemisternotomy, AVR, ascending and hemiarch replacement on 6/23 c/b left sided paralysis, NIHSS 22. CTA significant for MELISSA occlusion with partial reconstitution of RMCA from R ACOMM. 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 Pt s/p rpt CTP this am with no perfusion deficit and rpt CTH showing recent infarct @ R precentral gyrus. EEG with no evidence of seizures.     - Stroke will continue to follow  - May relax MAP goals from Neuro perspective.  - q4 neurocheck from stroke perspective when patient is stepped down to telemetry   - Would recommend considering DAPT, pt is on ASA, recommend adding Plavix if OK from CTSx standpoint.  - Recommend MRI brain without contrast once stable  - Stroke education.  - PT/OT recommending acute rehab, continue PT/OT     The above mentioned plan was D/W Dr. Johnson and CT surgery during rounds.

## 2022-06-28 NOTE — PROGRESS NOTE ADULT - SUBJECTIVE AND OBJECTIVE BOX
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 Improving slowly.    FAMILY HISTORY:  Family history of Marfan syndrome (Uncle)  FH: aortic aneurysm (Uncle)  PAST MEDICAL & SURGICAL HISTORY:  HTN (hypertension)  Aortic regurgitation  Anemia  Arthritis  COPD, mild  H/O cataract extraction      Patient is a 67yr old male with PMH active smoker, prior etoh, COPD, HTN, severe AR, dilated Ascending aorta, anemia, sp AVR (bio) and ascending hemiarch replacement (6/23 [place]), hospital course cb MELISSA infarct with L sided weakness. Improving slowly.      14 system review was unremarkable. Patient reports feeling better. able to move LLE antigravity. Some difficulty with LUE.     Vital signs, hemodynamic and respiratory parameters were reviewed from the bedside nursing flowsheet.  ICU Vital Signs Last 24 Hrs  T(C): 36.7 (28 Jun 2022 14:00), Max: 37.1 (27 Jun 2022 17:07)  T(F): 98.1 (28 Jun 2022 14:00), Max: 98.8 (27 Jun 2022 17:07)  HR: 89 (28 Jun 2022 14:12) (70 - 95)  BP: 121/74 (28 Jun 2022 14:12) (110/61 - 143/68)  BP(mean): 93 (28 Jun 2022 14:12) (80 - 103)  ABP: 111/106 (28 Jun 2022 10:40) (111/106 - 175/77)  ABP(mean): 109 (28 Jun 2022 10:40) (87 - 113)  RR: 18 (28 Jun 2022 13:13) (14 - 20)  SpO2: 96% (28 Jun 2022 14:12) (93% - 100%)    Adult Advanced Hemodynamics Last 24 Hrs  CVP(mm Hg): 6 (28 Jun 2022 14:12) (-6 - 211)  CVP(cm H2O): --  CO: --  CI: --  PA: --  PA(mean): --  PCWP: --  SVR: --  SVRI: --  PVR: --  PVRI: --, ABG - ( 28 Jun 2022 02:24 )  pH, Arterial: 7.44  pH, Blood: x     /  pCO2: 39    /  pO2: 152   / HCO3: 26    / Base Excess: 2.3   /  SaO2: 99.2        Intake and output was reviewed and the fluid balance was calculated  Daily     Daily   I&O's Summary    27 Jun 2022 07:01  -  28 Jun 2022 07:00  --------------------------------------------------------  IN: 1600 mL / OUT: 1890 mL / NET: -290 mL    28 Jun 2022 07:01  -  28 Jun 2022 14:25  --------------------------------------------------------  IN: 70 mL / OUT: 1210 mL / NET: -1140 mL    All lines and drain sites were assessed  Glycemic trend was reviewed    Alert, following commands  LUE in splint, unable to move fingers  LLE moving antigravity in bed  5/5 strength rue and rle  clear breath sounds bl  abd soft, nt/nd  no le edema      labs  CBC Full  -  ( 28 Jun 2022 09:25 )  WBC Count : 4.61 K/uL  RBC Count : 2.74 M/uL  Hemoglobin : 8.3 g/dL  Hematocrit : 25.5 %  Platelet Count - Automated : 126 K/uL  Mean Cell Volume : 93.1 fl  Mean Cell Hemoglobin : 30.3 pg  Mean Cell Hemoglobin Concentration : 32.5 gm/dL  Auto Neutrophil # : x  Auto Lymphocyte # : x  Auto Monocyte # : x  Auto Eosinophil # : x  Auto Basophil # : x  Auto Neutrophil % : x  Auto Lymphocyte % : x  Auto Monocyte % : x  Auto Eosinophil % : x  Auto Basophil % : x    06-28    138  |  103  |  18  ----------------------------<  109<H>  3.9   |  25  |  0.49<L>    Ca    8.7      28 Jun 2022 09:25  Phos  3.5     06-28  Mg     2.0     06-28    TPro  6.7  /  Alb  3.2<L>  /  TBili  0.5  /  DBili  x   /  AST  40  /  ALT  29  /  AlkPhos  52  06-28    PT/INR - ( 28 Jun 2022 09:25 )   PT: 14.6 sec;   INR: 1.22          PTT - ( 28 Jun 2022 09:25 )  PTT:32.0 sec  The current medications were reviewed   MEDICATIONS  (STANDING):  aspirin  chewable 81 milliGRAM(s) Oral daily  atorvastatin 80 milliGRAM(s) Oral at bedtime  chlorhexidine 2% Cloths 1 Application(s) Topical <User Schedule>  clopidogrel Tablet 75 milliGRAM(s) Oral daily  dextrose 50% Injectable 50 milliLiter(s) IV Push every 15 minutes  dextrose 50% Injectable 25 milliLiter(s) IV Push every 15 minutes  furosemide   Injectable 20 milliGRAM(s) IV Push every 12 hours  heparin   Injectable 5000 Unit(s) SubCutaneous every 8 hours  pantoprazole    Tablet 40 milliGRAM(s) Oral before breakfast  polyethylene glycol 3350 17 Gram(s) Oral daily  senna 2 Tablet(s) Oral at bedtime  sodium chloride 0.9%. 1000 milliLiter(s) (10 mL/Hr) IV Continuous <Continuous>  tiotropium 18 MICROgram(s) Capsule 1 Capsule(s) Inhalation daily    MEDICATIONS  (PRN):  acetaminophen     Tablet .. 650 milliGRAM(s) Oral every 6 hours PRN Mild Pain (1 - 3)  bisacodyl Suppository 10 milliGRAM(s) Rectal daily PRN Constipation  oxyCODONE    IR 5 milliGRAM(s) Oral every 6 hours PRN Moderate Pain (4 - 6)  oxyCODONE    IR 10 milliGRAM(s) Oral every 6 hours PRN Severe Pain (7 - 10)      Assessment/Plan:  67yr old male with PMH active smoker, prior etoh, COPD, HTN, severe AR, dilated Ascending aorta, anemia, sp AVR (bio) and ascending hemiarch replacement (6/23 [place]), hospital course cb MELISSA infarct with L sided weakness. Improving slowly.    Sp AVR and ascending hemiarch repair 6/23 [place]  L sided stroke with LUE and LLE weakness, LLE improving,   HTN  COPD  Anemia    Acute systolic and diastolic heart failure  will treat with diuresis  Continue aspirin, plavix, statin  GI/DVT PPX  OOB with PT/OT  Appreciate neuro recs  Pain control  trend H/H  monitor urine output    My plan includes :  Close hemodynamic, ventilatory and drain monitoring and management per post op routine  Monitor for arrhythmias and monitor parameters for organ perfusion  Beta blockade not administered due to hemodynamic instability and bradycardia  Monitor neurologic status  Head of the bed should remain elevated to 45 deg   Chest PT and IS will be encouraged  Monitor adequacy of oxygenation and ventilation and attempt to wean oxygen  Antibiotic regimen will be tailored to the clinical, laboratory and microbiologic data  Nutritional goals will be met using po eventually , ensure adequate caloric intake and monitor the same  Stress ulcer and VTE prophylaxis will be achieved    Glycemic control is satisfactory  Electrolytes have been repleted as necessary and wound care has been carried out   Pain control has been achieved.   Aggressive physical therapy and early mobility and ambulation goals will be met   The family was updated about the course and plan.    CRITICAL CARE TIME personally provided by me  in evaluation and management, reassessments, review and interpretation of labs and x-rays, ventilator and hemodynamic management, formulating a plan and coordinating care: ___90____ MIN.  Time does not include procedural time.    CTICU ATTENDING     					  Godfrey Hannon MD

## 2022-06-28 NOTE — PROGRESS NOTE ADULT - SUBJECTIVE AND OBJECTIVE BOX
CTICU  CRITICAL  CARE  attending     Hand off received 					   Pertinent clinical, laboratory, radiographic, hemodynamic, echocardiographic, respiratory data, microbiologic data and chart were reviewed and analyzed frequently throughout the course of the day and night        63 years old male with HTN, anemia, arthritis, COPD.  He is a current smoker (½ PPD x 40 years), former ETOH abuse (40yr hx, quit 7 years ago),   He has Family History of Marfan's Syndrome and Aortic Aneurysms.  He has severe Aortic Regurgitation, dilated ascending aorta (4.4 cm) and dilated descending aorta (4.9x5.5cm).  He was evaluated for fatigue, worsening SOB, and palpitations with activity.  CT chest 3/25/22: Ascending aorta at the level w/in the proximal margin of the aortic arch measures 3.6cm.   Descending aorta at the level of the L pulmonary measures 4.9 x 5.0 with peripheral plaque formation.   RLL 4mm parenchymal nodule, Triangular density w/in the LLL measuring 5mm. Linear fibrosis at the L lung base.  LHC: No CAD.       S/P AVR  S/P Replacement of AA and Hemiarch.          FAMILY HISTORY:  Family history of Marfan syndrome (Uncle)    FH: aortic aneurysm (Uncle)    PAST MEDICAL & SURGICAL HISTORY:  HTN (hypertension)  Aortic regurgitation  Anemia  Arthritis  COPD,   H/O cataract extraction          14 system review was unremarkable    Vital signs, hemodynamic and respiratory parameters were reviewed from the bedside nursing flow sheet.  ICU Vital Signs Last 24 Hrs  T(C): 37.1 (28 Jun 2022 20:58), Max: 37.3 (28 Jun 2022 17:08)  T(F): 98.7 (28 Jun 2022 20:58), Max: 99.2 (28 Jun 2022 17:08)  HR: 83 (28 Jun 2022 21:00) (70 - 95)  BP: 117/61 (28 Jun 2022 21:00) (103/71 - 137/80)  BP(mean): 83 (28 Jun 2022 21:00) (80 - 103)  ABP: 111/106 (28 Jun 2022 10:40) (111/106 - 175/77)  ABP(mean): 109 (28 Jun 2022 10:40) (91 - 113)  RR: 18 (28 Jun 2022 19:00) (17 - 20)  SpO2: 97% (28 Jun 2022 21:00) (93% - 100%)    Adult Advanced Hemodynamics Last 24 Hrs  CVP(mm Hg): -11 (28 Jun 2022 16:00) (-11 - 211)  CVP(cm H2O): --  CO: --  CI: --  PA: --  PA(mean): --  PCWP: --  SVR: --  SVRI: --  PVR: --  PVRI: --, ABG - ( 28 Jun 2022 02:24 )  pH, Arterial: 7.44  pH, Blood: x     /  pCO2: 39    /  pO2: 152   / HCO3: 26    / Base Excess: 2.3   /  SaO2: 99.2                Intake and output was reviewed and the fluid balance was calculated  Daily     Daily   I&O's Summary    27 Jun 2022 07:01  -  28 Jun 2022 07:00  --------------------------------------------------------  IN: 1600 mL / OUT: 1890 mL / NET: -290 mL    28 Jun 2022 07:01  -  28 Jun 2022 22:47  --------------------------------------------------------  IN: 125 mL / OUT: 1965 mL / NET: -1840 mL        All lines and drain sites were assessed    Neuro: Barely able to lift LUE off gravity (Improved strength in LUE. Moves LLE well.  Neck: No JVD.  CVS: S1, S2, No S3.  Lungs: Good air entry bilaterally.  Abd: Soft. No tenderness. + Bowel sounds.  Vascular: + DP/PT.  Extremities: No edema.  Lymphatic: Normal.  Skin: No abnormalities.      labs  CBC Full  -  ( 28 Jun 2022 09:25 )  WBC Count : 4.61 K/uL  RBC Count : 2.74 M/uL  Hemoglobin : 8.3 g/dL  Hematocrit : 25.5 %  Platelet Count - Automated : 126 K/uL  Mean Cell Volume : 93.1 fl  Mean Cell Hemoglobin : 30.3 pg  Mean Cell Hemoglobin Concentration : 32.5 gm/dL  Auto Neutrophil # : x  Auto Lymphocyte # : x  Auto Monocyte # : x  Auto Eosinophil # : x  Auto Basophil # : x  Auto Neutrophil % : x  Auto Lymphocyte % : x  Auto Monocyte % : x  Auto Eosinophil % : x  Auto Basophil % : x    06-28    138  |  103  |  18  ----------------------------<  109<H>  3.9   |  25  |  0.49<L>    Ca    8.7      28 Jun 2022 09:25  Phos  3.5     06-28  Mg     2.0     06-28    TPro  6.7  /  Alb  3.2<L>  /  TBili  0.5  /  DBili  x   /  AST  40  /  ALT  29  /  AlkPhos  52  06-28    PT/INR - ( 28 Jun 2022 09:25 )   PT: 14.6 sec;   INR: 1.22          PTT - ( 28 Jun 2022 09:25 )  PTT:32.0 sec  The current medications were reviewed   MEDICATIONS  (STANDING):  aspirin  chewable 81 milliGRAM(s) Oral daily  atorvastatin 80 milliGRAM(s) Oral at bedtime  chlorhexidine 2% Cloths 1 Application(s) Topical <User Schedule>  clopidogrel Tablet 75 milliGRAM(s) Oral daily  dextrose 50% Injectable 50 milliLiter(s) IV Push every 15 minutes  dextrose 50% Injectable 25 milliLiter(s) IV Push every 15 minutes  furosemide   Injectable 20 milliGRAM(s) IV Push every 12 hours  heparin   Injectable 5000 Unit(s) SubCutaneous every 8 hours  pantoprazole    Tablet 40 milliGRAM(s) Oral before breakfast  polyethylene glycol 3350 17 Gram(s) Oral daily  senna 2 Tablet(s) Oral at bedtime  sodium chloride 0.9%. 1000 milliLiter(s) (10 mL/Hr) IV Continuous <Continuous>  tiotropium 18 MICROgram(s) Capsule 1 Capsule(s) Inhalation daily    MEDICATIONS  (PRN):  acetaminophen     Tablet .. 650 milliGRAM(s) Oral every 6 hours PRN Mild Pain (1 - 3)  bisacodyl Suppository 10 milliGRAM(s) Rectal daily PRN Constipation  oxyCODONE    IR 5 milliGRAM(s) Oral every 6 hours PRN Moderate Pain (4 - 6)  oxyCODONE    IR 10 milliGRAM(s) Oral every 6 hours PRN Severe Pain (7 - 10)        67y old  Male with aortic regurgitation.  S/P AVR  S/P Replacement of ascending aorta and hemiarch.  Postoperative course complicated by left hemiparesis.  Stroke Code called.  CT Head: Acute Right precentral Gyrus Infarct.  CT perfusion was negative.   Hemodynamically stable.  Good oxygenation.  Fair urine out put.  Overall doing well.      My plan includes :  Physical Therapy and Neuro Rehab.  Statin and Betablocker.  Dual antiplatelet Rx.  Gentle Diuresis  Bronchodilator Rx.  Close hemodynamic, ventilatory and drain monitoring and management  Monitor for arrhythmias and monitor parameters for organ perfusion  Monitor neurologic status  Monitor renal function.  Head of the bed should remain elevated to 45 deg .   Chest PT and IS will be encouraged  Monitor adequacy of oxygenation and ventilation and attempt to wean oxygen  Nutritional goals will be met using po eventually , ensure adequate caloric intake and monitor the same  Stress ulcer and VTE prophylaxis will be achieved    Glycemic control is satisfactory  Electrolytes have been repleted as necessary and wound care has been carried out. Pain control has been achieved.   Aggressive physical therapy and early mobility and ambulation goals will be met   The family was updated about the course and plan  CRITICAL CARE TIME SPENT in evaluation and management, reassessments, review and interpretation of labs and x-rays, ventilator and hemodynamic management, formulating a plan and coordinating care: ___30____ MIN.  Time does not include procedural time.  CTICU ATTENDING     					    Stefan Henao MD

## 2022-06-29 LAB
ALBUMIN SERPL ELPH-MCNC: 3.6 G/DL — SIGNIFICANT CHANGE UP (ref 3.3–5)
ALBUMIN SERPL ELPH-MCNC: 3.7 G/DL — SIGNIFICANT CHANGE UP (ref 3.3–5)
ALP SERPL-CCNC: 57 U/L — SIGNIFICANT CHANGE UP (ref 40–120)
ALP SERPL-CCNC: 61 U/L — SIGNIFICANT CHANGE UP (ref 40–120)
ALT FLD-CCNC: 29 U/L — SIGNIFICANT CHANGE UP (ref 10–45)
ALT FLD-CCNC: 30 U/L — SIGNIFICANT CHANGE UP (ref 10–45)
ANION GAP SERPL CALC-SCNC: 10 MMOL/L — SIGNIFICANT CHANGE UP (ref 5–17)
ANION GAP SERPL CALC-SCNC: 14 MMOL/L — SIGNIFICANT CHANGE UP (ref 5–17)
APTT BLD: 28.8 SEC — SIGNIFICANT CHANGE UP (ref 27.5–35.5)
APTT BLD: 33.6 SEC — SIGNIFICANT CHANGE UP (ref 27.5–35.5)
AST SERPL-CCNC: 36 U/L — SIGNIFICANT CHANGE UP (ref 10–40)
AST SERPL-CCNC: 37 U/L — SIGNIFICANT CHANGE UP (ref 10–40)
BILIRUB SERPL-MCNC: 0.5 MG/DL — SIGNIFICANT CHANGE UP (ref 0.2–1.2)
BILIRUB SERPL-MCNC: 0.5 MG/DL — SIGNIFICANT CHANGE UP (ref 0.2–1.2)
BUN SERPL-MCNC: 18 MG/DL — SIGNIFICANT CHANGE UP (ref 7–23)
BUN SERPL-MCNC: 18 MG/DL — SIGNIFICANT CHANGE UP (ref 7–23)
CALCIUM SERPL-MCNC: 8.9 MG/DL — SIGNIFICANT CHANGE UP (ref 8.4–10.5)
CALCIUM SERPL-MCNC: 9.4 MG/DL — SIGNIFICANT CHANGE UP (ref 8.4–10.5)
CHLORIDE SERPL-SCNC: 98 MMOL/L — SIGNIFICANT CHANGE UP (ref 96–108)
CHLORIDE SERPL-SCNC: 99 MMOL/L — SIGNIFICANT CHANGE UP (ref 96–108)
CO2 SERPL-SCNC: 23 MMOL/L — SIGNIFICANT CHANGE UP (ref 22–31)
CO2 SERPL-SCNC: 28 MMOL/L — SIGNIFICANT CHANGE UP (ref 22–31)
CREAT SERPL-MCNC: 0.59 MG/DL — SIGNIFICANT CHANGE UP (ref 0.5–1.3)
CREAT SERPL-MCNC: 0.6 MG/DL — SIGNIFICANT CHANGE UP (ref 0.5–1.3)
EGFR: 105 ML/MIN/1.73M2 — SIGNIFICANT CHANGE UP
EGFR: 106 ML/MIN/1.73M2 — SIGNIFICANT CHANGE UP
GLUCOSE SERPL-MCNC: 101 MG/DL — HIGH (ref 70–99)
GLUCOSE SERPL-MCNC: 99 MG/DL — SIGNIFICANT CHANGE UP (ref 70–99)
HCT VFR BLD CALC: 27.7 % — LOW (ref 39–50)
HCT VFR BLD CALC: 29.5 % — LOW (ref 39–50)
HGB BLD-MCNC: 9.1 G/DL — LOW (ref 13–17)
HGB BLD-MCNC: 9.5 G/DL — LOW (ref 13–17)
INR BLD: 1.12 — SIGNIFICANT CHANGE UP (ref 0.88–1.16)
INR BLD: 1.16 — SIGNIFICANT CHANGE UP (ref 0.88–1.16)
MAGNESIUM SERPL-MCNC: 1.8 MG/DL — SIGNIFICANT CHANGE UP (ref 1.6–2.6)
MAGNESIUM SERPL-MCNC: 1.8 MG/DL — SIGNIFICANT CHANGE UP (ref 1.6–2.6)
MCHC RBC-ENTMCNC: 30.2 PG — SIGNIFICANT CHANGE UP (ref 27–34)
MCHC RBC-ENTMCNC: 31.2 PG — SIGNIFICANT CHANGE UP (ref 27–34)
MCHC RBC-ENTMCNC: 32.2 GM/DL — SIGNIFICANT CHANGE UP (ref 32–36)
MCHC RBC-ENTMCNC: 32.9 GM/DL — SIGNIFICANT CHANGE UP (ref 32–36)
MCV RBC AUTO: 93.7 FL — SIGNIFICANT CHANGE UP (ref 80–100)
MCV RBC AUTO: 94.9 FL — SIGNIFICANT CHANGE UP (ref 80–100)
NRBC # BLD: 0 /100 WBCS — SIGNIFICANT CHANGE UP (ref 0–0)
NRBC # BLD: 0 /100 WBCS — SIGNIFICANT CHANGE UP (ref 0–0)
PHOSPHATE SERPL-MCNC: 3.1 MG/DL — SIGNIFICANT CHANGE UP (ref 2.5–4.5)
PHOSPHATE SERPL-MCNC: 3.5 MG/DL — SIGNIFICANT CHANGE UP (ref 2.5–4.5)
PLATELET # BLD AUTO: 143 K/UL — LOW (ref 150–400)
PLATELET # BLD AUTO: 151 K/UL — SIGNIFICANT CHANGE UP (ref 150–400)
POTASSIUM SERPL-MCNC: 3.6 MMOL/L — SIGNIFICANT CHANGE UP (ref 3.5–5.3)
POTASSIUM SERPL-MCNC: 3.8 MMOL/L — SIGNIFICANT CHANGE UP (ref 3.5–5.3)
POTASSIUM SERPL-SCNC: 3.6 MMOL/L — SIGNIFICANT CHANGE UP (ref 3.5–5.3)
POTASSIUM SERPL-SCNC: 3.8 MMOL/L — SIGNIFICANT CHANGE UP (ref 3.5–5.3)
PROT SERPL-MCNC: 7.4 G/DL — SIGNIFICANT CHANGE UP (ref 6–8.3)
PROT SERPL-MCNC: 7.9 G/DL — SIGNIFICANT CHANGE UP (ref 6–8.3)
PROTHROM AB SERPL-ACNC: 13.3 SEC — SIGNIFICANT CHANGE UP (ref 10.5–13.4)
PROTHROM AB SERPL-ACNC: 13.8 SEC — HIGH (ref 10.5–13.4)
RBC # BLD: 2.92 M/UL — LOW (ref 4.2–5.8)
RBC # BLD: 3.15 M/UL — LOW (ref 4.2–5.8)
RBC # FLD: 15.1 % — HIGH (ref 10.3–14.5)
RBC # FLD: 15.2 % — HIGH (ref 10.3–14.5)
SODIUM SERPL-SCNC: 135 MMOL/L — SIGNIFICANT CHANGE UP (ref 135–145)
SODIUM SERPL-SCNC: 137 MMOL/L — SIGNIFICANT CHANGE UP (ref 135–145)
WBC # BLD: 5.51 K/UL — SIGNIFICANT CHANGE UP (ref 3.8–10.5)
WBC # BLD: 5.96 K/UL — SIGNIFICANT CHANGE UP (ref 3.8–10.5)
WBC # FLD AUTO: 5.51 K/UL — SIGNIFICANT CHANGE UP (ref 3.8–10.5)
WBC # FLD AUTO: 5.96 K/UL — SIGNIFICANT CHANGE UP (ref 3.8–10.5)

## 2022-06-29 PROCEDURE — 71045 X-RAY EXAM CHEST 1 VIEW: CPT | Mod: 26

## 2022-06-29 PROCEDURE — 99232 SBSQ HOSP IP/OBS MODERATE 35: CPT

## 2022-06-29 PROCEDURE — 99233 SBSQ HOSP IP/OBS HIGH 50: CPT

## 2022-06-29 RX ORDER — POTASSIUM CHLORIDE 20 MEQ
20 PACKET (EA) ORAL ONCE
Refills: 0 | Status: COMPLETED | OUTPATIENT
Start: 2022-06-29 | End: 2022-06-29

## 2022-06-29 RX ORDER — MAGNESIUM OXIDE 400 MG ORAL TABLET 241.3 MG
800 TABLET ORAL ONCE
Refills: 0 | Status: COMPLETED | OUTPATIENT
Start: 2022-06-29 | End: 2022-06-29

## 2022-06-29 RX ORDER — POTASSIUM CHLORIDE 20 MEQ
40 PACKET (EA) ORAL ONCE
Refills: 0 | Status: COMPLETED | OUTPATIENT
Start: 2022-06-29 | End: 2022-06-29

## 2022-06-29 RX ADMIN — OXYCODONE HYDROCHLORIDE 10 MILLIGRAM(S): 5 TABLET ORAL at 23:40

## 2022-06-29 RX ADMIN — PANTOPRAZOLE SODIUM 40 MILLIGRAM(S): 20 TABLET, DELAYED RELEASE ORAL at 07:55

## 2022-06-29 RX ADMIN — ATORVASTATIN CALCIUM 80 MILLIGRAM(S): 80 TABLET, FILM COATED ORAL at 22:45

## 2022-06-29 RX ADMIN — Medication 40 MILLIEQUIVALENT(S): at 14:11

## 2022-06-29 RX ADMIN — OXYCODONE HYDROCHLORIDE 10 MILLIGRAM(S): 5 TABLET ORAL at 22:44

## 2022-06-29 RX ADMIN — HEPARIN SODIUM 5000 UNIT(S): 5000 INJECTION INTRAVENOUS; SUBCUTANEOUS at 22:44

## 2022-06-29 RX ADMIN — HEPARIN SODIUM 5000 UNIT(S): 5000 INJECTION INTRAVENOUS; SUBCUTANEOUS at 05:12

## 2022-06-29 RX ADMIN — POLYETHYLENE GLYCOL 3350 17 GRAM(S): 17 POWDER, FOR SOLUTION ORAL at 11:01

## 2022-06-29 RX ADMIN — OXYCODONE HYDROCHLORIDE 10 MILLIGRAM(S): 5 TABLET ORAL at 07:00

## 2022-06-29 RX ADMIN — OXYCODONE HYDROCHLORIDE 5 MILLIGRAM(S): 5 TABLET ORAL at 00:30

## 2022-06-29 RX ADMIN — CLOPIDOGREL BISULFATE 75 MILLIGRAM(S): 75 TABLET, FILM COATED ORAL at 11:01

## 2022-06-29 RX ADMIN — MAGNESIUM OXIDE 400 MG ORAL TABLET 800 MILLIGRAM(S): 241.3 TABLET ORAL at 09:45

## 2022-06-29 RX ADMIN — HEPARIN SODIUM 5000 UNIT(S): 5000 INJECTION INTRAVENOUS; SUBCUTANEOUS at 14:10

## 2022-06-29 RX ADMIN — SENNA PLUS 2 TABLET(S): 8.6 TABLET ORAL at 22:45

## 2022-06-29 RX ADMIN — Medication 40 MILLIEQUIVALENT(S): at 05:16

## 2022-06-29 RX ADMIN — CHLORHEXIDINE GLUCONATE 1 APPLICATION(S): 213 SOLUTION TOPICAL at 06:00

## 2022-06-29 RX ADMIN — Medication 20 MILLIGRAM(S): at 17:09

## 2022-06-29 RX ADMIN — TIOTROPIUM BROMIDE 1 CAPSULE(S): 18 CAPSULE ORAL; RESPIRATORY (INHALATION) at 11:02

## 2022-06-29 RX ADMIN — MAGNESIUM OXIDE 400 MG ORAL TABLET 800 MILLIGRAM(S): 241.3 TABLET ORAL at 14:11

## 2022-06-29 RX ADMIN — Medication 81 MILLIGRAM(S): at 17:09

## 2022-06-29 RX ADMIN — Medication 20 MILLIGRAM(S): at 05:15

## 2022-06-29 RX ADMIN — OXYCODONE HYDROCHLORIDE 10 MILLIGRAM(S): 5 TABLET ORAL at 06:00

## 2022-06-29 NOTE — PROGRESS NOTE ADULT - SUBJECTIVE AND OBJECTIVE BOX
· Subjective and Objective:   Neurology Stroke Progress Note    INTERVAL HPI/OVERNIGHT EVENTS:  Patients arm is improving and leg almost at full strength. Patient expressing frustration with his condition but hopefulness     MEDICATIONS  (STANDING):  aspirin  chewable 81 milliGRAM(s) Oral daily  atorvastatin 80 milliGRAM(s) Oral at bedtime  chlorhexidine 2% Cloths 1 Application(s) Topical <User Schedule>  dextrose 50% Injectable 50 milliLiter(s) IV Push every 15 minutes  dextrose 50% Injectable 25 milliLiter(s) IV Push every 15 minutes  heparin   Injectable 5000 Unit(s) SubCutaneous every 8 hours  pantoprazole  Injectable 40 milliGRAM(s) IV Push daily  phenylephrine    Infusion 0.2 MICROgram(s)/kG/Min (5.99 mL/Hr) IV Continuous <Continuous>  sodium chloride 0.9%. 1000 milliLiter(s) (10 mL/Hr) IV Continuous <Continuous>  tiotropium 18 MICROgram(s) Capsule 1 Capsule(s) Inhalation daily    MEDICATIONS  (PRN):  bisacodyl Suppository 10 milliGRAM(s) Rectal daily PRN Constipation  fentaNYL    Injectable 25 MICROGram(s) IV Push every 3 hours PRN Severe Pain (7 - 10)      Allergies    No Known Allergies    Intolerances        ROS: As per HPI, otherwise negative    Vital Signs Last 24 Hrs  T(C): 36.7 (27 Jun 2022 14:00), Max: 37.7 (26 Jun 2022 14:44)  T(F): 98.1 (27 Jun 2022 14:00), Max: 99.9 (26 Jun 2022 14:44)  HR: 82 (27 Jun 2022 14:00) (51 - 91)  BP: 129/69 (26 Jun 2022 15:20) (129/69 - 129/69)  BP(mean): 93 (26 Jun 2022 15:20) (93 - 93)  RR: 18 (27 Jun 2022 14:00) (15 - 20)  SpO2: 99% (27 Jun 2022 14:00) (94% - 100%)    Physical exam:  Physical exam:  Constitutional: Intubated and sedated, easily arousable with verbal stimuli.  Neck: trachea midline, ET tube in place.  Cardiovascular: Regular rate and rhythm on monitor, sternal incision open to air, clean and dry  GI: Abdomen soft      Neurologic:  -Mental status: AAO X 3(Nods head appropriately with choices). Sedated, arousable with verbal stimuli. Spontaneous movements of RUE and RLE noted, following some simple commands. Unable to speak 2/2 intubation.  -Cranial nerves:   II: Visual fields are full to confrontation- able to track.  III, IV, VI:  Pupils equally round and reactive to light, left eye ptosis+.  VII: Face appears symmetrical, difficult to determine with ET tube and molina in place.  Motor: Normal bulk, RUE with no drift, able to keep it up for count of 10, strength 5/5,  LUE: decreased  strength 4/5, wrist can maintain a second when lifted, fingers 2/5, elbow 4+/5, deltoid forward flexion 3/5, 4/5 when moving horizontal extension  RLE 5/5 able to lift and keep it up for count of 5.   LLE: 4/5    Sensation: intact  Coordination: Unable to perform FTN with left side 2/2 paralysis. Right arm unable to bend due to lines in place.     LABS:                        8.6    3.81  )-----------( 107      ( 27 Jun 2022 12:53 )             26.2     06-27    138  |  101  |  15  ----------------------------<  101<H>  3.6   |  24  |  0.64    Ca    9.2      27 Jun 2022 03:15  Phos  3.8     06-27  Mg     2.2     06-27    TPro  6.9  /  Alb  3.2<L>  /  TBili  0.4  /  DBili  x   /  AST  55<H>  /  ALT  33  /  AlkPhos  56  06-27    PT/INR - ( 27 Jun 2022 03:15 )   PT: 13.9 sec;   INR: 1.17          PTT - ( 27 Jun 2022 03:15 )  PTT:28.5 sec      RADIOLOGY & ADDITIONAL TESTS:

## 2022-06-29 NOTE — PROGRESS NOTE ADULT - SUBJECTIVE AND OBJECTIVE BOX
CTICU  CRITICAL  CARE  attending     Hand off received 					   Pertinent clinical, laboratory, radiographic, hemodynamic, echocardiographic, respiratory data, microbiologic data and chart were reviewed and analyzed frequently throughout the course of the day and night          63 years old male with HTN, anemia, arthritis, COPD.  He is a current smoker (½ PPD x 40 years), former ETOH abuse (40yr hx, quit 7 years ago),   He has Family History of Marfan's Syndrome and Aortic Aneurysms.  He has severe Aortic Regurgitation, dilated ascending aorta (4.4 cm) and dilated descending aorta (4.9x5.5cm).  He was evaluated for fatigue, worsening SOB, and palpitations with activity.  CT chest 3/25/22: Ascending aorta at the level w/in the proximal margin of the aortic arch measures 3.6cm.   Descending aorta at the level of the L pulmonary measures 4.9 x 5.0 with peripheral plaque formation.   RLL 4mm parenchymal nodule, Triangular density w/in the LLL measuring 5mm. Linear fibrosis at the L lung base.  LHC: No CAD.       S/P AVR  S/P Replacement of AA and Hemiarch.        FAMILY HISTORY:  Family history of Marfan syndrome (Uncle)    FH: aortic aneurysm (Uncle)    PAST MEDICAL & SURGICAL HISTORY:  HTN (hypertension)  Aortic regurgitation  Anemia  Arthritis  COPD  H/O cataract extraction        14 system review was unremarkable    Vital signs, hemodynamic and respiratory parameters were reviewed from the bedside nursing flow sheet.  ICU Vital Signs Last 24 Hrs  T(C): 36.8 (29 Jun 2022 20:56), Max: 37.5 (29 Jun 2022 04:54)  T(F): 98.3 (29 Jun 2022 20:56), Max: 99.5 (29 Jun 2022 04:54)  HR: 77 (29 Jun 2022 22:00) (65 - 85)  BP: 139/72 (29 Jun 2022 22:00) (109/66 - 155/76)  BP(mean): 101 (29 Jun 2022 22:00) (81 - 108)  ABP: --  ABP(mean): --  RR: 16 (29 Jun 2022 22:00) (12 - 20)  SpO2: 97% (29 Jun 2022 22:00) (92% - 100%)    Adult Advanced Hemodynamics Last 24 Hrs  CVP(mm Hg): --  CVP(cm H2O): --  CO: --  CI: --  PA: --  PA(mean): --  PCWP: --  SVR: --  SVRI: --  PVR: --  PVRI: --, ABG - ( 28 Jun 2022 02:24 )  pH, Arterial: 7.44  pH, Blood: x     /  pCO2: 39    /  pO2: 152   / HCO3: 26    / Base Excess: 2.3   /  SaO2: 99.2                Intake and output was reviewed and the fluid balance was calculated  Daily     Daily   I&O's Summary    28 Jun 2022 07:01  -  29 Jun 2022 07:00  --------------------------------------------------------  IN: 845 mL / OUT: 2740 mL / NET: -1895 mL    29 Jun 2022 07:01  -  29 Jun 2022 22:33  --------------------------------------------------------  IN: 700 mL / OUT: 415 mL / NET: 285 mL        All lines and drain sites were assessed    Neuro: Improved strength in LUE  Neck: No JVD.  CVS: S1, S2, No S3.  Lungs: Good air entry bilaterally.  Abd: Soft. No tenderness. + Bowel sounds.  Vascular: + DP/PT.  Extremities: No edema.  Lymphatic: Normal.  Skin: No abnormalities.      labs  CBC Full  -  ( 29 Jun 2022 12:03 )  WBC Count : 5.51 K/uL  RBC Count : 3.15 M/uL  Hemoglobin : 9.5 g/dL  Hematocrit : 29.5 %  Platelet Count - Automated : 151 K/uL  Mean Cell Volume : 93.7 fl  Mean Cell Hemoglobin : 30.2 pg  Mean Cell Hemoglobin Concentration : 32.2 gm/dL  Auto Neutrophil # : x  Auto Lymphocyte # : x  Auto Monocyte # : x  Auto Eosinophil # : x  Auto Basophil # : x  Auto Neutrophil % : x  Auto Lymphocyte % : x  Auto Monocyte % : x  Auto Eosinophil % : x  Auto Basophil % : x    06-29    135  |  98  |  18  ----------------------------<  99  3.8   |  23  |  0.60    Ca    9.4      29 Jun 2022 12:03  Phos  3.5     06-29  Mg     1.8     06-29    TPro  7.9  /  Alb  3.6  /  TBili  0.5  /  DBili  x   /  AST  37  /  ALT  29  /  AlkPhos  61  06-29    PT/INR - ( 29 Jun 2022 12:03 )   PT: 13.3 sec;   INR: 1.12          PTT - ( 29 Jun 2022 12:03 )  PTT:28.8 sec  The current medications were reviewed   MEDICATIONS  (STANDING):  aspirin  chewable 81 milliGRAM(s) Oral daily  atorvastatin 80 milliGRAM(s) Oral at bedtime  chlorhexidine 2% Cloths 1 Application(s) Topical <User Schedule>  clopidogrel Tablet 75 milliGRAM(s) Oral daily  dextrose 50% Injectable 50 milliLiter(s) IV Push every 15 minutes  dextrose 50% Injectable 25 milliLiter(s) IV Push every 15 minutes  furosemide   Injectable 20 milliGRAM(s) IV Push every 12 hours  heparin   Injectable 5000 Unit(s) SubCutaneous every 8 hours  pantoprazole    Tablet 40 milliGRAM(s) Oral before breakfast  polyethylene glycol 3350 17 Gram(s) Oral daily  senna 2 Tablet(s) Oral at bedtime  sodium chloride 0.9%. 1000 milliLiter(s) (10 mL/Hr) IV Continuous <Continuous>  tiotropium 18 MICROgram(s) Capsule 1 Capsule(s) Inhalation daily    MEDICATIONS  (PRN):  acetaminophen     Tablet .. 650 milliGRAM(s) Oral every 6 hours PRN Mild Pain (1 - 3)  bisacodyl Suppository 10 milliGRAM(s) Rectal daily PRN Constipation  oxyCODONE    IR 5 milliGRAM(s) Oral every 6 hours PRN Moderate Pain (4 - 6)  oxyCODONE    IR 10 milliGRAM(s) Oral every 6 hours PRN Severe Pain (7 - 10)          67y old  Male with aortic regurgitation.  S/P AVR  S/P Replacement of ascending aorta and hemiarch.  Postoperative course complicated by CVA.  Improving motor strength on the left side.  Hemodynamically stable.  Good oxygenation.  Fair urine out put.  .      My plan includes :  Statin and Betablocker.  Dual antiplatelet Rx.  Neuro Rehab.  Bronchodilator Rx.  Close hemodynamic, ventilatory and drain monitoring and management  Monitor for arrhythmias and monitor parameters for organ perfusion  Monitor neurologic status  Monitor renal function.  Head of the bed should remain elevated to 45 deg .   Chest PT and IS will be encouraged  Monitor adequacy of oxygenation and ventilation and attempt to wean oxygen  Nutritional goals will be met using po eventually , ensure adequate caloric intake and monitor the same  Stress ulcer and VTE prophylaxis will be achieved    Glycemic control is satisfactory  Electrolytes have been repleted as necessary and wound care has been carried out. Pain control has been achieved.   Aggressive physical therapy and early mobility and ambulation goals will be met   The family was updated about the course and plan  CRITICAL CARE TIME SPENT in evaluation and management, reassessments, review and interpretation of labs and x-rays, ventilator and hemodynamic management, formulating a plan and coordinating care: ___30____ MIN.  Time does not include procedural time.  CTICU ATTENDING     					    Stefan Henao MD

## 2022-06-29 NOTE — PROGRESS NOTE ADULT - SUBJECTIVE AND OBJECTIVE BOX
CTICU  CRITICAL  CARE  attending     Hand off received 					   Pertinent clinical, laboratory, radiographic, hemodynamic, echocardiographic, respiratory data, microbiologic data and chart were reviewed and analyzed frequently throughout the course of the day and night  Patient seen and examined with CTS/ SH attending at bedside    Pt is a 67y , Male, post op day # 6 s/p min invasive AVR; replacement of ascending and rony arch    post op c/b    CVA with left hemiparesis  improving motor strength  hemodynamics stable      CVA (cerebrovascular accident)        , FAMILY HISTORY:  Family history of Marfan syndrome (Uncle)    FH: aortic aneurysm (Uncle)    PAST MEDICAL & SURGICAL HISTORY:  HTN (hypertension)      Aortic regurgitation      Anemia      Arthritis      COPD, mild      H/O cataract extraction        Patient is a 67y old  Male who presents with a chief complaint of aortic aneurysm (28 Jun 2022 22:47)      14 system review limited 2/2 post op morbidity    Vital signs, hemodynamic and respiratory parameters were reviewed from the bedside nursing flowsheet.  ICU Vital Signs Last 24 Hrs  T(C): 36.4 (29 Jun 2022 16:56), Max: 37.5 (29 Jun 2022 04:54)  T(F): 97.5 (29 Jun 2022 16:56), Max: 99.5 (29 Jun 2022 04:54)  HR: 78 (29 Jun 2022 16:00) (65 - 91)  BP: 113/83 (29 Jun 2022 16:00) (109/76 - 155/76)  BP(mean): 91 (29 Jun 2022 16:00) (82 - 108)  ABP: --  ABP(mean): --  RR: 16 (29 Jun 2022 16:00) (12 - 20)  SpO2: 96% (29 Jun 2022 16:00) (92% - 100%)    Adult Advanced Hemodynamics Last 24 Hrs  CVP(mm Hg): --  CVP(cm H2O): --  CO: --  CI: --  PA: --  PA(mean): --  PCWP: --  SVR: --  SVRI: --  PVR: --  PVRI: --, ABG - ( 28 Jun 2022 02:24 )  pH, Arterial: 7.44  pH, Blood: x     /  pCO2: 39    /  pO2: 152   / HCO3: 26    / Base Excess: 2.3   /  SaO2: 99.2                Intake and output was reviewed and the fluid balance was calculated  Daily     Daily   I&O's Summary    28 Jun 2022 07:01  -  29 Jun 2022 07:00  --------------------------------------------------------  IN: 845 mL / OUT: 2740 mL / NET: -1895 mL    29 Jun 2022 07:01  -  29 Jun 2022 17:01  --------------------------------------------------------  IN: 500 mL / OUT: 215 mL / NET: 285 mL        All lines and drain sites were assessed  Glycemic trend was reviewedCAPILLARY BLOOD GLUCOSE        No acute change in mental status  Auscultation of the chest reveals equal bs  Abdomen is soft  Extremities are warm and well perfused  Wounds appear clean and unremarkable  Antibiotics are periop    labs  CBC Full  -  ( 29 Jun 2022 12:03 )  WBC Count : 5.51 K/uL  RBC Count : 3.15 M/uL  Hemoglobin : 9.5 g/dL  Hematocrit : 29.5 %  Platelet Count - Automated : 151 K/uL  Mean Cell Volume : 93.7 fl  Mean Cell Hemoglobin : 30.2 pg  Mean Cell Hemoglobin Concentration : 32.2 gm/dL  Auto Neutrophil # : x  Auto Lymphocyte # : x  Auto Monocyte # : x  Auto Eosinophil # : x  Auto Basophil # : x  Auto Neutrophil % : x  Auto Lymphocyte % : x  Auto Monocyte % : x  Auto Eosinophil % : x  Auto Basophil % : x    06-29    135  |  98  |  18  ----------------------------<  99  3.8   |  23  |  0.60    Ca    9.4      29 Jun 2022 12:03  Phos  3.5     06-29  Mg     1.8     06-29    TPro  7.9  /  Alb  3.6  /  TBili  0.5  /  DBili  x   /  AST  37  /  ALT  29  /  AlkPhos  61  06-29    PT/INR - ( 29 Jun 2022 12:03 )   PT: 13.3 sec;   INR: 1.12          PTT - ( 29 Jun 2022 12:03 )  PTT:28.8 sec  The current medications were reviewed   MEDICATIONS  (STANDING):  aspirin  chewable 81 milliGRAM(s) Oral daily  atorvastatin 80 milliGRAM(s) Oral at bedtime  chlorhexidine 2% Cloths 1 Application(s) Topical <User Schedule>  clopidogrel Tablet 75 milliGRAM(s) Oral daily  dextrose 50% Injectable 50 milliLiter(s) IV Push every 15 minutes  dextrose 50% Injectable 25 milliLiter(s) IV Push every 15 minutes  furosemide   Injectable 20 milliGRAM(s) IV Push every 12 hours  heparin   Injectable 5000 Unit(s) SubCutaneous every 8 hours  pantoprazole    Tablet 40 milliGRAM(s) Oral before breakfast  polyethylene glycol 3350 17 Gram(s) Oral daily  senna 2 Tablet(s) Oral at bedtime  sodium chloride 0.9%. 1000 milliLiter(s) (10 mL/Hr) IV Continuous <Continuous>  tiotropium 18 MICROgram(s) Capsule 1 Capsule(s) Inhalation daily    MEDICATIONS  (PRN):  acetaminophen     Tablet .. 650 milliGRAM(s) Oral every 6 hours PRN Mild Pain (1 - 3)  bisacodyl Suppository 10 milliGRAM(s) Rectal daily PRN Constipation  oxyCODONE    IR 5 milliGRAM(s) Oral every 6 hours PRN Moderate Pain (4 - 6)  oxyCODONE    IR 10 milliGRAM(s) Oral every 6 hours PRN Severe Pain (7 - 10)       PROBLEM LIST/ ASSESSMENT:  HEALTH ISSUES - PROBLEM Dx:    Active:    CVA (cerebrovascular accident)  s/p AVR    stable/established:    HTN (hypertension)  Aortic regurgitation  Anemia  Arthritis  COPD, mild  H/O cataract extraction              ,   Patient is a 67y old  Male who presents with a chief complaint of aortic aneurysms (28 Jun 2022 22:47)     s/p min invasive AVR; replacement of ascending and rony arch      My plan includes :    titrate beta blockers to maintain MAP >70  OT/PT  plan for acute neuro rehab by end of the week    close hemodynamic, ventilatory and drain monitoring and management per post op routine    Monitor for arrhythmias and monitor parameters for organ perfusion  monitor neurologic status  Head of the bed should remain elevated to 45 deg .   chest PT and IS will be encouraged  monitor adequacy of oxygenation and ventilation and attempt to wean oxygen  Nutritional goals will be met using po eventually , ensure adequate caloric intake and montior the same  Stress ulcer and VTE prophylaxis will be achieved    Glycemic control is satisfactory  Electrolytes have been repleted as necessary and wound care has been carried out. Pain control has been achieved.   agressive physical therapy and early mobility and ambulation goals will be met   The family was updated about the course and plan  CRITICAL CARE TIME SPENT in evaluation and management, reassessments, review and interpretation of labs and x-rays, ventilator and hemodynamic management, formulating a plan and coordinating care: __25___ MIN.  Time does not include procedural time.  CTICU ATTENDING     					    Jamaal Figueredo MD

## 2022-06-29 NOTE — PROGRESS NOTE ADULT - ASSESSMENT
Assessment and Plan  62 yo M, current smoker (½ PPD x 40 years), former ETOH abuse (40yr hx, quit 7 years ago), FH of Marfan's Syndrome and Aortic Aneurysms, PMHx HTN, severe aortic regurgitation, dilated ascending aorta and dilated descending aorta, anemia, arthritis, COPD, now s/p upper hemisternotomy, AVR, ascending and hemiarch replacement on 6/23 c/b left sided paralysis, NIHSS 22. CTA significant for MELISSA occlusion with partial reconstitution of RMCA from R ACOMM. 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 Pt s/p rpt CTP this am with no perfusion deficit and rpt CTH showing recent infarct @ R precentral gyrus. EEG with no evidence of seizures.     - Stroke will continue to follow  - May relax MAP goals from Neuro perspective.  - q4 neurocheck from stroke perspective when patient is stepped down to telemetry   - Would recommend considering DAPT, pt is on ASA, recommend adding Plavix if OK from CTSx standpoint.  - Recommend MRI brain without contrast once stable  - Stroke education.  - PT/OT recommending acute rehab, continue PT/OT   -monitor mood for stroke related depression   -Small amount of evidence patient could benefit from fluoxitine as per FLAME

## 2022-06-30 LAB
ALBUMIN SERPL ELPH-MCNC: 3.5 G/DL — SIGNIFICANT CHANGE UP (ref 3.3–5)
ALP SERPL-CCNC: 60 U/L — SIGNIFICANT CHANGE UP (ref 40–120)
ALT FLD-CCNC: 34 U/L — SIGNIFICANT CHANGE UP (ref 10–45)
ANION GAP SERPL CALC-SCNC: 11 MMOL/L — SIGNIFICANT CHANGE UP (ref 5–17)
APTT BLD: 27.2 SEC — LOW (ref 27.5–35.5)
AST SERPL-CCNC: 46 U/L — HIGH (ref 10–40)
BILIRUB SERPL-MCNC: 0.4 MG/DL — SIGNIFICANT CHANGE UP (ref 0.2–1.2)
BUN SERPL-MCNC: 19 MG/DL — SIGNIFICANT CHANGE UP (ref 7–23)
CALCIUM SERPL-MCNC: 9.3 MG/DL — SIGNIFICANT CHANGE UP (ref 8.4–10.5)
CHLORIDE SERPL-SCNC: 99 MMOL/L — SIGNIFICANT CHANGE UP (ref 96–108)
CO2 SERPL-SCNC: 25 MMOL/L — SIGNIFICANT CHANGE UP (ref 22–31)
CREAT SERPL-MCNC: 0.56 MG/DL — SIGNIFICANT CHANGE UP (ref 0.5–1.3)
EGFR: 107 ML/MIN/1.73M2 — SIGNIFICANT CHANGE UP
GLUCOSE SERPL-MCNC: 115 MG/DL — HIGH (ref 70–99)
HCT VFR BLD CALC: 26.7 % — LOW (ref 39–50)
HGB BLD-MCNC: 8.9 G/DL — LOW (ref 13–17)
INR BLD: 1.16 — SIGNIFICANT CHANGE UP (ref 0.88–1.16)
MAGNESIUM SERPL-MCNC: 1.9 MG/DL — SIGNIFICANT CHANGE UP (ref 1.6–2.6)
MCHC RBC-ENTMCNC: 30.7 PG — SIGNIFICANT CHANGE UP (ref 27–34)
MCHC RBC-ENTMCNC: 33.3 GM/DL — SIGNIFICANT CHANGE UP (ref 32–36)
MCV RBC AUTO: 92.1 FL — SIGNIFICANT CHANGE UP (ref 80–100)
NRBC # BLD: 0 /100 WBCS — SIGNIFICANT CHANGE UP (ref 0–0)
PHOSPHATE SERPL-MCNC: 3.3 MG/DL — SIGNIFICANT CHANGE UP (ref 2.5–4.5)
PLATELET # BLD AUTO: 163 K/UL — SIGNIFICANT CHANGE UP (ref 150–400)
POTASSIUM SERPL-MCNC: 3.9 MMOL/L — SIGNIFICANT CHANGE UP (ref 3.5–5.3)
POTASSIUM SERPL-SCNC: 3.9 MMOL/L — SIGNIFICANT CHANGE UP (ref 3.5–5.3)
PROT SERPL-MCNC: 7.5 G/DL — SIGNIFICANT CHANGE UP (ref 6–8.3)
PROTHROM AB SERPL-ACNC: 13.8 SEC — HIGH (ref 10.5–13.4)
RBC # BLD: 2.9 M/UL — LOW (ref 4.2–5.8)
RBC # FLD: 14.7 % — HIGH (ref 10.3–14.5)
SODIUM SERPL-SCNC: 135 MMOL/L — SIGNIFICANT CHANGE UP (ref 135–145)
SURGICAL PATHOLOGY STUDY: SIGNIFICANT CHANGE UP
WBC # BLD: 5.72 K/UL — SIGNIFICANT CHANGE UP (ref 3.8–10.5)
WBC # FLD AUTO: 5.72 K/UL — SIGNIFICANT CHANGE UP (ref 3.8–10.5)

## 2022-06-30 PROCEDURE — 99232 SBSQ HOSP IP/OBS MODERATE 35: CPT

## 2022-06-30 PROCEDURE — 71045 X-RAY EXAM CHEST 1 VIEW: CPT | Mod: 26

## 2022-06-30 RX ORDER — MAGNESIUM OXIDE 400 MG ORAL TABLET 241.3 MG
400 TABLET ORAL ONCE
Refills: 0 | Status: COMPLETED | OUTPATIENT
Start: 2022-06-30 | End: 2022-06-30

## 2022-06-30 RX ORDER — POTASSIUM CHLORIDE 20 MEQ
20 PACKET (EA) ORAL ONCE
Refills: 0 | Status: COMPLETED | OUTPATIENT
Start: 2022-06-30 | End: 2022-06-30

## 2022-06-30 RX ADMIN — HEPARIN SODIUM 5000 UNIT(S): 5000 INJECTION INTRAVENOUS; SUBCUTANEOUS at 22:35

## 2022-06-30 RX ADMIN — CHLORHEXIDINE GLUCONATE 1 APPLICATION(S): 213 SOLUTION TOPICAL at 06:56

## 2022-06-30 RX ADMIN — HEPARIN SODIUM 5000 UNIT(S): 5000 INJECTION INTRAVENOUS; SUBCUTANEOUS at 05:40

## 2022-06-30 RX ADMIN — CLOPIDOGREL BISULFATE 75 MILLIGRAM(S): 75 TABLET, FILM COATED ORAL at 12:42

## 2022-06-30 RX ADMIN — Medication 20 MILLIEQUIVALENT(S): at 05:40

## 2022-06-30 RX ADMIN — SENNA PLUS 2 TABLET(S): 8.6 TABLET ORAL at 22:35

## 2022-06-30 RX ADMIN — Medication 81 MILLIGRAM(S): at 18:23

## 2022-06-30 RX ADMIN — Medication 20 MILLIGRAM(S): at 05:40

## 2022-06-30 RX ADMIN — OXYCODONE HYDROCHLORIDE 10 MILLIGRAM(S): 5 TABLET ORAL at 05:41

## 2022-06-30 RX ADMIN — OXYCODONE HYDROCHLORIDE 10 MILLIGRAM(S): 5 TABLET ORAL at 22:36

## 2022-06-30 RX ADMIN — HEPARIN SODIUM 5000 UNIT(S): 5000 INJECTION INTRAVENOUS; SUBCUTANEOUS at 14:38

## 2022-06-30 RX ADMIN — Medication 20 MILLIGRAM(S): at 18:23

## 2022-06-30 RX ADMIN — POLYETHYLENE GLYCOL 3350 17 GRAM(S): 17 POWDER, FOR SOLUTION ORAL at 12:41

## 2022-06-30 RX ADMIN — OXYCODONE HYDROCHLORIDE 10 MILLIGRAM(S): 5 TABLET ORAL at 06:56

## 2022-06-30 RX ADMIN — TIOTROPIUM BROMIDE 1 CAPSULE(S): 18 CAPSULE ORAL; RESPIRATORY (INHALATION) at 12:42

## 2022-06-30 RX ADMIN — PANTOPRAZOLE SODIUM 40 MILLIGRAM(S): 20 TABLET, DELAYED RELEASE ORAL at 05:40

## 2022-06-30 RX ADMIN — ATORVASTATIN CALCIUM 80 MILLIGRAM(S): 80 TABLET, FILM COATED ORAL at 22:35

## 2022-06-30 RX ADMIN — MAGNESIUM OXIDE 400 MG ORAL TABLET 400 MILLIGRAM(S): 241.3 TABLET ORAL at 05:40

## 2022-06-30 RX ADMIN — OXYCODONE HYDROCHLORIDE 10 MILLIGRAM(S): 5 TABLET ORAL at 23:30

## 2022-06-30 NOTE — PROGRESS NOTE ADULT - ASSESSMENT
62 yo M, current smoker (½ PPD x 40 years), former ETOH abuse (40yr hx, quit 7 years ago), FH of Marfan's Syndrome and Aortic Aneurysms, PMHx HTN, severe aortic regurgitation, dilated ascending aorta and dilated descending aorta, anemia, arthritis, COPD, now s/p upper hemisternotomy, AVR, ascending and hemiarch replacement on 6/23 c/b left sided paralysis, NIHSS 22. CTA significant for MELISSA occlusion with partial reconstitution of RMCA from R ACOMM. 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 Repeat CTH showing recent infarct @ R precentral gyrus. EEG with no evidence of seizures.   Stroke etiology possibly from large vessel disease.    1)Secondary stroke prevention  - continue ASA 81mg PO daily and Plavix 75mg PO daily  - continue Atorvastatin 80mg PO daily    2) Stroke risk factors  - A1C: 4.3  - LDL: 76    3) Further management  - MAP goal >80 and gradually weaning  - recommend q4hr stroke neuro checks  - may need outpt neurology follow up  - provide stroke education  -monitor mood for stroke related depression   -Small amount of evidence patient could benefit from fluoxetine as per FLAME  -please call stroke ACP phone [phone] on day before or morning of discharge so stroke team can facilitate discharge NIHSS score and follow up appointment.    DVT prophylaxis   -heparin SQ and SCDs    Case discussed with Dr. Johnson

## 2022-06-30 NOTE — PROGRESS NOTE ADULT - SUBJECTIVE AND OBJECTIVE BOX
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 Improving slowly.  POD 7      FAMILY HISTORY:  Family history of Marfan syndrome (Uncle)  FH: aortic aneurysm (Uncle)    PAST MEDICAL & SURGICAL HISTORY:  HTN (hypertension)  Aortic regurgitation  Anemia  Arthritis  COPD, mild  H/O cataract extraction        Patient is a 67yr old male with PMH active smoker, prior etoh, COPD, HTN, severe AR, dilated Ascending aorta, anemia, sp AVR (bio) and ascending hemiarch replacement (6/23 [place]), hospital course cb MELISSA infarct with L sided weakness. Improving slowly.          14 system review was unremarkable. denies complaints.    Vital signs, hemodynamic and respiratory parameters were reviewed from the bedside nursing flowsheet.  ICU Vital Signs Last 24 Hrs  T(C): 36.2 (30 Jun 2022 09:00), Max: 36.8 (29 Jun 2022 20:56)  T(F): 97.2 (30 Jun 2022 09:00), Max: 98.3 (29 Jun 2022 20:56)  HR: 77 (30 Jun 2022 12:00) (69 - 86)  BP: 119/71 (30 Jun 2022 12:00) (101/68 - 154/65)  BP(mean): 89 (30 Jun 2022 12:00) (76 - 107)  ABP: --  ABP(mean): --  RR: 20 (30 Jun 2022 12:00) (12 - 27)  SpO2: 96% (30 Jun 2022 12:00) (94% - 100%)      Intake and output was reviewed and the fluid balance was calculated  Daily     Daily   I&O's Summary    29 Jun 2022 07:01  -  30 Jun 2022 07:00  --------------------------------------------------------  IN: 940 mL / OUT: 935 mL / NET: 5 mL    30 Jun 2022 07:01  -  30 Jun 2022 13:04  --------------------------------------------------------  IN: 0 mL / OUT: 135 mL / NET: -135 mL        All lines and drain sites were assessed  Glycemic trend was reviewed      Neuro: alert, follows commands, moving bl le, weakness lue  Heart: s1, s2  Lungs: clear bl  Abdomen: soft, nt/nd  Extremities: no le edema      labs  CBC Full  -  ( 30 Jun 2022 02:40 )  WBC Count : 5.72 K/uL  RBC Count : 2.90 M/uL  Hemoglobin : 8.9 g/dL  Hematocrit : 26.7 %  Platelet Count - Automated : 163 K/uL  Mean Cell Volume : 92.1 fl  Mean Cell Hemoglobin : 30.7 pg  Mean Cell Hemoglobin Concentration : 33.3 gm/dL  Auto Neutrophil # : x  Auto Lymphocyte # : x  Auto Monocyte # : x  Auto Eosinophil # : x  Auto Basophil # : x  Auto Neutrophil % : x  Auto Lymphocyte % : x  Auto Monocyte % : x  Auto Eosinophil % : x  Auto Basophil % : x    06-30    135  |  99  |  19  ----------------------------<  115<H>  3.9   |  25  |  0.56    Ca    9.3      30 Jun 2022 02:40  Phos  3.3     06-30  Mg     1.9     06-30    TPro  7.5  /  Alb  3.5  /  TBili  0.4  /  DBili  x   /  AST  46<H>  /  ALT  34  /  AlkPhos  60  06-30    PT/INR - ( 30 Jun 2022 02:40 )   PT: 13.8 sec;   INR: 1.16          PTT - ( 30 Jun 2022 02:40 )  PTT:27.2 sec  The current medications were reviewed   MEDICATIONS  (STANDING):  aspirin  chewable 81 milliGRAM(s) Oral daily  atorvastatin 80 milliGRAM(s) Oral at bedtime  chlorhexidine 2% Cloths 1 Application(s) Topical <User Schedule>  clopidogrel Tablet 75 milliGRAM(s) Oral daily  dextrose 50% Injectable 50 milliLiter(s) IV Push every 15 minutes  dextrose 50% Injectable 25 milliLiter(s) IV Push every 15 minutes  furosemide   Injectable 20 milliGRAM(s) IV Push every 12 hours  heparin   Injectable 5000 Unit(s) SubCutaneous every 8 hours  pantoprazole    Tablet 40 milliGRAM(s) Oral before breakfast  polyethylene glycol 3350 17 Gram(s) Oral daily  senna 2 Tablet(s) Oral at bedtime  sodium chloride 0.9%. 1000 milliLiter(s) (10 mL/Hr) IV Continuous <Continuous>  tiotropium 18 MICROgram(s) Capsule 1 Capsule(s) Inhalation daily    MEDICATIONS  (PRN):  acetaminophen     Tablet .. 650 milliGRAM(s) Oral every 6 hours PRN Mild Pain (1 - 3)  bisacodyl Suppository 10 milliGRAM(s) Rectal daily PRN Constipation  oxyCODONE    IR 5 milliGRAM(s) Oral every 6 hours PRN Moderate Pain (4 - 6)  oxyCODONE    IR 10 milliGRAM(s) Oral every 6 hours PRN Severe Pain (7 - 10)      Assessment/Plan:  67yr old male with PMH active smoker, prior etoh, COPD, HTN, severe AR, dilated Ascending aorta, anemia, sp AVR (bio) and ascending hemiarch replacement (6/23 [place]), hospital course cb MELISSA infarct with L sided weakness. Improving slowly.    POD 7 AVR and ascending hemiarch repair 6/23 [place]   Post op stroke with LUE weakness  HTN  COPD  Anemia  DAPT  High dose statin  Lasix BID  Diet as tolerated  Replete lytes prn  Monitor CT output  GI/DVT PPX  Pain control  OOB with PT  DC to neuro rehab when bed available  Close hemodynamic, ventilatory and drain monitoring and management per post op routine  Monitor for arrhythmias and monitor parameters for organ perfusion  Monitor neurologic status  Head of the bed should remain elevated to 45 deg   Chest PT and IS will be encouraged  Monitor adequacy of oxygenation and ventilation and attempt to wean oxygen  Antibiotic regimen will be tailored to the clinical, laboratory and microbiologic data  Nutritional goals will be met using po eventually , ensure adequate caloric intake and monitor the same  Stress ulcer and VTE prophylaxis will be achieved    Glycemic control is satisfactory  Electrolytes have been repleted as necessary and wound care has been carried out   Pain control has been achieved.   Aggressive physical therapy and early mobility and ambulation goals will be met   The family was updated about the course and plan.    CRITICAL CARE TIME personally provided by me  in evaluation and management, reassessments, review and interpretation of labs and x-rays, ventilator and hemodynamic management, formulating a plan and coordinating care: ___25___ MIN.  Time does not include procedural time.    CTICU ATTENDING     					  Godfrey Hannon MD

## 2022-06-30 NOTE — PROGRESS NOTE ADULT - SUBJECTIVE AND OBJECTIVE BOX
Neurology Stroke Progress Note    INTERVAL HPI/OVERNIGHT EVENTS:  Patient seen and examined. Pt states he is feeling well, wants to get rehab done and is looking for a place in the [place] since that is where he lives.     MEDICATIONS  (STANDING):  aspirin  chewable 81 milliGRAM(s) Oral daily  atorvastatin 80 milliGRAM(s) Oral at bedtime  chlorhexidine 2% Cloths 1 Application(s) Topical <User Schedule>  clopidogrel Tablet 75 milliGRAM(s) Oral daily  dextrose 50% Injectable 50 milliLiter(s) IV Push every 15 minutes  dextrose 50% Injectable 25 milliLiter(s) IV Push every 15 minutes  furosemide   Injectable 20 milliGRAM(s) IV Push every 12 hours  heparin   Injectable 5000 Unit(s) SubCutaneous every 8 hours  pantoprazole    Tablet 40 milliGRAM(s) Oral before breakfast  polyethylene glycol 3350 17 Gram(s) Oral daily  senna 2 Tablet(s) Oral at bedtime  sodium chloride 0.9%. 1000 milliLiter(s) (10 mL/Hr) IV Continuous <Continuous>  tiotropium 18 MICROgram(s) Capsule 1 Capsule(s) Inhalation daily    MEDICATIONS  (PRN):  acetaminophen     Tablet .. 650 milliGRAM(s) Oral every 6 hours PRN Mild Pain (1 - 3)  bisacodyl Suppository 10 milliGRAM(s) Rectal daily PRN Constipation  oxyCODONE    IR 5 milliGRAM(s) Oral every 6 hours PRN Moderate Pain (4 - 6)  oxyCODONE    IR 10 milliGRAM(s) Oral every 6 hours PRN Severe Pain (7 - 10)      Allergies    No Known Allergies    Intolerances        Vital Signs Last 24 Hrs  T(C): 36.2 (30 Jun 2022 09:00), Max: 36.8 (29 Jun 2022 20:56)  T(F): 97.2 (30 Jun 2022 09:00), Max: 98.3 (29 Jun 2022 20:56)  HR: 77 (30 Jun 2022 12:00) (69 - 86)  BP: 119/71 (30 Jun 2022 12:00) (101/68 - 154/65)  BP(mean): 89 (30 Jun 2022 12:00) (76 - 107)  RR: 20 (30 Jun 2022 12:00) (12 - 27)  SpO2: 96% (30 Jun 2022 12:00) (94% - 100%)    Physical exam:  General: No acute distress, awake and alert  Eyes: Anicteric sclerae, moist conjunctivae, see below for CNs  Neck: trachea midline    Neurologic:  -Mental status: Awake, alert, oriented to person, place, and time. Speech is fluent with intact naming, no dysarthria. Follows commands. Attention/concentration intact. Fund of knowledge appropriate.  -Cranial nerves:   II: Visual fields are full to confrontation.  III, IV, VI: Extraocular movements are intact without nystagmus. Pupils equally round and reactive to light  V:  Facial sensation V1-V3 equal and intact   VII: Face appears symmetric   Motor: Normal bulk, left arm decreased tone. Left UE deltoid 4/5,  2+/5, finger extension 0/5. Left leg 4/5. Right side 5/5  Sensation: Intact to light touch bilaterally. No neglect or extinction on double simultaneous testing.  Coordination: No dysmetria on finger-to-nose and heel-to-shin bilaterally  Reflexes: Downgoing toes bilaterally   Gait: Narrow gait and steady    LABS:                        8.9    5.72  )-----------( 163      ( 30 Jun 2022 02:40 )             26.7     06-30    135  |  99  |  19  ----------------------------<  115<H>  3.9   |  25  |  0.56    Ca    9.3      30 Jun 2022 02:40  Phos  3.3     06-30  Mg     1.9     06-30    TPro  7.5  /  Alb  3.5  /  TBili  0.4  /  DBili  x   /  AST  46<H>  /  ALT  34  /  AlkPhos  60  06-30    PT/INR - ( 30 Jun 2022 02:40 )   PT: 13.8 sec;   INR: 1.16          PTT - ( 30 Jun 2022 02:40 )  PTT:27.2 sec      RADIOLOGY & ADDITIONAL TESTS:     Neurology Stroke Progress Note    INTERVAL HPI/OVERNIGHT EVENTS:  Patient seen and examined. Pt states he is feeling well, wants to get rehab done and is looking for a place in the [place] since that is where he lives.     MEDICATIONS  (STANDING):  aspirin  chewable 81 milliGRAM(s) Oral daily  atorvastatin 80 milliGRAM(s) Oral at bedtime  chlorhexidine 2% Cloths 1 Application(s) Topical <User Schedule>  clopidogrel Tablet 75 milliGRAM(s) Oral daily  dextrose 50% Injectable 50 milliLiter(s) IV Push every 15 minutes  dextrose 50% Injectable 25 milliLiter(s) IV Push every 15 minutes  furosemide   Injectable 20 milliGRAM(s) IV Push every 12 hours  heparin   Injectable 5000 Unit(s) SubCutaneous every 8 hours  pantoprazole    Tablet 40 milliGRAM(s) Oral before breakfast  polyethylene glycol 3350 17 Gram(s) Oral daily  senna 2 Tablet(s) Oral at bedtime  sodium chloride 0.9%. 1000 milliLiter(s) (10 mL/Hr) IV Continuous <Continuous>  tiotropium 18 MICROgram(s) Capsule 1 Capsule(s) Inhalation daily    MEDICATIONS  (PRN):  acetaminophen     Tablet .. 650 milliGRAM(s) Oral every 6 hours PRN Mild Pain (1 - 3)  bisacodyl Suppository 10 milliGRAM(s) Rectal daily PRN Constipation  oxyCODONE    IR 5 milliGRAM(s) Oral every 6 hours PRN Moderate Pain (4 - 6)  oxyCODONE    IR 10 milliGRAM(s) Oral every 6 hours PRN Severe Pain (7 - 10)      Allergies    No Known Allergies    Intolerances        Vital Signs Last 24 Hrs  T(C): 36.2 (30 Jun 2022 09:00), Max: 36.8 (29 Jun 2022 20:56)  T(F): 97.2 (30 Jun 2022 09:00), Max: 98.3 (29 Jun 2022 20:56)  HR: 77 (30 Jun 2022 12:00) (69 - 86)  BP: 119/71 (30 Jun 2022 12:00) (101/68 - 154/65)  BP(mean): 89 (30 Jun 2022 12:00) (76 - 107)  RR: 20 (30 Jun 2022 12:00) (12 - 27)  SpO2: 96% (30 Jun 2022 12:00) (94% - 100%)    Physical exam:  General: No acute distress, awake and alert  Eyes: Anicteric sclerae, moist conjunctivae, see below for CNs  Neck: trachea midline    Neurologic:  -Mental status: Awake, alert, oriented to person, place, and time. Speech is fluent with intact naming, no dysarthria. Follows commands. Attention/concentration intact. Fund of knowledge appropriate.  -Cranial nerves:   II: Visual fields are full to confrontation.  III, IV, VI: Extraocular movements are intact without nystagmus. Pupils equally round and reactive to light  V:  Facial sensation V1-V3 equal and intact   VII: Face appears symmetric   Motor: Normal bulk, left arm decreased tone. Left UE deltoid 4/5,  2+/5, finger extension 0/5. Left leg 4/5. Right side 5/5  Sensation: Intact to light touch bilaterally. No neglect or extinction on double simultaneous testing.  Coordination: No dysmetria on finger-to-nose of right side    LABS:                        8.9    5.72  )-----------( 163      ( 30 Jun 2022 02:40 )             26.7     06-30    135  |  99  |  19  ----------------------------<  115<H>  3.9   |  25  |  0.56    Ca    9.3      30 Jun 2022 02:40  Phos  3.3     06-30  Mg     1.9     06-30    TPro  7.5  /  Alb  3.5  /  TBili  0.4  /  DBili  x   /  AST  46<H>  /  ALT  34  /  AlkPhos  60  06-30    PT/INR - ( 30 Jun 2022 02:40 )   PT: 13.8 sec;   INR: 1.16          PTT - ( 30 Jun 2022 02:40 )  PTT:27.2 sec      RADIOLOGY & ADDITIONAL TESTS:

## 2022-07-01 PROBLEM — J44.9 CHRONIC OBSTRUCTIVE PULMONARY DISEASE, UNSPECIFIED: Chronic | Status: ACTIVE | Noted: 2022-06-20

## 2022-07-01 PROBLEM — D64.9 ANEMIA, UNSPECIFIED: Chronic | Status: ACTIVE | Noted: 2022-06-20

## 2022-07-01 PROBLEM — M19.90 UNSPECIFIED OSTEOARTHRITIS, UNSPECIFIED SITE: Chronic | Status: ACTIVE | Noted: 2022-06-20

## 2022-07-01 PROBLEM — I35.1 NONRHEUMATIC AORTIC (VALVE) INSUFFICIENCY: Chronic | Status: ACTIVE | Noted: 2022-06-20

## 2022-07-01 PROBLEM — I10 ESSENTIAL (PRIMARY) HYPERTENSION: Chronic | Status: ACTIVE | Noted: 2022-06-20

## 2022-07-01 LAB
ALBUMIN SERPL ELPH-MCNC: 3.3 G/DL — SIGNIFICANT CHANGE UP (ref 3.3–5)
ALP SERPL-CCNC: 64 U/L — SIGNIFICANT CHANGE UP (ref 40–120)
ALT FLD-CCNC: 34 U/L — SIGNIFICANT CHANGE UP (ref 10–45)
ANION GAP SERPL CALC-SCNC: 14 MMOL/L — SIGNIFICANT CHANGE UP (ref 5–17)
APTT BLD: 28.2 SEC — SIGNIFICANT CHANGE UP (ref 27.5–35.5)
AST SERPL-CCNC: 39 U/L — SIGNIFICANT CHANGE UP (ref 10–40)
BILIRUB SERPL-MCNC: 0.5 MG/DL — SIGNIFICANT CHANGE UP (ref 0.2–1.2)
BUN SERPL-MCNC: 16 MG/DL — SIGNIFICANT CHANGE UP (ref 7–23)
CALCIUM SERPL-MCNC: 9.3 MG/DL — SIGNIFICANT CHANGE UP (ref 8.4–10.5)
CHLORIDE SERPL-SCNC: 98 MMOL/L — SIGNIFICANT CHANGE UP (ref 96–108)
CO2 SERPL-SCNC: 22 MMOL/L — SIGNIFICANT CHANGE UP (ref 22–31)
CREAT SERPL-MCNC: 0.63 MG/DL — SIGNIFICANT CHANGE UP (ref 0.5–1.3)
EGFR: 104 ML/MIN/1.73M2 — SIGNIFICANT CHANGE UP
GLUCOSE SERPL-MCNC: 101 MG/DL — HIGH (ref 70–99)
HCT VFR BLD CALC: 28.1 % — LOW (ref 39–50)
HGB BLD-MCNC: 9.4 G/DL — LOW (ref 13–17)
INR BLD: 1.18 — HIGH (ref 0.88–1.16)
MAGNESIUM SERPL-MCNC: 1.9 MG/DL — SIGNIFICANT CHANGE UP (ref 1.6–2.6)
MCHC RBC-ENTMCNC: 30.9 PG — SIGNIFICANT CHANGE UP (ref 27–34)
MCHC RBC-ENTMCNC: 33.5 GM/DL — SIGNIFICANT CHANGE UP (ref 32–36)
MCV RBC AUTO: 92.4 FL — SIGNIFICANT CHANGE UP (ref 80–100)
NRBC # BLD: 0 /100 WBCS — SIGNIFICANT CHANGE UP (ref 0–0)
PHOSPHATE SERPL-MCNC: 3.6 MG/DL — SIGNIFICANT CHANGE UP (ref 2.5–4.5)
PLATELET # BLD AUTO: 196 K/UL — SIGNIFICANT CHANGE UP (ref 150–400)
POTASSIUM SERPL-MCNC: 4 MMOL/L — SIGNIFICANT CHANGE UP (ref 3.5–5.3)
POTASSIUM SERPL-SCNC: 4 MMOL/L — SIGNIFICANT CHANGE UP (ref 3.5–5.3)
PROT SERPL-MCNC: 7.6 G/DL — SIGNIFICANT CHANGE UP (ref 6–8.3)
PROTHROM AB SERPL-ACNC: 14.1 SEC — HIGH (ref 10.5–13.4)
RBC # BLD: 3.04 M/UL — LOW (ref 4.2–5.8)
RBC # FLD: 14.7 % — HIGH (ref 10.3–14.5)
SODIUM SERPL-SCNC: 134 MMOL/L — LOW (ref 135–145)
WBC # BLD: 5.42 K/UL — SIGNIFICANT CHANGE UP (ref 3.8–10.5)
WBC # FLD AUTO: 5.42 K/UL — SIGNIFICANT CHANGE UP (ref 3.8–10.5)

## 2022-07-01 PROCEDURE — 71045 X-RAY EXAM CHEST 1 VIEW: CPT | Mod: 26

## 2022-07-01 PROCEDURE — 99232 SBSQ HOSP IP/OBS MODERATE 35: CPT

## 2022-07-01 RX ORDER — METOPROLOL TARTRATE 50 MG
12.5 TABLET ORAL
Refills: 0 | Status: DISCONTINUED | OUTPATIENT
Start: 2022-07-01 | End: 2022-07-04

## 2022-07-01 RX ORDER — FUROSEMIDE 40 MG
40 TABLET ORAL
Refills: 0 | Status: DISCONTINUED | OUTPATIENT
Start: 2022-07-01 | End: 2022-07-05

## 2022-07-01 RX ORDER — POTASSIUM CHLORIDE 20 MEQ
20 PACKET (EA) ORAL ONCE
Refills: 0 | Status: COMPLETED | OUTPATIENT
Start: 2022-07-01 | End: 2022-07-01

## 2022-07-01 RX ORDER — MAGNESIUM OXIDE 400 MG ORAL TABLET 241.3 MG
400 TABLET ORAL ONCE
Refills: 0 | Status: COMPLETED | OUTPATIENT
Start: 2022-07-01 | End: 2022-07-01

## 2022-07-01 RX ORDER — POTASSIUM CHLORIDE 20 MEQ
40 PACKET (EA) ORAL DAILY
Refills: 0 | Status: DISCONTINUED | OUTPATIENT
Start: 2022-07-01 | End: 2022-07-05

## 2022-07-01 RX ADMIN — Medication 40 MILLIGRAM(S): at 13:38

## 2022-07-01 RX ADMIN — POLYETHYLENE GLYCOL 3350 17 GRAM(S): 17 POWDER, FOR SOLUTION ORAL at 09:49

## 2022-07-01 RX ADMIN — CHLORHEXIDINE GLUCONATE 1 APPLICATION(S): 213 SOLUTION TOPICAL at 06:30

## 2022-07-01 RX ADMIN — SENNA PLUS 2 TABLET(S): 8.6 TABLET ORAL at 21:26

## 2022-07-01 RX ADMIN — HEPARIN SODIUM 5000 UNIT(S): 5000 INJECTION INTRAVENOUS; SUBCUTANEOUS at 13:39

## 2022-07-01 RX ADMIN — Medication 20 MILLIGRAM(S): at 06:34

## 2022-07-01 RX ADMIN — TIOTROPIUM BROMIDE 1 CAPSULE(S): 18 CAPSULE ORAL; RESPIRATORY (INHALATION) at 20:14

## 2022-07-01 RX ADMIN — Medication 81 MILLIGRAM(S): at 13:38

## 2022-07-01 RX ADMIN — Medication 12.5 MILLIGRAM(S): at 13:47

## 2022-07-01 RX ADMIN — Medication 40 MILLIEQUIVALENT(S): at 13:39

## 2022-07-01 RX ADMIN — OXYCODONE HYDROCHLORIDE 10 MILLIGRAM(S): 5 TABLET ORAL at 07:53

## 2022-07-01 RX ADMIN — CLOPIDOGREL BISULFATE 75 MILLIGRAM(S): 75 TABLET, FILM COATED ORAL at 09:50

## 2022-07-01 RX ADMIN — MAGNESIUM OXIDE 400 MG ORAL TABLET 400 MILLIGRAM(S): 241.3 TABLET ORAL at 06:35

## 2022-07-01 RX ADMIN — Medication 20 MILLIEQUIVALENT(S): at 06:34

## 2022-07-01 RX ADMIN — HEPARIN SODIUM 5000 UNIT(S): 5000 INJECTION INTRAVENOUS; SUBCUTANEOUS at 21:26

## 2022-07-01 RX ADMIN — ATORVASTATIN CALCIUM 80 MILLIGRAM(S): 80 TABLET, FILM COATED ORAL at 21:26

## 2022-07-01 RX ADMIN — PANTOPRAZOLE SODIUM 40 MILLIGRAM(S): 20 TABLET, DELAYED RELEASE ORAL at 06:34

## 2022-07-01 RX ADMIN — HEPARIN SODIUM 5000 UNIT(S): 5000 INJECTION INTRAVENOUS; SUBCUTANEOUS at 06:34

## 2022-07-01 NOTE — CHART NOTE - NSCHARTNOTEFT_GEN_A_CORE
Admitting Diagnosis:   Patient is a 67y old  Male who presents with a chief complaint of cardiac catheterization (29 Jun 2022 22:33)      PAST MEDICAL & SURGICAL HISTORY:  HTN (hypertension)      Aortic regurgitation      Anemia      Arthritis      COPD, mild      H/O cataract extraction    Current Nutrition Order: Soft and Bite Sized    PO Intake: Good (%) [   ]  Fair (50-75%) [   ] Poor (<25%) [ x  ]    GI Issues: No nausea/vomiting documented at this time. Last documented bowel movement 6/23; noted w/ constipation (bowel regimen ordered).    Pain: No noted pain at time of RD interview.     Skin Integrity: B/L arm edema 2+. MSI surgical incisions per chart. Uvaldo score: 18.    Labs:   06-30    135  |  99  |  19  ----------------------------<  115<H>  3.9   |  25  |  0.56    Ca    9.3      30 Jun 2022 02:40  Phos  3.3     06-30  Mg     1.9     06-30    TPro  7.5  /  Alb  3.5  /  TBili  0.4  /  DBili  x   /  AST  46<H>  /  ALT  34  /  AlkPhos  60  06-30    CAPILLARY BLOOD GLUCOSE          Medications:  MEDICATIONS  (STANDING):  aspirin  chewable 81 milliGRAM(s) Oral daily  atorvastatin 80 milliGRAM(s) Oral at bedtime  chlorhexidine 2% Cloths 1 Application(s) Topical <User Schedule>  clopidogrel Tablet 75 milliGRAM(s) Oral daily  dextrose 50% Injectable 50 milliLiter(s) IV Push every 15 minutes  dextrose 50% Injectable 25 milliLiter(s) IV Push every 15 minutes  furosemide   Injectable 20 milliGRAM(s) IV Push every 12 hours  heparin   Injectable 5000 Unit(s) SubCutaneous every 8 hours  pantoprazole    Tablet 40 milliGRAM(s) Oral before breakfast  polyethylene glycol 3350 17 Gram(s) Oral daily  senna 2 Tablet(s) Oral at bedtime  sodium chloride 0.9%. 1000 milliLiter(s) (10 mL/Hr) IV Continuous <Continuous>  tiotropium 18 MICROgram(s) Capsule 1 Capsule(s) Inhalation daily    MEDICATIONS  (PRN):  acetaminophen     Tablet .. 650 milliGRAM(s) Oral every 6 hours PRN Mild Pain (1 - 3)  bisacodyl Suppository 10 milliGRAM(s) Rectal daily PRN Constipation  oxyCODONE    IR 5 milliGRAM(s) Oral every 6 hours PRN Moderate Pain (4 - 6)  oxyCODONE    IR 10 milliGRAM(s) Oral every 6 hours PRN Severe Pain (7 - 10)      Dosing Anthropometrics:  Height for BMI (FEET)	6 Feet  Height for BMI (INCHES)	3 Inch(s)  Height for BMI (CENTIMETERS)	190.5 Centimeter(s)  Weight for BMI (lbs)	176 lb  Weight for BMI (kg)	79.8 kg  Body Mass Index	21.9  IBW: 196 pounds   %IBW: 90% IBW    Weight Change: No new documented weights in EMR. Obtain biweekly weights to assess changes/trends.    Estimated energy needs: Based on Standards of Care pt within % IBW thus actual body weight used for all calculations. Needs adjusted for advanced age and post op.    Estimated Energy Needs From (robson/kg) 22  Estimated Energy Needs To (robson/kg)	27  Estimated Energy Needs Calculated From (robson/kg) 1755  Estimated Energy Needs Calculated To (robson/kg)	2154    Estimated Protein Needs From (g/kg)	1.1  Estimated Protein Needs To (g/kg)	1.3  Estimated Protein Needs Calculated From (g/kg) 87.78  Estimated Protein Needs Calculated To (g/kg)	103.74    Estimated Fluid Needs From (ml/kg)	30  Estimated Fluid Needs To (ml/kg)	35  Estimated Fluid Needs Calculated From (ml/kg)	2394  Estimated Fluid Needs Calculated To (ml/kg)	2793    Subjective: 65 y/o male with a pmhx of HTN, anemia, arthritis, COPD, and a family history of marfan disease and aortic aneurysm (nephew) who presents for an aortic valve replacement with Dr. Toledo. Pt is a current smoker who has been smoking for over 40 years (1ppd) who recently decreased his use to 1-2 cigarettes a day. 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 Prior to arrival to [facility], he expressed concerns of significant fatigue and occasional SOB with palpitations with activity since his COVID vaccination in oct/no 2021. Now s/p Min inv AVR ascending/hemiarch placement 6/23. Code stroke called 6/23; no acute intracranial abnormality per brain CT. 6/24 s/p angiogram. Per angiogram; Right CCA injection demonstrates robust ECA and absent right ICA. Right vert injection without evidence of LVO. Left ICA injection demonstrates contralateral flow into right MCA territory via robust ACOMM. No evidence of MCA occlusion. 6/24; CT head showing no hemorrhage or major mass. 6/24; EGG placed no acute events overnight. 6/26; able to wean off vent. SLP 6/27; recommending minced and moist diet w/ thin liquids.    Pt seen at bedside for follow up assessment-on room air. Labs reviewed 6/30; electrolytes within normal limits at this time. Pt reports poor PO intake since last RD interview due to dislike of diet consistency (of note, pt was on minced and moist diet w/ moderately thickened liquids at time of assessment; diet has since been advanced to soft and bite sized w/ thin liquids). RD discussed w/ pt likelihood of diet advancement pending SLP recommendations; amenable to education. Pt reports interest in cream of wheat w/ milk and yogurt; RD to honor preferences at as able. Deferred additional diet ed at this time. Made aware RD remains available. RD to follow up per protocol. See nutrition recommendations below.     Previous Nutrition Diagnosis: Increased protein/kcal needs r/t physiological demand for nutrient AEB post op    Active [ x  ]  Resolved [   ]    If resolved, new PES:     Goal: Pt to meet at least 75% of nutritional needs during hospital stay consistently    Recommendations:  1. Consider changing diet to DASH/TLC; consistency per SLP (monitor need for ONS)   2. Encourage pt to meet nutritional needs as able   3. Encourage adherence to diet education (reinforce as able)   4. Pain and bowel regimen per team   5. Will continue to assess/honor preferences as able    Education: Current diet order consistency; importance of adequate protein intake    Risk Level: High [ x  ] Moderate [   ] Low [   ]
CT Removal:    Pt seen and examined at bedside.  Case discussed with Dr. Toledo.  Minimal output from CTs.  No air leak appreciated.  CT removed without incident per Dr. Toledo request.  Occlusive DSD placed.  CXR no obvious PTX noted.  Pt tolerated procedure well.
Galen Removal:    Pt seen and examined at bedside.  Case discussed with Dr. Wilson and is recommending removal of Galen.  Minimal output from Galen.  No air leak appreciated.  Galen removed without incident.  Occlusive dressing placed. Follow up CXR with no obvious PTX noted.  Pt tolerated procedure well and remained hemodynamically stable.
Pt exhibiting intrinsic heart rate and rhythm.  Per Dr. Toledo pacing wires removed at bedside.  No acute issues.  Pt tolerated the procedure well.

## 2022-07-01 NOTE — PROGRESS NOTE ADULT - SUBJECTIVE AND OBJECTIVE BOX
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 LUE strength improving.          FAMILY HISTORY:  Family history of Marfan syndrome (Uncle)  FH: aortic aneurysm (Uncle)    PAST MEDICAL & SURGICAL HISTORY:  HTN (hypertension)  Aortic regurgitation  Anemia  Arthritis  COPD, mild  H/O cataract extraction        Patient is a 67yr old male with PMH active smoker, prior etoh, COPD, HTN, severe AR, dilated Ascending aorta, anemia, sp AVR (bio) and ascending hemiarch replacement (6/23 [place]), hospital course cb MELISSA infarct with L sided weakness. LUE strength improving.      14 system review was unremarkable    Vital signs, hemodynamic and respiratory parameters were reviewed from the bedside nursing flowsheet.  ICU Vital Signs Last 24 Hrs  T(C): 36.3 (01 Jul 2022 05:01), Max: 36.8 (30 Jun 2022 16:58)  T(F): 97.4 (01 Jul 2022 05:01), Max: 98.2 (30 Jun 2022 16:58)  HR: 77 (01 Jul 2022 08:00) (64 - 90)  BP: 143/76 (01 Jul 2022 08:00) (92/51 - 159/85)  BP(mean): 102 (01 Jul 2022 08:00) (67 - 115)  ABP: --  ABP(mean): --  RR: 18 (01 Jul 2022 08:00) (16 - 27)  SpO2: 97% (01 Jul 2022 08:00) (92% - 97%)    Intake and output was reviewed and the fluid balance was calculated  Daily     Daily   I&O's Summary    30 Jun 2022 07:01  -  01 Jul 2022 07:00  --------------------------------------------------------  IN: 800 mL / OUT: 1360 mL / NET: -560 mL    01 Jul 2022 07:01  -  01 Jul 2022 08:30  --------------------------------------------------------  IN: 100 mL / OUT: 140 mL / NET: -40 mL        All lines and drain sites were assessed  Glycemic trend was reviewed      Neuro:  HEENT:  Heart:  Lungs:  Abdomen:  Extremities:      labs  CBC Full  -  ( 01 Jul 2022 03:15 )  WBC Count : 5.42 K/uL  RBC Count : 3.04 M/uL  Hemoglobin : 9.4 g/dL  Hematocrit : 28.1 %  Platelet Count - Automated : 196 K/uL  Mean Cell Volume : 92.4 fl  Mean Cell Hemoglobin : 30.9 pg  Mean Cell Hemoglobin Concentration : 33.5 gm/dL  Auto Neutrophil # : x  Auto Lymphocyte # : x  Auto Monocyte # : x  Auto Eosinophil # : x  Auto Basophil # : x  Auto Neutrophil % : x  Auto Lymphocyte % : x  Auto Monocyte % : x  Auto Eosinophil % : x  Auto Basophil % : x    07-01    134<L>  |  98  |  16  ----------------------------<  101<H>  4.0   |  22  |  0.63    Ca    9.3      01 Jul 2022 03:15  Phos  3.6     07-01  Mg     1.9     07-01    TPro  7.6  /  Alb  3.3  /  TBili  0.5  /  DBili  x   /  AST  39  /  ALT  34  /  AlkPhos  64  07-01    PT/INR - ( 01 Jul 2022 03:15 )   PT: 14.1 sec;   INR: 1.18          PTT - ( 01 Jul 2022 03:15 )  PTT:28.2 sec  The current medications were reviewed   MEDICATIONS  (STANDING):  aspirin  chewable 81 milliGRAM(s) Oral daily  atorvastatin 80 milliGRAM(s) Oral at bedtime  chlorhexidine 2% Cloths 1 Application(s) Topical <User Schedule>  clopidogrel Tablet 75 milliGRAM(s) Oral daily  dextrose 50% Injectable 50 milliLiter(s) IV Push every 15 minutes  dextrose 50% Injectable 25 milliLiter(s) IV Push every 15 minutes  furosemide   Injectable 20 milliGRAM(s) IV Push every 12 hours  heparin   Injectable 5000 Unit(s) SubCutaneous every 8 hours  pantoprazole    Tablet 40 milliGRAM(s) Oral before breakfast  polyethylene glycol 3350 17 Gram(s) Oral daily  senna 2 Tablet(s) Oral at bedtime  sodium chloride 0.9%. 1000 milliLiter(s) (10 mL/Hr) IV Continuous <Continuous>  tiotropium 18 MICROgram(s) Capsule 1 Capsule(s) Inhalation daily    MEDICATIONS  (PRN):  acetaminophen     Tablet .. 650 milliGRAM(s) Oral every 6 hours PRN Mild Pain (1 - 3)  bisacodyl Suppository 10 milliGRAM(s) Rectal daily PRN Constipation  oxyCODONE    IR 5 milliGRAM(s) Oral every 6 hours PRN Moderate Pain (4 - 6)  oxyCODONE    IR 10 milliGRAM(s) Oral every 6 hours PRN Severe Pain (7 - 10)      Assessment/Plan:  67yr old male with PMH active smoker, prior etoh, COPD, HTN, severe AR, dilated Ascending aorta, anemia, sp AVR (bio) and ascending hemiarch replacement (6/23 [place]), hospital course cb MELISSA infarct with L sided weakness. LUE strength improving.    POD 8 AVR and ascending hemiarch repair 6/23 [place]   Post op stroke with LUE weakness  HTN  COPD  Anemia  DAPT  High dose statin  Lasix BID  Diet as tolerated  Replete lytes prn  Monitor CT output  GI/DVT PPX  Pain control  OOB with PT  Appreciate neuro recs  Plan for dc to neuro rehab, call stroke team for appropriate follow up prior to dc      Close hemodynamic, ventilatory and drain monitoring and management per post op routine  Monitor for arrhythmias and monitor parameters for organ perfusion  Beta blockade not administered due to hemodynamic instability and bradycardia  Monitor neurologic status  Head of the bed should remain elevated to 45 deg   Chest PT and IS will be encouraged  Monitor adequacy of oxygenation and ventilation and attempt to wean oxygen  Antibiotic regimen will be tailored to the clinical, laboratory and microbiologic data  Nutritional goals will be met using po eventually , ensure adequate caloric intake and monitor the same  Stress ulcer and VTE prophylaxis will be achieved    Glycemic control is satisfactory  Electrolytes have been repleted as necessary and wound care has been carried out   Pain control has been achieved.   Aggressive physical therapy and early mobility and ambulation goals will be met   The family was updated about the course and plan.    CRITICAL CARE TIME personally provided by me  in evaluation and management, reassessments, review and interpretation of labs and x-rays, ventilator and hemodynamic management, formulating a plan and coordinating care: ___25____ MIN.  Time does not include procedural time.    CTICU ATTENDING     					  Godfrey Hannon MD CTICU  CRITICAL  CARE  attending     Hand off received 					   Pertinent clinical, laboratory, radiographic, hemodynamic, echocardiographic, respiratory data, microbiologic data and chart were reviewed and analyzed frequently throughout the course of the day  Patient seen and examined with CTS/ SH attending at bedside  Pt is a 67yr old male with PMH active smoker, prior etoh, COPD, HTN, severe AR, dilated Ascending aorta, anemia, sp AVR (bio) and ascending hemiarch replacement (6/23 [place]), hospital course cb MELISSA infarct with L sided weakness. LUE strength improving.      FAMILY HISTORY:  Family history of Marfan syndrome (Uncle)  FH: aortic aneurysm (Uncle)    PAST MEDICAL & SURGICAL HISTORY:  HTN (hypertension)  Aortic regurgitation  Anemia  Arthritis  COPD, mild  H/O cataract extraction        Patient is a 67yr old male with PMH active smoker, prior etoh, COPD, HTN, severe AR, dilated Ascending aorta, anemia, sp AVR (bio) and ascending hemiarch replacement (6/23 [place]), hospital course cb MELISSA infarct with L sided weakness. LUE strength improving.      14 system review was unremarkable. R foot pain resolved.     Vital signs, hemodynamic and respiratory parameters were reviewed from the bedside nursing flowsheet.  ICU Vital Signs Last 24 Hrs  T(C): 36.3 (01 Jul 2022 05:01), Max: 36.8 (30 Jun 2022 16:58)  T(F): 97.4 (01 Jul 2022 05:01), Max: 98.2 (30 Jun 2022 16:58)  HR: 77 (01 Jul 2022 08:00) (64 - 90)  BP: 143/76 (01 Jul 2022 08:00) (92/51 - 159/85)  BP(mean): 102 (01 Jul 2022 08:00) (67 - 115)  ABP: --  ABP(mean): --  RR: 18 (01 Jul 2022 08:00) (16 - 27)  SpO2: 97% (01 Jul 2022 08:00) (92% - 97%)    Intake and output was reviewed and the fluid balance was calculated  Daily     Daily   I&O's Summary    30 Jun 2022 07:01  -  01 Jul 2022 07:00  --------------------------------------------------------  IN: 800 mL / OUT: 1360 mL / NET: -560 mL    01 Jul 2022 07:01  -  01 Jul 2022 08:30  --------------------------------------------------------  IN: 100 mL / OUT: 140 mL / NET: -40 mL        All lines and drain sites were assessed  Glycemic trend was reviewed      Neuro: alert, sitting in bed, strength 5/5 RUE RLE, mildly weak LLE, perhaps 4/5, no strength below wrist LUE, sensation intact throughout  HEENT: NAD  Heart: s1, s2  Lungs: clear bl  Abdomen: abd soft, nt/nd  Extremities: no le edema      labs  CBC Full  -  ( 01 Jul 2022 03:15 )  WBC Count : 5.42 K/uL  RBC Count : 3.04 M/uL  Hemoglobin : 9.4 g/dL  Hematocrit : 28.1 %  Platelet Count - Automated : 196 K/uL  Mean Cell Volume : 92.4 fl  Mean Cell Hemoglobin : 30.9 pg  Mean Cell Hemoglobin Concentration : 33.5 gm/dL  Auto Neutrophil # : x  Auto Lymphocyte # : x  Auto Monocyte # : x  Auto Eosinophil # : x  Auto Basophil # : x  Auto Neutrophil % : x  Auto Lymphocyte % : x  Auto Monocyte % : x  Auto Eosinophil % : x  Auto Basophil % : x    07-01    134<L>  |  98  |  16  ----------------------------<  101<H>  4.0   |  22  |  0.63    Ca    9.3      01 Jul 2022 03:15  Phos  3.6     07-01  Mg     1.9     07-01    TPro  7.6  /  Alb  3.3  /  TBili  0.5  /  DBili  x   /  AST  39  /  ALT  34  /  AlkPhos  64  07-01    PT/INR - ( 01 Jul 2022 03:15 )   PT: 14.1 sec;   INR: 1.18          PTT - ( 01 Jul 2022 03:15 )  PTT:28.2 sec  The current medications were reviewed   MEDICATIONS  (STANDING):  aspirin  chewable 81 milliGRAM(s) Oral daily  atorvastatin 80 milliGRAM(s) Oral at bedtime  chlorhexidine 2% Cloths 1 Application(s) Topical <User Schedule>  clopidogrel Tablet 75 milliGRAM(s) Oral daily  dextrose 50% Injectable 50 milliLiter(s) IV Push every 15 minutes  dextrose 50% Injectable 25 milliLiter(s) IV Push every 15 minutes  furosemide   Injectable 20 milliGRAM(s) IV Push every 12 hours  heparin   Injectable 5000 Unit(s) SubCutaneous every 8 hours  pantoprazole    Tablet 40 milliGRAM(s) Oral before breakfast  polyethylene glycol 3350 17 Gram(s) Oral daily  senna 2 Tablet(s) Oral at bedtime  sodium chloride 0.9%. 1000 milliLiter(s) (10 mL/Hr) IV Continuous <Continuous>  tiotropium 18 MICROgram(s) Capsule 1 Capsule(s) Inhalation daily    MEDICATIONS  (PRN):  acetaminophen     Tablet .. 650 milliGRAM(s) Oral every 6 hours PRN Mild Pain (1 - 3)  bisacodyl Suppository 10 milliGRAM(s) Rectal daily PRN Constipation  oxyCODONE    IR 5 milliGRAM(s) Oral every 6 hours PRN Moderate Pain (4 - 6)  oxyCODONE    IR 10 milliGRAM(s) Oral every 6 hours PRN Severe Pain (7 - 10)      Assessment/Plan:  67yr old male with PMH active smoker, prior etoh, COPD, HTN, severe AR, dilated Ascending aorta, anemia, sp AVR (bio) and ascending hemiarch replacement (6/23 [place]), hospital course cb MELISSA infarct with L sided weakness. LUE strength improving.    POD 8 AVR and ascending hemiarch repair 6/23 [place]   Post op stroke with LUE weakness  HTN  COPD  Anemia  DAPT  High dose statin  Lasix BID switch to PO  Start bblocker  Diet as tolerated  Replete lytes prn  Monitor CT output  GI/DVT PPX  Pain control  OOB with PT  Appreciate neuro recs  Plan for dc to neuro rehab, call stroke team for appropriate follow up prior to dc  Close hemodynamic, ventilatory and drain monitoring and management per post op routine  Monitor for arrhythmias and monitor parameters for organ perfusion  Monitor neurologic status  Head of the bed should remain elevated to 45 deg   Chest PT and IS will be encouraged  Monitor adequacy of oxygenation and ventilation and attempt to wean oxygen  Antibiotic regimen will be tailored to the clinical, laboratory and microbiologic data  Nutritional goals will be met using po eventually , ensure adequate caloric intake and monitor the same  Stress ulcer and VTE prophylaxis will be achieved    Glycemic control is satisfactory  Electrolytes have been repleted as necessary and wound care has been carried out   Pain control has been achieved.   Aggressive physical therapy and early mobility and ambulation goals will be met   The family was updated about the course and plan.    CRITICAL CARE TIME personally provided by me  in evaluation and management, reassessments, review and interpretation of labs and x-rays, ventilator and hemodynamic management, formulating a plan and coordinating care: ___25____ MIN.  Time does not include procedural time.    CTICU ATTENDING     					  Godfrey Hannon MD CTICU  CRITICAL  CARE  attending     Hand off received 					   Pertinent clinical, laboratory, radiographic, hemodynamic, echocardiographic, respiratory data, microbiologic data and chart were reviewed and analyzed frequently throughout the course of the day  Patient seen and examined with CTS/ SH attending at bedside  Pt is a 67yr old male with PMH active smoker, prior etoh, COPD, HTN, severe AR, dilated Ascending aorta, anemia, sp AVR (bio) and ascending hemiarch replacement (6/23 [place]), hospital course cb MELISSA infarct with L sided weakness. LUE strength improving.      FAMILY HISTORY:  Family history of Marfan syndrome (Uncle)  FH: aortic aneurysm (Uncle)    PAST MEDICAL & SURGICAL HISTORY:  HTN (hypertension)  Aortic regurgitation  Anemia  Arthritis  COPD, mild  H/O cataract extraction        Patient is a 67yr old male with PMH active smoker, prior etoh, COPD, HTN, severe AR, dilated Ascending aorta, anemia, sp AVR (bio) and ascending hemiarch replacement (6/23 [place]), hospital course cb MELISSA infarct with L sided weakness. LUE strength improving.      14 system review was unremarkable.    Vital signs, hemodynamic and respiratory parameters were reviewed from the bedside nursing flowsheet.  ICU Vital Signs Last 24 Hrs  T(C): 36.3 (01 Jul 2022 05:01), Max: 36.8 (30 Jun 2022 16:58)  T(F): 97.4 (01 Jul 2022 05:01), Max: 98.2 (30 Jun 2022 16:58)  HR: 77 (01 Jul 2022 08:00) (64 - 90)  BP: 143/76 (01 Jul 2022 08:00) (92/51 - 159/85)  BP(mean): 102 (01 Jul 2022 08:00) (67 - 115)  ABP: --  ABP(mean): --  RR: 18 (01 Jul 2022 08:00) (16 - 27)  SpO2: 97% (01 Jul 2022 08:00) (92% - 97%)    Intake and output was reviewed and the fluid balance was calculated  Daily     Daily   I&O's Summary    30 Jun 2022 07:01  -  01 Jul 2022 07:00  --------------------------------------------------------  IN: 800 mL / OUT: 1360 mL / NET: -560 mL    01 Jul 2022 07:01  -  01 Jul 2022 08:30  --------------------------------------------------------  IN: 100 mL / OUT: 140 mL / NET: -40 mL        All lines and drain sites were assessed  Glycemic trend was reviewed      Neuro: alert, sitting in bed, strength 5/5 RUE RLE, mildly weak LLE, perhaps 4/5, no strength below wrist LUE, sensation intact throughout  HEENT: NAD  Heart: s1, s2  Lungs: clear bl  Abdomen: abd soft, nt/nd  Extremities: no le edema      labs  CBC Full  -  ( 01 Jul 2022 03:15 )  WBC Count : 5.42 K/uL  RBC Count : 3.04 M/uL  Hemoglobin : 9.4 g/dL  Hematocrit : 28.1 %  Platelet Count - Automated : 196 K/uL  Mean Cell Volume : 92.4 fl  Mean Cell Hemoglobin : 30.9 pg  Mean Cell Hemoglobin Concentration : 33.5 gm/dL  Auto Neutrophil # : x  Auto Lymphocyte # : x  Auto Monocyte # : x  Auto Eosinophil # : x  Auto Basophil # : x  Auto Neutrophil % : x  Auto Lymphocyte % : x  Auto Monocyte % : x  Auto Eosinophil % : x  Auto Basophil % : x    07-01    134<L>  |  98  |  16  ----------------------------<  101<H>  4.0   |  22  |  0.63    Ca    9.3      01 Jul 2022 03:15  Phos  3.6     07-01  Mg     1.9     07-01    TPro  7.6  /  Alb  3.3  /  TBili  0.5  /  DBili  x   /  AST  39  /  ALT  34  /  AlkPhos  64  07-01    PT/INR - ( 01 Jul 2022 03:15 )   PT: 14.1 sec;   INR: 1.18          PTT - ( 01 Jul 2022 03:15 )  PTT:28.2 sec  The current medications were reviewed   MEDICATIONS  (STANDING):  aspirin  chewable 81 milliGRAM(s) Oral daily  atorvastatin 80 milliGRAM(s) Oral at bedtime  chlorhexidine 2% Cloths 1 Application(s) Topical <User Schedule>  clopidogrel Tablet 75 milliGRAM(s) Oral daily  dextrose 50% Injectable 50 milliLiter(s) IV Push every 15 minutes  dextrose 50% Injectable 25 milliLiter(s) IV Push every 15 minutes  furosemide   Injectable 20 milliGRAM(s) IV Push every 12 hours  heparin   Injectable 5000 Unit(s) SubCutaneous every 8 hours  pantoprazole    Tablet 40 milliGRAM(s) Oral before breakfast  polyethylene glycol 3350 17 Gram(s) Oral daily  senna 2 Tablet(s) Oral at bedtime  sodium chloride 0.9%. 1000 milliLiter(s) (10 mL/Hr) IV Continuous <Continuous>  tiotropium 18 MICROgram(s) Capsule 1 Capsule(s) Inhalation daily    MEDICATIONS  (PRN):  acetaminophen     Tablet .. 650 milliGRAM(s) Oral every 6 hours PRN Mild Pain (1 - 3)  bisacodyl Suppository 10 milliGRAM(s) Rectal daily PRN Constipation  oxyCODONE    IR 5 milliGRAM(s) Oral every 6 hours PRN Moderate Pain (4 - 6)  oxyCODONE    IR 10 milliGRAM(s) Oral every 6 hours PRN Severe Pain (7 - 10)      Assessment/Plan:  67yr old male with PMH active smoker, prior etoh, COPD, HTN, severe AR, dilated Ascending aorta, anemia, sp AVR (bio) and ascending hemiarch replacement (6/23 [place]), hospital course cb MELISSA infarct with L sided weakness. LUE strength improving.    POD 8 AVR and ascending hemiarch repair 6/23 [place]   Post op stroke with LUE weakness  HTN  COPD  Anemia  DAPT  High dose statin  Lasix BID switch to PO  Start bblocker  Diet as tolerated  Replete lytes prn  Monitor CT output  GI/DVT PPX  Pain control  OOB with PT  Appreciate neuro recs  Plan for dc to neuro rehab, call stroke team for appropriate follow up prior to dc  Close hemodynamic, ventilatory and drain monitoring and management per post op routine  Monitor for arrhythmias and monitor parameters for organ perfusion  Monitor neurologic status  Head of the bed should remain elevated to 45 deg   Chest PT and IS will be encouraged  Monitor adequacy of oxygenation and ventilation and attempt to wean oxygen  Antibiotic regimen will be tailored to the clinical, laboratory and microbiologic data  Nutritional goals will be met using po eventually , ensure adequate caloric intake and monitor the same  Stress ulcer and VTE prophylaxis will be achieved    Glycemic control is satisfactory  Electrolytes have been repleted as necessary and wound care has been carried out   Pain control has been achieved.   Aggressive physical therapy and early mobility and ambulation goals will be met   The family was updated about the course and plan.    CRITICAL CARE TIME personally provided by me  in evaluation and management, reassessments, review and interpretation of labs and x-rays, ventilator and hemodynamic management, formulating a plan and coordinating care: ___25____ MIN.  Time does not include procedural time.    CTICU ATTENDING     					  Godfrey Hannon MD

## 2022-07-02 LAB
ALBUMIN SERPL ELPH-MCNC: 3.4 G/DL — SIGNIFICANT CHANGE UP (ref 3.3–5)
ALP SERPL-CCNC: 65 U/L — SIGNIFICANT CHANGE UP (ref 40–120)
ALT FLD-CCNC: 34 U/L — SIGNIFICANT CHANGE UP (ref 10–45)
ANION GAP SERPL CALC-SCNC: 12 MMOL/L — SIGNIFICANT CHANGE UP (ref 5–17)
APTT BLD: 24.5 SEC — LOW (ref 27.5–35.5)
AST SERPL-CCNC: 37 U/L — SIGNIFICANT CHANGE UP (ref 10–40)
BILIRUB SERPL-MCNC: 0.4 MG/DL — SIGNIFICANT CHANGE UP (ref 0.2–1.2)
BUN SERPL-MCNC: 17 MG/DL — SIGNIFICANT CHANGE UP (ref 7–23)
CALCIUM SERPL-MCNC: 9.2 MG/DL — SIGNIFICANT CHANGE UP (ref 8.4–10.5)
CHLORIDE SERPL-SCNC: 100 MMOL/L — SIGNIFICANT CHANGE UP (ref 96–108)
CO2 SERPL-SCNC: 23 MMOL/L — SIGNIFICANT CHANGE UP (ref 22–31)
CREAT SERPL-MCNC: 0.72 MG/DL — SIGNIFICANT CHANGE UP (ref 0.5–1.3)
EGFR: 100 ML/MIN/1.73M2 — SIGNIFICANT CHANGE UP
GLUCOSE SERPL-MCNC: 117 MG/DL — HIGH (ref 70–99)
HCT VFR BLD CALC: 25.9 % — LOW (ref 39–50)
HGB BLD-MCNC: 8.7 G/DL — LOW (ref 13–17)
INR BLD: 1.21 — HIGH (ref 0.88–1.16)
MAGNESIUM SERPL-MCNC: 1.9 MG/DL — SIGNIFICANT CHANGE UP (ref 1.6–2.6)
MCHC RBC-ENTMCNC: 30.6 PG — SIGNIFICANT CHANGE UP (ref 27–34)
MCHC RBC-ENTMCNC: 33.6 GM/DL — SIGNIFICANT CHANGE UP (ref 32–36)
MCV RBC AUTO: 91.2 FL — SIGNIFICANT CHANGE UP (ref 80–100)
NRBC # BLD: 0 /100 WBCS — SIGNIFICANT CHANGE UP (ref 0–0)
PHOSPHATE SERPL-MCNC: 3.4 MG/DL — SIGNIFICANT CHANGE UP (ref 2.5–4.5)
PLATELET # BLD AUTO: 214 K/UL — SIGNIFICANT CHANGE UP (ref 150–400)
POTASSIUM SERPL-MCNC: 4.2 MMOL/L — SIGNIFICANT CHANGE UP (ref 3.5–5.3)
POTASSIUM SERPL-SCNC: 4.2 MMOL/L — SIGNIFICANT CHANGE UP (ref 3.5–5.3)
PROT SERPL-MCNC: 7.5 G/DL — SIGNIFICANT CHANGE UP (ref 6–8.3)
PROTHROM AB SERPL-ACNC: 14.4 SEC — HIGH (ref 10.5–13.4)
RBC # BLD: 2.84 M/UL — LOW (ref 4.2–5.8)
RBC # FLD: 14.6 % — HIGH (ref 10.3–14.5)
SODIUM SERPL-SCNC: 135 MMOL/L — SIGNIFICANT CHANGE UP (ref 135–145)
WBC # BLD: 6.5 K/UL — SIGNIFICANT CHANGE UP (ref 3.8–10.5)
WBC # FLD AUTO: 6.5 K/UL — SIGNIFICANT CHANGE UP (ref 3.8–10.5)

## 2022-07-02 PROCEDURE — 99232 SBSQ HOSP IP/OBS MODERATE 35: CPT

## 2022-07-02 PROCEDURE — 71045 X-RAY EXAM CHEST 1 VIEW: CPT | Mod: 26

## 2022-07-02 RX ORDER — MAGNESIUM OXIDE 400 MG ORAL TABLET 241.3 MG
800 TABLET ORAL ONCE
Refills: 0 | Status: COMPLETED | OUTPATIENT
Start: 2022-07-02 | End: 2022-07-02

## 2022-07-02 RX ORDER — SODIUM CHLORIDE 9 MG/ML
3 INJECTION INTRAMUSCULAR; INTRAVENOUS; SUBCUTANEOUS EVERY 8 HOURS
Refills: 0 | Status: DISCONTINUED | OUTPATIENT
Start: 2022-07-02 | End: 2022-07-05

## 2022-07-02 RX ADMIN — Medication 1 ENEMA: at 11:25

## 2022-07-02 RX ADMIN — OXYCODONE HYDROCHLORIDE 5 MILLIGRAM(S): 5 TABLET ORAL at 03:40

## 2022-07-02 RX ADMIN — HEPARIN SODIUM 5000 UNIT(S): 5000 INJECTION INTRAVENOUS; SUBCUTANEOUS at 15:22

## 2022-07-02 RX ADMIN — Medication 40 MILLIGRAM(S): at 15:22

## 2022-07-02 RX ADMIN — SODIUM CHLORIDE 3 MILLILITER(S): 9 INJECTION INTRAMUSCULAR; INTRAVENOUS; SUBCUTANEOUS at 21:11

## 2022-07-02 RX ADMIN — OXYCODONE HYDROCHLORIDE 10 MILLIGRAM(S): 5 TABLET ORAL at 18:49

## 2022-07-02 RX ADMIN — CHLORHEXIDINE GLUCONATE 1 APPLICATION(S): 213 SOLUTION TOPICAL at 07:46

## 2022-07-02 RX ADMIN — Medication 81 MILLIGRAM(S): at 10:10

## 2022-07-02 RX ADMIN — Medication 40 MILLIEQUIVALENT(S): at 10:09

## 2022-07-02 RX ADMIN — OXYCODONE HYDROCHLORIDE 5 MILLIGRAM(S): 5 TABLET ORAL at 03:11

## 2022-07-02 RX ADMIN — Medication 12.5 MILLIGRAM(S): at 17:18

## 2022-07-02 RX ADMIN — Medication 10 MILLIGRAM(S): at 10:10

## 2022-07-02 RX ADMIN — Medication 12.5 MILLIGRAM(S): at 06:52

## 2022-07-02 RX ADMIN — ATORVASTATIN CALCIUM 80 MILLIGRAM(S): 80 TABLET, FILM COATED ORAL at 21:26

## 2022-07-02 RX ADMIN — HEPARIN SODIUM 5000 UNIT(S): 5000 INJECTION INTRAVENOUS; SUBCUTANEOUS at 21:26

## 2022-07-02 RX ADMIN — Medication 40 MILLIGRAM(S): at 06:51

## 2022-07-02 RX ADMIN — MAGNESIUM OXIDE 400 MG ORAL TABLET 800 MILLIGRAM(S): 241.3 TABLET ORAL at 10:09

## 2022-07-02 RX ADMIN — OXYCODONE HYDROCHLORIDE 10 MILLIGRAM(S): 5 TABLET ORAL at 17:18

## 2022-07-02 RX ADMIN — HEPARIN SODIUM 5000 UNIT(S): 5000 INJECTION INTRAVENOUS; SUBCUTANEOUS at 06:51

## 2022-07-02 RX ADMIN — SODIUM CHLORIDE 3 MILLILITER(S): 9 INJECTION INTRAMUSCULAR; INTRAVENOUS; SUBCUTANEOUS at 14:37

## 2022-07-02 RX ADMIN — PANTOPRAZOLE SODIUM 40 MILLIGRAM(S): 20 TABLET, DELAYED RELEASE ORAL at 06:52

## 2022-07-02 RX ADMIN — TIOTROPIUM BROMIDE 1 CAPSULE(S): 18 CAPSULE ORAL; RESPIRATORY (INHALATION) at 15:21

## 2022-07-02 RX ADMIN — POLYETHYLENE GLYCOL 3350 17 GRAM(S): 17 POWDER, FOR SOLUTION ORAL at 10:10

## 2022-07-02 RX ADMIN — CLOPIDOGREL BISULFATE 75 MILLIGRAM(S): 75 TABLET, FILM COATED ORAL at 10:10

## 2022-07-02 NOTE — PROGRESS NOTE ADULT - SUBJECTIVE AND OBJECTIVE BOX
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 LUE strength improving.    FAMILY HISTORY:  Family history of Marfan syndrome (Uncle)    FH: aortic aneurysm (Uncle)  PAST MEDICAL & SURGICAL HISTORY:  HTN (hypertension)  Aortic regurgitation  Anemia  Arthritis  COPD, mild  H/O cataract extraction    Patient is a 67yr old male with PMH active smoker, prior etoh, COPD, HTN, severe AR, dilated Ascending aorta, anemia, sp AVR (bio) and ascending hemiarch replacement (6/23 [place]), hospital course cb MELISSA infarct with L sided weakness. LUE strength improving. No complaints.    14 point ROS unremarkable.       Vital signs, hemodynamic and respiratory parameters were reviewed from the bedside nursing flowsheet.  ICU Vital Signs Last 24 Hrs  T(C): 36.6 (02 Jul 2022 05:01), Max: 36.7 (02 Jul 2022 01:01)  T(F): 97.9 (02 Jul 2022 05:01), Max: 98.1 (02 Jul 2022 01:01)  HR: 75 (02 Jul 2022 08:00) (68 - 82)  BP: 119/66 (02 Jul 2022 08:00) (89/51 - 156/74)  BP(mean): 87 (02 Jul 2022 08:00) (64 - 104)  ABP: --  ABP(mean): --  RR: 18 (02 Jul 2022 08:00) (12 - 20)  SpO2: 97% (02 Jul 2022 08:00) (94% - 98%)      Intake and output was reviewed and the fluid balance was calculated  Daily     Daily   I&O's Summary    01 Jul 2022 07:01  -  02 Jul 2022 07:00  --------------------------------------------------------  IN: 100 mL / OUT: 540 mL / NET: -440 mL    02 Jul 2022 07:01  -  02 Jul 2022 08:37  --------------------------------------------------------  IN: 350 mL / OUT: 0 mL / NET: 350 mL        All lines and drain sites were assessed  Glycemic trend was reviewed    Neuro:  HEENT:  Heart:  Lungs:  Abdomen:  Extremities:      labs  CBC Full  -  ( 02 Jul 2022 02:33 )  WBC Count : 6.50 K/uL  RBC Count : 2.84 M/uL  Hemoglobin : 8.7 g/dL  Hematocrit : 25.9 %  Platelet Count - Automated : 214 K/uL  Mean Cell Volume : 91.2 fl  Mean Cell Hemoglobin : 30.6 pg  Mean Cell Hemoglobin Concentration : 33.6 gm/dL  Auto Neutrophil # : x  Auto Lymphocyte # : x  Auto Monocyte # : x  Auto Eosinophil # : x  Auto Basophil # : x  Auto Neutrophil % : x  Auto Lymphocyte % : x  Auto Monocyte % : x  Auto Eosinophil % : x  Auto Basophil % : x    07-02    135  |  100  |  17  ----------------------------<  117<H>  4.2   |  23  |  0.72    Ca    9.2      02 Jul 2022 02:33  Phos  3.4     07-02  Mg     1.9     07-02    TPro  7.5  /  Alb  3.4  /  TBili  0.4  /  DBili  x   /  AST  37  /  ALT  34  /  AlkPhos  65  07-02    PT/INR - ( 02 Jul 2022 02:33 )   PT: 14.4 sec;   INR: 1.21          PTT - ( 02 Jul 2022 02:33 )  PTT:24.5 sec  The current medications were reviewed   MEDICATIONS  (STANDING):  aspirin  chewable 81 milliGRAM(s) Oral daily  atorvastatin 80 milliGRAM(s) Oral at bedtime  chlorhexidine 2% Cloths 1 Application(s) Topical <User Schedule>  clopidogrel Tablet 75 milliGRAM(s) Oral daily  dextrose 50% Injectable 50 milliLiter(s) IV Push every 15 minutes  dextrose 50% Injectable 25 milliLiter(s) IV Push every 15 minutes  furosemide    Tablet 40 milliGRAM(s) Oral <User Schedule>  heparin   Injectable 5000 Unit(s) SubCutaneous every 8 hours  metoprolol tartrate 12.5 milliGRAM(s) Oral <User Schedule>  pantoprazole    Tablet 40 milliGRAM(s) Oral before breakfast  polyethylene glycol 3350 17 Gram(s) Oral daily  potassium chloride    Tablet ER 40 milliEquivalent(s) Oral daily  senna 2 Tablet(s) Oral at bedtime  sodium chloride 0.9%. 1000 milliLiter(s) (10 mL/Hr) IV Continuous <Continuous>  tiotropium 18 MICROgram(s) Capsule 1 Capsule(s) Inhalation daily    MEDICATIONS  (PRN):  acetaminophen     Tablet .. 650 milliGRAM(s) Oral every 6 hours PRN Mild Pain (1 - 3)  bisacodyl Suppository 10 milliGRAM(s) Rectal daily PRN Constipation  oxyCODONE    IR 5 milliGRAM(s) Oral every 6 hours PRN Moderate Pain (4 - 6)  oxyCODONE    IR 10 milliGRAM(s) Oral every 6 hours PRN Severe Pain (7 - 10)      Assessment/Plan:  67yr old male with PMH active smoker, prior etoh, COPD, HTN, severe AR, dilated Ascending aorta, anemia, sp AVR (bio) and ascending hemiarch replacement (6/23 [place]), hospital course cb MELISSA infarct with L sided weakness. LUE strength improving.    POD 9 AVR and ascending hemiarch repair 6/23 [place]   Post op stroke with LUE weakness  HTN  COPD  Anemia  DAPT  High dose statin  Lasix twice a day  Metoprolol twice a day  Diet as tolerated  Replete lytes prn  GI/DVT PPX  Pain control  OOB with PT  Appreciate neuro recs  Plan for dc to neuro rehab, call stroke team for appropriate follow up prior to dc  Close hemodynamic, ventilatory and drain monitoring and management per post op routine  Monitor for arrhythmias and monitor parameters for organ perfusion  Monitor neurologic status  Head of the bed should remain elevated to 45 deg   Chest PT and IS will be encouraged  Monitor adequacy of oxygenation and ventilation  Nutritional goals will be met using po eventually , ensure adequate caloric intake and monitor the same  Stress ulcer and VTE prophylaxis will be achieved    Glycemic control is satisfactory  Electrolytes have been repleted as necessary and wound care has been carried out   Pain control has been achieved.   Aggressive physical therapy and early mobility and ambulation goals will be met   The family was updated about the course and plan.    CRITICAL CARE TIME personally provided by me  in evaluation and management, reassessments, review and interpretation of labs and x-rays, ventilator and hemodynamic management, formulating a plan and coordinating care: ___25____ MIN.  Time does not include procedural time.    CTICU ATTENDING     					  Godfrey Hannon MD CTICU  CRITICAL  CARE  attending     Hand off received 					   Pertinent clinical, laboratory, radiographic, hemodynamic, echocardiographic, respiratory data, microbiologic data and chart were reviewed and analyzed frequently throughout the course of the day  Patient seen and examined with CTS/ SH attending at bedside  Pt is a 67yr old male with PMH active smoker, prior etoh, COPD, HTN, severe AR, dilated Ascending aorta, anemia, sp AVR (bio) and ascending hemiarch replacement (6/23 [place]), hospital course cb MELISSA infarct with L sided weakness. LUE strength improving.    FAMILY HISTORY:  Family history of Marfan syndrome (Uncle)    FH: aortic aneurysm (Uncle)  PAST MEDICAL & SURGICAL HISTORY:  HTN (hypertension)  Aortic regurgitation  Anemia  Arthritis  COPD, mild  H/O cataract extraction    Patient is a 67yr old male with PMH active smoker, prior etoh, COPD, HTN, severe AR, dilated Ascending aorta, anemia, sp AVR (bio) and ascending hemiarch replacement (6/23 [place]), hospital course cb MELISSA infarct with L sided weakness. LUE strength improving. No complaints.    14 point ROS unremarkable.       Vital signs, hemodynamic and respiratory parameters were reviewed from the bedside nursing flowsheet.  ICU Vital Signs Last 24 Hrs  T(C): 36.6 (02 Jul 2022 05:01), Max: 36.7 (02 Jul 2022 01:01)  T(F): 97.9 (02 Jul 2022 05:01), Max: 98.1 (02 Jul 2022 01:01)  HR: 75 (02 Jul 2022 08:00) (68 - 82)  BP: 119/66 (02 Jul 2022 08:00) (89/51 - 156/74)  BP(mean): 87 (02 Jul 2022 08:00) (64 - 104)  ABP: --  ABP(mean): --  RR: 18 (02 Jul 2022 08:00) (12 - 20)  SpO2: 97% (02 Jul 2022 08:00) (94% - 98%)      Intake and output was reviewed and the fluid balance was calculated  Daily     Daily   I&O's Summary    01 Jul 2022 07:01  -  02 Jul 2022 07:00  --------------------------------------------------------  IN: 100 mL / OUT: 540 mL / NET: -440 mL    02 Jul 2022 07:01  -  02 Jul 2022 08:37  --------------------------------------------------------  IN: 350 mL / OUT: 0 mL / NET: 350 mL        All lines and drain sites were assessed  Glycemic trend was reviewed    Neuro: alert, follows commands, weakness below wrist L hand  Heart: s1, s2  Lungs: clear bl  Abdomen: abd soft, nt/nd  Extremities: no le edema      labs  CBC Full  -  ( 02 Jul 2022 02:33 )  WBC Count : 6.50 K/uL  RBC Count : 2.84 M/uL  Hemoglobin : 8.7 g/dL  Hematocrit : 25.9 %  Platelet Count - Automated : 214 K/uL  Mean Cell Volume : 91.2 fl  Mean Cell Hemoglobin : 30.6 pg  Mean Cell Hemoglobin Concentration : 33.6 gm/dL  Auto Neutrophil # : x  Auto Lymphocyte # : x  Auto Monocyte # : x  Auto Eosinophil # : x  Auto Basophil # : x  Auto Neutrophil % : x  Auto Lymphocyte % : x  Auto Monocyte % : x  Auto Eosinophil % : x  Auto Basophil % : x    07-02    135  |  100  |  17  ----------------------------<  117<H>  4.2   |  23  |  0.72    Ca    9.2      02 Jul 2022 02:33  Phos  3.4     07-02  Mg     1.9     07-02    TPro  7.5  /  Alb  3.4  /  TBili  0.4  /  DBili  x   /  AST  37  /  ALT  34  /  AlkPhos  65  07-02    PT/INR - ( 02 Jul 2022 02:33 )   PT: 14.4 sec;   INR: 1.21          PTT - ( 02 Jul 2022 02:33 )  PTT:24.5 sec  The current medications were reviewed   MEDICATIONS  (STANDING):  aspirin  chewable 81 milliGRAM(s) Oral daily  atorvastatin 80 milliGRAM(s) Oral at bedtime  chlorhexidine 2% Cloths 1 Application(s) Topical <User Schedule>  clopidogrel Tablet 75 milliGRAM(s) Oral daily  dextrose 50% Injectable 50 milliLiter(s) IV Push every 15 minutes  dextrose 50% Injectable 25 milliLiter(s) IV Push every 15 minutes  furosemide    Tablet 40 milliGRAM(s) Oral <User Schedule>  heparin   Injectable 5000 Unit(s) SubCutaneous every 8 hours  metoprolol tartrate 12.5 milliGRAM(s) Oral <User Schedule>  pantoprazole    Tablet 40 milliGRAM(s) Oral before breakfast  polyethylene glycol 3350 17 Gram(s) Oral daily  potassium chloride    Tablet ER 40 milliEquivalent(s) Oral daily  senna 2 Tablet(s) Oral at bedtime  sodium chloride 0.9%. 1000 milliLiter(s) (10 mL/Hr) IV Continuous <Continuous>  tiotropium 18 MICROgram(s) Capsule 1 Capsule(s) Inhalation daily    MEDICATIONS  (PRN):  acetaminophen     Tablet .. 650 milliGRAM(s) Oral every 6 hours PRN Mild Pain (1 - 3)  bisacodyl Suppository 10 milliGRAM(s) Rectal daily PRN Constipation  oxyCODONE    IR 5 milliGRAM(s) Oral every 6 hours PRN Moderate Pain (4 - 6)  oxyCODONE    IR 10 milliGRAM(s) Oral every 6 hours PRN Severe Pain (7 - 10)      Assessment/Plan:  67yr old male with PMH active smoker, prior etoh, COPD, HTN, severe AR, dilated Ascending aorta, anemia, sp AVR (bio) and ascending hemiarch replacement (6/23 [place]), hospital course cb MELISSA infarct with L sided weakness. LUE strength improving.    POD 9 AVR and ascending hemiarch repair 6/23 [place]   Post op stroke with LUE weakness  HTN  COPD  Anemia  DAPT  High dose statin  Lasix twice a day  Metoprolol twice a day  Diet as tolerated  Replete lytes prn  GI/DVT PPX  Pain control  OOB with PT  Appreciate neuro recs  Plan for dc to neuro rehab, call stroke team for appropriate follow up prior to dc  Close hemodynamic, ventilatory and drain monitoring and management per post op routine  Monitor for arrhythmias and monitor parameters for organ perfusion  Monitor neurologic status  Head of the bed should remain elevated to 45 deg   Chest PT and IS will be encouraged  Monitor adequacy of oxygenation and ventilation  Nutritional goals will be met using po eventually , ensure adequate caloric intake and monitor the same  Stress ulcer and VTE prophylaxis will be achieved    Glycemic control is satisfactory  Electrolytes have been repleted as necessary and wound care has been carried out   Pain control has been achieved.   Aggressive physical therapy and early mobility and ambulation goals will be met   The family was updated about the course and plan.    CRITICAL CARE TIME personally provided by me  in evaluation and management, reassessments, review and interpretation of labs and x-rays, ventilator and hemodynamic management, formulating a plan and coordinating care: ___25____ MIN.  Time does not include procedural time.    CTICU ATTENDING     					  Godfrey Hannon MD

## 2022-07-03 PROCEDURE — 71045 X-RAY EXAM CHEST 1 VIEW: CPT | Mod: 26

## 2022-07-03 RX ADMIN — SODIUM CHLORIDE 3 MILLILITER(S): 9 INJECTION INTRAMUSCULAR; INTRAVENOUS; SUBCUTANEOUS at 08:49

## 2022-07-03 RX ADMIN — PANTOPRAZOLE SODIUM 40 MILLIGRAM(S): 20 TABLET, DELAYED RELEASE ORAL at 06:16

## 2022-07-03 RX ADMIN — Medication 650 MILLIGRAM(S): at 07:00

## 2022-07-03 RX ADMIN — Medication 81 MILLIGRAM(S): at 19:15

## 2022-07-03 RX ADMIN — Medication 40 MILLIGRAM(S): at 06:16

## 2022-07-03 RX ADMIN — Medication 40 MILLIEQUIVALENT(S): at 12:06

## 2022-07-03 RX ADMIN — OXYCODONE HYDROCHLORIDE 5 MILLIGRAM(S): 5 TABLET ORAL at 15:58

## 2022-07-03 RX ADMIN — SENNA PLUS 2 TABLET(S): 8.6 TABLET ORAL at 21:43

## 2022-07-03 RX ADMIN — SODIUM CHLORIDE 3 MILLILITER(S): 9 INJECTION INTRAMUSCULAR; INTRAVENOUS; SUBCUTANEOUS at 22:10

## 2022-07-03 RX ADMIN — OXYCODONE HYDROCHLORIDE 10 MILLIGRAM(S): 5 TABLET ORAL at 21:43

## 2022-07-03 RX ADMIN — ATORVASTATIN CALCIUM 80 MILLIGRAM(S): 80 TABLET, FILM COATED ORAL at 21:43

## 2022-07-03 RX ADMIN — SODIUM CHLORIDE 3 MILLILITER(S): 9 INJECTION INTRAMUSCULAR; INTRAVENOUS; SUBCUTANEOUS at 13:01

## 2022-07-03 RX ADMIN — Medication 12.5 MILLIGRAM(S): at 19:15

## 2022-07-03 RX ADMIN — Medication 12.5 MILLIGRAM(S): at 06:16

## 2022-07-03 RX ADMIN — HEPARIN SODIUM 5000 UNIT(S): 5000 INJECTION INTRAVENOUS; SUBCUTANEOUS at 06:17

## 2022-07-03 RX ADMIN — HEPARIN SODIUM 5000 UNIT(S): 5000 INJECTION INTRAVENOUS; SUBCUTANEOUS at 15:42

## 2022-07-03 RX ADMIN — OXYCODONE HYDROCHLORIDE 5 MILLIGRAM(S): 5 TABLET ORAL at 16:28

## 2022-07-03 RX ADMIN — Medication 40 MILLIGRAM(S): at 15:41

## 2022-07-03 RX ADMIN — Medication 650 MILLIGRAM(S): at 06:16

## 2022-07-03 RX ADMIN — OXYCODONE HYDROCHLORIDE 10 MILLIGRAM(S): 5 TABLET ORAL at 22:43

## 2022-07-03 RX ADMIN — CLOPIDOGREL BISULFATE 75 MILLIGRAM(S): 75 TABLET, FILM COATED ORAL at 12:06

## 2022-07-03 RX ADMIN — CHLORHEXIDINE GLUCONATE 1 APPLICATION(S): 213 SOLUTION TOPICAL at 06:25

## 2022-07-03 RX ADMIN — HEPARIN SODIUM 5000 UNIT(S): 5000 INJECTION INTRAVENOUS; SUBCUTANEOUS at 21:44

## 2022-07-03 NOTE — PROGRESS NOTE ADULT - ASSESSMENT
65 y/o male with a pmhx of HTN, anemia, arthritis, COPD, and a family history of marfan disease and aortic aneurysm (nephew) who presents for an aortic valve replacement with Dr. Toledo. On 6/23/22 he underwent a mini-AVR (27mm bio), ascending and hemiarch replacement EF normal with Dr. Toledo. Intraop course was uncomplicated and he was brought to CTICU. Upon waking up noted to not move LUE/LLE and a stroke code was called. CTA revealed occlusion of R ICA. Overnight into POD1 he was brought emergently to OR by neurosurgery for angiogram which revealed chronic occlusion of R ICA. No thrombectomy performed. POD1 a repeat CT-head revealed recent infarct to right precentral gyrus. EEG placed which was negative for seizures. POD2 neuro exam improved and MAPs kept higher for cerebral perfusion using pressor support. On POD3 he was extubated and able to move LLE. POD4 he passed speech and swallow. LUE remained weak, LLE improved. POD5 weaned from pressors. POD6-9 continued to improve, ambulated with PT. Delined, started on low dose BB. Transferred to stepdown unit. On POD10 awaiting neuro rehab.    Plan:    Neurovascular:   -Pain well controlled with current regimen.   -continue tylenol, oxy5/10 PRN  R ICA stroke immediately postop 6/23  -neurology following  -no intervention  -continue ASA/plavix  -continue atorvastatin    Cardiovascular:   POD10 mini-AVR (27mm bio), ascending and hemiarch replacement EF normal  -continue ASA/Plavix  -continue atorvastatin  -continue low dose beta blocker  -continue lasix BID  -Hemodynamically stable.   -Monitor: BP, HR, tele    Respiratory:   COPD, current smoker  -continue spiriva  -Oxygenating well on room air  -Encourage continued use of IS 10x/hr and frequent ambulation    GI:  -GI PPX: continue protonix  -PO Diet  -Bowel Regimen: continue senna/miralax/dulcolax     Renal / :  -Continue to monitor renal function: BUN/Cr: lab holiday  -Monitor I/O's daily     Endocrine:    -No hx of DM or thyroid dx  -A1c: 4.3  -TSH: 1.50    Hematologic:  -CBC: H/H- lab holiday  -previously stable, labs in AM    ID:  -Temperature: afebrile  -CBC: WBC- previously stable  -Continue to observe for SIRS/Sepsis Syndrome.    Prophylaxis:  -DVT prophylaxis with 5000 SubQ Heparin q8h.  -Continue with SCD's b/l while patient is at rest     Disposition:  -Discharge to neuro rehab

## 2022-07-03 NOTE — PROGRESS NOTE ADULT - SUBJECTIVE AND OBJECTIVE BOX
Patient discussed on morning rounds with Dr. Carrera    Operation / Date: : 6/23/22 mini-AVR (27mm bio), ascending and hemiarch replacement EF normal     SUBJECTIVE ASSESSMENT:  67y Male seen and examined. Patient feels well, has no pain today. Pending neuro rehab. No complaints. Tolerating PO diet, satting well on room air, ambulating in halls with walker. Denies lightheadedness, headache, CP, palpitations, SOB, abdominal pain, nausea, vomiting, fever, chills.      Vital Signs Last 24 Hrs  T(C): 36.6 (03 Jul 2022 09:54), Max: 37.1 (02 Jul 2022 18:02)  T(F): 97.8 (03 Jul 2022 09:54), Max: 98.8 (02 Jul 2022 18:02)  HR: 80 (03 Jul 2022 09:42) (64 - 88)  BP: 112/81 (03 Jul 2022 09:42) (108/51 - 141/63)  BP(mean): 94 (03 Jul 2022 09:42) (81 - 106)  RR: 15 (03 Jul 2022 09:42) (12 - 16)  SpO2: 97% (03 Jul 2022 09:42) (97% - 100%)  I&O's Detail    02 Jul 2022 07:01  -  03 Jul 2022 07:00  --------------------------------------------------------  IN:    Oral Fluid: 350 mL    sodium chloride 0.9%: 110 mL  Total IN: 460 mL    OUT:    Voided (mL): 1150 mL  Total OUT: 1150 mL    Total NET: -690 mL      CHEST TUBE:  No  EDMUNDO DRAIN:  No.  EPICARDIAL WIRES: No.  TIE DOWNS: No.  SWENSON: No.    PHYSICAL EXAM:    General: NAD, sitting comfortably in bed, conversing appropriately  Neurological: alert and oriented, LUE 1/5, RUE 5/5, LLE 4/5, RLE 5/5, facial asymmetry - drooping to right side  Cardiovascular: RRR, Clear S1 and S2, no murmurs appreciated  Respiratory: chest expansion symmetrical, CTA b/l, no wheezing noted  Gastrointestinal: +BS, soft, NT, ND  Extremities: moving spontaneously, no calf tenderness or edema.  Vascular: warm, well perfused. DP/PT pulses palpable b/l.  Incisions: MSI C/d/I, no drainage or purulence      LABS:                        8.7    6.50  )-----------( 214      ( 02 Jul 2022 02:33 )             25.9       COUMADIN:  no    PT/INR - ( 02 Jul 2022 02:33 )   PT: 14.4 sec;   INR: 1.21          PTT - ( 02 Jul 2022 02:33 )  PTT:24.5 sec    07-02    135  |  100  |  17  ----------------------------<  117<H>  4.2   |  23  |  0.72    Ca    9.2      02 Jul 2022 02:33  Phos  3.4     07-02  Mg     1.9     07-02    TPro  7.5  /  Alb  3.4  /  TBili  0.4  /  DBili  x   /  AST  37  /  ALT  34  /  AlkPhos  65  07-02        MEDICATIONS  (STANDING):  aspirin  chewable 81 milliGRAM(s) Oral daily  atorvastatin 80 milliGRAM(s) Oral at bedtime  chlorhexidine 2% Cloths 1 Application(s) Topical <User Schedule>  clopidogrel Tablet 75 milliGRAM(s) Oral daily  dextrose 50% Injectable 50 milliLiter(s) IV Push every 15 minutes  dextrose 50% Injectable 25 milliLiter(s) IV Push every 15 minutes  furosemide    Tablet 40 milliGRAM(s) Oral <User Schedule>  heparin   Injectable 5000 Unit(s) SubCutaneous every 8 hours  metoprolol tartrate 12.5 milliGRAM(s) Oral <User Schedule>  pantoprazole    Tablet 40 milliGRAM(s) Oral before breakfast  polyethylene glycol 3350 17 Gram(s) Oral daily  potassium chloride    Tablet ER 40 milliEquivalent(s) Oral daily  senna 2 Tablet(s) Oral at bedtime  sodium chloride 0.9% lock flush 3 milliLiter(s) IV Push every 8 hours  sodium chloride 0.9%. 1000 milliLiter(s) (10 mL/Hr) IV Continuous <Continuous>  tiotropium 18 MICROgram(s) Capsule 1 Capsule(s) Inhalation daily    MEDICATIONS  (PRN):  acetaminophen     Tablet .. 650 milliGRAM(s) Oral every 6 hours PRN Mild Pain (1 - 3)  bisacodyl Suppository 10 milliGRAM(s) Rectal daily PRN Constipation  oxyCODONE    IR 5 milliGRAM(s) Oral every 6 hours PRN Moderate Pain (4 - 6)  oxyCODONE    IR 10 milliGRAM(s) Oral every 6 hours PRN Severe Pain (7 - 10)      RADIOLOGY & ADDITIONAL TESTS:    < from: Xray Chest 1 View- PORTABLE-Routine (Xray Chest 1 View- PORTABLE-Routine in AM.) (07.03.22 @ 04:08) >  INTERPRETATION:  Clinical History: Postop    Frontal examination of the chest demonstrates the heart to be within   normal limits in transverse diameter. No acute infiltrates. Status post   sternotomy and valvular replacement. Calcification aortic knob. Mild   dextroscoliosis thoracic spine.    IMPRESSION: No acute infiltrates    < end of copied text >

## 2022-07-04 LAB
ANION GAP SERPL CALC-SCNC: 10 MMOL/L — SIGNIFICANT CHANGE UP (ref 5–17)
BUN SERPL-MCNC: 14 MG/DL — SIGNIFICANT CHANGE UP (ref 7–23)
CALCIUM SERPL-MCNC: 9.3 MG/DL — SIGNIFICANT CHANGE UP (ref 8.4–10.5)
CHLORIDE SERPL-SCNC: 100 MMOL/L — SIGNIFICANT CHANGE UP (ref 96–108)
CO2 SERPL-SCNC: 23 MMOL/L — SIGNIFICANT CHANGE UP (ref 22–31)
CREAT SERPL-MCNC: 0.72 MG/DL — SIGNIFICANT CHANGE UP (ref 0.5–1.3)
EGFR: 100 ML/MIN/1.73M2 — SIGNIFICANT CHANGE UP
GLUCOSE SERPL-MCNC: 111 MG/DL — HIGH (ref 70–99)
HCT VFR BLD CALC: 27.9 % — LOW (ref 39–50)
HGB BLD-MCNC: 9 G/DL — LOW (ref 13–17)
MAGNESIUM SERPL-MCNC: 2 MG/DL — SIGNIFICANT CHANGE UP (ref 1.6–2.6)
MCHC RBC-ENTMCNC: 29.9 PG — SIGNIFICANT CHANGE UP (ref 27–34)
MCHC RBC-ENTMCNC: 32.3 GM/DL — SIGNIFICANT CHANGE UP (ref 32–36)
MCV RBC AUTO: 92.7 FL — SIGNIFICANT CHANGE UP (ref 80–100)
NRBC # BLD: 0 /100 WBCS — SIGNIFICANT CHANGE UP (ref 0–0)
PLATELET # BLD AUTO: 304 K/UL — SIGNIFICANT CHANGE UP (ref 150–400)
POTASSIUM SERPL-MCNC: 3.9 MMOL/L — SIGNIFICANT CHANGE UP (ref 3.5–5.3)
POTASSIUM SERPL-SCNC: 3.9 MMOL/L — SIGNIFICANT CHANGE UP (ref 3.5–5.3)
RBC # BLD: 3.01 M/UL — LOW (ref 4.2–5.8)
RBC # FLD: 14.9 % — HIGH (ref 10.3–14.5)
SODIUM SERPL-SCNC: 133 MMOL/L — LOW (ref 135–145)
WBC # BLD: 8.79 K/UL — SIGNIFICANT CHANGE UP (ref 3.8–10.5)
WBC # FLD AUTO: 8.79 K/UL — SIGNIFICANT CHANGE UP (ref 3.8–10.5)

## 2022-07-04 PROCEDURE — 71045 X-RAY EXAM CHEST 1 VIEW: CPT | Mod: 26

## 2022-07-04 RX ORDER — METOPROLOL TARTRATE 50 MG
25 TABLET ORAL EVERY 12 HOURS
Refills: 0 | Status: DISCONTINUED | OUTPATIENT
Start: 2022-07-04 | End: 2022-07-05

## 2022-07-04 RX ORDER — CHLORHEXIDINE GLUCONATE 213 G/1000ML
1 SOLUTION TOPICAL
Refills: 0 | Status: DISCONTINUED | OUTPATIENT
Start: 2022-07-04 | End: 2022-07-05

## 2022-07-04 RX ADMIN — HEPARIN SODIUM 5000 UNIT(S): 5000 INJECTION INTRAVENOUS; SUBCUTANEOUS at 13:02

## 2022-07-04 RX ADMIN — SODIUM CHLORIDE 3 MILLILITER(S): 9 INJECTION INTRAMUSCULAR; INTRAVENOUS; SUBCUTANEOUS at 15:40

## 2022-07-04 RX ADMIN — Medication 40 MILLIGRAM(S): at 17:47

## 2022-07-04 RX ADMIN — Medication 40 MILLIGRAM(S): at 05:49

## 2022-07-04 RX ADMIN — SODIUM CHLORIDE 3 MILLILITER(S): 9 INJECTION INTRAMUSCULAR; INTRAVENOUS; SUBCUTANEOUS at 06:11

## 2022-07-04 RX ADMIN — HEPARIN SODIUM 5000 UNIT(S): 5000 INJECTION INTRAVENOUS; SUBCUTANEOUS at 05:49

## 2022-07-04 RX ADMIN — OXYCODONE HYDROCHLORIDE 10 MILLIGRAM(S): 5 TABLET ORAL at 22:00

## 2022-07-04 RX ADMIN — ATORVASTATIN CALCIUM 80 MILLIGRAM(S): 80 TABLET, FILM COATED ORAL at 21:53

## 2022-07-04 RX ADMIN — Medication 40 MILLIEQUIVALENT(S): at 13:02

## 2022-07-04 RX ADMIN — HEPARIN SODIUM 5000 UNIT(S): 5000 INJECTION INTRAVENOUS; SUBCUTANEOUS at 21:53

## 2022-07-04 RX ADMIN — PANTOPRAZOLE SODIUM 40 MILLIGRAM(S): 20 TABLET, DELAYED RELEASE ORAL at 06:10

## 2022-07-04 RX ADMIN — CHLORHEXIDINE GLUCONATE 1 APPLICATION(S): 213 SOLUTION TOPICAL at 05:49

## 2022-07-04 RX ADMIN — OXYCODONE HYDROCHLORIDE 10 MILLIGRAM(S): 5 TABLET ORAL at 21:29

## 2022-07-04 RX ADMIN — SENNA PLUS 2 TABLET(S): 8.6 TABLET ORAL at 21:53

## 2022-07-04 RX ADMIN — Medication 25 MILLIGRAM(S): at 17:47

## 2022-07-04 RX ADMIN — CLOPIDOGREL BISULFATE 75 MILLIGRAM(S): 75 TABLET, FILM COATED ORAL at 13:02

## 2022-07-04 RX ADMIN — Medication 12.5 MILLIGRAM(S): at 05:49

## 2022-07-04 RX ADMIN — Medication 81 MILLIGRAM(S): at 17:51

## 2022-07-04 RX ADMIN — SODIUM CHLORIDE 3 MILLILITER(S): 9 INJECTION INTRAMUSCULAR; INTRAVENOUS; SUBCUTANEOUS at 22:09

## 2022-07-04 NOTE — PROGRESS NOTE ADULT - ASSESSMENT
Assessment:       Plan:    Neurovascular:   -Pain well controlled with current regimen.  -Continue Tylenol PRN    Cardiovascular:   #R ICA stroke immediately postop 6/23  -neurology following  -no intervention  -continue ASA/plavix  -continue atorvastatin  -Hemodynamically stable.   -Monitor: BP, HR, tele    Respiratory:   -Oxygenating well on room air  -Encourage continued use of IS 10x/hr and frequent ambulation  -CXR:    GI:  -GI PPX:  -PO Diet  -Bowel Regimen:     Renal / :  -Continue to monitor renal function: BUN/Cr  -Monitor I/O's daily     Endocrine:    -No hx of DM or thyroid dx  -A1c:  -TSH:    Hematologic:  -CBC: H/H-  -Coagulation Panel.    ID:  -Temperature:   -CBC: WBC-  -Continue to observe for SIRS/Sepsis Syndrome.    Prophylaxis:  -DVT prophylaxis with 5000 SubQ Heparin q8h.  -Continue with SCD's b/l while patient is at rest     Disposition:  -Discharge home once patient is medically ready     Assessment:       Plan:    Neurovascular:   #R ICA stroke immediately postop 6/23  -neurology following  -no intervention per neurosurgery   -continue ASA 81 QD and plavix 75 mg QD   -continue atorvastatin 80 QD   -Pain well controlled with current regimen.  -Continue Tylenol PRN    Cardiovascular:   #POD11 mini-AVR (27mm bio), ascending and hemiarch replacement EF normal  -continue ASA 81 QD and Plavix 75 mg QD  -continue atorvastatin 80 QD   -Increased Metoprolol to 25 Q12H   -continue lasix 40 mg BID   -continue to replete with Potassium 40 meq while taking lasix   -Hemodynamically stable.   -Monitor: BP, HR, tele    Respiratory:   #COPD, Current smoker   -Oxygenating well on room air  -continue Spiriva 1 inhalation QD   -Encourage continued use of IS 10x/hr and frequent ambulation    GI:  -GI PPX: Protonix 40 mg QD  -PO Diet  -Bowel Regimen: Miralax and senna daily, Dulcolax PRN    Renal / :  -Continue to monitor renal function: BUN/Cr 14/0.72  -Monitor I/O's daily     Endocrine:    -No hx of DM or thyroid dx  -A1c: 4.3  -TSH:1.50    Hematologic:  -CBC: H/H-9.0/27.9    ID:  -Temperature: afebrile   -CBC: WBC-8.79  -Continue to observe for SIRS/Sepsis Syndrome.    Prophylaxis:  -DVT prophylaxis with 5000 SubQ Heparin q8h.  -Continue with SCD's b/l while patient is at rest     Disposition:  -pending acceptance to a rehab facility      Assessment:   65 y/o male with a pmhx of HTN, anemia, arthritis, COPD, and a family history of marfan disease and aortic aneurysm (nephew) who presented for an aortic valve replacement with Dr. Toledo. On 6/23/22 he underwent a mini-AVR (27mm bio), ascending and hemiarch replacement EF normal with Dr. Toledo. Intraop course was uncomplicated and he was brought to CTICU. Upon waking up noted to not move LUE/LLE and a stroke code was called. CTA revealed occlusion of R ICA. Overnight into POD1 he was brought emergently to OR by neurosurgery for angiogram which revealed chronic occlusion of R ICA. No thrombectomy performed. POD1 a repeat CT-head revealed recent infarct to right precentral gyrus. EEG placed which was negative for seizures. POD2 neuro exam improved and MAPs kept higher for cerebral perfusion using pressor support. On POD3 he was extubated and able to move LLE. POD4 he passed speech and swallow. LUE remained weak, LLE improved. POD5 weaned from pressors. POD6-9 continued to improve, ambulated with PT. He was Delined, started on low dose BB and Transferred to stepdown unit. On PAD23-40 continued improvement of function of the LUE, he remains awaiting neuro rehab.    Plan:    Neurovascular:   #R ICA stroke immediately postop 6/23  -neurology following  -no intervention per neurosurgery   -continue ASA 81 QD and plavix 75 mg QD   -continue atorvastatin 80 QD   -Pain well controlled with current regimen.  -Continue Tylenol PRN    Cardiovascular:   #POD11 mini-AVR (27mm bio), ascending and hemiarch replacement EF normal  -continue ASA 81 QD and Plavix 75 mg QD  -continue atorvastatin 80 QD   -Increased Metoprolol to 25 Q12H   -continue lasix 40 mg BID   -continue to replete with Potassium 40 meq while taking lasix   -Hemodynamically stable.   -Monitor: BP, HR, tele    Respiratory:   #COPD, Current smoker   -Oxygenating well on room air  -continue Spiriva 1 inhalation QD   -Encourage continued use of IS 10x/hr and frequent ambulation    GI:  -GI PPX: Protonix 40 mg QD  -PO Diet  -Bowel Regimen: Miralax and senna daily, Dulcolax PRN    Renal / :  -Continue to monitor renal function: BUN/Cr 14/0.72  -Monitor I/O's daily     Endocrine:    -No hx of DM or thyroid dx  -A1c: 4.3  -TSH:1.50    Hematologic:  -CBC: H/H-9.0/27.9    ID:  -Temperature: afebrile   -CBC: WBC-8.79  -Continue to observe for SIRS/Sepsis Syndrome.    Prophylaxis:  -DVT prophylaxis with 5000 SubQ Heparin q8h.  -Continue with SCD's b/l while patient is at rest     Disposition:  -pending acceptance to a rehab facility

## 2022-07-04 NOTE — PROGRESS NOTE ADULT - SUBJECTIVE AND OBJECTIVE BOX
Patient discussed on morning rounds with       Operation / Date:     SUBJECTIVE ASSESSMENT:  67y Male       Vital Signs Last 24 Hrs  T(C): 36.6 (04 Jul 2022 09:33), Max: 37.2 (03 Jul 2022 17:16)  T(F): 97.8 (04 Jul 2022 09:33), Max: 98.9 (03 Jul 2022 17:16)  HR: 85 (04 Jul 2022 11:00) (72 - 93)  BP: 142/72 (04 Jul 2022 11:00) (119/64 - 154/77)  BP(mean): 102 (04 Jul 2022 09:19) (85 - 108)  RR: 17 (04 Jul 2022 09:19) (14 - 17)  SpO2: 96% (04 Jul 2022 11:00) (94% - 97%)  I&O's Detail    03 Jul 2022 07:01  -  04 Jul 2022 07:00  --------------------------------------------------------  IN:    Oral Fluid: 240 mL  Total IN: 240 mL    OUT:    Voided (mL): 1125 mL  Total OUT: 1125 mL    Total NET: -885 mL          CHEST TUBE:    EDMUNDO DRAIN:    EPICARDIAL WIRES:   TIE SOCORRO:   LEELA:     PHYSICAL EXAM:  *****    LABS:                        9.0    8.79  )-----------( 304      ( 04 Jul 2022 05:36 )             27.9           07-04    133<L>  |  100  |  14  ----------------------------<  111<H>  3.9   |  23  |  0.72    Ca    9.3      04 Jul 2022 05:36  Mg     2.0     07-04            MEDICATIONS  (STANDING):  aspirin  chewable 81 milliGRAM(s) Oral daily  atorvastatin 80 milliGRAM(s) Oral at bedtime  chlorhexidine 2% Cloths 1 Application(s) Topical <User Schedule>  clopidogrel Tablet 75 milliGRAM(s) Oral daily  dextrose 50% Injectable 50 milliLiter(s) IV Push every 15 minutes  dextrose 50% Injectable 25 milliLiter(s) IV Push every 15 minutes  furosemide    Tablet 40 milliGRAM(s) Oral <User Schedule>  heparin   Injectable 5000 Unit(s) SubCutaneous every 8 hours  metoprolol tartrate 12.5 milliGRAM(s) Oral <User Schedule>  pantoprazole    Tablet 40 milliGRAM(s) Oral before breakfast  polyethylene glycol 3350 17 Gram(s) Oral daily  potassium chloride    Tablet ER 40 milliEquivalent(s) Oral daily  senna 2 Tablet(s) Oral at bedtime  sodium chloride 0.9% lock flush 3 milliLiter(s) IV Push every 8 hours  sodium chloride 0.9%. 1000 milliLiter(s) (10 mL/Hr) IV Continuous <Continuous>  tiotropium 18 MICROgram(s) Capsule 1 Capsule(s) Inhalation daily    MEDICATIONS  (PRN):  acetaminophen     Tablet .. 650 milliGRAM(s) Oral every 6 hours PRN Mild Pain (1 - 3)  bisacodyl Suppository 10 milliGRAM(s) Rectal daily PRN Constipation  oxyCODONE    IR 5 milliGRAM(s) Oral every 6 hours PRN Moderate Pain (4 - 6)  oxyCODONE    IR 10 milliGRAM(s) Oral every 6 hours PRN Severe Pain (7 - 10)        RADIOLOGY & ADDITIONAL TESTS:     Patient discussed on morning rounds with Dr. Carrera     Operation / Date: Mini AVR (27 mm bio), ascending and hemiarch replacement, EF normal / 6/23/22    SUBJECTIVE ASSESSMENT:  67y Male seen and examined at bedside with improvement in his left hand movement/strength. He stated today he was able to better open and close his left hand today. He has been walking the halls with nursing staff with no issues. He has been using his IS regularly. He has regular BM and tolerated a PO diet. He denies chest pain, SOB/KAUFMAN, nausea, vomiting, headache, palpitations, dizziness, change in vision or any further associated symptoms.        Vital Signs Last 24 Hrs  T(C): 36.6 (04 Jul 2022 09:33), Max: 37.2 (03 Jul 2022 17:16)  T(F): 97.8 (04 Jul 2022 09:33), Max: 98.9 (03 Jul 2022 17:16)  HR: 85 (04 Jul 2022 11:00) (72 - 93)  BP: 142/72 (04 Jul 2022 11:00) (119/64 - 154/77)  BP(mean): 102 (04 Jul 2022 09:19) (85 - 108)  RR: 17 (04 Jul 2022 09:19) (14 - 17)  SpO2: 96% (04 Jul 2022 11:00) (94% - 97%)  I&O's Detail    03 Jul 2022 07:01  -  04 Jul 2022 07:00  --------------------------------------------------------  IN:    Oral Fluid: 240 mL  Total IN: 240 mL    OUT:    Voided (mL): 1125 mL  Total OUT: 1125 mL    Total NET: -885 mL    CHEST TUBE:  No  EDMUNDO DRAIN:  No  EPICARDIAL WIRES: No   TIE DOWNS: No  SWENSON: No    PHYSICAL EXAM:  GEN: NAD, looks comfortable  Psych: Mood appropriate  Neuro: A&Ox3. Left upper extremity weakness when compared to the right, able to weakly  when compared to the right. able to lift elbow off of bed. LUE and bilat lower extremities are 5/5 strength.  No facial asymmetry, no motor drift, no gaze preference, No focal deficits.  HEENT: No obvious abnormalities  CV: S1S2, regular, no murmurs appreciated.  No carotid bruits.  No JVD  Lungs: Clear B/L.  No wheezing, rales or rhonchi  ABD: Soft, non-tender, non-distended.  +Bowel sounds  EXT: Warm and well perfused.  No peripheral edema noted  Musculoskeletal: Moving all extremities with normal ROM, no joint swelling  PV: Pedal pulses palpable  Incisions: sternal incision site clean dry and intact with no drainage or infectious processes.     LABS:                        9.0    8.79  )-----------( 304      ( 04 Jul 2022 05:36 )             27.9         07-04    133<L>  |  100  |  14  ----------------------------<  111<H>  3.9   |  23  |  0.72    Ca    9.3      04 Jul 2022 05:36  Mg     2.0     07-04            MEDICATIONS  (STANDING):  aspirin  chewable 81 milliGRAM(s) Oral daily  atorvastatin 80 milliGRAM(s) Oral at bedtime  chlorhexidine 2% Cloths 1 Application(s) Topical <User Schedule>  clopidogrel Tablet 75 milliGRAM(s) Oral daily  dextrose 50% Injectable 50 milliLiter(s) IV Push every 15 minutes  dextrose 50% Injectable 25 milliLiter(s) IV Push every 15 minutes  furosemide    Tablet 40 milliGRAM(s) Oral <User Schedule>  heparin   Injectable 5000 Unit(s) SubCutaneous every 8 hours  metoprolol tartrate 12.5 milliGRAM(s) Oral <User Schedule>  pantoprazole    Tablet 40 milliGRAM(s) Oral before breakfast  polyethylene glycol 3350 17 Gram(s) Oral daily  potassium chloride    Tablet ER 40 milliEquivalent(s) Oral daily  senna 2 Tablet(s) Oral at bedtime  sodium chloride 0.9% lock flush 3 milliLiter(s) IV Push every 8 hours  sodium chloride 0.9%. 1000 milliLiter(s) (10 mL/Hr) IV Continuous <Continuous>  tiotropium 18 MICROgram(s) Capsule 1 Capsule(s) Inhalation daily    MEDICATIONS  (PRN):  acetaminophen     Tablet .. 650 milliGRAM(s) Oral every 6 hours PRN Mild Pain (1 - 3)  bisacodyl Suppository 10 milliGRAM(s) Rectal daily PRN Constipation  oxyCODONE    IR 5 milliGRAM(s) Oral every 6 hours PRN Moderate Pain (4 - 6)  oxyCODONE    IR 10 milliGRAM(s) Oral every 6 hours PRN Severe Pain (7 - 10)      RADIOLOGY & ADDITIONAL TESTS:  < from: Xray Chest 1 View- PORTABLE-Routine (Xray Chest 1 View- PORTABLE-Routine in AM.) (07.04.22 @ 05:31) >  IMPRESSION: No acute disease. No significant change.    --- End of Report ---    < end of copied text >

## 2022-07-05 ENCOUNTER — TRANSCRIPTION ENCOUNTER (OUTPATIENT)
Age: 67
End: 2022-07-05

## 2022-07-05 VITALS
OXYGEN SATURATION: 98 % | SYSTOLIC BLOOD PRESSURE: 120 MMHG | DIASTOLIC BLOOD PRESSURE: 78 MMHG | HEART RATE: 86 BPM | RESPIRATION RATE: 18 BRPM

## 2022-07-05 RX ORDER — POTASSIUM CHLORIDE 20 MEQ
1 PACKET (EA) ORAL
Qty: 30 | Refills: 0
Start: 2022-07-05 | End: 2022-08-03

## 2022-07-05 RX ORDER — PANTOPRAZOLE SODIUM 20 MG/1
1 TABLET, DELAYED RELEASE ORAL
Qty: 30 | Refills: 0
Start: 2022-07-05 | End: 2022-08-03

## 2022-07-05 RX ORDER — ATORVASTATIN CALCIUM 80 MG/1
1 TABLET, FILM COATED ORAL
Qty: 30 | Refills: 0
Start: 2022-07-05 | End: 2022-08-03

## 2022-07-05 RX ORDER — ACETAMINOPHEN 500 MG
2 TABLET ORAL
Qty: 56 | Refills: 0
Start: 2022-07-05 | End: 2022-07-11

## 2022-07-05 RX ORDER — CLOPIDOGREL BISULFATE 75 MG/1
1 TABLET, FILM COATED ORAL
Qty: 30 | Refills: 0
Start: 2022-07-05 | End: 2022-08-03

## 2022-07-05 RX ORDER — SENNA PLUS 8.6 MG/1
2 TABLET ORAL
Qty: 60 | Refills: 0
Start: 2022-07-05 | End: 2022-08-03

## 2022-07-05 RX ORDER — FUROSEMIDE 40 MG
1 TABLET ORAL
Qty: 30 | Refills: 0
Start: 2022-07-05 | End: 2022-08-03

## 2022-07-05 RX ORDER — POLYETHYLENE GLYCOL 3350 17 G/17G
17 POWDER, FOR SOLUTION ORAL
Qty: 0 | Refills: 0 | DISCHARGE
Start: 2022-07-05

## 2022-07-05 RX ORDER — ASPIRIN/CALCIUM CARB/MAGNESIUM 324 MG
1 TABLET ORAL
Qty: 30 | Refills: 0
Start: 2022-07-05 | End: 2022-08-03

## 2022-07-05 RX ORDER — METOPROLOL TARTRATE 50 MG
1 TABLET ORAL
Qty: 60 | Refills: 0
Start: 2022-07-05 | End: 2022-08-03

## 2022-07-05 RX ADMIN — CHLORHEXIDINE GLUCONATE 1 APPLICATION(S): 213 SOLUTION TOPICAL at 06:30

## 2022-07-05 RX ADMIN — Medication 40 MILLIEQUIVALENT(S): at 11:34

## 2022-07-05 RX ADMIN — Medication 650 MILLIGRAM(S): at 11:35

## 2022-07-05 RX ADMIN — Medication 40 MILLIGRAM(S): at 06:07

## 2022-07-05 RX ADMIN — Medication 40 MILLIGRAM(S): at 14:43

## 2022-07-05 RX ADMIN — Medication 25 MILLIGRAM(S): at 06:08

## 2022-07-05 RX ADMIN — HEPARIN SODIUM 5000 UNIT(S): 5000 INJECTION INTRAVENOUS; SUBCUTANEOUS at 06:07

## 2022-07-05 RX ADMIN — SODIUM CHLORIDE 3 MILLILITER(S): 9 INJECTION INTRAMUSCULAR; INTRAVENOUS; SUBCUTANEOUS at 11:08

## 2022-07-05 RX ADMIN — PANTOPRAZOLE SODIUM 40 MILLIGRAM(S): 20 TABLET, DELAYED RELEASE ORAL at 06:07

## 2022-07-05 RX ADMIN — CLOPIDOGREL BISULFATE 75 MILLIGRAM(S): 75 TABLET, FILM COATED ORAL at 11:35

## 2022-07-05 RX ADMIN — Medication 81 MILLIGRAM(S): at 16:10

## 2022-07-05 RX ADMIN — TIOTROPIUM BROMIDE 1 CAPSULE(S): 18 CAPSULE ORAL; RESPIRATORY (INHALATION) at 11:58

## 2022-07-05 RX ADMIN — Medication 25 MILLIGRAM(S): at 16:10

## 2022-07-05 RX ADMIN — HEPARIN SODIUM 5000 UNIT(S): 5000 INJECTION INTRAVENOUS; SUBCUTANEOUS at 14:43

## 2022-07-05 RX ADMIN — SODIUM CHLORIDE 3 MILLILITER(S): 9 INJECTION INTRAMUSCULAR; INTRAVENOUS; SUBCUTANEOUS at 06:30

## 2022-07-05 NOTE — DISCHARGE NOTE PROVIDER - PROVIDER TOKENS
PROVIDER:[TOKEN:[[id number]],SCHEDULEDAPPT:[07/14/2022],SCHEDULEDAPPTTIME:[02:00 AM]],PROVIDER:[TOKEN:[[id number]],SCHEDULEDAPPT:[07/15/2022],SCHEDULEDAPPTTIME:[11:00 AM]]

## 2022-07-05 NOTE — DISCHARGE NOTE PROVIDER - NSDCFUADDINST_GEN_ALL_CORE_FT
-Walk daily as tolerated and use your incentive spirometer 10 times every hour while you are awake.     -Please weigh yourself daily. If you notice over a 3 pound weight gain in 3 days, this is a sign you are likely retaining too much fluid. It is imperative you call our right away with unexplained rapid weight gain.      -Please continue to wear the compression stockings given to you in the hospital at home. This is a way to prevent fluid from building up in your legs.     -No driving or strenuous activity/exercise until cleared by your surgeon.    -Gently clean your incisions with unscented/antibacterial soap and water, pat dry.  You may leave them open to air.    -Call your doctor if you have shortness of breath, chest pain not relieved by pain medication, dizziness, fever >100.5, or increased redness or drainage from incisions.

## 2022-07-05 NOTE — PROGRESS NOTE ADULT - SUBJECTIVE AND OBJECTIVE BOX
Patient discussed on morning rounds with       Operation / Date:     Surgeon:    Referring Physician:    SUBJECTIVE ASSESSMENT AND HOSPITAL COURSE:  67y Male       Vital Signs Last 24 Hrs  T(C): 36.6 (05 Jul 2022 09:29), Max: 36.8 (04 Jul 2022 17:28)  T(F): 97.9 (05 Jul 2022 09:29), Max: 98.2 (04 Jul 2022 17:28)  HR: 95 (05 Jul 2022 09:48) (72 - 103)  BP: 135/74 (05 Jul 2022 09:48) (115/69 - 152/84)  BP(mean): 98 (05 Jul 2022 09:48) (86 - 112)  RR: 18 (05 Jul 2022 05:00) (16 - 18)  SpO2: 98% (05 Jul 2022 09:48) (95% - 100%)    EPICARDIAL WIRES REMOVED: Yes/No.  TIE DOWNS REMOVED: Yes/No.    PHYSICAL EXAM:    General:     Neurological:    Cardiovascular:    Respiratory:    Gastrointestinal:    Extremities:    Vascular:    Incision Sites:    LABS:                        9.0    8.79  )-----------( 304      ( 04 Jul 2022 05:36 )             27.9       COUMADIN:  Yes/No.        DOSE:                  INDICATION:                GOAL INR:        07-04    133<L>  |  100  |  14  ----------------------------<  111<H>  3.9   |  23  |  0.72    Ca    9.3      04 Jul 2022 05:36  Mg     2.0     07-04            Discharge CXR:    Discharge ECHO:     Patient discussed on morning rounds with Dr. Wilson     Operation / Date:     Surgeon:    Referring Physician:    SUBJECTIVE ASSESSMENT AND HOSPITAL COURSE:  67y Male       Vital Signs Last 24 Hrs  T(C): 36.6 (05 Jul 2022 09:29), Max: 36.8 (04 Jul 2022 17:28)  T(F): 97.9 (05 Jul 2022 09:29), Max: 98.2 (04 Jul 2022 17:28)  HR: 95 (05 Jul 2022 09:48) (72 - 103)  BP: 135/74 (05 Jul 2022 09:48) (115/69 - 152/84)  BP(mean): 98 (05 Jul 2022 09:48) (86 - 112)  RR: 18 (05 Jul 2022 05:00) (16 - 18)  SpO2: 98% (05 Jul 2022 09:48) (95% - 100%)    EPICARDIAL WIRES REMOVED: Yes/No.  TIE DOWNS REMOVED: Yes/No.    PHYSICAL EXAM:    General:     Neurological:    Cardiovascular:    Respiratory:    Gastrointestinal:    Extremities:    Vascular:    Incision Sites:    LABS:                        9.0    8.79  )-----------( 304      ( 04 Jul 2022 05:36 )             27.9       COUMADIN:  Yes/No.        DOSE:                  INDICATION:                GOAL INR:        07-04    133<L>  |  100  |  14  ----------------------------<  111<H>  3.9   |  23  |  0.72    Ca    9.3      04 Jul 2022 05:36  Mg     2.0     07-04            Discharge CXR:    Discharge ECHO:     Patient discussed on morning rounds with Dr. Wilson     Operation / Date: Mini AVR- (27 mm bio), ascending and hemiarch replacement, Normal EF 6/23/22    Surgeon: Dr. Toledo     Referring Physician: Dr. Juan Manuel Rod     SUBJECTIVE ASSESSMENT   67y Male seen and examined at [facility] who stated he has no complaints and is excited to go home and begin his out patient rehabilitation He stated he feels safe at home and already has a rolling walker and all needed equipment. He stated that his left hand has been improving daily. He had been walking the halls with PT and nursing staff with no issues. He uses his IS regularly. He had BM regularly most recent being today and has been tolerating a PO diet. He denies n/v/d/c, abd pain, chest pain, SOB/KAUFMAN, headache, dizziness, or any further associated symptoms.     HOSPITAL COURSE:  67 y/o male with a pmhx of HTN, anemia, arthritis, COPD, and a family history of marfan disease and aortic aneurysm (nephew) who presented for an aortic valve replacement with Dr. Toledo. On 6/23/22 he underwent a mini-AVR (27mm bio), ascending and hemiarch replacement EF normal with Dr. Toledo. Intraop course was uncomplicated and he was brought to CTICU. Upon waking up noted to not move LUE/LLE and a stroke code was called. CTA revealed occlusion of R ICA. Overnight into POD1 he was brought emergently to OR by neurosurgery for angiogram which revealed chronic occlusion of R ICA. No thrombectomy performed. POD1 a repeat CT-head revealed recent infarct to right precentral gyrus. EEG placed which was negative for seizures. POD2 neuro exam improved and MAPs kept higher for cerebral perfusion using pressor support. On POD3 he was extubated and able to move LLE. POD4 he passed speech and swallow. LUE remained weak, LLE improved. POD5 weaned from pressors. POD6-9 continued to improve, ambulated with PT. He was Delined, started on low dose BB and Transferred to stepdown unit. On [id number] continued improvement of function of the LUE. On POD 12 he was re evaluated by PT/OT who found him stable for discharge home and for outpatient rehabilitation rather then Neuro rehab. He agreed to the plan and is excited to go home and begin his out patient rehab. He stated he feels safe and home and he has a rolling walker at home and does not need any further equipment. He had ambulated the halls with no issues, tolerated a PO diet, and had regular BM. He is medically cleared for discharge home.     Over 35 minutes was spent with the patient reviewing the discharge material including medications, follow up appointments, recovery, concerning symptoms, and how to contact their health care providers if they have questions    Vital Signs Last 24 Hrs  T(C): 36.6 (05 Jul 2022 09:29), Max: 36.8 (04 Jul 2022 17:28)  T(F): 97.9 (05 Jul 2022 09:29), Max: 98.2 (04 Jul 2022 17:28)  HR: 95 (05 Jul 2022 09:48) (72 - 103)  BP: 135/74 (05 Jul 2022 09:48) (115/69 - 152/84)  BP(mean): 98 (05 Jul 2022 09:48) (86 - 112)  RR: 18 (05 Jul 2022 05:00) (16 - 18)  SpO2: 98% (05 Jul 2022 09:48) (95% - 100%)    EPICARDIAL WIRES REMOVED: Yes/No.  TIE DOWNS REMOVED: Yes/No.    PHYSICAL EXAM:    General:     Neurological:    Cardiovascular:    Respiratory:    Gastrointestinal:    Extremities:    Vascular:    Incision Sites:    LABS:                        9.0    8.79  )-----------( 304      ( 04 Jul 2022 05:36 )             27.9       COUMADIN:  Yes/No.        DOSE:                  INDICATION:                GOAL INR:        07-04    133<L>  |  100  |  14  ----------------------------<  111<H>  3.9   |  23  |  0.72    Ca    9.3      04 Jul 2022 05:36  Mg     2.0     07-04            Discharge CXR:    Discharge ECHO:     Patient discussed on morning rounds with Dr. Wilson     Operation / Date: Mini AVR- (27 mm bio), ascending and hemiarch replacement, Normal EF 6/23/22    Surgeon: Dr. Toledo     Referring Physician: Dr. Juan Manuel Rod     SUBJECTIVE ASSESSMENT   67y Male seen and examined at [facility] who stated he has no complaints and is excited to go home and begin his out patient rehabilitation He stated he feels safe at home and already has a rolling walker and all needed equipment. He stated that his left hand has been improving daily. He had been walking the halls with PT and nursing staff with no issues. He uses his IS regularly. He had BM regularly most recent being today and has been tolerating a PO diet. He denies n/v/d/c, abd pain, chest pain, SOB/KAUFMAN, headache, dizziness, or any further associated symptoms.     HOSPITAL COURSE:  67 y/o male with a pmhx of HTN, anemia, arthritis, COPD, and a family history of marfan disease and aortic aneurysm (nephew) who presented for an aortic valve replacement with Dr. Toledo. On 6/23/22 he underwent a mini-AVR (27mm bio), ascending and hemiarch replacement EF normal with Dr. Toledo. Intraop course was uncomplicated and he was brought to CTICU. Upon waking up noted to not move LUE/LLE and a stroke code was called. CTA revealed occlusion of R ICA. Overnight into POD1 he was brought emergently to OR by neurosurgery for angiogram which revealed chronic occlusion of R ICA. No thrombectomy performed. POD1 a repeat CT-head revealed recent infarct to right precentral gyrus. EEG placed which was negative for seizures. POD2 neuro exam improved and MAPs kept higher for cerebral perfusion using pressor support. On POD3 he was extubated and able to move LLE. POD4 he passed speech and swallow. LUE remained weak, LLE improved. POD5 weaned from pressors. POD6-9 continued to improve, ambulated with PT. He was Delined, started on low dose BB and Transferred to stepdown unit. On [id number] continued improvement of function of the LUE. On POD 12 he was re evaluated by PT/OT who found him stable for discharge home and for outpatient rehabilitation rather then Neuro rehab. He agreed to the plan and is excited to go home and begin his out patient rehab. He stated he feels safe and home and he has been sent home with a rolling walker and does not need any further equipment. He had ambulated the halls with no issues, tolerated a PO diet, and had regular BM. He is medically cleared for discharge home.     Over 35 minutes was spent with the patient reviewing the discharge material including medications, follow up appointments, recovery, concerning symptoms, and how to contact their health care providers if they have questions    Vital Signs Last 24 Hrs  T(C): 36.6 (05 Jul 2022 09:29), Max: 36.8 (04 Jul 2022 17:28)  T(F): 97.9 (05 Jul 2022 09:29), Max: 98.2 (04 Jul 2022 17:28)  HR: 95 (05 Jul 2022 09:48) (72 - 103)  BP: 135/74 (05 Jul 2022 09:48) (115/69 - 152/84)  BP(mean): 98 (05 Jul 2022 09:48) (86 - 112)  RR: 18 (05 Jul 2022 05:00) (16 - 18)  SpO2: 98% (05 Jul 2022 09:48) (95% - 100%)    EPICARDIAL WIRES REMOVED: Yes  TIE DOWNS REMOVED: Yes    PHYSICAL EXAM:  GEN: NAD, looks comfortable  Psych: Mood appropriate  Neuro: A&Ox3. LUE hand  is weaker when compared to the RUE hand  which is improving. No focal deficits.  Moving all extremities.   HEENT: No obvious abnormalities  CV: S1S2, regular, no murmurs appreciated.  No carotid bruits.  No JVD  Lungs: Clear B/L.  No wheezing, rales or rhonchi  ABD: Soft, non-tender, non-distended.  +Bowel sounds  EXT: Warm and well perfused.  No peripheral edema noted  Musculoskeletal: Moving all extremities with normal ROM, no joint swelling  PV: Pedal pulses palpable  Incision Sites: sternal incision site is clean dry and intact and open to air, sutures from oliver sites removed, sites are clean dry and intact with no purulent drainage.     LABS:                        9.0    8.79  )-----------( 304      ( 04 Jul 2022 05:36 )             27.9       COUMADIN:  Yes/No.        DOSE:                  INDICATION:                GOAL INR:        07-04    133<L>  |  100  |  14  ----------------------------<  111<H>  3.9   |  23  |  0.72    Ca    9.3      04 Jul 2022 05:36  Mg     2.0     07-04          Discharge CXR:  < from: Xray Chest 1 View- PORTABLE-Routine (Xray Chest 1 View- PORTABLE-Routine in AM.) (07.04.22 @ 05:31) >    IMPRESSION: No acute disease. No significant change.    --- End of Report ---    < end of copied text >    Discharge ECHO:  < from: TTE Echo Complete w/o Contrast w/ Doppler (06.27.22 @ 11:37) >  ----  CONCLUSIONS:     1. Limited study obtained for evaluation of pericardial effusion.   2. No pericardial effusion.    < end of copied text >

## 2022-07-05 NOTE — DISCHARGE NOTE PROVIDER - NSDCCPCAREPLAN_GEN_ALL_CORE_FT
PRINCIPAL DISCHARGE DIAGNOSIS  Diagnosis: Aortic insufficiency  Assessment and Plan of Treatment:       SECONDARY DISCHARGE DIAGNOSES  Diagnosis: CVA (cerebrovascular accident)  Assessment and Plan of Treatment:     Diagnosis: S/P aortic aneurysm repair  Assessment and Plan of Treatment:

## 2022-07-05 NOTE — DISCHARGE NOTE PROVIDER - REASON FOR ADMISSION
mini- AVR (27mm bio), ascending and hemiarch replacement, normal EF  cardiac catheterization mini-AVR (27 mm bio), ascending and hemiarch replacement normal EF

## 2022-07-05 NOTE — DISCHARGE NOTE PROVIDER - HOSPITAL COURSE
65 y/o male with a pmhx of HTN, anemia, arthritis, COPD, and a family history of marfan disease and aortic aneurysm (nephew) who presented for an aortic valve replacement with Dr. Toledo. On 6/23/22 he underwent a mini-AVR (27mm bio), ascending and hemiarch replacement EF normal with Dr. Toledo. Intraop course was uncomplicated and he was brought to CTICU. Upon waking up noted to not move LUE/LLE and a stroke code was called. CTA revealed occlusion of R ICA. Overnight into POD1 he was brought emergently to OR by neurosurgery for angiogram which revealed chronic occlusion of R ICA. No thrombectomy performed. POD1 a repeat CT-head revealed recent infarct to right precentral gyrus. EEG placed which was negative for seizures. POD2 neuro exam improved and MAPs kept higher for cerebral perfusion using pressor support. On POD3 he was extubated and able to move LLE. POD4 he passed speech and swallow. LUE remained weak, LLE improved. POD5 weaned from pressors. POD6-9 continued to improve, ambulated with PT. He was Delined, started on low dose BB and Transferred to stepdown unit. On [id number] continued improvement of function of the LUE. On POD 10 he was re evaluated by PT/OT who found him stable for discharge home and for outpatient rehabilitation rather then Neuro rehab. He agreed to the plan and is excited to go home and begin his out patient rehab. He had ambulated the halls with no issues, tolerated a PO diet, and had regular BM. He is medically cleared for discharge home.     Over 35 minutes was spent with the patient reviewing the discharge material including medications, follow up appointments, recovery, concerning symptoms, and how to contact their health care providers if they have questions     65 y/o male with a pmhx of HTN, anemia, arthritis, COPD, and a family history of marfan disease and aortic aneurysm (nephew) who presented for an aortic valve replacement with Dr. Toledo. On 6/23/22 he underwent a mini-AVR (27mm bio), ascending and hemiarch replacement EF normal with Dr. Toledo. Intraop course was uncomplicated and he was brought to CTICU. Upon waking up noted to not move LUE/LLE and a stroke code was called. CTA revealed occlusion of R ICA. Overnight into POD1 he was brought emergently to OR by neurosurgery for angiogram which revealed chronic occlusion of R ICA. No thrombectomy performed. POD1 a repeat CT-head revealed recent infarct to right precentral gyrus. EEG placed which was negative for seizures. POD2 neuro exam improved and MAPs kept higher for cerebral perfusion using pressor support. On POD3 he was extubated and able to move LLE. POD4 he passed speech and swallow. LUE remained weak, LLE improved. POD5 weaned from pressors. POD6-9 continued to improve, ambulated with PT. He was Delined, started on low dose BB and Transferred to stepdown unit. On [id number] continued improvement of function of the LUE. On POD 12 he was re evaluated by PT/OT who found him stable for discharge home and for outpatient rehabilitation rather then Neuro rehab. He agreed to the plan and is excited to go home and begin his out patient rehab. He had ambulated the halls with no issues, tolerated a PO diet, and had regular BM. He is medically cleared for discharge home.     Over 35 minutes was spent with the patient reviewing the discharge material including medications, follow up appointments, recovery, concerning symptoms, and how to contact their health care providers if they have questions     65 y/o male with a pmhx of HTN, anemia, arthritis, COPD, and a family history of marfan disease and aortic aneurysm (nephew) who presented for an aortic valve replacement with Dr. Toledo. On 6/23/22 he underwent a mini-AVR (27mm bio), ascending and hemiarch replacement EF normal with Dr. Toledo. Intraop course was uncomplicated and he was brought to CTICU. Upon waking up noted to not move LUE/LLE and a stroke code was called. CTA revealed occlusion of R ICA. Overnight into POD1 he was brought emergently to OR by neurosurgery for angiogram which revealed chronic occlusion of R ICA. No thrombectomy performed. POD1 a repeat CT-head revealed recent infarct to right precentral gyrus. EEG placed which was negative for seizures. POD2 neuro exam improved and MAPs kept higher for cerebral perfusion using pressor support. On POD3 he was extubated and able to move LLE. POD4 he passed speech and swallow. LUE remained weak, LLE improved. POD5 weaned from pressors. POD6-9 continued to improve, ambulated with PT. He was Delined, started on low dose BB and Transferred to stepdown unit. On [id number] continued improvement of function of the LUE. On POD 12 he was re evaluated by PT/OT who found him stable for discharge home and for outpatient rehabilitation rather then Neuro rehab. He agreed to the plan and is excited to go home and begin his out patient rehab. He stated he feels safe and home and he has a rolling walker at home and does not need any further equipment. He had ambulated the halls with no issues, tolerated a PO diet, and had regular BM. He is medically cleared for discharge home.     Over 35 minutes was spent with the patient reviewing the discharge material including medications, follow up appointments, recovery, concerning symptoms, and how to contact their health care providers if they have questions     67 y/o male with a pmhx of HTN, anemia, arthritis, COPD, and a family history of marfan disease and aortic aneurysm (nephew) who presented for an aortic valve replacement with Dr. Toledo. On 6/23/22 he underwent a mini-AVR (27mm bio), ascending and hemiarch replacement EF normal with Dr. Toledo. Intraop course was uncomplicated and he was brought to CTICU. Upon waking up noted to not move LUE/LLE and a stroke code was called. CTA revealed occlusion of R ICA. Overnight into POD1 he was brought emergently to OR by neurosurgery for angiogram which revealed chronic occlusion of R ICA. No thrombectomy performed. POD1 a repeat CT-head revealed recent infarct to right precentral gyrus. EEG placed which was negative for seizures. POD2 neuro exam improved and MAPs kept higher for cerebral perfusion using pressor support. On POD3 he was extubated and able to move LLE. POD4 he passed speech and swallow. LUE remained weak, LLE improved. POD5 weaned from pressors. POD6-9 continued to improve, ambulated with PT. He was Delined, started on low dose BB and Transferred to stepdown unit. On [id number] continued improvement of function of the LUE. On POD 12 he was re evaluated by PT/OT who found him stable for discharge home and for outpatient rehabilitation rather then Neuro rehab. He agreed to the plan and is excited to go home and begin his out patient rehab. He stated he feels safe and home and was given a prescription for a rolling walker for home. He had ambulated the halls with no issues, tolerated a PO diet, and had regular BM. He is medically cleared for discharge home.     Over 35 minutes was spent with the patient reviewing the discharge material including medications, follow up appointments, recovery, concerning symptoms, and how to contact their health care providers if they have questions     65 y/o male with a pmhx of HTN, anemia, arthritis, COPD, and a family history of marfan disease and aortic aneurysm (nephew) who presented for an aortic valve replacement with Dr. Toledo. On 6/23/22 he underwent a mini-AVR (27mm bio), ascending and hemiarch replacement EF normal with Dr. Toledo. Intraop course was uncomplicated and he was brought to CTICU. Upon waking up noted to not move LUE/LLE and a stroke code was called. CTA revealed occlusion of R ICA. Overnight into POD1 he was brought emergently to OR by neurosurgery for angiogram which revealed chronic occlusion of R ICA. No thrombectomy performed. POD1 a repeat CT-head revealed recent infarct to right precentral gyrus. EEG placed which was negative for seizures. POD2 neuro exam improved and MAPs kept higher for cerebral perfusion using pressor support. On POD3 he was extubated and able to move LLE. POD4 he passed speech and swallow. LUE remained weak, LLE improved. POD5 weaned from pressors. POD6-9 continued to improve, ambulated with PT. He was Delined, started on low dose BB and Transferred to stepdown unit. On ZLS61-42 continued improvement of function of the LUE. On POD 12 he was re evaluated by PT/OT who found him stable for discharge home and for outpatient rehabilitation rather then Neuro rehab. He agreed to the plan and is excited to go home and begin his out patient rehab. He stated he feels safe and home and was sent home with a rolling walker for home. He had ambulated the halls with no issues, tolerated a PO diet, and had regular BM. He is medically cleared for discharge home.     Over 35 minutes was spent with the patient reviewing the discharge material including medications, follow up appointments, recovery, concerning symptoms, and how to contact their health care providers if they have questions

## 2022-07-05 NOTE — PROGRESS NOTE ADULT - ASSESSMENT
Med Reconciliation:  Medication Reconciliation Status	Admission Reconciliation is Completed  Discharge Reconciliation is Completed  Discharge Medications	acetaminophen 325 mg oral tablet: 2 tab(s) orally every 6 hours, As needed, Mild Pain (1 - 3)  aspirin 81 mg oral tablet, chewable: 1 tab(s) orally once a day  atorvastatin 80 mg oral tablet: 1 tab(s) orally once a day (at bedtime)  clopidogrel 75 mg oral tablet: 1 tab(s) orally once a day  furosemide 40 mg oral tablet: 1 tab(s) orally once a day   metoprolol tartrate 25 mg oral tablet: 1 tab(s) orally every 12 hours  pantoprazole 40 mg oral delayed release tablet: 1 tab(s) orally once a day (before a meal)  polyethylene glycol 3350 oral powder for reconstitution: 17 gram(s) orally once a day  potassium chloride 20 mEq oral tablet, extended release: 1 tab(s) orally once a day   rolling walker : Dispense one ICD code: I25.10 Height: 190.5cm Weight: 80.00 kg  senna leaf extract oral tablet: 2 tab(s) orally once a day (at bedtime)  Spiriva 18 mcg inhalation capsule: 1 cap(s) inhaled once a day  Wixela Inhub 250 mcg-50 mcg inhalation powder: 1 puff(s) inhaled 2 times a day  ,  ,     Care Plan/Procedures:  Discharge Diagnoses, Assessment and Plan of Treatment	PRINCIPAL DISCHARGE DIAGNOSIS  Diagnosis: Aortic insufficiency  Assessment and Plan of Treatment:       SECONDARY DISCHARGE DIAGNOSES  Diagnosis: CVA (cerebrovascular accident)  Assessment and Plan of Treatment:     Diagnosis: S/P aortic aneurysm repair  Assessment and Plan of Treatment:  Discharge Procedures, Findings and Treatment	PRINCIPAL PROCEDURE  Procedure: Replacement of aortic valve with tissue valve  Findings and Treatment: 27mm bio Inspiris, Gorman      SECONDARY PROCEDURE  Procedure: Replacement, ascending aorta and hemiarch  Findings and Treatment: 30mm antiflow graft  Total bypass time: 179 mins   aortic clamp time: 130 mins  Circulatory arrest time 16 mins (ACP 13 mins and RCP 3 mins)  Goal(s)	To get better and follow your care plan as instructed.     Follow Up:  Care Providers for Follow up (PCP/Outpatient Provider)	Faisal Toledo)  Surgery; Thoracic and Cardiac Surgery  [address] [place] [address]  Phone: [phone]  Fax: [phone]  Scheduled Appointment: 07/14/2022 02:00 AM    TAI VIZCAINO  Internal Medicine  [address] [facility] [place] [address]  Phone: ([phone]  Fax: ([phone]  Scheduled Appointment: 07/15/2022 11:00 AM  Patient's Scheduled Appointments	[facility]  [facility] [address]  Scheduled Appointment: 07/14/2022  Additional Scheduled Appointments	Any issues regarding your appointments please call our office at [phone]  Discharge Diet	Regular Diet - No restrictions  Activity	No heavy lifting/straining, Stairs allowed, Walking - Indoors allowed, Walking - Outdoors allowed, Follow Instructions Provided by your Surgical Team  Additional Instructions	-Walk daily as tolerated and use your incentive spirometer 10 times every hour while you are awake.     -Please weigh yourself daily. If you notice over a 3 pound weight gain in 3 days, this is a sign you are likely retaining too much fluid. It is imperative you call our right away with unexplained rapid weight gain.      -Please continue to wear the compression stockings given to you in the hospital at home. This is a way to prevent fluid from building up in your legs.     -No driving or strenuous activity/exercise until cleared by your surgeon.    -Gently clean your incisions with unscented/antibacterial soap and water, pat dry.  You may leave them open to air.    -Call your doctor if you have shortness of breath, chest pain not relieved by pain medication, dizziness, fever >100.5, or increased redness or drainage from incisions.     Quality Measures:  Patient Condition	Stable  Hospice Patient	No  Does the patient have difficulty running errands alone like visiting a doctor’s office or shopping?	No  Does the patient have difficulty climbing stairs?	No  Cognition: The patient has	No difficulties  Does the patient have a principal diagnosis of ischemic stroke, hemorrhagic stroke, or TIA?	No  Does the patient have a principal diagnosis of Acute Myocardial Infarction?	No  Has the patient had a Percutaneous Coronary Intervention?	No     Home Health:  Discharged with Home Health Care Services?	Yes

## 2022-07-05 NOTE — DISCHARGE NOTE PROVIDER - NSDCCPTREATMENT_GEN_ALL_CORE_FT
PRINCIPAL PROCEDURE  Procedure: Replacement of aortic valve with tissue valve  Findings and Treatment: 27mm bio Inspiris, Gorman      SECONDARY PROCEDURE  Procedure: Replacement, ascending aorta and hemiarch  Findings and Treatment: 30mm antiflow graft  Total bypass time: 179 mins   aortic clamp time: 130 mins  Circulatory arrest time 16 mins (ACP 13 mins and RCP 3 mins)

## 2022-07-05 NOTE — DISCHARGE NOTE PROVIDER - CARE PROVIDERS DIRECT ADDRESSES
,[email or web address],DirectAddress_Unknown

## 2022-07-05 NOTE — DISCHARGE NOTE NURSING/CASE MANAGEMENT/SOCIAL WORK - PATIENT PORTAL LINK FT
You can access the FollowMyHealth Patient Portal offered by [facility] by registering at the following website: [email or web address]. By joining [employer]’s FollowMyHealth portal, you will also be able to view your health information using other applications (apps) compatible with our system.

## 2022-07-05 NOTE — DISCHARGE NOTE PROVIDER - NSDCHHCONTACT_GEN_ALL_CORE_FT
Monitor summary  SR [id number]  Rare PVCs, rare PACs, run down to 40, 1.9 second pause.   .16/.10/.38  Per monitor room strip summary.    As certified below, I, or a nurse practitioner or physician assistant working with me, had a face-to-face encounter that meets the physician face-to-face encounter requirements.

## 2022-07-05 NOTE — DISCHARGE NOTE PROVIDER - CARE PROVIDER_API CALL
Faisal Toledo (MD)  Surgery; Thoracic and Cardiac Surgery  [address] [place] [address]  Phone: [phone]  Fax: [phone]  Scheduled Appointment: 07/14/2022 02:00 AM    TAI VIZCAINO  Internal Medicine  [address] [place] [address]  Phone: ([phone]  Fax: ()-  Scheduled Appointment: 07/15/2022 11:00 AM

## 2022-07-05 NOTE — DISCHARGE NOTE NURSING/CASE MANAGEMENT/SOCIAL WORK - NSDCPEFALRISK_GEN_ALL_CORE
For information on Fall & Injury Prevention, visit: [email or web address] OR  [email or web address] OR  https://www.cdc.gov/steadi/patient.html

## 2022-07-05 NOTE — DISCHARGE NOTE PROVIDER - NSDCMRMEDTOKEN_GEN_ALL_CORE_FT
acetaminophen 325 mg oral tablet: 2 tab(s) orally every 6 hours, As needed, Mild Pain (1 - 3)  aspirin 81 mg oral tablet, chewable: 1 tab(s) orally once a day  atorvastatin 80 mg oral tablet: 1 tab(s) orally once a day (at bedtime)  clopidogrel 75 mg oral tablet: 1 tab(s) orally once a day  furosemide 40 mg oral tablet: 1 tab(s) orally once a day   metoprolol tartrate 25 mg oral tablet: 1 tab(s) orally every 12 hours  pantoprazole 40 mg oral delayed release tablet: 1 tab(s) orally once a day (before a meal)  polyethylene glycol 3350 oral powder for reconstitution: 17 gram(s) orally once a day  potassium chloride 20 mEq oral tablet, extended release: 1 tab(s) orally once a day   senna leaf extract oral tablet: 2 tab(s) orally once a day (at bedtime)  Spiriva 18 mcg inhalation capsule: 1 cap(s) inhaled once a day  Wixela Inhub 250 mcg-50 mcg inhalation powder: 1 puff(s) inhaled 2 times a day   acetaminophen 325 mg oral tablet: 2 tab(s) orally every 6 hours, As needed, Mild Pain (1 - 3)  aspirin 81 mg oral tablet, chewable: 1 tab(s) orally once a day  atorvastatin 80 mg oral tablet: 1 tab(s) orally once a day (at bedtime)  clopidogrel 75 mg oral tablet: 1 tab(s) orally once a day  furosemide 40 mg oral tablet: 1 tab(s) orally once a day   metoprolol tartrate 25 mg oral tablet: 1 tab(s) orally every 12 hours  pantoprazole 40 mg oral delayed release tablet: 1 tab(s) orally once a day (before a meal)  polyethylene glycol 3350 oral powder for reconstitution: 17 gram(s) orally once a day  potassium chloride 20 mEq oral tablet, extended release: 1 tab(s) orally once a day   rolling walker : Dispense one ICD code: I25.10 Height: 190.5cm Weight: 80.00 kg  senna leaf extract oral tablet: 2 tab(s) orally once a day (at bedtime)  Spiriva 18 mcg inhalation capsule: 1 cap(s) inhaled once a day  Wixela Inhub 250 mcg-50 mcg inhalation powder: 1 puff(s) inhaled 2 times a day

## 2022-07-06 ENCOUNTER — TRANSCRIPTION ENCOUNTER (OUTPATIENT)
Age: 67
End: 2022-07-06

## 2022-07-06 PROCEDURE — P9059: CPT

## 2022-07-06 PROCEDURE — 85347 COAGULATION TIME ACTIVATED: CPT

## 2022-07-06 PROCEDURE — C1894: CPT

## 2022-07-06 PROCEDURE — C1757: CPT

## 2022-07-06 PROCEDURE — 71046 X-RAY EXAM CHEST 2 VIEWS: CPT

## 2022-07-06 PROCEDURE — 93306 TTE W/DOPPLER COMPLETE: CPT

## 2022-07-06 PROCEDURE — 83605 ASSAY OF LACTIC ACID: CPT

## 2022-07-06 PROCEDURE — C1760: CPT

## 2022-07-06 PROCEDURE — 87635 SARS-COV-2 COVID-19 AMP PRB: CPT

## 2022-07-06 PROCEDURE — 82330 ASSAY OF CALCIUM: CPT

## 2022-07-06 PROCEDURE — C1768: CPT

## 2022-07-06 PROCEDURE — 80048 BASIC METABOLIC PNL TOTAL CA: CPT

## 2022-07-06 PROCEDURE — 95708 EEG WO VID EA 12-26HR UNMNTR: CPT

## 2022-07-06 PROCEDURE — 95705 EEG W/O VID 2-12 HR UNMNTR: CPT

## 2022-07-06 PROCEDURE — 83735 ASSAY OF MAGNESIUM: CPT

## 2022-07-06 PROCEDURE — C1889: CPT

## 2022-07-06 PROCEDURE — 85610 PROTHROMBIN TIME: CPT

## 2022-07-06 PROCEDURE — 70450 CT HEAD/BRAIN W/O DYE: CPT

## 2022-07-06 PROCEDURE — 97112 NEUROMUSCULAR REEDUCATION: CPT

## 2022-07-06 PROCEDURE — 88305 TISSUE EXAM BY PATHOLOGIST: CPT

## 2022-07-06 PROCEDURE — 36415 COLL VENOUS BLD VENIPUNCTURE: CPT

## 2022-07-06 PROCEDURE — 85730 THROMBOPLASTIN TIME PARTIAL: CPT

## 2022-07-06 PROCEDURE — P9037: CPT

## 2022-07-06 PROCEDURE — C1751: CPT

## 2022-07-06 PROCEDURE — 71045 X-RAY EXAM CHEST 1 VIEW: CPT

## 2022-07-06 PROCEDURE — 84132 ASSAY OF SERUM POTASSIUM: CPT

## 2022-07-06 PROCEDURE — 86891 AUTOLOGOUS BLOOD OP SALVAGE: CPT

## 2022-07-06 PROCEDURE — 86965 POOLING BLOOD PLATELETS: CPT

## 2022-07-06 PROCEDURE — 97530 THERAPEUTIC ACTIVITIES: CPT

## 2022-07-06 PROCEDURE — 83036 HEMOGLOBIN GLYCOSYLATED A1C: CPT

## 2022-07-06 PROCEDURE — 70498 CT ANGIOGRAPHY NECK: CPT

## 2022-07-06 PROCEDURE — 70496 CT ANGIOGRAPHY HEAD: CPT

## 2022-07-06 PROCEDURE — 80053 COMPREHEN METABOLIC PANEL: CPT

## 2022-07-06 PROCEDURE — 86923 COMPATIBILITY TEST ELECTRIC: CPT

## 2022-07-06 PROCEDURE — 94640 AIRWAY INHALATION TREATMENT: CPT

## 2022-07-06 PROCEDURE — 86901 BLOOD TYPING SEROLOGIC RH(D): CPT

## 2022-07-06 PROCEDURE — 82550 ASSAY OF CK (CPK): CPT

## 2022-07-06 PROCEDURE — 85014 HEMATOCRIT: CPT

## 2022-07-06 PROCEDURE — 36430 TRANSFUSION BLD/BLD COMPNT: CPT

## 2022-07-06 PROCEDURE — P9100: CPT

## 2022-07-06 PROCEDURE — 97163 PT EVAL HIGH COMPLEX 45 MIN: CPT

## 2022-07-06 PROCEDURE — 82962 GLUCOSE BLOOD TEST: CPT

## 2022-07-06 PROCEDURE — 0042T: CPT

## 2022-07-06 PROCEDURE — 86900 BLOOD TYPING SEROLOGIC ABO: CPT

## 2022-07-06 PROCEDURE — 86803 HEPATITIS C AB TEST: CPT

## 2022-07-06 PROCEDURE — 80061 LIPID PANEL: CPT

## 2022-07-06 PROCEDURE — 82803 BLOOD GASES ANY COMBINATION: CPT

## 2022-07-06 PROCEDURE — 85025 COMPLETE CBC W/AUTO DIFF WBC: CPT

## 2022-07-06 PROCEDURE — 82553 CREATINE MB FRACTION: CPT

## 2022-07-06 PROCEDURE — 84484 ASSAY OF TROPONIN QUANT: CPT

## 2022-07-06 PROCEDURE — 84100 ASSAY OF PHOSPHORUS: CPT

## 2022-07-06 PROCEDURE — 82565 ASSAY OF CREATININE: CPT

## 2022-07-06 PROCEDURE — 83880 ASSAY OF NATRIURETIC PEPTIDE: CPT

## 2022-07-06 PROCEDURE — 92610 EVALUATE SWALLOWING FUNCTION: CPT

## 2022-07-06 PROCEDURE — 81003 URINALYSIS AUTO W/O SCOPE: CPT

## 2022-07-06 PROCEDURE — P9016: CPT

## 2022-07-06 PROCEDURE — C1887: CPT

## 2022-07-06 PROCEDURE — 97162 PT EVAL MOD COMPLEX 30 MIN: CPT

## 2022-07-06 PROCEDURE — 93005 ELECTROCARDIOGRAM TRACING: CPT

## 2022-07-06 PROCEDURE — 85027 COMPLETE CBC AUTOMATED: CPT

## 2022-07-06 PROCEDURE — P9045: CPT

## 2022-07-06 PROCEDURE — 84443 ASSAY THYROID STIM HORMONE: CPT

## 2022-07-06 PROCEDURE — 82947 ASSAY GLUCOSE BLOOD QUANT: CPT

## 2022-07-06 PROCEDURE — 86850 RBC ANTIBODY SCREEN: CPT

## 2022-07-06 PROCEDURE — C1769: CPT

## 2022-07-06 PROCEDURE — 92526 ORAL FUNCTION THERAPY: CPT

## 2022-07-06 PROCEDURE — 94150 VITAL CAPACITY TEST: CPT

## 2022-07-06 PROCEDURE — 93880 EXTRACRANIAL BILAT STUDY: CPT

## 2022-07-06 PROCEDURE — 95700 EEG CONT REC W/VID EEG TECH: CPT

## 2022-07-06 PROCEDURE — 97535 SELF CARE MNGMENT TRAINING: CPT

## 2022-07-06 PROCEDURE — 97116 GAIT TRAINING THERAPY: CPT

## 2022-07-06 PROCEDURE — P9012: CPT

## 2022-07-06 PROCEDURE — 84295 ASSAY OF SERUM SODIUM: CPT

## 2022-07-06 PROCEDURE — 94002 VENT MGMT INPAT INIT DAY: CPT

## 2022-07-07 ENCOUNTER — APPOINTMENT (OUTPATIENT)
Dept: CARE COORDINATION | Facility: HOME HEALTH | Age: 67
End: 2022-07-07

## 2022-07-07 ENCOUNTER — TRANSCRIPTION ENCOUNTER (OUTPATIENT)
Age: 67
End: 2022-07-07

## 2022-07-07 PROCEDURE — 99024 POSTOP FOLLOW-UP VISIT: CPT

## 2022-07-07 RX ORDER — HYDROCHLOROTHIAZIDE 25 MG/1
25 TABLET ORAL DAILY
Qty: 30 | Refills: 3 | Status: DISCONTINUED | COMMUNITY
Start: 2022-03-17 | End: 2022-07-07

## 2022-07-07 RX ORDER — ASPIRIN 81 MG/1
81 TABLET, COATED ORAL DAILY
Refills: 0 | Status: ACTIVE | COMMUNITY
Start: 2022-07-07

## 2022-07-07 RX ORDER — ACETAMINOPHEN 325 MG/1
325 TABLET ORAL EVERY 6 HOURS
Refills: 0 | Status: ACTIVE | COMMUNITY
Start: 2022-07-07

## 2022-07-07 RX ORDER — OXYCODONE HYDROCHLORIDE 5 MG/1
1 TABLET ORAL
Qty: 28 | Refills: 0
Start: 2022-07-07 | End: 2022-07-13

## 2022-07-07 NOTE — PHYSICAL EXAM
[FreeTextEntry1] : Upon visualization MT incision CDI & CAIN, CT scabbed no edema noted, RSVG CDI & CAIN, trace of edema x2BLE

## 2022-07-07 NOTE — HISTORY OF PRESENT ILLNESS
[FreeTextEntry1] : FOLLOW YOUR HEART HOME VISIT-[facility]\par Telehealth Visit made to  Mr. Fried for post discharge transitional care management and post follow up.    Patient of Dr. Toledo.   S/P  mini-AVR (27mm bio), ascending and hemiarch replacement on 6/23/22        Discharge on 7/5/22 from [facility]\par \par Mr. Fried is a 66 year old male with a PMH of HTN, anemia, arthritis, COPD, and a family history of marfan disease and aortic aneurysm (nephew) who presented for an aortic valve replacement with Dr. Toledo. On 6/23/22 he underwent a mini-AVR (27mm bio), ascending and hemiarch replacement EF normal with Dr. Toledo. Intraop course was uncomplicated and he was brought to CTICU. Upon waking up noted to not move LUE/LLE and a stroke code was called. CTA revealed occlusion of R ICA. Post-op coarse, overnight into POD1 he was brought emergently to OR by neurosurgery for angiogram which revealed chronic occlusion of R ICA. No thrombectomy performed. POD1 a repeat CT-head revealed recent infarct to right precentral gyrus. EEG placed which was negative for seizures. On POD3 he was extubated and able to move LLE. On [id number] continued improvement of function of the LUE. On POD 12 he was re evaluated by PT/OT who found him stable for discharge home and for outpatient rehabilitation rather then Neuro rehab. He agreed to the plan and discharged to home.   Pt. states hes tired & has midsternal incisional pain with no relief from Tylenol PRN, OXY PRN ordered for pain control.  Pt. denies any SOB, chest pain, N/V, fevr, chills, or abdominal pain. Medications reviewed as per discharge order with +teach back.  Reminded of CN role and contact number was provided to the patient.  Advised to call with any questions/concerns, verbalized understanding.\par \par

## 2022-07-12 ENCOUNTER — TRANSCRIPTION ENCOUNTER (OUTPATIENT)
Age: 67
End: 2022-07-12

## 2022-07-14 ENCOUNTER — INPATIENT (INPATIENT)
Facility: HOSPITAL | Age: 67
LOS: 0 days | Discharge: HOME HEALTH | DRG: 696 | End: 2022-07-15
Attending: THORACIC SURGERY (CARDIOTHORACIC VASCULAR SURGERY) | Admitting: THORACIC SURGERY (CARDIOTHORACIC VASCULAR SURGERY)
Payer: COMMERCIAL

## 2022-07-14 ENCOUNTER — TRANSCRIPTION ENCOUNTER (OUTPATIENT)
Age: 67
End: 2022-07-14

## 2022-07-14 ENCOUNTER — APPOINTMENT (OUTPATIENT)
Dept: CARDIOTHORACIC SURGERY | Facility: CLINIC | Age: 67
End: 2022-07-14

## 2022-07-14 VITALS
WEIGHT: 169.98 LBS | HEART RATE: 105 BPM | TEMPERATURE: 98 F | OXYGEN SATURATION: 98 % | HEIGHT: 75 IN | SYSTOLIC BLOOD PRESSURE: 161 MMHG | DIASTOLIC BLOOD PRESSURE: 95 MMHG | RESPIRATION RATE: 18 BRPM

## 2022-07-14 DIAGNOSIS — Z98.49 CATARACT EXTRACTION STATUS, UNSPECIFIED EYE: Chronic | ICD-10-CM

## 2022-07-14 LAB
ALBUMIN SERPL ELPH-MCNC: 3.8 G/DL — SIGNIFICANT CHANGE UP (ref 3.3–5)
ALP SERPL-CCNC: 105 U/L — SIGNIFICANT CHANGE UP (ref 40–120)
ALT FLD-CCNC: 29 U/L — SIGNIFICANT CHANGE UP (ref 10–45)
ANION GAP SERPL CALC-SCNC: 11 MMOL/L — SIGNIFICANT CHANGE UP (ref 5–17)
ANION GAP SERPL CALC-SCNC: 12 MMOL/L — SIGNIFICANT CHANGE UP (ref 5–17)
APPEARANCE UR: ABNORMAL
APPEARANCE UR: CLEAR — SIGNIFICANT CHANGE UP
APTT BLD: 26 SEC — LOW (ref 27.5–35.5)
AST SERPL-CCNC: 34 U/L — SIGNIFICANT CHANGE UP (ref 10–40)
BACTERIA # UR AUTO: PRESENT /HPF
BACTERIA # UR AUTO: PRESENT /HPF
BASOPHILS # BLD AUTO: 0.02 K/UL — SIGNIFICANT CHANGE UP (ref 0–0.2)
BASOPHILS NFR BLD AUTO: 0.2 % — SIGNIFICANT CHANGE UP (ref 0–2)
BILIRUB SERPL-MCNC: 0.6 MG/DL — SIGNIFICANT CHANGE UP (ref 0.2–1.2)
BILIRUB UR-MCNC: NEGATIVE — SIGNIFICANT CHANGE UP
BILIRUB UR-MCNC: NEGATIVE — SIGNIFICANT CHANGE UP
BUN SERPL-MCNC: 14 MG/DL — SIGNIFICANT CHANGE UP (ref 7–23)
BUN SERPL-MCNC: 19 MG/DL — SIGNIFICANT CHANGE UP (ref 7–23)
CALCIUM SERPL-MCNC: 10.1 MG/DL — SIGNIFICANT CHANGE UP (ref 8.4–10.5)
CALCIUM SERPL-MCNC: 9.8 MG/DL — SIGNIFICANT CHANGE UP (ref 8.4–10.5)
CHLORIDE SERPL-SCNC: 100 MMOL/L — SIGNIFICANT CHANGE UP (ref 96–108)
CHLORIDE SERPL-SCNC: 93 MMOL/L — LOW (ref 96–108)
CO2 SERPL-SCNC: 23 MMOL/L — SIGNIFICANT CHANGE UP (ref 22–31)
CO2 SERPL-SCNC: 26 MMOL/L — SIGNIFICANT CHANGE UP (ref 22–31)
COLOR SPEC: ABNORMAL
COLOR SPEC: YELLOW — SIGNIFICANT CHANGE UP
CREAT SERPL-MCNC: 0.86 MG/DL — SIGNIFICANT CHANGE UP (ref 0.5–1.3)
CREAT SERPL-MCNC: 1.28 MG/DL — SIGNIFICANT CHANGE UP (ref 0.5–1.3)
DIFF PNL FLD: ABNORMAL
DIFF PNL FLD: ABNORMAL
EGFR: 61 ML/MIN/1.73M2 — SIGNIFICANT CHANGE UP
EGFR: 94 ML/MIN/1.73M2 — SIGNIFICANT CHANGE UP
EOSINOPHIL # BLD AUTO: 0.16 K/UL — SIGNIFICANT CHANGE UP (ref 0–0.5)
EOSINOPHIL NFR BLD AUTO: 1.9 % — SIGNIFICANT CHANGE UP (ref 0–6)
EPI CELLS # UR: SIGNIFICANT CHANGE UP /HPF (ref 0–5)
GLUCOSE SERPL-MCNC: 113 MG/DL — HIGH (ref 70–99)
GLUCOSE SERPL-MCNC: 117 MG/DL — HIGH (ref 70–99)
GLUCOSE UR QL: NEGATIVE — SIGNIFICANT CHANGE UP
GLUCOSE UR QL: NEGATIVE — SIGNIFICANT CHANGE UP
HCT VFR BLD CALC: 27.6 % — LOW (ref 39–50)
HGB BLD-MCNC: 9.1 G/DL — LOW (ref 13–17)
IMM GRANULOCYTES NFR BLD AUTO: 0.4 % — SIGNIFICANT CHANGE UP (ref 0–1.5)
INR BLD: 1.27 — HIGH (ref 0.88–1.16)
KETONES UR-MCNC: NEGATIVE — SIGNIFICANT CHANGE UP
KETONES UR-MCNC: NEGATIVE — SIGNIFICANT CHANGE UP
LEUKOCYTE ESTERASE UR-ACNC: ABNORMAL
LEUKOCYTE ESTERASE UR-ACNC: NEGATIVE — SIGNIFICANT CHANGE UP
LYMPHOCYTES # BLD AUTO: 0.76 K/UL — LOW (ref 1–3.3)
LYMPHOCYTES # BLD AUTO: 8.9 % — LOW (ref 13–44)
MCHC RBC-ENTMCNC: 29.7 PG — SIGNIFICANT CHANGE UP (ref 27–34)
MCHC RBC-ENTMCNC: 33 GM/DL — SIGNIFICANT CHANGE UP (ref 32–36)
MCV RBC AUTO: 90.2 FL — SIGNIFICANT CHANGE UP (ref 80–100)
MONOCYTES # BLD AUTO: 0.9 K/UL — SIGNIFICANT CHANGE UP (ref 0–0.9)
MONOCYTES NFR BLD AUTO: 10.6 % — SIGNIFICANT CHANGE UP (ref 2–14)
NEUTROPHILS # BLD AUTO: 6.63 K/UL — SIGNIFICANT CHANGE UP (ref 1.8–7.4)
NEUTROPHILS NFR BLD AUTO: 78 % — HIGH (ref 43–77)
NITRITE UR-MCNC: NEGATIVE — SIGNIFICANT CHANGE UP
NITRITE UR-MCNC: NEGATIVE — SIGNIFICANT CHANGE UP
NRBC # BLD: 0 /100 WBCS — SIGNIFICANT CHANGE UP (ref 0–0)
OSMOLALITY UR: 216 MOSM/KG — LOW (ref 300–900)
OSMOLALITY UR: 373 MOSM/KG — SIGNIFICANT CHANGE UP (ref 300–900)
PH UR: 6 — SIGNIFICANT CHANGE UP (ref 5–8)
PH UR: 6 — SIGNIFICANT CHANGE UP (ref 5–8)
PLATELET # BLD AUTO: 277 K/UL — SIGNIFICANT CHANGE UP (ref 150–400)
POTASSIUM SERPL-MCNC: 3.8 MMOL/L — SIGNIFICANT CHANGE UP (ref 3.5–5.3)
POTASSIUM SERPL-MCNC: 3.8 MMOL/L — SIGNIFICANT CHANGE UP (ref 3.5–5.3)
POTASSIUM SERPL-SCNC: 3.8 MMOL/L — SIGNIFICANT CHANGE UP (ref 3.5–5.3)
POTASSIUM SERPL-SCNC: 3.8 MMOL/L — SIGNIFICANT CHANGE UP (ref 3.5–5.3)
PROT SERPL-MCNC: 8.7 G/DL — HIGH (ref 6–8.3)
PROT UR-MCNC: 30 MG/DL
PROT UR-MCNC: NEGATIVE MG/DL — SIGNIFICANT CHANGE UP
PROTHROM AB SERPL-ACNC: 15.2 SEC — HIGH (ref 10.5–13.4)
RBC # BLD: 3.06 M/UL — LOW (ref 4.2–5.8)
RBC # FLD: 15.3 % — HIGH (ref 10.3–14.5)
RBC CASTS # UR COMP ASSIST: ABNORMAL /HPF
RBC CASTS # UR COMP ASSIST: ABNORMAL /HPF
SARS-COV-2 RNA SPEC QL NAA+PROBE: NEGATIVE — SIGNIFICANT CHANGE UP
SODIUM SERPL-SCNC: 127 MMOL/L — LOW (ref 135–145)
SODIUM SERPL-SCNC: 138 MMOL/L — SIGNIFICANT CHANGE UP (ref 135–145)
SODIUM UR-SCNC: 20 MMOL/L — SIGNIFICANT CHANGE UP
SODIUM UR-SCNC: 38 MMOL/L — SIGNIFICANT CHANGE UP
SP GR SPEC: 1.01 — SIGNIFICANT CHANGE UP (ref 1–1.03)
SP GR SPEC: 1.02 — SIGNIFICANT CHANGE UP (ref 1–1.03)
UROBILINOGEN FLD QL: 0.2 E.U./DL — SIGNIFICANT CHANGE UP
UROBILINOGEN FLD QL: 0.2 E.U./DL — SIGNIFICANT CHANGE UP
WBC # BLD: 8.5 K/UL — SIGNIFICANT CHANGE UP (ref 3.8–10.5)
WBC # FLD AUTO: 8.5 K/UL — SIGNIFICANT CHANGE UP (ref 3.8–10.5)
WBC UR QL: < 5 /HPF — SIGNIFICANT CHANGE UP
WBC UR QL: ABNORMAL /HPF

## 2022-07-14 PROCEDURE — 99285 EMERGENCY DEPT VISIT HI MDM: CPT

## 2022-07-14 PROCEDURE — 99221 1ST HOSP IP/OBS SF/LOW 40: CPT

## 2022-07-14 PROCEDURE — 74019 RADEX ABDOMEN 2 VIEWS: CPT | Mod: 26

## 2022-07-14 RX ORDER — CLOPIDOGREL BISULFATE 75 MG/1
75 TABLET, FILM COATED ORAL DAILY
Refills: 0 | Status: DISCONTINUED | OUTPATIENT
Start: 2022-07-14 | End: 2022-07-15

## 2022-07-14 RX ORDER — POLYETHYLENE GLYCOL 3350 17 G/17G
17 POWDER, FOR SOLUTION ORAL DAILY
Refills: 0 | Status: DISCONTINUED | OUTPATIENT
Start: 2022-07-14 | End: 2022-07-15

## 2022-07-14 RX ORDER — METOPROLOL TARTRATE 50 MG
25 TABLET ORAL
Refills: 0 | Status: DISCONTINUED | OUTPATIENT
Start: 2022-07-14 | End: 2022-07-15

## 2022-07-14 RX ORDER — BUDESONIDE AND FORMOTEROL FUMARATE DIHYDRATE 160; 4.5 UG/1; UG/1
2 AEROSOL RESPIRATORY (INHALATION)
Refills: 0 | Status: DISCONTINUED | OUTPATIENT
Start: 2022-07-14 | End: 2022-07-15

## 2022-07-14 RX ORDER — SODIUM CHLORIDE 9 MG/ML
1000 INJECTION, SOLUTION INTRAVENOUS
Refills: 0 | Status: DISCONTINUED | OUTPATIENT
Start: 2022-07-14 | End: 2022-07-15

## 2022-07-14 RX ORDER — TIOTROPIUM BROMIDE 18 UG/1
1 CAPSULE ORAL; RESPIRATORY (INHALATION) DAILY
Refills: 0 | Status: DISCONTINUED | OUTPATIENT
Start: 2022-07-14 | End: 2022-07-15

## 2022-07-14 RX ORDER — ATORVASTATIN CALCIUM 80 MG/1
80 TABLET, FILM COATED ORAL AT BEDTIME
Refills: 0 | Status: DISCONTINUED | OUTPATIENT
Start: 2022-07-14 | End: 2022-07-15

## 2022-07-14 RX ORDER — SODIUM CHLORIDE 9 MG/ML
3 INJECTION INTRAMUSCULAR; INTRAVENOUS; SUBCUTANEOUS EVERY 8 HOURS
Refills: 0 | Status: DISCONTINUED | OUTPATIENT
Start: 2022-07-14 | End: 2022-07-15

## 2022-07-14 RX ORDER — LACTULOSE 10 G/15ML
5 SOLUTION ORAL ONCE
Refills: 0 | Status: COMPLETED | OUTPATIENT
Start: 2022-07-14 | End: 2022-07-14

## 2022-07-14 RX ORDER — SENNA PLUS 8.6 MG/1
2 TABLET ORAL AT BEDTIME
Refills: 0 | Status: DISCONTINUED | OUTPATIENT
Start: 2022-07-14 | End: 2022-07-15

## 2022-07-14 RX ORDER — ASPIRIN/CALCIUM CARB/MAGNESIUM 324 MG
81 TABLET ORAL DAILY
Refills: 0 | Status: DISCONTINUED | OUTPATIENT
Start: 2022-07-14 | End: 2022-07-15

## 2022-07-14 RX ORDER — ACETAMINOPHEN 500 MG
650 TABLET ORAL EVERY 6 HOURS
Refills: 0 | Status: DISCONTINUED | OUTPATIENT
Start: 2022-07-14 | End: 2022-07-15

## 2022-07-14 RX ORDER — SODIUM CHLORIDE 9 MG/ML
1000 INJECTION INTRAMUSCULAR; INTRAVENOUS; SUBCUTANEOUS
Refills: 0 | Status: DISCONTINUED | OUTPATIENT
Start: 2022-07-14 | End: 2022-07-14

## 2022-07-14 RX ORDER — TAMSULOSIN HYDROCHLORIDE 0.4 MG/1
0.4 CAPSULE ORAL AT BEDTIME
Refills: 0 | Status: DISCONTINUED | OUTPATIENT
Start: 2022-07-14 | End: 2022-07-15

## 2022-07-14 RX ORDER — PANTOPRAZOLE SODIUM 20 MG/1
40 TABLET, DELAYED RELEASE ORAL
Refills: 0 | Status: DISCONTINUED | OUTPATIENT
Start: 2022-07-14 | End: 2022-07-15

## 2022-07-14 RX ORDER — HEPARIN SODIUM 5000 [USP'U]/ML
5000 INJECTION INTRAVENOUS; SUBCUTANEOUS EVERY 8 HOURS
Refills: 0 | Status: DISCONTINUED | OUTPATIENT
Start: 2022-07-14 | End: 2022-07-15

## 2022-07-14 RX ADMIN — HEPARIN SODIUM 5000 UNIT(S): 5000 INJECTION INTRAVENOUS; SUBCUTANEOUS at 22:13

## 2022-07-14 RX ADMIN — ATORVASTATIN CALCIUM 80 MILLIGRAM(S): 80 TABLET, FILM COATED ORAL at 22:13

## 2022-07-14 RX ADMIN — LACTULOSE 5 GRAM(S): 10 SOLUTION ORAL at 17:47

## 2022-07-14 RX ADMIN — SODIUM CHLORIDE 3 MILLILITER(S): 9 INJECTION INTRAMUSCULAR; INTRAVENOUS; SUBCUTANEOUS at 15:30

## 2022-07-14 RX ADMIN — TAMSULOSIN HYDROCHLORIDE 0.4 MILLIGRAM(S): 0.4 CAPSULE ORAL at 22:13

## 2022-07-14 RX ADMIN — Medication 650 MILLIGRAM(S): at 23:00

## 2022-07-14 RX ADMIN — Medication 650 MILLIGRAM(S): at 22:13

## 2022-07-14 RX ADMIN — SODIUM CHLORIDE 3 MILLILITER(S): 9 INJECTION INTRAMUSCULAR; INTRAVENOUS; SUBCUTANEOUS at 21:45

## 2022-07-14 RX ADMIN — BUDESONIDE AND FORMOTEROL FUMARATE DIHYDRATE 2 PUFF(S): 160; 4.5 AEROSOL RESPIRATORY (INHALATION) at 22:36

## 2022-07-14 RX ADMIN — Medication 25 MILLIGRAM(S): at 17:48

## 2022-07-14 RX ADMIN — SENNA PLUS 2 TABLET(S): 8.6 TABLET ORAL at 22:13

## 2022-07-14 RX ADMIN — SODIUM CHLORIDE 100 MILLILITER(S): 9 INJECTION, SOLUTION INTRAVENOUS at 20:56

## 2022-07-14 NOTE — CONSULT NOTE ADULT - ATTENDING COMMENTS
urinary retention with ANKITA and resultant hyponatremia, s/p fuchs with polyuria, likely to autocorrect Na, repeat Na this evening, start D5W if overcorrecting

## 2022-07-14 NOTE — ED PROVIDER NOTE - PHYSICAL EXAMINATION
VITAL SIGNS: I have reviewed nursing notes and confirm.  CONSTITUTIONAL: Well appearing, in no acute distress.   SKIN:  warm and dry, no acute rash.   HEAD:  normocephalic, atraumatic.  EYES: EOM intact; conjunctiva and sclera clear.  ENT: No nasal discharge; airway clear.   NECK: Supple; non tender.  CARD: S1, S2 normal; no murmurs, gallops, or rubs. Regular rate and rhythm.   RESP:  Clear to auscultation b/l, no wheezes, rales or rhonchi.  ABD: Normal bowel sounds; soft; distended w/h bladder full and tender; surgical scars are clean and intact. no guarding/ rebound.  EXT: Normal ROM. No clubbing, cyanosis or edema. 2+ pulses to b/l ue/le.  NEURO: Alert, oriented, grossly unremarkable  PSYCH: Cooperative, mood and affect appropriate. VITAL SIGNS: I have reviewed nursing notes and confirm.  CONSTITUTIONAL: Well appearing, in no acute distress.   SKIN:  warm and dry, no acute rash.   HEAD:  normocephalic, atraumatic.  EYES: EOM intact; conjunctiva and sclera clear.  ENT: No nasal discharge; airway clear.   NECK: Supple; non tender.  CARD: S1, S2 normal; no murmurs, gallops, or rubs. Regular rate and rhythm.   RESP:  Clear to auscultation b/l, no wheezes, rales or rhonchi.  ABD: Normal bowel sounds; soft; + distended bladder, tender; surgical scars are clean, dry and intact. no guarding/ rebound.  EXT: Normal ROM. No clubbing, cyanosis or edema. 2+ pulses to b/l ue/le.  NEURO: Alert, oriented, grossly unremarkable  PSYCH: Cooperative, mood and affect appropriate.

## 2022-07-14 NOTE — PATIENT PROFILE ADULT - FALL HARM RISK - HARM RISK INTERVENTIONS

## 2022-07-14 NOTE — ED ADULT TRIAGE NOTE - CHIEF COMPLAINT QUOTE
Pt presents to the ED c/o urinary retention that began yesterday associated w/ suprapubic discomfort. Pt reports last urinating last night, but very little. Pt w/ hx of valve replacement on 6/23/22 w/ Dr. Toledo. Denies any other sx. Pt states, "I also had a stroke after the surgery and have some weakness in my left hand still."

## 2022-07-14 NOTE — H&P ADULT - HISTORY OF PRESENT ILLNESS
65 y/o male with a pmhx of HTN, anemia, arthritis, COPD, and a family history of marfan disease and aortic aneurysm (nephew) who presented for an aortic valve replacement with Dr. Toledo. On 6/23/22 he underwent a mini-AVR (27mm bio), ascending and hemiarch replacement EF normal with Dr. Toledo. Intraop course was uncomplicated and he was brought to CTICU. Upon waking up noted to not move LUE/LLE and a stroke code was called. CTA revealed occlusion of R ICA. Overnight into POD1 he was brought emergently to OR by neurosurgery for angiogram which revealed chronic occlusion of R ICA. No thrombectomy performed. POD1 a repeat CT-head revealed recent infarct to right precentral gyrus. EEG placed which was negative for seizures. Remainder of post operative care was stable with improvement of neuro exam and he was discharged home on with home physical therapy and rolling walker. Prior to discharge he had post operative BMs and was ambulating and tolerated a PO diet. On this admission on 7/14/22 he came via ED with complaints of urinary retention and constipation He stated that he has not been able to pass urine for one day and despite enemas and suppositories he had not had a BM in two days but passing gas. A fuchs catheter was placed in the ED with that drained approximately 2 L of clear non concentrated urine. Lab results showed increased creatine, electrolyte abnormalities and possible hydronephrosis. He was admitted to the floor [address] for further management and observation given recent cardiac surgery.     ICU Vital Signs Last 24 Hrs  T(C): 36.9 (14 Jul 2022 12:26), Max: 36.9 (14 Jul 2022 12:26)  T(F): 98.5 (14 Jul 2022 12:26), Max: 98.5 (14 Jul 2022 12:26)  HR: 85 (14 Jul 2022 12:26) (85 - 105)  BP: 132/82 (14 Jul 2022 12:26) (132/82 - 161/95)  BP(mean): --  ABP: --  ABP(mean): --  RR: 18 (14 Jul 2022 12:26) (18 - 18)  SpO2: 98% (14 Jul 2022 12:26) (98% - 98%)    O2 Parameters below as of 14 Jul 2022 12:26  Patient On (Oxygen Delivery Method): room air

## 2022-07-14 NOTE — H&P ADULT - NSCORESITESY/N_GEN_A_CORE_RD
Discharge Note    Progress reporting period is from initial evaluation date (please see noted date below) to Jun 12, 2018.  Linked Episodes   Type: Episode: Status: Noted: Resolved: Last update: Updated by:   PHYSICAL THERAPY Neck and Knee pain 4/11/18 Active 4/11/2018 9/18/2018  3:15 PM Enrique Sena, PT      Comments:       Carlos failed to follow up and current status is unknown.  Please see information below for last relevant information on current status.  Patient seen for 9 visits.    SUBJECTIVE  Subjective changes noted by patient:  Irma reports that she is feeling better. States that she has had very little pain going down stairs.    .  Current pain level is  .     Previous pain level was   .   Changes in function:  Yes (See Goal flowsheet attached for changes in current functional level)  Adverse reaction to treatment or activity: None    OBJECTIVE  Changes noted in objective findings: reviewed HEP, patient will try do HEP on own will keep next appt if needed.      ASSESSMENT/PLAN  Diagnosis: Neck and R knee pain   Updated problem list and treatment plan:   Pain - HEP  Decreased ROM/flexibility - HEP  Decreased function - HEP  Decreased strength - HEP  Inflammation - HEP  Impaired muscle performance - HEP  Impaired posture - HEP  STG/LTGs have been met or progress has been made towards goals:  Yes, please see goal flowsheet for most current information  Assessment of Progress: current status is unknown.    Last current status:     Self Management Plans:  HEP  I have re-evaluated this patient and find that the nature, scope, duration and intensity of the therapy is appropriate for the medical condition of the patient.  Carlos continues to require the following intervention to meet STG and LTG's:  HEP.    Recommendations:  Discharge with current home program.  Patient to follow up with MD as needed.    Please refer to the daily flowsheet for treatment today, total treatment time and time spent performing  1:1 timed codes.       No

## 2022-07-14 NOTE — ED ADULT NURSE NOTE - CAS TRG GENERAL AIRWAY, MLM
MEDICARE WELLNESS VISIT + NOTE    CHIEF COMPLAINT:  Blank Cheek presents for her Subsequent Annual Medicare Wellness Visit.   Her additional complaints or concerns are addressed below.      Patient Care Team:  Martha Breen DO as PCP - General (Family Practice)        Patient Active Problem List   Diagnosis   • Acquired hypothyroidism   • Age-related osteoporosis without current pathological fracture   • Polyarthritis   • Personal history of meningioma of the brain   • Meniere's disease   • Dyslipidemia   • Nonexudative age-related macular degeneration, bilateral, early dry stage   • Bilateral dry eyes   • Meningioma (CMS/HCC)   • Chronic idiopathic constipation         Past Medical History:   Diagnosis Date   • Acquired hypothyroidism    • Age related osteoporosis    • History of recurrent UTI (urinary tract infection)    • Meningioma (CMS/HCC)          Past Surgical History:   Procedure Laterality Date   • Appendectomy     •  section, classic      x6   • Cholecystectomy     • Incisional hernia repair     • Leg surgery Bilateral    • Thyroidectomy           Social History     Tobacco Use   • Smoking status: Never Smoker   • Smokeless tobacco: Never Used   Substance Use Topics   • Alcohol use: No   • Drug use: No     Family History   Problem Relation Age of Onset   • Patient is unaware of any medical problems Mother    • Patient is unaware of any medical problems Father          Current Outpatient Medications   Medication Sig Dispense Refill   • simvastatin (ZOCOR) 20 MG tablet Take 1 tablet by mouth daily. 90 tablet 1   • levothyroxine 100 MCG tablet Take 1 tablet by mouth daily. 90 tablet 1   • acetic acid-hydroCORTisone (ACETASOL-HC) otic solution Place 3 drops into right ear 2 times daily. 10 mL 1   • diclofenac (VOLTAREN) 1 % gel Apply 1 g topically 2 times daily as needed (muscle cramp). 150 g 1   • Multiple Vitamins-Minerals (Hair/Skin/Nails/Biotin) Tab Take 1 tablet by mouth daily. 90 tablet 3    • zoster vaccine recomb adjuvanted (SHINGRIX) 50 MCG/0.5ML injection Inject 0.5 mLs into the muscle 1 time. Repeat dose in 2 to 6 months (unless 1 dose already given), for a total of 2 doses. 1 each 1   • Aspirin Buf,CaCarb-MgCarb-MgO, 81 MG Tab Take 81 mg by mouth daily.       No current facility-administered medications for this visit.        The following items on the Medicare Health Risk Assessment were found to be positive  4.) During the past 4 weeks, what was the hardest physical activity you could do for at least 2 minutes?: Very Light     6 a.) How many servings of Fruits and Vegetables do you have each day ( 1 serving = 1 piece of fruit, 1/2 cup fruits or vegetables): 1 per day     6 b.) How many servings of High Fiber / Whole Grain Foods to you have each day ( 1 serving = 1 cup cold cereal, 1/2 cup cooked cereal, 1 slice bread): 1 per day     11b.) Bowel control problems: Sometimes         Vision and Hearing screens: Both screenings were performed and reviewed    Advance Directive:   The patient has the following documents:  No Advance Directives on file. Patient offered documents.    Cognitive/Functional Status: no evidence of cognitive dysfunction by direct observation    Opioid Review: Blank is not taking opioid medications.    Recent PHQ 2/9 Score:    PHQ 2:  Date Adult PHQ 2 Score Adult PHQ 2 Interpretation   10/25/2021 0 No further screening needed       PHQ 9:       DEPRESSION ASSESSMENT/PLAN:  Depression screening is negative no further plan needed. Depression Screening performed. Screening time with patient was 5 minutes.     Body mass index is 25.09 kg/m².    BMI ASSESSMENT/PLAN:  Patient BMI is within normal range.     Health Maintenance Due   Topic Date Due   • Shingles Vaccine (1 of 2) Never done   • Medicare Wellness Visit  08/06/2021   • COVID-19 Vaccine (3 - Pfizer booster) 10/10/2021        See Patient Instructions section.   Return in about 6 months (around 4/25/2022) for Follow  up.      OUTPATIENT PROGRESS NOTE    Subjective   Chief Complaint Medicare Wellness Visit and Flu Vaccine    Here for routine follow up of chronic conditions. Feels overall healthy, has no new complaints.        Medications  Medications were reviewed and updated today.    Histories  I have personally reviewed and updated the patient's past medical, past surgical, family and social histories during today's visit.    Review of Systems   Constitutional: Negative.    HENT: Negative.    Eyes: Negative.    Respiratory: Negative.    Cardiovascular: Negative.    Gastrointestinal: Negative.    Endocrine: Negative.    Skin: Negative.    All other systems reviewed and are negative.      Objective   Visit Vitals  /60 (BP Location: LUE - Left upper extremity, Patient Position: Sitting, Cuff Size: Regular)   Pulse 61   Temp 97.7 °F (36.5 °C) (Temporal)   Resp 14   Ht 4' 10\" (1.473 m)   Wt 54.5 kg (120 lb 0.7 oz)   SpO2 98%   BMI 25.09 kg/m²     Physical Exam  Vitals and nursing note reviewed.   Constitutional:       Appearance: She is well-developed.   HENT:      Head: Normocephalic and atraumatic.      Right Ear: External ear normal.      Left Ear: External ear normal.      Nose: Nose normal.   Eyes:      Conjunctiva/sclera: Conjunctivae normal.      Pupils: Pupils are equal, round, and reactive to light.   Cardiovascular:      Rate and Rhythm: Normal rate and regular rhythm.      Heart sounds: Normal heart sounds.   Pulmonary:      Effort: Pulmonary effort is normal.      Breath sounds: Normal breath sounds.   Abdominal:      General: Bowel sounds are normal.      Palpations: Abdomen is soft.      Tenderness: There is no abdominal tenderness.   Musculoskeletal:         General: Normal range of motion.      Cervical back: Normal range of motion and neck supple.   Skin:     General: Skin is warm and dry.   Neurological:      Mental Status: She is alert and oriented to person, place, and time.   Psychiatric:          Behavior: Behavior normal.         Laboratory  I have reviewed the pertinent laboratory tests. These are the pertinent findings:  · Labs pending    Imaging  I have reviewed the pertinent imaging study reports. These are the pertinent findings:  · none    Assessment & Plan   Diagnoses and associated orders for this visit:  1. Medicare annual wellness visit, subsequent  -     CBC with Automated Differential  -     Comprehensive Metabolic Panel  -     Lipid Panel With Reflex  -     Thyroid Stimulating Hormone Reflex  -     Urinalysis & Reflex Microscopy  -     Urinalysis & Reflex Microscopy  -     Thyroid Stimulating Hormone Reflex  -     Lipid Panel With Reflex  -     Comprehensive Metabolic Panel  -     CBC with Automated Differential  2. Age-related osteoporosis without current pathological fracture  Assessment & Plan:  counseled on weight bearing exercises, Ca/vit D supplement, adherence with tx plan.   3. Acquired hypothyroidism  Assessment & Plan:  Urge compliance with med, lab testings.  No heat/cold intolerance, weight changes   Orders:  -     CBC with Automated Differential  -     Comprehensive Metabolic Panel  -     Lipid Panel With Reflex  -     Thyroid Stimulating Hormone Reflex  -     Urinalysis & Reflex Microscopy  -     Urinalysis & Reflex Microscopy  -     Thyroid Stimulating Hormone Reflex  -     Lipid Panel With Reflex  -     Comprehensive Metabolic Panel  -     CBC with Automated Differential  -     Levothyroxine Sodium  4. Dyslipidemia  Assessment & Plan:  No myalgia reported on statin.  Reinforced low fat/chol diet; cardio exercises, compliance with treatment plan.  Follow lipid, LFT's.   Orders:  -     CBC with Automated Differential  -     Comprehensive Metabolic Panel  -     Lipid Panel With Reflex  -     Thyroid Stimulating Hormone Reflex  -     Urinalysis & Reflex Microscopy  -     Urinalysis & Reflex Microscopy  -     Thyroid Stimulating Hormone Reflex  -     Lipid Panel With Reflex  -      Comprehensive Metabolic Panel  -     CBC with Automated Differential  -     Simvastatin  5. Immunization due  -     INFLUENZA QUADRIVALENT HIGH DOSE PRES FREE 0.7 ML VACC,IM  -     PNEUMOCOCCAL POLYSACCHARIDE ADULT VACC, IM/SQ (PNEUMOVAX 23)     Patent

## 2022-07-14 NOTE — PATIENT PROFILE ADULT - SURGICAL SITE INCISION
26 y/o male with no PMHx who is otherwise healthy is present in the ER c/o right foot pain x1 day. Pt reports he was lifting up a heavy object at work with his hands when he placed the majority of his weight on his right foot. He felt sudden pain and noticed swelling and bruising. Pain is located on the right lateral side of his foot without radiation. He denies self-medication to help alleviate his discomfort. Despite the pain, pt was able to walk without difficulty. He denies the following: numbness/tingling, skin breaks, prior hx of injury to his foot. yes

## 2022-07-14 NOTE — ED PROVIDER NOTE - CLINICAL SUMMARY MEDICAL DECISION MAKING FREE TEXT BOX
Impression: difficulty urinating x few days w/ acute retention since last night, likely due to percocet use/ constipation. AVSS. No signs of abd obstruction on axr. House placed w/ outpt of ~ 1500mL clear urine. + mild increase in cr and hyponatremia to 127. Pt seen by ct surgery; will admit to their svc for monitoring of electrolytes s/p obstructive diuresis.

## 2022-07-14 NOTE — ED CLERICAL - NS ED CLERK NOTE PRE-ARRIVAL INFORMATION; ADDITIONAL PRE-ARRIVAL INFORMATION
This patient is enrolled in the [facility] program and has undergone a cardiac surgery procedure and has active care navigation. This patient can be followed up by the care navigation team within 24 hours. To arrange close follow-up or to obtain additional clinical information about this patient, please call the contact number above. Please call the cardiac surgery team once patient is registered at [phone] for consultation PRIOR to disposition decision.  The patient recently underwent a cardiac surgery procedure and the team can assist in acute medical management.

## 2022-07-14 NOTE — H&P ADULT - ASSESSMENT
65 y/o male with a pmhx of HTN, anemia, arthritis, COPD, and a family history of marfan disease and aortic aneurysm (nephew) who presented for an aortic valve replacement with Dr. Toledo. On 6/23/22 he underwent a mini-AVR (27mm bio), ascending and hemiarch replacement EF normal with Dr. Toledo. Intraop course was uncomplicated and he was brought to CTICU. Upon waking up noted to not move LUE/LLE and a stroke code was called. CTA revealed occlusion of R ICA. Overnight into POD1 he was brought emergently to OR by neurosurgery for angiogram which revealed chronic occlusion of R ICA. No thrombectomy performed. POD1 a repeat CT-head revealed recent infarct to right precentral gyrus. EEG placed which was negative for seizures. Remainder of post operative care was stable with improvement of neuro exam and he was discharged home on with home physical therapy and rolling walker. Prior to discharge he had post operative BMs and was ambulating and tolerated a PO diet. On this admission on 7/14/22 he came via ED with complaints of urinary retention and constipation He stated that he has not been able to pass urine for one day and despite enemas and suppositories he had not had a BM in two days but passing gas. A fuchs catheter was placed in the ED with that drained approximately 2 L of clear non concentrated urine. Lab results showed increased creatine, electrolyte abnormalities and possible hydronephrosis. He was admitted to the floor [facility] for further management and observation given recent cardiac surgery.    Plan:    Neurovascular:   #CVA  -continue Plavix 75 mg QD   -Pain well controlled with current regimen. PRN's:    Cardiovascular:   #s/p ascending and hemiarch replacment and Mini-AVR 6/23/22  #HTN  -continue home Metoprolol 25 Q12H  -continue ASA 81 mg QD   -Hemodynamically stable.   -Monitor: BP, HR, tele    Respiratory:   #COPD  -continue home inhalers: Spiriva 18 one inhalation QD and Fluticasone/Salmetrol 250/50 1 inhalation BID   -Oxygenating well on room air  -Encourage continued use of IS 10x/hr and frequent ambulation    GI:  #Constipation  -holding Narcotics at this time  -Lactulose syrup x1  -GI PPX: Protonix 40 mg QD   -PO Diet  -Bowel Regimen: Miralax and Senna 2 mg QD    Renal / :  #urinary retention  -fuchs placed  -holding home dose lasix 40 mg QD and potassium   -UA, urine sodium and urine osmoles sent pending results   -nephrology consulted, pending recommendations   -Continue to monitor renal function: BUN/Cr 19/1.28  -Monitor I/O's daily     Endocrine:    -No hx of DM or thyroid dx  -A1c: 4.3  -TSH:1.150    Hematologic:  -CBC: H/H-9.1/27.6  -Coagulation Panel PTT: 26.0, INR: 1.27.    ID:  -Temperature: afebrile   -CBC: WBC-8.50  -Continue to observe for SIRS/Sepsis Syndrome.    Prophylaxis:  -DVT prophylaxis with 5000 SubQ Heparin q8h.  -Continue with SCD's b/l while patient is at rest     Disposition:  -Discharge home once patient is medically ready   67 y/o male with a pmhx of HTN, anemia, arthritis, COPD, and a family history of marfan disease and aortic aneurysm (nephew) who presented for an aortic valve replacement with Dr. Toledo. On 6/23/22 he underwent a mini-AVR (27mm bio), ascending and hemiarch replacement EF normal with Dr. Toledo. Intraop course was uncomplicated and he was brought to CTICU. Upon waking up noted to not move LUE/LLE and a stroke code was called. CTA revealed occlusion of R ICA. Overnight into POD1 he was brought emergently to OR by neurosurgery for angiogram which revealed chronic occlusion of R ICA. No thrombectomy performed. POD1 a repeat CT-head revealed recent infarct to right precentral gyrus. EEG placed which was negative for seizures. Remainder of post operative care was stable with improvement of neuro exam and he was discharged home on with home physical therapy and rolling walker. Prior to discharge he had post operative BMs and was ambulating and tolerated a PO diet. On this admission on 7/14/22 he came via ED with complaints of urinary retention and constipation He stated that he has not been able to pass urine for one day and despite enemas and suppositories he had not had a BM in two days but passing gas. A fuchs catheter was placed in the ED with that drained approximately 2 L of clear non concentrated urine. Lab results showed increased creatine, electrolyte abnormalities and possible hydronephrosis. He was admitted to the floor [facility] for further management and observation given recent cardiac surgery.    Plan:    Neurovascular:   #CVA  -continue Plavix 75 mg QD     Cardiovascular:   #s/p ascending and hemiarch replacment and Mini-AVR 6/23/22  #HTN  -continue home Metoprolol 25 Q12H  -continue ASA 81 mg QD   -Hemodynamically stable.   -Monitor: BP, HR, tele    Respiratory:   #COPD  -continue home inhalers: Spiriva 18 one inhalation QD and Fluticasone/Salmetrol 250/50 1 inhalation BID   -Oxygenating well on room air  -Encourage continued use of IS 10x/hr and frequent ambulation    GI:  #Constipation  -holding Narcotics at this time  -Lactulose syrup x1, no BM   -GI PPX: Protonix 40 mg QD   -PO Diet  -Bowel Regimen: Miralax and Senna 2 mg QD    Renal / :  #urinary retention  -fuchs placed  -holding home dose lasix 40 mg QD and potassium   -UA, urine sodium and urine osmoles sent pending results   -nephrology consulted, pending recommendations   -Continue to monitor renal function: BUN/Cr 19/1.28  -Monitor I/O's daily     Endocrine:    -No hx of DM or thyroid dx  -A1c: 4.3  -TSH:1.150    Hematologic:  -CBC: H/H-9.1/27.6  -Coagulation Panel PTT: 26.0, INR: 1.27.    ID:  -Temperature: afebrile   -CBC: WBC-8.50  -Continue to observe for SIRS/Sepsis Syndrome.    Prophylaxis:  -DVT prophylaxis with 5000 SubQ Heparin q8h.  -Continue with SCD's b/l while patient is at rest     Disposition:  -Admit to CT surgery

## 2022-07-14 NOTE — H&P ADULT - NSHPREVIEWOFSYSTEMS_GEN_ALL_CORE
Review of Systems  CONSTITUTIONAL:  Denies Fevers / chills, sweats, fatigue, weight loss, weight gain                                      NEURO:  stated improved weakness of left hand, Denies changes in sensation, seizures, syncope, confusion                                                                            EYES:  Denies Blurry vision, discharge, pain, loss of vision                                                                                    ENMT:  Denies Difficulty hearing, vertigo, dysphagia, epistaxis, recent dental work                                       CV:  Denies Chest pain, palpitations, KAUFMAN, orthopnea                                                                                          RESPIRATORY:  Denies Wheezing, SOB, cough / sputum, hemoptysis                                                                GI:  c/o constipation, Denies Nausea, vomiting, diarrhea, melena, difficulty swallowing                                               : c/o urinary retention, Denies Hematuria, dysuria, urgency, incontinence                                                                                         MUSCULOSKELETAL:  Denies arthritis, joint swelling, muscle weakness                                                             SKIN/BREAST:  Denies rash, itching, hair loss, masses                                                                                            PSYCH:  Denies depression, anxiety, suicidal ideation                                                                                               HEME/LYMPH:  Denies bruises easily, enlarged lymph nodes, tender lymph nodes                                        ENDOCRINE:  Denies cold intolerance, heat intolerance, polydipsia

## 2022-07-14 NOTE — ED PROVIDER NOTE - OBJECTIVE STATEMENT
66 y/o M, PMHx of HTN, anemia, arthritis, COPD, s/p aortic valve replacement, s/p mini-AVR, ascending and hemiarch replacement by Dr. Toledo on 6/23/22 now p/w difficulty urinating over past few days. Pt reports not having urine output since last night. Pt is also complaining of bladder distension. Otherwise, he denies hematuria, dysuria, flank pain, n/v/d, fevers and chills. Pt reports taking multiple meds since surgery and is constipated. His last BM was a few days ago, despite taking stool softeners, laxatives, and enema. Pt Is able to pass flatus, last this morning. He denies chest pain, SOB, and weakness. 66 y/o M, PMHx of HTN, anemia, arthritis, COPD, s/p aortic valve replacement, s/p mini-AVR, ascending and hemiarch replacement by Dr. Toledo on 6/23/22 now p/w difficulty urinating over past few days. Pt reports not having urine output since last night. Pt is also complaining of bladder distension. Otherwise, he denies hematuria, dysuria, flank pain, n/v/d, fevers and chills. Pt reports taking multiple meds since surgery including percocet and is constipated. His last BM was a few days ago, despite taking stool softeners, laxatives, and enema. Pt Is able to pass flatus, last time this morning. He denies chest pain, SOB, and weakness.

## 2022-07-14 NOTE — ED ADULT NURSE NOTE - OBJECTIVE STATEMENT
Pt presents to ED c/o urinary retention since last night. pt endorses suprapubic discomfort with sensation of needing to void. Pt endorses passing small amounts of urine at times. pt recently had valve replacement complicated by CVA while hosptialized, denies cp or sob. EKG done in triage. No fevers.

## 2022-07-15 ENCOUNTER — TRANSCRIPTION ENCOUNTER (OUTPATIENT)
Age: 67
End: 2022-07-15

## 2022-07-15 VITALS
SYSTOLIC BLOOD PRESSURE: 141 MMHG | OXYGEN SATURATION: 100 % | DIASTOLIC BLOOD PRESSURE: 77 MMHG | HEART RATE: 82 BPM | RESPIRATION RATE: 17 BRPM

## 2022-07-15 LAB
ANION GAP SERPL CALC-SCNC: 8 MMOL/L — SIGNIFICANT CHANGE UP (ref 5–17)
ANION GAP SERPL CALC-SCNC: 9 MMOL/L — SIGNIFICANT CHANGE UP (ref 5–17)
BUN SERPL-MCNC: 12 MG/DL — SIGNIFICANT CHANGE UP (ref 7–23)
BUN SERPL-MCNC: 14 MG/DL — SIGNIFICANT CHANGE UP (ref 7–23)
CALCIUM SERPL-MCNC: 10.1 MG/DL — SIGNIFICANT CHANGE UP (ref 8.4–10.5)
CALCIUM SERPL-MCNC: 9.9 MG/DL — SIGNIFICANT CHANGE UP (ref 8.4–10.5)
CHLORIDE SERPL-SCNC: 100 MMOL/L — SIGNIFICANT CHANGE UP (ref 96–108)
CHLORIDE SERPL-SCNC: 98 MMOL/L — SIGNIFICANT CHANGE UP (ref 96–108)
CO2 SERPL-SCNC: 26 MMOL/L — SIGNIFICANT CHANGE UP (ref 22–31)
CO2 SERPL-SCNC: 28 MMOL/L — SIGNIFICANT CHANGE UP (ref 22–31)
CREAT SERPL-MCNC: 0.79 MG/DL — SIGNIFICANT CHANGE UP (ref 0.5–1.3)
CREAT SERPL-MCNC: 0.88 MG/DL — SIGNIFICANT CHANGE UP (ref 0.5–1.3)
EGFR: 94 ML/MIN/1.73M2 — SIGNIFICANT CHANGE UP
EGFR: 97 ML/MIN/1.73M2 — SIGNIFICANT CHANGE UP
GLUCOSE SERPL-MCNC: 111 MG/DL — HIGH (ref 70–99)
GLUCOSE SERPL-MCNC: 119 MG/DL — HIGH (ref 70–99)
HCT VFR BLD CALC: 29.1 % — LOW (ref 39–50)
HGB BLD-MCNC: 9.2 G/DL — LOW (ref 13–17)
MAGNESIUM SERPL-MCNC: 2.1 MG/DL — SIGNIFICANT CHANGE UP (ref 1.6–2.6)
MCHC RBC-ENTMCNC: 29.4 PG — SIGNIFICANT CHANGE UP (ref 27–34)
MCHC RBC-ENTMCNC: 31.6 GM/DL — LOW (ref 32–36)
MCV RBC AUTO: 93 FL — SIGNIFICANT CHANGE UP (ref 80–100)
NRBC # BLD: 0 /100 WBCS — SIGNIFICANT CHANGE UP (ref 0–0)
PLATELET # BLD AUTO: 266 K/UL — SIGNIFICANT CHANGE UP (ref 150–400)
POTASSIUM SERPL-MCNC: 4 MMOL/L — SIGNIFICANT CHANGE UP (ref 3.5–5.3)
POTASSIUM SERPL-MCNC: 4.3 MMOL/L — SIGNIFICANT CHANGE UP (ref 3.5–5.3)
POTASSIUM SERPL-SCNC: 4 MMOL/L — SIGNIFICANT CHANGE UP (ref 3.5–5.3)
POTASSIUM SERPL-SCNC: 4.3 MMOL/L — SIGNIFICANT CHANGE UP (ref 3.5–5.3)
RBC # BLD: 3.13 M/UL — LOW (ref 4.2–5.8)
RBC # FLD: 15.5 % — HIGH (ref 10.3–14.5)
SODIUM SERPL-SCNC: 133 MMOL/L — LOW (ref 135–145)
SODIUM SERPL-SCNC: 136 MMOL/L — SIGNIFICANT CHANGE UP (ref 135–145)
WBC # BLD: 6.21 K/UL — SIGNIFICANT CHANGE UP (ref 3.8–10.5)
WBC # FLD AUTO: 6.21 K/UL — SIGNIFICANT CHANGE UP (ref 3.8–10.5)

## 2022-07-15 PROCEDURE — 96372 THER/PROPH/DIAG INJ SC/IM: CPT | Mod: XU

## 2022-07-15 PROCEDURE — 81001 URINALYSIS AUTO W/SCOPE: CPT

## 2022-07-15 PROCEDURE — 85730 THROMBOPLASTIN TIME PARTIAL: CPT

## 2022-07-15 PROCEDURE — 99024 POSTOP FOLLOW-UP VISIT: CPT

## 2022-07-15 PROCEDURE — 94640 AIRWAY INHALATION TREATMENT: CPT

## 2022-07-15 PROCEDURE — 84300 ASSAY OF URINE SODIUM: CPT

## 2022-07-15 PROCEDURE — 87635 SARS-COV-2 COVID-19 AMP PRB: CPT

## 2022-07-15 PROCEDURE — 74019 RADEX ABDOMEN 2 VIEWS: CPT

## 2022-07-15 PROCEDURE — 80048 BASIC METABOLIC PNL TOTAL CA: CPT

## 2022-07-15 PROCEDURE — 85027 COMPLETE CBC AUTOMATED: CPT

## 2022-07-15 PROCEDURE — 85025 COMPLETE CBC W/AUTO DIFF WBC: CPT

## 2022-07-15 PROCEDURE — 99285 EMERGENCY DEPT VISIT HI MDM: CPT

## 2022-07-15 PROCEDURE — 85610 PROTHROMBIN TIME: CPT

## 2022-07-15 PROCEDURE — 83935 ASSAY OF URINE OSMOLALITY: CPT

## 2022-07-15 PROCEDURE — 83735 ASSAY OF MAGNESIUM: CPT

## 2022-07-15 PROCEDURE — 80053 COMPREHEN METABOLIC PANEL: CPT

## 2022-07-15 PROCEDURE — 36415 COLL VENOUS BLD VENIPUNCTURE: CPT

## 2022-07-15 PROCEDURE — 99232 SBSQ HOSP IP/OBS MODERATE 35: CPT | Mod: GC

## 2022-07-15 PROCEDURE — 96374 THER/PROPH/DIAG INJ IV PUSH: CPT

## 2022-07-15 RX ORDER — TAMSULOSIN HYDROCHLORIDE 0.4 MG/1
1 CAPSULE ORAL
Qty: 30 | Refills: 0
Start: 2022-07-15 | End: 2022-08-13

## 2022-07-15 RX ORDER — METOPROLOL TARTRATE 50 MG
1 TABLET ORAL
Qty: 0 | Refills: 0 | DISCHARGE
Start: 2022-07-15

## 2022-07-15 RX ORDER — CLOPIDOGREL BISULFATE 75 MG/1
1 TABLET, FILM COATED ORAL
Qty: 0 | Refills: 0 | DISCHARGE
Start: 2022-07-15

## 2022-07-15 RX ORDER — SENNA PLUS 8.6 MG/1
2 TABLET ORAL
Qty: 0 | Refills: 0 | DISCHARGE
Start: 2022-07-15

## 2022-07-15 RX ORDER — POLYETHYLENE GLYCOL 3350 17 G/17G
17 POWDER, FOR SOLUTION ORAL
Qty: 0 | Refills: 0 | DISCHARGE
Start: 2022-07-15

## 2022-07-15 RX ORDER — PANTOPRAZOLE SODIUM 20 MG/1
1 TABLET, DELAYED RELEASE ORAL
Qty: 0 | Refills: 0 | DISCHARGE
Start: 2022-07-15

## 2022-07-15 RX ORDER — ATORVASTATIN CALCIUM 80 MG/1
1 TABLET, FILM COATED ORAL
Qty: 0 | Refills: 0 | DISCHARGE
Start: 2022-07-15

## 2022-07-15 RX ORDER — ASPIRIN/CALCIUM CARB/MAGNESIUM 324 MG
1 TABLET ORAL
Qty: 0 | Refills: 0 | DISCHARGE
Start: 2022-07-15

## 2022-07-15 RX ADMIN — Medication 25 MILLIGRAM(S): at 05:35

## 2022-07-15 RX ADMIN — Medication 650 MILLIGRAM(S): at 09:20

## 2022-07-15 RX ADMIN — SODIUM CHLORIDE 3 MILLILITER(S): 9 INJECTION INTRAMUSCULAR; INTRAVENOUS; SUBCUTANEOUS at 05:16

## 2022-07-15 RX ADMIN — Medication 650 MILLIGRAM(S): at 08:53

## 2022-07-15 RX ADMIN — CLOPIDOGREL BISULFATE 75 MILLIGRAM(S): 75 TABLET, FILM COATED ORAL at 13:10

## 2022-07-15 RX ADMIN — SODIUM CHLORIDE 3 MILLILITER(S): 9 INJECTION INTRAMUSCULAR; INTRAVENOUS; SUBCUTANEOUS at 13:28

## 2022-07-15 RX ADMIN — HEPARIN SODIUM 5000 UNIT(S): 5000 INJECTION INTRAVENOUS; SUBCUTANEOUS at 05:34

## 2022-07-15 RX ADMIN — PANTOPRAZOLE SODIUM 40 MILLIGRAM(S): 20 TABLET, DELAYED RELEASE ORAL at 05:35

## 2022-07-15 RX ADMIN — Medication 81 MILLIGRAM(S): at 13:10

## 2022-07-15 NOTE — DISCHARGE NOTE PROVIDER - CARE PROVIDER_API CALL
Faisal Toledo)  Surgery; Thoracic and Cardiac Surgery  [address] [place] [address]  Phone: [phone]  Fax: [phone]  Follow Up Time: 2 weeks    Edy Roth)  Urology  [address], [address] [place] [id number]  Phone: [phone]  Fax: [phone]  Follow Up Time: 1 week

## 2022-07-15 NOTE — PROGRESS NOTE ADULT - SUBJECTIVE AND OBJECTIVE BOX
Patient discussed on morning rounds with       Operation / Date:     Surgeon:    Referring Physician:    SUBJECTIVE ASSESSMENT AND HOSPITAL COURSE:  67y Male       Vital Signs Last 24 Hrs  T(C): 36.1 (15 Jul 2022 14:04), Max: 36.7 (2022 21:30)  T(F): 97 (15 Jul 2022 14:04), Max: 98 (2022 21:30)  HR: 83 (15 Jul 2022 13:22) (57 - 86)  BP: 139/69 (15 Jul 2022 13:22) (98/51 - 141/74)  BP(mean): 94 (15 Jul 2022 13:22) (70 - 101)  RR: 16 (15 Jul 2022 13:22) (16 - 18)  SpO2: 100% (15 Jul 2022 13:22) (97% - 100%)    Parameters below as of 15 Jul 2022 13:22  Patient On (Oxygen Delivery Method): room air        EPICARDIAL WIRES REMOVED: Yes/No.  TIE DOWNS REMOVED: Yes/No.    PHYSICAL EXAM:    General:     Neurological:    Cardiovascular:    Respiratory:    Gastrointestinal:    Extremities:    Vascular:    Incision Sites:    LABS:                        9.2    6.21  )-----------( 266      ( 15 Jul 2022 05:30 )             29.1       COUMADIN: No.        PT/INR - ( 2022 11:44 )   PT: 15.2 sec;   INR: 1.27          PTT - ( 2022 11:44 )  PTT:26.0 sec    07-15    133<L>  |  98  |  14  ----------------------------<  119<H>  4.3   |  26  |  0.79    Ca    9.9      15 Jul 2022 11:08  Mg     2.1     07-15    TPro  8.7<H>  /  Alb  3.8  /  TBili  0.6  /  DBili  x   /  AST  34  /  ALT  29  /  AlkPhos  105  07-14      Urinalysis Basic - ( 2022 18:34 )    Color: Red / Appearance: Turbid / S.020 / pH: x  Gluc: x / Ketone: NEGATIVE  / Bili: Negative / Urobili: 0.2 E.U./dL   Blood: x / Protein: 30 mg/dL / Nitrite: NEGATIVE   Leuk Esterase: Small / RBC: Many /HPF / WBC 5-10 /HPF   Sq Epi: x / Non Sq Epi: x / Bacteria: Present /HPF        Discharge CXR:    Discharge ECHO:     Patient discussed on morning rounds with Dr. Menon      Operation / Date:     Surgeon:    Referring Physician:    SUBJECTIVE ASSESSMENT AND HOSPITAL COURSE:  67y Male       Vital Signs Last 24 Hrs  T(C): 36.1 (15 Jul 2022 14:04), Max: 36.7 (2022 21:30)  T(F): 97 (15 Jul 2022 14:04), Max: 98 (2022 21:30)  HR: 83 (15 Jul 2022 13:22) (57 - 86)  BP: 139/69 (15 Jul 2022 13:22) (98/51 - 141/74)  BP(mean): 94 (15 Jul 2022 13:22) (70 - 101)  RR: 16 (15 Jul 2022 13:22) (16 - 18)  SpO2: 100% (15 Jul 2022 13:22) (97% - 100%)    Parameters below as of 15 Jul 2022 13:22  Patient On (Oxygen Delivery Method): room air        EPICARDIAL WIRES REMOVED: Yes/No.  TIE DOWNS REMOVED: Yes/No.    PHYSICAL EXAM:    General:     Neurological:    Cardiovascular:    Respiratory:    Gastrointestinal:    Extremities:    Vascular:    Incision Sites:    LABS:                        9.2    6.21  )-----------( 266      ( 15 Jul 2022 05:30 )             29.1       COUMADIN: No.        PT/INR - ( 2022 11:44 )   PT: 15.2 sec;   INR: 1.27          PTT - ( 2022 11:44 )  PTT:26.0 sec    07-15    133<L>  |  98  |  14  ----------------------------<  119<H>  4.3   |  26  |  0.79    Ca    9.9      15 Jul 2022 11:08  Mg     2.1     07-15    TPro  8.7<H>  /  Alb  3.8  /  TBili  0.6  /  DBili  x   /  AST  34  /  ALT  29  /  AlkPhos  105  07-14      Urinalysis Basic - ( 2022 18:34 )    Color: Red / Appearance: Turbid / S.020 / pH: x  Gluc: x / Ketone: NEGATIVE  / Bili: Negative / Urobili: 0.2 E.U./dL   Blood: x / Protein: 30 mg/dL / Nitrite: NEGATIVE   Leuk Esterase: Small / RBC: Many /HPF / WBC 5-10 /HPF   Sq Epi: x / Non Sq Epi: x / Bacteria: Present /HPF        Discharge CXR:    Discharge ECHO:     Patient discussed on morning rounds with Dr. Toledo    Operation / Date: 22 he underwent a mini-AVR (27mm bio), ascending and hemiarch replacement EF normal    Surgeon: Dr. Toledo    SUBJECTIVE ASSESSMENT AND HOSPITAL COURSE:  67 y/o male with a pmhx of HTN, anemia, arthritis, COPD, and a family history of marfan disease and aortic aneurysm (nephew) who presented for an aortic valve replacement with Dr. Toledo. On 22 he underwent a mini-AVR (27mm bio), ascending and hemiarch replacement EF normal with Dr. Toledo. Intraop course was uncomplicated and he was brought to CTICU. Upon waking up noted to not move LUE/LLE and a stroke code was called. CTA revealed occlusion of R ICA. Overnight into POD1 he was brought emergently to OR by neurosurgery for angiogram which revealed chronic occlusion of R ICA. No thrombectomy performed. POD1 a repeat CT-head revealed recent infarct to right precentral gyrus. EEG placed which was negative for seizures. Remainder of post operative care was stable with improvement of neuro exam and he was discharged home on with home physical therapy and rolling walker. Prior to discharge he had post operative BMs and was ambulating and tolerated a PO diet. On this admission on 22 he came via ED with complaints of urinary retention and constipation He stated that he has not been able to pass urine for one day and despite enemas and suppositories he had not had a BM in two days but passing gas. A fuchs catheter was placed in the ED with that drained approximately 2 L of clear non concentrated urine. Lab results showed increased creatine, electrolyte abnormalities and possible hydronephrosis. He was admitted to the floor [address] [facility] for further management and observation given recent cardiac surgery. Nephrology and Urology were consulted to evaluate the patient. HD1 after serial BMPs, his electrolytes normalized and nephrology was comfortable with discharge home with a fuchs catheter. Urology also evaluated the patient and agreed with a discharge home with a fuchs. He will follow up with Urologist Dr. Edy Roth in 1 week to attempt trial of void. Hematuria has resolved since insertion of the fuchs, no need for irrigation.  The fuchs was attached to leg bag and the patient and his wife were instructed on how to maintain the fuchs until his follow up appointment with Urology.     Over 35 minutes was spent with the patient reviewing the discharge material including medications, follow up appointments, recovery, concerning symptoms, and how to contact their health care providers if they have questions    Vital Signs Last 24 Hrs  T(C): 36.1 (15 Jul 2022 14:04), Max: 36.7 (2022 21:30)  T(F): 97 (15 Jul 2022 14:04), Max: 98 (2022 21:30)  HR: 83 (15 Jul 2022 13:22) (57 - 86)  BP: 139/69 (15 Jul 2022 13:22) (98/51 - 141/74)  BP(mean): 94 (15 Jul 2022 13:) (70 - 101)  RR: 16 (15 Jul 2022 13:) (16 - 18)  SpO2: 100% (15 Jul 2022 13:22) (97% - 100%)    Parameters below as of 15 Jul 2022 13:22  Patient On (Oxygen Delivery Method): room air    EPICARDIAL WIRES REMOVED: Yes.  TIE DOWNS REMOVED: Yes.    PHYSICAL EXAM:  GEN: NAD, looks comfortable  Psych: Mood appropriate  Neuro: A&Ox3.  No focal deficits.  Moving all extremities, LUE weaker then RUE which is baseline   HEENT: No obvious abnormalities  CV: S1S2, regular, no murmurs appreciated.  No carotid bruits.  No JVD  Lungs: Clear B/L.  No wheezing, rales or rhonchi  ABD: Soft, non-tender, non-distended.  +Bowel sounds  : fuchs catheter in place attached to leg bag   EXT: Warm and well perfused.  No peripheral edema noted  Musculoskeletal: Moving all extremities with normal ROM, no joint swelling  Incisions: previous sternotomy incision is clean dry and intact healing well, previous drain sites are clean dry and intact, healing well.LABS:                        9.2    6.21  )-----------( 266      ( 15 Jul 2022 05:30 )             29.1     COUMADIN: No.        PT/INR - ( 2022 11:44 )   PT: 15.2 sec;   INR: 1.27          PTT - ( 2022 11:44 )  PTT:26.0 sec    07-15    133<L>  |  98  |  14  ----------------------------<  119<H>  4.3   |  26  |  0.79    Ca    9.9      15 Jul 2022 11:08  Mg     2.1     -15    TPro  8.7<H>  /  Alb  3.8  /  TBili  0.6  /  DBili  x   /  AST  34  /  ALT  29  /  AlkPhos  105  07-14      Urinalysis Basic - ( 2022 18:34 )    Color: Red / Appearance: Turbid / S.020 / pH: x  Gluc: x / Ketone: NEGATIVE  / Bili: Negative / Urobili: 0.2 E.U./dL   Blood: x / Protein: 30 mg/dL / Nitrite: NEGATIVE   Leuk Esterase: Small / RBC: Many /HPF / WBC 5-10 /HPF   Sq Epi: x / Non Sq Epi: x / Bacteria: Present /HPF    Discharge CXR:  < from: Xray Abdomen 2 Views (22 @ 12:16) >  INTERPRETATION:  Clinical history/reason for exam: Constipation.    Abdomen supine and upright.    No comparison.    Findings/  impression: No abnormalbowel dilatation or pneumoperitoneum. Lumbar   spine degenerative changes. Aortic valve replacement.    < end of copied text >

## 2022-07-15 NOTE — DISCHARGE NOTE PROVIDER - HOSPITAL COURSE
65 y/o male with a pmhx of HTN, anemia, arthritis, COPD, and a family history of marfan disease and aortic aneurysm (nephew) who presented for an aortic valve replacement with Dr. Toledo. On 6/23/22 he underwent a mini-AVR (27mm bio), ascending and hemiarch replacement EF normal with Dr. Toledo. Intraop course was uncomplicated and he was brought to CTICU. Upon waking up noted to not move LUE/LLE and a stroke code was called. CTA revealed occlusion of R ICA. Overnight into POD1 he was brought emergently to OR by neurosurgery for angiogram which revealed chronic occlusion of R ICA. No thrombectomy performed. POD1 a repeat CT-head revealed recent infarct to right precentral gyrus. EEG placed which was negative for seizures. Remainder of post operative care was stable with improvement of neuro exam and he was discharged home on with home physical therapy and rolling walker. Prior to discharge he had post operative BMs and was ambulating and tolerated a PO diet. On this admission on 7/14/22 he came via ED with complaints of urinary retention and constipation He stated that he has not been able to pass urine for one day and despite enemas and suppositories he had not had a BM in two days but passing gas. A fuchs catheter was placed in the ED with that drained approximately 2 L of clear non concentrated urine. Lab results showed increased creatine, electrolyte abnormalities and possible hydronephrosis. He was admitted to the floor [facility] for further management and observation given recent cardiac surgery. Nephrology and Urology were consulted to evaluate the patient. HD1 after serial BMPs, his electrolytes normalized and nephrology was comfortable with discharge home with a fuchs catheter. Urology also evaluated the patient and agreed with a discharge home with a fuchs. He will follow up with Urologist Dr. Edy Roth in 1 week to attempt trial of void. Hematuria has resolved since insertion of the fucsh, no need for irrigation.  The fuchs was attached to leg bag and the patient and his wife were instructed on how to maintain the fuchs until his follow up appointment with Urology.     Over 35 minutes was spent with the patient reviewing the discharge material including medications, follow up appointments, recovery, concerning symptoms, and how to contact their health care providers if they have questions

## 2022-07-15 NOTE — DISCHARGE NOTE PROVIDER - CARE PROVIDERS DIRECT ADDRESSES
[email or web address]

## 2022-07-15 NOTE — CONSULT NOTE ADULT - ASSESSMENT
67 y/o male with a pmhx of HTN, anemia, arthritis, COPD, and a family history of marfan disease and aortic aneurysm (nephew) who presented for an aortic valve replacement with Dr. Toledo. On 6/23/22 he underwent a mini-AVR (27mm bio), ascending and hemiarch replacement EF normal with Dr. Toledo. On this admission on 7/14/22 he came via ED with complaints of urinary retention and constipation.  consulted for urinary retention. Fuchs placed in ED, drained >2L. Urine is blood tinged after fuchs catheter insertion. on ASA/plavis/hepSQ. No issues voiding in the past, no known hx BPH, no f/c/n/v. States has prostate biopsy 4 years ago (doesn't remember Urologist) which was normal. States was taking oxycodone for arthritis pain, caused him to be constipated, and then he was unable to urinate. Has been ambulating. No leukocytosis, Cr has improved (1.28->0.88 today) UA neg nit/small leukocyte esterase.    Plan:  -c/w fuchs catheter for at least 5-7 days, if being discharged, dc with fuchs to legbag  -flomax  -encourage ambulation/hydration  -bowel regimen  -limit narcotics  -f/u Dr. Roth office next week for TOV
66M with pmhx of HTN, anemia, arthritis, COPD, who presents to the hospital today in the setting of urinary retention and constipation. Nephrology consulted for elevated creatinine and hyponatremia likely in the setting of urinary obstruction.     Assessment/Plan:   #Acute urinary retention  #Elevated Creatinine  #Asymptomatic Euvolemic Hyponatremia  PT with baseline creatinine of 0.6, now presenting with creatinine of 1.28. House placed in ED with U/O of 2.4L. Lab derangements likely from obstruction. PT states never been told he has any prostate issues which could be a culprit to current presentation. He does use a tiotropium inhaler which has a side effect of urinary retention. Expect creatinine to improve now that obstruction resolved, similarly urine studies w/ osm of 216 and sodium of 20. Sodium will likely correct on its own.   -Please repeat BMP at 4PM with urine sodium and osm  -Given 2.4L of urine output, if U/O >3L by 10 pm tonight, would start on 100cc/hr of 1/2NS to replace volume  -Start flomax 0.4mg Qday  -Consider urology consult  -Goal serum sodium by 11AM on 7/15 133-135  -Avoid NSAID's  -Strict I/O's  -Hold tiotropium inhaler for now     Thank you for the opportunity to participate in the care of your patient. The nephrology service remains available to assist with any questions or concerns. Please feel free to reach us by paging the on-call nephrology fellow for urgent issues or as below.     Nic Epperson D.O.  PGY 5 - Nephrology Fellow  [phone]

## 2022-07-15 NOTE — DISCHARGE NOTE NURSING/CASE MANAGEMENT/SOCIAL WORK - NSDCFUADDAPPT_GEN_ALL_CORE_FT
-Please follow up with Dr. Toledo in 1-2 weeks.  The office is located at [facility], [facility], 4th floor. Call us with any questions #[phone].    -Please follow up with Dr. Roht in 1 week to remove your fuchs catheter. Their office is located at [address] [place] [address].

## 2022-07-15 NOTE — DISCHARGE NOTE NURSING/CASE MANAGEMENT/SOCIAL WORK - PATIENT PORTAL LINK FT
You can access the FollowMyHealth Patient Portal offered by [facility] by registering at the following website: [email or web address]. By joining [employer]’s FollowMyHealth portal, you will also be able to view your health information using other applications (apps) compatible with our system.

## 2022-07-15 NOTE — DISCHARGE NOTE PROVIDER - NSDCCPCAREPLAN_GEN_ALL_CORE_FT
PRINCIPAL DISCHARGE DIAGNOSIS  Diagnosis: Urinary retention  Assessment and Plan of Treatment:       SECONDARY DISCHARGE DIAGNOSES  Diagnosis: Hyponatremia  Assessment and Plan of Treatment:

## 2022-07-15 NOTE — DISCHARGE NOTE PROVIDER - NSDCFUADDINST_GEN_ALL_CORE_FT
-Walk daily as tolerated and use your incentive spirometer every hour.    -No driving or strenuous activity/exercise for 6 weeks, or until cleared by your surgeon.    -Gently clean your incisions with anti-bacterial soap and water, pat dry.  You may leave them open to air.    -Call your doctor if you have shortness of breath, chest pain not relieved by pain medication, dizziness, fever >101.5, or increased  redness or drainage from incisions.

## 2022-07-15 NOTE — DISCHARGE NOTE NURSING/CASE MANAGEMENT/SOCIAL WORK - NSDCPEFALRISK_GEN_ALL_CORE
For information on Fall & Injury Prevention, visit: [email or web address] OR  [email or web address] OR  https://www.cdc.gov/steadi/patient.html

## 2022-07-15 NOTE — CONSULT NOTE ADULT - SUBJECTIVE AND OBJECTIVE BOX
HPI:  66M with pmhx of HTN, anemia, arthritis, COPD, who presents to the hospital today in the setting of urinary retention and constipation. Per patient had procedure with Dr. Toledo about 3 weeks ago, but around 1.5 days ago started to notice he was having difficulty urinating. Endorses he was having a little dribbling and has been dealing with constipation. States last BM was yesterday, but very small and thinks this may be due to reduced appetite. At time of evaluation, states he is not constipated at the moment, in addition had urinary fuchs placed with 2.4L U/O. Urine is pink tinged in the fcuhs. He has not taken any new medications or recently taken Benadryl (which could be a cause of urinary retention). He has never had this problem in the past and never has been told he has a problem with his prostate. Nephrology consulted for elevated creatinine in the setting of acute urinary retention.     ICU Vital Signs Last 24 Hrs  T(C): 36.9 (2022 12:26), Max: 36.9 (2022 12:26)  T(F): 98.5 (2022 12:26), Max: 98.5 (2022 12:26)  HR: 85 (2022 12:26) (85 - 105)  BP: 132/82 (2022 12:26) (132/82 - 161/95)  BP(mean): --  ABP: --  ABP(mean): --  RR: 18 (2022 12:26) (18 - 18)  SpO2: 98% (2022 12:26) (98% - 98%)    O2 Parameters below as of 2022 12:26  Patient On (Oxygen Delivery Method): room air         (2022 14:14)      PAST MEDICAL & SURGICAL HISTORY:  HTN (hypertension)      Aortic regurgitation      Anemia      Arthritis      COPD, mild      H/O cataract extraction        Allergies:  No Known Allergies      Home Medications:   aspirin  chewable 81 milliGRAM(s) Oral daily  atorvastatin 80 milliGRAM(s) Oral at bedtime  budesonide 160 MICROgram(s)/formoterol 4.5 MICROgram(s) Inhaler 2 Puff(s) Inhalation two times a day  clopidogrel Tablet 75 milliGRAM(s) Oral daily  heparin   Injectable 5000 Unit(s) SubCutaneous every 8 hours  lactulose Syrup 5 Gram(s) Oral once  metoprolol tartrate 25 milliGRAM(s) Oral two times a day  pantoprazole    Tablet 40 milliGRAM(s) Oral before breakfast  polyethylene glycol 3350 17 Gram(s) Oral daily  senna 2 Tablet(s) Oral at bedtime  sodium chloride 0.9% lock flush 3 milliLiter(s) IV Push every 8 hours  sodium chloride 0.9%. 1000 milliLiter(s) IV Continuous <Continuous>  tiotropium 18 MICROgram(s) Capsule 1 Capsule(s) Inhalation daily      Hospital Medications:   MEDICATIONS  (STANDING):  aspirin  chewable 81 milliGRAM(s) Oral daily  atorvastatin 80 milliGRAM(s) Oral at bedtime  budesonide 160 MICROgram(s)/formoterol 4.5 MICROgram(s) Inhaler 2 Puff(s) Inhalation two times a day  clopidogrel Tablet 75 milliGRAM(s) Oral daily  heparin   Injectable 5000 Unit(s) SubCutaneous every 8 hours  lactulose Syrup 5 Gram(s) Oral once  metoprolol tartrate 25 milliGRAM(s) Oral two times a day  pantoprazole    Tablet 40 milliGRAM(s) Oral before breakfast  polyethylene glycol 3350 17 Gram(s) Oral daily  senna 2 Tablet(s) Oral at bedtime  sodium chloride 0.9% lock flush 3 milliLiter(s) IV Push every 8 hours  sodium chloride 0.9%. 1000 milliLiter(s) (75 mL/Hr) IV Continuous <Continuous>  tiotropium 18 MICROgram(s) Capsule 1 Capsule(s) Inhalation daily      SOCIAL HISTORY:  Denies ETOh, Smoking    Family History:  FAMILY HISTORY:  Family history of Marfan syndrome (Uncle)    FH: aortic aneurysm (Uncle)      VITALS:  T(F): 97.5 (22 @ 14:27), Max: 98.5 (22 @ 12:26)  HR: 57 (22 @ 14:27)  BP: 120/73 (22 @ 14:27)  RR: 18 (22 @ 14:27)  SpO2: 97% (22 @ 14:27)  Wt(kg): --     @ 07:01  -   @ 15:07  --------------------------------------------------------  IN: 0 mL / OUT: 2400 mL / NET: -2400 mL      Height (cm): 190.5 ( @ 10:24)  Weight (kg): 77.1 ( @ 10:24)  BMI (kg/m2): 21.2 ( @ 10:24)  BSA (m2): 2.05 ( @ 10:24)  CAPILLARY BLOOD GLUCOSE      Review of Systems:  ROS negative except as per HPI    PHYSICAL EXAM:  GENERAL: Alert, awake, oriented x3   HEENT: KATE, EOMI, neck supple, no JVP  CHEST/LUNG: Bilateral clear breath sounds  HEART: Regular rate and rhythm, no murmur, no gallops, no rub   ABDOMEN: Soft, nontender, non distended  : No flank or supra pubic tenderness, fuchs in place draining pink tinged urine  EXTREMITIES: no pedal edema  Neurology: AAOx3, no focal neurological deficit  SKIN: No rash or skin lesion     LABS:      127<L>  |  93<L>  |  19  ----------------------------<  117<H>  3.8   |  23  |  1.28    Ca    9.8      2022 11:44    TPro  8.7<H>  /  Alb  3.8  /  TBili  0.6  /  DBili      /  AST  34  /  ALT  29  /  AlkPhos  105      Creatinine Trend: 1.28 <--                        9.1    8.50  )-----------( 277      ( 2022 11:44 )             27.6     Urine Studies:  Urinalysis Basic - ( 2022 11:44 )    Color: Yellow / Appearance: Clear / S.010 / pH:   Gluc:  / Ketone: NEGATIVE  / Bili: Negative / Urobili: 0.2 E.U./dL   Blood:  / Protein: NEGATIVE mg/dL / Nitrite: NEGATIVE   Leuk Esterase: NEGATIVE / RBC: 5-10 /HPF / WBC < 5 /HPF   Sq Epi:  / Non Sq Epi: 0-5 /HPF / Bacteria: Present /HPF      Osmolality, Random Urine: 216 mosm/kg ( @ 13:12)  Sodium, Random Urine: 20 mmol/L ( @ 13:12)          
  HPI:  65 y/o male with a pmhx of HTN, anemia, arthritis, COPD, and a family history of marfan disease and aortic aneurysm (nephew) who presented for an aortic valve replacement with Dr. Toledo. On 6/23/22 he underwent a mini-AVR (27mm bio), ascending and hemiarch replacement EF normal with Dr. Toledo. Intraop course was uncomplicated and he was brought to CTICU. Upon waking up noted to not move LUE/LLE and a stroke code was called. CTA revealed occlusion of R ICA. Overnight into POD1 he was brought emergently to OR by neurosurgery for angiogram which revealed chronic occlusion of R ICA. No thrombectomy performed. POD1 a repeat CT-head revealed recent infarct to right precentral gyrus. EEG placed which was negative for seizures. Remainder of post operative care was stable with improvement of neuro exam and he was discharged home on with home physical therapy and rolling walker. Prior to discharge he had post operative BMs and was ambulating and tolerated a PO diet. On this admission on 7/14/22 he came via ED with complaints of urinary retention and constipation He stated that he has not been able to pass urine for one day and despite enemas and suppositories he had not had a BM in two days but passing gas. A fuchs catheter was placed in the ED with that drained approximately 2 L of clear non concentrated urine. Lab results showed increased creatine, electrolyte abnormalities and possible hydronephrosis. He was admitted to the floor [address] for further management and observation given recent cardiac surgery.     :   consulted for urinary retention. Fuchs placed in ED, drained >2L. Urine is blood tinged after fuchs catheter insertion. on ASA/plavis/hepSQ. No issues voiding in the past, no known hx BPH, no f/c/n/v. States has prostate biopsy 4 years ago (doesn't remember Urologist) which was normal. States was taking oxycodone for arthritis pain, caused him to be constipated, and then he was unable to urinate. Has been ambulating.    ICU Vital Signs Last 24 Hrs  T(C): 36.9 (14 Jul 2022 12:26), Max: 36.9 (14 Jul 2022 12:26)  T(F): 98.5 (14 Jul 2022 12:26), Max: 98.5 (14 Jul 2022 12:26)  HR: 85 (14 Jul 2022 12:26) (85 - 105)  BP: 132/82 (14 Jul 2022 12:26) (132/82 - 161/95)  BP(mean): --  ABP: --  ABP(mean): --  RR: 18 (14 Jul 2022 12:26) (18 - 18)  SpO2: 98% (14 Jul 2022 12:26) (98% - 98%)    O2 Parameters below as of 14 Jul 2022 12:26  Patient On (Oxygen Delivery Method): room air               (14 Jul 2022 14:14)      Vital Signs Last 24 Hrs  T(C): 36.1 (15 Jul 2022 09:54), Max: 36.9 (14 Jul 2022 12:26)  T(F): 97 (15 Jul 2022 09:54), Max: 98.5 (14 Jul 2022 12:26)  HR: 72 (15 Jul 2022 08:28) (57 - 86)  BP: 136/70 (15 Jul 2022 08:28) (98/51 - 141/74)  BP(mean): 97 (15 Jul 2022 08:28) (70 - 101)  RR: 17 (15 Jul 2022 08:28) (16 - 18)  SpO2: 100% (15 Jul 2022 08:28) (97% - 100%)    Parameters below as of 15 Jul 2022 08:28  Patient On (Oxygen Delivery Method): room air      I&O's Summary    14 Jul 2022 07:01  -  15 Jul 2022 07:00  --------------------------------------------------------  IN: 800 mL / OUT: 4685 mL / NET: -3885 mL    15 Jul 2022 07:01  -  15 Jul 2022 11:08  --------------------------------------------------------  IN: 0 mL / OUT: 100 mL / NET: -100 mL        PE:  Gen:  Abd:  :  JULIET:    LABS:                        9.2    6.21  )-----------( 266      ( 15 Jul 2022 05:30 )             29.1     07-15    136  |  100  |  12  ----------------------------<  111<H>  4.0   |  28  |  0.88    Ca    10.1      15 Jul 2022 05:30  Mg     2.1     07-15    TPro  8.7<H>  /  Alb  3.8  /  TBili  0.6  /  DBili  x   /  AST  34  /  ALT  29  /  AlkPhos  105  07-14    PT/INR - ( 14 Jul 2022 11:44 )   PT: 15.2 sec;   INR: 1.27          PTT - ( 14 Jul 2022 11:44 )  PTT:26.0 sec  Cultures      A/P

## 2022-07-15 NOTE — PROGRESS NOTE ADULT - ASSESSMENT
66M with pmhx of HTN, anemia, arthritis, COPD, who presents to the hospital today in the setting of urinary retention and constipation. Nephrology consulted for elevated creatinine and hyponatremia likely in the setting of urinary obstruction.     Assessment/Plan:   #Acute urinary retention  Most likely due to constipation from opioid use  urinary fuchs placed with 2.4L U/O, total of ~4L in 24hrs   PT with baseline creatinine of 0.6, now improved  with creatinine of .79.     Plan:  Resolved   Continue with Tamsulosin   Follow up with urology   Limit use of opioids   Bowel regimen         #Asymptomatic Euvolemic Hyponatremia  PT with baseline creatinine of 0.6, now improved  with creatinine of .79.   U/O around ~4L, Patient was over corrected to 138  and started on D5, repeat Na+ now at goal of 133.   Lab derangements  from obstruction.      Plan:   Resolved   Continue with Fuchs and to follow up with urology for TOV in one week     
 Follow Up:  Care Providers for Follow up (PCP/Outpatient Provider)	Faisal Toledo)  Surgery; Thoracic and Cardiac Surgery  [address], [address]th [place] [address]  Phone: [phone]  Fax: [phone]  Follow Up Time: 2 weeks    Edy Roth)  Urology  [address], [address] [place] [address]  Phone: [phone]  Fax: [phone]  Follow Up Time: 1 week  Patient's Scheduled Appointments	Faisal Toledo  [facility]  [facility] [address]  Scheduled Appointment: 07/27/2022  Additional Scheduled Appointments	-Please follow up with Dr. Toledo in 1-2 weeks.  The office is located at [facility], 4th floor. Call us with any questions #[phone].    -Please follow up with Dr. Roth in 1 week to remove your fuchs catheter. Their office is located at [address] [place] [address].  Discharge Diet	DASH Diet  Activity	Do not drive or operate machinery, No heavy lifting/straining, Return to Work/School allowed, Showering allowed, Stairs allowed, Walking - Indoors allowed, Walking - Outdoors allowed, Follow Instructions Provided by your Surgical Team  Additional Instructions	-Walk daily as tolerated and use your incentive spirometer every hour.    -No driving or strenuous activity/exercise for 6 weeks, or until cleared by your surgeon.    -Gently clean your incisions with anti-bacterial soap and water, pat dry.  You may leave them open to air.    -Call your doctor if you have shortness of breath, chest pain not relieved by pain medication, dizziness, fever >101.5, or increased  redness or drainage from incisions.

## 2022-07-15 NOTE — DISCHARGE NOTE PROVIDER - NSDCFUADDAPPT_GEN_ALL_CORE_FT
-Please follow up with Dr. Toledo in 1-2 weeks.  The office is located at [facility], [facility], 4th floor. Call us with any questions #[phone].    -Please follow up with Dr. Roth in 1 week to remove your fuchs catheter. Their office is located at [address] [place] [address].

## 2022-07-15 NOTE — DISCHARGE NOTE PROVIDER - NSDCFUSCHEDAPPT_GEN_ALL_CORE_FT
Ordered, thanks  
Pt is here for a nurse visit for blood pressure check and the appt note states Meningitis B... is pt due for this? If so, please place order ASAP as pt is here in clinic currently.   
[facility]  [facility] [address]  Scheduled Appointment: 07/27/2022

## 2022-07-15 NOTE — DISCHARGE NOTE PROVIDER - NSDCMRMEDTOKEN_GEN_ALL_CORE_FT
acetaminophen 325 mg oral tablet: 2 tab(s) orally every 6 hours, As needed, Mild Pain (1 - 3)  aspirin 81 mg oral tablet, chewable: 1 tab(s) orally once a day  atorvastatin 80 mg oral tablet: 1 tab(s) orally once a day (at bedtime)  clopidogrel 75 mg oral tablet: 1 tab(s) orally once a day  metoprolol tartrate 25 mg oral tablet: 1 tab(s) orally 2 times a day  oxyCODONE 5 mg oral tablet: 1 tab(s) orally every 6 hours MDD:4  pantoprazole 40 mg oral delayed release tablet: 1 tab(s) orally once a day (before a meal)  polyethylene glycol 3350 oral powder for reconstitution: 17 gram(s) orally once a day  senna leaf extract oral tablet: 2 tab(s) orally once a day (at bedtime)  Spiriva 18 mcg inhalation capsule: 1 cap(s) inhaled once a day  tamsulosin 0.4 mg oral capsule: 1 cap(s) orally once a day (at bedtime)  Wixela Inhub 250 mcg-50 mcg inhalation powder: 1 puff(s) inhaled 2 times a day

## 2022-07-15 NOTE — PROGRESS NOTE ADULT - ATTENDING COMMENTS
urinary retention with ANKITA and resultant hyponatremia, s/p fuchs with polyuria that has now improved, Na 133 with D5W. Ok from nephrology standpoint to discharge home with fuchs and f/u with urology.

## 2022-07-15 NOTE — PROGRESS NOTE ADULT - SUBJECTIVE AND OBJECTIVE BOX
Patient is a 67y Male seen and evaluated at bedside. Patient in no acute distress. States that he feels much better.       Meds:    acetaminophen     Tablet .. 650 every 6 hours PRN  aspirin  chewable 81 daily  atorvastatin 80 at bedtime  budesonide 160 MICROgram(s)/formoterol 4.5 MICROgram(s) Inhaler 2 two times a day  clopidogrel Tablet 75 daily  dextrose 5%. 1000 <Continuous>  heparin   Injectable 5000 every 8 hours  metoprolol tartrate 25 two times a day  pantoprazole    Tablet 40 before breakfast  polyethylene glycol 3350 17 daily  senna 2 at bedtime  sodium chloride 0.9% lock flush 3 every 8 hours  tamsulosin 0.4 at bedtime  tiotropium 18 MICROgram(s) Capsule 1 daily      T(C): , Max: 36.9 (22 @ 12:26)  T(F): , Max: 98.5 (22 @ 12:26)  HR: 72 (07-15-22 @ 08:28)  BP: 136/70 (07-15-22 @ 08:28)  BP(mean): 97 (07-15-22 @ 08:28)  RR: 17 (07-15-22 @ 08:28)  SpO2: 100% (07-15-22 @ 08:28)  Wt(kg): --     @ 07:  -  07-15 @ 07:00  --------------------------------------------------------  IN: 800 mL / OUT: 4685 mL / NET: -3885 mL    07-15 @ 07:  -  07-15 @ 12:23  --------------------------------------------------------  IN: 0 mL / OUT: 100 mL / NET: -100 mL          Review of Systems:  ROS negative except as per HPI      PHYSICAL EXAM:  GENERAL: NAD  NECK: supple, No JVD  CHEST/LUNG: Clear to auscultation bilaterally  HEART: normal S1S2, RRR  ABDOMEN: Soft, Nontender, +BS, No flank tenderness bilateral  EXTREMITIES: No clubbing, cyanosis, or edema   NEUROLOGY: AAO x3, no focal neurological deficit        LABS:                        9.2    6.21  )-----------( 266      ( 15 Jul 2022 05:30 )             29.1     07-15    133<L>  |  98  |  14  ----------------------------<  119<H>  4.3   |  26  |  0.79    Ca    9.9      15 Jul 2022 11:08  Mg     2.1     07-15    TPro  8.7<H>  /  Alb  3.8  /  TBili  0.6  /  DBili  x   /  AST  34  /  ALT  29  /  AlkPhos  105        PT/INR - ( 2022 11:44 )   PT: 15.2 sec;   INR: 1.27          PTT - ( 2022 11:44 )  PTT:26.0 sec  Urinalysis Basic - ( 2022 18:34 )    Color: Red / Appearance: Turbid / S.020 / pH: x  Gluc: x / Ketone: NEGATIVE  / Bili: Negative / Urobili: 0.2 E.U./dL   Blood: x / Protein: 30 mg/dL / Nitrite: NEGATIVE   Leuk Esterase: Small / RBC: Many /HPF / WBC 5-10 /HPF   Sq Epi: x / Non Sq Epi: x / Bacteria: Present /HPF      Sodium, Random Urine: 38 mmol/L ( @ 18:34)  Osmolality, Random Urine: 373 mosm/kg ( @ 18:34)  Osmolality, Random Urine: 216 mosm/kg ( @ 13:12)  Sodium, Random Urine: 20 mmol/L ( @ 13:12)        RADIOLOGY & ADDITIONAL STUDIES:           Patient is a 67y Male seen and evaluated at bedside. Patient in no acute distress. States that he feels much better.       Meds:    acetaminophen     Tablet .. 650 every 6 hours PRN  aspirin  chewable 81 daily  atorvastatin 80 at bedtime  budesonide 160 MICROgram(s)/formoterol 4.5 MICROgram(s) Inhaler 2 two times a day  clopidogrel Tablet 75 daily  dextrose 5%. 1000 <Continuous>  heparin   Injectable 5000 every 8 hours  metoprolol tartrate 25 two times a day  pantoprazole    Tablet 40 before breakfast  polyethylene glycol 3350 17 daily  senna 2 at bedtime  sodium chloride 0.9% lock flush 3 every 8 hours  tamsulosin 0.4 at bedtime  tiotropium 18 MICROgram(s) Capsule 1 daily      T(C): , Max: 36.9 (22 @ 12:26)  T(F): , Max: 98.5 (22 @ 12:26)  HR: 72 (07-15-22 @ 08:28)  BP: 136/70 (07-15-22 @ 08:28)  BP(mean): 97 (07-15-22 @ 08:28)  RR: 17 (07-15-22 @ 08:28)  SpO2: 100% (07-15-22 @ 08:28)  Wt(kg): --     @ 07:  -  07-15 @ 07:00  --------------------------------------------------------  IN: 800 mL / OUT: 4685 mL / NET: -3885 mL    07-15 @ 07:  -  07-15 @ 12:23  --------------------------------------------------------  IN: 0 mL / OUT: 100 mL / NET: -100 mL          Review of Systems:  ROS negative except as per HPI      PHYSICAL EXAM:  GENERAL: NAD  NECK: supple, No JVD  CHEST/LUNG: Clear to auscultation bilaterally  HEART: normal S1S2, RRR  ABDOMEN: Soft, Nontender, +BS, No flank tenderness bilateral  EXTREMITIES: No clubbing, cyanosis, or edema   NEUROLOGY: AAO x3, no focal neurological deficit        LABS:                        9.2    6.21  )-----------( 266      ( 15 Jul 2022 05:30 )             29.1     07-15    133<L>  |  98  |  14  ----------------------------<  119<H>  4.3   |  26  |  0.79    Ca    9.9      15 Jul 2022 11:08  Mg     2.1     07-15    TPro  8.7<H>  /  Alb  3.8  /  TBili  0.6  /  DBili  x   /  AST  34  /  ALT  29  /  AlkPhos  105        PT/INR - ( 2022 11:44 )   PT: 15.2 sec;   INR: 1.27          PTT - ( 2022 11:44 )  PTT:26.0 sec  Urinalysis Basic - ( 2022 18:34 )    Color: Red / Appearance: Turbid / S.020 / pH: x  Gluc: x / Ketone: NEGATIVE  / Bili: Negative / Urobili: 0.2 E.U./dL   Blood: x / Protein: 30 mg/dL / Nitrite: NEGATIVE   Leuk Esterase: Small / RBC: Many /HPF / WBC 5-10 /HPF   Sq Epi: x / Non Sq Epi: x / Bacteria: Present /HPF      Sodium, Random Urine: 38 mmol/L ( @ 18:34)  Osmolality, Random Urine: 373 mosm/kg ( @ 18:34)  Osmolality, Random Urine: 216 mosm/kg ( @ 13:12)  Sodium, Random Urine: 20 mmol/L ( @ 13:12)        RADIOLOGY & ADDITIONAL STUDIES:

## 2022-07-15 NOTE — DISCHARGE NOTE PROVIDER - PROVIDER TOKENS
PROVIDER:[TOKEN:[[id number]],FOLLOWUP:[2 weeks]],PROVIDER:[TOKEN:[[id number]],FOLLOWUP:[1 week]]

## 2022-07-16 ENCOUNTER — TRANSCRIPTION ENCOUNTER (OUTPATIENT)
Age: 67
End: 2022-07-16

## 2022-07-17 ENCOUNTER — TRANSCRIPTION ENCOUNTER (OUTPATIENT)
Age: 67
End: 2022-07-17

## 2022-07-18 ENCOUNTER — TRANSCRIPTION ENCOUNTER (OUTPATIENT)
Age: 67
End: 2022-07-18

## 2022-07-18 ENCOUNTER — NON-APPOINTMENT (OUTPATIENT)
Age: 67
End: 2022-07-18

## 2022-07-19 ENCOUNTER — TRANSCRIPTION ENCOUNTER (OUTPATIENT)
Age: 67
End: 2022-07-19

## 2022-07-20 ENCOUNTER — OUTPATIENT (OUTPATIENT)
Dept: OUTPATIENT SERVICES | Facility: HOSPITAL | Age: 67
LOS: 1 days | End: 2022-07-20
Payer: COMMERCIAL

## 2022-07-20 ENCOUNTER — APPOINTMENT (OUTPATIENT)
Dept: CARDIOTHORACIC SURGERY | Facility: CLINIC | Age: 67
End: 2022-07-20

## 2022-07-20 ENCOUNTER — TRANSCRIPTION ENCOUNTER (OUTPATIENT)
Age: 67
End: 2022-07-20

## 2022-07-20 VITALS
BODY MASS INDEX: 20.89 KG/M2 | OXYGEN SATURATION: 97 % | HEIGHT: 75 IN | SYSTOLIC BLOOD PRESSURE: 143 MMHG | TEMPERATURE: 97.2 F | HEART RATE: 94 BPM | DIASTOLIC BLOOD PRESSURE: 80 MMHG | WEIGHT: 168 LBS | RESPIRATION RATE: 17 BRPM

## 2022-07-20 DIAGNOSIS — Z79.51 LONG TERM (CURRENT) USE OF INHALED STEROIDS: ICD-10-CM

## 2022-07-20 DIAGNOSIS — K59.03 DRUG INDUCED CONSTIPATION: ICD-10-CM

## 2022-07-20 DIAGNOSIS — N13.30 UNSPECIFIED HYDRONEPHROSIS: ICD-10-CM

## 2022-07-20 DIAGNOSIS — E87.1 HYPO-OSMOLALITY AND HYPONATREMIA: ICD-10-CM

## 2022-07-20 DIAGNOSIS — R33.9 RETENTION OF URINE, UNSPECIFIED: ICD-10-CM

## 2022-07-20 DIAGNOSIS — Z98.49 CATARACT EXTRACTION STATUS, UNSPECIFIED EYE: Chronic | ICD-10-CM
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
## 2022-07-20 DIAGNOSIS — M13.80 OTHER SPECIFIED ARTHRITIS, UNSPECIFIED SITE: ICD-10-CM

## 2022-07-20 DIAGNOSIS — R31.9 HEMATURIA, UNSPECIFIED: ICD-10-CM

## 2022-07-20 DIAGNOSIS — N17.9 ACUTE KIDNEY FAILURE, UNSPECIFIED: ICD-10-CM

## 2022-07-20 DIAGNOSIS — J44.9 CHRONIC OBSTRUCTIVE PULMONARY DISEASE, UNSPECIFIED: ICD-10-CM

## 2022-07-20 DIAGNOSIS — I10 ESSENTIAL (PRIMARY) HYPERTENSION: ICD-10-CM

## 2022-07-20 DIAGNOSIS — Z86.73 PERSONAL HISTORY OF TRANSIENT ISCHEMIC ATTACK (TIA), AND CEREBRAL INFARCTION WITHOUT RESIDUAL DEFICITS: ICD-10-CM

## 2022-07-20 DIAGNOSIS — Z79.891 LONG TERM (CURRENT) USE OF OPIATE ANALGESIC: ICD-10-CM

## 2022-07-20 DIAGNOSIS — Z95.2 PRESENCE OF PROSTHETIC HEART VALVE: ICD-10-CM

## 2022-07-20 DIAGNOSIS — T40.2X5A ADVERSE EFFECT OF OTHER OPIOIDS, INITIAL ENCOUNTER: ICD-10-CM

## 2022-07-20 DIAGNOSIS — Z79.82 LONG TERM (CURRENT) USE OF ASPIRIN: ICD-10-CM

## 2022-07-20 PROCEDURE — 71046 X-RAY EXAM CHEST 2 VIEWS: CPT

## 2022-07-20 PROCEDURE — 71046 X-RAY EXAM CHEST 2 VIEWS: CPT | Mod: 26

## 2022-07-20 PROCEDURE — 99024 POSTOP FOLLOW-UP VISIT: CPT

## 2022-07-21 ENCOUNTER — TRANSCRIPTION ENCOUNTER (OUTPATIENT)
Age: 67
End: 2022-07-21

## 2022-07-21 ENCOUNTER — APPOINTMENT (OUTPATIENT)
Dept: UROLOGY | Facility: CLINIC | Age: 67
End: 2022-07-21

## 2022-07-21 VITALS
DIASTOLIC BLOOD PRESSURE: 87 MMHG | RESPIRATION RATE: 18 BRPM | WEIGHT: 167 LBS | BODY MASS INDEX: 20.76 KG/M2 | HEART RATE: 83 BPM | TEMPERATURE: 97.9 F | OXYGEN SATURATION: 98 % | SYSTOLIC BLOOD PRESSURE: 146 MMHG | HEIGHT: 75 IN

## 2022-07-21 PROCEDURE — 99204 OFFICE O/P NEW MOD 45 MIN: CPT

## 2022-07-21 PROCEDURE — 51798 US URINE CAPACITY MEASURE: CPT

## 2022-07-21 NOTE — HISTORY OF PRESENT ILLNESS
[FreeTextEntry1] : 67 year old man with history of HTN, Marfan's syndrome, aortic aneurysm presents with urinary retention after recent AVR. His AVR was on 6/23/22. His post-op course was complicated by a stroke and occlusion of the right ICA. No thrombectomy was performed and management was concervative. He has some residual left sided weakness and continues to work with PT.\par \par He presented several weeks after the AVR with difficulty urinating and constipation. He had 2 L in his bladder, bilateral hydronephrosis and ANKITA. A catheter was placed with resolution of the ANKITA. Initial hematuria resolved. He has chronic constipation, but this worsened in setting narcotic use. He is using enemas and supossitories to help with constipation.\par \par Prior to this episode, he denies any difficulty urinating or bothersome urinary symptoms.\par \par He reports history of elevated PSA, most recently above 6 earlier this year. He had a negative prostate biopsy several years ago for PSA > 4. He does not believe he has had an MRI of the prostate.

## 2022-07-21 NOTE — ASSESSMENT
[FreeTextEntry1] : 67 year old man with urinary retention a few weeks after AVR, likely secondary to constipation and narcotic use. 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 It is possible stroke could affect urinary function, however, this seems less likely given urinary retention occurred several weeks after stroke. Catheter was removed today and patient was able to void about 200 cc with  cc. He felt he was voiding comfortably. \par \par He has no baseline urinary complaints. He has history of elevated PSA with negative prostate biopsy 4 years ago, no prior prostate MRI.\par  - F/U in 2 weeks for PVR check and PSA\par  - Continue flomax

## 2022-07-23 ENCOUNTER — TRANSCRIPTION ENCOUNTER (OUTPATIENT)
Age: 67
End: 2022-07-23

## 2022-07-24 ENCOUNTER — TRANSCRIPTION ENCOUNTER (OUTPATIENT)
Age: 67
End: 2022-07-24

## 2022-07-24 ENCOUNTER — EMERGENCY (EMERGENCY)
Facility: HOSPITAL | Age: 67
LOS: 1 days | Discharge: HOME | End: 2022-07-24
Attending: STUDENT IN AN ORGANIZED HEALTH CARE EDUCATION/TRAINING PROGRAM | Admitting: STUDENT IN AN ORGANIZED HEALTH CARE EDUCATION/TRAINING PROGRAM
Payer: COMMERCIAL

## 2022-07-24 VITALS
SYSTOLIC BLOOD PRESSURE: 179 MMHG | WEIGHT: 166.89 LBS | HEART RATE: 109 BPM | TEMPERATURE: 98 F | DIASTOLIC BLOOD PRESSURE: 110 MMHG | HEIGHT: 75 IN | OXYGEN SATURATION: 94 % | RESPIRATION RATE: 17 BRPM

## 2022-07-24 VITALS
DIASTOLIC BLOOD PRESSURE: 79 MMHG | SYSTOLIC BLOOD PRESSURE: 130 MMHG | TEMPERATURE: 98 F | HEART RATE: 78 BPM | RESPIRATION RATE: 17 BRPM | OXYGEN SATURATION: 97 %

## 2022-07-24 DIAGNOSIS — Z98.49 CATARACT EXTRACTION STATUS, UNSPECIFIED EYE: Chronic | ICD-10-CM

## 2022-07-24 LAB
ALBUMIN SERPL ELPH-MCNC: 3.7 G/DL — SIGNIFICANT CHANGE UP (ref 3.3–5)
ALP SERPL-CCNC: 113 U/L — SIGNIFICANT CHANGE UP (ref 40–120)
ALT FLD-CCNC: 20 U/L — SIGNIFICANT CHANGE UP (ref 10–45)
ANION GAP SERPL CALC-SCNC: 13 MMOL/L — SIGNIFICANT CHANGE UP (ref 5–17)
APPEARANCE UR: CLEAR — SIGNIFICANT CHANGE UP
AST SERPL-CCNC: 23 U/L — SIGNIFICANT CHANGE UP (ref 10–40)
BACTERIA # UR AUTO: SIGNIFICANT CHANGE UP /HPF
BASOPHILS # BLD AUTO: 0.02 K/UL — SIGNIFICANT CHANGE UP (ref 0–0.2)
BASOPHILS NFR BLD AUTO: 0.3 % — SIGNIFICANT CHANGE UP (ref 0–2)
BILIRUB SERPL-MCNC: 0.4 MG/DL — SIGNIFICANT CHANGE UP (ref 0.2–1.2)
BILIRUB UR-MCNC: NEGATIVE — SIGNIFICANT CHANGE UP
BUN SERPL-MCNC: 13 MG/DL — SIGNIFICANT CHANGE UP (ref 7–23)
CALCIUM SERPL-MCNC: 10.6 MG/DL — HIGH (ref 8.4–10.5)
CHLORIDE SERPL-SCNC: 98 MMOL/L — SIGNIFICANT CHANGE UP (ref 96–108)
CO2 SERPL-SCNC: 22 MMOL/L — SIGNIFICANT CHANGE UP (ref 22–31)
COLOR SPEC: YELLOW — SIGNIFICANT CHANGE UP
COMMENT - URINE: SIGNIFICANT CHANGE UP
CREAT SERPL-MCNC: 1.18 MG/DL — SIGNIFICANT CHANGE UP (ref 0.5–1.3)
DIFF PNL FLD: ABNORMAL
EGFR: 67 ML/MIN/1.73M2 — SIGNIFICANT CHANGE UP
EOSINOPHIL # BLD AUTO: 0.09 K/UL — SIGNIFICANT CHANGE UP (ref 0–0.5)
EOSINOPHIL NFR BLD AUTO: 1.3 % — SIGNIFICANT CHANGE UP (ref 0–6)
EPI CELLS # UR: SIGNIFICANT CHANGE UP /HPF (ref 0–5)
GLUCOSE SERPL-MCNC: 122 MG/DL — HIGH (ref 70–99)
GLUCOSE UR QL: NEGATIVE — SIGNIFICANT CHANGE UP
HCT VFR BLD CALC: 28.3 % — LOW (ref 39–50)
HGB BLD-MCNC: 9 G/DL — LOW (ref 13–17)
IMM GRANULOCYTES NFR BLD AUTO: 0.3 % — SIGNIFICANT CHANGE UP (ref 0–1.5)
KETONES UR-MCNC: NEGATIVE — SIGNIFICANT CHANGE UP
LEUKOCYTE ESTERASE UR-ACNC: ABNORMAL
LYMPHOCYTES # BLD AUTO: 0.75 K/UL — LOW (ref 1–3.3)
LYMPHOCYTES # BLD AUTO: 11 % — LOW (ref 13–44)
MAGNESIUM SERPL-MCNC: 1.8 MG/DL — SIGNIFICANT CHANGE UP (ref 1.6–2.6)
MCHC RBC-ENTMCNC: 29.5 PG — SIGNIFICANT CHANGE UP (ref 27–34)
MCHC RBC-ENTMCNC: 31.8 GM/DL — LOW (ref 32–36)
MCV RBC AUTO: 92.8 FL — SIGNIFICANT CHANGE UP (ref 80–100)
MONOCYTES # BLD AUTO: 0.8 K/UL — SIGNIFICANT CHANGE UP (ref 0–0.9)
MONOCYTES NFR BLD AUTO: 11.7 % — SIGNIFICANT CHANGE UP (ref 2–14)
NEUTROPHILS # BLD AUTO: 5.14 K/UL — SIGNIFICANT CHANGE UP (ref 1.8–7.4)
NEUTROPHILS NFR BLD AUTO: 75.4 % — SIGNIFICANT CHANGE UP (ref 43–77)
NITRITE UR-MCNC: NEGATIVE — SIGNIFICANT CHANGE UP
NRBC # BLD: 0 /100 WBCS — SIGNIFICANT CHANGE UP (ref 0–0)
PH UR: 6 — SIGNIFICANT CHANGE UP (ref 5–8)
PHOSPHATE SERPL-MCNC: 4.1 MG/DL — SIGNIFICANT CHANGE UP (ref 2.5–4.5)
PLATELET # BLD AUTO: 293 K/UL — SIGNIFICANT CHANGE UP (ref 150–400)
POTASSIUM SERPL-MCNC: 3.5 MMOL/L — SIGNIFICANT CHANGE UP (ref 3.5–5.3)
POTASSIUM SERPL-SCNC: 3.5 MMOL/L — SIGNIFICANT CHANGE UP (ref 3.5–5.3)
PROT SERPL-MCNC: 8.7 G/DL — HIGH (ref 6–8.3)
PROT UR-MCNC: NEGATIVE MG/DL — SIGNIFICANT CHANGE UP
RBC # BLD: 3.05 M/UL — LOW (ref 4.2–5.8)
RBC # FLD: 15.9 % — HIGH (ref 10.3–14.5)
RBC CASTS # UR COMP ASSIST: < 5 /HPF — SIGNIFICANT CHANGE UP
SARS-COV-2 RNA SPEC QL NAA+PROBE: NEGATIVE — SIGNIFICANT CHANGE UP
SODIUM SERPL-SCNC: 133 MMOL/L — LOW (ref 135–145)
SP GR SPEC: 1.01 — SIGNIFICANT CHANGE UP (ref 1–1.03)
UROBILINOGEN FLD QL: 0.2 E.U./DL — SIGNIFICANT CHANGE UP
WBC # BLD: 6.82 K/UL — SIGNIFICANT CHANGE UP (ref 3.8–10.5)
WBC # FLD AUTO: 6.82 K/UL — SIGNIFICANT CHANGE UP (ref 3.8–10.5)
WBC UR QL: ABNORMAL /HPF

## 2022-07-24 PROCEDURE — 93005 ELECTROCARDIOGRAM TRACING: CPT

## 2022-07-24 PROCEDURE — 99283 EMERGENCY DEPT VISIT LOW MDM: CPT

## 2022-07-24 PROCEDURE — 84100 ASSAY OF PHOSPHORUS: CPT

## 2022-07-24 PROCEDURE — 87086 URINE CULTURE/COLONY COUNT: CPT

## 2022-07-24 PROCEDURE — 99284 EMERGENCY DEPT VISIT MOD MDM: CPT

## 2022-07-24 PROCEDURE — 51702 INSERT TEMP BLADDER CATH: CPT

## 2022-07-24 PROCEDURE — 81001 URINALYSIS AUTO W/SCOPE: CPT

## 2022-07-24 PROCEDURE — 93010 ELECTROCARDIOGRAM REPORT: CPT

## 2022-07-24 PROCEDURE — 36415 COLL VENOUS BLD VENIPUNCTURE: CPT

## 2022-07-24 PROCEDURE — 87635 SARS-COV-2 COVID-19 AMP PRB: CPT

## 2022-07-24 PROCEDURE — 83735 ASSAY OF MAGNESIUM: CPT

## 2022-07-24 PROCEDURE — 85025 COMPLETE CBC W/AUTO DIFF WBC: CPT

## 2022-07-24 PROCEDURE — 80053 COMPREHEN METABOLIC PANEL: CPT

## 2022-07-24 RX ORDER — ACETAMINOPHEN 500 MG
650 TABLET ORAL ONCE
Refills: 0 | Status: COMPLETED | OUTPATIENT
Start: 2022-07-24 | End: 2022-07-24

## 2022-07-24 RX ADMIN — Medication 650 MILLIGRAM(S): at 20:44

## 2022-07-24 NOTE — ED PROVIDER NOTE - CLINICAL SUMMARY MEDICAL DECISION MAKING FREE TEXT BOX
Urinary retention, indwelling fuchs recently removed  Will place fuchs, check for ANKITA, likely dc w uro f/u  R/o UTI w UA, on abx  Low susp of cord compression or other dangerous cause of retention given history of recent post-op retention, no back pain or paresthesias.  Pt has appt w Dr. Roth (uro) later this week.

## 2022-07-24 NOTE — ED PROVIDER NOTE - CARE PROVIDER_API CALL
Edy Roth)  Urology  [address] [place] [address]  Phone: [phone]  Fax: [phone]  Follow Up Time:

## 2022-07-24 NOTE — ED ADULT NURSE REASSESSMENT NOTE - NS ED NURSE REASSESS COMMENT FT1
Pt catheter bag changed to leg bag. Pt and wife present at time of bag change, verbalized understanding on how to change bag.

## 2022-07-24 NOTE — ED PROVIDER NOTE - NSFOLLOWUPINSTRUCTIONS_ED_ALL_ED_FT
Follow up with your primary medical doctor as soon as possible.  Follow up with Dr. Roth this week.  Return to the emergency department if your symptoms worsen or if you develop new symptoms.

## 2022-07-24 NOTE — ED ADULT NURSE NOTE - NSFALLRSKPASTHIST_ED_ALL_ED
Arthritis    Prostate mass  benign non nodular prostatic hyperplasia without urinary tract symptoms  Spermatocele no

## 2022-07-24 NOTE — ED PROVIDER NOTE - PHYSICAL EXAMINATION
CONSTITUTIONAL: Non-toxic; in no apparent distress  HEAD: Normocephalic; atraumatic  EYES: PERRL; EOM intact   ENMT: External appears normal  NECK: Supple; non-tender  CARD: Normal S1, S2; no murmurs, rubs, or gallops  RESP: Normal chest excursion with respiration; breath sounds clear and equal bilaterally  ABD: + ttp of suprapubic abd, + fullness of suprapubic abd, no CVA ttp, no rebound or guarding  EXT: Normal ROM in all four extremities; non-tender to palpation  SKIN: Warm, dry, no rash  NEURO:  No focal neurological deficiencies.

## 2022-07-24 NOTE — ED PROVIDER NOTE - OBJECTIVE STATEMENT
66 yo M w PMH htn, marfan's, aortic aneurysm, recent AVR, indwelling fuchs catheter placed d/t urinary retention & ANKITA - fuchs removed 7/21, pt voided. Now presenting with urinary retention since several hours ago. States he has suprapubic abd pain, no flank pain. No fever. 68 yo M w PMH htn, marfan's, aortic aneurysm, recent AVR, indwelling fuchs catheter placed d/t urinary retention & ANKITA - fuchs removed 7/21, pt voided. Now presenting with urinary retention since several hours ago. States he has suprapubic abd pain, no flank pain. No fever, hematuria, penile dc.

## 2022-07-24 NOTE — ED CLERICAL - NS ED CLERK NOTE PRE-ARRIVAL INFORMATION; ADDITIONAL PRE-ARRIVAL INFORMATION
This patient is enrolled in the Follow Your Heart program and has undergone a cardiac surgery procedure and has active care navigation. This patient can be followed up by the care navigation team within 24 hours. To arrange close follow-up or to obtain additional clinical information about this patient, please call the contact number above.     Please call the cardiac surgery team once patient is registered at [phone] for consultation PRIOR to disposition decision.  The patient recently underwent a cardiac surgery procedure and the team can assist in acute medical management.

## 2022-07-24 NOTE — ED ADULT NURSE NOTE - OBJECTIVE STATEMENT
67 y.o male a&ox3, speaking in full sentences, uncomfortable in stretcher. Pt reports to ED for urinary retention x this morning. Pt states this is not the first time, has enlarged prostate. C/o mild burning when urine does come out. Denies hematuria, penile discharge, n/v/d. PMH valve replacement, HTN, denies allergies. 67 y.o male a&ox3, speaking in full sentences, uncomfortable in stretcher. Pt reports to ED for urinary retention x this morning. Pt states this is not the first time, has enlarged prostate. C/o mild burning when urine does come out. Abdomen distended. Denies hematuria, penile discharge, n/v/d. PMH valve replacement, HTN, denies allergies.

## 2022-07-24 NOTE — ED PROVIDER NOTE - PATIENT PORTAL LINK FT
You can access the FollowMyHealth Patient Portal offered by [facility] by registering at the following website: [email or web address]. By joining [facility]’s FollowMyHealth portal, you will also be able to view your health information using other applications (apps) compatible with our system.

## 2022-07-24 NOTE — ED ADULT NURSE REASSESSMENT NOTE - NS ED NURSE REASSESS COMMENT FT1
18 fr coude fuchs catheter inserted as ordered by MD Lowry. Pt states he is feeling relief, pain subsided. no

## 2022-07-24 NOTE — ED PROVIDER NOTE - PROGRESS NOTE DETAILS
Feels improved after fuchs placed, 900ml urine drained, UA sent - neg. No ANKITA on labs, will dc w urology f/u.

## 2022-07-24 NOTE — ED ADULT TRIAGE NOTE - CHIEF COMPLAINT QUOTE
Pt has hx of enlarged prostate, had fuchs catheter removed "a couple days ago" reports he was able to urinate today, however "as the day went on I couldn't pee." Pt also reports suprapubic pain, denies fevers, n/v. Pt was placed on ciprofloxacin when fuchs was removed "because they saw some white blood cells."

## 2022-07-24 NOTE — ED ADULT NURSE NOTE - NSIMPLEMENTINTERV_GEN_ALL_ED
Implemented All Universal Safety Interventions:  [place] to call system. Call bell, personal items and telephone within reach. Instruct patient to call for assistance. Room bathroom lighting operational. Non-slip footwear when patient is off stretcher. Physically safe environment: no spills, clutter or unnecessary equipment. Stretcher in lowest position, wheels locked, appropriate side rails in place.

## 2022-07-24 NOTE — ED PROVIDER NOTE - MDM ORDERS SUBMITTED SELECTION
[General Appearance - Well Developed] : well developed [Normal Appearance] : normal appearance [Well Groomed] : well groomed [General Appearance - Well Nourished] : well nourished [No Deformities] : no deformities [General Appearance - In No Acute Distress] : no acute distress [Normal Conjunctiva] : the conjunctiva exhibited no abnormalities [Eyelids - No Xanthelasma] : the eyelids demonstrated no xanthelasmas [Normal Oral Mucosa] : normal oral mucosa Labs [No Oral Pallor] : no oral pallor [No Oral Cyanosis] : no oral cyanosis [Normal Jugular Venous A Waves Present] : normal jugular venous A waves present [Normal Jugular Venous V Waves Present] : normal jugular venous V waves present [No Jugular Venous Miranda A Waves] : no jugular venous miranda A waves [Heart Rate And Rhythm] : heart rate and rhythm were normal [Heart Sounds] : normal S1 and S2 [Murmurs] : no murmurs present [Edema] : no peripheral edema present [Respiration, Rhythm And Depth] : normal respiratory rhythm and effort [Exaggerated Use Of Accessory Muscles For Inspiration] : no accessory muscle use [Auscultation Breath Sounds / Voice Sounds] : lungs were clear to auscultation bilaterally [Abdomen Soft] : soft [Abdomen Tenderness] : non-tender [Abdomen Mass (___ Cm)] : no abdominal mass palpated [Gait - Sufficient For Exercise Testing] : the gait was sufficient for exercise testing [Abnormal Walk] : normal gait [Nail Clubbing] : no clubbing of the fingernails [Cyanosis, Localized] : no localized cyanosis [Petechial Hemorrhages (___cm)] : no petechial hemorrhages [Skin Color & Pigmentation] : normal skin color and pigmentation [] : no rash [Skin Lesions] : no skin lesions [No Venous Stasis] : no venous stasis [No Skin Ulcers] : no skin ulcer [Oriented To Time, Place, And Person] : oriented to person, place, and time Labs/EKG [No Xanthoma] : no  xanthoma was observed [Affect] : the affect was normal [Mood] : the mood was normal [No Anxiety] : not feeling anxious

## 2022-07-25 ENCOUNTER — NON-APPOINTMENT (OUTPATIENT)
Age: 67
End: 2022-07-25

## 2022-07-25 RX ORDER — POTASSIUM CHLORIDE 750 MG/1
10 TABLET, FILM COATED, EXTENDED RELEASE ORAL DAILY
Refills: 0 | Status: COMPLETED | COMMUNITY
Start: 2022-07-07 | End: 2022-07-25

## 2022-07-25 RX ORDER — FUROSEMIDE 40 MG/1
40 TABLET ORAL DAILY
Refills: 0 | Status: COMPLETED | COMMUNITY
Start: 2022-07-07 | End: 2022-07-25

## 2022-07-26 ENCOUNTER — APPOINTMENT (OUTPATIENT)
Dept: UROLOGY | Facility: CLINIC | Age: 67
End: 2022-07-26

## 2022-07-26 ENCOUNTER — APPOINTMENT (OUTPATIENT)
Dept: CARDIOTHORACIC SURGERY | Facility: CLINIC | Age: 67
End: 2022-07-26

## 2022-07-26 LAB
CULTURE RESULTS: NO GROWTH — SIGNIFICANT CHANGE UP
SPECIMEN SOURCE: SIGNIFICANT CHANGE UP

## 2022-07-26 PROCEDURE — 99214 OFFICE O/P EST MOD 30 MIN: CPT

## 2022-07-26 NOTE — PHYSICAL EXAM
[General Appearance - Well Developed] : well developed [General Appearance - Well Nourished] : well nourished [Normal Appearance] : normal appearance [Well Groomed] : well groomed [General Appearance - In No Acute Distress] : no acute distress [Abdomen Soft] : soft [Abdomen Tenderness] : non-tender [Costovertebral Angle Tenderness] : no ~M costovertebral angle tenderness [Urethral Meatus] : meatus normal [Urinary Bladder Findings] : the bladder was normal on palpation [Scrotum] : the scrotum was normal [Testes Mass (___cm)] : there were no testicular masses [No Prostate Nodules] : no prostate nodules [FreeTextEntry1] : Catheter in place [Edema] : no peripheral edema [] : no respiratory distress [Respiration, Rhythm And Depth] : normal respiratory rhythm and effort [Exaggerated Use Of Accessory Muscles For Inspiration] : no accessory muscle use [Oriented To Time, Place, And Person] : oriented to person, place, and time [Affect] : the affect was normal [Mood] : the mood was normal [Not Anxious] : not anxious [Normal Station and Gait] : the gait and station were normal for the patient's age [No Focal Deficits] : no focal deficits

## 2022-07-26 NOTE — ASSESSMENT
[FreeTextEntry1] : 67 year old man with urinary retention a few weeks after AVR, likely secondary to constipation and narcotic use. 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 It is possible stroke could affect urinary function, however, this seems less likely given urinary retention occurred several weeks after stroke. Catheter was removed last week, and he was initially able to void. Catheter replaced 3 days after removal due to recurrent urinary retention. 2 L drained from bladder. \par \par He has no baseline urinary complaints. He has history of elevated PSA with negative prostate biopsy 4 years ago, no prior prostate MRI.\par  - F/U in 1 week for void trial. Given significant distension of bladder, will allow some time for bladder relaxation\par  - Continue flomax. \par

## 2022-07-26 NOTE — HISTORY OF PRESENT ILLNESS
[FreeTextEntry1] : 7/21/22: 67 year old man with history of HTN, Marfan's syndrome, aortic aneurysm presents with urinary retention after recent AVR. His AVR was on 6/23/22. His post-op course was complicated by a stroke and occlusion of the right ICA. No thrombectomy was performed and management was concervative. He has some residual left sided weakness and continues to work with PT.\par \par He presented several weeks after the AVR with difficulty urinating and constipation. He had 2 L in his bladder, bilateral hydronephrosis and ANKITA. A catheter was placed with resolution of the ANKITA. Initial hematuria resolved. He has chronic constipation, but this worsened in setting narcotic use. He is using enemas and supossitories to help with constipation.\par \par Prior to this episode, he denies any difficulty urinating or bothersome urinary symptoms.\par \par He reports history of elevated PSA, most recently above 6 earlier this year. He had a negative prostate biopsy several years ago for PSA > 4. He does not believe he has had an MRI of the prostate.\par \par 7/26/22: Patient had been voiding after catheter removal for three days. He subsequently was unable to urinate and returned to ED where catheter was replaced. 2 L of urine was drained from bladder. He had been placed on antibiotics by PCP last week for bacteriuria. Urine culture from ED was negative. Doing well with catheter. Continues to have issues with constipation.

## 2022-07-27 ENCOUNTER — TRANSCRIPTION ENCOUNTER (OUTPATIENT)
Age: 67
End: 2022-07-27

## 2022-07-27 DIAGNOSIS — Z79.82 LONG TERM (CURRENT) USE OF ASPIRIN: ICD-10-CM

## 2022-07-27 DIAGNOSIS — I10 ESSENTIAL (PRIMARY) HYPERTENSION: ICD-10-CM

## 2022-07-27 DIAGNOSIS — Z79.02 LONG TERM (CURRENT) USE OF ANTITHROMBOTICS/ANTIPLATELETS: ICD-10-CM

## 2022-07-27 DIAGNOSIS — Z98.890 OTHER SPECIFIED POSTPROCEDURAL STATES: ICD-10-CM

## 2022-07-27 DIAGNOSIS — R33.9 RETENTION OF URINE, UNSPECIFIED: ICD-10-CM

## 2022-07-27 DIAGNOSIS — D64.9 ANEMIA, UNSPECIFIED: ICD-10-CM

## 2022-07-27 DIAGNOSIS — I71.9 AORTIC ANEURYSM OF UNSPECIFIED SITE, WITHOUT RUPTURE: ICD-10-CM

## 2022-07-27 DIAGNOSIS — Z20.822 CONTACT WITH AND (SUSPECTED) EXPOSURE TO COVID-19: ICD-10-CM

## 2022-07-27 DIAGNOSIS — J44.9 CHRONIC OBSTRUCTIVE PULMONARY DISEASE, UNSPECIFIED: ICD-10-CM

## 2022-07-27 DIAGNOSIS — M19.90 UNSPECIFIED OSTEOARTHRITIS, UNSPECIFIED SITE: ICD-10-CM

## 2022-07-27 DIAGNOSIS — Q87.40 MARFAN SYNDROME, UNSPECIFIED: ICD-10-CM

## 2022-07-29 ENCOUNTER — NON-APPOINTMENT (OUTPATIENT)
Age: 67
End: 2022-07-29

## 2022-07-29 ENCOUNTER — TRANSCRIPTION ENCOUNTER (OUTPATIENT)
Age: 67
End: 2022-07-29

## 2022-07-29 ENCOUNTER — EMERGENCY (EMERGENCY)
Facility: HOSPITAL | Age: 67
LOS: 1 days | Discharge: HOME | End: 2022-07-29
Attending: EMERGENCY MEDICINE | Admitting: EMERGENCY MEDICINE
Payer: COMMERCIAL

## 2022-07-29 VITALS
TEMPERATURE: 99 F | SYSTOLIC BLOOD PRESSURE: 191 MMHG | RESPIRATION RATE: 22 BRPM | HEIGHT: 75 IN | DIASTOLIC BLOOD PRESSURE: 101 MMHG | OXYGEN SATURATION: 98 % | HEART RATE: 104 BPM | WEIGHT: 158.95 LBS

## 2022-07-29 VITALS
DIASTOLIC BLOOD PRESSURE: 85 MMHG | RESPIRATION RATE: 18 BRPM | OXYGEN SATURATION: 98 % | SYSTOLIC BLOOD PRESSURE: 146 MMHG | HEART RATE: 91 BPM

## 2022-07-29 DIAGNOSIS — Z98.49 CATARACT EXTRACTION STATUS, UNSPECIFIED EYE: Chronic | ICD-10-CM

## 2022-07-29 LAB
APPEARANCE UR: CLEAR — SIGNIFICANT CHANGE UP
BACTERIA # UR AUTO: PRESENT /HPF
BILIRUB UR-MCNC: NEGATIVE — SIGNIFICANT CHANGE UP
COD CRY URNS QL: ABNORMAL /HPF
COLOR SPEC: YELLOW — SIGNIFICANT CHANGE UP
DIFF PNL FLD: ABNORMAL
EPI CELLS # UR: SIGNIFICANT CHANGE UP /HPF (ref 0–5)
GLUCOSE UR QL: NEGATIVE — SIGNIFICANT CHANGE UP
KETONES UR-MCNC: NEGATIVE — SIGNIFICANT CHANGE UP
LEUKOCYTE ESTERASE UR-ACNC: ABNORMAL
NITRITE UR-MCNC: NEGATIVE — SIGNIFICANT CHANGE UP
PH UR: 6.5 — SIGNIFICANT CHANGE UP (ref 5–8)
PROT UR-MCNC: ABNORMAL MG/DL
RBC CASTS # UR COMP ASSIST: ABNORMAL /HPF
SP GR SPEC: 1.01 — SIGNIFICANT CHANGE UP (ref 1–1.03)
UROBILINOGEN FLD QL: 0.2 E.U./DL — SIGNIFICANT CHANGE UP
WBC UR QL: < 5 /HPF — SIGNIFICANT CHANGE UP

## 2022-07-29 PROCEDURE — 81001 URINALYSIS AUTO W/SCOPE: CPT

## 2022-07-29 PROCEDURE — 99284 EMERGENCY DEPT VISIT MOD MDM: CPT

## 2022-07-29 PROCEDURE — 99283 EMERGENCY DEPT VISIT LOW MDM: CPT

## 2022-07-29 PROCEDURE — 87086 URINE CULTURE/COLONY COUNT: CPT

## 2022-07-29 PROCEDURE — 51702 INSERT TEMP BLADDER CATH: CPT

## 2022-07-29 NOTE — ED PROVIDER NOTE - PHYSICAL EXAMINATION
CONSTITUTIONAL: Awake, alert.  Nontoxic, appears uncomfortable    HEAD: Normocephalic, atraumatic.    EYES: Conjunctivae clear without exudates or hemorrhage. Sclera is non-icteric.    HEART:  Normal rate, regular rhythm.  Heart sounds S1, S2.  No murmurs, rubs or gallops.    LUNGS:  No acute respiratory distress.  Non-tachypneic and non-labored.  Lungs are clear bilaterally with good aeration.  No wheezing, rales, rhonchi.    ABDOMEN: Normal appearing skin without lesions, rashes.  Normal bowel sounds x 4.  +suprapubic ttp, distention. No rebound or guarding. No CVA tenderness b/l.  House in place without output    MUSCULOSKELETAL:  Moving all extremities without issue.    SKIN: Skin in warm, dry and intact without rashes or lesions.  Appropriate color for ethnicity.    NEUROLOGICAL:  Patient is alert, oriented x person, place and time.    PSYCH: Appropriate mood and affect. Good judgment and insight.

## 2022-07-29 NOTE — ED ADULT TRIAGE NOTE - CHIEF COMPLAINT QUOTE
Patient reporting urinary catheter placed 7/24/22, last noted urine output this morning.  Appears uncomfortable.

## 2022-07-29 NOTE — ED ADULT TRIAGE NOTE - GLASGOW COMA SCALE: BEST VERBAL RESPONSE, MLM
Patient Seen in: THE [facility] Emergency Department In [place]    History   Patient presents with:  Ear Problem Pain (neurosensory)    Stated Complaint: LEFT EAR ACHE     HPI    8year-old female presents emergency room for evaluation of left-sided earache. Neck is supple, there is no nuchal rigidity. No cervical lymphadenopathy. HEART: Regular rate and rhythm, no murmurs. LUNGS: Clear to auscultation bilaterally. No Rales, no rhonchi, no wheezing, no stridor.   ABDOMEN: Soft, nondistended,non tender  SK Pt received sitting at edge of stretcher in [facility], NAD, pt agreeable to PT evaluation. (V5) oriented

## 2022-07-29 NOTE — ED PROVIDER NOTE - CLINICAL SUMMARY MEDICAL DECISION MAKING FREE TEXT BOX
66 y/o male w/ above hx, recent fuchs 66 y/o male w/ above hx, recent fuchs placed 7/24/22 p/w no output from fuchs and suprapubic pain/distention x approx 5 h c/f fuchs malfxn  Fuchs replaced --> pt feeling much better.  No further ttp/ pain to abd  UA sent, without overt signs infx, +blood  Pt has f/u with urologist scheduled on 8/1/22.  Advised to keep this appt  Given strict return precautions  DC with fuchs

## 2022-07-29 NOTE — ED PROVIDER NOTE - NS ED ATTENDING STATEMENT MOD
This was a shared visit with the GERARDO. I reviewed and verified the documentation and independently performed the documented:

## 2022-07-29 NOTE — ED PROVIDER NOTE - PATIENT PORTAL LINK FT
You can access the FollowMyHealth Patient Portal offered by [facility] by registering at the following website: [email or web address]. By joining [facility]’s FollowMyHealth portal, you will also be able to view your health information using other applications (apps) compatible with our system.

## 2022-07-29 NOTE — ED PROVIDER NOTE - NSFOLLOWUPINSTRUCTIONS_ED_ALL_ED_FT
Thank you for visiting [facility] Emergency Department.      We saw you today for urinary retention/ fuchs malfunction.    Please keep your appointment with urology.  Please know that no emergency visit is complete without follow-up with your primary care provider in 1 week.  Please bring copies of all discharge papers and results and show to your doctor.      Please continue taking all previous medications as instructed unless we discussed otherwise.     I appreciated your patience and hope you feel better soon.     Return to ER immediately if you develop fevers, chills, chest pain, shortness of breath, urinary retention, flank pain, worsening and/or any concerning symptoms.

## 2022-07-29 NOTE — ED PROVIDER NOTE - OBJECTIVE STATEMENT
66 y/o male w/ hx htn, marfan's, aortic aneurysm, recent AVR, indwelling fuchs catheter placed d/t urinary retention & ANKITA - fuchs removed 7/21, seen in ED on 7/24/22 for urinary retention with fuchs replaced presents to ED c/o no urine output from fuchs and suprapubic pain x approx 5h.    Denies f/c, n/v/d, new back pain (has chronic at baseline, unchanged), constipation, saddle anesthesia.

## 2022-07-29 NOTE — ED ADULT NURSE NOTE - OBJECTIVE STATEMENT
67 y.o male a&ox3, speaking in full sentences, no acute distress. Pt reports to ED c/o urinary retention x this morning after changing bag on fuchs catheter. Pt previously in ED 5 days ago to replace catheter due to UTI and urinary retention. Pt noticeably uncomfortable, bladder distended. New fuchs catheter placed, 18 fr coude.

## 2022-07-29 NOTE — ED PROVIDER NOTE - NS ED ROS FT
CONSTITUTIONAL: Denies fever and chills    HEENT: Denies changes in vision and hearing.    RESPIRATORY: Denies SOB and cough.    CARDIOVASCULAR: Denies palpitations and chest pain.    GASTROINTESTINAL: +suprapubic pain, nausea, vomiting and diarrhea.    GENITOURINARY: No urine output from fuchs x few hrs    MUSCULOSKELETAL: Denies myalgia and joint pain.    SKIN: Denies rash and pruritus.    ?NEUROLOGICAL: Denies headache and syncope.    PSYCHIATRIC: Denies recent changes in mood. Denies anxiety and depression.

## 2022-07-31 LAB
CULTURE RESULTS: NO GROWTH — SIGNIFICANT CHANGE UP
SPECIMEN SOURCE: SIGNIFICANT CHANGE UP

## 2022-08-01 ENCOUNTER — NON-APPOINTMENT (OUTPATIENT)
Age: 67
End: 2022-08-01

## 2022-08-02 ENCOUNTER — APPOINTMENT (OUTPATIENT)
Dept: UROLOGY | Facility: CLINIC | Age: 67
End: 2022-08-02

## 2022-08-02 VITALS
HEIGHT: 75 IN | WEIGHT: 167 LBS | TEMPERATURE: 98.2 F | SYSTOLIC BLOOD PRESSURE: 148 MMHG | BODY MASS INDEX: 20.76 KG/M2 | HEART RATE: 88 BPM | DIASTOLIC BLOOD PRESSURE: 90 MMHG

## 2022-08-02 DIAGNOSIS — D64.9 ANEMIA, UNSPECIFIED: ICD-10-CM

## 2022-08-02 DIAGNOSIS — Z79.01 LONG TERM (CURRENT) USE OF ANTICOAGULANTS: ICD-10-CM

## 2022-08-02 DIAGNOSIS — Q87.40 MARFAN SYNDROME, UNSPECIFIED: ICD-10-CM

## 2022-08-02 DIAGNOSIS — M19.90 UNSPECIFIED OSTEOARTHRITIS, UNSPECIFIED SITE: ICD-10-CM

## 2022-08-02 DIAGNOSIS — J44.9 CHRONIC OBSTRUCTIVE PULMONARY DISEASE, UNSPECIFIED: ICD-10-CM

## 2022-08-02 DIAGNOSIS — R33.9 RETENTION OF URINE, UNSPECIFIED: ICD-10-CM

## 2022-08-02 DIAGNOSIS — I35.1 NONRHEUMATIC AORTIC (VALVE) INSUFFICIENCY: ICD-10-CM

## 2022-08-02 DIAGNOSIS — I10 ESSENTIAL (PRIMARY) HYPERTENSION: ICD-10-CM

## 2022-08-02 DIAGNOSIS — Z79.82 LONG TERM (CURRENT) USE OF ASPIRIN: ICD-10-CM

## 2022-08-02 DIAGNOSIS — Z98.49 CATARACT EXTRACTION STATUS, UNSPECIFIED EYE: ICD-10-CM

## 2022-08-02 PROCEDURE — 51798 US URINE CAPACITY MEASURE: CPT

## 2022-08-02 PROCEDURE — 99214 OFFICE O/P EST MOD 30 MIN: CPT | Mod: 25

## 2022-08-02 PROCEDURE — 51702 INSERT TEMP BLADDER CATH: CPT

## 2022-08-02 NOTE — LETTER BODY
[Dear  ___] : Dear  [unfilled], [Courtesy Letter:] : I had the pleasure of seeing your patient, [unfilled], in my office today. [Please see my note below.] : Please see my note below. [Consult Closing:] : Thank you very much for allowing me to participate in the care of this patient.  If you have any questions, please do not hesitate to contact me. [Sincerely,] : Sincerely, [FreeTextEntry3] : Edy Roth MD

## 2022-08-02 NOTE — PHYSICAL EXAM
[General Appearance - Well Developed] : well developed [General Appearance - Well Nourished] : well nourished [Normal Appearance] : normal appearance [Well Groomed] : well groomed [General Appearance - In No Acute Distress] : no acute distress [Abdomen Soft] : soft [Abdomen Tenderness] : non-tender [Costovertebral Angle Tenderness] : no ~M costovertebral angle tenderness [Edema] : no peripheral edema [] : no respiratory distress [Respiration, Rhythm And Depth] : normal respiratory rhythm and effort [Exaggerated Use Of Accessory Muscles For Inspiration] : no accessory muscle use [Oriented To Time, Place, And Person] : oriented to person, place, and time [Affect] : the affect was normal [Mood] : the mood was normal [Not Anxious] : not anxious [Normal Station and Gait] : the gait and station were normal for the patient's age [No Focal Deficits] : no focal deficits [FreeTextEntry1] : Catheter in place

## 2022-08-02 NOTE — HISTORY OF PRESENT ILLNESS
[FreeTextEntry1] : 7/21/22: 67 year old man with history of HTN, Marfan's syndrome, aortic aneurysm presents with urinary retention after recent AVR. His AVR was on 6/23/22. His post-op course was complicated by a stroke and occlusion of the right ICA. No thrombectomy was performed and management was concervative. He has some residual left sided weakness and continues to work with PT.\par \par He presented several weeks after the AVR with difficulty urinating and constipation. He had 2 L in his bladder, bilateral hydronephrosis and ANKITA. A catheter was placed with resolution of the ANKITA. Initial hematuria resolved. He has chronic constipation, but this worsened in setting narcotic use. He is using enemas and supossitories to help with constipation.\par \par Prior to this episode, he denies any difficulty urinating or bothersome urinary symptoms.\par \par He reports history of elevated PSA, most recently above 6 earlier this year. He had a negative prostate biopsy several years ago for PSA > 4. He does not believe he has had an MRI of the prostate.\par \par 7/26/22: Patient had been voiding after catheter removal for three days. He subsequently was unable to urinate and returned to ED where catheter was replaced. 2 L of urine was drained from bladder. He had been placed on antibiotics by PCP last week for bacteriuria. Urine culture from ED was negative. Doing well with catheter. Continues to have issues with constipation. \par \par 8/2/22: Presents for another void trial. Was in ED last Friday with obstructed catheter. Catheter was exchanged. Urine culture was negative. No fevers, chills, dysuria, hematuria.\par \par Bladder filled with 200 cc. Patient able to void small amounts with increasingly high PVRs, up to 650 cc. Catheter replaced.

## 2022-08-02 NOTE — ASSESSMENT
[FreeTextEntry1] : 67 year old man with urinary retention a few weeks after AVR, likely secondary to constipation and narcotic use. 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 It is possible stroke could affect urinary function, however, this seems less likely given urinary retention occurred several weeks after stroke. Catheter was removed two weeks ago, and he was initially able to void. Catheter replaced 3 days after removal due to recurrent urinary retention. 2 L drained from bladder. Presents today for second void trial as outpatient. Catheter was removed, and he was able to void with increasingly high PVRs, up to 650 cc. Catheter was replaced.\par \par He has no baseline urinary complaints. He has history of elevated PSA with negative prostate biopsy 4 years ago, no prior prostate MRI.\par  - Continue fuchs catheter\par  - F/U for UDS, cystoscopy to determine etiology of retention\par

## 2022-08-04 ENCOUNTER — TRANSCRIPTION ENCOUNTER (OUTPATIENT)
Age: 67
End: 2022-08-04

## 2022-08-04 ENCOUNTER — APPOINTMENT (OUTPATIENT)
Dept: CARDIOLOGY | Facility: CLINIC | Age: 67
End: 2022-08-04

## 2022-08-04 DIAGNOSIS — Z82.49 FAMILY HISTORY OF ISCHEMIC HEART DISEASE AND OTHER DISEASES OF THE CIRCULATORY SYSTEM: ICD-10-CM

## 2022-08-04 PROCEDURE — 99204 OFFICE O/P NEW MOD 45 MIN: CPT | Mod: 95

## 2022-08-04 NOTE — HISTORY OF PRESENT ILLNESS
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 \par complicated by a CVA LUE and LLE plegia now recovered\par \par he has another aortic anuerysm in descending aorta\par \par \par his family history detailed below  is positive for marfan syndrome and aortic anuerysm \par --\par PATIENT NOT SEEN 4/7 HE LEFT AS HE DID NOT HAVE A REFERRAL FROM HIS PCP\par requested a referral from his pcp\par I also sent his PCP a fax requesting referral\par \par now rescheduled for 5/5 at the patients request, patient finally seen 8/4/22\par \par

## 2022-08-04 NOTE — FAMILY HISTORY
[FreeTextEntry1] : FamilyHistory_20_twCiteListControlStart FamilyHistory_20_twCiteListControlEnd [id number] \par A four-generation family history was constructed and scanned into [employer]. \par Family history is significant for: \par siblings\par sister 79 yo : her ex  dec 57 he had a hx anuerysms: twin sons, one twin dec hx valve change and anuerysm (possible MFS) CVA dec , twin 60 yo hx aortic anuerysm , son dec in [place] to care for an aortic anuerysm unable to hold the flesh of the aorta together, dec 37 yo: no children\par daughter with 2nd  + healthy children\par brother dec 37 yo  hx enlarged heart, hx etoh: daughter 30s med hx unk\par \par Mother dec 85 yo grossly healthy , dec old age\par 9 siiblings total med hx unk all dec 80s\par Maternal aunts \par Maternal uncles\par Maternal Grandmother dec 80s\par Maternal Grandfather dec 80s\par \par Father dec 101, fall out of bed in rehab ctr\par Paternal aunts  x1 dec 80s med hx unk\par Paternal uncles x1 dec 80s med hx unk\par Paternal Grandfather dec\par Paternal Grandmother dec\par \par children: 3\par son 43yo : son 1yr, \par daughter 44yo : son 20s , son 18, daughter 17, daughter 16\par daughter 42 yo : son 17, daughter 16\par all children and grandchildren healthy \par \par his maternal families originate from [place] and paternal families originate from [place]. \par No Ashkenazi Mandaeism ancestry. \par Family history was negative for consanguinity  \par No family history of SIDS\par  \par \par  [FreeTextEntry2] : [place] [FreeTextEntry3] : [place]

## 2022-08-04 NOTE — REASON FOR VISIT
[FreeTextEntry3] : \par Dear Dr. Toledo      . I saw your patient PADMINI ABEL on 08/04/2022 . Please see the note below for the assessment and plan. \par \par PADMINI ABEL  was seen  for an initial consultation at the [facility] at [facility] on 04/07/2022.   Mr. ABEL was referred by Dr. Toledo for hereditary cardiac predisposition risk assessment and counseling, due to positive family history of Marfan syndrome and aortic aneurysm\par \par

## 2022-08-09 ENCOUNTER — APPOINTMENT (OUTPATIENT)
Dept: UROLOGY | Facility: CLINIC | Age: 67
End: 2022-08-09

## 2022-08-09 ENCOUNTER — NON-APPOINTMENT (OUTPATIENT)
Age: 67
End: 2022-08-09

## 2022-08-09 VITALS
WEIGHT: 167 LBS | BODY MASS INDEX: 20.76 KG/M2 | SYSTOLIC BLOOD PRESSURE: 150 MMHG | DIASTOLIC BLOOD PRESSURE: 90 MMHG | HEIGHT: 75 IN | TEMPERATURE: 98.2 F | HEART RATE: 88 BPM

## 2022-08-09 PROCEDURE — 51798 US URINE CAPACITY MEASURE: CPT

## 2022-08-09 PROCEDURE — 99214 OFFICE O/P EST MOD 30 MIN: CPT

## 2022-08-09 PROCEDURE — 51741 ELECTRO-UROFLOWMETRY FIRST: CPT

## 2022-08-09 NOTE — ASSESSMENT
[FreeTextEntry1] : 67 year old man with urinary retention a few weeks after AVR, likely secondary to constipation and narcotic use. 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 It is possible stroke could affect urinary function, however, this seems less likely given urinary retention occurred several weeks after stroke. Catheter was removed two weeks ago, and he was initially able to void. Catheter replaced 3 days after removal due to recurrent urinary retention. 2 L drained from bladder. He failed second void trial in the office last week. Presents today for third void trial as outpatient. Catheter was removed, and he was able to void in small volumes with PVR between 300-400 cc. He was comfortable and elected to go home to monitor. If he has any issues overnight, he will present to the emergency room or come to the office in the morning.\par \par He has no baseline urinary complaints. He has history of elevated PSA with negative prostate biopsy 4 years ago, no prior prostate MRI. PSA obtianed today prior to catheter removal.\par  - Continue fuchs catheter\par  - F/U for UDS, cystoscopy in one week to determine etiology of retention\par

## 2022-08-09 NOTE — HISTORY OF PRESENT ILLNESS
[FreeTextEntry1] : 7/21/22: 67 year old man with history of HTN, Marfan's syndrome, aortic aneurysm presents with urinary retention after recent AVR. His AVR was on 6/23/22. His post-op course was complicated by a stroke and occlusion of the right ICA. No thrombectomy was performed and management was conservative. He has some residual left sided weakness and continues to work with PT.\par \par He presented several weeks after the AVR with difficulty urinating and constipation. He had 2 L in his bladder, bilateral hydronephrosis and ANKITA. A catheter was placed with resolution of the ANKITA. Initial hematuria resolved. He has chronic constipation, but this worsened in setting narcotic use. He is using enemas and supossitories to help with constipation.\par \par Prior to this episode, he denies any difficulty urinating or bothersome urinary symptoms.\par \par He reports history of elevated PSA, most recently above 6 earlier this year. He had a negative prostate biopsy several years ago for PSA > 4. He does not believe he has had an MRI of the prostate.\par \par 7/26/22: Patient had been voiding after catheter removal for three days. He subsequently was unable to urinate and returned to ED where catheter was replaced. 2 L of urine was drained from bladder. He had been placed on antibiotics by PCP last week for bacteriuria. Urine culture from ED was negative. Doing well with catheter. Continues to have issues with constipation. \par \par 8/2/22: Presents for another void trial. Was in ED last Friday with obstructed catheter. Catheter was exchanged. Urine culture was negative. No fevers, chills, dysuria, hematuria.\par \par Bladder filled with 200 cc. Patient able to void small amounts with increasingly high PVRs, up to 650 cc. Catheter replaced.\par \par 8/9/22: Patient came in for repeat void trial. Bladder filled and catheter removed. Patient voided several times in small volumes with PVR between 300-400cc. He felt stream was getting stronger.\par \par Uroflow: \par Qmax 3.9, Qavg 2.2, voided volume 101.4 cc, 1 interval

## 2022-08-12 ENCOUNTER — EMERGENCY (EMERGENCY)
Facility: HOSPITAL | Age: 67
LOS: 1 days | Discharge: HOME | End: 2022-08-12
Attending: EMERGENCY MEDICINE | Admitting: EMERGENCY MEDICINE
Payer: COMMERCIAL

## 2022-08-12 VITALS
OXYGEN SATURATION: 98 % | HEART RATE: 82 BPM | DIASTOLIC BLOOD PRESSURE: 73 MMHG | RESPIRATION RATE: 16 BRPM | TEMPERATURE: 98 F | SYSTOLIC BLOOD PRESSURE: 114 MMHG

## 2022-08-12 VITALS
OXYGEN SATURATION: 98 % | RESPIRATION RATE: 18 BRPM | HEART RATE: 86 BPM | TEMPERATURE: 98 F | HEIGHT: 75 IN | DIASTOLIC BLOOD PRESSURE: 80 MMHG | WEIGHT: 166.89 LBS | SYSTOLIC BLOOD PRESSURE: 128 MMHG

## 2022-08-12 DIAGNOSIS — Z98.49 CATARACT EXTRACTION STATUS, UNSPECIFIED EYE: ICD-10-CM

## 2022-08-12 DIAGNOSIS — R33.9 RETENTION OF URINE, UNSPECIFIED: ICD-10-CM

## 2022-08-12 DIAGNOSIS — M19.90 UNSPECIFIED OSTEOARTHRITIS, UNSPECIFIED SITE: ICD-10-CM

## 2022-08-12 DIAGNOSIS — Z95.2 PRESENCE OF PROSTHETIC HEART VALVE: ICD-10-CM

## 2022-08-12 DIAGNOSIS — Z79.02 LONG TERM (CURRENT) USE OF ANTITHROMBOTICS/ANTIPLATELETS: ICD-10-CM

## 2022-08-12 DIAGNOSIS — N39.0 URINARY TRACT INFECTION, SITE NOT SPECIFIED: ICD-10-CM

## 2022-08-12 DIAGNOSIS — Z86.2 PERSONAL HISTORY OF DISEASES OF THE BLOOD AND BLOOD-FORMING ORGANS AND CERTAIN DISORDERS INVOLVING THE IMMUNE MECHANISM: ICD-10-CM

## 2022-08-12 DIAGNOSIS — J44.9 CHRONIC OBSTRUCTIVE PULMONARY DISEASE, UNSPECIFIED: ICD-10-CM

## 2022-08-12 DIAGNOSIS — Z98.49 CATARACT EXTRACTION STATUS, UNSPECIFIED EYE: Chronic | ICD-10-CM

## 2022-08-12 DIAGNOSIS — I10 ESSENTIAL (PRIMARY) HYPERTENSION: ICD-10-CM

## 2022-08-12 DIAGNOSIS — Z79.82 LONG TERM (CURRENT) USE OF ASPIRIN: ICD-10-CM

## 2022-08-12 LAB
APPEARANCE UR: ABNORMAL
BACTERIA # UR AUTO: ABNORMAL /HPF
BILIRUB UR-MCNC: NEGATIVE — SIGNIFICANT CHANGE UP
COLOR SPEC: YELLOW — SIGNIFICANT CHANGE UP
DIFF PNL FLD: ABNORMAL
EPI CELLS # UR: SIGNIFICANT CHANGE UP /HPF (ref 0–5)
GLUCOSE UR QL: NEGATIVE — SIGNIFICANT CHANGE UP
KETONES UR-MCNC: NEGATIVE — SIGNIFICANT CHANGE UP
LEUKOCYTE ESTERASE UR-ACNC: ABNORMAL
NITRITE UR-MCNC: NEGATIVE — SIGNIFICANT CHANGE UP
PH UR: 6.5 — SIGNIFICANT CHANGE UP (ref 5–8)
PROT UR-MCNC: ABNORMAL MG/DL
RBC CASTS # UR COMP ASSIST: < 5 /HPF — SIGNIFICANT CHANGE UP
SP GR SPEC: 1.01 — SIGNIFICANT CHANGE UP (ref 1–1.03)
UROBILINOGEN FLD QL: 0.2 E.U./DL — SIGNIFICANT CHANGE UP
WBC UR QL: ABNORMAL /HPF

## 2022-08-12 PROCEDURE — 87086 URINE CULTURE/COLONY COUNT: CPT

## 2022-08-12 PROCEDURE — 99284 EMERGENCY DEPT VISIT MOD MDM: CPT

## 2022-08-12 PROCEDURE — 99283 EMERGENCY DEPT VISIT LOW MDM: CPT

## 2022-08-12 PROCEDURE — 87186 SC STD MICRODIL/AGAR DIL: CPT

## 2022-08-12 PROCEDURE — 51702 INSERT TEMP BLADDER CATH: CPT

## 2022-08-12 PROCEDURE — 81001 URINALYSIS AUTO W/SCOPE: CPT

## 2022-08-12 RX ORDER — NITROFURANTOIN MACROCRYSTAL 50 MG
1 CAPSULE ORAL
Qty: 10 | Refills: 0
Start: 2022-08-12 | End: 2022-08-16

## 2022-08-12 NOTE — ED PROVIDER NOTE - LOCATION
suprapubic region General Sunscreen Counseling: I recommend a broad spectrum sunscreen with a SPF of 30 or higher.  I explained that SPF 30 sunscreens block approximately 97 percent of the sun's harmful rays.  Sunscreens should be applied at least 15 minutes prior to expected sun exposure and then every 2 hours after that as long as sun exposure continues. If swimming or exercising sunscreen should be reapplied every 45 minutes to an hour after getting wet or sweating.  I also recommend a lip balm with a sunscreen as well. Sun protective clothing can be used in lieu of sunscreen but must be worn the entire time you are exposed to the sun's rays. Detail Level: Detailed

## 2022-08-12 NOTE — ED PROVIDER NOTE - CLINICAL SUMMARY MEDICAL DECISION MAKING FREE TEXT BOX
66 y/o M with hx overly distended bladder causing chronic urinary retention. S/p recent House removal 3 days ago,now with urinary retention  and suprapubic tenderness. Concerning from retention and UTI. Neuro intact. Will replace House check ua and dc with outpatient urology f/u.

## 2022-08-12 NOTE — ED ADULT TRIAGE NOTE - CHIEF COMPLAINT QUOTE
Pt c/o suprapubic pain and dysuria since Tuesday. Reports having House catheter placed last week d/t urinary rentention and had it removed on Tuesday. Denies f/c, n/v, SOB, chest pain, hematuria. States "I urinated this morning but I could only get a little out"

## 2022-08-12 NOTE — ED PROVIDER NOTE - NSFOLLOWUPINSTRUCTIONS_ED_ALL_ED_FT
Urinary Tract Infection    A urinary tract infection (UTI) is an infection of any part of the urinary tract, which includes the kidneys, ureters, bladder, and urethra. Risk factors include ignoring your need to urinate, wiping back to front if female, being an uncircumcised male, and having diabetes or a weak immune system. Symptoms include frequent urination, pain or burning with urination, foul smelling urine, cloudy urine, pain in the lower abdomen, blood in the urine, and fever. If you were prescribed an antibiotic medicine, take it as told by your health care provider. Do not stop taking the antibiotic even if you start to feel better.    SEEK IMMEDIATE MEDICAL CARE IF YOU HAVE ANY OF THE FOLLOWING SYMPTOMS: severe back or abdominal pain, fever, inability to keep fluids or medicine down, dizziness/lightheadedness, or a change in mental status.     FOLLOW UP WITH YOUR UROLOGIST MONDAY AS SCHEDULED.

## 2022-08-12 NOTE — ED ADULT NURSE NOTE - NSIMPLEMENTINTERV_GEN_ALL_ED
Implemented All Fall with Harm Risk Interventions:  [place] to call system. Call bell, personal items and telephone within reach. Instruct patient to call for assistance. Room bathroom lighting operational. Non-slip footwear when patient is off stretcher. Physically safe environment: no spills, clutter or unnecessary equipment. Stretcher in lowest position, wheels locked, appropriate side rails in place. Provide visual cue, wrist band, yellow gown, etc. Monitor gait and stability. Monitor for mental status changes and reorient to person, place, and time. Review medications for side effects contributing to fall risk. Reinforce activity limits and safety measures with patient and family. Provide visual clues: red socks.

## 2022-08-12 NOTE — ED ADULT NURSE NOTE - ISAR MEDICATION
"Chief Complaint   Patient presents with     Throat Problem     Heartburn       Initial /78 (BP Location: Right arm, Cuff Size: Adult Regular)  Pulse 83  Temp 98.2  F (36.8  C) (Oral)  Ht 6' 1\" (1.854 m)  Wt 188 lb (85.3 kg)  SpO2 97%  BMI 24.8 kg/m2 Estimated body mass index is 24.8 kg/(m^2) as calculated from the following:    Height as of this encounter: 6' 1\" (1.854 m).    Weight as of this encounter: 188 lb (85.3 kg).  Medication Reconciliation: complete     Ruth Marcus MA   August 31, 2017,  1:57 PM    "
Yes

## 2022-08-12 NOTE — ED PROVIDER NOTE - PATIENT PORTAL LINK FT
You can access the FollowMyHealth Patient Portal offered by [facility] by registering at the following website: [email or web address]. By joining [facility]’s FollowMyHealth portal, you will also be able to view your health information using other applications (apps) compatible with our system.

## 2022-08-12 NOTE — ED ADULT NURSE NOTE - OBJECTIVE STATEMENT
.  67years male alert mental state (AOX3) received on foot with his wife.  -complain of urinary retention.  Hx of stroke, HTN, aortic regurgitation, COPD.  pt visited his PCP on Tuesday. His House cath was removed. pt started on urinary retention and, painful voiding since last night.   -denied chest pain, SOB, dizziness, headache, abdomen pain, n/v/d, fever, chills.  Pt is in the bed comfortably at this time. Will continue to monitor and document any changes.

## 2022-08-12 NOTE — ED PROVIDER NOTE - NSICDXPASTMEDICALHX_GEN_ALL_CORE_FT
PAST MEDICAL HISTORY:  Anemia     Aortic regurgitation     Arthritis     COPD, mild     HTN (hypertension)       Bladder distention

## 2022-08-12 NOTE — ED ADULT TRIAGE NOTE - DOMESTIC TRAVEL HIGH RISK QUESTION
Problem: Safety  Goal: Will remain free from injury  Outcome: PROGRESSING SLOWER THAN EXPECTED  Note:   Pt is a high fall risk. Pt call light within reach. Charge nurse made aware, awaiting desk bed availablility.      Problem: Bowel/Gastric:  Goal: Normal bowel function is maintained or improved  Outcome: PROGRESSING SLOWER THAN EXPECTED  Note:   Two episodes of loose bloody bm. GI consulted. Pt down to GI bleed study.       No

## 2022-08-12 NOTE — ED PROVIDER NOTE - OBJECTIVE STATEMENT
68 y/o M with PMHx. of overly distended bladder presents to the ED c/o urinary retention for approx. 1 month. Pt had open heart surgery about 1.5 months ago and had a stroke during surgery. Upon returning home, pt began experiencing difficulty urinating. He had a catheter removed 3 days ago and is requesting a new one to be installed. Pt denies fever or chills.

## 2022-08-13 NOTE — ED ADULT NURSE NOTE - NS ED NURSE DISCH DISPOSITION
PULMONARY & CCM PROGRESS NOTE    SUBJECTIVE   Seen and examined  Currently on room air  Noted changes with blister forming in the tip of the finger    ACTIVE MEDS:  Current Facility-Administered Medications   Medication Dose Route Frequency Provider Last Rate Last Admin   • sodium chloride 0.9 % flush bag 25 mL  25 mL Intravenous PRN Calvin Cao MD       • acetaminophen (TYLENOL) tablet 650 mg  650 mg Oral Q8H PRN Anju Mireles MD       • HYDROcodone-acetaminophen (NORCO) 5-325 MG per tablet 1 tablet  1 tablet Oral Q6H PRN Anju Mireles MD       • nicotine (NICODERM) 14 MG/24HR patch 1 patch  1 patch Transdermal Daily PRN Melquiades Singh CNP       • heparin (porcine) injection 5,100 Units  80 Units/kg (Dosing Weight) Intravenous PRN Ronaldo Brumfield MD       • heparin (porcine) injection 2,600 Units  40 Units/kg (Dosing Weight) Intravenous PRN Ronaldo Brumfield MD   2,600 Units at 08/12/22 1400     Current Facility-Administered Medications   Medication Dose Route Frequency Provider Last Rate Last Admin   • magnesium oxide (MAG-OX) tablet 400 mg  400 mg Oral Once Calvin Cao MD       • magnesium oxide (MAG-OX) tablet 400 mg  400 mg Oral Once Calvin Cao MD       • Magnesium Standard Replacement Protocol   Does not apply See Admin Instructions Calvin Cao MD       • Potassium Standard Replacement Protocol (Levels 3.5 and lower)   Does not apply See Admin Instructions Calvin Cao MD       • Potassium Replacement (Levels 3.6 - 4)   Does not apply See Admin Instructions Calvin Cao MD       • atorvastatin (LIPITOR) tablet 20 mg  20 mg Oral Daily Calvin Cao MD   20 mg at 08/12/22 1439   • NIFEdipine XL (PROCARDIA XL) ER tablet 30 mg  30 mg Oral Daily Calvin Cao MD   30 mg at 08/12/22 1439   • latanoprost (XALATAN) 0.005 % ophthalmic solution 1 drop  1 drop Both Eyes Nightly Anju Mireles MD   1 drop at 08/12/22 2039   • timolol (TIMOPTIC) 0.5 % ophthalmic solution 1 drop  1 drop Both Eyes  BID Anju Mireles [facility] at the   thoracic inlet could be could represent postsurgical fibrosis and scarring.    However, recurrent disease is not entirely excluded at this level and   therefore comparison with prior breast imaging including MRI if available   is recommended.      A nodular and curvilinear region of opacity in the right upper lobe   measuring 2.2 x 1.3 x 1.5 cm, may represent sequelae of scarring but   underlying nodule/malignancy is not entirely excluded.      Two additional nodules in the right lower lobe measuring up to 5 mm.      Evaluation of the scar tissue along the chest wall and right apical nodular   focus can be considered with PET/CT.      Emphysema.      Soft tissue edema of the right upper extremity particularly involving the   forearm and hand.      Electronically Signed by: AC FAJARDO MD    Signed on: 8/10/2022 11:19 PM          US VASC EXTREMITY UPPER VENOUS DUPLEX   Final Result by Ac Fajardo MD (08/10 2017)   No evidence of acute DVT of the investigated right upper extremity.      There is subcutaneous edema in the right upper extremity.      Electronically Signed by: AC FAJARDO MD    Signed on: 8/10/2022 8:17 PM          US VASC UPPER EXTREMITY ARTERIAL DUPLEX   Final Result by Ac Fajardo MD (08/10 2023)    Monophasic arterial waveforms are noted throughout the right upper   extremity, may represent poor cardiac output or more proximal/central   stenosis near the subclavian origin, not visualized on this exam      Electronically Signed by: AC FAJARDO MD    Signed on: 8/10/2022 8:23 PM          XR HAND MIN 3 VIEWS RIGHT   Final Result by Herb Junior DO (08/10 1902)      1.    Diffuse osteopenia without bone destruction.  Extensive soft tissue   swelling of the dorsum of the hand.  No gas foci is present.  Cellulitis is   a consideration.       Electronically Signed by: HERB JUNIOR D.O.    Signed on: 8/10/2022 7:02 PM          US VASC UPPER EXTREMITY SEGMENTAL PRESSURES BILATERAL     (Discharged

## 2022-08-14 LAB
-  AMPICILLIN/SULBACTAM: SIGNIFICANT CHANGE UP
-  AMPICILLIN: SIGNIFICANT CHANGE UP
-  CEFAZOLIN: SIGNIFICANT CHANGE UP
-  CEFTRIAXONE: SIGNIFICANT CHANGE UP
-  CIPROFLOXACIN: SIGNIFICANT CHANGE UP
-  ERTAPENEM: SIGNIFICANT CHANGE UP
-  GENTAMICIN: SIGNIFICANT CHANGE UP
-  NITROFURANTOIN: SIGNIFICANT CHANGE UP
-  PIPERACILLIN/TAZOBACTAM: SIGNIFICANT CHANGE UP
-  TOBRAMYCIN: SIGNIFICANT CHANGE UP
-  TRIMETHOPRIM/SULFAMETHOXAZOLE: SIGNIFICANT CHANGE UP
CULTURE RESULTS: SIGNIFICANT CHANGE UP
METHOD TYPE: SIGNIFICANT CHANGE UP
ORGANISM # SPEC MICROSCOPIC CNT: SIGNIFICANT CHANGE UP
ORGANISM # SPEC MICROSCOPIC CNT: SIGNIFICANT CHANGE UP
SPECIMEN SOURCE: SIGNIFICANT CHANGE UP

## 2022-08-17 ENCOUNTER — APPOINTMENT (OUTPATIENT)
Dept: UROLOGY | Facility: CLINIC | Age: 67
End: 2022-08-17

## 2022-08-17 VITALS
WEIGHT: 167 LBS | DIASTOLIC BLOOD PRESSURE: 74 MMHG | BODY MASS INDEX: 20.76 KG/M2 | HEART RATE: 89 BPM | HEIGHT: 75 IN | SYSTOLIC BLOOD PRESSURE: 125 MMHG | TEMPERATURE: 97.7 F

## 2022-08-17 PROCEDURE — 99214 OFFICE O/P EST MOD 30 MIN: CPT

## 2022-08-17 NOTE — ASSESSMENT
[FreeTextEntry1] : 67 year old man with urinary retention a few weeks after AVR, likely secondary to constipation and narcotic use. 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 It is possible stroke could affect urinary function, however, this seems less likely given urinary retention occurred several weeks after stroke. Catheter was removed two weeks ago, and he was initially able to void. Catheter replaced 3 days after removal due to recurrent urinary retention. 2 L drained from bladder. He failed second void trial in the office last week. Presents today for third void trial as outpatient. Catheter was removed, and he was able to void in small volumes with PVR between 300-400 cc. He went into retention again on 8/12 with fevers. Catheter was replaced and he was discharged with macrobid x 5 days. He continued to have fevers on macrobid that he has been controlling with tylenol and aspirin. Macrobid is not sufficient for treating febrile UTIs in men. Cipro x 14 days will be prescribed for UTI and possible prostatitis. \par \par He has no baseline urinary complaints. He has history of elevated PSA with negative prostate biopsy 4 years ago, no prior prostate MRI. PSA is 6.13 in setting of catheterization.\par  - Continue fuchs catheter\par  - F/U for UDS, cystoscopy in one week to determine etiology of retention\par  - Cipro x 14 days\par

## 2022-08-17 NOTE — HISTORY OF PRESENT ILLNESS
[FreeTextEntry1] : 7/21/22: 67 year old man with history of HTN, Marfan's syndrome, aortic aneurysm presents with urinary retention after recent AVR. His AVR was on 6/23/22. His post-op course was complicated by a stroke and occlusion of the right ICA. No thrombectomy was performed and management was conservative. He has some residual left sided weakness and continues to work with PT.\par \par He presented several weeks after the AVR with difficulty urinating and constipation. He had 2 L in his bladder, bilateral hydronephrosis and ANKITA. A catheter was placed with resolution of the ANKITA. Initial hematuria resolved. He has chronic constipation, but this worsened in setting narcotic use. He is using enemas and supossitories to help with constipation.\par \par Prior to this episode, he denies any difficulty urinating or bothersome urinary symptoms.\par \par He reports history of elevated PSA, most recently above 6 earlier this year. He had a negative prostate biopsy several years ago for PSA > 4. He does not believe he has had an MRI of the prostate.\par \par 7/26/22: Patient had been voiding after catheter removal for three days. He subsequently was unable to urinate and returned to ED where catheter was replaced. 2 L of urine was drained from bladder. He had been placed on antibiotics by PCP last week for bacteriuria. Urine culture from ED was negative. Doing well with catheter. Continues to have issues with constipation. \par \par 8/2/22: Presents for another void trial. Was in ED last Friday with obstructed catheter. Catheter was exchanged. Urine culture was negative. No fevers, chills, dysuria, hematuria.\par \par Bladder filled with 200 cc. Patient able to void small amounts with increasingly high PVRs, up to 650 cc. Catheter replaced.\par \par 8/9/22: Patient came in for repeat void trial. Bladder filled and catheter removed. Patient voided several times in small volumes with PVR between 300-400cc. He felt stream was getting stronger.\par \par Uroflow: \par Qmax 3.9, Qavg 2.2, voided volume 101.4 cc, 1 interval\par \par 8/17/22: Ended up in ED on 8/12/22 with urinary retention and fevers. Catheter was placed with drainage of 1 L. He was discharged with macrobid x 5 days. He continued to have fevers at home that he has been controlling with tylenol and aspirin. Urine culture grew klebsiella.

## 2022-08-23 PROBLEM — N32.89 OTHER SPECIFIED DISORDERS OF BLADDER: Chronic | Status: ACTIVE | Noted: 2022-08-19

## 2022-08-24 ENCOUNTER — APPOINTMENT (OUTPATIENT)
Dept: UROLOGY | Facility: CLINIC | Age: 67
End: 2022-08-24

## 2022-08-24 VITALS
HEIGHT: 75 IN | SYSTOLIC BLOOD PRESSURE: 137 MMHG | BODY MASS INDEX: 20.76 KG/M2 | HEART RATE: 83 BPM | TEMPERATURE: 97.8 F | DIASTOLIC BLOOD PRESSURE: 75 MMHG | WEIGHT: 167 LBS

## 2022-08-24 DIAGNOSIS — N39.0 URINARY TRACT INFECTION, SITE NOT SPECIFIED: ICD-10-CM

## 2022-08-24 PROCEDURE — 51728 CYSTOMETROGRAM W/VP: CPT

## 2022-08-24 PROCEDURE — 51741 ELECTRO-UROFLOWMETRY FIRST: CPT

## 2022-08-24 PROCEDURE — 51798 US URINE CAPACITY MEASURE: CPT | Mod: 59

## 2022-08-24 PROCEDURE — 52000 CYSTOURETHROSCOPY: CPT

## 2022-08-24 PROCEDURE — 51784 ANAL/URINARY MUSCLE STUDY: CPT

## 2022-08-24 PROCEDURE — 51797 INTRAABDOMINAL PRESSURE TEST: CPT

## 2022-08-25 ENCOUNTER — NON-APPOINTMENT (OUTPATIENT)
Age: 67
End: 2022-08-25

## 2022-08-31 ENCOUNTER — APPOINTMENT (OUTPATIENT)
Dept: CT IMAGING | Facility: HOSPITAL | Age: 67
End: 2022-08-31

## 2022-08-31 ENCOUNTER — NON-APPOINTMENT (OUTPATIENT)
Age: 67
End: 2022-08-31

## 2022-08-31 ENCOUNTER — APPOINTMENT (OUTPATIENT)
Dept: CARDIOTHORACIC SURGERY | Facility: CLINIC | Age: 67
End: 2022-08-31

## 2022-08-31 ENCOUNTER — APPOINTMENT (OUTPATIENT)
Dept: UROLOGY | Facility: CLINIC | Age: 67
End: 2022-08-31

## 2022-08-31 ENCOUNTER — OUTPATIENT (OUTPATIENT)
Dept: OUTPATIENT SERVICES | Facility: HOSPITAL | Age: 67
LOS: 1 days | End: 2022-08-31
Payer: COMMERCIAL

## 2022-08-31 VITALS
BODY MASS INDEX: 21.01 KG/M2 | TEMPERATURE: 97.3 F | SYSTOLIC BLOOD PRESSURE: 152 MMHG | OXYGEN SATURATION: 97 % | HEIGHT: 75 IN | WEIGHT: 169 LBS | HEART RATE: 71 BPM | DIASTOLIC BLOOD PRESSURE: 76 MMHG | RESPIRATION RATE: 18 BRPM

## 2022-08-31 DIAGNOSIS — Z98.49 CATARACT EXTRACTION STATUS, UNSPECIFIED EYE: Chronic | ICD-10-CM

## 2022-08-31 LAB
BILIRUB UR QL STRIP: NORMAL
GLUCOSE UR-MCNC: NORMAL
HCG UR QL: 0.2 EU/DL
HGB UR QL STRIP.AUTO: NORMAL
KETONES UR-MCNC: NORMAL
LEUKOCYTE ESTERASE UR QL STRIP: NORMAL
NITRITE UR QL STRIP: NORMAL
PH UR STRIP: 5
POCT ISTAT CREATININE: 1.3 MG/DL — SIGNIFICANT CHANGE UP (ref 0.5–1.3)
PROT UR STRIP-MCNC: NORMAL
SP GR UR STRIP: 1

## 2022-08-31 PROCEDURE — 99213 OFFICE O/P EST LOW 20 MIN: CPT | Mod: 25

## 2022-08-31 PROCEDURE — 74174 CTA ABD&PLVS W/CONTRAST: CPT

## 2022-08-31 PROCEDURE — 81003 URINALYSIS AUTO W/O SCOPE: CPT | Mod: QW

## 2022-08-31 PROCEDURE — 99024 POSTOP FOLLOW-UP VISIT: CPT

## 2022-08-31 PROCEDURE — 71275 CT ANGIOGRAPHY CHEST: CPT

## 2022-08-31 PROCEDURE — 71275 CT ANGIOGRAPHY CHEST: CPT | Mod: 26

## 2022-08-31 PROCEDURE — 74174 CTA ABD&PLVS W/CONTRAST: CPT | Mod: 26

## 2022-08-31 PROCEDURE — 51702 INSERT TEMP BLADDER CATH: CPT

## 2022-08-31 PROCEDURE — 82565 ASSAY OF CREATININE: CPT

## 2022-08-31 NOTE — HISTORY OF PRESENT ILLNESS
[FreeTextEntry1] : 68 yo male patient of Dr. Roth presents to clinic in acute urinary retention.  He has been in and out of retention over the last few weeks.   He underwent a CT angiogram earlier today, ordered by [facility], which showed a very distended bladder.  He has been feeling significant pressure in his bladder over the last couple of days and his urination has been very difficult.  He has been abx in the last couple of week for treatment of a UTI\par \par \par

## 2022-08-31 NOTE — ASSESSMENT
[FreeTextEntry1] : 68 yo male in AUR.  Catheter was placed today which drained > 1 liter of urine.  He will return next week to see Dr. Roth to discuss next steps in management of his retention. He will go home today with a catheter to leg bag drainage.

## 2022-08-31 NOTE — PHYSICAL EXAM
[General Appearance - Well Developed] : well developed [Normal Appearance] : normal appearance [Urethral Meatus] : meatus normal [Epididymis] : the epididymides were normal [Testes Tenderness] : no tenderness of the testes [Testes Mass (___cm)] : there were no testicular masses [Skin Color & Pigmentation] : normal skin color and pigmentation [] : no respiratory distress [Oriented To Time, Place, And Person] : oriented to person, place, and time [FreeTextEntry1] : Cane for ambulation [No Focal Deficits] : no focal deficits

## 2022-09-13 ENCOUNTER — APPOINTMENT (OUTPATIENT)
Dept: UROLOGY | Facility: CLINIC | Age: 67
End: 2022-09-13

## 2022-09-13 VITALS
HEIGHT: 75 IN | BODY MASS INDEX: 21.01 KG/M2 | DIASTOLIC BLOOD PRESSURE: 93 MMHG | WEIGHT: 169 LBS | SYSTOLIC BLOOD PRESSURE: 156 MMHG | HEART RATE: 57 BPM | TEMPERATURE: 97.8 F

## 2022-09-13 LAB
PSA FREE FLD-MCNC: 15 %
PSA FREE SERPL-MCNC: 0.93 NG/ML
PSA SERPL-MCNC: 6.13 NG/ML

## 2022-09-13 PROCEDURE — 51702 INSERT TEMP BLADDER CATH: CPT

## 2022-09-13 PROCEDURE — 99215 OFFICE O/P EST HI 40 MIN: CPT | Mod: 25

## 2022-09-13 NOTE — ASSESSMENT
[FreeTextEntry1] : 67 year old man with urinary retention a few weeks after AVR. 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 It is possible stroke could affect urinary function, however, this seems less likely given urinary retention occurred several weeks after stroke. He has failed several void trials, although he is usually initially able to void and then goes into urinary retention over several days. \par \par He has no baseline urinary complaints. He has history of elevated PSA with negative prostate biopsy 4 years ago, no prior prostate MRI. PSA is 6.13 in setting of catheterization. Will repeat today.\par \par UDS demonstrated a small capacity bladder with poor compliance, elevated voiding pressures consistent with bladder outlet obstruction. He was able to urinate without difficulty, however, with low post-void residual. This is a change from a two weeks prior. Cystoscopy showed an enlarged prostate with bladder outlet obstruction and grade 3 trabeculations. I suspect he does have chronic bladder outlet obstruction from BPH. While UDS did show that he was able to urinate, he subseuqently went into urinary retention again. At this point, we should consider a prostate debulking procedure to allow for catheter removal. \par \par We discussed different therapeutic options including TURP, PVP, simple prostatectomy (open, laparoscopic or transurethral laser enucleation), Aquablation, Urolift therapy, and Rezum in addition to full continuation of medical therapy. Discussed the issues of incontinence, retrograde ejaculation, erectile dysfunction, irritative voiding symptoms, injury to urethra and bladder, persistence of irritative voiding symptoms, urethral stricture or bladder neck contracture, need for open surgery to repair the injury, erectile dysfunction and infertility.\par \par Mr. ABEL decided to proceed with a laser enucleation of prostate followed by prostate morcellation. Therefore, I spent a good amount of time discussing the risks and benefits of this procedure. We discussed potential risks of incontinence, retrograde ejaculation and infertility, erectile dysfunction, irritative voiding symptoms, injury to the urethra and bladder, rare need to convert to an open surgery.\par  - Plan for HoLEP[facility]ThuLEP at [facility]\par  - Will need pre-op clearance from hematology, cardiology, PCP\par  - He takes xarelto, ideally can hold emily-op. \par  - Pre-op bloodwork and urine culture obtained today

## 2022-09-15 ENCOUNTER — EMERGENCY (EMERGENCY)
Facility: HOSPITAL | Age: 67
LOS: 1 days | Discharge: HOME | End: 2022-09-15
Attending: EMERGENCY MEDICINE | Admitting: EMERGENCY MEDICINE
Payer: COMMERCIAL

## 2022-09-15 ENCOUNTER — NON-APPOINTMENT (OUTPATIENT)
Age: 67
End: 2022-09-15

## 2022-09-15 VITALS
SYSTOLIC BLOOD PRESSURE: 168 MMHG | OXYGEN SATURATION: 98 % | TEMPERATURE: 98 F | HEART RATE: 60 BPM | RESPIRATION RATE: 18 BRPM | DIASTOLIC BLOOD PRESSURE: 82 MMHG

## 2022-09-15 VITALS
OXYGEN SATURATION: 97 % | HEART RATE: 90 BPM | SYSTOLIC BLOOD PRESSURE: 199 MMHG | TEMPERATURE: 99 F | DIASTOLIC BLOOD PRESSURE: 116 MMHG | HEIGHT: 75 IN | RESPIRATION RATE: 18 BRPM

## 2022-09-15 DIAGNOSIS — Z98.49 CATARACT EXTRACTION STATUS, UNSPECIFIED EYE: Chronic | ICD-10-CM

## 2022-09-15 DIAGNOSIS — R31.9 HEMATURIA, UNSPECIFIED: ICD-10-CM

## 2022-09-15 DIAGNOSIS — Z95.2 PRESENCE OF PROSTHETIC HEART VALVE: Chronic | ICD-10-CM

## 2022-09-15 LAB
ALBUMIN SERPL ELPH-MCNC: 3.7 G/DL — SIGNIFICANT CHANGE UP (ref 3.3–5)
ALP SERPL-CCNC: 113 U/L — SIGNIFICANT CHANGE UP (ref 40–120)
ALT FLD-CCNC: 8 U/L — LOW (ref 10–45)
ANION GAP SERPL CALC-SCNC: 13 MMOL/L — SIGNIFICANT CHANGE UP (ref 5–17)
APPEARANCE UR: ABNORMAL
APTT BLD: 27.7 SEC — SIGNIFICANT CHANGE UP (ref 27.5–35.5)
AST SERPL-CCNC: 16 U/L — SIGNIFICANT CHANGE UP (ref 10–40)
BACTERIA # UR AUTO: PRESENT /HPF
BASOPHILS # BLD AUTO: 0.03 K/UL — SIGNIFICANT CHANGE UP (ref 0–0.2)
BASOPHILS NFR BLD AUTO: 0.5 % — SIGNIFICANT CHANGE UP (ref 0–2)
BILIRUB SERPL-MCNC: 0.4 MG/DL — SIGNIFICANT CHANGE UP (ref 0.2–1.2)
BILIRUB UR-MCNC: NEGATIVE — SIGNIFICANT CHANGE UP
BUN SERPL-MCNC: 13 MG/DL — SIGNIFICANT CHANGE UP (ref 7–23)
CALCIUM SERPL-MCNC: 9.6 MG/DL — SIGNIFICANT CHANGE UP (ref 8.4–10.5)
CHLORIDE SERPL-SCNC: 100 MMOL/L — SIGNIFICANT CHANGE UP (ref 96–108)
CO2 SERPL-SCNC: 23 MMOL/L — SIGNIFICANT CHANGE UP (ref 22–31)
COLOR SPEC: ABNORMAL
CREAT SERPL-MCNC: 0.74 MG/DL — SIGNIFICANT CHANGE UP (ref 0.5–1.3)
DIFF PNL FLD: ABNORMAL
EGFR: 99 ML/MIN/1.73M2 — SIGNIFICANT CHANGE UP
EOSINOPHIL # BLD AUTO: 0.15 K/UL — SIGNIFICANT CHANGE UP (ref 0–0.5)
EOSINOPHIL NFR BLD AUTO: 2.4 % — SIGNIFICANT CHANGE UP (ref 0–6)
EPI CELLS # UR: SIGNIFICANT CHANGE UP /HPF (ref 0–5)
GLUCOSE SERPL-MCNC: 118 MG/DL — HIGH (ref 70–99)
GLUCOSE UR QL: NEGATIVE — SIGNIFICANT CHANGE UP
HCT VFR BLD CALC: 30.4 % — LOW (ref 39–50)
HGB BLD-MCNC: 9.4 G/DL — LOW (ref 13–17)
IMM GRANULOCYTES NFR BLD AUTO: 0.5 % — SIGNIFICANT CHANGE UP (ref 0–1.5)
INR BLD: 1.32 — HIGH (ref 0.88–1.16)
KETONES UR-MCNC: NEGATIVE — SIGNIFICANT CHANGE UP
LEUKOCYTE ESTERASE UR-ACNC: ABNORMAL
LYMPHOCYTES # BLD AUTO: 1.15 K/UL — SIGNIFICANT CHANGE UP (ref 1–3.3)
LYMPHOCYTES # BLD AUTO: 18.7 % — SIGNIFICANT CHANGE UP (ref 13–44)
MCHC RBC-ENTMCNC: 28.1 PG — SIGNIFICANT CHANGE UP (ref 27–34)
MCHC RBC-ENTMCNC: 30.9 GM/DL — LOW (ref 32–36)
MCV RBC AUTO: 90.7 FL — SIGNIFICANT CHANGE UP (ref 80–100)
MONOCYTES # BLD AUTO: 0.63 K/UL — SIGNIFICANT CHANGE UP (ref 0–0.9)
MONOCYTES NFR BLD AUTO: 10.2 % — SIGNIFICANT CHANGE UP (ref 2–14)
NEUTROPHILS # BLD AUTO: 4.16 K/UL — SIGNIFICANT CHANGE UP (ref 1.8–7.4)
NEUTROPHILS NFR BLD AUTO: 67.7 % — SIGNIFICANT CHANGE UP (ref 43–77)
NITRITE UR-MCNC: NEGATIVE — SIGNIFICANT CHANGE UP
NRBC # BLD: 0 /100 WBCS — SIGNIFICANT CHANGE UP (ref 0–0)
PH UR: 6.5 — SIGNIFICANT CHANGE UP (ref 5–8)
PLATELET # BLD AUTO: 249 K/UL — SIGNIFICANT CHANGE UP (ref 150–400)
POTASSIUM SERPL-MCNC: 3.3 MMOL/L — LOW (ref 3.5–5.3)
POTASSIUM SERPL-SCNC: 3.3 MMOL/L — LOW (ref 3.5–5.3)
PROT SERPL-MCNC: 8.3 G/DL — SIGNIFICANT CHANGE UP (ref 6–8.3)
PROT UR-MCNC: 100 MG/DL
PROTHROM AB SERPL-ACNC: 15.7 SEC — HIGH (ref 10.5–13.4)
RBC # BLD: 3.35 M/UL — LOW (ref 4.2–5.8)
RBC # FLD: 18.8 % — HIGH (ref 10.3–14.5)
RBC CASTS # UR COMP ASSIST: ABNORMAL /HPF
SODIUM SERPL-SCNC: 136 MMOL/L — SIGNIFICANT CHANGE UP (ref 135–145)
SP GR SPEC: 1.02 — SIGNIFICANT CHANGE UP (ref 1–1.03)
UROBILINOGEN FLD QL: 0.2 E.U./DL — SIGNIFICANT CHANGE UP
WBC # BLD: 6.15 K/UL — SIGNIFICANT CHANGE UP (ref 3.8–10.5)
WBC # FLD AUTO: 6.15 K/UL — SIGNIFICANT CHANGE UP (ref 3.8–10.5)
WBC UR QL: < 5 /HPF — SIGNIFICANT CHANGE UP

## 2022-09-15 PROCEDURE — 99284 EMERGENCY DEPT VISIT MOD MDM: CPT

## 2022-09-15 PROCEDURE — 81001 URINALYSIS AUTO W/SCOPE: CPT

## 2022-09-15 PROCEDURE — 80053 COMPREHEN METABOLIC PANEL: CPT

## 2022-09-15 PROCEDURE — 85730 THROMBOPLASTIN TIME PARTIAL: CPT

## 2022-09-15 PROCEDURE — 85025 COMPLETE CBC W/AUTO DIFF WBC: CPT

## 2022-09-15 PROCEDURE — 87086 URINE CULTURE/COLONY COUNT: CPT

## 2022-09-15 PROCEDURE — 85610 PROTHROMBIN TIME: CPT

## 2022-09-15 PROCEDURE — 36415 COLL VENOUS BLD VENIPUNCTURE: CPT

## 2022-09-15 RX ORDER — POTASSIUM CHLORIDE 20 MEQ
40 PACKET (EA) ORAL ONCE
Refills: 0 | Status: COMPLETED | OUTPATIENT
Start: 2022-09-15 | End: 2022-09-15

## 2022-09-15 RX ORDER — SODIUM CHLORIDE 9 MG/ML
500 INJECTION INTRAMUSCULAR; INTRAVENOUS; SUBCUTANEOUS ONCE
Refills: 0 | Status: COMPLETED | OUTPATIENT
Start: 2022-09-15 | End: 2022-09-15

## 2022-09-15 RX ADMIN — Medication 40 MILLIEQUIVALENT(S): at 06:53

## 2022-09-15 RX ADMIN — SODIUM CHLORIDE 1000 MILLILITER(S): 9 INJECTION INTRAMUSCULAR; INTRAVENOUS; SUBCUTANEOUS at 07:02

## 2022-09-15 NOTE — ED PROVIDER NOTE - ATTENDING APP SHARED VISIT CONTRIBUTION OF CARE
67M hx htn, bph, copd, c/o hematuria. pt has indwelling fuchs, last changed 2 days ago. noted blood and clots in fuchs tonight. no fevers. no vomiting. no abd pain.   gen- nad  heent- ncat, clear conj  abd - soft, nt, nd  ext -wwp  neuro -aox3, steady gait, dunbar   hematuria, pt seen by  - fuchs flushed with improvement of hematuria. pt can f/u with Dr. Roth

## 2022-09-15 NOTE — ED ADULT NURSE NOTE - OBJECTIVE STATEMENT
pt presents to the ED for dysuria/hematuria onset on 09/14/22. Pt states he has a chronic fuchs (hx of BPH and enlarged bladder) pt is planning to have procedure performed to "widen the urethra" and had fuchs changed on 09/14/22, reports the changing of the fuchs was painful and he has been experiencing suprapubic pain and hematuria ever since.

## 2022-09-15 NOTE — ED PROVIDER NOTE - CLINICAL SUMMARY MEDICAL DECISION MAKING FREE TEXT BOX
68 yo male with h/o HTN, HLD, anemia, COPD, BPH  present to Er c/o hematuria. Pt has been on and off with House catheter for the past month. Last catheter was replaced 2 days ago. Pt concerned that he noted hematuria and small blood clots in the leg bag. Pt also c/o pain in his penis. Currently on oral abx for few weeks for recurrent uti.  Pt denies fever, chills, nausea vomiting, diarrhea constipations.  pt afebrile, non-toxic appearing. will check labs and call urology consult.

## 2022-09-15 NOTE — CONSULT NOTE ADULT - PROBLEM SELECTOR RECOMMENDATION 9
-s/p manual irrigation in ED  -urine pink clear at this time  -chronic anemia at baseline unlikely to be worsened by acute urine status  -please observe for 1 hr to ensure no return onset of hematuria  -can d/c with fuchs catheter and follow up with Dr. Roth  -rest of care and d.c planning as per ED

## 2022-09-15 NOTE — ED PROVIDER NOTE - PATIENT PORTAL LINK FT
You can access the FollowMyHealth Patient Portal offered by [facility] by registering at the following website: [email or web address]. By joining [facility]’s FollowMyHealth portal, you will also be able to view your health information using other applications (apps) compatible with our system.

## 2022-09-15 NOTE — ED ADULT TRIAGE NOTE - NS ED NURSE BANDS TYPE
Yes, ok to fill school form - normal physical exam portion - for the needs/modificaitons portion, please write \"continue therapies\". Thanks.    Name band;

## 2022-09-15 NOTE — ED ADULT NURSE NOTE - NSICDXPASTMEDICALHX_GEN_ALL_CORE_FT
PAST MEDICAL HISTORY:  Anemia     Aortic regurgitation     Arthritis     Bladder distention     BPH (benign prostatic hyperplasia)     COPD, mild     HTN (hypertension)

## 2022-09-15 NOTE — ED PROVIDER NOTE - NSFOLLOWUPINSTRUCTIONS_ED_ALL_ED_FT
Hematuria, Adult       Hematuria is blood in the urine. Blood may be visible in the urine, or it may be identified with a test. This condition can be caused by infections of the bladder, urethra, kidney, or prostate. Other possible causes include:  •Kidney stones.      •Cancer of the urinary tract.      •Too much calcium in the urine.      •Conditions that are passed from parent to child (inherited conditions).       •Exercise that requires a lot of energy.      Infections can usually be treated with medicine, and a kidney stone usually will pass through your urine. If neither of these is the cause of your hematuria, more tests may be needed to identify the cause of your symptoms.    It is very important to tell your health care provider about any blood in your urine, even if it is painless or the blood stops without treatment. Blood in the urine, when it happens and then stops and then happens again, can be a symptom of a very serious condition, including cancer. There is no pain in the initial stages of many urinary cancers.      Follow these instructions at home:    Medicines     •Take over-the-counter and prescription medicines only as told by your health care provider.      •If you were prescribed an antibiotic medicine, take it as told by your health care provider. Do not stop taking the antibiotic even if you start to feel better.      Eating and drinking     •Drink enough fluid to keep your urine pale yellow. It is recommended that you drink 3–4 quarts (2.8–3.8 L) a day. If you have been diagnosed with an infection, drinking cranberry juice in addition to large amounts of water is recommended.      •Avoid caffeine, tea, and carbonated beverages. These tend to irritate the bladder.      •Avoid alcohol because it may irritate the prostate (in males).      General instructions     •If you have been diagnosed with a kidney stone, follow your health care provider's instructions about straining your urine to catch the stone.      •Empty your bladder often. Avoid holding urine for long periods of time.    •If you are female:  •After a bowel movement, wipe from front to back and use each piece of toilet paper only once.      •Empty your bladder before and after sex.        •Pay attention to any changes in your symptoms. Tell your health care provider about any changes or any new symptoms.      •It is up to you to get the results of any tests. Ask your health care provider, or the department that is doing the test, when your results will be ready.      •Keep all follow-up visits. This is important.        Contact a health care provider if:    •You develop back pain.      •You have a fever or chills.      •You have nausea or vomiting.      •Your symptoms do not improve after 3 days.      •Your symptoms get worse.        Get help right away if:    •You develop severe vomiting and are unable to take medicine without vomiting.      •You develop severe pain in your back or abdomen even though you are taking medicine.      •You pass a large amount of blood in your urine.      •You pass blood clots in your urine.      •You feel very weak or like you might faint.      •You faint.        Summary    •Hematuria is blood in the urine. It has many possible causes.      •It is very important that you tell your health care provider about any blood in your urine, even if it is painless or the blood stops without treatment.      •Take over-the-counter and prescription medicines only as told by your health care provider.      •Drink enough fluid to keep your urine pale yellow.      This information is not intended to replace advice given to you by your health care provider. Make sure you discuss any questions you have with your health care provider. Follow up with your urologist.       Hematuria, Adult       Hematuria is blood in the urine. Blood may be visible in the urine, or it may be identified with a test. This condition can be caused by infections of the bladder, urethra, kidney, or prostate. Other possible causes include:  •Kidney stones.      •Cancer of the urinary tract.      •Too much calcium in the urine.      •Conditions that are passed from parent to child (inherited conditions).       •Exercise that requires a lot of energy.      Infections can usually be treated with medicine, and a kidney stone usually will pass through your urine. If neither of these is the cause of your hematuria, more tests may be needed to identify the cause of your symptoms.    It is very important to tell your health care provider about any blood in your urine, even if it is painless or the blood stops without treatment. Blood in the urine, when it happens and then stops and then happens again, can be a symptom of a very serious condition, including cancer. There is no pain in the initial stages of many urinary cancers.      Follow these instructions at home:    Medicines     •Take over-the-counter and prescription medicines only as told by your health care provider.      •If you were prescribed an antibiotic medicine, take it as told by your health care provider. Do not stop taking the antibiotic even if you start to feel better.      Eating and drinking     •Drink enough fluid to keep your urine pale yellow. It is recommended that you drink 3–4 quarts (2.8–3.8 L) a day. If you have been diagnosed with an infection, drinking cranberry juice in addition to large amounts of water is recommended.      •Avoid caffeine, tea, and carbonated beverages. These tend to irritate the bladder.      •Avoid alcohol because it may irritate the prostate (in males).      General instructions     •If you have been diagnosed with a kidney stone, follow your health care provider's instructions about straining your urine to catch the stone.      •Empty your bladder often. Avoid holding urine for long periods of time.    •If you are female:  •After a bowel movement, wipe from front to back and use each piece of toilet paper only once.      •Empty your bladder before and after sex.        •Pay attention to any changes in your symptoms. Tell your health care provider about any changes or any new symptoms.      •It is up to you to get the results of any tests. Ask your health care provider, or the department that is doing the test, when your results will be ready.      •Keep all follow-up visits. This is important.        Contact a health care provider if:    •You develop back pain.      •You have a fever or chills.      •You have nausea or vomiting.      •Your symptoms do not improve after 3 days.      •Your symptoms get worse.        Get help right away if:    •You develop severe vomiting and are unable to take medicine without vomiting.      •You develop severe pain in your back or abdomen even though you are taking medicine.      •You pass a large amount of blood in your urine.      •You pass blood clots in your urine.      •You feel very weak or like you might faint.      •You faint.        Summary    •Hematuria is blood in the urine. It has many possible causes.      •It is very important that you tell your health care provider about any blood in your urine, even if it is painless or the blood stops without treatment.      •Take over-the-counter and prescription medicines only as told by your health care provider.      •Drink enough fluid to keep your urine pale yellow.      This information is not intended to replace advice given to you by your health care provider. Make sure you discuss any questions you have with your health care provider.

## 2022-09-15 NOTE — ED PROVIDER NOTE - OBJECTIVE STATEMENT
66 yo male with h/o HTN, HLD, anemia, COPD, BPH  present to Er c/o hematuria. Pt has been on and off with House catheter for the past month. Last catheter was replaced 2 days ago. Pt concerned that he noted hematuria and small blood clots in the leg bag. Pt also c/o pain in his penis. Currently on oral abx for few weeks for recurrent uti.  Pt denies fever, chills, nausea vomiting, diarrhea constipations.

## 2022-09-15 NOTE — CONSULT NOTE ADULT - SUBJECTIVE AND OBJECTIVE BOX
Patient is a 67y old  Male who presents with a chief complaint of hematuria and clogged catheter    HPI: 68 yo male with h/o HTN, HLD, anemia, COPD, BPH  present to Er c/o hematuria. Pt has been on and off with House catheter for the past month. Last catheter was replaced 2 days ago. Pt concerned that he noted hematuria and small blood clots in the leg bag. Pt also c/o pain in his penis. Currently on oral abx for few weeks for recurrent uti.  Pt denies fever, chills, nausea vomiting, diarrhea constipations.    Gu Note: above as discussed. Patient evaluated at bedside. he follows up with Dr. Roth for routine  care. Recent catheter exchanged by NP at Dr. Roth's office. Today pt reports to onset of hematuria and later lack of urine in catheter tubing. Dark fruit punch color urine in catheter tubing. Catheter irrigated with 2L of sterile solution in ED with clot expulsion and clearing of urine color. Catheter advanced with balloon deflated and inflated prior to irrigation. Patient tolerated well. urine pink clear post manual irrigation      Vital Signs Last 24 Hrs  T(C): 37.1 (15 Sep 2022 03:16), Max: 37.1 (15 Sep 2022 03:16)  T(F): 98.8 (15 Sep 2022 03:16), Max: 98.8 (15 Sep 2022 03:16)  HR: 63 (15 Sep 2022 05:20) (63 - 90)  BP: 162/79 (15 Sep 2022 05:20) (162/79 - 199/116)  BP(mean): --  RR: 18 (15 Sep 2022 05:20) (18 - 18)  SpO2: 96% (15 Sep 2022 05:20) (96% - 97%)    Parameters below as of 15 Sep 2022 03:16  Patient On (Oxygen Delivery Method): room air      I&O's Summary      PE:  Gen: NAD. AOx3, VSS  Abd: soft, nt, nd  : 16 fr catheter in place, urine pink clear, suprapubic pressure resolved post irrigation, no CVAT      LABS:                        9.4    6.15  )-----------( 249      ( 15 Sep 2022 03:43 )             30.4     09-15    136  |  100  |  13  ----------------------------<  118<H>  3.3<L>   |  23  |  0.74    Ca    9.6      15 Sep 2022 03:43    TPro  8.3  /  Alb  3.7  /  TBili  0.4  /  DBili  x   /  AST  16  /  ALT  8<L>  /  AlkPhos  113  09-15    PT/INR - ( 15 Sep 2022 03:43 )   PT: 15.7 sec;   INR: 1.32          PTT - ( 15 Sep 2022 03:43 )  PTT:27.7 sec  Cultures      A/P

## 2022-09-16 LAB
CULTURE RESULTS: NO GROWTH — SIGNIFICANT CHANGE UP
SPECIMEN SOURCE: SIGNIFICANT CHANGE UP

## 2022-09-17 DIAGNOSIS — R31.9 HEMATURIA, UNSPECIFIED: ICD-10-CM

## 2022-09-17 DIAGNOSIS — Z86.2 PERSONAL HISTORY OF DISEASES OF THE BLOOD AND BLOOD-FORMING ORGANS AND CERTAIN DISORDERS INVOLVING THE IMMUNE MECHANISM: ICD-10-CM
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
## 2022-09-17 DIAGNOSIS — M19.90 UNSPECIFIED OSTEOARTHRITIS, UNSPECIFIED SITE: ICD-10-CM

## 2022-09-17 DIAGNOSIS — N40.0 BENIGN PROSTATIC HYPERPLASIA WITHOUT LOWER URINARY TRACT SYMPTOMS: ICD-10-CM
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
## 2022-09-17 DIAGNOSIS — Z79.82 LONG TERM (CURRENT) USE OF ASPIRIN: ICD-10-CM

## 2022-09-17 DIAGNOSIS — J44.9 CHRONIC OBSTRUCTIVE PULMONARY DISEASE, UNSPECIFIED: ICD-10-CM

## 2022-09-17 DIAGNOSIS — Z79.02 LONG TERM (CURRENT) USE OF ANTITHROMBOTICS/ANTIPLATELETS: ICD-10-CM

## 2022-09-17 DIAGNOSIS — I10 ESSENTIAL (PRIMARY) HYPERTENSION: ICD-10-CM

## 2022-09-17 DIAGNOSIS — T83.9XXA UNSPECIFIED COMPLICATION OF GENITOURINARY PROSTHETIC DEVICE, IMPLANT AND GRAFT, INITIAL ENCOUNTER: ICD-10-CM

## 2022-09-17 DIAGNOSIS — E78.5 HYPERLIPIDEMIA, UNSPECIFIED: ICD-10-CM

## 2022-09-21 ENCOUNTER — APPOINTMENT (OUTPATIENT)
Dept: UROLOGY | Facility: CLINIC | Age: 67
End: 2022-09-21

## 2022-09-21 PROBLEM — N40.0 BENIGN PROSTATIC HYPERPLASIA WITHOUT LOWER URINARY TRACT SYMPTOMS: Chronic | Status: ACTIVE | Noted: 2022-09-15

## 2022-09-27 ENCOUNTER — APPOINTMENT (OUTPATIENT)
Dept: UROLOGY | Facility: CLINIC | Age: 67
End: 2022-09-27

## 2022-09-27 LAB
ALBUMIN SERPL ELPH-MCNC: 4 G/DL
ALP BLD-CCNC: 119 U/L
ALT SERPL-CCNC: 11 U/L
ANION GAP SERPL CALC-SCNC: 14 MMOL/L
APTT BLD: 29.6 SEC
AST SERPL-CCNC: 18 U/L
BACTERIA UR CULT: NORMAL
BASOPHILS # BLD AUTO: 0.04 K/UL
BASOPHILS NFR BLD AUTO: 0.8 %
BILIRUB SERPL-MCNC: 0.5 MG/DL
BUN SERPL-MCNC: 14 MG/DL
CALCIUM SERPL-MCNC: 10.2 MG/DL
CHLORIDE SERPL-SCNC: 102 MMOL/L
CO2 SERPL-SCNC: 24 MMOL/L
CREAT SERPL-MCNC: 0.72 MG/DL
EGFR: 100 ML/MIN/1.73M2
EOSINOPHIL # BLD AUTO: 0.19 K/UL
EOSINOPHIL NFR BLD AUTO: 3.6 %
GLUCOSE SERPL-MCNC: 85 MG/DL
HCT VFR BLD CALC: 33.3 %
HGB BLD-MCNC: 9.5 G/DL
IMM GRANULOCYTES NFR BLD AUTO: 0.2 %
INR PPP: 1.14 RATIO
LYMPHOCYTES # BLD AUTO: 1.02 K/UL
LYMPHOCYTES NFR BLD AUTO: 19.5 %
MAN DIFF?: NORMAL
MCHC RBC-ENTMCNC: 28.5 GM/DL
MCHC RBC-ENTMCNC: 28.5 PG
MCV RBC AUTO: 100 FL
MONOCYTES # BLD AUTO: 0.55 K/UL
MONOCYTES NFR BLD AUTO: 10.5 %
NEUTROPHILS # BLD AUTO: 3.43 K/UL
NEUTROPHILS NFR BLD AUTO: 65.4 %
PLATELET # BLD AUTO: 272 K/UL
POTASSIUM SERPL-SCNC: 4.1 MMOL/L
PROT SERPL-MCNC: 8.5 G/DL
PSA FREE FLD-MCNC: 14 %
PSA FREE SERPL-MCNC: 0.77 NG/ML
PSA SERPL-MCNC: 5.57 NG/ML
PT BLD: 13.3 SEC
RBC # BLD: 3.33 M/UL
RBC # FLD: 19.8 %
SODIUM SERPL-SCNC: 141 MMOL/L
WBC # FLD AUTO: 5.24 K/UL

## 2022-09-27 PROCEDURE — 99214 OFFICE O/P EST MOD 30 MIN: CPT | Mod: 95

## 2022-09-27 NOTE — ASSESSMENT
[FreeTextEntry1] : 67 year old man with urinary retention a few weeks after AVR. 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 It is possible stroke could affect urinary function, however, this seems less likely given urinary retention occurred several weeks after stroke. He has failed several void trials, although he is usually initially able to void and then goes into urinary retention over several days. \par \par He has no baseline urinary complaints. He has history of elevated PSA with negative prostate biopsy 4 years ago, no prior prostate MRI. PSA is 6.13 in setting of catheterization, down to 5.6 on repeat. Given stability of PSA and negative prostate biopsy prior, I do not think MRI prostate is necessary at this time. Patient would like to proceed with a prostate debulking procedure now in order to rid himself of catheter.\par \par UDS demonstrated a small capacity bladder with poor compliance, elevated voiding pressures consistent with bladder outlet obstruction. He was able to urinate without difficulty, however, with low post-void residual. This is a change from a two weeks prior. Cystoscopy showed an enlarged prostate with bladder outlet obstruction and grade 3 trabeculations. I suspect he does have chronic bladder outlet obstruction from BPH. While UDS did show that he was able to urinate, he subseuqently went into urinary retention again. \par \par We discussed different therapeutic options including TURP, PVP, simple prostatectomy (open, laparoscopic or transurethral laser enucleation), Aquablation, Urolift therapy, and Rezum in addition to full continuation of medical therapy. Discussed the issues of incontinence, retrograde ejaculation, erectile dysfunction, irritative voiding symptoms, injury to urethra and bladder, persistence of irritative voiding symptoms, urethral stricture or bladder neck contracture, need for open surgery to repair the injury, erectile dysfunction and infertility.\par \par Mr. ABEL decided to proceed with a laser enucleation of prostate followed by prostate morcellation. Therefore, I spent a good amount of time discussing the risks and benefits of this procedure. We discussed potential risks of incontinence, retrograde ejaculation and infertility, erectile dysfunction, irritative voiding symptoms, injury to the urethra and bladder, rare need to convert to an open surgery.\par  - Plan for [facility]LEP[facility]ThuLEP at [facility]\par  - Will need pre-op clearance from hematology, cardiology, PCP\par  - He takes xarelto, ideally can hold emily-op. \par  - Pre-op bloodwork and urine culture obtained today. \par

## 2022-09-27 NOTE — HISTORY OF PRESENT ILLNESS
[Home] : at home, [unfilled] , at the time of the visit. [Medical Office: ([facility])___] : at the medical office located in  [Spouse] : spouse [Verbal consent obtained from patient] : the patient, [unfilled] [FreeTextEntry1] : 7/21/22: 67 year old man with history of HTN, Marfan's syndrome, aortic aneurysm presents with urinary retention after recent AVR. His AVR was on 6/23/22. His post-op course was complicated by a stroke and occlusion of the right ICA. No thrombectomy was performed and management was conservative. He has some residual left sided weakness and continues to work with PT.\par \par He presented several weeks after the AVR with difficulty urinating and constipation. He had 2 L in his bladder, bilateral hydronephrosis and ANKITA. A catheter was placed with resolution of the ANKITA. Initial hematuria resolved. He has chronic constipation, but this worsened in setting narcotic use. He is using enemas and supossitories to help with constipation.\par \par Prior to this episode, he denies any difficulty urinating or bothersome urinary symptoms.\par \par He reports history of elevated PSA, most recently above 6 earlier this year. He had a negative prostate biopsy several years ago for PSA > 4. He does not believe he has had an MRI of the prostate.\par \par 7/26/22: Patient had been voiding after catheter removal for three days. He subsequently was unable to urinate and returned to ED where catheter was replaced. 2 L of urine was drained from bladder. He had been placed on antibiotics by PCP last week for bacteriuria. Urine culture from ED was negative. Doing well with catheter. Continues to have issues with constipation. \par \par 8/2/22: Presents for another void trial. Was in ED last Friday with obstructed catheter. Catheter was exchanged. Urine culture was negative. No fevers, chills, dysuria, hematuria.\par \par Bladder filled with 200 cc. Patient able to void small amounts with increasingly high PVRs, up to 650 cc. Catheter replaced.\par \par 8/9/22: Patient came in for repeat void trial. Bladder filled and catheter removed. Patient voided several times in small volumes with PVR between 300-400cc. He felt stream was getting stronger.\par \par Uroflow: \par Qmax 3.9, Qavg 2.2, voided volume 101.4 cc, 1 interval\par \par 8/17/22: Ended up in ED on 8/12/22 with urinary retention and fevers. Catheter was placed with drainage of 1 L. He was discharged with macrobid x 5 days. He continued to have fevers at home that he has been controlling with tylenol and aspirin. Urine culture grew klebsiella. \par \par UDS 8/24/22: Urodynamic Interpretation : \par Normal bladder sensation. Decreased bladder capacity. Patient experiencing no detrusor instability. The uroflow rate is decreased. The uroflow pattern is normal. The detrusor contractility is normal. The patient has bladder outlet obstruction. The intravesical voiding pressure is increased. The patient has complete bladder emptying. The spincter activity is normal synergic. \par \par Cysto 8/24/22: enlarged prostate with MSOES and grade 3 trabeculations\par \par 8/31/22: Presented urgently with urinary retention noted on CT angiogram performed for post-op follow up from AVR.\par \par 9/13/22: Catheter remains in place. Occasional hematuria. No fevers, chills, dysuria, hematuria. He has anemia and received blood transfusion two weeks ago and iron infusion. Seeing hematologist tomorrow.\par \par 9/27/22: Telehealth to discuss prostate procedure. He reports he received blood transfusion and iron transfusion and feels much better now. Hematologist and cardiologist have given him verbal clearance to proceed with surgery. Catheter remains in place, no hematuria, draining well. No fevers, chills, flank pain.\par \par

## 2022-09-28 ENCOUNTER — TRANSCRIPTION ENCOUNTER (OUTPATIENT)
Age: 67
End: 2022-09-28

## 2022-10-05 ENCOUNTER — TRANSCRIPTION ENCOUNTER (OUTPATIENT)
Age: 67
End: 2022-10-05

## 2022-10-05 VITALS
OXYGEN SATURATION: 100 % | WEIGHT: 158.29 LBS | TEMPERATURE: 97 F | RESPIRATION RATE: 16 BRPM | HEART RATE: 80 BPM | HEIGHT: 75 IN | SYSTOLIC BLOOD PRESSURE: 123 MMHG | DIASTOLIC BLOOD PRESSURE: 77 MMHG

## 2022-10-05 NOTE — ASU PATIENT PROFILE, ADULT - FALL HARM RISK - HARM RISK INTERVENTIONS

## 2022-10-06 ENCOUNTER — TRANSCRIPTION ENCOUNTER (OUTPATIENT)
Age: 67
End: 2022-10-06

## 2022-10-06 ENCOUNTER — INPATIENT (INPATIENT)
Facility: HOSPITAL | Age: 67
LOS: 0 days | Discharge: HOME | DRG: 714 | End: 2022-10-07
Attending: SURGERY | Admitting: SURGERY
Payer: COMMERCIAL

## 2022-10-06 ENCOUNTER — RESULT REVIEW (OUTPATIENT)
Age: 67
End: 2022-10-06

## 2022-10-06 ENCOUNTER — APPOINTMENT (OUTPATIENT)
Dept: UROLOGY | Facility: HOSPITAL | Age: 67
End: 2022-10-06

## 2022-10-06 DIAGNOSIS — Z95.2 PRESENCE OF PROSTHETIC HEART VALVE: Chronic | ICD-10-CM

## 2022-10-06 DIAGNOSIS — Z98.49 CATARACT EXTRACTION STATUS, UNSPECIFIED EYE: Chronic | ICD-10-CM

## 2022-10-06 LAB
BLD GP AB SCN SERPL QL: NEGATIVE — SIGNIFICANT CHANGE UP
RH IG SCN BLD-IMP: POSITIVE — SIGNIFICANT CHANGE UP

## 2022-10-06 PROCEDURE — 88305 TISSUE EXAM BY PATHOLOGIST: CPT | Mod: 26

## 2022-10-06 PROCEDURE — 88344 IMHCHEM/IMCYTCHM EA MLT ANTB: CPT | Mod: 26

## 2022-10-06 PROCEDURE — 52649 PROSTATE LASER ENUCLEATION: CPT

## 2022-10-06 DEVICE — MORCELLATOR ROTATION 4.8MM: Type: IMPLANTABLE DEVICE | Status: FUNCTIONAL

## 2022-10-06 DEVICE — LASER FIBER SOLTIVE 550: Type: IMPLANTABLE DEVICE | Status: FUNCTIONAL

## 2022-10-06 RX ORDER — SODIUM CHLORIDE 9 MG/ML
1000 INJECTION, SOLUTION INTRAVENOUS
Refills: 0 | Status: DISCONTINUED | OUTPATIENT
Start: 2022-10-06 | End: 2022-10-06

## 2022-10-06 RX ORDER — AMPICILLIN TRIHYDRATE 250 MG
2 CAPSULE ORAL EVERY 6 HOURS
Refills: 0 | Status: DISCONTINUED | OUTPATIENT
Start: 2022-10-06 | End: 2022-10-07

## 2022-10-06 RX ORDER — CIPROFLOXACIN LACTATE 400MG/40ML
1 VIAL (ML) INTRAVENOUS
Qty: 0 | Refills: 0 | DISCHARGE

## 2022-10-06 RX ORDER — TIOTROPIUM BROMIDE 18 UG/1
1 CAPSULE ORAL; RESPIRATORY (INHALATION) DAILY
Refills: 0 | Status: DISCONTINUED | OUTPATIENT
Start: 2022-10-06 | End: 2022-10-07

## 2022-10-06 RX ORDER — ONDANSETRON 8 MG/1
4 TABLET, FILM COATED ORAL ONCE
Refills: 0 | Status: DISCONTINUED | OUTPATIENT
Start: 2022-10-06 | End: 2022-10-07

## 2022-10-06 RX ORDER — LIDOCAINE 4 G/100G
1 CREAM TOPICAL DAILY
Refills: 0 | Status: DISCONTINUED | OUTPATIENT
Start: 2022-10-06 | End: 2022-10-07

## 2022-10-06 RX ORDER — ACETAMINOPHEN 500 MG
1000 TABLET ORAL ONCE
Refills: 0 | Status: DISCONTINUED | OUTPATIENT
Start: 2022-10-06 | End: 2022-10-07

## 2022-10-06 RX ORDER — TIOTROPIUM BROMIDE 18 UG/1
1 CAPSULE ORAL; RESPIRATORY (INHALATION)
Qty: 0 | Refills: 0 | DISCHARGE

## 2022-10-06 RX ORDER — ACETAMINOPHEN 500 MG
650 TABLET ORAL EVERY 6 HOURS
Refills: 0 | Status: DISCONTINUED | OUTPATIENT
Start: 2022-10-06 | End: 2022-10-07

## 2022-10-06 RX ORDER — ATORVASTATIN CALCIUM 80 MG/1
80 TABLET, FILM COATED ORAL AT BEDTIME
Refills: 0 | Status: DISCONTINUED | OUTPATIENT
Start: 2022-10-06 | End: 2022-10-07

## 2022-10-06 RX ADMIN — ATORVASTATIN CALCIUM 80 MILLIGRAM(S): 80 TABLET, FILM COATED ORAL at 21:41

## 2022-10-06 RX ADMIN — Medication 216 GRAM(S): at 20:47

## 2022-10-06 RX ADMIN — SODIUM CHLORIDE 75 MILLILITER(S): 9 INJECTION, SOLUTION INTRAVENOUS at 16:32

## 2022-10-06 NOTE — BRIEF OPERATIVE NOTE - OPERATION/FINDINGS
Patient was placed in dorsal lithotomy position, and prepped and draped in usual sterile fashion. The laser endoscope was inserted into the bladder. Cystoscopy was performed, which showed a trilobar prostate, otherwise unremarkable. Incisions were made at the 5 and 7 o'clock positions and carried down to the surgical capsule. The adenoma was dissected off the capsule without issues and the enucleated tissue was released into the bladder. Hemostasis was achieved with bipolar. Morcellation was performed. 22F 3-way House was inserted into the bladder and continuous bladder irrigation was started. Patient tolerated the procedure well and was transferred to PACU in stable condition.

## 2022-10-06 NOTE — PACU DISCHARGE NOTE - NS MD DISCHARGE NOTE DISCHARGE
[place] [facility] [place] clinic back at [phone] regarding normal test results. ----- Message from Yasmine Hooks CNM sent at 4/27/2022  4:23 PM CDT -----  Informed via portal Cardiac Tele

## 2022-10-06 NOTE — PRE-ANESTHESIA EVALUATION ADULT - NSANTHADDINFOFT_GEN_ALL_CORE
I had a detailed discussion with patient, wife and surgeon outlining risks of post operative stroke risk, brain death, coma and stroke. patient has had numerous infections, is in urinary retention and would be at increased risk of sepsis in the settings of recently grafted aortic arch and prosthetic aortic valve. Pts cardiologist, cardiovascular surgeon agree that he should undergo this urological procedure and agree that risk of post operative stroke is outweighed by benefits of proceeding.

## 2022-10-07 ENCOUNTER — TRANSCRIPTION ENCOUNTER (OUTPATIENT)
Age: 67
End: 2022-10-07

## 2022-10-07 VITALS
RESPIRATION RATE: 18 BRPM | SYSTOLIC BLOOD PRESSURE: 157 MMHG | OXYGEN SATURATION: 97 % | DIASTOLIC BLOOD PRESSURE: 80 MMHG | HEART RATE: 78 BPM | TEMPERATURE: 98 F

## 2022-10-07 DIAGNOSIS — N40.0 BENIGN PROSTATIC HYPERPLASIA WITHOUT LOWER URINARY TRACT SYMPTOMS: ICD-10-CM

## 2022-10-07 LAB
ANION GAP SERPL CALC-SCNC: 9 MMOL/L — SIGNIFICANT CHANGE UP (ref 5–17)
BUN SERPL-MCNC: 9 MG/DL — SIGNIFICANT CHANGE UP (ref 7–23)
CALCIUM SERPL-MCNC: 9.8 MG/DL — SIGNIFICANT CHANGE UP (ref 8.4–10.5)
CHLORIDE SERPL-SCNC: 103 MMOL/L — SIGNIFICANT CHANGE UP (ref 96–108)
CO2 SERPL-SCNC: 27 MMOL/L — SIGNIFICANT CHANGE UP (ref 22–31)
CREAT SERPL-MCNC: 0.59 MG/DL — SIGNIFICANT CHANGE UP (ref 0.5–1.3)
EGFR: 106 ML/MIN/1.73M2 — SIGNIFICANT CHANGE UP
GLUCOSE SERPL-MCNC: 90 MG/DL — SIGNIFICANT CHANGE UP (ref 70–99)
HCT VFR BLD CALC: 28.4 % — LOW (ref 39–50)
HGB BLD-MCNC: 9 G/DL — LOW (ref 13–17)
MAGNESIUM SERPL-MCNC: 1.6 MG/DL — SIGNIFICANT CHANGE UP (ref 1.6–2.6)
MCHC RBC-ENTMCNC: 29.4 PG — SIGNIFICANT CHANGE UP (ref 27–34)
MCHC RBC-ENTMCNC: 31.7 GM/DL — LOW (ref 32–36)
MCV RBC AUTO: 92.8 FL — SIGNIFICANT CHANGE UP (ref 80–100)
NRBC # BLD: 0 /100 WBCS — SIGNIFICANT CHANGE UP (ref 0–0)
PHOSPHATE SERPL-MCNC: 3.6 MG/DL — SIGNIFICANT CHANGE UP (ref 2.5–4.5)
PLATELET # BLD AUTO: 115 K/UL — LOW (ref 150–400)
POTASSIUM SERPL-MCNC: 3.8 MMOL/L — SIGNIFICANT CHANGE UP (ref 3.5–5.3)
POTASSIUM SERPL-SCNC: 3.8 MMOL/L — SIGNIFICANT CHANGE UP (ref 3.5–5.3)
RBC # BLD: 3.06 M/UL — LOW (ref 4.2–5.8)
RBC # FLD: 19.2 % — HIGH (ref 10.3–14.5)
SARS-COV-2 N GENE NPH QL NAA+PROBE: NOT DETECTED
SODIUM SERPL-SCNC: 139 MMOL/L — SIGNIFICANT CHANGE UP (ref 135–145)
WBC # BLD: 4.74 K/UL — SIGNIFICANT CHANGE UP (ref 3.8–10.5)
WBC # FLD AUTO: 4.74 K/UL — SIGNIFICANT CHANGE UP (ref 3.8–10.5)

## 2022-10-07 PROCEDURE — C1889: CPT

## 2022-10-07 PROCEDURE — 86850 RBC ANTIBODY SCREEN: CPT

## 2022-10-07 PROCEDURE — 85027 COMPLETE CBC AUTOMATED: CPT

## 2022-10-07 PROCEDURE — 36415 COLL VENOUS BLD VENIPUNCTURE: CPT

## 2022-10-07 PROCEDURE — 80048 BASIC METABOLIC PNL TOTAL CA: CPT

## 2022-10-07 PROCEDURE — 88305 TISSUE EXAM BY PATHOLOGIST: CPT

## 2022-10-07 PROCEDURE — 87186 SC STD MICRODIL/AGAR DIL: CPT

## 2022-10-07 PROCEDURE — 86900 BLOOD TYPING SEROLOGIC ABO: CPT

## 2022-10-07 PROCEDURE — 88344 IMHCHEM/IMCYTCHM EA MLT ANTB: CPT

## 2022-10-07 PROCEDURE — 87086 URINE CULTURE/COLONY COUNT: CPT

## 2022-10-07 PROCEDURE — 83735 ASSAY OF MAGNESIUM: CPT

## 2022-10-07 PROCEDURE — C1782: CPT

## 2022-10-07 PROCEDURE — 86901 BLOOD TYPING SEROLOGIC RH(D): CPT

## 2022-10-07 PROCEDURE — 84100 ASSAY OF PHOSPHORUS: CPT

## 2022-10-07 RX ORDER — FLUTICASONE PROPIONATE AND SALMETEROL 50; 250 UG/1; UG/1
1 POWDER ORAL; RESPIRATORY (INHALATION)
Qty: 0 | Refills: 0 | DISCHARGE

## 2022-10-07 RX ORDER — GENTAMICIN SULFATE 40 MG/ML
325 VIAL (ML) INJECTION EVERY 24 HOURS
Refills: 0 | Status: DISCONTINUED | OUTPATIENT
Start: 2022-10-07 | End: 2022-10-07

## 2022-10-07 RX ORDER — MAGNESIUM OXIDE 400 MG ORAL TABLET 241.3 MG
400 TABLET ORAL ONCE
Refills: 0 | Status: COMPLETED | OUTPATIENT
Start: 2022-10-07 | End: 2022-10-07

## 2022-10-07 RX ADMIN — Medication 216 GRAM(S): at 03:11

## 2022-10-07 RX ADMIN — Medication 216 GRAM(S): at 10:55

## 2022-10-07 NOTE — DISCHARGE NOTE NURSING/CASE MANAGEMENT/SOCIAL WORK - PATIENT PORTAL LINK FT
You can access the FollowMyHealth Patient Portal offered by [facility] by registering at the following website: [email or web address]. By joining [facility]’s FollowMyHealth portal, you will also be able to view your health information using other applications (apps) compatible with our system.

## 2022-10-07 NOTE — DISCHARGE NOTE NURSING/CASE MANAGEMENT/SOCIAL WORK - TOBACCO CESSATION EDUCATION/COUNSELLING(PROVIDED IF TOBACCO USED IN THE PAST 30 DAYS) NON CORE MEASURE SITES
Instructions: This plan will send the code FBSD to the PM system.  DO NOT or CHANGE the price.
Price (Do Not Change): 0.00
Detail Level: Simple
Offered and patient declined

## 2022-10-07 NOTE — DISCHARGE NOTE PROVIDER - CARE PROVIDER_API CALL
Edy Roth)  Urology  [address] [place] [address]  Phone: [phone]  Fax: [phone]  Follow Up Time:

## 2022-10-07 NOTE — DISCHARGE NOTE NURSING/CASE MANAGEMENT/SOCIAL WORK - NSDCPEFALRISK_GEN_ALL_CORE
For information on Fall & Injury Prevention, visit: [email or web address] OR  [email or web address] OR  https://www.cdc.gov/steadi/patient.html

## 2022-10-07 NOTE — PROGRESS NOTE ADULT - PROBLEM SELECTOR PLAN 1
-stable  -OOB  -SCD's, IS  -f/u labs  -for TOV this am, f/u PVR's  -d/c planning  -continue present care

## 2022-10-07 NOTE — DISCHARGE NOTE PROVIDER - NSDCMRMEDTOKEN_GEN_ALL_CORE_FT
atorvastatin 80 mg oral tablet: 1 tab(s) orally once a day (at bedtime)  ciprofloxacin 500 mg oral tablet: 1 tab(s) orally every 12 hours  metoprolol tartrate 50 mg oral tablet: 1 tab(s) orally 2 times a day  NIFEdipine 30 mg oral tablet, extended release: 1 tab(s) orally once a day  Spiriva 18 mcg inhalation capsule: 1 cap(s) inhaled once a day  tamsulosin 0.4 mg oral capsule: 1 cap(s) orally once a day (at bedtime)

## 2022-10-07 NOTE — DISCHARGE NOTE PROVIDER - HOSPITAL COURSE
68 yo male s/p laser enucleation prostate 10/6. Pt is hemodynamically stable and optimized for discharge.

## 2022-10-07 NOTE — DISCHARGE NOTE PROVIDER - NSDCCPCAREPLAN_GEN_ALL_CORE_FT
PRINCIPAL DISCHARGE DIAGNOSIS  Diagnosis: BPH (benign prostatic hyperplasia)  Assessment and Plan of Treatment:       SECONDARY DISCHARGE DIAGNOSES  Diagnosis: CVA (cerebrovascular accident)  Assessment and Plan of Treatment:     Diagnosis: Hypertension  Assessment and Plan of Treatment:     Diagnosis: History of COPD  Assessment and Plan of Treatment:

## 2022-10-07 NOTE — PROGRESS NOTE ADULT - ASSESSMENT
67M hx HTN, BPH s/p 10/06 laser enucleation    Plan:  -c/w fuchs and CBI  -SCD's  -OOB  -Monitor I's and O's  -TOV in AM  -c/w home meds  
68 yo male s/p laser enucleation prostate

## 2022-10-07 NOTE — DISCHARGE NOTE PROVIDER - NSDCFUSCHEDAPPT_GEN_ALL_CORE_FT
Kash Paniagua  [facility]  CARDIOLOGY [address]  Scheduled Appointment: 10/19/2022    [facility]  CARDIOLOGY [address]  Scheduled Appointment: 10/19/2022    Faisal Toledo  [facility]  CTSURG [address]  Scheduled Appointment: 11/30/2022

## 2022-10-07 NOTE — PROGRESS NOTE ADULT - SUBJECTIVE AND OBJECTIVE BOX
UROLOGY POST OP NOTE (PAGER # [phone])    PROCEDURE: 10/06 laser enucleation    T(C): 36.2 (10-06-22 @ 16:06), Max: 36.2 (10-06-22 @ 16:06)  HR: 78 (10-06-22 @ 18:06) (72 - 84)  BP: 130/63 (10-06-22 @ 18:06) (113/57 - 130/63)  RR: 16 (10-06-22 @ 16:21) (16 - 18)  SpO2: 100% (10-06-22 @ 18:06) (95% - 100%)  Wt(kg): --    Subjective: pain controlled. Denies fevers, chills, nausea, vomiting.    ON PE: awake and alert    Abdomen: nontender, nondistended    : no surpapubic/CVAT. FC intact; CBI on draining clear liquid              A/P:
INTERVAL HPI/OVERNIGHT EVENTS:  No acute events overnight.    VITALS:    T(F): 97.6 (10-07-22 @ 04:35), Max: 98.5 (10-06-22 @ 20:52)  HR: 68 (10-07-22 @ 04:35) (68 - 84)  BP: 119/74 (10-07-22 @ 04:35) (113/57 - 130/63)  RR: 16 (10-07-22 @ 04:35) (16 - 18)  SpO2: 98% (10-07-22 @ 04:35) (95% - 100%)  Wt(kg): --    I&O's Detail    06 Oct 2022 07:01  -  07 Oct 2022 05:33  --------------------------------------------------------  IN:    Continuous Bladder Irrigation (mL): 63496 mL    IV PiggyBack: 200 mL    Lactated Ringers: 2075 mL    Oral Fluid: 440 mL  Total IN: 02179 mL    OUT:    Continuous Bladder Irrigation (mL): 84556 mL  Total OUT: 10173 mL    Total NET: 3215 mL          MEDICATIONS:    ANTIBIOTICS:  ampicillin  IVPB 2 Gram(s) IV Intermittent every 6 hours      PAIN CONTROL:  acetaminophen     Tablet .. 650 milliGRAM(s) Oral every 6 hours PRN  acetaminophen   IVPB .. 1000 milliGRAM(s) IV Intermittent once PRN  ondansetron Injectable 4 milliGRAM(s) IV Push once PRN       MEDS:      HEME/ONC        PHYSICAL EXAM:  General: No acute distress.  Alert and Oriented  Abdominal Exam: soft, NT, ND   Exam: CBI clamped, FC intact, urine clear      LABS:                RADIOLOGY & ADDITIONAL TESTS:

## 2022-10-07 NOTE — DISCHARGE NOTE PROVIDER - NSDCFUADDINST_GEN_ALL_CORE_FT
You may disconnect your fuchs catheter from the drainage bag, and let it drain freely while showering.  Make sure your incision sites are clean and dry after showering, it is not necessary to scrub, just let water and soap wash over incision sites.     Advance diet as tolerated, activity as tolerated. No heavy lifting or straining. Fuchs to leg bag, Stay well hydrated. If fever >100.4 or any change or worsening of symptoms please call doctor or report to ED. Make follow up appointment with Dr Roth.

## 2022-10-07 NOTE — PROVIDER CONTACT NOTE (OTHER) - ACTION/TREATMENT ORDERED:
No new intervention ordered; Avery BOLANOS called back and said that "attending is okay to discharge pt without fuchs"

## 2022-10-08 LAB
-  LINEZOLID: SIGNIFICANT CHANGE UP
-  NITROFURANTOIN: SIGNIFICANT CHANGE UP
-  OXACILLIN: SIGNIFICANT CHANGE UP
-  RIFAMPIN: SIGNIFICANT CHANGE UP
-  TRIMETHOPRIM/SULFAMETHOXAZOLE: SIGNIFICANT CHANGE UP
-  VANCOMYCIN: SIGNIFICANT CHANGE UP
METHOD TYPE: SIGNIFICANT CHANGE UP

## 2022-10-09 LAB
METHOD TYPE: SIGNIFICANT CHANGE UP
METHOD TYPE: SIGNIFICANT CHANGE UP

## 2022-10-10 LAB
CULTURE RESULTS: SIGNIFICANT CHANGE UP
ORGANISM # SPEC MICROSCOPIC CNT: SIGNIFICANT CHANGE UP
SPECIMEN SOURCE: SIGNIFICANT CHANGE UP

## 2022-10-12 ENCOUNTER — APPOINTMENT (OUTPATIENT)
Dept: UROLOGY | Facility: CLINIC | Age: 67
End: 2022-10-12

## 2022-10-12 DIAGNOSIS — R33.8 OTHER RETENTION OF URINE: ICD-10-CM

## 2022-10-12 PROCEDURE — 51798 US URINE CAPACITY MEASURE: CPT

## 2022-10-12 PROCEDURE — 99214 OFFICE O/P EST MOD 30 MIN: CPT | Mod: 24

## 2022-10-12 NOTE — ASSESSMENT
[FreeTextEntry1] : 67 year old man with urinary retention a few weeks after AVR. 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 It is possible stroke could affect urinary function, however, this seems less likely given urinary retention occurred several weeks after stroke. He has failed several void trials, although he is usually initially able to void and then goes into urinary retention over several days. \par \par He has no baseline urinary complaints. He has history of elevated PSA with negative prostate biopsy 4 years ago, no prior prostate MRI. PSA is 6.13 in setting of catheterization, down to 5.6 on repeat. Given stability of PSA and negative prostate biopsy prior, I do not think MRI prostate is necessary at this time. Patient would like to proceed with a prostate debulking procedure now in order to rid himself of catheter.\par \par UDS demonstrated a small capacity bladder with poor compliance, elevated voiding pressures consistent with bladder outlet obstruction. He was able to urinate without difficulty, however, with low post-void residual. This is a change from a two weeks prior. Cystoscopy showed an enlarged prostate with bladder outlet obstruction and grade 3 trabeculations. I suspect he does have chronic bladder outlet obstruction from BPH. While UDS did show that he was able to urinate, he subseuqently went into urinary retention again. \par \par He underwent ThuLEP on 10/6/22, passed void trial POD 1. Doing well with strong stream, more complete emptying, no leakage. Intermittent hematuria.\par  - F/U in 6 weeks for UA, IPSS, PVR

## 2022-10-12 NOTE — HISTORY OF PRESENT ILLNESS
[FreeTextEntry1] : 7/21/22: 67 year old man with history of HTN, Marfan's syndrome, aortic aneurysm presents with urinary retention after recent AVR. His AVR was on 6/23/22. His post-op course was complicated by a stroke and occlusion of the right ICA. No thrombectomy was performed and management was conservative. He has some residual left sided weakness and continues to work with PT.\par \par He presented several weeks after the AVR with difficulty urinating and constipation. He had 2 L in his bladder, bilateral hydronephrosis and ANKITA. A catheter was placed with resolution of the ANKITA. Initial hematuria resolved. He has chronic constipation, but this worsened in setting narcotic use. He is using enemas and supossitories to help with constipation.\par \par Prior to this episode, he denies any difficulty urinating or bothersome urinary symptoms.\par \par He reports history of elevated PSA, most recently above 6 earlier this year. He had a negative prostate biopsy several years ago for PSA > 4. He does not believe he has had an MRI of the prostate.\par \par 7/26/22: Patient had been voiding after catheter removal for three days. He subsequently was unable to urinate and returned to ED where catheter was replaced. 2 L of urine was drained from bladder. He had been placed on antibiotics by PCP last week for bacteriuria. Urine culture from ED was negative. Doing well with catheter. Continues to have issues with constipation. \par \par 8/2/22: Presents for another void trial. Was in ED last Friday with obstructed catheter. Catheter was exchanged. Urine culture was negative. No fevers, chills, dysuria, hematuria.\par \par Bladder filled with 200 cc. Patient able to void small amounts with increasingly high PVRs, up to 650 cc. Catheter replaced.\par \par 8/9/22: Patient came in for repeat void trial. Bladder filled and catheter removed. Patient voided several times in small volumes with PVR between 300-400cc. He felt stream was getting stronger.\par \par Uroflow: \par Qmax 3.9, Qavg 2.2, voided volume 101.4 cc, 1 interval\par \par 8/17/22: Ended up in ED on 8/12/22 with urinary retention and fevers. Catheter was placed with drainage of 1 L. He was discharged with macrobid x 5 days. He continued to have fevers at home that he has been controlling with tylenol and aspirin. Urine culture grew klebsiella. \par \par UDS 8/24/22: Urodynamic Interpretation : \par Normal bladder sensation. Decreased bladder capacity. Patient experiencing no detrusor instability. The uroflow rate is decreased. The uroflow pattern is normal. The detrusor contractility is normal. The patient has bladder outlet obstruction. The intravesical voiding pressure is increased. The patient has complete bladder emptying. The spincter activity is normal synergic. \par \par Cysto 8/24/22: enlarged prostate with MOSES and grade 3 trabeculations\par \par 8/31/22: Presented urgently with urinary retention noted on CT angiogram performed for post-op follow up from AVR.\par \par 9/13/22: Catheter remains in place. Occasional hematuria. No fevers, chills, dysuria, hematuria. He has anemia and received blood transfusion two weeks ago and iron infusion. Seeing hematologist tomorrow.\par \par 9/27/22: Telehealth to discuss prostate procedure. He reports he received blood transfusion and iron transfusion and feels much better now. Hematologist and cardiologist have given him verbal clearance to proceed with surgery. Catheter remains in place, no hematuria, draining well. No fevers, chills, flank pain.\par \par 10/6/22: ThuLEP procedure uncomplicated. Passed void trial POD 1.\par \par 10/12/22: Doing well. Voiding with strong stream, more complete emptying, decreased freqeuncy and urgency. Intermittent hematuria with occasional clot.  No leakage\par \par  cc\par \par

## 2022-10-14 LAB — SURGICAL PATHOLOGY STUDY: SIGNIFICANT CHANGE UP

## 2022-10-19 ENCOUNTER — APPOINTMENT (OUTPATIENT)
Dept: CARDIOLOGY | Facility: CLINIC | Age: 67
End: 2022-10-19

## 2022-10-19 DIAGNOSIS — J44.9 CHRONIC OBSTRUCTIVE PULMONARY DISEASE, UNSPECIFIED: ICD-10-CM

## 2022-10-19 DIAGNOSIS — D64.9 ANEMIA, UNSPECIFIED: ICD-10-CM

## 2022-10-19 DIAGNOSIS — N40.1 BENIGN PROSTATIC HYPERPLASIA WITH LOWER URINARY TRACT SYMPTOMS: ICD-10-CM

## 2022-10-19 DIAGNOSIS — Z86.73 PERSONAL HISTORY OF TRANSIENT ISCHEMIC ATTACK (TIA), AND CEREBRAL INFARCTION WITHOUT RESIDUAL DEFICITS: ICD-10-CM

## 2022-10-19 DIAGNOSIS — R33.8 OTHER RETENTION OF URINE: ICD-10-CM

## 2022-10-19 DIAGNOSIS — I10 ESSENTIAL (PRIMARY) HYPERTENSION: ICD-10-CM

## 2022-10-19 DIAGNOSIS — F17.210 NICOTINE DEPENDENCE, CIGARETTES, UNCOMPLICATED: ICD-10-CM

## 2022-10-19 DIAGNOSIS — Z95.2 PRESENCE OF PROSTHETIC HEART VALVE: ICD-10-CM

## 2022-10-19 PROCEDURE — 99215 OFFICE O/P EST HI 40 MIN: CPT | Mod: 95

## 2022-10-19 NOTE — HISTORY OF PRESENT ILLNESS
[Home] : at home, [unfilled] , at the time of the visit. [Medical Office: ([facility])___] : at the medical office located in  [Verbal consent obtained from patient] : the patient, [unfilled] [FreeTextEntry1] : PADMINI ABEL is a 66 yo M  PMH aortic aneurysm , moderate to severe AI aortic insufficiency now s/p replacement , HTN, anemia, arthritis, COPD\par he was initially diagnosed during a workup in 11/2021 for tachycardia after his covid vaccination\par \par This identified that he had an aortic aneurysm 5.6cm \par \par he is now s/p miniAVR and ascending and hemiarch replacement. \par complicated by a CVA LUE and LLE plegia now recovered\par \par he has another aortic aneurysm in descending aorta\par \par \par his family history detailed below  is positive for marfan syndrome and aortic aneurysm \par --\par PATIENT NOT SEEN 4/7 HE LEFT AS HE DID NOT HAVE A REFERRAL FROM HIS PCP\par requested a referral from his pcp\par I also sent his PCP a fax requesting referral\par \par now rescheduled for 5/5 at the patients request, patient finally seen 8/4/22\par \par he underwent genetic testing and today presents for results \par \par

## 2022-10-19 NOTE — REASON FOR VISIT
[FreeTextEntry3] : Dear Dr. Toledo      . I saw your patient PADMINI ABEL on 10/19/2022 . Please see the note below for the assessment and plan. \par \par PADMINI ABEL  was seen  for an initial consultation at the [facility] at [facility] on 08/042022.   Mr. ABEL was referred by Dr. Toledo for hereditary cardiac predisposition risk assessment and counseling, due to positive family history of Marfan syndrome and aortic aneurysm\par \par

## 2022-10-19 NOTE — FAMILY HISTORY
[FreeTextEntry1] : FamilyHistory_20_twCiteListControlStart FamilyHistory_20_twCiteListControlEnd [id number] \par A four-generation family history was constructed and scanned into [facility]. \par Family history is significant for: \par siblings\par sister 79 yo : her ex  dec 57 he had a hx aneurysms: twin sons, one twin dec hx valve change and aneurysm (possible MFS) CVA dec , twin 60 yo hx aortic aneurysm , son dec in OR to care for an aortic aneurysm unable to hold the flesh of the aorta together, dec 37 yo: no children\par daughter with 2nd  + healthy children\par brother dec 37 yo  hx enlarged heart, hx etoh: daughter 30s med hx unk\par \par Mother dec 87 yo grossly healthy , dec old age\par 9 siblings total med hx unk all dec 80s\par Maternal aunts \par Maternal uncles\par Maternal Grandmother dec 80s\par Maternal Grandfather dec 80s\par \par Father dec 101, fall out of bed in rehab ctr\par Paternal aunts  x1 dec 80s med hx unk\par Paternal uncles x1 dec 80s med hx unk\par Paternal Grandfather dec\par Paternal Grandmother dec\par \par children: 3\par son 41yo : son 1yr, \par daughter 44yo : son 20s , son 18, daughter 17, daughter 16\par daughter 42 yo : son 17, daughter 16\par all children and grandchildren healthy \par \par his maternal families originate from [place] and paternal families originate from [place]. \par No Ashkenazi Sabianist ancestry. \par Family history was negative for consanguinity  \par No family history of SIDS\par  \par \par  [FreeTextEntry2] : [place] [FreeTextEntry3] : [place]

## 2022-11-01 ENCOUNTER — NON-APPOINTMENT (OUTPATIENT)
Age: 67
End: 2022-11-01

## 2022-11-10 ENCOUNTER — NON-APPOINTMENT (OUTPATIENT)
Age: 67
End: 2022-11-10

## 2022-11-29 ENCOUNTER — NON-APPOINTMENT (OUTPATIENT)
Age: 67
End: 2022-11-29

## 2022-12-01 ENCOUNTER — NON-APPOINTMENT (OUTPATIENT)
Age: 67
End: 2022-12-01

## 2022-12-06 ENCOUNTER — APPOINTMENT (OUTPATIENT)
Dept: UROLOGY | Facility: CLINIC | Age: 67
End: 2022-12-06

## 2022-12-07 ENCOUNTER — APPOINTMENT (OUTPATIENT)
Dept: CARDIOTHORACIC SURGERY | Facility: CLINIC | Age: 67
End: 2022-12-07
Payer: COMMERCIAL

## 2022-12-07 ENCOUNTER — NON-APPOINTMENT (OUTPATIENT)
Age: 67
End: 2022-12-07

## 2022-12-14 ENCOUNTER — NON-APPOINTMENT (OUTPATIENT)
Age: 67
End: 2022-12-14

## 2022-12-21 ENCOUNTER — APPOINTMENT (OUTPATIENT)
Dept: CARDIOTHORACIC SURGERY | Facility: CLINIC | Age: 67
End: 2022-12-21
Payer: COMMERCIAL

## 2023-01-04 ENCOUNTER — NON-APPOINTMENT (OUTPATIENT)
Age: 68
End: 2023-01-04

## 2023-01-04 ENCOUNTER — APPOINTMENT (OUTPATIENT)
Dept: CARDIOTHORACIC SURGERY | Facility: CLINIC | Age: 68
End: 2023-01-04
Payer: COMMERCIAL

## 2023-01-18 ENCOUNTER — APPOINTMENT (OUTPATIENT)
Dept: CT IMAGING | Facility: HOSPITAL | Age: 68
End: 2023-01-18
Payer: COMMERCIAL

## 2023-01-18 ENCOUNTER — APPOINTMENT (OUTPATIENT)
Dept: CARDIOTHORACIC SURGERY | Facility: CLINIC | Age: 68
End: 2023-01-18
Payer: COMMERCIAL

## 2023-01-18 ENCOUNTER — OUTPATIENT (OUTPATIENT)
Dept: OUTPATIENT SERVICES | Facility: HOSPITAL | Age: 68
LOS: 1 days | End: 2023-01-18
Payer: COMMERCIAL

## 2023-01-18 VITALS
SYSTOLIC BLOOD PRESSURE: 137 MMHG | DIASTOLIC BLOOD PRESSURE: 73 MMHG | HEART RATE: 69 BPM | RESPIRATION RATE: 16 BRPM | OXYGEN SATURATION: 97 %

## 2023-01-18 DIAGNOSIS — Z98.49 CATARACT EXTRACTION STATUS, UNSPECIFIED EYE: Chronic | ICD-10-CM

## 2023-01-18 DIAGNOSIS — Z95.2 PRESENCE OF PROSTHETIC HEART VALVE: Chronic | ICD-10-CM

## 2023-01-18 LAB — POCT ISTAT CREATININE: 0.9 MG/DL — SIGNIFICANT CHANGE UP (ref 0.5–1.3)

## 2023-01-18 PROCEDURE — 74174 CTA ABD&PLVS W/CONTRAST: CPT

## 2023-01-18 PROCEDURE — 99213 OFFICE O/P EST LOW 20 MIN: CPT

## 2023-01-18 PROCEDURE — 74174 CTA ABD&PLVS W/CONTRAST: CPT | Mod: 26

## 2023-01-18 PROCEDURE — 71275 CT ANGIOGRAPHY CHEST: CPT

## 2023-01-18 PROCEDURE — 71275 CT ANGIOGRAPHY CHEST: CPT | Mod: 26

## 2023-01-18 PROCEDURE — 82565 ASSAY OF CREATININE: CPT

## 2023-01-19 RX ORDER — NIFEDIPINE 30 MG/1
30 TABLET, EXTENDED RELEASE ORAL
Refills: 0 | Status: ACTIVE | COMMUNITY

## 2023-01-24 NOTE — PROCEDURE
[FreeTextEntry1] : \par Patient was advised to view the educational video prior to this visit regarding aortic pathology, risk factors, surgical procedures, and lifestyle modifications. Video can be retrieved at [email or web address].\par

## 2023-01-24 NOTE — CONSULT LETTER
[Dear  ___] : Dear  [unfilled], [Please see my note below.] : Please see my note below. [FreeTextEntry2] : Dr. Bharat Romano MD [FreeTextEntry1] : Please find attached our consultation on your patient, Mr. PADMINI ABEL . \par \par We take a multidisciplinary team approach to patient care and consider you, the referring physician, an extension of our team. We will maintain an open line of communication with you throughout your patient's treatment course.  \par \par It is our commitment to provide your patient with the highest quality of advanced therapeutic options. We thank you for allowing us to participate in the care of your patient.\par \par Please do not hesitate to contact our team with any questions or concerns at [phone]. \par \par \par  [FreeTextEntry3] : Sincerely, \par \par \par \par \par \par Faisal Toledo M.D.\par Professor of Cardiovascular and Thoracic Surgery\par Minimally Invasive Valve Surgeon\par Director of Aortic Surgery, [facility]\par Cell: [phone]\par Email: [email or web address] \par \par [facility]:\par [address], [address] Floor, [place] [address]\par Office: [phone]\par Fax: [phone]\par \par [facility]:\par Department of Cardiovascular and Thoracic Surgery\par [address] [place] [address], [address]\par Office: [phone]\par Fax: [phone]\par \par Practice Manager: Ms. Roz Medina\par Email: [email or web address]\par Phone: [phone]\par \par \par \par

## 2023-01-24 NOTE — PHYSICAL EXAM
[Sclera] : the sclera and conjunctiva were normal [Neck Appearance] : the appearance of the neck was normal [] : the neck was supple [Respiration, Rhythm And Depth] : normal respiratory rhythm and effort [Exaggerated Use Of Accessory Muscles For Inspiration] : no accessory muscle use [Heart Rate And Rhythm] : heart rate was normal and rhythm regular [Heart Sounds] : normal S1 and S2 [Examination Of The Chest] : the chest was normal in appearance [2+] : left 2+ [Bowel Sounds] : normal bowel sounds [No CVA Tenderness] : no ~M costovertebral angle tenderness [Involuntary Movements] : no involuntary movements were seen [Skin Color & Pigmentation] : normal skin color and pigmentation [No Focal Deficits] : no focal deficits [Oriented To Time, Place, And Person] : oriented to person, place, and time [FreeTextEntry1] : deferred

## 2023-01-24 NOTE — END OF VISIT
[Time Spent: ___ minutes] : I have spent [unfilled] minutes of time on the encounter. [FreeTextEntry3] : \par I, NIDHI SALAZARU , am scribing for and in the presence of JESSICA OGLESBY the following sections: History of present illness, past Medical/family/surgical/family/social history, review of systems, vital signs, physical exam and disposition.\par \par I personally performed the services described in the documentation, reviewed the documentation recorded by the scribe in my presence and it accurately and completely records my words and actions.\par

## 2023-01-24 NOTE — HISTORY OF PRESENT ILLNESS
[FreeTextEntry1] : 67 year old male, current smoker (40 yr hx,1/2PPD) and former ETOH misuse, with a past medical hx of HTN, anemia, arthritis, COPD and aortic pathology/aortic insufficiency s/p mini-AVR (27mmbio), ascending and hemiarch replacement EF normal on 6/23/22, (postop course includes chronic occlusion of right ICA, infarct to right precentral gyrus - no intervention; medical management), urinary retention now s/p ThuLEP procedure on 10/6/22, incidental fall 11/2022 s/p left hip replacement, presents for a follow up visit with repeat diagnostic imaging and known descending aortic aneurysm. \par Positive family history for Marfan's and aortic aneurysm (Nephew).\par Patient underwent genetic testing with Dr. Paniagua and result was negative. \par \par CTA chest, abdomen and pelvis today: stable descending thoracic aortic aneurysm. \par Ulcerative plaques along the descending thoracic aorta. Proximal descending aorta 4.5 cm just distal to the origin of left subclavian artery on sagittal images. Midportion of descending thoracic aorta 5.2 cm in transverse diameter on axial images without significant change.\par

## 2023-01-24 NOTE — DATA REVIEWED
[FreeTextEntry1] : CTA 8/31/22\par \par FINDINGS:\par CHEST:\par LUNGS AND LARGE AIRWAYS: Patent central airways. Mild linear atelectatic changes. Stable 4 mm nodule right lower lobe image 61 series 10. New 7 mm nodule left upper lobe image 10 series 22, likely due to bronchial mucous plugging. Stable 4 mm nodule lingula segment left upper lobe image 7 series 10.\par PLEURA: No pleural effusion.\par VESSELS: Status post bioprosthetic aortic valve replacement and graft replacement of the ascending aorta and portion of aortic arch. 4.5 cm aortic root at the level of sinus of Valsalva. 3.3 cm ascending aorta at the level of the graft. 3.5 cm aortic arch. Patent branches of the aortic arch. Ulcerative plaques along the descending thoracic aorta. Proximal descending aorta 4.5 cm just distal to the origin of left subclavian artery on sagittal images. Midportion of descending thoracic aorta 5.2 cm in transverse diameter on axial images without significant change. Distal descending thoracic aorta 3.2 cm. No pulmonary emboli.\par HEART: Aortic valve replacement. Normal heart size. No pericardial effusion.\par MEDIASTINUM AND AREN: No lymphadenopathy.\par CHEST WALL AND LOWER NECK: Within normal limits.\par \par ABDOMEN AND PELVIS:\par LIVER: 1.5 cm cyst segment 8.\par BILE DUCTS: Normal caliber.\par GALLBLADDER: Within normal limits.\par SPLEEN: Borderline splenomegaly 14 cm, unchanged.\par PANCREAS: Within normal limits.\par ADRENALS: Within normal limits.\par KIDNEYS/URETERS: Mild bilateral hydronephrosis likely due to distended urinary bladder.\par \par BLADDER: Bladder is distended with the dome above the umbilicus.\par REPRODUCTIVE ORGANS: Enlarged prostate.\par \par BOWEL: No bowel obstruction. Appendix is normal.\par PERITONEUM: No ascites.\par VESSELS: Bilobed infrarenal aortic aneurysm with the largest segment measuring 5 cm in AP diameter and 5 cm transverse diameter below the origin of inferior mesenteric artery. Aneurysm measures about 9 cm in length and starts about 1 cm distal to the origin of the renal arteries. Patent celiac artery. Patent SMA. Patent bilateral renal arteries. Accessory artery present to the upper pole of the left kidney. DIMITRI is patent. Bilateral common, external and internal iliac arteries are patent.\par RETROPERITONEUM/LYMPH NODES: No lymphadenopathy.\par ABDOMINAL WALL: Within normal limits.\par BONES: Median sternotomy. Moderate compression deformity of L2 and L3 vertebral bodies, new since prior studies.\par \par IMPRESSION:\par \par Status post aortic valve replacement and graft replacement of the ascending aorta and portion of aortic arch.\par \par Descending thoracic aortic aneurysm measuring up to 5.2 cm with ulcerated plaques.\par \par Infrarenal abdominal aortic aneurysm measuring up to 5 cm.\par \par 7 mm left upper lobe lung nodule, new, probably due to bronchial mucoid impaction. Recommend follow-up CT in 6 months.\par \par Enlarged prostate. Distended urinary bladder. Mild bilateral hydronephrosis.\par \par New compression deformity of L3 and L4 vertebral bodies.

## 2023-01-24 NOTE — ASSESSMENT
[FreeTextEntry1] : 67 year old male, current smoker (40 yr hx,1/2PPD) and former ETOH misuse, with a past medical hx of HTN, anemia, arthritis, COPD and aortic pathology/aortic insufficiency s/p mini-AVR (27mmbio), ascending and hemiarch replacement EF normal on 6/23/22, (postop course includes chronic occlusion of right ICA, infarct to right precentral gyrus - no intervention; medical management), urinary retention now s/p ThuLEP procedure on 10/6/22, incidental fall 11/2022 s/p left hip replacement, presents for a follow up visit with repeat diagnostic imaging and known descending aortic aneurysm. \par Positive family history for Marfan's and aortic aneurysm (Nephew).\par Patient underwent genetic testing with Dr. Paniagua and result was negative. \par \par I have reviewed the patient's medical records, diagnostic images during the time of this office consultation and have made the following recommendation. Patient will require a TEVAR for his descending aortic aneurysm in the future. However, since he is still recovering from his recent hip surgery and repeat CTA today demonstrated stable descending aortic aneurysm, I have recommended the patient to follow up in 3 months with repeat CTA chest, abdomen and pelvis and for clinical recheck. \par \par Plan\par \par - [facility] with CTA chest, abdomen and pelvis. \par - Continue medication regimen.\par - Follow up with cardiologist and PCP.\par - Blood pressure management.\par \par \par

## 2023-02-09 NOTE — ED ADULT TRIAGE NOTE - HEART RATE (BEATS/MIN)
86 Nsaids Counseling: NSAID Counseling: I discussed with the patient that NSAIDs should be taken with food. Prolonged use of NSAIDs can result in the development of stomach ulcers.  Patient advised to stop taking NSAIDs if abdominal pain occurs.  The patient verbalized understanding of the proper use and possible adverse effects of NSAIDs.  All of the patient's questions and concerns were addressed.

## 2023-03-08 NOTE — ED ADULT TRIAGE NOTE - BSA (M2)
----- Message from Tiana Castillo sent at 3/8/2023  1:14 PM CST -----  Regarding: cancel  appt  Who Called:GARCÍA BOUCHER [[id number]]         What is the reqeust in detail: Requesting call back to discuss canceling colonoscopy appt. Please advise         Can the clinic reply by [facility]?no         Best Call Back Number:[phone]           Additional Information:      2.05

## 2023-04-18 ENCOUNTER — APPOINTMENT (OUTPATIENT)
Dept: CT IMAGING | Facility: HOSPITAL | Age: 68
End: 2023-04-18

## 2023-04-18 ENCOUNTER — OUTPATIENT (OUTPATIENT)
Dept: OUTPATIENT SERVICES | Facility: HOSPITAL | Age: 68
LOS: 1 days | End: 2023-04-18
Payer: COMMERCIAL

## 2023-04-18 DIAGNOSIS — Z98.49 CATARACT EXTRACTION STATUS, UNSPECIFIED EYE: Chronic | ICD-10-CM

## 2023-04-18 DIAGNOSIS — Z95.2 PRESENCE OF PROSTHETIC HEART VALVE: Chronic | ICD-10-CM

## 2023-04-18 LAB — POCT ISTAT CREATININE: 0.7 MG/DL — SIGNIFICANT CHANGE UP (ref 0.5–1.3)

## 2023-04-18 PROCEDURE — 74174 CTA ABD&PLVS W/CONTRAST: CPT | Mod: 26

## 2023-04-18 PROCEDURE — 74174 CTA ABD&PLVS W/CONTRAST: CPT

## 2023-04-18 PROCEDURE — 71275 CT ANGIOGRAPHY CHEST: CPT | Mod: 26

## 2023-04-18 PROCEDURE — 71275 CT ANGIOGRAPHY CHEST: CPT

## 2023-04-18 PROCEDURE — 82565 ASSAY OF CREATININE: CPT

## 2023-04-19 ENCOUNTER — APPOINTMENT (OUTPATIENT)
Dept: CARDIOTHORACIC SURGERY | Facility: CLINIC | Age: 68
End: 2023-04-19

## 2023-05-22 ENCOUNTER — RESULT REVIEW (OUTPATIENT)
Age: 68
End: 2023-05-22

## 2023-05-23 ENCOUNTER — NON-APPOINTMENT (OUTPATIENT)
Age: 68
End: 2023-05-23

## 2023-05-24 ENCOUNTER — APPOINTMENT (OUTPATIENT)
Dept: CARDIOTHORACIC SURGERY | Facility: CLINIC | Age: 68
End: 2023-05-24
Payer: COMMERCIAL

## 2023-05-31 ENCOUNTER — APPOINTMENT (OUTPATIENT)
Dept: CARDIOTHORACIC SURGERY | Facility: CLINIC | Age: 68
End: 2023-05-31
Payer: COMMERCIAL

## 2023-05-31 VITALS
TEMPERATURE: 97 F | OXYGEN SATURATION: 95 % | BODY MASS INDEX: 20.51 KG/M2 | SYSTOLIC BLOOD PRESSURE: 140 MMHG | HEART RATE: 63 BPM | WEIGHT: 165 LBS | HEIGHT: 75 IN | RESPIRATION RATE: 16 BRPM | DIASTOLIC BLOOD PRESSURE: 73 MMHG

## 2023-05-31 DIAGNOSIS — Z01.818 ENCOUNTER FOR OTHER PREPROCEDURAL EXAMINATION: ICD-10-CM

## 2023-05-31 PROCEDURE — 99214 OFFICE O/P EST MOD 30 MIN: CPT

## 2023-06-01 RX ORDER — TAMSULOSIN HYDROCHLORIDE 0.4 MG/1
0.4 CAPSULE ORAL
Qty: 90 | Refills: 3 | Status: COMPLETED | COMMUNITY
Start: 2022-07-15 | End: 2023-06-01

## 2023-06-01 RX ORDER — SENNOSIDES 8.6 MG TABLETS 8.6 MG/1
8.6 TABLET ORAL AT BEDTIME
Refills: 0 | Status: COMPLETED | COMMUNITY
Start: 2022-07-07 | End: 2023-06-01

## 2023-06-01 RX ORDER — CIPROFLOXACIN HYDROCHLORIDE 500 MG/1
500 TABLET, FILM COATED ORAL
Qty: 10 | Refills: 0 | Status: COMPLETED | COMMUNITY
Start: 2022-08-31 | End: 2023-06-01

## 2023-06-01 RX ORDER — SERTRALINE HYDROCHLORIDE 25 MG/1
25 TABLET, FILM COATED ORAL DAILY
Refills: 0 | Status: ACTIVE | COMMUNITY

## 2023-06-01 RX ORDER — CIPROFLOXACIN HYDROCHLORIDE 500 MG/1
500 TABLET, FILM COATED ORAL
Qty: 20 | Refills: 0 | Status: COMPLETED | COMMUNITY
Start: 2022-08-17 | End: 2023-06-01

## 2023-06-01 RX ORDER — OXYCODONE 5 MG/1
5 TABLET ORAL EVERY 6 HOURS
Refills: 0 | Status: COMPLETED | COMMUNITY
Start: 2022-07-07 | End: 2023-06-01

## 2023-06-01 RX ORDER — CLOPIDOGREL BISULFATE 75 MG/1
75 TABLET, FILM COATED ORAL DAILY
Qty: 90 | Refills: 0 | Status: COMPLETED | COMMUNITY
Start: 2022-07-07 | End: 2023-06-01

## 2023-06-01 RX ORDER — SULFAMETHOXAZOLE AND TRIMETHOPRIM 800; 160 MG/1; MG/1
800-160 TABLET ORAL
Qty: 14 | Refills: 0 | Status: COMPLETED | COMMUNITY
Start: 2022-10-08 | End: 2023-06-01

## 2023-06-01 RX ORDER — CIPROFLOXACIN HYDROCHLORIDE 500 MG/1
500 TABLET, FILM COATED ORAL
Qty: 28 | Refills: 0 | Status: COMPLETED | COMMUNITY
Start: 2022-08-31 | End: 2023-06-01

## 2023-06-01 NOTE — PHYSICAL EXAM
[Sclera] : the sclera and conjunctiva were normal [PERRL With Normal Accommodation] : pupils were equal in size, round, and reactive to light [Extraocular Movements] : extraocular movements were intact [Neck Appearance] : the appearance of the neck was normal [Neck Cervical Mass (___cm)] : no neck mass was observed [Respiration, Rhythm And Depth] : normal respiratory rhythm and effort [Exaggerated Use Of Accessory Muscles For Inspiration] : no accessory muscle use [Auscultation Breath Sounds / Voice Sounds] : lungs were clear to auscultation bilaterally [Apical Impulse] : the apical impulse was normal [Heart Rate And Rhythm] : heart rate was normal and rhythm regular [Heart Sounds] : normal S1 and S2 [Examination Of The Chest] : the chest was normal in appearance [2+] : left 2+ [Bowel Sounds] : normal bowel sounds [Abdomen Soft] : soft [Abdomen Tenderness] : non-tender [No CVA Tenderness] : no ~M costovertebral angle tenderness [Nail Clubbing] : no clubbing  or cyanosis of the fingernails [Musculoskeletal - Swelling] : no joint swelling seen [Skin Color & Pigmentation] : normal skin color and pigmentation [Skin Turgor] : normal skin turgor [] : no rash [Cranial Nerves] : cranial nerves 2-12 were intact [Deep Tendon Reflexes (DTR)] : deep tendon reflexes were 2+ and symmetric [Sensation] : the sensory exam was normal to light touch and pinprick [Oriented To Time, Place, And Person] : oriented to person, place, and time [Impaired Insight] : insight and judgment were intact [Affect] : the affect was normal

## 2023-06-02 NOTE — END OF VISIT
[Time Spent: ___ minutes] : I have spent [unfilled] minutes of time on the encounter. [FreeTextEntry3] : I, GLORY BILLINGSLEY , am scribing for and in the presence of JESSICA OGLESBY the following sections: History of present illness, past Medical/family/surgical/family/social history, review of systems, vital signs, physical exam and disposition.\par  \par I personally performed the services described in the documentation, reviewed the documentation recorded by the scribe in my presence and it accurately and completely records my words and actions.\par

## 2023-06-02 NOTE — ASSESSMENT
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 \par Positive family history for Marfan's and aortic aneurysm (Nephew).\par Patient underwent genetic testing with Dr. Paniagua and result was negative. \par \par I had a lengthy discussion with the patient and his wife regarding his aneurysmal disease. I have recommended that the patient is a candidate for a Thoracic Endovascular Aortic Repair (TEVAR). I have discussed the risks, benefits and alternatives to surgery. I have explained the risks of the surgery, including approximately a 2-3% major mortality or morbidity including stroke, infection, paralysis, spinal cord ischemia, bleeding, death, renal failure and heart attack. All questions were addressed, patient would like to move forward with surgery in August. \par \par Plan: \par 1.Schedule stress test \par 2. Obtain CT head \par 3. Lumbar and chest x-rays\par 4. Admit 1 day before for lumbar drain placement and PST \par 5. Refer to neurology (hx of right precentral gyrus infarct)\par

## 2023-06-02 NOTE — CONSULT LETTER
[Dear  ___] : Dear  [unfilled], [Please see my note below.] : Please see my note below. [Sincerely,] : Sincerely, [DrJayden  ___] : Dr. TOMAS [DrJayden ___] : Dr. TOMAS [FreeTextEntry2] : Bahrat Romano MD\par [address]\par [address]\[place] [address]\par PHONE; [phone]\par FAX; [phone] [FreeTextEntry1] : Please find attached our consultation on your patient, Mr. PADMINI ABEL. \par \par We take a multidisciplinary team approach to patient care and consider you, the referring physician, an extension of our team. We will maintain an open line of communication with you throughout your patient's treatment course. \par \par It is our commitment to provide your patient with the highest quality of advanced therapeutic options. We thank you for allowing us to participate in the care of your patient.\par \par Please do not hesitate to contact our team with any questions or concerns at [phone]\par \par \par  [FreeTextEntry3] : Faisal Toledo M.D.\par Professor of Cardiovascular and Thoracic Surgery\par Minimally Invasive Valve Surgeon\par Director of Aortic Surgery, [facility]\par Cell: [phone]\par Email: [email or web address] \par \par [facility]:\par [address], [address] Floor, [place] [address]\par Office: [phone]\par Fax: [phone]\par \par [facility]:\par Department of Cardiovascular and Thoracic Surgery\par [address] [place] [address], [address]\par Office: [phone]\par Fax: [phone]\par \par \par Practice Manager: Ms. Roz Medina\par Email: [email or web address] \par Phone: [phone]

## 2023-06-02 NOTE — HISTORY OF PRESENT ILLNESS
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 \par Positive family history for Marfan's and aortic aneurysm (Nephew).\par Patient underwent genetic testing with Dr. Paniagua and result was negative. \par \par Patient seen in office 1/2023 and will require a TEVAR for his descending aortic aneurysm in the future. However, since he is still recovering from his recent hip surgery. Patient recommended for a 3 month follow up visit with  repeat CTA chest, abdomen and pelvis and for clinical recheck. \par \par Patient states that he has been feeling tired and has lack of motivation. He was recently started on Zoloft. He  denies fever, chills, headache, blurred vision, dizziness, syncope, chest pain, palpitations, shortness of breath, orthopnea, paroxysmal nocturnal dyspnea, nausea, vomiting, abdominal pain, back pain, BRBPR or swelling to legs.\par

## 2023-06-02 NOTE — DATA REVIEWED
[FreeTextEntry1] : CTA 8/31/22\par \par FINDINGS:\par CHEST:\par LUNGS AND LARGE AIRWAYS: Patent central airways. Mild linear atelectatic changes. Stable 4 mm nodule right lower lobe image 61 series 10. New 7 mm nodule left upper lobe image 10 series 22, likely due to bronchial mucous plugging. Stable 4 mm nodule lingula segment left upper lobe image 7 series 10.\par PLEURA: No pleural effusion.\par VESSELS: Status post bioprosthetic aortic valve replacement and graft replacement of the ascending aorta and portion of aortic arch. 4.5 cm aortic root at the level of sinus of Valsalva. 3.3 cm ascending aorta at the level of the graft. 3.5 cm aortic arch. Patent branches of the aortic arch. Ulcerative plaques along the descending thoracic aorta. Proximal descending aorta 4.5 cm just distal to the origin of left subclavian artery on sagittal images. Midportion of descending thoracic aorta 5.2 cm in transverse diameter on axial images without significant change. Distal descending thoracic aorta 3.2 cm. No pulmonary emboli.\par HEART: Aortic valve replacement. Normal heart size. No pericardial effusion.\par MEDIASTINUM AND AREN: No lymphadenopathy.\par CHEST WALL AND LOWER NECK: Within normal limits.\par \par ABDOMEN AND PELVIS:\par LIVER: 1.5 cm cyst segment 8.\par BILE DUCTS: Normal caliber.\par GALLBLADDER: Within normal limits.\par SPLEEN: Borderline splenomegaly 14 cm, unchanged.\par PANCREAS: Within normal limits.\par ADRENALS: Within normal limits.\par KIDNEYS/URETERS: Mild bilateral hydronephrosis likely due to distended urinary bladder.\par \par BLADDER: Bladder is distended with the dome above the umbilicus.\par REPRODUCTIVE ORGANS: Enlarged prostate.\par \par BOWEL: No bowel obstruction. Appendix is normal.\par PERITONEUM: No ascites.\par VESSELS: Bilobed infrarenal aortic aneurysm with the largest segment measuring 5 cm in AP diameter and 5 cm transverse diameter below the origin of inferior mesenteric artery. Aneurysm measures about 9 cm in length and starts about 1 cm distal to the origin of the renal arteries. Patent celiac artery. Patent SMA. Patent bilateral renal arteries. Accessory artery present to the upper pole of the left kidney. DIMITRI is patent. Bilateral common, external and internal iliac arteries are patent.\par RETROPERITONEUM/LYMPH NODES: No lymphadenopathy.\par ABDOMINAL WALL: Within normal limits.\par BONES: Median sternotomy. Moderate compression deformity of L2 and L3 vertebral bodies, new since prior studies.\par \par IMPRESSION:\par \par Status post aortic valve replacement and graft replacement of the ascending aorta and portion of aortic arch.\par \par Descending thoracic aortic aneurysm measuring up to 5.2 cm with ulcerated plaques.\par \par Infrarenal abdominal aortic aneurysm measuring up to 5 cm.\par \par 7 mm left upper lobe lung nodule, new, probably due to bronchial mucoid impaction. Recommend follow-up CT in 6 months.\par \par Enlarged prostate. Distended urinary bladder. Mild bilateral hydronephrosis.\par \par New compression deformity of L3 and L4 vertebral bodies.\par \par \par CTA chest, abdomen and pelvis 1-18-23: stable descending thoracic aortic aneurysm. \par Ulcerative plaques along the descending thoracic aorta. Proximal descending aorta 4.5 cm just distal to the origin of left subclavian artery on sagittal images. Midportion of descending thoracic aorta 5.2 cm in transverse diameter on axial images without significant change.\par \par \par \par CTA The diameter of the aorta was measured at the following levels:\par Aortic root (sinuses of Valsalva):: 4.5 x 4.9 cm (stable)\par Ascending aorta: 3.4 x 3.7 cm (stable)\par Aortic arch: 3.5 cm (stable)\par Descending thoracic aorta, mid 5.9 x 5.0 cm (slightly increased, previously, 5.0 x 5.0 cm)\par Suprarenal abdominal aorta: 2.7 x 3.0 cm (stable)\par Infrarenal abdominal aorta: 4.1 x 5.2 cm (stable)\par \par As noted previously post aortic valve replacement and ascending aortic replacement. Stable postoperative bed. No aortic dissection. Trace perigraft fluid noted. Extensive calcified and noncalcified plaque throughout the aorta. As noted previously the left gastric artery arises directly from the aorta. The infrarenal abdominal aorta is fusiform.\par

## 2023-06-08 PROBLEM — Z01.818 PREOP TESTING: Status: ACTIVE | Noted: 2023-06-08

## 2023-06-22 ENCOUNTER — FORM ENCOUNTER (OUTPATIENT)
Age: 68
End: 2023-06-22

## 2023-06-26 ENCOUNTER — FORM ENCOUNTER (OUTPATIENT)
Age: 68
End: 2023-06-26

## 2023-06-29 ENCOUNTER — APPOINTMENT (OUTPATIENT)
Dept: NEUROLOGY | Facility: CLINIC | Age: 68
End: 2023-06-29
Payer: COMMERCIAL

## 2023-06-29 VITALS
BODY MASS INDEX: 21.14 KG/M2 | DIASTOLIC BLOOD PRESSURE: 84 MMHG | WEIGHT: 170 LBS | HEART RATE: 62 BPM | OXYGEN SATURATION: 98 % | SYSTOLIC BLOOD PRESSURE: 134 MMHG | HEIGHT: 75 IN | TEMPERATURE: 97.5 F

## 2023-06-29 DIAGNOSIS — I63.9 CEREBRAL INFARCTION, UNSPECIFIED: ICD-10-CM

## 2023-06-29 PROCEDURE — 99214 OFFICE O/P EST MOD 30 MIN: CPT

## 2023-06-29 RX ORDER — PANTOPRAZOLE 40 MG/1
40 TABLET, DELAYED RELEASE ORAL DAILY
Qty: 90 | Refills: 0 | Status: DISCONTINUED | COMMUNITY
Start: 2022-07-07 | End: 2023-06-29

## 2023-06-29 NOTE — ASSESSMENT
[FreeTextEntry1] : The patient is a 68 year old male with a PMH of HTN, COPD, aortic disease s/p AVR, ascending and hemiarch replacement in 06/2023 with course complicated by left-sided weakness in due to right precentral gyrus in setting of chronic/congenital R ICA occlusion and family history of Marfan's syndrome (patient genetic testing was negative). Etiology of stroke was unclear- it appeared embolic more than watershed in nature but the absence of the R ICA would imply the thrombus most likely came from the left circulation.  Prior to undergoing planned TEVAR, to best understand prior mechanism of stroke and also better risk stratefy for upcoming procedure, would recommended he have MRA head/neck NOVA with MRI Brain.\par \par Patient recently had labs done and will send over a copy when they can.

## 2023-06-29 NOTE — HISTORY OF PRESENT ILLNESS
[FreeTextEntry1] : The patient is a 68 year old male with a PMH of HTN, COPD, aortic disease s/p AVR, ascending and hemiarch replacement in 06/2023 with course complicated by left-sided weakness in due to right precentral gyrus in setting of chronic/congenital R ICA occlusion, recent hip replacement, tobacco dependence, and family history of Marfan's syndrome (patient genetic testing was negative). He presents for clearance give history of stroke following aortic surgery last year and need for TEVAR for descending aortic aneurysm in near future.\par \par With regard to the patient's former stroke, he was noted to have new left hemiparesis following his aortic surgery 06/2022. 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 Etiology of stroke was unclear- it appeared embolic more than watershed in nature but the absence of the R ICA would imply the thrombus most likely came from the left circulation.  No acute intervention was offered given chronicity of the occlusion, but he was recommended to continue aspirin, statin therapy indefinitely. \par \par Over the last year, the patient has had no new stroke symptoms. He remains on aspirin/statin therapy and is tolerating well. Patient states that he quit smoking 1 year ago. His left-sided weakness has improved *****  BP is well controlled.

## 2023-06-29 NOTE — PHYSICAL EXAM
[FreeTextEntry1] : The patient is alert and oriented x3, follows commands, and is able to participate fully in the history taking.\par Speech is normal with no evidence of dysarthria.\par Memory is intact: Immediate recall 3 out of 3, short-term 3 out of 3, remote memory intact.\par Cranial nerves II through XII intact.\par Motor exam: 4 out of 5 strength in the left upper extremity, Left lower extremity and Right upper and lower extremities 5 out of 5 power, normal tone. No abnormal movements noted.\par Sensory exam: Intact to light touch and pinprick. Romberg negative.\par Coordination and vestibular exam: Finger to nose intact, no evidence of ataxia, nystagmus and no vestibular symptoms elicited with head turning during ambulation.\par Gait: Normal gait walking without a cane. \par Reflexes: One to 2+ in upper and lower extremities. No pathological reflexes. Downgoing toes.

## 2023-07-24 ENCOUNTER — RESULT REVIEW (OUTPATIENT)
Age: 68
End: 2023-07-24

## 2023-07-24 ENCOUNTER — OUTPATIENT (OUTPATIENT)
Dept: OUTPATIENT SERVICES | Facility: HOSPITAL | Age: 68
LOS: 1 days | End: 2023-07-24
Payer: COMMERCIAL

## 2023-07-24 ENCOUNTER — APPOINTMENT (OUTPATIENT)
Dept: MRI IMAGING | Facility: HOSPITAL | Age: 68
End: 2023-07-24

## 2023-07-24 DIAGNOSIS — Z95.2 PRESENCE OF PROSTHETIC HEART VALVE: Chronic | ICD-10-CM

## 2023-07-24 DIAGNOSIS — Z98.49 CATARACT EXTRACTION STATUS, UNSPECIFIED EYE: Chronic | ICD-10-CM

## 2023-07-24 PROCEDURE — 70544 MR ANGIOGRAPHY HEAD W/O DYE: CPT

## 2023-07-24 PROCEDURE — 70544 MR ANGIOGRAPHY HEAD W/O DYE: CPT | Mod: 26

## 2023-07-24 PROCEDURE — 70547 MR ANGIOGRAPHY NECK W/O DYE: CPT | Mod: 26

## 2023-07-24 PROCEDURE — 70547 MR ANGIOGRAPHY NECK W/O DYE: CPT

## 2023-07-28 ENCOUNTER — NON-APPOINTMENT (OUTPATIENT)
Age: 68
End: 2023-07-28

## 2023-09-06 ENCOUNTER — RESULT REVIEW (OUTPATIENT)
Age: 68
End: 2023-09-06

## 2023-09-06 ENCOUNTER — OUTPATIENT (OUTPATIENT)
Dept: OUTPATIENT SERVICES | Facility: HOSPITAL | Age: 68
LOS: 1 days | End: 2023-09-06
Payer: COMMERCIAL

## 2023-09-06 DIAGNOSIS — Z95.2 PRESENCE OF PROSTHETIC HEART VALVE: Chronic | ICD-10-CM

## 2023-09-06 DIAGNOSIS — Z95.2 PRESENCE OF PROSTHETIC HEART VALVE: ICD-10-CM

## 2023-09-06 DIAGNOSIS — Z98.49 CATARACT EXTRACTION STATUS, UNSPECIFIED EYE: Chronic | ICD-10-CM

## 2023-09-06 DIAGNOSIS — Z95.828 PRESENCE OF OTHER VASCULAR IMPLANTS AND GRAFTS: ICD-10-CM

## 2023-09-06 PROCEDURE — 93306 TTE W/DOPPLER COMPLETE: CPT

## 2023-09-06 PROCEDURE — 71046 X-RAY EXAM CHEST 2 VIEWS: CPT | Mod: 26

## 2023-09-06 PROCEDURE — 93306 TTE W/DOPPLER COMPLETE: CPT | Mod: 26

## 2023-09-06 PROCEDURE — 71046 X-RAY EXAM CHEST 2 VIEWS: CPT

## 2023-09-06 PROCEDURE — 72100 X-RAY EXAM L-S SPINE 2/3 VWS: CPT

## 2023-09-06 PROCEDURE — 72100 X-RAY EXAM L-S SPINE 2/3 VWS: CPT | Mod: 26

## 2023-09-14 ENCOUNTER — TRANSCRIPTION ENCOUNTER (OUTPATIENT)
Age: 68
End: 2023-09-14

## 2023-09-14 ENCOUNTER — INPATIENT (INPATIENT)
Facility: HOSPITAL | Age: 68
LOS: 8 days | Discharge: HOME | DRG: 268 | End: 2023-09-23
Attending: THORACIC SURGERY (CARDIOTHORACIC VASCULAR SURGERY) | Admitting: THORACIC SURGERY (CARDIOTHORACIC VASCULAR SURGERY)
Payer: COMMERCIAL

## 2023-09-14 VITALS
DIASTOLIC BLOOD PRESSURE: 75 MMHG | TEMPERATURE: 96 F | SYSTOLIC BLOOD PRESSURE: 138 MMHG | OXYGEN SATURATION: 95 % | HEART RATE: 57 BPM | RESPIRATION RATE: 18 BRPM

## 2023-09-14 DIAGNOSIS — Z95.2 PRESENCE OF PROSTHETIC HEART VALVE: Chronic | ICD-10-CM

## 2023-09-14 DIAGNOSIS — Z98.49 CATARACT EXTRACTION STATUS, UNSPECIFIED EYE: Chronic | ICD-10-CM

## 2023-09-14 LAB
A1C WITH ESTIMATED AVERAGE GLUCOSE RESULT: 5.3 % — SIGNIFICANT CHANGE UP (ref 4–5.6)
ALBUMIN SERPL ELPH-MCNC: 4.6 G/DL — SIGNIFICANT CHANGE UP (ref 3.3–5)
ALP SERPL-CCNC: 98 U/L — SIGNIFICANT CHANGE UP (ref 40–120)
ALT FLD-CCNC: 39 U/L — SIGNIFICANT CHANGE UP (ref 10–45)
ANION GAP SERPL CALC-SCNC: 13 MMOL/L — SIGNIFICANT CHANGE UP (ref 5–17)
APPEARANCE UR: CLEAR — SIGNIFICANT CHANGE UP
APTT BLD: 26.5 SEC — SIGNIFICANT CHANGE UP (ref 24.5–35.6)
AST SERPL-CCNC: 29 U/L — SIGNIFICANT CHANGE UP (ref 10–40)
BASOPHILS # BLD AUTO: 0.03 K/UL — SIGNIFICANT CHANGE UP (ref 0–0.2)
BASOPHILS NFR BLD AUTO: 0.6 % — SIGNIFICANT CHANGE UP (ref 0–2)
BILIRUB SERPL-MCNC: 0.4 MG/DL — SIGNIFICANT CHANGE UP (ref 0.2–1.2)
BILIRUB UR-MCNC: NEGATIVE — SIGNIFICANT CHANGE UP
BLD GP AB SCN SERPL QL: NEGATIVE — SIGNIFICANT CHANGE UP
BUN SERPL-MCNC: 19 MG/DL — SIGNIFICANT CHANGE UP (ref 7–23)
CALCIUM SERPL-MCNC: 9.8 MG/DL — SIGNIFICANT CHANGE UP (ref 8.4–10.5)
CHLORIDE SERPL-SCNC: 100 MMOL/L — SIGNIFICANT CHANGE UP (ref 96–108)
CHOLEST SERPL-MCNC: 102 MG/DL — SIGNIFICANT CHANGE UP
CK MB CFR SERPL CALC: 1.2 NG/ML — SIGNIFICANT CHANGE UP (ref 0–6.7)
CK SERPL-CCNC: 54 U/L — SIGNIFICANT CHANGE UP (ref 30–200)
CO2 SERPL-SCNC: 25 MMOL/L — SIGNIFICANT CHANGE UP (ref 22–31)
COLOR SPEC: YELLOW — SIGNIFICANT CHANGE UP
CREAT SERPL-MCNC: 0.76 MG/DL — SIGNIFICANT CHANGE UP (ref 0.5–1.3)
DIFF PNL FLD: NEGATIVE — SIGNIFICANT CHANGE UP
EGFR: 97 ML/MIN/1.73M2 — SIGNIFICANT CHANGE UP
EOSINOPHIL # BLD AUTO: 0.16 K/UL — SIGNIFICANT CHANGE UP (ref 0–0.5)
EOSINOPHIL NFR BLD AUTO: 3 % — SIGNIFICANT CHANGE UP (ref 0–6)
ESTIMATED AVERAGE GLUCOSE: 105 MG/DL — SIGNIFICANT CHANGE UP (ref 68–114)
GLUCOSE SERPL-MCNC: 95 MG/DL — SIGNIFICANT CHANGE UP (ref 70–99)
GLUCOSE UR QL: NEGATIVE — SIGNIFICANT CHANGE UP
HCT VFR BLD CALC: 34.6 % — LOW (ref 39–50)
HDLC SERPL-MCNC: 39 MG/DL — LOW
HGB BLD-MCNC: 11.8 G/DL — LOW (ref 13–17)
IMM GRANULOCYTES NFR BLD AUTO: 0.2 % — SIGNIFICANT CHANGE UP (ref 0–0.9)
INR BLD: 1.05 — SIGNIFICANT CHANGE UP (ref 0.85–1.18)
KETONES UR-MCNC: NEGATIVE — SIGNIFICANT CHANGE UP
LEUKOCYTE ESTERASE UR-ACNC: NEGATIVE — SIGNIFICANT CHANGE UP
LIPID PNL WITH DIRECT LDL SERPL: 40 MG/DL — SIGNIFICANT CHANGE UP
LYMPHOCYTES # BLD AUTO: 1.47 K/UL — SIGNIFICANT CHANGE UP (ref 1–3.3)
LYMPHOCYTES # BLD AUTO: 27.7 % — SIGNIFICANT CHANGE UP (ref 13–44)
MCHC RBC-ENTMCNC: 33.5 PG — SIGNIFICANT CHANGE UP (ref 27–34)
MCHC RBC-ENTMCNC: 34.1 GM/DL — SIGNIFICANT CHANGE UP (ref 32–36)
MCV RBC AUTO: 98.3 FL — SIGNIFICANT CHANGE UP (ref 80–100)
MONOCYTES # BLD AUTO: 0.73 K/UL — SIGNIFICANT CHANGE UP (ref 0–0.9)
MONOCYTES NFR BLD AUTO: 13.8 % — SIGNIFICANT CHANGE UP (ref 2–14)
NEUTROPHILS # BLD AUTO: 2.9 K/UL — SIGNIFICANT CHANGE UP (ref 1.8–7.4)
NEUTROPHILS NFR BLD AUTO: 54.7 % — SIGNIFICANT CHANGE UP (ref 43–77)
NITRITE UR-MCNC: NEGATIVE — SIGNIFICANT CHANGE UP
NON HDL CHOLESTEROL: 63 MG/DL — SIGNIFICANT CHANGE UP
NRBC # BLD: 0 /100 WBCS — SIGNIFICANT CHANGE UP (ref 0–0)
NT-PROBNP SERPL-SCNC: 320 PG/ML — HIGH (ref 0–300)
PH UR: 6.5 — SIGNIFICANT CHANGE UP (ref 5–8)
PLATELET # BLD AUTO: 171 K/UL — SIGNIFICANT CHANGE UP (ref 150–400)
POTASSIUM SERPL-MCNC: 3.7 MMOL/L — SIGNIFICANT CHANGE UP (ref 3.5–5.3)
POTASSIUM SERPL-SCNC: 3.7 MMOL/L — SIGNIFICANT CHANGE UP (ref 3.5–5.3)
PROT SERPL-MCNC: 8.4 G/DL — HIGH (ref 6–8.3)
PROT UR-MCNC: NEGATIVE MG/DL — SIGNIFICANT CHANGE UP
PROTHROM AB SERPL-ACNC: 12 SEC — SIGNIFICANT CHANGE UP (ref 9.5–13)
RBC # BLD: 3.52 M/UL — LOW (ref 4.2–5.8)
RBC # FLD: 13.7 % — SIGNIFICANT CHANGE UP (ref 10.3–14.5)
RH IG SCN BLD-IMP: POSITIVE — SIGNIFICANT CHANGE UP
SODIUM SERPL-SCNC: 138 MMOL/L — SIGNIFICANT CHANGE UP (ref 135–145)
SP GR SPEC: 1.01 — SIGNIFICANT CHANGE UP (ref 1–1.03)
TRIGL SERPL-MCNC: 113 MG/DL — SIGNIFICANT CHANGE UP
TROPONIN T, HIGH SENSITIVITY RESULT: 13 NG/L — SIGNIFICANT CHANGE UP (ref 0–51)
TSH SERPL-MCNC: 2.51 UIU/ML — SIGNIFICANT CHANGE UP (ref 0.27–4.2)
UROBILINOGEN FLD QL: 0.2 E.U./DL — SIGNIFICANT CHANGE UP
WBC # BLD: 5.3 K/UL — SIGNIFICANT CHANGE UP (ref 3.8–10.5)
WBC # FLD AUTO: 5.3 K/UL — SIGNIFICANT CHANGE UP (ref 3.8–10.5)

## 2023-09-14 PROCEDURE — 76937 US GUIDE VASCULAR ACCESS: CPT | Mod: 26

## 2023-09-14 PROCEDURE — 62272 THER SPI PNXR DRG CSF: CPT

## 2023-09-14 PROCEDURE — 99292 CRITICAL CARE ADDL 30 MIN: CPT | Mod: 25

## 2023-09-14 PROCEDURE — 36620 INSERTION CATHETER ARTERY: CPT

## 2023-09-14 PROCEDURE — 99291 CRITICAL CARE FIRST HOUR: CPT | Mod: 25

## 2023-09-14 RX ORDER — FENTANYL CITRATE 50 UG/ML
50 INJECTION INTRAVENOUS ONCE
Refills: 0 | Status: DISCONTINUED | OUTPATIENT
Start: 2023-09-14 | End: 2023-09-14

## 2023-09-14 RX ORDER — IPRATROPIUM/ALBUTEROL SULFATE 18-103MCG
3 AEROSOL WITH ADAPTER (GRAM) INHALATION EVERY 6 HOURS
Refills: 0 | Status: DISCONTINUED | OUTPATIENT
Start: 2023-09-14 | End: 2023-09-15

## 2023-09-14 RX ORDER — MIDAZOLAM HYDROCHLORIDE 1 MG/ML
2 INJECTION, SOLUTION INTRAMUSCULAR; INTRAVENOUS ONCE
Refills: 0 | Status: DISCONTINUED | OUTPATIENT
Start: 2023-09-14 | End: 2023-09-14

## 2023-09-14 RX ORDER — FLUTICASONE PROPIONATE AND SALMETEROL 50; 250 UG/1; UG/1
1 POWDER ORAL; RESPIRATORY (INHALATION)
Refills: 0 | DISCHARGE

## 2023-09-14 RX ORDER — CHLORHEXIDINE GLUCONATE 213 G/1000ML
1 SOLUTION TOPICAL ONCE
Refills: 0 | Status: COMPLETED | OUTPATIENT
Start: 2023-09-14 | End: 2023-09-14

## 2023-09-14 RX ORDER — METOPROLOL TARTRATE 50 MG
50 TABLET ORAL
Refills: 0 | Status: DISCONTINUED | OUTPATIENT
Start: 2023-09-14 | End: 2023-09-15

## 2023-09-14 RX ORDER — ACETAMINOPHEN 500 MG
650 TABLET ORAL EVERY 6 HOURS
Refills: 0 | Status: DISCONTINUED | OUTPATIENT
Start: 2023-09-14 | End: 2023-09-15

## 2023-09-14 RX ORDER — PANTOPRAZOLE SODIUM 20 MG/1
40 TABLET, DELAYED RELEASE ORAL
Refills: 0 | Status: DISCONTINUED | OUTPATIENT
Start: 2023-09-14 | End: 2023-09-15

## 2023-09-14 RX ORDER — SERTRALINE 25 MG/1
1 TABLET, FILM COATED ORAL
Refills: 0 | DISCHARGE

## 2023-09-14 RX ORDER — INFLUENZA VIRUS VACCINE 15; 15; 15; 15 UG/.5ML; UG/.5ML; UG/.5ML; UG/.5ML
0.7 SUSPENSION INTRAMUSCULAR ONCE
Refills: 0 | Status: DISCONTINUED | OUTPATIENT
Start: 2023-09-14 | End: 2023-09-15

## 2023-09-14 RX ORDER — CHLORHEXIDINE GLUCONATE 213 G/1000ML
12.5 SOLUTION TOPICAL ONCE
Refills: 0 | Status: COMPLETED | OUTPATIENT
Start: 2023-09-14 | End: 2023-09-15

## 2023-09-14 RX ORDER — ATORVASTATIN CALCIUM 80 MG/1
80 TABLET, FILM COATED ORAL AT BEDTIME
Refills: 0 | Status: DISCONTINUED | OUTPATIENT
Start: 2023-09-14 | End: 2023-09-15

## 2023-09-14 RX ORDER — CHLORHEXIDINE GLUCONATE 213 G/1000ML
1 SOLUTION TOPICAL ONCE
Refills: 0 | Status: COMPLETED | OUTPATIENT
Start: 2023-09-15 | End: 2023-09-15

## 2023-09-14 RX ORDER — SERTRALINE 25 MG/1
25 TABLET, FILM COATED ORAL DAILY
Refills: 0 | Status: DISCONTINUED | OUTPATIENT
Start: 2023-09-14 | End: 2023-09-15

## 2023-09-14 RX ORDER — SODIUM CHLORIDE 9 MG/ML
3 INJECTION INTRAMUSCULAR; INTRAVENOUS; SUBCUTANEOUS EVERY 8 HOURS
Refills: 0 | Status: DISCONTINUED | OUTPATIENT
Start: 2023-09-14 | End: 2023-09-15

## 2023-09-14 RX ADMIN — MIDAZOLAM HYDROCHLORIDE 2 MILLIGRAM(S): 1 INJECTION, SOLUTION INTRAMUSCULAR; INTRAVENOUS at 14:00

## 2023-09-14 RX ADMIN — ATORVASTATIN CALCIUM 80 MILLIGRAM(S): 80 TABLET, FILM COATED ORAL at 21:36

## 2023-09-14 RX ADMIN — CHLORHEXIDINE GLUCONATE 1 APPLICATION(S): 213 SOLUTION TOPICAL at 22:22

## 2023-09-14 RX ADMIN — SODIUM CHLORIDE 3 MILLILITER(S): 9 INJECTION INTRAMUSCULAR; INTRAVENOUS; SUBCUTANEOUS at 21:32

## 2023-09-14 RX ADMIN — FENTANYL CITRATE 50 MICROGRAM(S): 50 INJECTION INTRAVENOUS at 14:30

## 2023-09-14 RX ADMIN — FENTANYL CITRATE 50 MICROGRAM(S): 50 INJECTION INTRAVENOUS at 14:00

## 2023-09-14 RX ADMIN — SODIUM CHLORIDE 3 MILLILITER(S): 9 INJECTION INTRAMUSCULAR; INTRAVENOUS; SUBCUTANEOUS at 13:11

## 2023-09-14 NOTE — H&P ADULT - HISTORY OF PRESENT ILLNESS
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 He is followed by Dr. Toledo as an outpatient for his descending aortic aneurysm. He presented to [facility] on 9/14/23 for a pre-operative lumbar drain placement with plan for TEVAR tomorrow.

## 2023-09-14 NOTE — PATIENT PROFILE ADULT - NSPROSPHOSPCHAPLAINYN_GEN_A_NUR
"Labs are all okay/ stable    Hemoglobin a1c still in the \"prediabetes\" range    Keep working on healthy diet/exercise     David Morse MD  "
no

## 2023-09-14 NOTE — PATIENT PROFILE ADULT - FUNCTIONAL ASSESSMENT - DAILY ACTIVITY 3.
Left message on both numbers in chart [phone](Dad) and [phone]. Pt's name came up on [facility]... A message was also left on November 29,2018 but no one called office back.  
4 = No assist / stand by assistance

## 2023-09-14 NOTE — PATIENT PROFILE ADULT - MONEY FOR FOOD
Subjective:     Lonnie Stone  who presentsto the office today for a preoperative consultation at the request of surgeon Dr. Imani Berry who plans on performing cataract surgery  on 10/20/20. Thisconsultation is requested for the specific conditions prompting preoperative evaluation(i.e. because of potential affect on operative risk): DM, HTN, ILD. Planned anesthesia isTopical anesthesia. The patient has the following known anesthesiaissues: none  Patient has a bleeding risk of : no recent abnormal bleeding, no remote history of abnormal bleeding, no use of Ca-channel blockers    Sloan Steward is also in the office for follow up with her son. She has been having weight loss and the family is concerned about the 20 lbs weight loss since her husbands passed. She admits that she does not have any appetite and tries to eat but does not eat much. She is consistent with breakfast and always has a large first meal complete with eggs, reyna, fruit, bagel, and OJ. At lunch she only eats a few bites of cottage cheese and fruit, at dinner she eats maybe half a small chicken breast. She tries to drink ensures when she has them. She is not currently arranged with [employer], but does have assistance from family and hospice from when her  passed. She states that she is interested in meals on wheels. Her son is worried about safety with driving. States that the opthal told her to stop driving due to eyelids impairing her vision. Patient's medications, allergies, past medical, surgical,social and family histories were reviewed and updated as appropriate.     Past Medical History:   Diagnosis Date    Diabetes mellitus ([facility] [address]     Essential hypertension, benign     GERD (gastroesophageal reflux disease)     Hyperlipidemia     Interstitial lung disease (HCC)     Osteoarthrosis, unspecified whether generalized or localized, unspecified site     osteoarthritis lower leg    Osteopenia     Shingles      Past Surgical History:   Procedure diverticulosis, follow up in 5 years   [address]  8/16/2012    DR. Ramos - with mesh     Social History     Socioeconomic History    Marital status:      Spouse name: Indira Hollis Number of children: 3    Years of education: 15    Highest education level: Not on file   Occupational History    Occupation: Retired   Social Needs    Financial resource strain: Not on file    Food insecurity     Worry: Not on file     Inability: Not on file   [employer] needs     Medical: Not on file     Non-medical: Not on file   Tobacco Use    Smoking status: Never Smoker    Smokeless tobacco: Never Used   Substance and Sexual Activity    Alcohol use: Yes     Alcohol/week: 0.0 standard drinks     Comment: one vodka tonic nightly    Drug use: No    Sexual activity: Never     Partners: Male     Comment:     Lifestyle    Physical activity     Days per week: Not on file     Minutes per session: Not on file    Stress: Not on file   Relationships    Social connections     Talks on phone: Not on file     Gets together: Not on file     Attends Rest[employer] service: Not on file     Active member of club or organization: Not on file     Attends meetings of clubs or organizations: Not on file     Relationship status: Not on file    Intimate partner violence     Fear of current or ex partner: Not on file     Emotionally abused: Not on file     Physically abused: Not on file     Forced sexual activity: Not on file   Other Topics Concern    Not on file   Social History Narrative    Not on file       Review of Systems  Review of Systems   Constitutional: Positive for unexpected weight change (20 lbs weight loss). Negative for chills, fatigue and fever. HENT: Negative for congestion, ear pain, postnasal drip, rhinorrhea, sinus pressure, sneezing and sore throat. Eyes: Negative for redness and itching. Respiratory: Negative for cough, chest tightness, shortness of breath and wheezing. Cardiovascular: Negative for chest pain and palpitations. Gastrointestinal: Negative for abdominal pain, blood in stool, constipation, diarrhea, nausea and vomiting. Endocrine: Negative for cold intolerance and heat intolerance. Genitourinary: Negative for difficulty urinating, dysuria, flank pain, frequency, hematuria and urgency. Musculoskeletal: Negative for arthralgias, back pain, joint swelling and myalgias. Skin: Negative for color change, pallor, rash and wound. Allergic/Immunologic: Negative for environmental allergies and food allergies. Neurological: Negative for dizziness, seizures, syncope, weakness, light-headedness, numbness and headaches. Hematological: Negative for adenopathy. Does not bruise/bleed easily. Psychiatric/Behavioral: Negative for confusion, sleep disturbance and suicidal ideas. The patient is not nervous/anxious and is not hyperactive. Objective:     Vitals:    10/13/20 1012   BP: 128/64   Pulse: 68   Temp: 97.1 °F (36.2 °C)   SpO2: 94%        Physical Exam  Physical Exam  Constitutional:       Appearance: She is well-developed. HENT:      Head: Normocephalic. Right Ear: External ear normal.      Left Ear: External ear normal.   Eyes:      Conjunctiva/sclera: Conjunctivae normal.      Pupils: Pupils are equal, round, and reactive to light. Neck:      Musculoskeletal: Normal range of motion and neck supple. Vascular: No JVD. Cardiovascular:      Rate and Rhythm: Normal rate and regular rhythm. Heart sounds: No murmur. No friction rub. No gallop. Pulmonary:      Effort: Pulmonary effort is normal. No respiratory distress. Breath sounds: Normal breath sounds. No wheezing. Abdominal:      General: Bowel sounds are normal. There is no distension. Palpations: Abdomen is soft. There is no mass. Tenderness: There is no abdominal tenderness. There is no guarding or rebound. Musculoskeletal: Normal range of motion. General: No tenderness. Skin:     General: Skin is warm and dry. Neurological:      Mental Status: She is alert and oriented to person, place, and time. Deep Tendon Reflexes: Reflexes are normal and symmetric. Psychiatric:         Behavior: Behavior normal.           Medication Review  Current Outpatient Medications on File Prior to Visit   Medication Sig Dispense Refill    metFORMIN (GLUCOPHAGE) 500 MG tablet TAKE ONE TABLET BY MOUTH EVERY MORNING AND TAKE TWO TABLETS BY MOUTH AT NIGHT WITH DINNER 270 tablet 0    escitalopram (LEXAPRO) 20 MG tablet TAKE ONE TABLET BY MOUTH DAILY 90 tablet 0    Diclofenac Sodium POWD 2 g by Does not apply route 4 times daily Formula #8E Baclo 2% Diclo 3% DMSO 5% Evans 6% Lido 2% Prilo 2% 240 g 11    omeprazole (PRILOSEC) 20 MG delayed release capsule Take 20 mg by mouth daily      irbesartan (AVAPRO) 300 MG tablet Take 300 mg by mouth nightly      ondansetron (ZOFRAN) 4 MG tablet Take 1 tablet by mouth every 8 hours as needed for Nausea or Vomiting 10 tablet 0    blood glucose test strips (ASCENSIA AUTODISC VI;ONE TOUCH ULTRA TEST VI) strip One Touch Ultra test Strips testing daily per E11.9 100 each 3    TRADJENTA 5 MG tablet TAKE ONE TABLET BY MOUTH DAILY 90 tablet 0    rosuvastatin (CRESTOR) 40 MG tablet TAKE ONE TABLET BY MOUTH DAILY 90 tablet 1    lidocaine (LIDODERM) 5 % Place 1 patch onto the skin every 12 hours 12 hours on, 12 hours off.  30 patch 0    traZODone (DESYREL) 100 MG tablet Take 1 tablet by mouth nightly as needed for Sleep 90 tablet 1    amLODIPine (NORVASC) 10 MG tablet Take 1 tablet by mouth daily 90 tablet 1    meloxicam (MOBIC) 15 MG tablet Take 1 tablet by mouth daily 30 tablet 3    FREESTYLE LANCETS MISC 1 each by Does not apply route daily 100 each 3    blood glucose test strips (FREESTYLE LITE) strip 1 each by In Vitro route daily Testing 1-2 times daily per e11.9 100 each 3    blood glucose monitor strips Test 2 times a day & as needed for symptoms of irregular blood glucose. One Touch Verio strips 100 strip 2    Lancets MISC 1 each by Does not apply route 2 times daily 100 each 5    ONETOUCH DELICA LANCETS 73B MISC USE ONE LANCET TO TEST TWICE A DAY per E11.9 100 each 5    Cyanocobalamin (B-12 PO) Take by mouth daily       Probiotic Product (PROBIOTIC ACIDOPHILUS) CAPS Take 1 capsule by mouth daily      VITAMIN D, CHOLECALCIFEROL, PO Take 1 tablet by mouth daily      vitamin E 1000 UNITS capsule Take 1,000 Units by mouth daily. No current facility-administered medications on file prior to visit. Assessment:       1. Diabetes mellitus due to underlying condition with diabetic nephropathy, without long-term current use of insulin ([address]    2. Preop exam for internal medicine    3. Other age-related cataract of right eye    4. Essential hypertension, benign    5. Anxiety    6. Decreased appetite           Plan:        Preop exam for internal medicine  Perioperative risk related to the patient's upcoming surgery is considered low. she is cleared for surgery.   Pre-op exam was completed on 10/13/20 11:56 AM.      Other age-related cataract  Surgery as planned      Essential hypertension, benign  Stable, controlled  No changes  Continue current treatment plan       DM (diabetes mellitus) ([address]  Stable, controlled  No changes  Continue current treatment plan       Anxiety  Will increase lexapro to 40 mg   Follow up in 1 months time  Long conversation about grief and what this looks like for her  Support provided       Decreased appetite  Will refer to [facility] for assistance with [facility] for possible meals on wheels no

## 2023-09-14 NOTE — H&P ADULT - NSHPREVIEWOFSYSTEMS_GEN_ALL_CORE
Review of Systems  CONSTITUTIONAL:  Denies Fevers / chills, sweats, fatigue, weight loss, weight gain                                      NEURO:  + LUE weakness. Denies parathesias, seizures, syncope, confusion                                                                                EYES:  Denies Blurry vision, discharge, pain, loss of vision                                                                                    ENMT: + Vertigo. Denies Difficulty hearing, dysphagia, epistaxis, recent dental work                                       CV:  Denies Chest pain, palpitations, KAUFMAN, orthopnea                                                                                          RESPIRATORY:  Denies Wheezing, SOB, cough / sputum, hemoptysis                                                                GI:  Denies Nausea, vomiting, diarrhea, constipation, melena, difficulty swallowing                                               : Denies Hematuria, dysuria, urgency, incontinence                                                                                         MUSCULOSKELETAL: + Lower back pain. + LUE weakness. Denies arthritis, joint swelling.                                                             SKIN/BREAST:  Denies rash, itching, hair loss, masses                                                                                            PSYCH:  Denies depression, anxiety, suicidal ideation                                                                                               HEME/LYMPH:  Denies bruises easily, enlarged lymph nodes, tender lymph nodes                                        ENDOCRINE:  Denies cold intolerance, heat intolerance, polydipsia

## 2023-09-14 NOTE — H&P ADULT - NSHPLABSRESULTS_GEN_ALL_CORE
< from: Xray Lumbar Spine AP + Lateral (09.06.23 @ 15:02) >  Impression:  Stable compression fractures L1-L3 and L5. No acute fracture or   malalignment.      < from: Xray Chest PA/Lat Pre Surgical Testing 2 Views (09.06.23 @ 15:01) >  IMPRESSION:  No acute pathology.      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      < from: CT Angio Chest Aorta w/wo IV Cont (04.18.23 @ 15:41) >  IMPRESSION:  1.  Slight increase in greatest dimensions of a descending thoracic   aortic aneurysm. Thoracic/abdominal aortic measurements are otherwise   stable. Fusiform infrarenal abdominal aortic aneurysm.  2.  Stable benign-appearing lung nodules.

## 2023-09-14 NOTE — PATIENT PROFILE ADULT - FALL HARM RISK - HARM RISK INTERVENTIONS

## 2023-09-14 NOTE — H&P ADULT - ASSESSMENT
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 He is followed by Dr. Toledo as an outpatient for his descending aortic aneurysm. He presented to [facility] on 9/14/23 for a pre-operative lumbar drain placement with plan for TEVAR tomorrow.     Plan:    Neurovascular:   -No delirium.   -Pain regimen, PRN's: Tylenol     Cardiovascular: Descending aortic aneurysm   - Lumbar drain placement today with neurosurgery   - OR tomorrow for TEVAR with Dr. Toledo   - hx HTN: Continue Metoprolol 50mg BID   - hx HLD: Continue Lipitor 80   -Hemodynamically stable. HR controlled.  -Continue to monitor HR, BP, telemetry      Respiratory: COPD; former smoker  - Reports not consistently using spiriva/Wixela at home  - PRN duoneb  -Oxygenating well on RA   -Encourage coughing and use of IS 10x / hr while awake.    GI: stable  -PPX: Protonix  -PO Diet: DASH/TLC: NPO after midnight    Renal / : Stable  -Monitor renal function.  -Monitor I/O's.  -BUN/Cr: Pending    Endocrine:  No hx DM or thyroid disease  -A1c: pending  -TSH: pending    Hematologic: hx anemia  -H/H: pending  -Coagulation Panel: pending     ID: stable  -Temperature: afebrile   -WBC: pending  -Observe for SIRS/Sepsis Syndrome.    Prophylaxis:  - holding chemical dvt prophylaxis i/s/o lumbar drain placement   -Continue with SCD's    Disposition:  -Discharge home when medically appropriate

## 2023-09-14 NOTE — CONSULT NOTE ADULT - SUBJECTIVE AND OBJECTIVE BOX
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 Neurosurgery consulted for LD placement prior to TEVAR.         PAST MEDICAL & SURGICAL HISTORY:  HTN (hypertension)  Aortic regurgitation  Anemia  Arthritis  COPD, mild  Bladder distention  BPH (benign prostatic hyperplasia)  H/O cataract extraction  B/LAortic valve replaced    FAMILY HISTORY:  Family history of Marfan syndrome (Uncle)  FH: aortic aneurysm (Uncle)    Allergies  No Known Allergies    Intolerances    REVIEW OF SYSTEMS      General: no recent illnesses, no recent wt gain/loss  Skin/Breast:  intact  Ophthalmologic:  negative  ENMT:	negative  Respiratory and Thorax: no coughing, wheezing, recent URI  Cardiovascular: no chest pain, KAUFMAN  Gastrointestinal:	soft, non tender  Genitourinary: no frequency, dysuria  Musculoskeletal:	negative  Neurological:	see HPI  Psychiatric:	negative  Hematology/Lymphatics:	negative  Endocrine:  	negative  Allergic/Immunologic:  Negative      MEDICATIONS:  Antibiotics:    Neuro:  acetaminophen     Tablet .. 650 milliGRAM(s) Oral every 6 hours PRN  sertraline 25 milliGRAM(s) Oral daily    Anticoagulation:    OTHER:  albuterol/ipratropium for Nebulization 3 milliLiter(s) Nebulizer every 6 hours PRN  atorvastatin 80 milliGRAM(s) Oral at bedtime  influenza  Vaccine (HIGH DOSE) 0.7 milliLiter(s) IntraMuscular once  metoprolol tartrate 50 milliGRAM(s) Oral two times a day  pantoprazole    Tablet 40 milliGRAM(s) Oral before breakfast    IVF:  sodium chloride 0.9% lock flush 3 milliLiter(s) IV Push every 8 hours      Vital Signs Last 24 Hrs  T(C): 36.8 (14 Sep 2023 12:57), Max: 36.8 (14 Sep 2023 12:57)  T(F): 98.3 (14 Sep 2023 12:57), Max: 98.3 (14 Sep 2023 12:57)  HR: 52 (14 Sep 2023 12:48) (52 - 57)  BP: 125/65 (14 Sep 2023 12:48) (125/65 - 138/75)  BP(mean): 89 (14 Sep 2023 12:48) (89 - 100)  RR: 16 (14 Sep 2023 12:48) (16 - 18)  SpO2: 96% (14 Sep 2023 12:48) (95% - 96%)    Parameters below as of 14 Sep 2023 12:48  Patient On (Oxygen Delivery Method): room air        PHYSICAL EXAM:  Constitutional: Alert, awake, NAD  Eyes: PERRLA, EOMI  Neck: C-spine midline, Full range of motion of neck, no obvious deformities  Back: L-spine no obvious deformities, no tenderness, no erythema or swelling  Respiratory: breathing comfortably on room air  Cardiovascular: S1 S2 RRR  Gastrointestinal: Abd soft, non-tender, non-distended  Vascular: Warm well perfused in all extremities  Neurological: AAOx3. NAD. PERRLA. EOMI. Strength 5/5 UE and LE. Sensation intact. No pronator drift. Finger to nose intact.   Skin: intact     LABS:                        11.8   5.30  )-----------( 171      ( 14 Sep 2023 12:49 )             34.6           PT/INR - ( 14 Sep 2023 12:49 )   PT: 12.0 sec;   INR: 1.05          PTT - ( 14 Sep 2023 12:49 )  PTT:26.5 sec

## 2023-09-14 NOTE — CONSULT NOTE ADULT - ASSESSMENT
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 for treatment of descending aortic artery aneurysm. Neurosurgery consulted for LD placement prior to TEVAR. Labs and imaging reviewed.    -Consent for lumbar drain placement obtained and placed in chart. Risks and benefits explained and discussed.  - Drain 10cc/hr  - Rest of care as per primary team  - Discussed with Dr. Jarvis

## 2023-09-14 NOTE — H&P ADULT - NSHPPHYSICALEXAM_GEN_ALL_CORE
Appearance: No acute distress.  Neurologic: + LUE weakness 4/5.  All other extremities 5/5 strength. AAOx3, no AMS.  Responds appropriately to verbal and physical stimuli; exhibits purposeful movement in all extremities.  HEENT:   MMM, PERRLA, EOMI	b/l  Neck: Supple  Cardiovascular: RRR, S1 S2. No m/r/g.  Respiratory: No acute respiratory distress. CTA b/l, no w/r/r.   Gastrointestinal:  Soft, non-tender, non-distended, + BS.	  Skin: No rashes. No ecchymoses. No cyanosis.  Extremities: Exhibits normal range of motion, no clubbing, cyanosis or edema.  Vascular: Peripheral pulses palpable 2+ bilaterally.

## 2023-09-14 NOTE — PROGRESS NOTE ADULT - SUBJECTIVE AND OBJECTIVE BOX
CTICU  CRITICAL  CARE  attending     Hand off received 					   Pertinent clinical, laboratory, radiographic, hemodynamic, echocardiographic, respiratory data, microbiologic data and chart were reviewed and analyzed frequently throughout the course of the day and night  Patient seen and examined with CTS/ SH attending at bedside  Pt is a 68y , Male, HEALTH ISSUES - PROBLEM Dx:      , FAMILY HISTORY:  Family history of Marfan syndrome (Uncle)    FH: aortic aneurysm (Uncle)    PAST MEDICAL & SURGICAL HISTORY:  HTN (hypertension)      Aortic regurgitation      Anemia      Arthritis      COPD, mild      Bladder distention      BPH (benign prostatic hyperplasia)      H/O cataract extraction  B/L      Aortic valve replaced        Patient is a 68y old  Male who presents with a chief complaint of Thoracic Aortic Aneurysm (14 Sep 2023 13:35)      14 system review was unremarkable    Vital signs, hemodynamic and respiratory parameters were reviewed from the bedside nursing flowsheet.  ICU Vital Signs Last 24 Hrs  T(C): 36.6 (14 Sep 2023 18:18), Max: 36.8 (14 Sep 2023 12:57)  T(F): 97.8 (14 Sep 2023 18:18), Max: 98.3 (14 Sep 2023 12:57)  HR: 54 (14 Sep 2023 20:00) (50 - 57)  BP: 117/63 (14 Sep 2023 16:00) (117/63 - 138/75)  BP(mean): 85 (14 Sep 2023 16:00) (85 - 100)  ABP: 119/54 (14 Sep 2023 20:00) (112/46 - 128/55)  ABP(mean): 77 (14 Sep 2023 20:00) (69 - 82)  RR: 20 (14 Sep 2023 20:00) (16 - 20)  SpO2: 97% (14 Sep 2023 20:00) (94% - 99%)    O2 Parameters below as of 14 Sep 2023 20:00  Patient On (Oxygen Delivery Method): room air          Adult Advanced Hemodynamics Last 24 Hrs  CVP(mm Hg): --  CVP(cm H2O): --  CO: --  CI: --  PA: --  PA(mean): --  PCWP: --  SVR: --  SVRI: --  PVR: --  PVRI: --,     Intake and output was reviewed and the fluid balance was calculated  Daily     Daily   I&O's Summary    14 Sep 2023 07:01  -  14 Sep 2023 20:20  --------------------------------------------------------  IN: 180 mL / OUT: 630 mL / NET: -450 mL        All lines and drain sites were assessed  Glycemic trend was reviewedCAPILLARY BLOOD GLUCOSE        No acute change in mental status  Auscultation of the chest reveals equal bs  Abdomen is soft  Extremities are warm and well perfused  Wounds appear clean and unremarkable  Antibiotics are periop    labs  CBC Full  -  ( 14 Sep 2023 12:49 )  WBC Count : 5.30 K/uL  RBC Count : 3.52 M/uL  Hemoglobin : 11.8 g/dL  Hematocrit : 34.6 %  Platelet Count - Automated : 171 K/uL  Mean Cell Volume : 98.3 fl  Mean Cell Hemoglobin : 33.5 pg  Mean Cell Hemoglobin Concentration : 34.1 gm/dL  Auto Neutrophil # : 2.90 K/uL  Auto Lymphocyte # : 1.47 K/uL  Auto Monocyte # : 0.73 K/uL  Auto Eosinophil # : 0.16 K/uL  Auto Basophil # : 0.03 K/uL  Auto Neutrophil % : 54.7 %  Auto Lymphocyte % : 27.7 %  Auto Monocyte % : 13.8 %  Auto Eosinophil % : 3.0 %  Auto Basophil % : 0.6 %    09-14    138  |  100  |  19  ----------------------------<  95  3.7   |  25  |  0.76    Ca    9.8      14 Sep 2023 12:49    TPro  8.4<H>  /  Alb  4.6  /  TBili  0.4  /  DBili  x   /  AST  29  /  ALT  39  /  AlkPhos  98  09-14    PT/INR - ( 14 Sep 2023 12:49 )   PT: 12.0 sec;   INR: 1.05          PTT - ( 14 Sep 2023 12:49 )  PTT:26.5 sec  The current medications were reviewed   MEDICATIONS  (STANDING):  atorvastatin 80 milliGRAM(s) Oral at bedtime  influenza  Vaccine (HIGH DOSE) 0.7 milliLiter(s) IntraMuscular once  metoprolol tartrate 50 milliGRAM(s) Oral two times a day  pantoprazole    Tablet 40 milliGRAM(s) Oral before breakfast  sertraline 25 milliGRAM(s) Oral daily  sodium chloride 0.9% lock flush 3 milliLiter(s) IV Push every 8 hours    MEDICATIONS  (PRN):  acetaminophen     Tablet .. 650 milliGRAM(s) Oral every 6 hours PRN Mild Pain (1 - 3)  albuterol/ipratropium for Nebulization 3 milliLiter(s) Nebulizer every 6 hours PRN Shortness of Breath and/or Wheezing       PROBLEM LIST/ ASSESSMENT:  HEALTH ISSUES - PROBLEM Dx:      ,   Patient is a 68y old  Male who presents with a chief complaint of Thoracic Aortic Aneurysm (14 Sep 2023 13:35)     preop for tevar           My plan includes :  close hemodynamic, ventilatory and drain monitoring and management per post op routine    Monitor for arrhythmias and monitor parameters for organ perfusion  beta blockade not administered due to hemodynamic instability and bradycardia  monitor neurologic status  Head of the bed should remain elevated to 45 deg .   chest PT and IS will be encouraged  monitor adequacy of oxygenation and ventilation and attempt to wean oxygen  antibiotic regimen will be tailored to the clinical, laboratory and microbiologic data  Nutritional goals will be met using po eventually , ensure adequate caloric intake and montior the same  Stress ulcer and VTE prophylaxis will be achieved    Glycemic control is satisfactory  Electrolytes have been repleted as necessary and wound care has been carried out. Pain control has been achieved.   agressive physical therapy and early mobility and ambulation goals will be met   The family was updated about the course and plan  CRITICAL CARE TIME Upon my evaluation, this patient had a high probability of imminent or life-threatening deterioration due to the above problems which required my direct attention, intervention, and personal management.  I have personally provided 110 minutes of critical care time exclusive of time spent on separately billable procedures. Time included review of laboratory data, radiology results, discussion with consultants, and monitoring for potential decompensation. Interventions were performed as documented abovepersonally provided by me  in evaluation and management, reassessments, review and interpretation of labs and x-rays, ventilator and hemodynamic management, formulating a plan and coordinating care: ___110___ MIN.  Time does not include procedural time.    CTICU ATTENDING     					    Brando Castaneda MD

## 2023-09-14 NOTE — PROCEDURE NOTE - NSPROCDETAILS_GEN_ALL_CORE
location identified, draped/prepped, sterile technique used, needle inserted/introduced/sutured in place/Seldinger technique/all materials/supplies accounted for at end of procedure

## 2023-09-15 ENCOUNTER — APPOINTMENT (OUTPATIENT)
Dept: CARDIOTHORACIC SURGERY | Facility: HOSPITAL | Age: 68
End: 2023-09-15

## 2023-09-15 ENCOUNTER — TRANSCRIPTION ENCOUNTER (OUTPATIENT)
Age: 68
End: 2023-09-15

## 2023-09-15 LAB
ALBUMIN SERPL ELPH-MCNC: 3.9 G/DL — SIGNIFICANT CHANGE UP (ref 3.3–5)
ALBUMIN SERPL ELPH-MCNC: 4.1 G/DL — SIGNIFICANT CHANGE UP (ref 3.3–5)
ALP SERPL-CCNC: 92 U/L — SIGNIFICANT CHANGE UP (ref 40–120)
ALP SERPL-CCNC: 93 U/L — SIGNIFICANT CHANGE UP (ref 40–120)
ALT FLD-CCNC: 29 U/L — SIGNIFICANT CHANGE UP (ref 10–45)
ALT FLD-CCNC: 36 U/L — SIGNIFICANT CHANGE UP (ref 10–45)
ANION GAP SERPL CALC-SCNC: 11 MMOL/L — SIGNIFICANT CHANGE UP (ref 5–17)
ANION GAP SERPL CALC-SCNC: 12 MMOL/L — SIGNIFICANT CHANGE UP (ref 5–17)
APTT BLD: 26.9 SEC — SIGNIFICANT CHANGE UP (ref 24.5–35.6)
APTT BLD: 31.1 SEC — SIGNIFICANT CHANGE UP (ref 24.5–35.6)
AST SERPL-CCNC: 20 U/L — SIGNIFICANT CHANGE UP (ref 10–40)
AST SERPL-CCNC: 27 U/L — SIGNIFICANT CHANGE UP (ref 10–40)
BASOPHILS # BLD AUTO: 0.01 K/UL — SIGNIFICANT CHANGE UP (ref 0–0.2)
BASOPHILS NFR BLD AUTO: 0.1 % — SIGNIFICANT CHANGE UP (ref 0–2)
BILIRUB SERPL-MCNC: 0.3 MG/DL — SIGNIFICANT CHANGE UP (ref 0.2–1.2)
BILIRUB SERPL-MCNC: 0.4 MG/DL — SIGNIFICANT CHANGE UP (ref 0.2–1.2)
BUN SERPL-MCNC: 18 MG/DL — SIGNIFICANT CHANGE UP (ref 7–23)
BUN SERPL-MCNC: 20 MG/DL — SIGNIFICANT CHANGE UP (ref 7–23)
CALCIUM SERPL-MCNC: 8.8 MG/DL — SIGNIFICANT CHANGE UP (ref 8.4–10.5)
CALCIUM SERPL-MCNC: 9.6 MG/DL — SIGNIFICANT CHANGE UP (ref 8.4–10.5)
CHLORIDE SERPL-SCNC: 100 MMOL/L — SIGNIFICANT CHANGE UP (ref 96–108)
CHLORIDE SERPL-SCNC: 104 MMOL/L — SIGNIFICANT CHANGE UP (ref 96–108)
CO2 SERPL-SCNC: 22 MMOL/L — SIGNIFICANT CHANGE UP (ref 22–31)
CO2 SERPL-SCNC: 24 MMOL/L — SIGNIFICANT CHANGE UP (ref 22–31)
CREAT SERPL-MCNC: 0.73 MG/DL — SIGNIFICANT CHANGE UP (ref 0.5–1.3)
CREAT SERPL-MCNC: 0.75 MG/DL — SIGNIFICANT CHANGE UP (ref 0.5–1.3)
EGFR: 98 ML/MIN/1.73M2 — SIGNIFICANT CHANGE UP
EGFR: 98 ML/MIN/1.73M2 — SIGNIFICANT CHANGE UP
EOSINOPHIL # BLD AUTO: 0.16 K/UL — SIGNIFICANT CHANGE UP (ref 0–0.5)
EOSINOPHIL NFR BLD AUTO: 2.3 % — SIGNIFICANT CHANGE UP (ref 0–6)
GAS PNL BLDA: SIGNIFICANT CHANGE UP
GAS PNL BLDA: SIGNIFICANT CHANGE UP
GLUCOSE BLDC GLUCOMTR-MCNC: 85 MG/DL — SIGNIFICANT CHANGE UP (ref 70–99)
GLUCOSE SERPL-MCNC: 100 MG/DL — HIGH (ref 70–99)
GLUCOSE SERPL-MCNC: 98 MG/DL — SIGNIFICANT CHANGE UP (ref 70–99)
HCT VFR BLD CALC: 32.3 % — LOW (ref 39–50)
HCT VFR BLD CALC: 33.6 % — LOW (ref 39–50)
HGB BLD-MCNC: 11.1 G/DL — LOW (ref 13–17)
HGB BLD-MCNC: 11.6 G/DL — LOW (ref 13–17)
IMM GRANULOCYTES NFR BLD AUTO: 0.4 % — SIGNIFICANT CHANGE UP (ref 0–0.9)
INR BLD: 1.09 — SIGNIFICANT CHANGE UP (ref 0.85–1.18)
INR BLD: 1.19 — HIGH (ref 0.85–1.18)
ISTAT ARTERIAL BE: -3 MMOL/L — LOW (ref -2–3)
ISTAT ARTERIAL GLUCOSE: 91 MG/DL — SIGNIFICANT CHANGE UP (ref 70–99)
ISTAT ARTERIAL HCO3: 22 MMOL/L — SIGNIFICANT CHANGE UP (ref 22–26)
ISTAT ARTERIAL HEMATOCRIT: 33 % — LOW (ref 39–50)
ISTAT ARTERIAL HEMOGLOBIN: 11.2 G/DL — LOW (ref 13–17)
ISTAT ARTERIAL IONIZED CALCIUM: 1.21 MMOL/L — SIGNIFICANT CHANGE UP (ref 1.12–1.3)
ISTAT ARTERIAL PCO2: 36 MMHG — SIGNIFICANT CHANGE UP (ref 35–45)
ISTAT ARTERIAL PH: 7.39 — SIGNIFICANT CHANGE UP (ref 7.35–7.45)
ISTAT ARTERIAL PO2: 84 MMHG — SIGNIFICANT CHANGE UP (ref 80–105)
ISTAT ARTERIAL POTASSIUM: 3.7 MMOL/L — SIGNIFICANT CHANGE UP (ref 3.5–5.3)
ISTAT ARTERIAL SO2: 96 % — SIGNIFICANT CHANGE UP (ref 95–98)
ISTAT ARTERIAL SODIUM: 138 MMOL/L — SIGNIFICANT CHANGE UP (ref 135–145)
ISTAT ARTERIAL TCO2: 23 MMOL/L — SIGNIFICANT CHANGE UP (ref 22–31)
LYMPHOCYTES # BLD AUTO: 1.12 K/UL — SIGNIFICANT CHANGE UP (ref 1–3.3)
LYMPHOCYTES # BLD AUTO: 16.3 % — SIGNIFICANT CHANGE UP (ref 13–44)
MAGNESIUM SERPL-MCNC: 1.9 MG/DL — SIGNIFICANT CHANGE UP (ref 1.6–2.6)
MAGNESIUM SERPL-MCNC: 2 MG/DL — SIGNIFICANT CHANGE UP (ref 1.6–2.6)
MCHC RBC-ENTMCNC: 34.2 PG — HIGH (ref 27–34)
MCHC RBC-ENTMCNC: 34.4 GM/DL — SIGNIFICANT CHANGE UP (ref 32–36)
MCHC RBC-ENTMCNC: 34.4 PG — HIGH (ref 27–34)
MCHC RBC-ENTMCNC: 34.5 GM/DL — SIGNIFICANT CHANGE UP (ref 32–36)
MCV RBC AUTO: 100 FL — SIGNIFICANT CHANGE UP (ref 80–100)
MCV RBC AUTO: 99.1 FL — SIGNIFICANT CHANGE UP (ref 80–100)
MONOCYTES # BLD AUTO: 0.4 K/UL — SIGNIFICANT CHANGE UP (ref 0–0.9)
MONOCYTES NFR BLD AUTO: 5.8 % — SIGNIFICANT CHANGE UP (ref 2–14)
NEUTROPHILS # BLD AUTO: 5.15 K/UL — SIGNIFICANT CHANGE UP (ref 1.8–7.4)
NEUTROPHILS NFR BLD AUTO: 75.1 % — SIGNIFICANT CHANGE UP (ref 43–77)
NRBC # BLD: 0 /100 WBCS — SIGNIFICANT CHANGE UP (ref 0–0)
NRBC # BLD: 0 /100 WBCS — SIGNIFICANT CHANGE UP (ref 0–0)
PHOSPHATE SERPL-MCNC: 4 MG/DL — SIGNIFICANT CHANGE UP (ref 2.5–4.5)
PHOSPHATE SERPL-MCNC: 4 MG/DL — SIGNIFICANT CHANGE UP (ref 2.5–4.5)
PLATELET # BLD AUTO: 114 K/UL — LOW (ref 150–400)
PLATELET # BLD AUTO: 162 K/UL — SIGNIFICANT CHANGE UP (ref 150–400)
POTASSIUM SERPL-MCNC: 3.9 MMOL/L — SIGNIFICANT CHANGE UP (ref 3.5–5.3)
POTASSIUM SERPL-MCNC: 4 MMOL/L — SIGNIFICANT CHANGE UP (ref 3.5–5.3)
POTASSIUM SERPL-SCNC: 3.9 MMOL/L — SIGNIFICANT CHANGE UP (ref 3.5–5.3)
POTASSIUM SERPL-SCNC: 4 MMOL/L — SIGNIFICANT CHANGE UP (ref 3.5–5.3)
PROT SERPL-MCNC: 7.2 G/DL — SIGNIFICANT CHANGE UP (ref 6–8.3)
PROT SERPL-MCNC: 7.9 G/DL — SIGNIFICANT CHANGE UP (ref 6–8.3)
PROTHROM AB SERPL-ACNC: 12.4 SEC — SIGNIFICANT CHANGE UP (ref 9.5–13)
PROTHROM AB SERPL-ACNC: 13.5 SEC — HIGH (ref 9.5–13)
RBC # BLD: 3.23 M/UL — LOW (ref 4.2–5.8)
RBC # BLD: 3.39 M/UL — LOW (ref 4.2–5.8)
RBC # FLD: 13.6 % — SIGNIFICANT CHANGE UP (ref 10.3–14.5)
RBC # FLD: 13.7 % — SIGNIFICANT CHANGE UP (ref 10.3–14.5)
SODIUM SERPL-SCNC: 136 MMOL/L — SIGNIFICANT CHANGE UP (ref 135–145)
SODIUM SERPL-SCNC: 137 MMOL/L — SIGNIFICANT CHANGE UP (ref 135–145)
WBC # BLD: 5.27 K/UL — SIGNIFICANT CHANGE UP (ref 3.8–10.5)
WBC # BLD: 6.87 K/UL — SIGNIFICANT CHANGE UP (ref 3.8–10.5)
WBC # FLD AUTO: 5.27 K/UL — SIGNIFICANT CHANGE UP (ref 3.8–10.5)
WBC # FLD AUTO: 6.87 K/UL — SIGNIFICANT CHANGE UP (ref 3.8–10.5)

## 2023-09-15 PROCEDURE — 33880 EVASC RPR TA NDGFT COV LSA: CPT | Mod: 62

## 2023-09-15 PROCEDURE — 36200 PLACE CATHETER IN AORTA: CPT | Mod: 50

## 2023-09-15 PROCEDURE — 71045 X-RAY EXAM CHEST 1 VIEW: CPT | Mod: 26

## 2023-09-15 PROCEDURE — 75956: CPT | Mod: 26

## 2023-09-15 PROCEDURE — 99292 CRITICAL CARE ADDL 30 MIN: CPT

## 2023-09-15 PROCEDURE — 33880 EVASC RPR TA NDGFT COV LSA: CPT | Mod: GC,62

## 2023-09-15 PROCEDURE — 34713 PERQ ACCESS & CLSR FEM ART: CPT | Mod: RT

## 2023-09-15 PROCEDURE — 37252 INTRVASC US NONCORONARY 1ST: CPT

## 2023-09-15 PROCEDURE — 75956: CPT | Mod: 26,80

## 2023-09-15 PROCEDURE — 93010 ELECTROCARDIOGRAM REPORT: CPT

## 2023-09-15 PROCEDURE — 99291 CRITICAL CARE FIRST HOUR: CPT

## 2023-09-15 PROCEDURE — 76937 US GUIDE VASCULAR ACCESS: CPT | Mod: 26,LT

## 2023-09-15 DEVICE — INTRODUCER SHEATH DRYSEAL FLEX 18FR X 33CM: Type: IMPLANTABLE DEVICE | Status: FUNCTIONAL

## 2023-09-15 DEVICE — INTRO MICROPUNC 4FRX10CM SS: Type: IMPLANTABLE DEVICE | Status: FUNCTIONAL

## 2023-09-15 DEVICE — SHEATH INTRODUCER TERUMO PINNACLE CORONARY 8FR X 10CM X 0.038" MINI WIRE: Type: IMPLANTABLE DEVICE | Status: FUNCTIONAL

## 2023-09-15 DEVICE — SHEATH INTRODUCER TERUMO PINNACLE CORONARY 6FR X 10CM X 0.038" MINI WIRE: Type: IMPLANTABLE DEVICE | Status: FUNCTIONAL

## 2023-09-15 DEVICE — SUT PERCLOSE PROGLIDE 6FR: Type: IMPLANTABLE DEVICE | Status: FUNCTIONAL

## 2023-09-15 DEVICE — KIT CVC 3LUM SPECTRUM 9FR: Type: IMPLANTABLE DEVICE | Status: FUNCTIONAL

## 2023-09-15 DEVICE — IMPLANTABLE DEVICE: Type: IMPLANTABLE DEVICE | Status: FUNCTIONAL

## 2023-09-15 DEVICE — GUIDEWIRE LUNDERQUIST EXTRA-STIFF DOUBLE CURVED .035" X 260CM: Type: IMPLANTABLE DEVICE | Status: FUNCTIONAL

## 2023-09-15 DEVICE — CATH SOFTVU BERENSTN 5FRX100: Type: IMPLANTABLE DEVICE | Status: FUNCTIONAL

## 2023-09-15 DEVICE — SHEATH INTRODUCER TERUMO PINNACLE CORONARY 5FR X 10CM X 0.038" MINI WIRE: Type: IMPLANTABLE DEVICE | Status: FUNCTIONAL

## 2023-09-15 RX ORDER — METOPROLOL TARTRATE 50 MG
2.5 TABLET ORAL ONCE
Refills: 0 | Status: DISCONTINUED | OUTPATIENT
Start: 2023-09-15 | End: 2023-09-16

## 2023-09-15 RX ORDER — NICARDIPINE HYDROCHLORIDE 30 MG/1
5 CAPSULE, EXTENDED RELEASE ORAL
Qty: 40 | Refills: 0 | Status: DISCONTINUED | OUTPATIENT
Start: 2023-09-15 | End: 2023-09-16

## 2023-09-15 RX ORDER — SERTRALINE 25 MG/1
25 TABLET, FILM COATED ORAL DAILY
Refills: 0 | Status: DISCONTINUED | OUTPATIENT
Start: 2023-09-15 | End: 2023-09-23

## 2023-09-15 RX ORDER — ALBUMIN HUMAN 25 %
250 VIAL (ML) INTRAVENOUS
Refills: 0 | Status: COMPLETED | OUTPATIENT
Start: 2023-09-15 | End: 2023-09-15

## 2023-09-15 RX ORDER — FENTANYL CITRATE 50 UG/ML
12.5 INJECTION INTRAVENOUS
Refills: 0 | Status: DISCONTINUED | OUTPATIENT
Start: 2023-09-15 | End: 2023-09-16

## 2023-09-15 RX ORDER — CHLORHEXIDINE GLUCONATE 213 G/1000ML
1 SOLUTION TOPICAL DAILY
Refills: 0 | Status: DISCONTINUED | OUTPATIENT
Start: 2023-09-15 | End: 2023-09-22

## 2023-09-15 RX ORDER — SODIUM CHLORIDE 9 MG/ML
1000 INJECTION INTRAMUSCULAR; INTRAVENOUS; SUBCUTANEOUS
Refills: 0 | Status: DISCONTINUED | OUTPATIENT
Start: 2023-09-15 | End: 2023-09-23

## 2023-09-15 RX ORDER — POLYETHYLENE GLYCOL 3350 17 G/17G
17 POWDER, FOR SOLUTION ORAL DAILY
Refills: 0 | Status: DISCONTINUED | OUTPATIENT
Start: 2023-09-15 | End: 2023-09-23

## 2023-09-15 RX ORDER — ACETAMINOPHEN 500 MG
650 TABLET ORAL EVERY 6 HOURS
Refills: 0 | Status: DISCONTINUED | OUTPATIENT
Start: 2023-09-15 | End: 2023-09-17

## 2023-09-15 RX ORDER — TIOTROPIUM BROMIDE 18 UG/1
2 CAPSULE ORAL; RESPIRATORY (INHALATION) DAILY
Refills: 0 | Status: DISCONTINUED | OUTPATIENT
Start: 2023-09-15 | End: 2023-09-23

## 2023-09-15 RX ORDER — MAGNESIUM SULFATE 500 MG/ML
2 VIAL (ML) INJECTION ONCE
Refills: 0 | Status: COMPLETED | OUTPATIENT
Start: 2023-09-15 | End: 2023-09-15

## 2023-09-15 RX ORDER — PANTOPRAZOLE SODIUM 20 MG/1
40 TABLET, DELAYED RELEASE ORAL DAILY
Refills: 0 | Status: DISCONTINUED | OUTPATIENT
Start: 2023-09-15 | End: 2023-09-23

## 2023-09-15 RX ORDER — CEFAZOLIN SODIUM 1 G
2000 VIAL (EA) INJECTION EVERY 8 HOURS
Refills: 0 | Status: COMPLETED | OUTPATIENT
Start: 2023-09-15 | End: 2023-09-17

## 2023-09-15 RX ORDER — SENNA PLUS 8.6 MG/1
2 TABLET ORAL AT BEDTIME
Refills: 0 | Status: DISCONTINUED | OUTPATIENT
Start: 2023-09-16 | End: 2023-09-23

## 2023-09-15 RX ORDER — ATORVASTATIN CALCIUM 80 MG/1
80 TABLET, FILM COATED ORAL AT BEDTIME
Refills: 0 | Status: DISCONTINUED | OUTPATIENT
Start: 2023-09-15 | End: 2023-09-23

## 2023-09-15 RX ORDER — OXYCODONE HYDROCHLORIDE 5 MG/1
5 TABLET ORAL EVERY 6 HOURS
Refills: 0 | Status: DISCONTINUED | OUTPATIENT
Start: 2023-09-15 | End: 2023-09-21

## 2023-09-15 RX ADMIN — PANTOPRAZOLE SODIUM 40 MILLIGRAM(S): 20 TABLET, DELAYED RELEASE ORAL at 06:04

## 2023-09-15 RX ADMIN — OXYCODONE HYDROCHLORIDE 5 MILLIGRAM(S): 5 TABLET ORAL at 22:44

## 2023-09-15 RX ADMIN — Medication 1500 MILLILITER(S): at 16:30

## 2023-09-15 RX ADMIN — Medication 125 MILLILITER(S): at 22:39

## 2023-09-15 RX ADMIN — NICARDIPINE HYDROCHLORIDE 25 MG/HR: 30 CAPSULE, EXTENDED RELEASE ORAL at 16:12

## 2023-09-15 RX ADMIN — Medication 125 MILLILITER(S): at 23:40

## 2023-09-15 RX ADMIN — Medication 650 MILLIGRAM(S): at 00:18

## 2023-09-15 RX ADMIN — FENTANYL CITRATE 12.5 MICROGRAM(S): 50 INJECTION INTRAVENOUS at 23:10

## 2023-09-15 RX ADMIN — FENTANYL CITRATE 12.5 MICROGRAM(S): 50 INJECTION INTRAVENOUS at 15:46

## 2023-09-15 RX ADMIN — Medication 650 MILLIGRAM(S): at 01:45

## 2023-09-15 RX ADMIN — Medication 1500 MILLILITER(S): at 15:39

## 2023-09-15 RX ADMIN — FENTANYL CITRATE 12.5 MICROGRAM(S): 50 INJECTION INTRAVENOUS at 22:40

## 2023-09-15 RX ADMIN — SODIUM CHLORIDE 3 MILLILITER(S): 9 INJECTION INTRAMUSCULAR; INTRAVENOUS; SUBCUTANEOUS at 05:32

## 2023-09-15 RX ADMIN — FENTANYL CITRATE 12.5 MICROGRAM(S): 50 INJECTION INTRAVENOUS at 15:16

## 2023-09-15 RX ADMIN — CHLORHEXIDINE GLUCONATE 12.5 MILLILITER(S): 213 SOLUTION TOPICAL at 10:41

## 2023-09-15 RX ADMIN — CHLORHEXIDINE GLUCONATE 1 APPLICATION(S): 213 SOLUTION TOPICAL at 06:04

## 2023-09-15 RX ADMIN — ATORVASTATIN CALCIUM 80 MILLIGRAM(S): 80 TABLET, FILM COATED ORAL at 21:41

## 2023-09-15 RX ADMIN — Medication 25 GRAM(S): at 05:35

## 2023-09-15 RX ADMIN — OXYCODONE HYDROCHLORIDE 5 MILLIGRAM(S): 5 TABLET ORAL at 21:41

## 2023-09-15 RX ADMIN — Medication 100 MILLIGRAM(S): at 21:41

## 2023-09-15 NOTE — BRIEF OPERATIVE NOTE - NSICDXBRIEFPREOP_GEN_ALL_CORE_FT
PRE-OP DIAGNOSIS:  Aortic aneurysm 15-Sep-2023 12:32:08  Nancy Ricardo  
PRE-OP DIAGNOSIS:  Aortic aneurysm 15-Sep-2023 12:32:08  Nancy Ricardo

## 2023-09-15 NOTE — BRIEF OPERATIVE NOTE - OPERATION/FINDINGS
Patient prepped and draped [facility]. R groin main access for main body. Max sheath size 20F on R groin, max sheath size 5F on L groin. Graft main bodies deployed in 3 pieces using Terumo Relay Pro device. Most proximal device 38mm, middle device 40mm, and most distal device 38mm. Partial coverage of the L subclavian artery. Completion angiography shows patent great vessels. 3 proglides used for L groin, manual pressure for R groin. No complications.
TEVAR x 3 (38mm, 40mm, 38mm) covering 90% Subclavian artery  Primary access R CFA 21F Perclose x 2  Pigtail access L CFA manual pressure held

## 2023-09-15 NOTE — PROGRESS NOTE ADULT - ASSESSMENT
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    Recommendations:  - Patient should continue to lie flat for 4 hours  - Monitor neuro and pulse exam  - Rest of excellent care per ICU

## 2023-09-15 NOTE — PROGRESS NOTE ADULT - SUBJECTIVE AND OBJECTIVE BOX
Vascular Surgery Post op Check    STATUS POST:  Second stage thoracic endovascular aortic repair (TEVAR)  POST OPERATIVE DAY #: 0    SUBJECTIVE:   Patient seen and examined at bedside for post op check. Patient without acute complaints. Denies LE and UE paresthesias, weakness or pain. States he has been lying flat. Denies chest pain or SOB.     Vital Signs Last 24 Hrs  T(C): 36.6 (15 Sep 2023 10:31), Max: 36.9 (15 Sep 2023 05:03)  T(F): 97.8 (15 Sep 2023 09:28), Max: 98.4 (15 Sep 2023 05:03)  HR: 63 (15 Sep 2023 16:45) (54 - 70)  BP: 117/63 (15 Sep 2023 10:31) (117/63 - 132/78)  BP(mean): 85 (15 Sep 2023 10:31) (85 - 85)  RR: 16 (15 Sep 2023 16:45) (14 - 20)  SpO2: 97% (15 Sep 2023 16:45) (92% - 99%)    Parameters below as of 15 Sep 2023 16:45  Patient On (Oxygen Delivery Method): nasal cannula w/ humidification  O2 Flow (L/min): 2      I&O's Summary    14 Sep 2023 07:01  -  15 Sep 2023 07:00  --------------------------------------------------------  IN: 410 mL / OUT: 1415 mL / NET: -1005 mL    15 Sep 2023 07:01  -  15 Sep 2023 17:24  --------------------------------------------------------  IN: 555 mL / OUT: 910 mL / NET: -355 mL        Physical Exam:  General: Resting comfortably in bed, NAD  Neuro: A&Ox3, no focal deficits, spontaneously moving all 4 extremities   HEENT: ATNC  Pulmonary: Nonlabored breathing, no respiratory distress, no accessory muscle use noted  Cardiovascular: NSR  Abdomen: Soft, ND, NT. Lumbar drain noted  : House in place draining yellow urine  Groin: B/L groins soft without evidence of hematoma/ecchymosis   Extremities: WWP, SCDs in place. Palpable distal pulses noted in all distal extremities.     LABS:                        11.1   6.87  )-----------( 114      ( 15 Sep 2023 15:15 )             32.3     09-15    137  |  104  |  18  ----------------------------<  98  4.0   |  22  |  0.75    Ca    8.8      15 Sep 2023 15:15  Phos  4.0     09-15  Mg     2.0     09-15    TPro  7.2  /  Alb  3.9  /  TBili  0.3  /  DBili  x   /  AST  20  /  ALT  29  /  AlkPhos  92  09-15    PT/INR - ( 15 Sep 2023 15:15 )   PT: 13.5 sec;   INR: 1.19          PTT - ( 15 Sep 2023 15:15 )  PTT:31.1 sec  Urinalysis Basic - ( 15 Sep 2023 15:15 )    Color: x / Appearance: x / SG: x / pH: x  Gluc: 98 mg/dL / Ketone: x  / Bili: x / Urobili: x   Blood: x / Protein: x / Nitrite: x   Leuk Esterase: x / RBC: x / WBC x   Sq Epi: x / Non Sq Epi: x / Bacteria: x       Vascular Surgery Post op Check    STATUS POST:  Second stage thoracic endovascular aortic repair (TEVAR)  POST OPERATIVE DAY #: 0    SUBJECTIVE:   Patient seen and examined at bedside for post op check. Patient without acute complaints. Denies LE and UE paresthesias, weakness or pain. States he has been lying flat. Denies chest pain or SOB.     Vital Signs Last 24 Hrs  T(C): 36.6 (15 Sep 2023 10:31), Max: 36.9 (15 Sep 2023 05:03)  T(F): 97.8 (15 Sep 2023 09:28), Max: 98.4 (15 Sep 2023 05:03)  HR: 63 (15 Sep 2023 16:45) (54 - 70)  BP: 117/63 (15 Sep 2023 10:31) (117/63 - 132/78)  BP(mean): 85 (15 Sep 2023 10:31) (85 - 85)  RR: 16 (15 Sep 2023 16:45) (14 - 20)  SpO2: 97% (15 Sep 2023 16:45) (92% - 99%)    Parameters below as of 15 Sep 2023 16:45  Patient On (Oxygen Delivery Method): nasal cannula w/ humidification  O2 Flow (L/min): 2      I&O's Summary    14 Sep 2023 07:01  -  15 Sep 2023 07:00  --------------------------------------------------------  IN: 410 mL / OUT: 1415 mL / NET: -1005 mL    15 Sep 2023 07:01  -  15 Sep 2023 17:24  --------------------------------------------------------  IN: 555 mL / OUT: 910 mL / NET: -355 mL        Physical Exam:  General: Resting comfortably in bed, NAD  Neuro: A&Ox3, no focal deficits, spontaneously moving all 4 extremities   HEENT: ATNC  Pulmonary: Nonlabored breathing, no respiratory distress, no accessory muscle use noted  Cardiovascular: NSR  Abdomen: Soft, ND, NT. Lumbar drain noted  : House in place draining yellow urine  Groin: B/L groins soft without evidence of hematoma/ecchymosis   Extremities: WWP, SCDs in place. Palpable distal pulses noted in all distal extremities. Proximal muscle strength wnl in all extremities     LABS:                        11.1   6.87  )-----------( 114      ( 15 Sep 2023 15:15 )             32.3     09-15    137  |  104  |  18  ----------------------------<  98  4.0   |  22  |  0.75    Ca    8.8      15 Sep 2023 15:15  Phos  4.0     09-15  Mg     2.0     09-15    TPro  7.2  /  Alb  3.9  /  TBili  0.3  /  DBili  x   /  AST  20  /  ALT  29  /  AlkPhos  92  09-15    PT/INR - ( 15 Sep 2023 15:15 )   PT: 13.5 sec;   INR: 1.19          PTT - ( 15 Sep 2023 15:15 )  PTT:31.1 sec  Urinalysis Basic - ( 15 Sep 2023 15:15 )    Color: x / Appearance: x / SG: x / pH: x  Gluc: 98 mg/dL / Ketone: x  / Bili: x / Urobili: x   Blood: x / Protein: x / Nitrite: x   Leuk Esterase: x / RBC: x / WBC x   Sq Epi: x / Non Sq Epi: x / Bacteria: x

## 2023-09-15 NOTE — BRIEF OPERATIVE NOTE - NSICDXBRIEFPOSTOP_GEN_ALL_CORE_FT
POST-OP DIAGNOSIS:  Aortic aneurysm 15-Sep-2023 12:32:13  Nancy Ricardo  
POST-OP DIAGNOSIS:  Aortic aneurysm 15-Sep-2023 12:32:13  Nancy Ricardo

## 2023-09-15 NOTE — PROGRESS NOTE ADULT - SUBJECTIVE AND OBJECTIVE BOX
CTICU  CRITICAL  CARE  attending     Hand off received 					   Pertinent clinical, laboratory, radiographic, hemodynamic, echocardiographic, respiratory data, microbiologic data and chart were reviewed and analyzed frequently throughout the course of the day and night  Patient seen and examined with CTS/ SH attending at bedside    Pt is a 68y , Male, s/p TEVAR today;    LD placement preop bt NSx for CSF diversion  uneventful procedure  extubated in the OR  arrived on no drips  Hypertensive post procedure;  started on nicardipine gtt    no s/s of SC ischemia  MAPs maintained >90      69 y/o M, former smoker (40 yr hx,1/2PPD; quit 09/2022) and former ETOH misuse, with PMH of HTN, anemia, arthritis, COPD and aortic pathology/aortic insufficiency s/p mini-AVR (27mmbio), ascending and hemiarch replacement EF normal on 6/23/22, (postop course includes chronic occlusion of right ICA, 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 He is followed by Dr. Toledo as an outpatient for his descending aortic aneurysm. He presented to [facility] on 9/14/23 for a pre-operative lumbar drain placement with plan for TEVAR     , FAMILY HISTORY:  Family history of Marfan syndrome (Uncle)    FH: aortic aneurysm (Uncle)    PAST MEDICAL & SURGICAL HISTORY:  HTN (hypertension)      Aortic regurgitation      Anemia      Arthritis      COPD, mild      Bladder distention      BPH (benign prostatic hyperplasia)      H/O cataract extraction  B/L      Aortic valve replaced        Patient is a 68y old  Male who presents with a chief complaint of Thoracic Aortic Aneurysm (15 Sep 2023 17:23)      14 system review limited 2/2 post procedural discomfort    Vital signs, hemodynamic and respiratory parameters were reviewed from the bedside nursing flowsheet.  ICU Vital Signs Last 24 Hrs  T(C): 37 (15 Sep 2023 17:33), Max: 37 (15 Sep 2023 17:33)  T(F): 98.6 (15 Sep 2023 17:33), Max: 98.6 (15 Sep 2023 17:33)  HR: 63 (15 Sep 2023 16:45) (54 - 70)  BP: 117/63 (15 Sep 2023 10:31) (117/63 - 132/78)  BP(mean): 85 (15 Sep 2023 10:31) (85 - 85)  ABP: 137/63 (15 Sep 2023 16:45) (112/46 - 173/76)  ABP(mean): 91 (15 Sep 2023 16:45) (69 - 119)  RR: 16 (15 Sep 2023 16:45) (14 - 20)  SpO2: 97% (15 Sep 2023 16:45) (92% - 99%)    O2 Parameters below as of 15 Sep 2023 16:45  Patient On (Oxygen Delivery Method): nasal cannula w/ humidification  O2 Flow (L/min): 2        Adult Advanced Hemodynamics Last 24 Hrs  CVP(mm Hg): --  CVP(cm H2O): --  CO: --  CI: --  PA: --  PA(mean): --  PCWP: --  SVR: --  SVRI: --  PVR: --  PVRI: --, ABG - ( 15 Sep 2023 15:09 )  pH, Arterial: 7.40  pH, Blood: x     /  pCO2: 34    /  pO2: 96    / HCO3: 21    / Base Excess: -3.0  /  SaO2: 99.1                Intake and output was reviewed and the fluid balance was calculated  Daily Height in cm: 190.5 (15 Sep 2023 10:31)    Daily   I&O's Summary    14 Sep 2023 07:01  -  15 Sep 2023 07:00  --------------------------------------------------------  IN: 410 mL / OUT: 1415 mL / NET: -1005 mL    15 Sep 2023 07:01  -  15 Sep 2023 17:59  --------------------------------------------------------  IN: 555 mL / OUT: 910 mL / NET: -355 mL        All lines and drain sites were assessed  Glycemic trend was reviewedCAPILLARY BLOOD GLUCOSE      POCT Blood Glucose.: 85 mg/dL (15 Sep 2023 10:42)    No acute change in mental status  Auscultation of the chest reveals equal bs  Abdomen is soft  Extremities are warm and well perfused  Wounds appear clean and unremarkable  Antibiotics are periop    labs  CBC Full  -  ( 15 Sep 2023 15:15 )  WBC Count : 6.87 K/uL  RBC Count : 3.23 M/uL  Hemoglobin : 11.1 g/dL  Hematocrit : 32.3 %  Platelet Count - Automated : 114 K/uL  Mean Cell Volume : 100.0 fl  Mean Cell Hemoglobin : 34.4 pg  Mean Cell Hemoglobin Concentration : 34.4 gm/dL  Auto Neutrophil # : 5.15 K/uL  Auto Lymphocyte # : 1.12 K/uL  Auto Monocyte # : 0.40 K/uL  Auto Eosinophil # : 0.16 K/uL  Auto Basophil # : 0.01 K/uL  Auto Neutrophil % : 75.1 %  Auto Lymphocyte % : 16.3 %  Auto Monocyte % : 5.8 %  Auto Eosinophil % : 2.3 %  Auto Basophil % : 0.1 %    09-15    137  |  104  |  18  ----------------------------<  98  4.0   |  22  |  0.75    Ca    8.8      15 Sep 2023 15:15  Phos  4.0     09-15  Mg     2.0     09-15    TPro  7.2  /  Alb  3.9  /  TBili  0.3  /  DBili  x   /  AST  20  /  ALT  29  /  AlkPhos  92  09-15    PT/INR - ( 15 Sep 2023 15:15 )   PT: 13.5 sec;   INR: 1.19          PTT - ( 15 Sep 2023 15:15 )  PTT:31.1 sec  The current medications were reviewed   MEDICATIONS  (STANDING):  atorvastatin 80 milliGRAM(s) Oral at bedtime  ceFAZolin   IVPB 2000 milliGRAM(s) IV Intermittent every 8 hours  chlorhexidine 2% Cloths 1 Application(s) Topical daily  niCARdipine Infusion 5 mG/Hr (25 mL/Hr) IV Continuous <Continuous>  pantoprazole    Tablet 40 milliGRAM(s) Oral daily  polyethylene glycol 3350 17 Gram(s) Oral daily  sertraline 25 milliGRAM(s) Oral daily  sodium chloride 0.9%. 1000 milliLiter(s) (10 mL/Hr) IV Continuous <Continuous>  tiotropium 2.5 MICROgram(s) Inhaler 2 Puff(s) Inhalation daily    MEDICATIONS  (PRN):  acetaminophen     Tablet .. 650 milliGRAM(s) Oral every 6 hours PRN Temp greater or equal to 38C (100.4F), Mild Pain (1 - 3)  fentaNYL    Injectable 12.5 MICROGram(s) IV Push every 3 hours PRN breakthrough  oxyCODONE    IR 5 milliGRAM(s) Oral every 6 hours PRN Severe Pain (7 - 10)       PROBLEM LIST/ ASSESSMENT:  HEALTH ISSUES - PROBLEM Dx:      ,   Patient is a 68y old  Male who presents with a chief complaint of Thoracic Aortic Aneurysm (15 Sep 2023 17:23)     s/p TEVAR      My plan includes :    Maintain MAP >90  Titrate anti hypertensive gtt to maintain SBP < 130-140  supplemental O2  Titrate to maintain Sao2 >95%  serial ABGs  continue CSF diversion @ 10 cc/hr  Maintain ICP/ISP < 10  frequent LE motor function checks to r/o SC ischemia    close hemodynamic, ventilatory and drain monitoring and management per post op routine    Monitor for arrhythmias and monitor parameters for organ perfusion  monitor neurologic status  Head of the bed should remain elevated to 45 deg .   chest PT and IS will be encouraged  monitor adequacy of oxygenation and ventilation and attempt to wean oxygen  Nutritional goals will be met using po eventually , ensure adequate caloric intake and montior the same  Stress ulcer and VTE prophylaxis will be achieved    Glycemic control is satisfactory  Electrolytes have been repleted as necessary and wound care has been carried out. Pain control has been achieved.   agressive physical therapy and early mobility and ambulation goals will be met   The family was updated about the course and plan  CRITICAL CARE TIME SPENT in evaluation and management, reassessments, review and interpretation of labs and x-rays, ventilator and hemodynamic management, formulating a plan and coordinating care: ___90____ MIN.  Time does not include procedural time.  CTICU ATTENDING     					    Jamaal Figueredo MD

## 2023-09-15 NOTE — PRE-OP CHECKLIST - DENTURES
no Pt. lives in a pvt home with their spouse. Pt. has steps to negotiate at home. Pt. was previously ambulating  with a cane independently. Pt. was previously independent with ADLs however, they would benefit from occasional assistance. Pt. returned to bed at end of therapy session with all lines and tubes intact, call bell in reach and in NAD.

## 2023-09-15 NOTE — PROGRESS NOTE ADULT - SUBJECTIVE AND OBJECTIVE BOX
CTICU  CRITICAL  CARE  attending     Hand off received 					   Pertinent clinical, laboratory, radiographic, hemodynamic, echocardiographic, respiratory data, microbiologic data and chart were reviewed and analyzed frequently throughout the course of the day and night      68 year old male with HTN, anemia, arthritis, COPD and aortic pathology/aortic insufficiency s/p mini sternotomy AVR (27mmbio), ascending and hemiarch replacement EF normal on 6/23/22.  Postoperative course complicated by right pre cental gyrus infarct.  He is a former smoker (40 yr hx,1/2PPD; quit 09/2022) and former ETOH abuser.  He had a mechanical fall 11/2022 s/p left hip replacement.  He is followed by Dr. Toledo as an outpatient for his descending aortic aneurysm.  He was electively admitted to [facility] on 9/14/23 for a pre-operative lumbar drain placement.    S/P TEVAR.       FAMILY HISTORY:  Family history of Marfan syndrome (Uncle)    FH: aortic aneurysm (Uncle)    PAST MEDICAL & SURGICAL HISTORY:  HTN (hypertension)  Aortic regurgitation  Anemia  Arthritis  COPD  Bladder distention  BPH (benign prostatic hyperplasia)  H/O bilateral cataract extraction.  Aortic valve replaced            14 system review was unremarkable    Vital signs, hemodynamic and respiratory parameters were reviewed from the bedside nursing flow sheet.  ICU Vital Signs Last 24 Hrs  T(C): 37.2 (15 Sep 2023 20:33), Max: 37.2 (15 Sep 2023 20:33)  T(F): 99 (15 Sep 2023 20:33), Max: 99 (15 Sep 2023 20:33)  HR: 80 (15 Sep 2023 23:00) (54 - 96)  BP: 117/63 (15 Sep 2023 10:31) (117/63 - 132/78)  BP(mean): 85 (15 Sep 2023 10:31) (85 - 85)  ABP: 136/65 (15 Sep 2023 23:00) (120/62 - 173/76)  ABP(mean): 91 (15 Sep 2023 23:00) (83 - 119)  RR: 18 (15 Sep 2023 23:00) (14 - 18)  SpO2: 97% (15 Sep 2023 23:00) (92% - 99%)    O2 Parameters below as of 15 Sep 2023 23:00  Patient On (Oxygen Delivery Method): room air          Adult Advanced Hemodynamics Last 24 Hrs  CVP(mm Hg): 5 (15 Sep 2023 23:00) (2 - 5)  CVP(cm H2O): --  CO: --  CI: --  PA: --  PA(mean): --  PCWP: --  SVR: --  SVRI: --  PVR: --  PVRI: --, ABG - ( 15 Sep 2023 15:09 )  pH, Arterial: 7.40  pH, Blood: x     /  pCO2: 34    /  pO2: 96    / HCO3: 21    / Base Excess: -3.0  /  SaO2: 99.1                Intake and output was reviewed and the fluid balance was calculated  Daily Height in cm: 190.5 (15 Sep 2023 10:31)    Daily   I&O's Summary    14 Sep 2023 07:01  -  15 Sep 2023 07:00  --------------------------------------------------------  IN: 410 mL / OUT: 1415 mL / NET: -1005 mL    15 Sep 2023 07:01  -  15 Sep 2023 23:24  --------------------------------------------------------  IN: 1345 mL / OUT: 1865 mL / NET: -520 mL            Neuro: No change in the Neuro status from the baseline.   Neck: No JVD.  CVS: S1, S2, No S3.  Lungs: Good air entry bilaterally.  Abd: Soft. No tenderness. + Bowel sounds.  Vascular: + DP/PT.  Extremities: No edema.  Lymphatic: Normal.  Skin: No abnormalities.      labs  CBC Full  -  ( 15 Sep 2023 15:15 )  WBC Count : 6.87 K/uL  RBC Count : 3.23 M/uL  Hemoglobin : 11.1 g/dL  Hematocrit : 32.3 %  Platelet Count - Automated : 114 K/uL  Mean Cell Volume : 100.0 fl  Mean Cell Hemoglobin : 34.4 pg  Mean Cell Hemoglobin Concentration : 34.4 gm/dL  Auto Neutrophil # : 5.15 K/uL  Auto Lymphocyte # : 1.12 K/uL  Auto Monocyte # : 0.40 K/uL  Auto Eosinophil # : 0.16 K/uL  Auto Basophil # : 0.01 K/uL  Auto Neutrophil % : 75.1 %  Auto Lymphocyte % : 16.3 %  Auto Monocyte % : 5.8 %  Auto Eosinophil % : 2.3 %  Auto Basophil % : 0.1 %    09-15    137  |  104  |  18  ----------------------------<  98  4.0   |  22  |  0.75    Ca    8.8      15 Sep 2023 15:15  Phos  4.0     09-15  Mg     2.0     09-15    TPro  7.2  /  Alb  3.9  /  TBili  0.3  /  DBili  x   /  AST  20  /  ALT  29  /  AlkPhos  92  09-15    PT/INR - ( 15 Sep 2023 15:15 )   PT: 13.5 sec;   INR: 1.19          PTT - ( 15 Sep 2023 15:15 )  PTT:31.1 sec  The current medications were reviewed   MEDICATIONS  (STANDING):  albumin human  5% IVPB 250 milliLiter(s) IV Intermittent every 1 hour  atorvastatin 80 milliGRAM(s) Oral at bedtime  ceFAZolin   IVPB 2000 milliGRAM(s) IV Intermittent every 8 hours  chlorhexidine 2% Cloths 1 Application(s) Topical daily  metoprolol tartrate Injectable 2.5 milliGRAM(s) IV Push once  niCARdipine Infusion 5 mG/Hr (25 mL/Hr) IV Continuous <Continuous>  pantoprazole    Tablet 40 milliGRAM(s) Oral daily  polyethylene glycol 3350 17 Gram(s) Oral daily  sertraline 25 milliGRAM(s) Oral daily  sodium chloride 0.9%. 1000 milliLiter(s) (10 mL/Hr) IV Continuous <Continuous>  tiotropium 2.5 MICROgram(s) Inhaler 2 Puff(s) Inhalation daily    MEDICATIONS  (PRN):  acetaminophen     Tablet .. 650 milliGRAM(s) Oral every 6 hours PRN Temp greater or equal to 38C (100.4F), Mild Pain (1 - 3)  fentaNYL    Injectable 12.5 MICROGram(s) IV Push every 3 hours PRN breakthrough  oxyCODONE    IR 5 milliGRAM(s) Oral every 6 hours PRN Severe Pain (7 - 10)        68 years old male with aortic aneurysm.  S/P Second stage TEVAR.  Hemodynamically stable.  Good oxygenation.  Fair urine out put.        My plan includes :  D/C IV nicardipine.  Statin and Betablocker.  Close hemodynamic monitoring   Monitor for arrhythmias and monitor parameters for organ perfusion  Monitor neurologic status  Monitor renal function.  Head of the bed should remain elevated to 45 deg .   Chest PT and IS will be encouraged  Monitor adequacy of oxygenation   Nutritional goals will be met using po eventually , ensure adequate caloric intake and monitor the same  Stress ulcer and VTE prophylaxis will be achieved    Glycemic control is satisfactory  Electrolytes have been repleted as necessary and wound care has been carried out. Pain control has been achieved.   Aggressive physical therapy and early mobility and ambulation goals will be met   The family was updated about the course and plan  CRITICAL CARE TIME SPENT in evaluation and management, review and interpretation of labs and x-rays, hemodynamic management, formulating a plan and coordinating care: ___30____ MIN.  Time does not include procedural time.  CTICU ATTENDING     					    Stefan Henao MD

## 2023-09-15 NOTE — PRE-OP CHECKLIST - NOTHING BY MOUTH SINCE
----- Message from Fallon Abreu sent at 9/30/2022  4:14 PM CDT -----  Regarding: advice  Contact: MIRTHA CHILEL [[id number]]  Name of Who is Calling: Mirtha Chilel            What is the request in detail: Pt states she is requesting a call back in regards to some orders for CPAP machine, she states she has the machine and she wants to know what are the next steps. Please advise           Can the clinic reply by [facility]: No          What Number to Call Back if not [facility]:[phone]    
Staff contact patient on 9/30 patient needed to schedule their 31-90 follow-up compliance appointment. First day of CPAP use 9/29 patient scheduled a virtual visit on 11/17 at 5:00pm  
15-Sep-2023 00:00

## 2023-09-15 NOTE — BRIEF OPERATIVE NOTE - TYPE OF ANESTHESIA
Spoke to patient, states he was trying to reach Trina Boyd. Said that Trina wanted to know how anxiety med is working on a personal level, because Trina stated that she was also on Lexapro, but states it is not helping him. [facility]. Please let patient know Dionisio's recommendation.   General

## 2023-09-15 NOTE — BRIEF OPERATIVE NOTE - NSICDXBRIEFPROCEDURE_GEN_ALL_CORE_FT
PROCEDURES:  Second stage thoracic endovascular aortic repair (TEVAR) 15-Sep-2023 14:43:34  Nancy Ricardo  
PROCEDURES:  Second stage thoracic endovascular aortic repair (TEVAR) 15-Sep-2023 14:43:34  Nancy Ricardo

## 2023-09-16 LAB
ALBUMIN SERPL ELPH-MCNC: 4.6 G/DL — SIGNIFICANT CHANGE UP (ref 3.3–5)
ALBUMIN SERPL ELPH-MCNC: 4.8 G/DL — SIGNIFICANT CHANGE UP (ref 3.3–5)
ALBUMIN SERPL ELPH-MCNC: 4.8 G/DL — SIGNIFICANT CHANGE UP (ref 3.3–5)
ALBUMIN SERPL ELPH-MCNC: 5 G/DL — SIGNIFICANT CHANGE UP (ref 3.3–5)
ALP SERPL-CCNC: 73 U/L — SIGNIFICANT CHANGE UP (ref 40–120)
ALP SERPL-CCNC: 73 U/L — SIGNIFICANT CHANGE UP (ref 40–120)
ALP SERPL-CCNC: 81 U/L — SIGNIFICANT CHANGE UP (ref 40–120)
ALP SERPL-CCNC: 88 U/L — SIGNIFICANT CHANGE UP (ref 40–120)
ALT FLD-CCNC: 19 U/L — SIGNIFICANT CHANGE UP (ref 10–45)
ALT FLD-CCNC: 20 U/L — SIGNIFICANT CHANGE UP (ref 10–45)
ALT FLD-CCNC: 21 U/L — SIGNIFICANT CHANGE UP (ref 10–45)
ALT FLD-CCNC: 24 U/L — SIGNIFICANT CHANGE UP (ref 10–45)
ANION GAP SERPL CALC-SCNC: 10 MMOL/L — SIGNIFICANT CHANGE UP (ref 5–17)
ANION GAP SERPL CALC-SCNC: 12 MMOL/L — SIGNIFICANT CHANGE UP (ref 5–17)
ANION GAP SERPL CALC-SCNC: 13 MMOL/L — SIGNIFICANT CHANGE UP (ref 5–17)
ANION GAP SERPL CALC-SCNC: 15 MMOL/L — SIGNIFICANT CHANGE UP (ref 5–17)
APTT BLD: 26.2 SEC — SIGNIFICANT CHANGE UP (ref 24.5–35.6)
APTT BLD: 28.8 SEC — SIGNIFICANT CHANGE UP (ref 24.5–35.6)
APTT BLD: 29.4 SEC — SIGNIFICANT CHANGE UP (ref 24.5–35.6)
APTT BLD: 33.8 SEC — SIGNIFICANT CHANGE UP (ref 24.5–35.6)
AST SERPL-CCNC: 19 U/L — SIGNIFICANT CHANGE UP (ref 10–40)
AST SERPL-CCNC: 20 U/L — SIGNIFICANT CHANGE UP (ref 10–40)
AST SERPL-CCNC: 20 U/L — SIGNIFICANT CHANGE UP (ref 10–40)
AST SERPL-CCNC: 22 U/L — SIGNIFICANT CHANGE UP (ref 10–40)
BASOPHILS # BLD AUTO: 0 K/UL — SIGNIFICANT CHANGE UP (ref 0–0.2)
BASOPHILS # BLD AUTO: 0 K/UL — SIGNIFICANT CHANGE UP (ref 0–0.2)
BASOPHILS # BLD AUTO: 0.01 K/UL — SIGNIFICANT CHANGE UP (ref 0–0.2)
BASOPHILS # BLD AUTO: 0.02 K/UL — SIGNIFICANT CHANGE UP (ref 0–0.2)
BASOPHILS NFR BLD AUTO: 0 % — SIGNIFICANT CHANGE UP (ref 0–2)
BASOPHILS NFR BLD AUTO: 0 % — SIGNIFICANT CHANGE UP (ref 0–2)
BASOPHILS NFR BLD AUTO: 0.1 % — SIGNIFICANT CHANGE UP (ref 0–2)
BASOPHILS NFR BLD AUTO: 0.3 % — SIGNIFICANT CHANGE UP (ref 0–2)
BILIRUB SERPL-MCNC: 0.5 MG/DL — SIGNIFICANT CHANGE UP (ref 0.2–1.2)
BILIRUB SERPL-MCNC: 0.5 MG/DL — SIGNIFICANT CHANGE UP (ref 0.2–1.2)
BILIRUB SERPL-MCNC: 0.6 MG/DL — SIGNIFICANT CHANGE UP (ref 0.2–1.2)
BILIRUB SERPL-MCNC: 0.7 MG/DL — SIGNIFICANT CHANGE UP (ref 0.2–1.2)
BUN SERPL-MCNC: 16 MG/DL — SIGNIFICANT CHANGE UP (ref 7–23)
BUN SERPL-MCNC: 19 MG/DL — SIGNIFICANT CHANGE UP (ref 7–23)
BUN SERPL-MCNC: 19 MG/DL — SIGNIFICANT CHANGE UP (ref 7–23)
BUN SERPL-MCNC: 20 MG/DL — SIGNIFICANT CHANGE UP (ref 7–23)
CALCIUM SERPL-MCNC: 9.2 MG/DL — SIGNIFICANT CHANGE UP (ref 8.4–10.5)
CALCIUM SERPL-MCNC: 9.3 MG/DL — SIGNIFICANT CHANGE UP (ref 8.4–10.5)
CALCIUM SERPL-MCNC: 9.3 MG/DL — SIGNIFICANT CHANGE UP (ref 8.4–10.5)
CALCIUM SERPL-MCNC: 9.5 MG/DL — SIGNIFICANT CHANGE UP (ref 8.4–10.5)
CHLORIDE SERPL-SCNC: 100 MMOL/L — SIGNIFICANT CHANGE UP (ref 96–108)
CHLORIDE SERPL-SCNC: 101 MMOL/L — SIGNIFICANT CHANGE UP (ref 96–108)
CHLORIDE SERPL-SCNC: 98 MMOL/L — SIGNIFICANT CHANGE UP (ref 96–108)
CHLORIDE SERPL-SCNC: 99 MMOL/L — SIGNIFICANT CHANGE UP (ref 96–108)
CO2 SERPL-SCNC: 22 MMOL/L — SIGNIFICANT CHANGE UP (ref 22–31)
CO2 SERPL-SCNC: 23 MMOL/L — SIGNIFICANT CHANGE UP (ref 22–31)
CO2 SERPL-SCNC: 23 MMOL/L — SIGNIFICANT CHANGE UP (ref 22–31)
CO2 SERPL-SCNC: 27 MMOL/L — SIGNIFICANT CHANGE UP (ref 22–31)
CREAT SERPL-MCNC: 0.73 MG/DL — SIGNIFICANT CHANGE UP (ref 0.5–1.3)
CREAT SERPL-MCNC: 0.79 MG/DL — SIGNIFICANT CHANGE UP (ref 0.5–1.3)
CREAT SERPL-MCNC: 0.8 MG/DL — SIGNIFICANT CHANGE UP (ref 0.5–1.3)
CREAT SERPL-MCNC: 0.84 MG/DL — SIGNIFICANT CHANGE UP (ref 0.5–1.3)
EGFR: 94 ML/MIN/1.73M2 — SIGNIFICANT CHANGE UP
EGFR: 96 ML/MIN/1.73M2 — SIGNIFICANT CHANGE UP
EGFR: 96 ML/MIN/1.73M2 — SIGNIFICANT CHANGE UP
EGFR: 98 ML/MIN/1.73M2 — SIGNIFICANT CHANGE UP
EOSINOPHIL # BLD AUTO: 0 K/UL — SIGNIFICANT CHANGE UP (ref 0–0.5)
EOSINOPHIL # BLD AUTO: 0 K/UL — SIGNIFICANT CHANGE UP (ref 0–0.5)
EOSINOPHIL # BLD AUTO: 0.02 K/UL — SIGNIFICANT CHANGE UP (ref 0–0.5)
EOSINOPHIL # BLD AUTO: 0.03 K/UL — SIGNIFICANT CHANGE UP (ref 0–0.5)
EOSINOPHIL NFR BLD AUTO: 0 % — SIGNIFICANT CHANGE UP (ref 0–6)
EOSINOPHIL NFR BLD AUTO: 0 % — SIGNIFICANT CHANGE UP (ref 0–6)
EOSINOPHIL NFR BLD AUTO: 0.3 % — SIGNIFICANT CHANGE UP (ref 0–6)
EOSINOPHIL NFR BLD AUTO: 0.5 % — SIGNIFICANT CHANGE UP (ref 0–6)
GAS PNL BLDA: SIGNIFICANT CHANGE UP
GLUCOSE SERPL-MCNC: 110 MG/DL — HIGH (ref 70–99)
GLUCOSE SERPL-MCNC: 114 MG/DL — HIGH (ref 70–99)
GLUCOSE SERPL-MCNC: 118 MG/DL — HIGH (ref 70–99)
GLUCOSE SERPL-MCNC: 183 MG/DL — HIGH (ref 70–99)
HCT VFR BLD CALC: 26.1 % — LOW (ref 39–50)
HCT VFR BLD CALC: 26.5 % — LOW (ref 39–50)
HCT VFR BLD CALC: 29.3 % — LOW (ref 39–50)
HCT VFR BLD CALC: 30 % — LOW (ref 39–50)
HGB BLD-MCNC: 10.1 G/DL — LOW (ref 13–17)
HGB BLD-MCNC: 10.5 G/DL — LOW (ref 13–17)
HGB BLD-MCNC: 9 G/DL — LOW (ref 13–17)
HGB BLD-MCNC: 9.3 G/DL — LOW (ref 13–17)
IMM GRANULOCYTES NFR BLD AUTO: 0.3 % — SIGNIFICANT CHANGE UP (ref 0–0.9)
IMM GRANULOCYTES NFR BLD AUTO: 0.3 % — SIGNIFICANT CHANGE UP (ref 0–0.9)
IMM GRANULOCYTES NFR BLD AUTO: 0.4 % — SIGNIFICANT CHANGE UP (ref 0–0.9)
IMM GRANULOCYTES NFR BLD AUTO: 0.5 % — SIGNIFICANT CHANGE UP (ref 0–0.9)
INR BLD: 1.21 — HIGH (ref 0.85–1.18)
INR BLD: 1.23 — HIGH (ref 0.85–1.18)
INR BLD: 1.23 — HIGH (ref 0.85–1.18)
INR BLD: 1.25 — HIGH (ref 0.85–1.18)
LACTATE SERPL-SCNC: 1.1 MMOL/L — SIGNIFICANT CHANGE UP (ref 0.5–2)
LACTATE SERPL-SCNC: 1.8 MMOL/L — SIGNIFICANT CHANGE UP (ref 0.5–2)
LACTATE SERPL-SCNC: 3.3 MMOL/L — HIGH (ref 0.5–2)
LYMPHOCYTES # BLD AUTO: 0.44 K/UL — LOW (ref 1–3.3)
LYMPHOCYTES # BLD AUTO: 0.8 K/UL — LOW (ref 1–3.3)
LYMPHOCYTES # BLD AUTO: 0.86 K/UL — LOW (ref 1–3.3)
LYMPHOCYTES # BLD AUTO: 0.89 K/UL — LOW (ref 1–3.3)
LYMPHOCYTES # BLD AUTO: 12.4 % — LOW (ref 13–44)
LYMPHOCYTES # BLD AUTO: 12.8 % — LOW (ref 13–44)
LYMPHOCYTES # BLD AUTO: 13.8 % — SIGNIFICANT CHANGE UP (ref 13–44)
LYMPHOCYTES # BLD AUTO: 7.2 % — LOW (ref 13–44)
MAGNESIUM SERPL-MCNC: 1.8 MG/DL — SIGNIFICANT CHANGE UP (ref 1.6–2.6)
MAGNESIUM SERPL-MCNC: 1.9 MG/DL — SIGNIFICANT CHANGE UP (ref 1.6–2.6)
MAGNESIUM SERPL-MCNC: 1.9 MG/DL — SIGNIFICANT CHANGE UP (ref 1.6–2.6)
MAGNESIUM SERPL-MCNC: 2 MG/DL — SIGNIFICANT CHANGE UP (ref 1.6–2.6)
MCHC RBC-ENTMCNC: 34.1 PG — HIGH (ref 27–34)
MCHC RBC-ENTMCNC: 34.1 PG — HIGH (ref 27–34)
MCHC RBC-ENTMCNC: 34.2 PG — HIGH (ref 27–34)
MCHC RBC-ENTMCNC: 34.2 PG — HIGH (ref 27–34)
MCHC RBC-ENTMCNC: 34.5 GM/DL — SIGNIFICANT CHANGE UP (ref 32–36)
MCHC RBC-ENTMCNC: 34.5 GM/DL — SIGNIFICANT CHANGE UP (ref 32–36)
MCHC RBC-ENTMCNC: 35 GM/DL — SIGNIFICANT CHANGE UP (ref 32–36)
MCHC RBC-ENTMCNC: 35.1 GM/DL — SIGNIFICANT CHANGE UP (ref 32–36)
MCV RBC AUTO: 97.4 FL — SIGNIFICANT CHANGE UP (ref 80–100)
MCV RBC AUTO: 97.7 FL — SIGNIFICANT CHANGE UP (ref 80–100)
MCV RBC AUTO: 98.9 FL — SIGNIFICANT CHANGE UP (ref 80–100)
MCV RBC AUTO: 99 FL — SIGNIFICANT CHANGE UP (ref 80–100)
MONOCYTES # BLD AUTO: 0.42 K/UL — SIGNIFICANT CHANGE UP (ref 0–0.9)
MONOCYTES # BLD AUTO: 0.66 K/UL — SIGNIFICANT CHANGE UP (ref 0–0.9)
MONOCYTES # BLD AUTO: 0.92 K/UL — HIGH (ref 0–0.9)
MONOCYTES # BLD AUTO: 1.13 K/UL — HIGH (ref 0–0.9)
MONOCYTES NFR BLD AUTO: 10.8 % — SIGNIFICANT CHANGE UP (ref 2–14)
MONOCYTES NFR BLD AUTO: 14.3 % — HIGH (ref 2–14)
MONOCYTES NFR BLD AUTO: 16.3 % — HIGH (ref 2–14)
MONOCYTES NFR BLD AUTO: 6.7 % — SIGNIFICANT CHANGE UP (ref 2–14)
NEUTROPHILS # BLD AUTO: 4.57 K/UL — SIGNIFICANT CHANGE UP (ref 1.8–7.4)
NEUTROPHILS # BLD AUTO: 4.9 K/UL — SIGNIFICANT CHANGE UP (ref 1.8–7.4)
NEUTROPHILS # BLD AUTO: 4.98 K/UL — SIGNIFICANT CHANGE UP (ref 1.8–7.4)
NEUTROPHILS # BLD AUTO: 5.01 K/UL — SIGNIFICANT CHANGE UP (ref 1.8–7.4)
NEUTROPHILS NFR BLD AUTO: 70.5 % — SIGNIFICANT CHANGE UP (ref 43–77)
NEUTROPHILS NFR BLD AUTO: 70.8 % — SIGNIFICANT CHANGE UP (ref 43–77)
NEUTROPHILS NFR BLD AUTO: 80 % — HIGH (ref 43–77)
NEUTROPHILS NFR BLD AUTO: 81.7 % — HIGH (ref 43–77)
NRBC # BLD: 0 /100 WBCS — SIGNIFICANT CHANGE UP (ref 0–0)
PHOSPHATE SERPL-MCNC: 2.5 MG/DL — SIGNIFICANT CHANGE UP (ref 2.5–4.5)
PHOSPHATE SERPL-MCNC: 2.5 MG/DL — SIGNIFICANT CHANGE UP (ref 2.5–4.5)
PHOSPHATE SERPL-MCNC: 2.7 MG/DL — SIGNIFICANT CHANGE UP (ref 2.5–4.5)
PHOSPHATE SERPL-MCNC: 3.1 MG/DL — SIGNIFICANT CHANGE UP (ref 2.5–4.5)
PLATELET # BLD AUTO: 109 K/UL — LOW (ref 150–400)
PLATELET # BLD AUTO: 115 K/UL — LOW (ref 150–400)
PLATELET # BLD AUTO: 116 K/UL — LOW (ref 150–400)
PLATELET # BLD AUTO: 96 K/UL — LOW (ref 150–400)
POTASSIUM SERPL-MCNC: 3.7 MMOL/L — SIGNIFICANT CHANGE UP (ref 3.5–5.3)
POTASSIUM SERPL-MCNC: 3.8 MMOL/L — SIGNIFICANT CHANGE UP (ref 3.5–5.3)
POTASSIUM SERPL-MCNC: 4 MMOL/L — SIGNIFICANT CHANGE UP (ref 3.5–5.3)
POTASSIUM SERPL-MCNC: 4.1 MMOL/L — SIGNIFICANT CHANGE UP (ref 3.5–5.3)
POTASSIUM SERPL-SCNC: 3.7 MMOL/L — SIGNIFICANT CHANGE UP (ref 3.5–5.3)
POTASSIUM SERPL-SCNC: 3.8 MMOL/L — SIGNIFICANT CHANGE UP (ref 3.5–5.3)
POTASSIUM SERPL-SCNC: 4 MMOL/L — SIGNIFICANT CHANGE UP (ref 3.5–5.3)
POTASSIUM SERPL-SCNC: 4.1 MMOL/L — SIGNIFICANT CHANGE UP (ref 3.5–5.3)
PROT SERPL-MCNC: 7.7 G/DL — SIGNIFICANT CHANGE UP (ref 6–8.3)
PROT SERPL-MCNC: 7.7 G/DL — SIGNIFICANT CHANGE UP (ref 6–8.3)
PROT SERPL-MCNC: 8 G/DL — SIGNIFICANT CHANGE UP (ref 6–8.3)
PROT SERPL-MCNC: 8.2 G/DL — SIGNIFICANT CHANGE UP (ref 6–8.3)
PROTHROM AB SERPL-ACNC: 13.7 SEC — HIGH (ref 9.5–13)
PROTHROM AB SERPL-ACNC: 13.9 SEC — HIGH (ref 9.5–13)
PROTHROM AB SERPL-ACNC: 13.9 SEC — HIGH (ref 9.5–13)
PROTHROM AB SERPL-ACNC: 14.2 SEC — HIGH (ref 9.5–13)
RBC # BLD: 2.64 M/UL — LOW (ref 4.2–5.8)
RBC # BLD: 2.72 M/UL — LOW (ref 4.2–5.8)
RBC # BLD: 2.96 M/UL — LOW (ref 4.2–5.8)
RBC # BLD: 3.07 M/UL — LOW (ref 4.2–5.8)
RBC # FLD: 13.5 % — SIGNIFICANT CHANGE UP (ref 10.3–14.5)
RBC # FLD: 13.7 % — SIGNIFICANT CHANGE UP (ref 10.3–14.5)
RBC # FLD: 13.7 % — SIGNIFICANT CHANGE UP (ref 10.3–14.5)
RBC # FLD: 13.8 % — SIGNIFICANT CHANGE UP (ref 10.3–14.5)
SODIUM SERPL-SCNC: 135 MMOL/L — SIGNIFICANT CHANGE UP (ref 135–145)
SODIUM SERPL-SCNC: 135 MMOL/L — SIGNIFICANT CHANGE UP (ref 135–145)
SODIUM SERPL-SCNC: 136 MMOL/L — SIGNIFICANT CHANGE UP (ref 135–145)
SODIUM SERPL-SCNC: 137 MMOL/L — SIGNIFICANT CHANGE UP (ref 135–145)
WBC # BLD: 6.1 K/UL — SIGNIFICANT CHANGE UP (ref 3.8–10.5)
WBC # BLD: 6.26 K/UL — SIGNIFICANT CHANGE UP (ref 3.8–10.5)
WBC # BLD: 6.45 K/UL — SIGNIFICANT CHANGE UP (ref 3.8–10.5)
WBC # BLD: 6.95 K/UL — SIGNIFICANT CHANGE UP (ref 3.8–10.5)
WBC # FLD AUTO: 6.1 K/UL — SIGNIFICANT CHANGE UP (ref 3.8–10.5)
WBC # FLD AUTO: 6.26 K/UL — SIGNIFICANT CHANGE UP (ref 3.8–10.5)
WBC # FLD AUTO: 6.45 K/UL — SIGNIFICANT CHANGE UP (ref 3.8–10.5)
WBC # FLD AUTO: 6.95 K/UL — SIGNIFICANT CHANGE UP (ref 3.8–10.5)

## 2023-09-16 PROCEDURE — 93010 ELECTROCARDIOGRAM REPORT: CPT

## 2023-09-16 PROCEDURE — 99291 CRITICAL CARE FIRST HOUR: CPT

## 2023-09-16 PROCEDURE — 71045 X-RAY EXAM CHEST 1 VIEW: CPT | Mod: 26

## 2023-09-16 PROCEDURE — 99292 CRITICAL CARE ADDL 30 MIN: CPT

## 2023-09-16 RX ORDER — MAGNESIUM OXIDE 400 MG ORAL TABLET 241.3 MG
800 TABLET ORAL ONCE
Refills: 0 | Status: COMPLETED | OUTPATIENT
Start: 2023-09-16 | End: 2023-09-16

## 2023-09-16 RX ORDER — SODIUM CHLORIDE 9 MG/ML
1000 INJECTION, SOLUTION INTRAVENOUS ONCE
Refills: 0 | Status: COMPLETED | OUTPATIENT
Start: 2023-09-16 | End: 2023-09-16

## 2023-09-16 RX ORDER — PHENYLEPHRINE HYDROCHLORIDE 10 MG/ML
0.5 INJECTION INTRAVENOUS
Qty: 40 | Refills: 0 | Status: DISCONTINUED | OUTPATIENT
Start: 2023-09-16 | End: 2023-09-16

## 2023-09-16 RX ORDER — ALBUMIN HUMAN 25 %
250 VIAL (ML) INTRAVENOUS
Refills: 0 | Status: COMPLETED | OUTPATIENT
Start: 2023-09-16 | End: 2023-09-16

## 2023-09-16 RX ORDER — GABAPENTIN 400 MG/1
300 CAPSULE ORAL EVERY 8 HOURS
Refills: 0 | Status: DISCONTINUED | OUTPATIENT
Start: 2023-09-16 | End: 2023-09-23

## 2023-09-16 RX ORDER — ACETAMINOPHEN 500 MG
1000 TABLET ORAL ONCE
Refills: 0 | Status: COMPLETED | OUTPATIENT
Start: 2023-09-16 | End: 2023-09-16

## 2023-09-16 RX ORDER — ALBUMIN HUMAN 25 %
250 VIAL (ML) INTRAVENOUS ONCE
Refills: 0 | Status: COMPLETED | OUTPATIENT
Start: 2023-09-16 | End: 2023-09-16

## 2023-09-16 RX ORDER — POTASSIUM CHLORIDE 20 MEQ
40 PACKET (EA) ORAL EVERY 4 HOURS
Refills: 0 | Status: COMPLETED | OUTPATIENT
Start: 2023-09-16 | End: 2023-09-16

## 2023-09-16 RX ADMIN — Medication 40 MILLIEQUIVALENT(S): at 11:51

## 2023-09-16 RX ADMIN — SODIUM CHLORIDE 1000 MILLILITER(S): 9 INJECTION, SOLUTION INTRAVENOUS at 18:19

## 2023-09-16 RX ADMIN — MAGNESIUM OXIDE 400 MG ORAL TABLET 800 MILLIGRAM(S): 241.3 TABLET ORAL at 11:51

## 2023-09-16 RX ADMIN — POLYETHYLENE GLYCOL 3350 17 GRAM(S): 17 POWDER, FOR SOLUTION ORAL at 11:52

## 2023-09-16 RX ADMIN — FENTANYL CITRATE 12.5 MICROGRAM(S): 50 INJECTION INTRAVENOUS at 15:40

## 2023-09-16 RX ADMIN — Medication 100 MILLIGRAM(S): at 13:41

## 2023-09-16 RX ADMIN — Medication 125 MILLILITER(S): at 06:20

## 2023-09-16 RX ADMIN — GABAPENTIN 300 MILLIGRAM(S): 400 CAPSULE ORAL at 22:14

## 2023-09-16 RX ADMIN — PANTOPRAZOLE SODIUM 40 MILLIGRAM(S): 20 TABLET, DELAYED RELEASE ORAL at 11:51

## 2023-09-16 RX ADMIN — Medication 1000 MILLIGRAM(S): at 10:30

## 2023-09-16 RX ADMIN — Medication 125 MILLILITER(S): at 12:26

## 2023-09-16 RX ADMIN — Medication 100 MILLIGRAM(S): at 22:14

## 2023-09-16 RX ADMIN — SERTRALINE 25 MILLIGRAM(S): 25 TABLET, FILM COATED ORAL at 11:52

## 2023-09-16 RX ADMIN — Medication 125 MILLILITER(S): at 14:07

## 2023-09-16 RX ADMIN — CHLORHEXIDINE GLUCONATE 1 APPLICATION(S): 213 SOLUTION TOPICAL at 11:52

## 2023-09-16 RX ADMIN — GABAPENTIN 300 MILLIGRAM(S): 400 CAPSULE ORAL at 17:31

## 2023-09-16 RX ADMIN — Medication 40 MILLIEQUIVALENT(S): at 13:41

## 2023-09-16 RX ADMIN — Medication 650 MILLIGRAM(S): at 16:06

## 2023-09-16 RX ADMIN — ATORVASTATIN CALCIUM 80 MILLIGRAM(S): 80 TABLET, FILM COATED ORAL at 22:15

## 2023-09-16 RX ADMIN — FENTANYL CITRATE 12.5 MICROGRAM(S): 50 INJECTION INTRAVENOUS at 16:10

## 2023-09-16 RX ADMIN — TIOTROPIUM BROMIDE 2 PUFF(S): 18 CAPSULE ORAL; RESPIRATORY (INHALATION) at 11:53

## 2023-09-16 RX ADMIN — Medication 400 MILLIGRAM(S): at 10:00

## 2023-09-16 RX ADMIN — Medication 650 MILLIGRAM(S): at 16:36

## 2023-09-16 RX ADMIN — PHENYLEPHRINE HYDROCHLORIDE 14.2 MICROGRAM(S)/KG/MIN: 10 INJECTION INTRAVENOUS at 12:27

## 2023-09-16 RX ADMIN — Medication 100 MILLIGRAM(S): at 05:19

## 2023-09-16 RX ADMIN — Medication 125 MILLILITER(S): at 12:27

## 2023-09-16 RX ADMIN — Medication 125 MILLILITER(S): at 07:04

## 2023-09-16 RX ADMIN — SENNA PLUS 2 TABLET(S): 8.6 TABLET ORAL at 22:14

## 2023-09-16 NOTE — PROGRESS NOTE ADULT - SUBJECTIVE AND OBJECTIVE BOX
Subjective: Patient seen and evaluated. No acute complaints at this time. Doing well, lying flat    ROS:   Denies Headache, blurred vision, SOB, Abdominal pain, nausea or vomiting     Social   ceFAZolin   IVPB 2000  ceFAZolin   IVPB 2000  metoprolol tartrate Injectable 2.5  niCARdipine Infusion 5      Allergies    No Known Allergies    Intolerances        Vital Signs Last 24 Hrs  T(C): 38 (16 Sep 2023 10:13), Max: 38 (16 Sep 2023 10:13)  T(F): 100.4 (16 Sep 2023 10:13), Max: 100.4 (16 Sep 2023 10:13)  HR: 62 (16 Sep 2023 10:00) (56 - 96)  BP: 97/54 (16 Sep 2023 10:00) (97/54 - 120/71)  BP(mean): 70 (16 Sep 2023 10:00) (70 - 90)  RR: 18 (16 Sep 2023 10:00) (16 - 19)  SpO2: 96% (16 Sep 2023 10:00) (94% - 99%)    Parameters below as of 16 Sep 2023 10:00  Patient On (Oxygen Delivery Method): room air      I&O's Summary    15 Sep 2023 07:01  -  16 Sep 2023 07:00  --------------------------------------------------------  IN: 2225 mL / OUT: 2375 mL / NET: -150 mL    16 Sep 2023 07:01  -  16 Sep 2023 10:52  --------------------------------------------------------  IN: 5 mL / OUT: 140 mL / NET: -135 mL        Physical Exam:  General: Resting comfortably in bed, NAD  Neuro: A&Ox3, no focal deficits, spontaneously moving all 4 extremities   HEENT: ATNC  Pulmonary: Nonlabored breathing, no respiratory distress, no accessory muscle use noted  Cardiovascular: NSR  Abdomen: Soft, ND, NT. Lumbar drain noted  Groin: B/L groins soft without evidence of hematoma/ecchymosis   Extremities: WWP, SCDs in place. Palpable distal pulses noted in all distal extremities. Proximal muscle strength wnl in all extremities         LABS:                        10.5   6.10  )-----------( 116      ( 16 Sep 2023 01:18 )             30.0     09-16    135  |  100  |  16  ----------------------------<  114<H>  4.0   |  23  |  0.73    Ca    9.3      16 Sep 2023 01:18  Phos  3.1     09-16  Mg     2.0     09-16    TPro  8.0  /  Alb  4.6  /  TBili  0.5  /  DBili  x   /  AST  19  /  ALT  24  /  AlkPhos  88  09-16    PT/INR - ( 16 Sep 2023 01:18 )   PT: 13.9 sec;   INR: 1.23          PTT - ( 16 Sep 2023 01:18 )  PTT:28.8 sec    Radiology and Additional Studies:

## 2023-09-16 NOTE — PROGRESS NOTE ADULT - SUBJECTIVE AND OBJECTIVE BOX
CTICU  CRITICAL  CARE  attending     Hand off received 					   Pertinent clinical, laboratory, radiographic, hemodynamic, echocardiographic, respiratory data, microbiologic data and chart were reviewed and analyzed frequently throughout the course of the day and night  Patient seen and examined with CTS/ SH attending at bedside  Pt is a 68y , Male, HEALTH ISSUES - PROBLEM Dx:      , FAMILY HISTORY:  Family history of Marfan syndrome (Uncle)    FH: aortic aneurysm (Uncle)    PAST MEDICAL & SURGICAL HISTORY:  HTN (hypertension)      Aortic regurgitation      Anemia      Arthritis      COPD, mild      Bladder distention      BPH (benign prostatic hyperplasia)      H/O cataract extraction  B/L      Aortic valve replaced        Patient is a 68y old  Male who presents with a chief complaint of Thoracic Aortic Aneurysm (16 Sep 2023 10:52)      14 system review was unremarkable    Vital signs, hemodynamic and respiratory parameters were reviewed from the bedside nursing flowsheet.  ICU Vital Signs Last 24 Hrs  T(C): 36.9 (16 Sep 2023 14:23), Max: 38 (16 Sep 2023 10:13)  T(F): 98.4 (16 Sep 2023 14:23), Max: 100.4 (16 Sep 2023 10:13)  HR: 66 (16 Sep 2023 16:00) (55 - 96)  BP: 117/60 (16 Sep 2023 11:00) (97/54 - 120/71)  BP(mean): 82 (16 Sep 2023 11:00) (70 - 90)  ABP: 161/65 (16 Sep 2023 16:00) (120/62 - 165/66)  ABP(mean): 101 (16 Sep 2023 16:00) (81 - 104)  RR: 18 (16 Sep 2023 16:00) (16 - 19)  SpO2: 98% (16 Sep 2023 16:00) (96% - 99%)    O2 Parameters below as of 16 Sep 2023 16:00  Patient On (Oxygen Delivery Method): room air          Adult Advanced Hemodynamics Last 24 Hrs  CVP(mm Hg): 0 (16 Sep 2023 16:00) (-2 - 209)  CVP(cm H2O): --  CO: --  CI: --  PA: --  PA(mean): --  PCWP: --  SVR: --  SVRI: --  PVR: --  PVRI: --, ABG - ( 16 Sep 2023 13:44 )  pH, Arterial: 7.49  pH, Blood: x     /  pCO2: 29    /  pO2: 117   / HCO3: 22    / Base Excess: -0.2  /  SaO2: 100.0               Intake and output was reviewed and the fluid balance was calculated  Daily     Daily   I&O's Summary    15 Sep 2023 07:01  -  16 Sep 2023 07:00  --------------------------------------------------------  IN: 2225 mL / OUT: 2375 mL / NET: -150 mL    16 Sep 2023 07:01  -  16 Sep 2023 16:51  --------------------------------------------------------  IN: 997.2 mL / OUT: 1825 mL / NET: -827.8 mL        All lines and drain sites were assessed  [facility]      POCT Blood Glucose.: 85 mg/dL (15 Sep 2023 10:42)    No acute change in mental status  Auscultation of the chest reveals equal bs  Abdomen is soft  Extremities are warm and well perfused  Wounds appear clean and unremarkable  Antibiotics are periop    labs  CBC Full  -  ( 16 Sep 2023 13:41 )  WBC Count : 6.95 K/uL  RBC Count : 2.64 M/uL  Hemoglobin : 9.0 g/dL  Hematocrit : 26.1 %  Platelet Count - Automated : 109 K/uL  Mean Cell Volume : 98.9 fl  Mean Cell Hemoglobin : 34.1 pg  Mean Cell Hemoglobin Concentration : 34.5 gm/dL  Auto Neutrophil # : 4.90 K/uL  Auto Lymphocyte # : 0.86 K/uL  Auto Monocyte # : 1.13 K/uL  Auto Eosinophil # : 0.02 K/uL  Auto Basophil # : 0.01 K/uL  Auto Neutrophil % : 70.5 %  Auto Lymphocyte % : 12.4 %  Auto Monocyte % : 16.3 %  Auto Eosinophil % : 0.3 %  Auto Basophil % : 0.1 %    09-16    135  |  98  |  19  ----------------------------<  118<H>  3.8   |  27  |  0.80    Ca    9.2      16 Sep 2023 13:41  Phos  2.5     09-16  Mg     1.9     09-16    TPro  7.7  /  Alb  4.8  /  TBili  0.5  /  DBili  x   /  AST  20  /  ALT  19  /  AlkPhos  73  09-16    PT/INR - ( 16 Sep 2023 13:41 )   PT: 13.7 sec;   INR: 1.21          PTT - ( 16 Sep 2023 13:41 )  PTT:29.4 sec  The current medications were reviewed   MEDICATIONS  (STANDING):  atorvastatin 80 milliGRAM(s) Oral at bedtime  ceFAZolin   IVPB 2000 milliGRAM(s) IV Intermittent every 8 hours  chlorhexidine 2% Cloths 1 Application(s) Topical daily  pantoprazole    Tablet 40 milliGRAM(s) Oral daily  phenylephrine    Infusion 0.5 MICROgram(s)/kG/Min (14.2 mL/Hr) IV Continuous <Continuous>  polyethylene glycol 3350 17 Gram(s) Oral daily  senna 2 Tablet(s) Oral at bedtime  sertraline 25 milliGRAM(s) Oral daily  sodium chloride 0.9%. 1000 milliLiter(s) (10 mL/Hr) IV Continuous <Continuous>  tiotropium 2.5 MICROgram(s) Inhaler 2 Puff(s) Inhalation daily    MEDICATIONS  (PRN):  acetaminophen     Tablet .. 650 milliGRAM(s) Oral every 6 hours PRN Temp greater or equal to 38C (100.4F), Mild Pain (1 - 3)  fentaNYL    Injectable 12.5 MICROGram(s) IV Push every 3 hours PRN breakthrough  oxyCODONE    IR 5 milliGRAM(s) Oral every 6 hours PRN Severe Pain (7 - 10)       PROBLEM LIST/ ASSESSMENT:  HEALTH ISSUES - PROBLEM Dx:      ,   Patient is a 68y old  Male who presents with a chief complaint of Thoracic Aortic Aneurysm (16 Sep 2023 10:52)     s/p cardiac surgery    expected post - op Hypovolemic shock - > 20% intravascular depletion will replete volume          My plan includes :    cap LD  ambulate    drain hs and LDd tomorrow  close hemodynamic, ventilatory and drain monitoring and management per post op routine    Monitor for arrhythmias and monitor parameters for organ perfusion  beta blockade not administered due to hemodynamic instability and bradycardia  monitor neurologic status  Head of the bed should remain elevated to 45 deg .   chest PT and IS will be encouraged  monitor adequacy of oxygenation and ventilation and attempt to wean oxygen  antibiotic regimen will be tailored to the clinical, laboratory and microbiologic data  Nutritional goals will be met using po eventually , ensure adequate caloric intake and montior the same  Stress ulcer and VTE prophylaxis will be achieved    Glycemic control is satisfactory  Electrolytes have been repleted as necessary and wound care has been carried out. Pain control has been achieved.   agressive physical therapy and early mobility and ambulation goals will be met   The family was updated about the course and plan  CRITICAL CARE TIME Upon my evaluation, this patient had a high probability of imminent or life-threatening deterioration due to the above problems which required my direct attention, intervention, and personal management.  I have personally provided 110 minutes of critical care time exclusive of time spent on separately billable procedures. Time included review of laboratory data, radiology results, discussion with consultants, and monitoring for potential decompensation. Interventions were performed as documented abovepersonally provided by me  in evaluation and management, reassessments, review and interpretation of labs and x-rays, ventilator and hemodynamic management, formulating a plan and coordinating care: ___110___ MIN.  Time does not include procedural time.    CTICU ATTENDING     					    Brando Castaneda MD

## 2023-09-16 NOTE — PROGRESS NOTE ADULT - ASSESSMENT
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 Overall doing well with no acute complaints at this time.    Recommendations:  - Patient should continue to lie flat for 4 hours  - Monitor neuro and pulse exam  - Rest of excellent care per ICU

## 2023-09-16 NOTE — PROGRESS NOTE ADULT - SUBJECTIVE AND OBJECTIVE BOX
to follow consult note: NEUROSURGERY:    Patient was evaluated at bedside.  Neurological intact. Patient was sitting in chair with no complaint.  Lumbar working properly, producing clear CSF.  Motor: bilateral LE 5/5 no focal deficit.  Plan: Continue CSF drainage per CT surgery protocol.  - We will remove drain upon CT surgery request.

## 2023-09-17 LAB
ALBUMIN SERPL ELPH-MCNC: 4.3 G/DL — SIGNIFICANT CHANGE UP (ref 3.3–5)
ALP SERPL-CCNC: 68 U/L — SIGNIFICANT CHANGE UP (ref 40–120)
ALT FLD-CCNC: 15 U/L — SIGNIFICANT CHANGE UP (ref 10–45)
ANION GAP SERPL CALC-SCNC: 11 MMOL/L — SIGNIFICANT CHANGE UP (ref 5–17)
APTT BLD: 27.7 SEC — SIGNIFICANT CHANGE UP (ref 24.5–35.6)
AST SERPL-CCNC: 21 U/L — SIGNIFICANT CHANGE UP (ref 10–40)
BASOPHILS # BLD AUTO: 0.01 K/UL — SIGNIFICANT CHANGE UP (ref 0–0.2)
BASOPHILS NFR BLD AUTO: 0.2 % — SIGNIFICANT CHANGE UP (ref 0–2)
BILIRUB SERPL-MCNC: 0.6 MG/DL — SIGNIFICANT CHANGE UP (ref 0.2–1.2)
BLD GP AB SCN SERPL QL: NEGATIVE — SIGNIFICANT CHANGE UP
BUN SERPL-MCNC: 16 MG/DL — SIGNIFICANT CHANGE UP (ref 7–23)
CALCIUM SERPL-MCNC: 9.2 MG/DL — SIGNIFICANT CHANGE UP (ref 8.4–10.5)
CHLORIDE SERPL-SCNC: 100 MMOL/L — SIGNIFICANT CHANGE UP (ref 96–108)
CO2 SERPL-SCNC: 24 MMOL/L — SIGNIFICANT CHANGE UP (ref 22–31)
CREAT SERPL-MCNC: 0.76 MG/DL — SIGNIFICANT CHANGE UP (ref 0.5–1.3)
EGFR: 97 ML/MIN/1.73M2 — SIGNIFICANT CHANGE UP
EOSINOPHIL # BLD AUTO: 0.02 K/UL — SIGNIFICANT CHANGE UP (ref 0–0.5)
EOSINOPHIL NFR BLD AUTO: 0.3 % — SIGNIFICANT CHANGE UP (ref 0–6)
GAS PNL BLDA: SIGNIFICANT CHANGE UP
GLUCOSE SERPL-MCNC: 125 MG/DL — HIGH (ref 70–99)
HCT VFR BLD CALC: 25.8 % — LOW (ref 39–50)
HGB BLD-MCNC: 9.2 G/DL — LOW (ref 13–17)
IMM GRANULOCYTES NFR BLD AUTO: 0.5 % — SIGNIFICANT CHANGE UP (ref 0–0.9)
INR BLD: 1.28 — HIGH (ref 0.85–1.18)
LYMPHOCYTES # BLD AUTO: 0.8 K/UL — LOW (ref 1–3.3)
LYMPHOCYTES # BLD AUTO: 12.7 % — LOW (ref 13–44)
MAGNESIUM SERPL-MCNC: 1.8 MG/DL — SIGNIFICANT CHANGE UP (ref 1.6–2.6)
MCHC RBC-ENTMCNC: 34.8 PG — HIGH (ref 27–34)
MCHC RBC-ENTMCNC: 35.7 GM/DL — SIGNIFICANT CHANGE UP (ref 32–36)
MCV RBC AUTO: 97.7 FL — SIGNIFICANT CHANGE UP (ref 80–100)
MONOCYTES # BLD AUTO: 0.94 K/UL — HIGH (ref 0–0.9)
MONOCYTES NFR BLD AUTO: 14.9 % — HIGH (ref 2–14)
NEUTROPHILS # BLD AUTO: 4.5 K/UL — SIGNIFICANT CHANGE UP (ref 1.8–7.4)
NEUTROPHILS NFR BLD AUTO: 71.4 % — SIGNIFICANT CHANGE UP (ref 43–77)
NRBC # BLD: 0 /100 WBCS — SIGNIFICANT CHANGE UP (ref 0–0)
PHOSPHATE SERPL-MCNC: 2 MG/DL — LOW (ref 2.5–4.5)
PLATELET # BLD AUTO: 94 K/UL — LOW (ref 150–400)
POTASSIUM SERPL-MCNC: 3.8 MMOL/L — SIGNIFICANT CHANGE UP (ref 3.5–5.3)
POTASSIUM SERPL-SCNC: 3.8 MMOL/L — SIGNIFICANT CHANGE UP (ref 3.5–5.3)
PROT SERPL-MCNC: 7.3 G/DL — SIGNIFICANT CHANGE UP (ref 6–8.3)
PROTHROM AB SERPL-ACNC: 14.5 SEC — HIGH (ref 9.5–13)
RBC # BLD: 2.64 M/UL — LOW (ref 4.2–5.8)
RBC # FLD: 13.7 % — SIGNIFICANT CHANGE UP (ref 10.3–14.5)
RH IG SCN BLD-IMP: POSITIVE — SIGNIFICANT CHANGE UP
SODIUM SERPL-SCNC: 135 MMOL/L — SIGNIFICANT CHANGE UP (ref 135–145)
WBC # BLD: 6.3 K/UL — SIGNIFICANT CHANGE UP (ref 3.8–10.5)
WBC # FLD AUTO: 6.3 K/UL — SIGNIFICANT CHANGE UP (ref 3.8–10.5)

## 2023-09-17 PROCEDURE — 71045 X-RAY EXAM CHEST 1 VIEW: CPT | Mod: 26

## 2023-09-17 PROCEDURE — 99232 SBSQ HOSP IP/OBS MODERATE 35: CPT

## 2023-09-17 RX ORDER — ALBUMIN HUMAN 25 %
250 VIAL (ML) INTRAVENOUS ONCE
Refills: 0 | Status: COMPLETED | OUTPATIENT
Start: 2023-09-17 | End: 2023-09-17

## 2023-09-17 RX ORDER — POTASSIUM CHLORIDE 20 MEQ
20 PACKET (EA) ORAL ONCE
Refills: 0 | Status: COMPLETED | OUTPATIENT
Start: 2023-09-17 | End: 2023-09-17

## 2023-09-17 RX ORDER — PHENYLEPHRINE HYDROCHLORIDE 10 MG/ML
0.1 INJECTION INTRAVENOUS
Qty: 40 | Refills: 0 | Status: DISCONTINUED | OUTPATIENT
Start: 2023-09-17 | End: 2023-09-17

## 2023-09-17 RX ORDER — PHENYLEPHRINE HYDROCHLORIDE 10 MG/ML
0.5 INJECTION INTRAVENOUS
Qty: 40 | Refills: 0 | Status: DISCONTINUED | OUTPATIENT
Start: 2023-09-17 | End: 2023-09-17

## 2023-09-17 RX ORDER — ACETAMINOPHEN 500 MG
1000 TABLET ORAL EVERY 6 HOURS
Refills: 0 | Status: DISCONTINUED | OUTPATIENT
Start: 2023-09-17 | End: 2023-09-23

## 2023-09-17 RX ORDER — ACETAMINOPHEN 500 MG
1000 TABLET ORAL ONCE
Refills: 0 | Status: COMPLETED | OUTPATIENT
Start: 2023-09-17 | End: 2023-09-17

## 2023-09-17 RX ADMIN — GABAPENTIN 300 MILLIGRAM(S): 400 CAPSULE ORAL at 22:37

## 2023-09-17 RX ADMIN — Medication 1000 MILLIGRAM(S): at 20:25

## 2023-09-17 RX ADMIN — SENNA PLUS 2 TABLET(S): 8.6 TABLET ORAL at 22:37

## 2023-09-17 RX ADMIN — Medication 1000 MILLIGRAM(S): at 01:20

## 2023-09-17 RX ADMIN — Medication 100 MILLIEQUIVALENT(S): at 04:52

## 2023-09-17 RX ADMIN — Medication 650 MILLIGRAM(S): at 06:16

## 2023-09-17 RX ADMIN — Medication 500 MILLILITER(S): at 04:32

## 2023-09-17 RX ADMIN — PHENYLEPHRINE HYDROCHLORIDE 2.84 MICROGRAM(S)/KG/MIN: 10 INJECTION INTRAVENOUS at 03:17

## 2023-09-17 RX ADMIN — OXYCODONE HYDROCHLORIDE 5 MILLIGRAM(S): 5 TABLET ORAL at 22:55

## 2023-09-17 RX ADMIN — OXYCODONE HYDROCHLORIDE 5 MILLIGRAM(S): 5 TABLET ORAL at 22:25

## 2023-09-17 RX ADMIN — POLYETHYLENE GLYCOL 3350 17 GRAM(S): 17 POWDER, FOR SOLUTION ORAL at 12:01

## 2023-09-17 RX ADMIN — Medication 100 MILLIGRAM(S): at 06:16

## 2023-09-17 RX ADMIN — Medication 650 MILLIGRAM(S): at 07:34

## 2023-09-17 RX ADMIN — ATORVASTATIN CALCIUM 80 MILLIGRAM(S): 80 TABLET, FILM COATED ORAL at 22:37

## 2023-09-17 RX ADMIN — TIOTROPIUM BROMIDE 2 PUFF(S): 18 CAPSULE ORAL; RESPIRATORY (INHALATION) at 12:02

## 2023-09-17 RX ADMIN — Medication 1000 MILLIGRAM(S): at 19:25

## 2023-09-17 RX ADMIN — SERTRALINE 25 MILLIGRAM(S): 25 TABLET, FILM COATED ORAL at 12:01

## 2023-09-17 RX ADMIN — PANTOPRAZOLE SODIUM 40 MILLIGRAM(S): 20 TABLET, DELAYED RELEASE ORAL at 12:01

## 2023-09-17 RX ADMIN — Medication 500 MILLILITER(S): at 02:23

## 2023-09-17 RX ADMIN — GABAPENTIN 300 MILLIGRAM(S): 400 CAPSULE ORAL at 06:16

## 2023-09-17 RX ADMIN — GABAPENTIN 300 MILLIGRAM(S): 400 CAPSULE ORAL at 13:31

## 2023-09-17 RX ADMIN — Medication 400 MILLIGRAM(S): at 01:08

## 2023-09-17 NOTE — PROGRESS NOTE ADULT - ASSESSMENT
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 Overall doing well with no acute complaints at this time.    Recommendations:  - Continue to monitor neuro and pulse exam  - Rest of care per ICU

## 2023-09-17 NOTE — PROGRESS NOTE ADULT - SUBJECTIVE AND OBJECTIVE BOX
Subjective: Patient seen and examined at the bedside. Reports stiffness due to immobility. Otherwise, feeling well.  Denies headache, SOB.     ROS:   Denies Headache, blurred vision, Chest Pain, SOB, Abdominal pain, nausea or vomiting     Social   phenylephrine    Infusion 0.5      Allergies    No Known Allergies    Intolerances        Vital Signs Last 24 Hrs  T(C): 36.6 (17 Sep 2023 08:55), Max: 38.2 (17 Sep 2023 01:02)  T(F): 97.8 (17 Sep 2023 08:55), Max: 100.7 (17 Sep 2023 01:02)  HR: 75 (17 Sep 2023 13:00) (55 - 94)  BP: 145/71 (17 Sep 2023 10:05) (145/71 - 145/71)  BP(mean): 102 (17 Sep 2023 10:05) (102 - 102)  RR: 18 (17 Sep 2023 13:00) (13 - 19)  SpO2: 98% (17 Sep 2023 13:00) (94% - 100%)    Parameters below as of 17 Sep 2023 13:00  Patient On (Oxygen Delivery Method): room air      I&O's Summary    16 Sep 2023 07:01  -  17 Sep 2023 07:00  --------------------------------------------------------  IN: 3685 mL / OUT: 4760 mL / NET: -1075 mL    17 Sep 2023 07:01  -  17 Sep 2023 13:13  --------------------------------------------------------  IN: 355 mL / OUT: 405 mL / NET: -50 mL    Physical Exam:  General: Resting comfortably in bed, NAD  Neuro: A&Ox3, no focal deficits, spontaneously moving all 4 extremities   HEENT: ATNC  Pulmonary: Nonlabored breathing, no respiratory distress, no accessory muscle use noted  Cardiovascular: NSR  Abdomen: Soft, ND, NT.   Groin: B/L groins soft without evidence of hematoma/ecchymosis   Extremities: WWP, SCDs in place. Palpable distal pulses noted in all distal extremities. Proximal muscle strength wnl in all extremities           LABS:                        9.2    6.30  )-----------( 94       ( 17 Sep 2023 02:47 )             25.8     09-17    135  |  100  |  16  ----------------------------<  125<H>  3.8   |  24  |  0.76    Ca    9.2      17 Sep 2023 02:47  Phos  2.0     09-17  Mg     1.8     09-17    TPro  7.3  /  Alb  4.3  /  TBili  0.6  /  DBili  x   /  AST  21  /  ALT  15  /  AlkPhos  68  09-17    PT/INR - ( 17 Sep 2023 02:47 )   PT: 14.5 sec;   INR: 1.28          PTT - ( 17 Sep 2023 02:47 )  PTT:27.7 sec

## 2023-09-17 NOTE — PROGRESS NOTE ADULT - SUBJECTIVE AND OBJECTIVE BOX
INTERVAL HPI/OVERNIGHT EVENTS:    9/15 TEVAR  2nd stage - 90% coverage subclavian artery   EF Normal    69yo male Hx tobaco use - quit Sept 2022, carotid disease (chronic occlusion of right ICA),  former ETOH abuse, HTN, anemia, arthritis, COPD, Mini-AVR (27mmbio), ascending/hemiarch replacement 6/23/22, (postop infarct to right precentral gyrus - no intervention), urinary retention - ThuLEP 10/6/22, L THR, known descending aortic aneurysm being followed    presented 9/14 for pre-operative lumbar drain placement anticipating OR 9/15    OR 9/15: no intraop blood products; 1 L Crystalloid  arrived to ICU post-procedure extubated; normal neuro/vascular examination     postop LA 3.3 - normalized with volume    9/16: LD capped and patient ambulated the length of unit without incident    drainage of LD overnight per protocol with plan to cap again and ultimately remove LD today      PMHx include but is not limited to:   HTN (hypertension)  Aortic regurgitation  Anemia  Arthritis  COPD, mild  BPH (benign prostatic hyperplasia)  cataract extraction - B/L  Aortic valve replaced    ICU Vital Signs Last 24 Hrs  T(C): 37.1 (17 Sep 2023 05:11), Max: 38.2 (17 Sep 2023 01:02)  T(F): 98.7 (17 Sep 2023 05:11), Max: 100.7 (17 Sep 2023 01:02)  HR: 77 (17 Sep 2023 08:00) (55 - 94) sinus   BP: 117/60 (16 Sep 2023 11:00) (97/54 - 117/60)  BP(mean): 82 (16 Sep 2023 11:00) (70 - 82)  ABP: 111/54 (17 Sep 2023 08:00) (108/47 - 167/69)  ABP(mean): 75 (17 Sep 2023 08:00) (69 - 111)  RR: 15 (17 Sep 2023 08:00) (14 - 19)  SpO2: 96% (17 Sep 2023 08:00) (94% - 99%) RA    Qtts: None     I&O's Summary    16 Sep 2023 07:01  -  17 Sep 2023 07:00  --------------------------------------------------------  IN: 3685 mL / OUT: 4760 mL / NET: -1075 mL    17 Sep 2023 07:01  -  17 Sep 2023 08:40  --------------------------------------------------------  IN: 5 mL / OUT: 75 mL / NET: -70 mL    Physical Exam    Heart - regular (-)rub/gallop  Lungs - CTA anterior - no rub /gallop  Abd - (+)BS soft NTND (-)r/r/g  Ext - warm to touch; no cyanosis/clubbing  Neuro - alert/oriented and interactive - nonfocal and moving all extremities ; no sensory deficits   Skin - no rash     LABS:                        9.2    6.30  )-----------( 94       ( 17 Sep 2023 02:47 )             25.8     09-17    135  |  100  |  16  ----------------------------<  125<H>  3.8   |  24  |  0.76    Ca    9.2      17 Sep 2023 02:47  Phos  2.0     09-17  Mg     1.8     09-17    TPro  7.3  /  Alb  4.3  /  TBili  0.6  /  DBili  x   /  AST  21  /  ALT  15  /  AlkPhos  68  09-17    PT/INR - ( 17 Sep 2023 02:47 )   PT: 14.5 sec;   INR: 1.28     PTT - ( 17 Sep 2023 02:47 )  PTT:27.7 sec    ABG - ( 17 Sep 2023 02:45 )  pH, Arterial: 7.48  pH, Blood: x     /  pCO2: 35    /  pO2: 88    / HCO3: 26    / Base Excess: 2.8   /  SaO2: 98.5      RADIOLOGY & ADDITIONAL STUDIES: reviewed    patient with complicated operative Hx now s/p 2nd stage TEVAR  2nd stage - 90% coverage subclavian artery - doing well     BP control - noninvasive BP to be taken on right arm   cap LD with anticipation of removal later today  monitor Neuro and vascular checks ongoing  monitor cannulation sites     bowel regimen - patient states typically pattern at home once per week  hope to remove carine and fuchs after lumbar drain removal   SCD and Diet ordered/GI prophylaxis     d/w patient/staff and CTS

## 2023-09-18 LAB
ALBUMIN SERPL ELPH-MCNC: 4.2 G/DL — SIGNIFICANT CHANGE UP (ref 3.3–5)
ALBUMIN SERPL ELPH-MCNC: 4.2 G/DL — SIGNIFICANT CHANGE UP (ref 3.3–5)
ALBUMIN SERPL ELPH-MCNC: 4.3 G/DL — SIGNIFICANT CHANGE UP (ref 3.3–5)
ALBUMIN SERPL ELPH-MCNC: 4.4 G/DL — SIGNIFICANT CHANGE UP (ref 3.3–5)
ALP SERPL-CCNC: 65 U/L — SIGNIFICANT CHANGE UP (ref 40–120)
ALP SERPL-CCNC: 66 U/L — SIGNIFICANT CHANGE UP (ref 40–120)
ALP SERPL-CCNC: 69 U/L — SIGNIFICANT CHANGE UP (ref 40–120)
ALP SERPL-CCNC: 75 U/L — SIGNIFICANT CHANGE UP (ref 40–120)
ALT FLD-CCNC: 12 U/L — SIGNIFICANT CHANGE UP (ref 10–45)
ALT FLD-CCNC: 14 U/L — SIGNIFICANT CHANGE UP (ref 10–45)
ALT FLD-CCNC: 16 U/L — SIGNIFICANT CHANGE UP (ref 10–45)
ALT FLD-CCNC: 27 U/L — SIGNIFICANT CHANGE UP (ref 10–45)
AMYLASE P1 CFR SERPL: 449 U/L — HIGH (ref 25–125)
ANION GAP SERPL CALC-SCNC: 11 MMOL/L — SIGNIFICANT CHANGE UP (ref 5–17)
ANION GAP SERPL CALC-SCNC: 11 MMOL/L — SIGNIFICANT CHANGE UP (ref 5–17)
ANION GAP SERPL CALC-SCNC: 13 MMOL/L — SIGNIFICANT CHANGE UP (ref 5–17)
ANION GAP SERPL CALC-SCNC: 9 MMOL/L — SIGNIFICANT CHANGE UP (ref 5–17)
APTT BLD: 27.8 SEC — SIGNIFICANT CHANGE UP (ref 24.5–35.6)
APTT BLD: 27.8 SEC — SIGNIFICANT CHANGE UP (ref 24.5–35.6)
APTT BLD: 28 SEC — SIGNIFICANT CHANGE UP (ref 24.5–35.6)
APTT BLD: 29.1 SEC — SIGNIFICANT CHANGE UP (ref 24.5–35.6)
AST SERPL-CCNC: 18 U/L — SIGNIFICANT CHANGE UP (ref 10–40)
AST SERPL-CCNC: 22 U/L — SIGNIFICANT CHANGE UP (ref 10–40)
AST SERPL-CCNC: 25 U/L — SIGNIFICANT CHANGE UP (ref 10–40)
AST SERPL-CCNC: 37 U/L — SIGNIFICANT CHANGE UP (ref 10–40)
BASOPHILS # BLD AUTO: 0 K/UL — SIGNIFICANT CHANGE UP (ref 0–0.2)
BASOPHILS # BLD AUTO: 0.01 K/UL — SIGNIFICANT CHANGE UP (ref 0–0.2)
BASOPHILS NFR BLD AUTO: 0 % — SIGNIFICANT CHANGE UP (ref 0–2)
BASOPHILS NFR BLD AUTO: 0.1 % — SIGNIFICANT CHANGE UP (ref 0–2)
BILIRUB SERPL-MCNC: 0.8 MG/DL — SIGNIFICANT CHANGE UP (ref 0.2–1.2)
BILIRUB SERPL-MCNC: 1 MG/DL — SIGNIFICANT CHANGE UP (ref 0.2–1.2)
BILIRUB SERPL-MCNC: 1.1 MG/DL — SIGNIFICANT CHANGE UP (ref 0.2–1.2)
BILIRUB SERPL-MCNC: 1.2 MG/DL — SIGNIFICANT CHANGE UP (ref 0.2–1.2)
BUN SERPL-MCNC: 12 MG/DL — SIGNIFICANT CHANGE UP (ref 7–23)
BUN SERPL-MCNC: 13 MG/DL — SIGNIFICANT CHANGE UP (ref 7–23)
BUN SERPL-MCNC: 14 MG/DL — SIGNIFICANT CHANGE UP (ref 7–23)
BUN SERPL-MCNC: 14 MG/DL — SIGNIFICANT CHANGE UP (ref 7–23)
CALCIUM SERPL-MCNC: 9.3 MG/DL — SIGNIFICANT CHANGE UP (ref 8.4–10.5)
CALCIUM SERPL-MCNC: 9.6 MG/DL — SIGNIFICANT CHANGE UP (ref 8.4–10.5)
CALCIUM SERPL-MCNC: 9.6 MG/DL — SIGNIFICANT CHANGE UP (ref 8.4–10.5)
CALCIUM SERPL-MCNC: 9.8 MG/DL — SIGNIFICANT CHANGE UP (ref 8.4–10.5)
CHLORIDE SERPL-SCNC: 98 MMOL/L — SIGNIFICANT CHANGE UP (ref 96–108)
CHLORIDE SERPL-SCNC: 98 MMOL/L — SIGNIFICANT CHANGE UP (ref 96–108)
CHLORIDE SERPL-SCNC: 99 MMOL/L — SIGNIFICANT CHANGE UP (ref 96–108)
CHLORIDE SERPL-SCNC: 99 MMOL/L — SIGNIFICANT CHANGE UP (ref 96–108)
CO2 SERPL-SCNC: 22 MMOL/L — SIGNIFICANT CHANGE UP (ref 22–31)
CO2 SERPL-SCNC: 22 MMOL/L — SIGNIFICANT CHANGE UP (ref 22–31)
CO2 SERPL-SCNC: 24 MMOL/L — SIGNIFICANT CHANGE UP (ref 22–31)
CO2 SERPL-SCNC: 24 MMOL/L — SIGNIFICANT CHANGE UP (ref 22–31)
CREAT SERPL-MCNC: 0.65 MG/DL — SIGNIFICANT CHANGE UP (ref 0.5–1.3)
CREAT SERPL-MCNC: 0.69 MG/DL — SIGNIFICANT CHANGE UP (ref 0.5–1.3)
CREAT SERPL-MCNC: 0.74 MG/DL — SIGNIFICANT CHANGE UP (ref 0.5–1.3)
CREAT SERPL-MCNC: 0.8 MG/DL — SIGNIFICANT CHANGE UP (ref 0.5–1.3)
EGFR: 100 ML/MIN/1.73M2 — SIGNIFICANT CHANGE UP
EGFR: 102 ML/MIN/1.73M2 — SIGNIFICANT CHANGE UP
EGFR: 96 ML/MIN/1.73M2 — SIGNIFICANT CHANGE UP
EGFR: 98 ML/MIN/1.73M2 — SIGNIFICANT CHANGE UP
EOSINOPHIL # BLD AUTO: 0.03 K/UL — SIGNIFICANT CHANGE UP (ref 0–0.5)
EOSINOPHIL # BLD AUTO: 0.03 K/UL — SIGNIFICANT CHANGE UP (ref 0–0.5)
EOSINOPHIL # BLD AUTO: 0.04 K/UL — SIGNIFICANT CHANGE UP (ref 0–0.5)
EOSINOPHIL # BLD AUTO: 0.05 K/UL — SIGNIFICANT CHANGE UP (ref 0–0.5)
EOSINOPHIL # BLD AUTO: 0.07 K/UL — SIGNIFICANT CHANGE UP (ref 0–0.5)
EOSINOPHIL NFR BLD AUTO: 0.4 % — SIGNIFICANT CHANGE UP (ref 0–6)
EOSINOPHIL NFR BLD AUTO: 0.7 % — SIGNIFICANT CHANGE UP (ref 0–6)
EOSINOPHIL NFR BLD AUTO: 0.9 % — SIGNIFICANT CHANGE UP (ref 0–6)
FIBRINOGEN PPP-MCNC: 299 MG/DL — SIGNIFICANT CHANGE UP (ref 200–445)
GAS PNL BLDA: SIGNIFICANT CHANGE UP
GLUCOSE SERPL-MCNC: 104 MG/DL — HIGH (ref 70–99)
GLUCOSE SERPL-MCNC: 117 MG/DL — HIGH (ref 70–99)
GLUCOSE SERPL-MCNC: 118 MG/DL — HIGH (ref 70–99)
GLUCOSE SERPL-MCNC: 132 MG/DL — HIGH (ref 70–99)
HCT VFR BLD CALC: 24.9 % — LOW (ref 39–50)
HCT VFR BLD CALC: 27.1 % — LOW (ref 39–50)
HCT VFR BLD CALC: 28.2 % — LOW (ref 39–50)
HCT VFR BLD CALC: 29.1 % — LOW (ref 39–50)
HCT VFR BLD CALC: 29.5 % — LOW (ref 39–50)
HEPARIN-PF4 AB RESULT: <0.6 U/ML — SIGNIFICANT CHANGE UP (ref 0–0.9)
HGB BLD-MCNC: 10 G/DL — LOW (ref 13–17)
HGB BLD-MCNC: 10.2 G/DL — LOW (ref 13–17)
HGB BLD-MCNC: 8.6 G/DL — LOW (ref 13–17)
HGB BLD-MCNC: 9.4 G/DL — LOW (ref 13–17)
HGB BLD-MCNC: 9.5 G/DL — LOW (ref 13–17)
IMM GRANULOCYTES NFR BLD AUTO: 0.3 % — SIGNIFICANT CHANGE UP (ref 0–0.9)
IMM GRANULOCYTES NFR BLD AUTO: 0.3 % — SIGNIFICANT CHANGE UP (ref 0–0.9)
IMM GRANULOCYTES NFR BLD AUTO: 0.4 % — SIGNIFICANT CHANGE UP (ref 0–0.9)
IMM GRANULOCYTES NFR BLD AUTO: 0.5 % — SIGNIFICANT CHANGE UP (ref 0–0.9)
IMM GRANULOCYTES NFR BLD AUTO: 0.6 % — SIGNIFICANT CHANGE UP (ref 0–0.9)
INR BLD: 1.21 — HIGH (ref 0.85–1.18)
INR BLD: 1.25 — HIGH (ref 0.85–1.18)
INR BLD: 1.25 — HIGH (ref 0.85–1.18)
INR BLD: 1.28 — HIGH (ref 0.85–1.18)
LACTATE SERPL-SCNC: 1.2 MMOL/L — SIGNIFICANT CHANGE UP (ref 0.5–2)
LACTATE SERPL-SCNC: 1.6 MMOL/L — SIGNIFICANT CHANGE UP (ref 0.5–2)
LIDOCAIN IGE QN: 10 U/L — SIGNIFICANT CHANGE UP (ref 7–60)
LYMPHOCYTES # BLD AUTO: 0.53 K/UL — LOW (ref 1–3.3)
LYMPHOCYTES # BLD AUTO: 0.57 K/UL — LOW (ref 1–3.3)
LYMPHOCYTES # BLD AUTO: 0.7 K/UL — LOW (ref 1–3.3)
LYMPHOCYTES # BLD AUTO: 0.74 K/UL — LOW (ref 1–3.3)
LYMPHOCYTES # BLD AUTO: 0.75 K/UL — LOW (ref 1–3.3)
LYMPHOCYTES # BLD AUTO: 10.1 % — LOW (ref 13–44)
LYMPHOCYTES # BLD AUTO: 7.6 % — LOW (ref 13–44)
LYMPHOCYTES # BLD AUTO: 7.7 % — LOW (ref 13–44)
LYMPHOCYTES # BLD AUTO: 7.8 % — LOW (ref 13–44)
LYMPHOCYTES # BLD AUTO: 8.8 % — LOW (ref 13–44)
MAGNESIUM SERPL-MCNC: 1.8 MG/DL — SIGNIFICANT CHANGE UP (ref 1.6–2.6)
MAGNESIUM SERPL-MCNC: 2 MG/DL — SIGNIFICANT CHANGE UP (ref 1.6–2.6)
MAGNESIUM SERPL-MCNC: 2 MG/DL — SIGNIFICANT CHANGE UP (ref 1.6–2.6)
MAGNESIUM SERPL-MCNC: 2.2 MG/DL — SIGNIFICANT CHANGE UP (ref 1.6–2.6)
MCHC RBC-ENTMCNC: 32.8 PG — SIGNIFICANT CHANGE UP (ref 27–34)
MCHC RBC-ENTMCNC: 32.9 PG — SIGNIFICANT CHANGE UP (ref 27–34)
MCHC RBC-ENTMCNC: 33.1 PG — SIGNIFICANT CHANGE UP (ref 27–34)
MCHC RBC-ENTMCNC: 33.7 GM/DL — SIGNIFICANT CHANGE UP (ref 32–36)
MCHC RBC-ENTMCNC: 33.9 GM/DL — SIGNIFICANT CHANGE UP (ref 32–36)
MCHC RBC-ENTMCNC: 33.9 PG — SIGNIFICANT CHANGE UP (ref 27–34)
MCHC RBC-ENTMCNC: 34 PG — SIGNIFICANT CHANGE UP (ref 27–34)
MCHC RBC-ENTMCNC: 34.5 GM/DL — SIGNIFICANT CHANGE UP (ref 32–36)
MCHC RBC-ENTMCNC: 34.7 GM/DL — SIGNIFICANT CHANGE UP (ref 32–36)
MCHC RBC-ENTMCNC: 35.1 GM/DL — SIGNIFICANT CHANGE UP (ref 32–36)
MCV RBC AUTO: 93.6 FL — SIGNIFICANT CHANGE UP (ref 80–100)
MCV RBC AUTO: 97.6 FL — SIGNIFICANT CHANGE UP (ref 80–100)
MCV RBC AUTO: 97.7 FL — SIGNIFICANT CHANGE UP (ref 80–100)
MCV RBC AUTO: 97.8 FL — SIGNIFICANT CHANGE UP (ref 80–100)
MCV RBC AUTO: 98.4 FL — SIGNIFICANT CHANGE UP (ref 80–100)
MONOCYTES # BLD AUTO: 0.89 K/UL — SIGNIFICANT CHANGE UP (ref 0–0.9)
MONOCYTES # BLD AUTO: 0.89 K/UL — SIGNIFICANT CHANGE UP (ref 0–0.9)
MONOCYTES # BLD AUTO: 1.03 K/UL — HIGH (ref 0–0.9)
MONOCYTES # BLD AUTO: 1.16 K/UL — HIGH (ref 0–0.9)
MONOCYTES # BLD AUTO: 1.4 K/UL — HIGH (ref 0–0.9)
MONOCYTES NFR BLD AUTO: 11.9 % — SIGNIFICANT CHANGE UP (ref 2–14)
MONOCYTES NFR BLD AUTO: 12 % — SIGNIFICANT CHANGE UP (ref 2–14)
MONOCYTES NFR BLD AUTO: 13.7 % — SIGNIFICANT CHANGE UP (ref 2–14)
MONOCYTES NFR BLD AUTO: 15 % — HIGH (ref 2–14)
MONOCYTES NFR BLD AUTO: 15.6 % — HIGH (ref 2–14)
NEUTROPHILS # BLD AUTO: 5.24 K/UL — SIGNIFICANT CHANGE UP (ref 1.8–7.4)
NEUTROPHILS # BLD AUTO: 5.69 K/UL — SIGNIFICANT CHANGE UP (ref 1.8–7.4)
NEUTROPHILS # BLD AUTO: 5.93 K/UL — SIGNIFICANT CHANGE UP (ref 1.8–7.4)
NEUTROPHILS # BLD AUTO: 6.47 K/UL — SIGNIFICANT CHANGE UP (ref 1.8–7.4)
NEUTROPHILS # BLD AUTO: 6.76 K/UL — SIGNIFICANT CHANGE UP (ref 1.8–7.4)
NEUTROPHILS NFR BLD AUTO: 75.5 % — SIGNIFICANT CHANGE UP (ref 43–77)
NEUTROPHILS NFR BLD AUTO: 76.6 % — SIGNIFICANT CHANGE UP (ref 43–77)
NEUTROPHILS NFR BLD AUTO: 76.6 % — SIGNIFICANT CHANGE UP (ref 43–77)
NEUTROPHILS NFR BLD AUTO: 76.8 % — SIGNIFICANT CHANGE UP (ref 43–77)
NEUTROPHILS NFR BLD AUTO: 79 % — HIGH (ref 43–77)
NRBC # BLD: 0 /100 WBCS — SIGNIFICANT CHANGE UP (ref 0–0)
PA ADP PRP-ACNC: 316 PRU — SIGNIFICANT CHANGE UP (ref 194–418)
PF4 HEPARIN CMPLX AB SER-ACNC: NEGATIVE — SIGNIFICANT CHANGE UP
PHOSPHATE SERPL-MCNC: 2.6 MG/DL — SIGNIFICANT CHANGE UP (ref 2.5–4.5)
PHOSPHATE SERPL-MCNC: 2.8 MG/DL — SIGNIFICANT CHANGE UP (ref 2.5–4.5)
PHOSPHATE SERPL-MCNC: 2.9 MG/DL — SIGNIFICANT CHANGE UP (ref 2.5–4.5)
PHOSPHATE SERPL-MCNC: 3.2 MG/DL — SIGNIFICANT CHANGE UP (ref 2.5–4.5)
PLATELET # BLD AUTO: 118 K/UL — LOW (ref 150–400)
PLATELET # BLD AUTO: 131 K/UL — LOW (ref 150–400)
PLATELET # BLD AUTO: 72 K/UL — LOW (ref 150–400)
PLATELET # BLD AUTO: 77 K/UL — LOW (ref 150–400)
PLATELET # BLD AUTO: 85 K/UL — LOW (ref 150–400)
POTASSIUM SERPL-MCNC: 3.8 MMOL/L — SIGNIFICANT CHANGE UP (ref 3.5–5.3)
POTASSIUM SERPL-MCNC: 3.8 MMOL/L — SIGNIFICANT CHANGE UP (ref 3.5–5.3)
POTASSIUM SERPL-MCNC: 3.9 MMOL/L — SIGNIFICANT CHANGE UP (ref 3.5–5.3)
POTASSIUM SERPL-MCNC: 3.9 MMOL/L — SIGNIFICANT CHANGE UP (ref 3.5–5.3)
POTASSIUM SERPL-SCNC: 3.8 MMOL/L — SIGNIFICANT CHANGE UP (ref 3.5–5.3)
POTASSIUM SERPL-SCNC: 3.8 MMOL/L — SIGNIFICANT CHANGE UP (ref 3.5–5.3)
POTASSIUM SERPL-SCNC: 3.9 MMOL/L — SIGNIFICANT CHANGE UP (ref 3.5–5.3)
POTASSIUM SERPL-SCNC: 3.9 MMOL/L — SIGNIFICANT CHANGE UP (ref 3.5–5.3)
PROT SERPL-MCNC: 7.1 G/DL — SIGNIFICANT CHANGE UP (ref 6–8.3)
PROT SERPL-MCNC: 7.2 G/DL — SIGNIFICANT CHANGE UP (ref 6–8.3)
PROT SERPL-MCNC: 7.4 G/DL — SIGNIFICANT CHANGE UP (ref 6–8.3)
PROT SERPL-MCNC: 7.6 G/DL — SIGNIFICANT CHANGE UP (ref 6–8.3)
PROTHROM AB SERPL-ACNC: 13.7 SEC — HIGH (ref 9.5–13)
PROTHROM AB SERPL-ACNC: 14.2 SEC — HIGH (ref 9.5–13)
PROTHROM AB SERPL-ACNC: 14.2 SEC — HIGH (ref 9.5–13)
PROTHROM AB SERPL-ACNC: 14.5 SEC — HIGH (ref 9.5–13)
RBC # BLD: 2.53 M/UL — LOW (ref 4.2–5.8)
RBC # BLD: 2.77 M/UL — LOW (ref 4.2–5.8)
RBC # BLD: 2.89 M/UL — LOW (ref 4.2–5.8)
RBC # BLD: 3.02 M/UL — LOW (ref 4.2–5.8)
RBC # BLD: 3.11 M/UL — LOW (ref 4.2–5.8)
RBC # FLD: 14.7 % — HIGH (ref 10.3–14.5)
RBC # FLD: 14.8 % — HIGH (ref 10.3–14.5)
RBC # FLD: 14.9 % — HIGH (ref 10.3–14.5)
RBC # FLD: 15 % — HIGH (ref 10.3–14.5)
RBC # FLD: 16.6 % — HIGH (ref 10.3–14.5)
SODIUM SERPL-SCNC: 132 MMOL/L — LOW (ref 135–145)
SODIUM SERPL-SCNC: 132 MMOL/L — LOW (ref 135–145)
SODIUM SERPL-SCNC: 133 MMOL/L — LOW (ref 135–145)
SODIUM SERPL-SCNC: 133 MMOL/L — LOW (ref 135–145)
WBC # BLD: 6.85 K/UL — SIGNIFICANT CHANGE UP (ref 3.8–10.5)
WBC # BLD: 7.41 K/UL — SIGNIFICANT CHANGE UP (ref 3.8–10.5)
WBC # BLD: 7.51 K/UL — SIGNIFICANT CHANGE UP (ref 3.8–10.5)
WBC # BLD: 8.44 K/UL — SIGNIFICANT CHANGE UP (ref 3.8–10.5)
WBC # BLD: 8.96 K/UL — SIGNIFICANT CHANGE UP (ref 3.8–10.5)
WBC # FLD AUTO: 6.85 K/UL — SIGNIFICANT CHANGE UP (ref 3.8–10.5)
WBC # FLD AUTO: 7.41 K/UL — SIGNIFICANT CHANGE UP (ref 3.8–10.5)
WBC # FLD AUTO: 7.51 K/UL — SIGNIFICANT CHANGE UP (ref 3.8–10.5)
WBC # FLD AUTO: 8.44 K/UL — SIGNIFICANT CHANGE UP (ref 3.8–10.5)
WBC # FLD AUTO: 8.96 K/UL — SIGNIFICANT CHANGE UP (ref 3.8–10.5)

## 2023-09-18 PROCEDURE — 99292 CRITICAL CARE ADDL 30 MIN: CPT

## 2023-09-18 PROCEDURE — 93306 TTE W/DOPPLER COMPLETE: CPT | Mod: 26

## 2023-09-18 PROCEDURE — 99291 CRITICAL CARE FIRST HOUR: CPT

## 2023-09-18 PROCEDURE — 72146 MRI CHEST SPINE W/O DYE: CPT | Mod: 26

## 2023-09-18 PROCEDURE — 74174 CTA ABD&PLVS W/CONTRAST: CPT | Mod: 26

## 2023-09-18 PROCEDURE — 72133 CT LUMBAR SPINE W/O & W/DYE: CPT | Mod: 26

## 2023-09-18 PROCEDURE — 71275 CT ANGIOGRAPHY CHEST: CPT | Mod: 26

## 2023-09-18 PROCEDURE — 71045 X-RAY EXAM CHEST 1 VIEW: CPT | Mod: 26

## 2023-09-18 PROCEDURE — 70450 CT HEAD/BRAIN W/O DYE: CPT | Mod: 26,59

## 2023-09-18 PROCEDURE — 70496 CT ANGIOGRAPHY HEAD: CPT | Mod: 26

## 2023-09-18 PROCEDURE — 72148 MRI LUMBAR SPINE W/O DYE: CPT | Mod: 26

## 2023-09-18 PROCEDURE — 70498 CT ANGIOGRAPHY NECK: CPT | Mod: 26

## 2023-09-18 RX ORDER — SODIUM CHLORIDE 9 MG/ML
1000 INJECTION, SOLUTION INTRAVENOUS
Refills: 0 | Status: DISCONTINUED | OUTPATIENT
Start: 2023-09-18 | End: 2023-09-19

## 2023-09-18 RX ORDER — ALBUMIN HUMAN 25 %
250 VIAL (ML) INTRAVENOUS ONCE
Refills: 0 | Status: COMPLETED | OUTPATIENT
Start: 2023-09-18 | End: 2023-09-18

## 2023-09-18 RX ORDER — ACETAMINOPHEN 500 MG
1000 TABLET ORAL ONCE
Refills: 0 | Status: COMPLETED | OUTPATIENT
Start: 2023-09-18 | End: 2023-09-18

## 2023-09-18 RX ORDER — SODIUM CHLORIDE 9 MG/ML
1000 INJECTION, SOLUTION INTRAVENOUS ONCE
Refills: 0 | Status: COMPLETED | OUTPATIENT
Start: 2023-09-18 | End: 2023-09-18

## 2023-09-18 RX ORDER — HYDROMORPHONE HYDROCHLORIDE 2 MG/ML
0.5 INJECTION INTRAMUSCULAR; INTRAVENOUS; SUBCUTANEOUS ONCE
Refills: 0 | Status: DISCONTINUED | OUTPATIENT
Start: 2023-09-18 | End: 2023-09-18

## 2023-09-18 RX ORDER — LABETALOL HCL 100 MG
10 TABLET ORAL ONCE
Refills: 0 | Status: COMPLETED | OUTPATIENT
Start: 2023-09-18 | End: 2023-09-18

## 2023-09-18 RX ORDER — MIDODRINE HYDROCHLORIDE 2.5 MG/1
10 TABLET ORAL EVERY 8 HOURS
Refills: 0 | Status: DISCONTINUED | OUTPATIENT
Start: 2023-09-18 | End: 2023-09-19

## 2023-09-18 RX ORDER — SODIUM CHLORIDE 9 MG/ML
1000 INJECTION, SOLUTION INTRAVENOUS
Refills: 0 | Status: DISCONTINUED | OUTPATIENT
Start: 2023-09-18 | End: 2023-09-18

## 2023-09-18 RX ORDER — LABETALOL HCL 100 MG
5 TABLET ORAL ONCE
Refills: 0 | Status: COMPLETED | OUTPATIENT
Start: 2023-09-18 | End: 2023-09-18

## 2023-09-18 RX ORDER — PHENYLEPHRINE HYDROCHLORIDE 10 MG/ML
0.2 INJECTION INTRAVENOUS
Qty: 40 | Refills: 0 | Status: DISCONTINUED | OUTPATIENT
Start: 2023-09-18 | End: 2023-09-19

## 2023-09-18 RX ORDER — POTASSIUM CHLORIDE 20 MEQ
20 PACKET (EA) ORAL ONCE
Refills: 0 | Status: COMPLETED | OUTPATIENT
Start: 2023-09-18 | End: 2023-09-18

## 2023-09-18 RX ORDER — MAGNESIUM SULFATE 500 MG/ML
2 VIAL (ML) INJECTION ONCE
Refills: 0 | Status: COMPLETED | OUTPATIENT
Start: 2023-09-18 | End: 2023-09-18

## 2023-09-18 RX ADMIN — ATORVASTATIN CALCIUM 80 MILLIGRAM(S): 80 TABLET, FILM COATED ORAL at 21:12

## 2023-09-18 RX ADMIN — SODIUM CHLORIDE 100 MILLILITER(S): 9 INJECTION, SOLUTION INTRAVENOUS at 11:14

## 2023-09-18 RX ADMIN — Medication 10 MILLIGRAM(S): at 07:30

## 2023-09-18 RX ADMIN — Medication 1000 MILLIGRAM(S): at 01:20

## 2023-09-18 RX ADMIN — GABAPENTIN 300 MILLIGRAM(S): 400 CAPSULE ORAL at 13:58

## 2023-09-18 RX ADMIN — MIDODRINE HYDROCHLORIDE 10 MILLIGRAM(S): 2.5 TABLET ORAL at 15:39

## 2023-09-18 RX ADMIN — Medication 20 MILLIEQUIVALENT(S): at 03:46

## 2023-09-18 RX ADMIN — HYDROMORPHONE HYDROCHLORIDE 0.5 MILLIGRAM(S): 2 INJECTION INTRAMUSCULAR; INTRAVENOUS; SUBCUTANEOUS at 18:14

## 2023-09-18 RX ADMIN — SENNA PLUS 2 TABLET(S): 8.6 TABLET ORAL at 21:12

## 2023-09-18 RX ADMIN — GABAPENTIN 300 MILLIGRAM(S): 400 CAPSULE ORAL at 06:40

## 2023-09-18 RX ADMIN — OXYCODONE HYDROCHLORIDE 5 MILLIGRAM(S): 5 TABLET ORAL at 21:23

## 2023-09-18 RX ADMIN — Medication 1000 MILLIGRAM(S): at 12:55

## 2023-09-18 RX ADMIN — Medication 1000 MILLIGRAM(S): at 01:50

## 2023-09-18 RX ADMIN — HYDROMORPHONE HYDROCHLORIDE 0.5 MILLIGRAM(S): 2 INJECTION INTRAMUSCULAR; INTRAVENOUS; SUBCUTANEOUS at 18:29

## 2023-09-18 RX ADMIN — GABAPENTIN 300 MILLIGRAM(S): 400 CAPSULE ORAL at 21:12

## 2023-09-18 RX ADMIN — POLYETHYLENE GLYCOL 3350 17 GRAM(S): 17 POWDER, FOR SOLUTION ORAL at 12:18

## 2023-09-18 RX ADMIN — OXYCODONE HYDROCHLORIDE 5 MILLIGRAM(S): 5 TABLET ORAL at 23:00

## 2023-09-18 RX ADMIN — Medication 125 MILLILITER(S): at 16:51

## 2023-09-18 RX ADMIN — TIOTROPIUM BROMIDE 2 PUFF(S): 18 CAPSULE ORAL; RESPIRATORY (INHALATION) at 12:18

## 2023-09-18 RX ADMIN — Medication 25 GRAM(S): at 03:47

## 2023-09-18 RX ADMIN — Medication 5 MILLIGRAM(S): at 08:30

## 2023-09-18 RX ADMIN — MIDODRINE HYDROCHLORIDE 10 MILLIGRAM(S): 2.5 TABLET ORAL at 21:12

## 2023-09-18 RX ADMIN — Medication 400 MILLIGRAM(S): at 12:40

## 2023-09-18 RX ADMIN — PANTOPRAZOLE SODIUM 40 MILLIGRAM(S): 20 TABLET, DELAYED RELEASE ORAL at 12:18

## 2023-09-18 RX ADMIN — CHLORHEXIDINE GLUCONATE 1 APPLICATION(S): 213 SOLUTION TOPICAL at 06:40

## 2023-09-18 RX ADMIN — Medication 1000 MILLIGRAM(S): at 19:50

## 2023-09-18 RX ADMIN — SERTRALINE 25 MILLIGRAM(S): 25 TABLET, FILM COATED ORAL at 12:18

## 2023-09-18 RX ADMIN — Medication 1000 MILLIGRAM(S): at 21:20

## 2023-09-18 RX ADMIN — SODIUM CHLORIDE 1000 MILLILITER(S): 9 INJECTION, SOLUTION INTRAVENOUS at 10:30

## 2023-09-18 NOTE — PROVIDER CONTACT NOTE (CHANGE IN STATUS NOTIFICATION) - RECOMMENDATIONS
Labs sent, 1 big albumin,  2U platelet, 1U FFP, 2U prbc given- jeremiah gtt started to maintain MAP >105. MRI ordered of spine

## 2023-09-18 NOTE — PROVIDER CONTACT NOTE (CHANGE IN STATUS NOTIFICATION) - SITUATION
Pt s/p TEVAR with lumbar drain on 9/15. Pt c/o b/l lower leg weakness after attempting to ambulate with PT. Lumbar site known to be oozy-clamped from 0600 9/18.

## 2023-09-18 NOTE — PROGRESS NOTE ADULT - SUBJECTIVE AND OBJECTIVE BOX
VASCULAR SURGERY PROGRESS NOTE     Interval/overnight hx: Pt seen and examined at the bedside. Reports of mild cramping sensation to the left thigh, mostly resolved. No other complaints otherwise. Denies any N/V/H/F/C/SOB.     ROS:   Denies Headache, blurred vision, Chest Pain, SOB, Abdominal pain, nausea or vomiting     Social       Allergies    No Known Allergies    Intolerances        Vital Signs Last 24 Hrs  T(C): 37.1 (18 Sep 2023 05:10), Max: 37.8 (17 Sep 2023 17:11)  T(F): 98.7 (18 Sep 2023 05:10), Max: 100 (17 Sep 2023 17:11)  HR: 76 (18 Sep 2023 06:00) (59 - 100)  BP: 145/71 (17 Sep 2023 10:05) (145/71 - 145/71)  BP(mean): 102 (17 Sep 2023 10:05) (102 - 102)  RR: 18 (18 Sep 2023 06:00) (13 - 18)  SpO2: 95% (18 Sep 2023 06:00) (93% - 100%)    Parameters below as of 18 Sep 2023 06:00  Patient On (Oxygen Delivery Method): room air      I&O's Summary    16 Sep 2023 07:01  -  17 Sep 2023 07:00  --------------------------------------------------------  IN: 3685 mL / OUT: 4760 mL / NET: -1075 mL    17 Sep 2023 07:01  -  18 Sep 2023 06:48  --------------------------------------------------------  IN: 1865 mL / OUT: 2890 mL / NET: -1025 mL        Physical Exam:  General: Resting comfortably in bed, NAD  Neuro: A&Ox3, no focal deficits, spontaneously moving all 4 extremities   HEENT: ATNC  Pulmonary: Nonlabored breathing, no respiratory distress, no accessory muscle use noted  Cardiovascular: NSR  Abdomen: Soft, ND, NT.   Groin: B/L groins soft without evidence of hematoma/ecchymosis   Extremities: WWP, SCDs in place. Palpable distal pulses noted in all distal extremities. Proximal muscle strength wnl in all extremities    LABS:                        10.0   8.96  )-----------( 85       ( 18 Sep 2023 02:25 )             29.5     09-18    133<L>  |  98  |  14  ----------------------------<  117<H>  3.9   |  24  |  0.69    Ca    9.6      18 Sep 2023 02:25  Phos  2.6     09-18  Mg     1.8     09-18    TPro  7.6  /  Alb  4.4  /  TBili  1.2  /  DBili  x   /  AST  22  /  ALT  14  /  AlkPhos  69  09-18    PT/INR - ( 18 Sep 2023 02:25 )   PT: 14.2 sec;   INR: 1.25          PTT - ( 18 Sep 2023 02:25 )  PTT:28.0 sec    Radiology and Additional Studies:    A/P: 68y YO Male      Vascular/PAD:      HTN/HLD:    CAD/CHF:     DM:    CKD:     Anemia:     ID:    Diet:     Activity:     DVTPPx:     Dispo:      VASCULAR SURGERY PROGRESS NOTE     Interval/overnight hx: Pt seen and examined at the bedside. Reports of mild cramping sensation to the left thigh, mostly resolved. No other complaints otherwise. Denies any N/V/H/F/C/SOB.     ROS:   Denies Headache, blurred vision, Chest Pain, SOB, Abdominal pain, nausea or vomiting     Social       Allergies    No Known Allergies    Intolerances        Vital Signs Last 24 Hrs  T(C): 37.1 (18 Sep 2023 05:10), Max: 37.8 (17 Sep 2023 17:11)  T(F): 98.7 (18 Sep 2023 05:10), Max: 100 (17 Sep 2023 17:11)  HR: 76 (18 Sep 2023 06:00) (59 - 100)  BP: 145/71 (17 Sep 2023 10:05) (145/71 - 145/71)  BP(mean): 102 (17 Sep 2023 10:05) (102 - 102)  RR: 18 (18 Sep 2023 06:00) (13 - 18)  SpO2: 95% (18 Sep 2023 06:00) (93% - 100%)    Parameters below as of 18 Sep 2023 06:00  Patient On (Oxygen Delivery Method): room air      I&O's Summary    16 Sep 2023 07:01  -  17 Sep 2023 07:00  --------------------------------------------------------  IN: 3685 mL / OUT: 4760 mL / NET: -1075 mL    17 Sep 2023 07:01  -  18 Sep 2023 06:48  --------------------------------------------------------  IN: 1865 mL / OUT: 2890 mL / NET: -1025 mL        Physical Exam:  General: Resting comfortably in bed, NAD  Neuro: A&Ox3, no focal deficits, spontaneously moving all 4 extremities   HEENT: ATNC  Pulmonary: Nonlabored breathing, no respiratory distress, no accessory muscle use noted  Cardiovascular: NSR  Abdomen: Soft, ND, NT.   Groin: B/L groins soft without evidence of hematoma/ecchymosis   Extremities: WWP, SCDs in place. Palpable distal pulses noted in all distal extremities. Proximal muscle strength wnl in all extremities    LABS:                        10.0   8.96  )-----------( 85       ( 18 Sep 2023 02:25 )             29.5     09-18    133<L>  |  98  |  14  ----------------------------<  117<H>  3.9   |  24  |  0.69    Ca    9.6      18 Sep 2023 02:25  Phos  2.6     09-18  Mg     1.8     09-18    TPro  7.6  /  Alb  4.4  /  TBili  1.2  /  DBili  x   /  AST  22  /  ALT  14  /  AlkPhos  69  09-18    PT/INR - ( 18 Sep 2023 02:25 )   PT: 14.2 sec;   INR: 1.25          PTT - ( 18 Sep 2023 02:25 )  PTT:28.0 sec    Radiology and Additional Studies:       VASCULAR SURGERY PROGRESS NOTE     Interval/overnight hx: Pt seen and examined at the bedside. Reports of mild cramping sensation to the right thigh, mostly resolved. No other complaints otherwise. Denies any N/V/H/F/C/SOB.     ROS:   Denies Headache, blurred vision, Chest Pain, SOB, Abdominal pain, nausea or vomiting     Social       Allergies    No Known Allergies    Intolerances        Vital Signs Last 24 Hrs  T(C): 37.1 (18 Sep 2023 05:10), Max: 37.8 (17 Sep 2023 17:11)  T(F): 98.7 (18 Sep 2023 05:10), Max: 100 (17 Sep 2023 17:11)  HR: 76 (18 Sep 2023 06:00) (59 - 100)  BP: 145/71 (17 Sep 2023 10:05) (145/71 - 145/71)  BP(mean): 102 (17 Sep 2023 10:05) (102 - 102)  RR: 18 (18 Sep 2023 06:00) (13 - 18)  SpO2: 95% (18 Sep 2023 06:00) (93% - 100%)    Parameters below as of 18 Sep 2023 06:00  Patient On (Oxygen Delivery Method): room air      I&O's Summary    16 Sep 2023 07:01  -  17 Sep 2023 07:00  --------------------------------------------------------  IN: 3685 mL / OUT: 4760 mL / NET: -1075 mL    17 Sep 2023 07:01  -  18 Sep 2023 06:48  --------------------------------------------------------  IN: 1865 mL / OUT: 2890 mL / NET: -1025 mL        Physical Exam:  General: Resting comfortably in bed, NAD  Neuro: A&Ox3, no focal deficits, spontaneously moving all 4 extremities   HEENT: ATNC  Pulmonary: Nonlabored breathing, no respiratory distress, no accessory muscle use noted  Cardiovascular: NSR  Abdomen: Soft, ND, NT.   Groin: B/L groins soft without evidence of hematoma/ecchymosis   Extremities: WWP, SCDs in place. Palpable distal pulses noted in all distal extremities. Proximal muscle strength wnl in all extremities    LABS:                        10.0   8.96  )-----------( 85       ( 18 Sep 2023 02:25 )             29.5     09-18    133<L>  |  98  |  14  ----------------------------<  117<H>  3.9   |  24  |  0.69    Ca    9.6      18 Sep 2023 02:25  Phos  2.6     09-18  Mg     1.8     09-18    TPro  7.6  /  Alb  4.4  /  TBili  1.2  /  DBili  x   /  AST  22  /  ALT  14  /  AlkPhos  69  09-18    PT/INR - ( 18 Sep 2023 02:25 )   PT: 14.2 sec;   INR: 1.25          PTT - ( 18 Sep 2023 02:25 )  PTT:28.0 sec    Radiology and Additional Studies:

## 2023-09-18 NOTE — PHYSICAL THERAPY INITIAL EVALUATION ADULT - PERTINENT HX OF CURRENT PROBLEM, REHAB EVAL
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 He is followed by Dr. Toledo as an outpatient for his descending aortic aneurysm. He presented to [facility] on 9/14/23 for a pre-operative lumbar drain placement with plan for TEVAR.

## 2023-09-18 NOTE — PHYSICAL THERAPY INITIAL EVALUATION ADULT - GAIT DEVIATIONS NOTED, PT EVAL
moderately unsteady, no LOB, decreased strength and B/L knee buckled/decreased glenna/decreased step length/decreased stride length/decreased weight-shifting ability

## 2023-09-18 NOTE — PROGRESS NOTE ADULT - ASSESSMENT
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 Overall doing well with no acute complaints at this time.    Recommendations:  - Continue to monitor neuro and pulse exam  - Rest of care per ICU

## 2023-09-18 NOTE — PROGRESS NOTE ADULT - SUBJECTIVE AND OBJECTIVE BOX
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 9/14 LD placed. 9/15 Pt underwent second stage TEVAR with Dr. Toledo covering 90% subclavian. Pt with right leg weakness 9/16. 9/18  sx persisted and LD frankly bloody. Underwent CT pan scan to ro epidural hematoma followed by MRI spine for persistent weakness. On gordy for elevated MAP goals.       FAMILY HISTORY:  Family history of Marfan syndrome (Uncle)  FH: aortic aneurysm (Uncle)  PAST MEDICAL & SURGICAL HISTORY:  HTN (hypertension)  Aortic regurgitation  Anemia  Arthritis  COPD, mild  Bladder istention  BPH (benign prostatic hyperplasia)  H/O cataract extraction  B/L  Aortic valve replaced        Patient is a 68y old  Male who presents with a chief complaint of Thoracic Aortic Aneurysm.      14 system review was unremarkable    Vital signs, hemodynamic and respiratory parameters were reviewed from the bedside nursing flowsheet.  ICU Vital Signs Last 24 Hrs  T(C): 37.4 (18 Sep 2023 18:14), Max: 37.7 (17 Sep 2023 21:01)  T(F): 99.4 (18 Sep 2023 18:14), Max: 99.8 (17 Sep 2023 21:01)  HR: 77 (18 Sep 2023 20:00) (54 - 77)  BP: 185/84 (18 Sep 2023 20:00) (99/50 - 185/84)  BP(mean): 120 (18 Sep 2023 20:00) (56 - 120)  ABP: 174/74 (18 Sep 2023 20:00) (106/52 - 215/80)  ABP(mean): 115 (18 Sep 2023 20:00) (61 - 128)  RR: 18 (18 Sep 2023 20:00) (15 - 22)  SpO2: 91% (18 Sep 2023 20:00) (90% - 100%)    O2 Parameters below as of 18 Sep 2023 20:00  Patient On (Oxygen Delivery Method): nasal cannula, high flow  O2 Flow (L/min): 60  O2 Concentration (%): 100      Adult Advanced Hemodynamics Last 24 Hrs  CVP(mm Hg): 18 (18 Sep 2023 20:00) (0 - 342)  CVP(cm H2O): --  CO: --  CI: --  PA: --  PA(mean): --  PCWP: --  SVR: --  SVRI: --  PVR: --  PVRI: --, ABG - ( 18 Sep 2023 15:00 )  pH, Arterial: 7.48  pH, Blood: x     /  pCO2: 29    /  pO2: 85    / HCO3: 22    / Base Excess: -0.8  /  SaO2: 99.4                Intake and output was reviewed and the fluid balance was calculated  Daily     Daily   I&O's Summary    17 Sep 2023 07:01  -  18 Sep 2023 07:00  --------------------------------------------------------  IN: 2865 mL / OUT: 3000 mL / NET: -135 mL    18 Sep 2023 07:01  -  18 Sep 2023 20:21  --------------------------------------------------------  IN: 4047.6 mL / OUT: 2421 mL / NET: 1626.6 mL        All lines and drain sites were assessed  Glycemic trend was reviewedCAPILLARY BLOOD GLUCOSE          Neuro: nad, moving all extremities equally  HEENT: mmm  Heart: s1 s2  Lungs: clear bl  Abdomen: soft nt nd  Extremities: wwp    Lines:  R radial arterial line 9/15    Tubes:  LD 9/14        labs  CBC Full  -  ( 18 Sep 2023 16:11 )  WBC Count : 7.51 K/uL  RBC Count : 2.53 M/uL  Hemoglobin : 8.6 g/dL  Hematocrit : 24.9 %  Platelet Count - Automated : 131 K/uL  Mean Cell Volume : 98.4 fl  Mean Cell Hemoglobin : 34.0 pg  Mean Cell Hemoglobin Concentration : 34.5 gm/dL  Auto Neutrophil # : 5.93 K/uL  Auto Lymphocyte # : 0.57 K/uL  Auto Monocyte # : 0.89 K/uL  Auto Eosinophil # : 0.07 K/uL  Auto Basophil # : 0.01 K/uL  Auto Neutrophil % : 79.0 %  Auto Lymphocyte % : 7.6 %  Auto Monocyte % : 11.9 %  Auto Eosinophil % : 0.9 %  Auto Basophil % : 0.1 %    09-18    132<L>  |  99  |  14  ----------------------------<  132<H>  3.8   |  22  |  0.80    Ca    9.6      18 Sep 2023 15:00  Phos  3.2     09-18  Mg     2.2     09-18    TPro  7.1  /  Alb  4.2  /  TBili  0.8  /  DBili  x   /  AST  25  /  ALT  16  /  AlkPhos  65  09-18    PT/INR - ( 18 Sep 2023 15:00 )   PT: 14.2 sec;   INR: 1.25          PTT - ( 18 Sep 2023 15:00 )  PTT:27.8 sec  The current medications were reviewed   MEDICATIONS  (STANDING):  atorvastatin 80 milliGRAM(s) Oral at bedtime  chlorhexidine 2% Cloths 1 Application(s) Topical daily  gabapentin 300 milliGRAM(s) Oral every 8 hours  lactated ringers. 1000 milliLiter(s) (100 mL/Hr) IV Continuous <Continuous>  midodrine 10 milliGRAM(s) Oral every 8 hours  pantoprazole    Tablet 40 milliGRAM(s) Oral daily  phenylephrine    Infusion 0.2 MICROgram(s)/kG/Min (5.68 mL/Hr) IV Continuous <Continuous>  polyethylene glycol 3350 17 Gram(s) Oral daily  senna 2 Tablet(s) Oral at bedtime  sertraline 25 milliGRAM(s) Oral daily  sodium chloride 0.9%. 1000 milliLiter(s) (10 mL/Hr) IV Continuous <Continuous>  tiotropium 2.5 MICROgram(s) Inhaler 2 Puff(s) Inhalation daily    MEDICATIONS  (PRN):  acetaminophen     Tablet .. 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 9/14 LD placed.     POD 3 second stage TEVAR  covering 90% subclavian ([facility], 9/15)  Blood LD output  Thrombocytopenia sp 2 plt  Neurosurgery aware  LD clamped  Follow up MRI results  Gordy for MAP goal 100-110  Serial neuro checks  Acute post operative anemia due to blood loss-trend H/H  Diet as tolerated  Replete lytes prn  GI/DVT PPX  Bowel Regimen  Pain control  Close hemodynamic, ventilatory and drain monitoring and management per post op routine  Monitor for arrhythmias and monitor parameters for organ perfusion  Monitor neurologic status  Head of the bed should remain elevated to 45 deg   Chest PT and IS will be encouraged  Monitor adequacy of oxygenation and ventilation and attempt to wean oxygen  Antibiotic regimen will be tailored to the clinical, laboratory and microbiologic data  Nutritional goals will be met using po eventually, ensure adequate caloric intake and monitor the same  Stress ulcer and VTE prophylaxis will be achieved    Glycemic control is satisfactory  Electrolytes have been repleted as necessary and wound care has been carried out   Pain control has been achieved.   Aggressive physical therapy and early mobility and ambulation goals will be met   The family was updated about the course and plan.    CRITICAL CARE TIME personally provided by me  in evaluation and management, reassessments, review and interpretation of labs and x-rays, ventilator and hemodynamic management, formulating a plan and coordinating care: ___60___ MIN.  Time does not include procedural time.    CTICU ATTENDING     					  Godfrey Hannon MD

## 2023-09-18 NOTE — PROVIDER CONTACT NOTE (CHANGE IN STATUS NOTIFICATION) - ASSESSMENT
When lumbar drain was on to drain, patrice red blood was found draining in the line- LUIS MIGUEL Anderson and Dr. Castaneda made aware and drain was clamped. MAP now in 80s/ Pt stable- no complaint of headache, dizziness.

## 2023-09-18 NOTE — PROGRESS NOTE ADULT - SUBJECTIVE AND OBJECTIVE BOX
CTICU  CRITICAL  CARE  attending     Hand off received 					   Pertinent clinical, laboratory, radiographic, hemodynamic, echocardiographic, respiratory data, microbiologic data and chart were reviewed and analyzed frequently throughout the course of the day and night  Patient seen and examined with CTS/ SH attending at bedside  Pt is a 68y , Male, HEALTH ISSUES - PROBLEM Dx:      , FAMILY HISTORY:  Family history of Marfan syndrome (Uncle)    FH: aortic aneurysm (Uncle)    PAST MEDICAL & SURGICAL HISTORY:  HTN (hypertension)      Aortic regurgitation      Anemia      Arthritis      COPD, mild      Bladder distention      BPH (benign prostatic hyperplasia)      H/O cataract extraction  B/L      Aortic valve replaced        Patient is a 68y old  Male who presents with a chief complaint of Thoracic Aortic Aneurysm (18 Sep 2023 15:39)      14 system review was unremarkable    Vital signs, hemodynamic and respiratory parameters were reviewed from the bedside nursing flowsheet.  ICU Vital Signs Last 24 Hrs  T(C): 37.4 (18 Sep 2023 18:14), Max: 37.7 (17 Sep 2023 21:01)  T(F): 99.4 (18 Sep 2023 18:14), Max: 99.8 (17 Sep 2023 21:01)  HR: 77 (18 Sep 2023 20:00) (54 - 77)  BP: 185/84 (18 Sep 2023 20:00) (99/50 - 185/84)  BP(mean): 120 (18 Sep 2023 20:00) (56 - 120)  ABP: 174/74 (18 Sep 2023 20:00) (106/52 - 215/80)  ABP(mean): 115 (18 Sep 2023 20:00) (61 - 128)  RR: 18 (18 Sep 2023 20:00) (15 - 22)  SpO2: 91% (18 Sep 2023 20:00) (90% - 100%)    O2 Parameters below as of 18 Sep 2023 21:00  Patient On (Oxygen Delivery Method): nasal cannula, high flow  O2 Flow (L/min): 60  O2 Concentration (%): 100      Adult Advanced Hemodynamics Last 24 Hrs  CVP(mm Hg): 18 (18 Sep 2023 20:00) (0 - 342)  CVP(cm H2O): --  CO: --  CI: --  PA: --  PA(mean): --  PCWP: --  SVR: --  SVRI: --  PVR: --  PVRI: --, ABG - ( 18 Sep 2023 15:00 )  pH, Arterial: 7.48  pH, Blood: x     /  pCO2: 29    /  pO2: 85    / HCO3: 22    / Base Excess: -0.8  /  SaO2: 99.4                Intake and output was reviewed and the fluid balance was calculated  Daily     Daily   I&O's Summary    17 Sep 2023 07:01  -  18 Sep 2023 07:00  --------------------------------------------------------  IN: 2865 mL / OUT: 3000 mL / NET: -135 mL    18 Sep 2023 07:01  -  18 Sep 2023 20:41  --------------------------------------------------------  IN: 4047.6 mL / OUT: 2421 mL / NET: 1626.6 mL        All lines and drain sites were assessed  Glycemic trend was reviewedCAPILLARY BLOOD GLUCOSE        No acute change in mental status  Auscultation of the chest reveals equal bs  Abdomen is soft  Extremities are warm and well perfused  Wounds appear clean and unremarkable  Antibiotics are periop    labs  CBC Full  -  ( 18 Sep 2023 16:11 )  WBC Count : 7.51 K/uL  RBC Count : 2.53 M/uL  Hemoglobin : 8.6 g/dL  Hematocrit : 24.9 %  Platelet Count - Automated : 131 K/uL  Mean Cell Volume : 98.4 fl  Mean Cell Hemoglobin : 34.0 pg  Mean Cell Hemoglobin Concentration : 34.5 gm/dL  Auto Neutrophil # : 5.93 K/uL  Auto Lymphocyte # : 0.57 K/uL  Auto Monocyte # : 0.89 K/uL  Auto Eosinophil # : 0.07 K/uL  Auto Basophil # : 0.01 K/uL  Auto Neutrophil % : 79.0 %  Auto Lymphocyte % : 7.6 %  Auto Monocyte % : 11.9 %  Auto Eosinophil % : 0.9 %  Auto Basophil % : 0.1 %    09-18    132<L>  |  99  |  14  ----------------------------<  132<H>  3.8   |  22  |  0.80    Ca    9.6      18 Sep 2023 15:00  Phos  3.2     09-18  Mg     2.2     09-18    TPro  7.1  /  Alb  4.2  /  TBili  0.8  /  DBili  x   /  AST  25  /  ALT  16  /  AlkPhos  65  09-18    PT/INR - ( 18 Sep 2023 15:00 )   PT: 14.2 sec;   INR: 1.25          PTT - ( 18 Sep 2023 15:00 )  PTT:27.8 sec  The current medications were reviewed   MEDICATIONS  (STANDING):  atorvastatin 80 milliGRAM(s) Oral at bedtime  chlorhexidine 2% Cloths 1 Application(s) Topical daily  gabapentin 300 milliGRAM(s) Oral every 8 hours  lactated ringers. 1000 milliLiter(s) (100 mL/Hr) IV Continuous <Continuous>  midodrine 10 milliGRAM(s) Oral every 8 hours  pantoprazole    Tablet 40 milliGRAM(s) Oral daily  phenylephrine    Infusion 0.2 MICROgram(s)/kG/Min (5.68 mL/Hr) IV Continuous <Continuous>  polyethylene glycol 3350 17 Gram(s) Oral daily  senna 2 Tablet(s) Oral at bedtime  sertraline 25 milliGRAM(s) Oral daily  sodium chloride 0.9%. 1000 milliLiter(s) (10 mL/Hr) IV Continuous <Continuous>  tiotropium 2.5 MICROgram(s) Inhaler 2 Puff(s) Inhalation daily    MEDICATIONS  (PRN):  acetaminophen     Tablet .. 1000 milliGRAM(s) Oral every 6 hours PRN Moderate Pain (4 - 6)  oxyCODONE    IR 5 milliGRAM(s) Oral every 6 hours PRN Severe Pain (7 - 10)       PROBLEM LIST/ ASSESSMENT:  HEALTH ISSUES - PROBLEM Dx:      ,   Patient is a 68y old  Male who presents with a chief complaint of Thoracic Aortic Aneurysm (18 Sep 2023 20:21)     s/p tevar    rle weakness  bloody csf drainage     Acute blood loss anemia with relative hypotension treated with > 1 unit PC    Acute post operative pulmonary insufficiency ruled in due to hypoxemia, O2 sats < 91% on RA treated with HFNC            My plan includes :    stat ct spine cap done    rle weakness by afternoon - map goals 110 - to mri   d/w nsx hold csf driange since bloody - 2 u plt   close hemodynamic, ventilatory and drain monitoring and management per post op routine    Monitor for arrhythmias and monitor parameters for organ perfusion  beta blockade not administered due to hemodynamic instability and bradycardia  monitor neurologic status  Head of the bed should remain elevated to 45 deg .   chest PT and IS will be encouraged  monitor adequacy of oxygenation and ventilation and attempt to wean oxygen  antibiotic regimen will be tailored to the clinical, laboratory and microbiologic data  Nutritional goals will be met using po eventually , ensure adequate caloric intake and montior the same  Stress ulcer and VTE prophylaxis will be achieved    Glycemic control is satisfactory  Electrolytes have been repleted as necessary and wound care has been carried out. Pain control has been achieved.   agressive physical therapy and early mobility and ambulation goals will be met   The family was updated about the course and plan  CRITICAL CARE TIME Upon my evaluation, this patient had a high probability of imminent or life-threatening deterioration due to the above problems which required my direct attention, intervention, and personal management.  I have personally provided 110 minutes of critical care time exclusive of time spent on separately billable procedures. Time included review of laboratory data, radiology results, discussion with consultants, and monitoring for potential decompensation. Interventions were performed as documented abovepersonally provided by me  in evaluation and management, reassessments, review and interpretation of labs and x-rays, ventilator and hemodynamic management, formulating a plan and coordinating care: ___110___ MIN.  Time does not include procedural time.    CTICU ATTENDING     					    Brando Castaneda MD

## 2023-09-18 NOTE — PHYSICAL THERAPY INITIAL EVALUATION ADULT - CRITERIA FOR SKILLED THERAPEUTIC INTERVENTIONS
impairments found/functional limitations in following categories/anticipated equipment needs at discharge

## 2023-09-18 NOTE — CONSULT NOTE ADULT - ASSESSMENT
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 On exam, his LE strength is 5/5 throughout. Other findings can be attributed to his previous stroke. Recent imaging reviewed. His current presentation mostly consistent with deconditioning but important to rule out any central pathology.     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 On exam, his LE strength is 5/5 throughout. Other findings can be attributed to his previous stroke. Recent imaging reviewed. His current presentation mostly consistent with deconditioning but important to rule out any central pathology including cord infarct     Recommendations:  - If amnenable with lumbar drain, can obtain MRI lumbar spine w/o contrast with DWI sequence.  - Maintain MAP goal 85-90 mmHg or higher.  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 On exam, his LE strength is 5/5 throughout. Other findings can be attributed to his previous stroke. Recent imaging reviewed. His current presentation mostly consistent with deconditioning but important to rule out any central pathology including cord infarct. Thoracic MRI (9/18) showed spinal subarachnoid hemorrhage. Lumbar drain removed.     Recommendations:  - Neuro checks q1hr  - No clear indication for steroids. Consider risk/benefit given potential venous HTN side effect  - Maintain MAP goal 85-90 mmHg or higher.  - Physical therapy 68 year old man s/p aortic aneurysm stent and lumbar drain presenting with lower limb weakness in the setting of spinal subarachnoid hemorrhage detected on MRI. His case is complicated by history of right ICA occlusion whereby his right hemisphere is supplied by Acomm and Pcomm collaterals. Subararachnoid blood likely related to lumbar drain and caused local irritation of nerves. MR does not demonstrate spinal cord infarction nor does the exam or history suggest a periprocedural spinal cord infarction at this time. While cerebral vasospasm is a well documented complication of aneurysmal subararachnoid hemorrhage, spinal vasospasm is not well documented. If there is a change in his neurological examination should consider rebleeding, spinal cord infarction, hypoperfusion/hyperperfusion syndromes, vasospasm.         Recommendations:  - Neuro checks q1hr  - No clear indication for corticosteroids. Consider risk/benefit given potential for steroid induced venous hypertension in the spinal circulation   - Maintain MAP goal 85-90 mmHg to ensure good intraspinal perfusion pressure and intracerebral perfusion.   - Physical therapy

## 2023-09-18 NOTE — PHYSICAL THERAPY INITIAL EVALUATION ADULT - GENERAL OBSERVATIONS, REHAB EVAL
Patient received +heplock +tele +A line +lumbar drain clamped +SCDx2. HECTOR Ortega aware of intent to treat.

## 2023-09-18 NOTE — PHYSICAL THERAPY INITIAL EVALUATION ADULT - ADDITIONAL COMMENTS
Patient lives with wife in a private home with 5-6 steps to enter and 1 flight of stairs inside. No use of AD but owns RW and cane. Independent with all ADLs. Bathroom set up with tub shower and no grab bars.

## 2023-09-18 NOTE — CONSULT NOTE ADULT - ATTENDING COMMENTS
Lower limb pain and unsteadiness likely due to spinal subarachnoid hemorrage which can be irritative and impact spinal cord perfusion in the setting of thoracic aneurysm stenting. No evidence at this time of spinal cord infarction clinically or on MRI. Maintain MAP 85, frequent neurochecks with PT as tolerated. His case is complicated by history of right ICA occlusion whereby his right hemisphere is supplied by Acomm and Pcomm collaterals. Subararachnoid blood likely related to lumbar drain and caused local irritation of nerves. MR does not demonstrate spinal cord infarction nor does the exam or history suggest a periprocedural spinal cord infarction at this time. While cerebral vasospasm is a well documented complication of aneurysmal subararachnoid hemorrhage, spinal vasospasm is not well documented. If there is a change in his neurological examination should consider rebleeding, spinal cord infarction, hypoperfusion/hyperperfusion syndromes, vasospasm.

## 2023-09-18 NOTE — CONSULT NOTE ADULT - SUBJECTIVE AND OBJECTIVE BOX
HPI:  Patient is a 67 y/o M, former smoker (40 yr hx,1/2PPD; quit 09/2022) and former ETOH misuse, with PMH of HTN, anemia, arthritis, COPD and aortic pathology/aortic insufficiency s/p mini-AVR (27mmbio), ascending and hemiarch replacement EF normal on 6/23/22, (postop course includes chronic occlusion of right ICA, 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 He is followed by Dr. Toledo as an outpatient for his descending aortic aneurysm. He presented to [facility] on 9/14/23 for a pre-operative lumbar drain placement and underwent TEVAR on 0915. Neurology is being consulted for B/L LE weakness noticed on POD1 when patient was out of bed with assistance. Patient states that he felt his knees were buckling when he tried to walk. He also noticed pain over his both thighs while trying to walk. He felt weak from middle of his thigh upto his knees,     Off note, he had a right CR stroke back in 06/2022 following a Aortic valve repair surgery. c/f left sided paralysis, NIHSS 22. CTA significant for MELISSA occlusion with partial reconstitution of RMCA from R ACOMM. Angiogram revealing chronic occlusion of R ICA, cross filling from left to right via Acomm, no thrombectomy was performed. Patient states that the residual weakness and brain fog lasted for a while but he has been mostly at his baseline recently.       He denies any headache, blurry/double vision,slurred speech, tingling/paresthesia      PAST MEDICAL & SURGICAL HISTORY:  HTN (hypertension)    Aortic regurgitation    Anemia    Arthritis    COPD, mild    Bladder distention    BPH (benign prostatic hyperplasia)    H/O cataract extraction  B/L    Aortic valve replaced          Medications:  acetaminophen     Tablet .. 1000 milliGRAM(s) Oral every 6 hours PRN  atorvastatin 80 milliGRAM(s) Oral at bedtime  chlorhexidine 2% Cloths 1 Application(s) Topical daily  gabapentin 300 milliGRAM(s) Oral every 8 hours  lactated ringers. 1000 milliLiter(s) IV Continuous <Continuous>  midodrine 10 milliGRAM(s) Oral every 8 hours  oxyCODONE    IR 5 milliGRAM(s) Oral every 6 hours PRN  pantoprazole    Tablet 40 milliGRAM(s) Oral daily  phenylephrine    Infusion 0.2 MICROgram(s)/kG/Min IV Continuous <Continuous>  polyethylene glycol 3350 17 Gram(s) Oral daily  senna 2 Tablet(s) Oral at bedtime  sertraline 25 milliGRAM(s) Oral daily  sodium chloride 0.9%. 1000 milliLiter(s) IV Continuous <Continuous>  tiotropium 2.5 MICROgram(s) Inhaler 2 Puff(s) Inhalation daily      Vital Signs Last 24 Hrs  T(C): 36.9 (18 Sep 2023 13:41), Max: 37.8 (17 Sep 2023 17:11)  T(F): 98.4 (18 Sep 2023 13:41), Max: 100 (17 Sep 2023 17:11)  HR: 55 (18 Sep 2023 15:00) (55 - 100)  BP: 112/68 (18 Sep 2023 14:00) (99/50 - 112/68)  BP(mean): 86 (18 Sep 2023 14:00) (56 - 86)  RR: 20 (18 Sep 2023 15:00) (15 - 22)  SpO2: 98% (18 Sep 2023 15:00) (90% - 100%)    Parameters below as of 18 Sep 2023 15:00  Patient On (Oxygen Delivery Method): room air        Neurological Exam:   Mental status: Awake, alert and oriented x3.  Recent and remote memory intact.  Naming, repetition and comprehension intact.  Attention/concentration intact.  No dysarthria, no aphasia.  Fund of knowledge appropriate.    Cranial nerves: Pupils equally round and reactive to light, visual fields full, no nystagmus, extraocular muscles intact, V1 through V3 intact bilaterally and symmetric, face symmetric, hearing intact to finger rub, palate elevation symmetric, tongue was midline.  Motor:  MRC grading 5/5 b/l UE/LE.   strength 5/5.  Normal tone and bulk.  No abnormal movements.    Sensation: Intact to light touch, proprioception, and pinprick.   Coordination: No dysmetria on finger-to-nose   Reflexes: 2++ in bilateral UE/LE, Planter: left: upgoing, right: downgoing.         Labs:  Fingerstick Blood Glucose: CAPILLARY BLOOD GLUCOSE        LABS:                        9.4    7.41  )-----------( 77       ( 18 Sep 2023 15:00 )             27.1     09-18    132<L>  |  99  |  13  ----------------------------<  104<H>  3.9   |  24  |  0.65    Ca    9.8      18 Sep 2023 10:35  Phos  3.2     09-18  Mg     2.2     09-18    TPro  7.4  /  Alb  4.3  /  TBili  1.0  /  DBili  x   /  AST  18  /  ALT  12  /  AlkPhos  66  09-18    PT/INR - ( 18 Sep 2023 15:00 )   PT: 14.2 sec;   INR: 1.25          PTT - ( 18 Sep 2023 15:00 )  PTT:27.8 sec      Urinalysis Basic - ( 18 Sep 2023 10:35 )    Color: x / Appearance: x / SG: x / pH: x  Gluc: 104 mg/dL / Ketone: x  / Bili: x / Urobili: x   Blood: x / Protein: x / Nitrite: x   Leuk Esterase: x / RBC: x / WBC x   Sq Epi: x / Non Sq Epi: x / Bacteria: x        < from: CT Lumbar Spine w/wo IV Cont (09.18.23 @ 10:10) >    Multilevel degenerative change as discussed above. This is most   significant at the L2-L4 levels where there is moderate to severe   narrowing of the spinal canal. Consider MRI of the lumbar spine if   clinically indicated.  2.  Chronic compression fractures seen in the lower thoracic and lumbar   spine, relatively stable when compared to prior imaging.  3.  Aneurysmal abdominal aorta measuring approximately 5 cm diameter. A   mural thrombus is seen throughout the abdominal aorta.      < from: CT Angio Head w/ IV Cont (09.18.23 @ 10:03) >  Intracranial CTA: Again demonstrated is lack of contrast filling of the   intracranial right internal carotid artery. 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    No new large vessel occlusion or high-grade stenosis.    Stable dilatation and fenestration of the anterior communicating artery   complex.    Extracranial CTA: Again demonstrated lack of contrast on the right   internal carotid artery which may be due to occlusion, possibly   congenital. No new steno-occlusive disease.      < from: CT Head No Cont (09.18.23 @ 10:01) >  .Encephalomalacia   secondary to chronic infarctions in the right frontal lobes (series 3   image 26). Encephalomalacia also noted in the posterior lobe of the left   cerebellar hemisphere (series 3 image 10).       HPI:  Patient is a 67 y/o M, former smoker (40 yr hx,1/2PPD; quit 09/2022) and former ETOH misuse, with PMH of HTN, anemia, arthritis, COPD and aortic pathology/aortic insufficiency s/p mini-AVR (27mmbio), ascending and hemiarch replacement EF normal on 6/23/22, (postop course includes chronic occlusion of right ICA, 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 He is followed by Dr. Toledo as an outpatient for his descending aortic aneurysm. He presented to [facility] on 9/14/23 for a pre-operative lumbar drain placement and underwent TEVAR on 0915. Neurology is being consulted for B/L LE weakness noticed on POD1 when patient was out of bed with assistance. Patient states that he felt his knees were buckling when he tried to walk. He also noticed pain over his both thighs while trying to walk. He felt weak from middle of his thigh upto his knees,     Off note, he had a right CR stroke back in 06/2022 following a Aortic valve repair surgery. c/f left sided paralysis, NIHSS 22. CTA significant for MELISSA occlusion with partial reconstitution of RMCA from R [employer]. Angiogram revealing chronic occlusion of R ICA, cross filling from left to right via Acomm, no thrombectomy was performed. Patient states that the residual weakness and brain fog lasted for a while but he has been mostly at his baseline recently.       He denies any headache, blurry/double vision,slurred speech, tingling/paresthesia. He has fuchs in so unable to notice any urinary symptom denies any bowel symptom, no saddle anesthesia      PAST MEDICAL & SURGICAL HISTORY:  HTN (hypertension)    Aortic regurgitation    Anemia    Arthritis    COPD, mild    Bladder distention    BPH (benign prostatic hyperplasia)    H/O cataract extraction  B/L    Aortic valve replaced          Medications:  acetaminophen     Tablet .. 1000 milliGRAM(s) Oral every 6 hours PRN  atorvastatin 80 milliGRAM(s) Oral at bedtime  chlorhexidine 2% Cloths 1 Application(s) Topical daily  gabapentin 300 milliGRAM(s) Oral every 8 hours  lactated ringers. 1000 milliLiter(s) IV Continuous <Continuous>  midodrine 10 milliGRAM(s) Oral every 8 hours  oxyCODONE    IR 5 milliGRAM(s) Oral every 6 hours PRN  pantoprazole    Tablet 40 milliGRAM(s) Oral daily  phenylephrine    Infusion 0.2 MICROgram(s)/kG/Min IV Continuous <Continuous>  polyethylene glycol 3350 17 Gram(s) Oral daily  senna 2 Tablet(s) Oral at bedtime  sertraline 25 milliGRAM(s) Oral daily  sodium chloride 0.9%. 1000 milliLiter(s) IV Continuous <Continuous>  tiotropium 2.5 MICROgram(s) Inhaler 2 Puff(s) Inhalation daily      Vital Signs Last 24 Hrs  T(C): 36.9 (18 Sep 2023 13:41), Max: 37.8 (17 Sep 2023 17:11)  T(F): 98.4 (18 Sep 2023 13:41), Max: 100 (17 Sep 2023 17:11)  HR: 55 (18 Sep 2023 15:00) (55 - 100)  BP: 112/68 (18 Sep 2023 14:00) (99/50 - 112/68)  BP(mean): 86 (18 Sep 2023 14:00) (56 - 86)  RR: 20 (18 Sep 2023 15:00) (15 - 22)  SpO2: 98% (18 Sep 2023 15:00) (90% - 100%)    Parameters below as of 18 Sep 2023 15:00  Patient On (Oxygen Delivery Method): room air        Neurological Exam:   Mental status: Awake, alert and oriented x3.  Recent and remote memory intact.  Naming, repetition and comprehension intact.  Attention/concentration intact.  No dysarthria, no aphasia.  Fund of knowledge appropriate.    Cranial nerves: Pupils equally round and reactive to light, visual fields full, no nystagmus, extraocular muscles intact, V1 through V3 intact bilaterally and symmetric, face symmetric, hearing intact to finger rub, palate elevation symmetric, tongue was midline.  Motor:  MRC grading 5/5 b/l UE/LE.   strength 5/5.  Normal tone and bulk.  No abnormal movements.    Sensation: Intact to light touch, proprioception, and pinprick.   Coordination: No dysmetria on finger-to-nose   Reflexes: 2++ in bilateral UE/LE, Planter: left: upgoing, right: downgoing.         Labs:  Fingerstick Blood Glucose: CAPILLARY BLOOD GLUCOSE        LABS:                        9.4    7.41  )-----------( 77       ( 18 Sep 2023 15:00 )             27.1     09-18    132<L>  |  99  |  13  ----------------------------<  104<H>  3.9   |  24  |  0.65    Ca    9.8      18 Sep 2023 10:35  Phos  3.2     09-18  Mg     2.2     09-18    TPro  7.4  /  Alb  4.3  /  TBili  1.0  /  DBili  x   /  AST  18  /  ALT  12  /  AlkPhos  66  09-18    PT/INR - ( 18 Sep 2023 15:00 )   PT: 14.2 sec;   INR: 1.25          PTT - ( 18 Sep 2023 15:00 )  PTT:27.8 sec      Urinalysis Basic - ( 18 Sep 2023 10:35 )    Color: x / Appearance: x / SG: x / pH: x  Gluc: 104 mg/dL / Ketone: x  / Bili: x / Urobili: x   Blood: x / Protein: x / Nitrite: x   Leuk Esterase: x / RBC: x / WBC x   Sq Epi: x / Non Sq Epi: x / Bacteria: x        < from: CT Lumbar Spine w/wo IV Cont (09.18.23 @ 10:10) >    Multilevel degenerative change as discussed above. This is most   significant at the L2-L4 levels where there is moderate to severe   narrowing of the spinal canal. Consider MRI of the lumbar spine if   clinically indicated.  2.  Chronic compression fractures seen in the lower thoracic and lumbar   spine, relatively stable when compared to prior imaging.  3.  Aneurysmal abdominal aorta measuring approximately 5 cm diameter. A   mural thrombus is seen throughout the abdominal aorta.      < from: CT Angio Head w/ IV Cont (09.18.23 @ 10:03) >  Intracranial CTA: Again demonstrated is lack of contrast filling of the   intracranial right internal carotid artery. 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    No new large vessel occlusion or high-grade stenosis.    Stable dilatation and fenestration of the anterior communicating artery   complex.    Extracranial CTA: Again demonstrated lack of contrast on the right   internal carotid artery which may be due to occlusion, possibly   congenital. No new steno-occlusive disease.      < from: CT Head No Cont (09.18.23 @ 10:01) >  .Encephalomalacia   secondary to chronic infarctions in the right frontal lobes (series 3   image 26). Encephalomalacia also noted in the posterior lobe of the left   cerebellar hemisphere (series 3 image 10).       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 He is followed by Dr. Toledo as an outpatient for his descending aortic aneurysm. He presented to [facility] on 9/14/23 for a pre-operative lumbar drain placement and underwent TEVAR on 0915. Neurology is being consulted for B/L LE weakness noticed on POD1 when patient was out of bed with assistance. Patient states that he felt his knees were buckling when he tried to walk. He also noticed pain over his both thighs while trying to walk. He felt weak from middle of his thigh upto his knees,     Off note, he had a right CR stroke back in 06/2022 following a Aortic valve repair surgery. c/f left sided paralysis, NIHSS 22. CTA significant for MELISSA occlusion with partial reconstitution of RMCA from R ACOMM. Angiogram revealing chronic occlusion of R ICA, cross filling from left to right via Acomm, no thrombectomy was performed. Patient states that the residual weakness and brain fog lasted for a while but he has been mostly at his baseline recently.       He denies any headache, blurry/double vision,slurred speech, tingling/paresthesia. He has fuchs in so unable to notice any urinary symptom denies any bowel symptom, no saddle anesthesia      PAST MEDICAL & SURGICAL HISTORY:  HTN (hypertension)    Aortic regurgitation    Anemia    Arthritis    COPD, mild    Bladder distention    BPH (benign prostatic hyperplasia)    H/O cataract extraction  B/L    Aortic valve replaced          Medications:  acetaminophen     Tablet .. 1000 milliGRAM(s) Oral every 6 hours PRN  atorvastatin 80 milliGRAM(s) Oral at bedtime  chlorhexidine 2% Cloths 1 Application(s) Topical daily  gabapentin 300 milliGRAM(s) Oral every 8 hours  lactated ringers. 1000 milliLiter(s) IV Continuous <Continuous>  midodrine 10 milliGRAM(s) Oral every 8 hours  oxyCODONE    IR 5 milliGRAM(s) Oral every 6 hours PRN  pantoprazole    Tablet 40 milliGRAM(s) Oral daily  phenylephrine    Infusion 0.2 MICROgram(s)/kG/Min IV Continuous <Continuous>  polyethylene glycol 3350 17 Gram(s) Oral daily  senna 2 Tablet(s) Oral at bedtime  sertraline 25 milliGRAM(s) Oral daily  sodium chloride 0.9%. 1000 milliLiter(s) IV Continuous <Continuous>  tiotropium 2.5 MICROgram(s) Inhaler 2 Puff(s) Inhalation daily      Vital Signs Last 24 Hrs  T(C): 36.9 (18 Sep 2023 13:41), Max: 37.8 (17 Sep 2023 17:11)  T(F): 98.4 (18 Sep 2023 13:41), Max: 100 (17 Sep 2023 17:11)  HR: 55 (18 Sep 2023 15:00) (55 - 100)  BP: 112/68 (18 Sep 2023 14:00) (99/50 - 112/68)  BP(mean): 86 (18 Sep 2023 14:00) (56 - 86)  RR: 20 (18 Sep 2023 15:00) (15 - 22)  SpO2: 98% (18 Sep 2023 15:00) (90% - 100%)    Parameters below as of 18 Sep 2023 15:00  Patient On (Oxygen Delivery Method): room air        Neurological Exam:   Mental status: Awake, alert and oriented x3.  Recent and remote memory intact.  Naming, repetition and comprehension intact.  Attention/concentration intact.  No dysarthria, no aphasia.  Fund of knowledge appropriate.    Cranial nerves: Pupils equally round and reactive to light, visual fields full, no nystagmus, extraocular muscles intact, V1 through V3 intact bilaterally and symmetric, face symmetric, hearing intact to finger rub, palate elevation symmetric, tongue was midline.  Motor:  MRC grading 5/5 b/l UE/LE.   strength 5/5.  Normal tone and bulk.  No abnormal movements.    Sensation: Intact to light touch, proprioception, and pinprick.   Coordination: No dysmetria on finger-to-nose   Reflexes: 2++ in bilateral UE/LE, Plantar: left: upgoing, right: downgoing.         Labs:  Fingerstick Blood Glucose: CAPILLARY BLOOD GLUCOSE        LABS:                        9.4    7.41  )-----------( 77       ( 18 Sep 2023 15:00 )             27.1     09-18    132<L>  |  99  |  13  ----------------------------<  104<H>  3.9   |  24  |  0.65    Ca    9.8      18 Sep 2023 10:35  Phos  3.2     09-18  Mg     2.2     09-18    TPro  7.4  /  Alb  4.3  /  TBili  1.0  /  DBili  x   /  AST  18  /  ALT  12  /  AlkPhos  66  09-18    PT/INR - ( 18 Sep 2023 15:00 )   PT: 14.2 sec;   INR: 1.25          PTT - ( 18 Sep 2023 15:00 )  PTT:27.8 sec      Urinalysis Basic - ( 18 Sep 2023 10:35 )    Color: x / Appearance: x / SG: x / pH: x  Gluc: 104 mg/dL / Ketone: x  / Bili: x / Urobili: x   Blood: x / Protein: x / Nitrite: x   Leuk Esterase: x / RBC: x / WBC x   Sq Epi: x / Non Sq Epi: x / Bacteria: x        < from: CT Lumbar Spine w/wo IV Cont (09.18.23 @ 10:10) >    Multilevel degenerative change as discussed above. This is most   significant at the L2-L4 levels where there is moderate to severe   narrowing of the spinal canal. Consider MRI of the lumbar spine if   clinically indicated.  2.  Chronic compression fractures seen in the lower thoracic and lumbar   spine, relatively stable when compared to prior imaging.  3.  Aneurysmal abdominal aorta measuring approximately 5 cm diameter. A   mural thrombus is seen throughout the abdominal aorta.      < from: CT Angio Head w/ IV Cont (09.18.23 @ 10:03) >  Intracranial CTA: Again demonstrated is lack of contrast filling of the   intracranial right internal carotid artery. 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    No new large vessel occlusion or high-grade stenosis.    Stable dilatation and fenestration of the anterior communicating artery   complex.    Extracranial CTA: Again demonstrated lack of contrast on the right   internal carotid artery which may be due to occlusion, possibly   congenital. No new steno-occlusive disease.      < from: CT Head No Cont (09.18.23 @ 10:01) >  .Encephalomalacia   secondary to chronic infarctions in the right frontal lobes (series 3   image 26). Encephalomalacia also noted in the posterior lobe of the left   cerebellar hemisphere (series 3 image 10).

## 2023-09-18 NOTE — PROVIDER CONTACT NOTE (CHANGE IN STATUS NOTIFICATION) - BACKGROUND
Pt s/p TEVAR 9/15 with lumbar drain. now clamped and c/o b/l lower leg weakness starting from hips. lumbar site with known oozing at site. c/o back pain and chest pain overnight and into AM. Upon attempt to ambulate with PT patient felt weakness on legs and LUIS MIGUEL Anderson made aware. pt placed back in bed and to drain csf fluid in attempt to alleviate weakness.

## 2023-09-19 LAB
ALBUMIN SERPL ELPH-MCNC: 4 G/DL — SIGNIFICANT CHANGE UP (ref 3.3–5)
ALBUMIN SERPL ELPH-MCNC: 4.3 G/DL — SIGNIFICANT CHANGE UP (ref 3.3–5)
ALP SERPL-CCNC: 76 U/L — SIGNIFICANT CHANGE UP (ref 40–120)
ALP SERPL-CCNC: 87 U/L — SIGNIFICANT CHANGE UP (ref 40–120)
ALT FLD-CCNC: 28 U/L — SIGNIFICANT CHANGE UP (ref 10–45)
ALT FLD-CCNC: 38 U/L — SIGNIFICANT CHANGE UP (ref 10–45)
ANION GAP SERPL CALC-SCNC: 12 MMOL/L — SIGNIFICANT CHANGE UP (ref 5–17)
ANION GAP SERPL CALC-SCNC: 14 MMOL/L — SIGNIFICANT CHANGE UP (ref 5–17)
ANISOCYTOSIS BLD QL: SIGNIFICANT CHANGE UP
APTT BLD: 27.9 SEC — SIGNIFICANT CHANGE UP (ref 24.5–35.6)
APTT BLD: 28.1 SEC — SIGNIFICANT CHANGE UP (ref 24.5–35.6)
APTT BLD: 28.5 SEC — SIGNIFICANT CHANGE UP (ref 24.5–35.6)
AST SERPL-CCNC: 37 U/L — SIGNIFICANT CHANGE UP (ref 10–40)
AST SERPL-CCNC: 49 U/L — HIGH (ref 10–40)
BASOPHILS # BLD AUTO: 0 K/UL — SIGNIFICANT CHANGE UP (ref 0–0.2)
BASOPHILS # BLD AUTO: 0.01 K/UL — SIGNIFICANT CHANGE UP (ref 0–0.2)
BASOPHILS NFR BLD AUTO: 0 % — SIGNIFICANT CHANGE UP (ref 0–2)
BASOPHILS NFR BLD AUTO: 0.1 % — SIGNIFICANT CHANGE UP (ref 0–2)
BILIRUB SERPL-MCNC: 0.9 MG/DL — SIGNIFICANT CHANGE UP (ref 0.2–1.2)
BILIRUB SERPL-MCNC: 1.1 MG/DL — SIGNIFICANT CHANGE UP (ref 0.2–1.2)
BUN SERPL-MCNC: 11 MG/DL — SIGNIFICANT CHANGE UP (ref 7–23)
BUN SERPL-MCNC: 13 MG/DL — SIGNIFICANT CHANGE UP (ref 7–23)
BURR CELLS BLD QL SMEAR: SLIGHT — SIGNIFICANT CHANGE UP
CALCIUM SERPL-MCNC: 10.1 MG/DL — SIGNIFICANT CHANGE UP (ref 8.4–10.5)
CALCIUM SERPL-MCNC: 9.5 MG/DL — SIGNIFICANT CHANGE UP (ref 8.4–10.5)
CHLORIDE SERPL-SCNC: 101 MMOL/L — SIGNIFICANT CHANGE UP (ref 96–108)
CHLORIDE SERPL-SCNC: 98 MMOL/L — SIGNIFICANT CHANGE UP (ref 96–108)
CO2 SERPL-SCNC: 22 MMOL/L — SIGNIFICANT CHANGE UP (ref 22–31)
CO2 SERPL-SCNC: 23 MMOL/L — SIGNIFICANT CHANGE UP (ref 22–31)
CREAT SERPL-MCNC: 0.68 MG/DL — SIGNIFICANT CHANGE UP (ref 0.5–1.3)
CREAT SERPL-MCNC: 0.72 MG/DL — SIGNIFICANT CHANGE UP (ref 0.5–1.3)
DACRYOCYTES BLD QL SMEAR: SLIGHT — SIGNIFICANT CHANGE UP
EGFR: 101 ML/MIN/1.73M2 — SIGNIFICANT CHANGE UP
EGFR: 99 ML/MIN/1.73M2 — SIGNIFICANT CHANGE UP
ELLIPTOCYTES BLD QL SMEAR: SLIGHT — SIGNIFICANT CHANGE UP
EOSINOPHIL # BLD AUTO: 0 K/UL — SIGNIFICANT CHANGE UP (ref 0–0.5)
EOSINOPHIL # BLD AUTO: 0.05 K/UL — SIGNIFICANT CHANGE UP (ref 0–0.5)
EOSINOPHIL NFR BLD AUTO: 0 % — SIGNIFICANT CHANGE UP (ref 0–6)
EOSINOPHIL NFR BLD AUTO: 0.7 % — SIGNIFICANT CHANGE UP (ref 0–6)
GAS PNL BLDA: SIGNIFICANT CHANGE UP
GIANT PLATELETS BLD QL SMEAR: PRESENT — SIGNIFICANT CHANGE UP
GLUCOSE BLDC GLUCOMTR-MCNC: 103 MG/DL — HIGH (ref 70–99)
GLUCOSE SERPL-MCNC: 108 MG/DL — HIGH (ref 70–99)
GLUCOSE SERPL-MCNC: 236 MG/DL — HIGH (ref 70–99)
HCT VFR BLD CALC: 30 % — LOW (ref 39–50)
HCT VFR BLD CALC: 31.1 % — LOW (ref 39–50)
HGB BLD-MCNC: 10.3 G/DL — LOW (ref 13–17)
HGB BLD-MCNC: 10.6 G/DL — LOW (ref 13–17)
IMM GRANULOCYTES NFR BLD AUTO: 0.4 % — SIGNIFICANT CHANGE UP (ref 0–0.9)
INR BLD: 1.04 — SIGNIFICANT CHANGE UP (ref 0.85–1.18)
INR BLD: 1.13 — SIGNIFICANT CHANGE UP (ref 0.85–1.18)
INR BLD: 1.2 — HIGH (ref 0.85–1.18)
LYMPHOCYTES # BLD AUTO: 0.13 K/UL — LOW (ref 1–3.3)
LYMPHOCYTES # BLD AUTO: 0.53 K/UL — LOW (ref 1–3.3)
LYMPHOCYTES # BLD AUTO: 2.6 % — LOW (ref 13–44)
LYMPHOCYTES # BLD AUTO: 7.9 % — LOW (ref 13–44)
MAGNESIUM SERPL-MCNC: 1.9 MG/DL — SIGNIFICANT CHANGE UP (ref 1.6–2.6)
MAGNESIUM SERPL-MCNC: 2.5 MG/DL — SIGNIFICANT CHANGE UP (ref 1.6–2.6)
MANUAL SMEAR VERIFICATION: SIGNIFICANT CHANGE UP
MCHC RBC-ENTMCNC: 32.2 PG — SIGNIFICANT CHANGE UP (ref 27–34)
MCHC RBC-ENTMCNC: 32.4 PG — SIGNIFICANT CHANGE UP (ref 27–34)
MCHC RBC-ENTMCNC: 34.1 GM/DL — SIGNIFICANT CHANGE UP (ref 32–36)
MCHC RBC-ENTMCNC: 34.3 GM/DL — SIGNIFICANT CHANGE UP (ref 32–36)
MCV RBC AUTO: 93.8 FL — SIGNIFICANT CHANGE UP (ref 80–100)
MCV RBC AUTO: 95.1 FL — SIGNIFICANT CHANGE UP (ref 80–100)
MONOCYTES # BLD AUTO: 0.05 K/UL — SIGNIFICANT CHANGE UP (ref 0–0.9)
MONOCYTES # BLD AUTO: 0.89 K/UL — SIGNIFICANT CHANGE UP (ref 0–0.9)
MONOCYTES NFR BLD AUTO: 0.9 % — LOW (ref 2–14)
MONOCYTES NFR BLD AUTO: 13.2 % — SIGNIFICANT CHANGE UP (ref 2–14)
NEUTROPHILS # BLD AUTO: 4.89 K/UL — SIGNIFICANT CHANGE UP (ref 1.8–7.4)
NEUTROPHILS # BLD AUTO: 5.24 K/UL — SIGNIFICANT CHANGE UP (ref 1.8–7.4)
NEUTROPHILS NFR BLD AUTO: 77.7 % — HIGH (ref 43–77)
NEUTROPHILS NFR BLD AUTO: 95.6 % — HIGH (ref 43–77)
NRBC # BLD: 0 /100 WBCS — SIGNIFICANT CHANGE UP (ref 0–0)
OVALOCYTES BLD QL SMEAR: SLIGHT — SIGNIFICANT CHANGE UP
PHOSPHATE SERPL-MCNC: 3.2 MG/DL — SIGNIFICANT CHANGE UP (ref 2.5–4.5)
PHOSPHATE SERPL-MCNC: 3.5 MG/DL — SIGNIFICANT CHANGE UP (ref 2.5–4.5)
PLAT MORPH BLD: ABNORMAL
PLATELET # BLD AUTO: 121 K/UL — LOW (ref 150–400)
PLATELET # BLD AUTO: 153 K/UL — SIGNIFICANT CHANGE UP (ref 150–400)
POIKILOCYTOSIS BLD QL AUTO: SLIGHT — SIGNIFICANT CHANGE UP
POLYCHROMASIA BLD QL SMEAR: SLIGHT — SIGNIFICANT CHANGE UP
POTASSIUM SERPL-MCNC: 4.2 MMOL/L — SIGNIFICANT CHANGE UP (ref 3.5–5.3)
POTASSIUM SERPL-MCNC: 4.3 MMOL/L — SIGNIFICANT CHANGE UP (ref 3.5–5.3)
POTASSIUM SERPL-SCNC: 4.2 MMOL/L — SIGNIFICANT CHANGE UP (ref 3.5–5.3)
POTASSIUM SERPL-SCNC: 4.3 MMOL/L — SIGNIFICANT CHANGE UP (ref 3.5–5.3)
PROT SERPL-MCNC: 7.3 G/DL — SIGNIFICANT CHANGE UP (ref 6–8.3)
PROT SERPL-MCNC: 7.9 G/DL — SIGNIFICANT CHANGE UP (ref 6–8.3)
PROTHROM AB SERPL-ACNC: 11.8 SEC — SIGNIFICANT CHANGE UP (ref 9.5–13)
PROTHROM AB SERPL-ACNC: 12.8 SEC — SIGNIFICANT CHANGE UP (ref 9.5–13)
PROTHROM AB SERPL-ACNC: 13.6 SEC — HIGH (ref 9.5–13)
RBC # BLD: 3.2 M/UL — LOW (ref 4.2–5.8)
RBC # BLD: 3.27 M/UL — LOW (ref 4.2–5.8)
RBC # FLD: 16.5 % — HIGH (ref 10.3–14.5)
RBC # FLD: 16.6 % — HIGH (ref 10.3–14.5)
RBC BLD AUTO: ABNORMAL
SODIUM SERPL-SCNC: 134 MMOL/L — LOW (ref 135–145)
SODIUM SERPL-SCNC: 136 MMOL/L — SIGNIFICANT CHANGE UP (ref 135–145)
VARIANT LYMPHS # BLD: 0.9 % — SIGNIFICANT CHANGE UP (ref 0–6)
WBC # BLD: 5.11 K/UL — SIGNIFICANT CHANGE UP (ref 3.8–10.5)
WBC # BLD: 6.75 K/UL — SIGNIFICANT CHANGE UP (ref 3.8–10.5)
WBC # FLD AUTO: 5.11 K/UL — SIGNIFICANT CHANGE UP (ref 3.8–10.5)
WBC # FLD AUTO: 6.75 K/UL — SIGNIFICANT CHANGE UP (ref 3.8–10.5)

## 2023-09-19 PROCEDURE — 99232 SBSQ HOSP IP/OBS MODERATE 35: CPT

## 2023-09-19 PROCEDURE — 71045 X-RAY EXAM CHEST 1 VIEW: CPT | Mod: 26

## 2023-09-19 PROCEDURE — 99292 CRITICAL CARE ADDL 30 MIN: CPT

## 2023-09-19 PROCEDURE — 99291 CRITICAL CARE FIRST HOUR: CPT

## 2023-09-19 PROCEDURE — 99223 1ST HOSP IP/OBS HIGH 75: CPT

## 2023-09-19 RX ORDER — NICARDIPINE HYDROCHLORIDE 30 MG/1
5 CAPSULE, EXTENDED RELEASE ORAL
Qty: 40 | Refills: 0 | Status: DISCONTINUED | OUTPATIENT
Start: 2023-09-19 | End: 2023-09-22

## 2023-09-19 RX ORDER — FUROSEMIDE 40 MG
20 TABLET ORAL ONCE
Refills: 0 | Status: COMPLETED | OUTPATIENT
Start: 2023-09-19 | End: 2023-09-19

## 2023-09-19 RX ORDER — MAGNESIUM SULFATE 500 MG/ML
2 VIAL (ML) INJECTION ONCE
Refills: 0 | Status: COMPLETED | OUTPATIENT
Start: 2023-09-19 | End: 2023-09-19

## 2023-09-19 RX ADMIN — Medication 125 MILLIGRAM(S): at 10:15

## 2023-09-19 RX ADMIN — Medication 1000 MILLIGRAM(S): at 09:29

## 2023-09-19 RX ADMIN — TIOTROPIUM BROMIDE 2 PUFF(S): 18 CAPSULE ORAL; RESPIRATORY (INHALATION) at 12:04

## 2023-09-19 RX ADMIN — SERTRALINE 25 MILLIGRAM(S): 25 TABLET, FILM COATED ORAL at 12:04

## 2023-09-19 RX ADMIN — PANTOPRAZOLE SODIUM 40 MILLIGRAM(S): 20 TABLET, DELAYED RELEASE ORAL at 12:04

## 2023-09-19 RX ADMIN — Medication 1000 MILLIGRAM(S): at 03:27

## 2023-09-19 RX ADMIN — Medication 1000 MILLIGRAM(S): at 21:52

## 2023-09-19 RX ADMIN — GABAPENTIN 300 MILLIGRAM(S): 400 CAPSULE ORAL at 14:51

## 2023-09-19 RX ADMIN — SENNA PLUS 2 TABLET(S): 8.6 TABLET ORAL at 21:37

## 2023-09-19 RX ADMIN — CHLORHEXIDINE GLUCONATE 1 APPLICATION(S): 213 SOLUTION TOPICAL at 06:12

## 2023-09-19 RX ADMIN — Medication 1000 MILLIGRAM(S): at 10:00

## 2023-09-19 RX ADMIN — POLYETHYLENE GLYCOL 3350 17 GRAM(S): 17 POWDER, FOR SOLUTION ORAL at 12:04

## 2023-09-19 RX ADMIN — ATORVASTATIN CALCIUM 80 MILLIGRAM(S): 80 TABLET, FILM COATED ORAL at 21:37

## 2023-09-19 RX ADMIN — GABAPENTIN 300 MILLIGRAM(S): 400 CAPSULE ORAL at 21:37

## 2023-09-19 RX ADMIN — MIDODRINE HYDROCHLORIDE 10 MILLIGRAM(S): 2.5 TABLET ORAL at 18:56

## 2023-09-19 RX ADMIN — Medication 20 MILLIGRAM(S): at 10:05

## 2023-09-19 RX ADMIN — Medication 1000 MILLIGRAM(S): at 22:53

## 2023-09-19 RX ADMIN — Medication 1000 MILLIGRAM(S): at 05:00

## 2023-09-19 RX ADMIN — GABAPENTIN 300 MILLIGRAM(S): 400 CAPSULE ORAL at 06:11

## 2023-09-19 RX ADMIN — Medication 25 GRAM(S): at 06:11

## 2023-09-19 NOTE — PROGRESS NOTE ADULT - SUBJECTIVE AND OBJECTIVE BOX
HPI:  Patient is a 67 y/o M, former smoker (40 yr hx,1/2PPD; quit 09/2022) and former ETOH misuse, with PMH of HTN, anemia, arthritis, COPD and aortic pathology/aortic insufficiency s/p mini-AVR (27mmbio), ascending and hemiarch replacement EF normal on 6/23/22, (postop course includes chronic occlusion of right ICA, 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 He is followed by Dr. Toledo as an outpatient for his descending aortic aneurysm. He presented to [facility] on 9/14/23 for a pre-operative lumbar drain placement with plan for TEVAR tomorrow.       OVERNIGHT EVENTS: Patient reports subjectively improved right leg weakness and continued chronic pain.      Vital Signs Last 24 Hrs  T(C): 36.7 (19 Sep 2023 08:39), Max: 37.7 (18 Sep 2023 20:43)  T(F): 98 (19 Sep 2023 08:39), Max: 99.8 (18 Sep 2023 20:43)  HR: 49 (19 Sep 2023 08:00) (49 - 77)  BP: 182/79 (19 Sep 2023 08:00) (99/50 - 194/82)  BP(mean): 114 (19 Sep 2023 08:00) (56 - 120)  RR: 17 (19 Sep 2023 08:00) (15 - 28)  SpO2: 100% (19 Sep 2023 08:00) (90% - 100%)    Parameters below as of 19 Sep 2023 08:00  Patient On (Oxygen Delivery Method): nasal cannula, high flow  O2 Flow (L/min): 50  O2 Concentration (%): 80    I&O's Summary    18 Sep 2023 07:01  -  19 Sep 2023 07:00  --------------------------------------------------------  IN: 5430.4 mL / OUT: 3456 mL / NET: 1974.4 mL    19 Sep 2023 07:01  -  19 Sep 2023 09:11  --------------------------------------------------------  IN: 110 mL / OUT: 75 mL / NET: 35 mL        PHYSICAL EXAM:  Gen: NAD, AAOx3  HEENT: PERRL. EOMI. VF grossly intact.  Back: lumbar drain in midline lower back with dressing.  Neuro: CNs II-XII intact. 5/5 str in UEs b/l. 5/5 str at hip and knee b/l, 4+/5 plantar/dorsiflexion. Hyperreflexic with 1-2 beats of clonus b/l. Sensation to LT intact throughout. Following commands. Speech clear. Gait not assessed at this time.    LABS:                        10.3   6.75  )-----------( 121      ( 19 Sep 2023 03:23 )             30.0     09-19    136  |  101  |  11  ----------------------------<  108<H>  4.2   |  23  |  0.68    Ca    9.5      19 Sep 2023 03:23  Phos  3.2     09-19  Mg     1.9     09-19    TPro  7.3  /  Alb  4.0  /  TBili  1.1  /  DBili  x   /  AST  37  /  ALT  28  /  AlkPhos  76  09-19    PT/INR - ( 19 Sep 2023 03:23 )   PT: 13.6 sec;   INR: 1.20          PTT - ( 19 Sep 2023 03:23 )  PTT:28.1 sec  Urinalysis Basic - ( 19 Sep 2023 03:23 )    Color: x / Appearance: x / SG: x / pH: x  Gluc: 108 mg/dL / Ketone: x  / Bili: x / Urobili: x   Blood: x / Protein: x / Nitrite: x   Leuk Esterase: x / RBC: x / WBC x   Sq Epi: x / Non Sq Epi: x / Bacteria: x        Allergies:  No Known Allergies        MEDICATIONS:  Antibiotics:    Neuro:  acetaminophen     Tablet .. 1000 milliGRAM(s) Oral every 6 hours PRN  gabapentin 300 milliGRAM(s) Oral every 8 hours  oxyCODONE    IR 5 milliGRAM(s) Oral every 6 hours PRN  sertraline 25 milliGRAM(s) Oral daily    Anticoagulation:    OTHER:  atorvastatin 80 milliGRAM(s) Oral at bedtime  chlorhexidine 2% Cloths 1 Application(s) Topical daily  midodrine 10 milliGRAM(s) Oral every 8 hours  pantoprazole    Tablet 40 milliGRAM(s) Oral daily  phenylephrine    Infusion 0.2 MICROgram(s)/kG/Min IV Continuous <Continuous>  polyethylene glycol 3350 17 Gram(s) Oral daily  senna 2 Tablet(s) Oral at bedtime  tiotropium 2.5 MICROgram(s) Inhaler 2 Puff(s) Inhalation daily    IVF:  sodium chloride 0.9%. 1000 milliLiter(s) IV Continuous <Continuous>    CULTURES:    RADIOLOGY & ADDITIONAL TESTS:      ASSESSMENT:  68y Male s/p TEVAR 9/15 with bedside lumbar drain placed 9/14, c/b recent right leg weakness and bloody CSF drainage with thrombocytopenia.        HTN (hypertension)    Aortic regurgitation    Anemia    Arthritis    COPD, mild    Bladder distention    BPH (benign prostatic hyperplasia)    Bladder distention    Aortic aneurysm    Insertion of lumbar drain    Second stage thoracic endovascular aortic repair (TEVAR)    H/O cataract extraction    Aortic valve replaced        PLAN:  - Case d/w Dr. Jarvis,  - MRI T&L spine reviewed, recommend obtaining official neuroradiology impression,  - Drain can be removed if not anticipated to be needed further post TEVAR, or can resume drainage pending MRI report,  - Neurosurgery will continue to follow until removed  - Recommend maintaining platelets above 100k for thrombocytopenia  - No contraindication to ambulation

## 2023-09-19 NOTE — DIETITIAN INITIAL EVALUATION ADULT - PERTINENT MEDS FT
MEDICATIONS  (STANDING):  atorvastatin 80 milliGRAM(s) Oral at bedtime  chlorhexidine 2% Cloths 1 Application(s) Topical daily  gabapentin 300 milliGRAM(s) Oral every 8 hours  midodrine 10 milliGRAM(s) Oral every 8 hours  pantoprazole    Tablet 40 milliGRAM(s) Oral daily  phenylephrine    Infusion 0.2 MICROgram(s)/kG/Min (5.68 mL/Hr) IV Continuous <Continuous>  polyethylene glycol 3350 17 Gram(s) Oral daily  senna 2 Tablet(s) Oral at bedtime  sertraline 25 milliGRAM(s) Oral daily  sodium chloride 0.9%. 1000 milliLiter(s) (10 mL/Hr) IV Continuous <Continuous>  tiotropium 2.5 MICROgram(s) Inhaler 2 Puff(s) Inhalation daily    MEDICATIONS  (PRN):  acetaminophen     Tablet .. 1000 milliGRAM(s) Oral every 6 hours PRN Moderate Pain (4 - 6)  oxyCODONE    IR 5 milliGRAM(s) Oral every 6 hours PRN Severe Pain (7 - 10)

## 2023-09-19 NOTE — PROGRESS NOTE ADULT - SUBJECTIVE AND OBJECTIVE BOX
CTICU  CRITICAL  CARE  attending     Hand off received 					   Pertinent clinical, laboratory, radiographic, hemodynamic, echocardiographic, respiratory data, microbiologic data and chart were reviewed and analyzed frequently throughout the course of the day and night  Patient seen and examined with CTS/ SH attending at bedside    Pt is a 68y , Male, POD # 4 s/p 2nd stage TEVAR; post procedure course c/b    R LE weakness ( proximal)  s/p therapeutic/permissive HTN  PRBC to maintain Hct close to 30  s/p CT/MRI of lumbar spine;       today:    LD d/cd by NSx ( blood in spinal canal-sub arachnoid- on MRI)  Midodrine to maintain MAP >85-90  diuresed for post procedure hypoxemia ( PO2 52/supine; in the face of being 2.5+ L positive/24 hrs; CXR with full hilar prominence)    67 y/o M, former smoker (40 yr hx,1/2PPD; quit 09/2022) and former ETOH misuse, with PMH of HTN, anemia, arthritis, COPD and aortic pathology/aortic insufficiency s/p mini-AVR (27mmbio), ascending and hemiarch replacement EF normal on 6/23/22, (postop course includes chronic occlusion of right ICA, 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 He is followed by Dr. Toledo as an outpatient for his descending aortic aneurysm. He presented to [facility] on 9/14/23 for a pre-operative lumbar drain placement with plan for TEVAR       FAMILY HISTORY:  Family history of Marfan syndrome (Uncle)    FH: aortic aneurysm (Uncle)    PAST MEDICAL & SURGICAL HISTORY:  HTN (hypertension)      Aortic regurgitation      Anemia      Arthritis      COPD, mild      Bladder distention      BPH (benign prostatic hyperplasia)      H/O cataract extraction  B/L      Aortic valve replaced        Patient is a 68y old  Male who presents with a chief complaint of descending thoracic aortic aneurysm  I71.9     (19 Sep 2023 15:15)      14 system review limited 2/2 post procedure morbidity    Vital signs, hemodynamic and respiratory parameters were reviewed from the bedside nursing flowsheet.  ICU Vital Signs Last 24 Hrs  T(C): 36.6 (19 Sep 2023 12:39), Max: 37.7 (18 Sep 2023 20:43)  T(F): 97.9 (19 Sep 2023 12:39), Max: 99.8 (18 Sep 2023 20:43)  HR: 59 (19 Sep 2023 15:00) (47 - 77)  BP: 131/61 (19 Sep 2023 15:00) (110/71 - 194/82)  BP(mean): 88 (19 Sep 2023 15:00) (86 - 120)  ABP: 114/55 (19 Sep 2023 15:00) (114/55 - 215/80)  ABP(mean): 77 (19 Sep 2023 15:00) (77 - 128)  RR: 18 (19 Sep 2023 15:00) (15 - 28)  SpO2: 98% (19 Sep 2023 15:00) (91% - 100%)    O2 Parameters below as of 19 Sep 2023 16:00  Patient On (Oxygen Delivery Method): nasal cannula w/ humidification  O2 Flow (L/min): 6        Adult Advanced Hemodynamics Last 24 Hrs  CVP(mm Hg): 232 (19 Sep 2023 15:00) (-5 - 253)  CVP(cm H2O): --  CO: --  CI: --  PA: --  PA(mean): --  PCWP: --  SVR: --  SVRI: --  PVR: --  PVRI: --, ABG - ( 19 Sep 2023 09:27 )  pH, Arterial: 7.47  pH, Blood: x     /  pCO2: 34    /  pO2: 86    / HCO3: 25    / Base Excess: 1.5   /  SaO2: 98.5                Intake and output was reviewed and the fluid balance was calculated  Daily     Daily   I&O's Summary    18 Sep 2023 07:01  -  19 Sep 2023 07:00  --------------------------------------------------------  IN: 5430.4 mL / OUT: 3456 mL / NET: 1974.4 mL    19 Sep 2023 07:01  -  19 Sep 2023 15:58  --------------------------------------------------------  IN: 1282 mL / OUT: 2130 mL / NET: -848 mL        All lines and drain sites were assessed  [facility]      POCT Blood Glucose.: 103 mg/dL (19 Sep 2023 11:44)    No acute change in mental status  Auscultation of the chest reveals equal bs  Abdomen is soft  Extremities are warm and well perfused  Wounds appear clean and unremarkable  Antibiotics are periop    labs  CBC Full  -  ( 19 Sep 2023 03:23 )  WBC Count : 6.75 K/uL  RBC Count : 3.20 M/uL  Hemoglobin : 10.3 g/dL  Hematocrit : 30.0 %  Platelet Count - Automated : 121 K/uL  Mean Cell Volume : 93.8 fl  Mean Cell Hemoglobin : 32.2 pg  Mean Cell Hemoglobin Concentration : 34.3 gm/dL  Auto Neutrophil # : 5.24 K/uL  Auto Lymphocyte # : 0.53 K/uL  Auto Monocyte # : 0.89 K/uL  Auto Eosinophil # : 0.05 K/uL  Auto Basophil # : 0.01 K/uL  Auto Neutrophil % : 77.7 %  Auto Lymphocyte % : 7.9 %  Auto Monocyte % : 13.2 %  Auto Eosinophil % : 0.7 %  Auto Basophil % : 0.1 %    09-19    136  |  101  |  11  ----------------------------<  108<H>  4.2   |  23  |  0.68    Ca    9.5      19 Sep 2023 03:23  Phos  3.2     09-19  Mg     1.9     09-19    TPro  7.3  /  Alb  4.0  /  TBili  1.1  /  DBili  x   /  AST  37  /  ALT  28  /  AlkPhos  76  09-19    PT/INR - ( 19 Sep 2023 10:24 )   PT: 12.8 sec;   INR: 1.13          PTT - ( 19 Sep 2023 10:24 )  PTT:27.9 sec  The current medications were reviewed   MEDICATIONS  (STANDING):  atorvastatin 80 milliGRAM(s) Oral at bedtime  chlorhexidine 2% Cloths 1 Application(s) Topical daily  gabapentin 300 milliGRAM(s) Oral every 8 hours  midodrine 10 milliGRAM(s) Oral every 8 hours  pantoprazole    Tablet 40 milliGRAM(s) Oral daily  phenylephrine    Infusion 0.2 MICROgram(s)/kG/Min (5.68 mL/Hr) IV Continuous <Continuous>  polyethylene glycol 3350 17 Gram(s) Oral daily  senna 2 Tablet(s) Oral at bedtime  sertraline 25 milliGRAM(s) Oral daily  sodium chloride 0.9%. 1000 milliLiter(s) (10 mL/Hr) IV Continuous <Continuous>  tiotropium 2.5 MICROgram(s) Inhaler 2 Puff(s) Inhalation daily    MEDICATIONS  (PRN):  acetaminophen     Tablet .. 1000 milliGRAM(s) Oral every 6 hours PRN Moderate Pain (4 - 6)  oxyCODONE    IR 5 milliGRAM(s) Oral every 6 hours PRN Severe Pain (7 - 10)       PROBLEM LIST/ ASSESSMENT:  HEALTH ISSUES - PROBLEM Dx:      ,   Patient is a 68y old  Male who presents with a chief complaint of descending thoracic aortic aneurysm I71.9     (19 Sep 2023 15:15)     s/p 2nd stage TEVAR      My plan includes :    Appreciate Neuro/NSx recs  Maintain MAP >85-90  Maintain SBP <160  Continue midodrine for hypotension ( relative)  diurese to maintain negative fluid balance  supplemental O2 via HFNC as needed  Titrate Fio2 to maintain Sao2 >95%  Serial ABGs      close hemodynamic, ventilatory and drain monitoring and management per post op routine    Monitor for arrhythmias and monitor parameters for organ perfusion  monitor neurologic status  Head of the bed should remain elevated to 45 deg .   chest PT and IS will be encouraged  monitor adequacy of oxygenation and ventilation and attempt to wean oxygen  Nutritional goals will be met using po eventually , ensure adequate caloric intake and montior the same  Stress ulcer and VTE prophylaxis will be achieved    Glycemic control is satisfactory  Electrolytes have been repleted as necessary and wound care has been carried out. Pain control has been achieved.   agressive physical therapy and early mobility and ambulation goals will be met   The family was updated about the course and plan  CRITICAL CARE TIME SPENT in evaluation and management, reassessments, review and interpretation of labs and x-rays, ventilator and hemodynamic management, formulating a plan and coordinating care: ___90____ MIN.  Time does not include procedural time.  CTICU ATTENDING     					    Jamaal Figueredo MD

## 2023-09-19 NOTE — PROGRESS NOTE ADULT - ASSESSMENT
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    Recommendations:  - Continue to monitor neuro and pulse exam  - F/U neuro and NSGY RE: lumbar drain output and LE weakness  - Rest of care per ICU

## 2023-09-19 NOTE — DIETITIAN INITIAL EVALUATION ADULT - OTHER INFO
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 He is followed by Dr. Toledo as an outpatient for his descending aortic aneurysm. He presented to [facility] 9/14/23 for a pre-operative lumbar drain placement with plan for TEVAR. S/p lumbar drain placement 9/14, TEVAR 9/15. On 9/19: removal of lumbar drain.    Pt seen this afternoon. Wife at bedside. Reports decreased PO intake during admit d/t pain. Reports pain has somewhat began to improve this afternoon and feels he will be more inclined to eat. Lunch david seen at bedside which had not yet been consumed. PTA Wife cooks: 3 meals/day. Pt sometimes takes ensure/protein powder PTA. Pt would be willing to take ensure At this time. Reports  pounds and being stable for sometime - overall is consistent with admit wt 166 pounds within 4 pound range. NKFA. No issues chewing/swallowing. HDL 39. Uvaldo 18. No Edema. No pressure ulcer-SX site noted. GI: LBM per flow sheets 9/15, Noted soft/nontender. Senna MIRALAX and Protonix ordered.  Please see below for nutritions recommendations. Recs made with team.

## 2023-09-19 NOTE — PROCEDURE NOTE - ADDITIONAL PROCEDURE DETAILS
usn guidance sterile cover and gel
Prior to removal of drain, Labs were verified: today AM platelet count = 121; Coags are WNR, INR= 1.13  Mattress was max inflated.  Patient was placed on lateral; Dressing was removed and the todd was cleansed with chloro prep. 1cc of 1% lidocaine was infused All sutures wee cut.  Catheter was removed and tip of catheter was identified. 1 monocryl stich was placed over puncture hole. Gauze and Tegaderm dressing was placed.  Patient was placed flat on his back.  Both, patient and nurse were instructed to keep patient flat 1 hour.

## 2023-09-19 NOTE — DIETITIAN INITIAL EVALUATION ADULT - PERTINENT LABORATORY DATA
09-19    136  |  101  |  11  ----------------------------<  108<H>  4.2   |  23  |  0.68    Ca    9.5      19 Sep 2023 03:23  Phos  3.2     09-19  Mg     1.9     09-19    TPro  7.3  /  Alb  4.0  /  TBili  1.1  /  DBili  x   /  AST  37  /  ALT  28  /  AlkPhos  76  09-19  POCT Blood Glucose.: 103 mg/dL (09-19-23 @ 11:44)  A1C with Estimated Average Glucose Result: 5.3 % (09-14-23 @ 12:49)

## 2023-09-19 NOTE — PROGRESS NOTE ADULT - SUBJECTIVE AND OBJECTIVE BOX
Vascular Surgery Progress Note    STATUS POST: Second stage thoracic endovascular aortic repair (TEVAR)  POST OPERATIVE DAY #: 4    SUBJECTIVE:   Patient seen and examined at bedside. Patient noted to be persistently HTN overnight, but otherwise no acute events noted. Patient still reports some low back pain and drainage at lumbar drain site. States LE weakness is improving.     Vital Signs Last 24 Hrs  T(C): 36.3 (19 Sep 2023 05:19), Max: 37.7 (18 Sep 2023 20:43)  T(F): 97.4 (19 Sep 2023 05:19), Max: 99.8 (18 Sep 2023 20:43)  HR: 49 (19 Sep 2023 07:00) (49 - 77)  BP: 167/74 (19 Sep 2023 06:00) (99/50 - 194/82)  BP(mean): 106 (19 Sep 2023 06:00) (56 - 120)  RR: 16 (19 Sep 2023 07:00) (15 - 20)  SpO2: 100% (19 Sep 2023 07:00) (90% - 100%)    Parameters below as of 19 Sep 2023 08:00  Patient On (Oxygen Delivery Method): nasal cannula, high flow  O2 Flow (L/min): 50      I&O's Summary    18 Sep 2023 07:01  -  19 Sep 2023 07:00  --------------------------------------------------------  IN: 5430.4 mL / OUT: 3456 mL / NET: 1974.4 mL    19 Sep 2023 07:01  -  19 Sep 2023 07:51  --------------------------------------------------------  IN: 110 mL / OUT: 0 mL / NET: 110 mL        Physical Exam:  General Appearance: Resting in bed, NAD  HEENT: HFNC in nares  Neuro: A&Ox3, moving all four extremities spontaneously. Proximal muscle strength preserved  Pulmonary: Nonlabored breathing, no respiratory distress, saturating well on HFNC  Cardiovascular: NSR  Abdomen: Soft, nondistended, nontender  Groin: B/L Groin access sites soft without ecchymosis or hematoma  Back: Lumbar drainage tubing noted with SS output  Extremities: WWP, SCD's in place, No peripheral edema appreciated. 2+ palpable DP and PT pulses b/l    LABS:                        10.3   6.75  )-----------( 121      ( 19 Sep 2023 03:23 )             30.0     09-19    136  |  101  |  11  ----------------------------<  108<H>  4.2   |  23  |  0.68    Ca    9.5      19 Sep 2023 03:23  Phos  3.2     09-19  Mg     1.9     09-19    TPro  7.3  /  Alb  4.0  /  TBili  1.1  /  DBili  x   /  AST  37  /  ALT  28  /  AlkPhos  76  09-19    PT/INR - ( 19 Sep 2023 03:23 )   PT: 13.6 sec;   INR: 1.20          PTT - ( 19 Sep 2023 03:23 )  PTT:28.1 sec  Urinalysis Basic - ( 19 Sep 2023 03:23 )    Color: x / Appearance: x / SG: x / pH: x  Gluc: 108 mg/dL / Ketone: x  / Bili: x / Urobili: x   Blood: x / Protein: x / Nitrite: x   Leuk Esterase: x / RBC: x / WBC x   Sq Epi: x / Non Sq Epi: x / Bacteria: x

## 2023-09-20 LAB
ALBUMIN SERPL ELPH-MCNC: 4 G/DL — SIGNIFICANT CHANGE UP (ref 3.3–5)
ALBUMIN SERPL ELPH-MCNC: 4.1 G/DL — SIGNIFICANT CHANGE UP (ref 3.3–5)
ALBUMIN SERPL ELPH-MCNC: 4.3 G/DL — SIGNIFICANT CHANGE UP (ref 3.3–5)
ALP SERPL-CCNC: 82 U/L — SIGNIFICANT CHANGE UP (ref 40–120)
ALP SERPL-CCNC: 82 U/L — SIGNIFICANT CHANGE UP (ref 40–120)
ALP SERPL-CCNC: 83 U/L — SIGNIFICANT CHANGE UP (ref 40–120)
ALT FLD-CCNC: 40 U/L — SIGNIFICANT CHANGE UP (ref 10–45)
ALT FLD-CCNC: 43 U/L — SIGNIFICANT CHANGE UP (ref 10–45)
ALT FLD-CCNC: 75 U/L — HIGH (ref 10–45)
ANION GAP SERPL CALC-SCNC: 10 MMOL/L — SIGNIFICANT CHANGE UP (ref 5–17)
ANION GAP SERPL CALC-SCNC: 10 MMOL/L — SIGNIFICANT CHANGE UP (ref 5–17)
ANION GAP SERPL CALC-SCNC: 14 MMOL/L — SIGNIFICANT CHANGE UP (ref 5–17)
APTT BLD: 24.4 SEC — LOW (ref 24.5–35.6)
APTT BLD: 25.3 SEC — SIGNIFICANT CHANGE UP (ref 24.5–35.6)
APTT BLD: 26.6 SEC — SIGNIFICANT CHANGE UP (ref 24.5–35.6)
AST SERPL-CCNC: 49 U/L — HIGH (ref 10–40)
AST SERPL-CCNC: 49 U/L — HIGH (ref 10–40)
AST SERPL-CCNC: 91 U/L — HIGH (ref 10–40)
BASE EXCESS BLDA CALC-SCNC: 2.9 MMOL/L — SIGNIFICANT CHANGE UP (ref -2–3)
BASOPHILS # BLD AUTO: 0 K/UL — SIGNIFICANT CHANGE UP (ref 0–0.2)
BASOPHILS # BLD AUTO: 0 K/UL — SIGNIFICANT CHANGE UP (ref 0–0.2)
BASOPHILS # BLD AUTO: 0.01 K/UL — SIGNIFICANT CHANGE UP (ref 0–0.2)
BASOPHILS NFR BLD AUTO: 0 % — SIGNIFICANT CHANGE UP (ref 0–2)
BASOPHILS NFR BLD AUTO: 0 % — SIGNIFICANT CHANGE UP (ref 0–2)
BASOPHILS NFR BLD AUTO: 0.2 % — SIGNIFICANT CHANGE UP (ref 0–2)
BILIRUB SERPL-MCNC: 0.6 MG/DL — SIGNIFICANT CHANGE UP (ref 0.2–1.2)
BILIRUB SERPL-MCNC: 0.6 MG/DL — SIGNIFICANT CHANGE UP (ref 0.2–1.2)
BILIRUB SERPL-MCNC: 0.7 MG/DL — SIGNIFICANT CHANGE UP (ref 0.2–1.2)
BUN SERPL-MCNC: 19 MG/DL — SIGNIFICANT CHANGE UP (ref 7–23)
BUN SERPL-MCNC: 20 MG/DL — SIGNIFICANT CHANGE UP (ref 7–23)
BUN SERPL-MCNC: 23 MG/DL — SIGNIFICANT CHANGE UP (ref 7–23)
CALCIUM SERPL-MCNC: 9.4 MG/DL — SIGNIFICANT CHANGE UP (ref 8.4–10.5)
CALCIUM SERPL-MCNC: 9.4 MG/DL — SIGNIFICANT CHANGE UP (ref 8.4–10.5)
CALCIUM SERPL-MCNC: 9.6 MG/DL — SIGNIFICANT CHANGE UP (ref 8.4–10.5)
CHLORIDE SERPL-SCNC: 101 MMOL/L — SIGNIFICANT CHANGE UP (ref 96–108)
CHLORIDE SERPL-SCNC: 101 MMOL/L — SIGNIFICANT CHANGE UP (ref 96–108)
CHLORIDE SERPL-SCNC: 98 MMOL/L — SIGNIFICANT CHANGE UP (ref 96–108)
CO2 BLDA-SCNC: 27 MMOL/L — HIGH (ref 19–24)
CO2 SERPL-SCNC: 23 MMOL/L — SIGNIFICANT CHANGE UP (ref 22–31)
CO2 SERPL-SCNC: 23 MMOL/L — SIGNIFICANT CHANGE UP (ref 22–31)
CO2 SERPL-SCNC: 25 MMOL/L — SIGNIFICANT CHANGE UP (ref 22–31)
CREAT SERPL-MCNC: 0.59 MG/DL — SIGNIFICANT CHANGE UP (ref 0.5–1.3)
CREAT SERPL-MCNC: 0.62 MG/DL — SIGNIFICANT CHANGE UP (ref 0.5–1.3)
CREAT SERPL-MCNC: 0.65 MG/DL — SIGNIFICANT CHANGE UP (ref 0.5–1.3)
EGFR: 102 ML/MIN/1.73M2 — SIGNIFICANT CHANGE UP
EGFR: 103 ML/MIN/1.73M2 — SIGNIFICANT CHANGE UP
EGFR: 105 ML/MIN/1.73M2 — SIGNIFICANT CHANGE UP
EOSINOPHIL # BLD AUTO: 0 K/UL — SIGNIFICANT CHANGE UP (ref 0–0.5)
EOSINOPHIL # BLD AUTO: 0 K/UL — SIGNIFICANT CHANGE UP (ref 0–0.5)
EOSINOPHIL # BLD AUTO: 0.01 K/UL — SIGNIFICANT CHANGE UP (ref 0–0.5)
EOSINOPHIL NFR BLD AUTO: 0 % — SIGNIFICANT CHANGE UP (ref 0–6)
EOSINOPHIL NFR BLD AUTO: 0 % — SIGNIFICANT CHANGE UP (ref 0–6)
EOSINOPHIL NFR BLD AUTO: 0.2 % — SIGNIFICANT CHANGE UP (ref 0–6)
FERRITIN SERPL-MCNC: 2286 NG/ML — HIGH (ref 30–400)
GAS PNL BLDA: SIGNIFICANT CHANGE UP
GLUCOSE SERPL-MCNC: 138 MG/DL — HIGH (ref 70–99)
GLUCOSE SERPL-MCNC: 150 MG/DL — HIGH (ref 70–99)
GLUCOSE SERPL-MCNC: 158 MG/DL — HIGH (ref 70–99)
HCO3 BLDA-SCNC: 26 MMOL/L — SIGNIFICANT CHANGE UP (ref 21–28)
HCT VFR BLD CALC: 29.9 % — LOW (ref 39–50)
HCT VFR BLD CALC: 30.7 % — LOW (ref 39–50)
HCT VFR BLD CALC: 31.6 % — LOW (ref 39–50)
HGB BLD-MCNC: 10.5 G/DL — LOW (ref 13–17)
HGB BLD-MCNC: 10.8 G/DL — LOW (ref 13–17)
HGB BLD-MCNC: 11.1 G/DL — LOW (ref 13–17)
IMM GRANULOCYTES NFR BLD AUTO: 0.3 % — SIGNIFICANT CHANGE UP (ref 0–0.9)
IMM GRANULOCYTES NFR BLD AUTO: 0.5 % — SIGNIFICANT CHANGE UP (ref 0–0.9)
IMM GRANULOCYTES NFR BLD AUTO: 0.5 % — SIGNIFICANT CHANGE UP (ref 0–0.9)
INR BLD: 1.02 — SIGNIFICANT CHANGE UP (ref 0.85–1.18)
INR BLD: 1.08 — SIGNIFICANT CHANGE UP (ref 0.85–1.18)
INR BLD: 1.1 — SIGNIFICANT CHANGE UP (ref 0.85–1.18)
LYMPHOCYTES # BLD AUTO: 0.31 K/UL — LOW (ref 1–3.3)
LYMPHOCYTES # BLD AUTO: 0.38 K/UL — LOW (ref 1–3.3)
LYMPHOCYTES # BLD AUTO: 0.48 K/UL — LOW (ref 1–3.3)
LYMPHOCYTES # BLD AUTO: 5 % — LOW (ref 13–44)
LYMPHOCYTES # BLD AUTO: 6 % — LOW (ref 13–44)
LYMPHOCYTES # BLD AUTO: 7.3 % — LOW (ref 13–44)
MAGNESIUM SERPL-MCNC: 2.1 MG/DL — SIGNIFICANT CHANGE UP (ref 1.6–2.6)
MAGNESIUM SERPL-MCNC: 2.2 MG/DL — SIGNIFICANT CHANGE UP (ref 1.6–2.6)
MAGNESIUM SERPL-MCNC: 2.2 MG/DL — SIGNIFICANT CHANGE UP (ref 1.6–2.6)
MCHC RBC-ENTMCNC: 32.5 PG — SIGNIFICANT CHANGE UP (ref 27–34)
MCHC RBC-ENTMCNC: 32.6 PG — SIGNIFICANT CHANGE UP (ref 27–34)
MCHC RBC-ENTMCNC: 32.6 PG — SIGNIFICANT CHANGE UP (ref 27–34)
MCHC RBC-ENTMCNC: 35.1 GM/DL — SIGNIFICANT CHANGE UP (ref 32–36)
MCHC RBC-ENTMCNC: 35.1 GM/DL — SIGNIFICANT CHANGE UP (ref 32–36)
MCHC RBC-ENTMCNC: 35.2 GM/DL — SIGNIFICANT CHANGE UP (ref 32–36)
MCV RBC AUTO: 92.5 FL — SIGNIFICANT CHANGE UP (ref 80–100)
MCV RBC AUTO: 92.7 FL — SIGNIFICANT CHANGE UP (ref 80–100)
MCV RBC AUTO: 92.9 FL — SIGNIFICANT CHANGE UP (ref 80–100)
MONOCYTES # BLD AUTO: 0.85 K/UL — SIGNIFICANT CHANGE UP (ref 0–0.9)
MONOCYTES # BLD AUTO: 0.9 K/UL — SIGNIFICANT CHANGE UP (ref 0–0.9)
MONOCYTES # BLD AUTO: 0.98 K/UL — HIGH (ref 0–0.9)
MONOCYTES NFR BLD AUTO: 13.4 % — SIGNIFICANT CHANGE UP (ref 2–14)
MONOCYTES NFR BLD AUTO: 14.6 % — HIGH (ref 2–14)
MONOCYTES NFR BLD AUTO: 15 % — HIGH (ref 2–14)
NEUTROPHILS # BLD AUTO: 4.94 K/UL — SIGNIFICANT CHANGE UP (ref 1.8–7.4)
NEUTROPHILS # BLD AUTO: 5.04 K/UL — SIGNIFICANT CHANGE UP (ref 1.8–7.4)
NEUTROPHILS # BLD AUTO: 5.1 K/UL — SIGNIFICANT CHANGE UP (ref 1.8–7.4)
NEUTROPHILS NFR BLD AUTO: 76.8 % — SIGNIFICANT CHANGE UP (ref 43–77)
NEUTROPHILS NFR BLD AUTO: 80.1 % — HIGH (ref 43–77)
NEUTROPHILS NFR BLD AUTO: 80.1 % — HIGH (ref 43–77)
NRBC # BLD: 0 /100 WBCS — SIGNIFICANT CHANGE UP (ref 0–0)
PCO2 BLDA: 33 MMHG — LOW (ref 35–48)
PH BLDA: 7.5 — HIGH (ref 7.35–7.45)
PHOSPHATE SERPL-MCNC: 2.8 MG/DL — SIGNIFICANT CHANGE UP (ref 2.5–4.5)
PHOSPHATE SERPL-MCNC: 3.5 MG/DL — SIGNIFICANT CHANGE UP (ref 2.5–4.5)
PHOSPHATE SERPL-MCNC: 3.8 MG/DL — SIGNIFICANT CHANGE UP (ref 2.5–4.5)
PLATELET # BLD AUTO: 169 K/UL — SIGNIFICANT CHANGE UP (ref 150–400)
PLATELET # BLD AUTO: 184 K/UL — SIGNIFICANT CHANGE UP (ref 150–400)
PLATELET # BLD AUTO: 207 K/UL — SIGNIFICANT CHANGE UP (ref 150–400)
PO2 BLDA: 132 MMHG — HIGH (ref 83–108)
POTASSIUM SERPL-MCNC: 3.8 MMOL/L — SIGNIFICANT CHANGE UP (ref 3.5–5.3)
POTASSIUM SERPL-MCNC: 3.8 MMOL/L — SIGNIFICANT CHANGE UP (ref 3.5–5.3)
POTASSIUM SERPL-MCNC: 4.2 MMOL/L — SIGNIFICANT CHANGE UP (ref 3.5–5.3)
POTASSIUM SERPL-SCNC: 3.8 MMOL/L — SIGNIFICANT CHANGE UP (ref 3.5–5.3)
POTASSIUM SERPL-SCNC: 3.8 MMOL/L — SIGNIFICANT CHANGE UP (ref 3.5–5.3)
POTASSIUM SERPL-SCNC: 4.2 MMOL/L — SIGNIFICANT CHANGE UP (ref 3.5–5.3)
PROT SERPL-MCNC: 7.4 G/DL — SIGNIFICANT CHANGE UP (ref 6–8.3)
PROT SERPL-MCNC: 7.5 G/DL — SIGNIFICANT CHANGE UP (ref 6–8.3)
PROT SERPL-MCNC: 7.7 G/DL — SIGNIFICANT CHANGE UP (ref 6–8.3)
PROTHROM AB SERPL-ACNC: 11.6 SEC — SIGNIFICANT CHANGE UP (ref 9.5–13)
PROTHROM AB SERPL-ACNC: 12.3 SEC — SIGNIFICANT CHANGE UP (ref 9.5–13)
PROTHROM AB SERPL-ACNC: 12.5 SEC — SIGNIFICANT CHANGE UP (ref 9.5–13)
RBC # BLD: 3.22 M/UL — LOW (ref 4.2–5.8)
RBC # BLD: 3.32 M/UL — LOW (ref 4.2–5.8)
RBC # BLD: 3.41 M/UL — LOW (ref 4.2–5.8)
RBC # FLD: 15.9 % — HIGH (ref 10.3–14.5)
RBC # FLD: 15.9 % — HIGH (ref 10.3–14.5)
RBC # FLD: 16 % — HIGH (ref 10.3–14.5)
SAO2 % BLDA: 99.4 % — HIGH (ref 94–98)
SODIUM SERPL-SCNC: 134 MMOL/L — LOW (ref 135–145)
SODIUM SERPL-SCNC: 135 MMOL/L — SIGNIFICANT CHANGE UP (ref 135–145)
SODIUM SERPL-SCNC: 136 MMOL/L — SIGNIFICANT CHANGE UP (ref 135–145)
UNFRACTIONATED HEPARIN INTERPRETATION: SIGNIFICANT CHANGE UP
UNFRACTIONATED HEPARIN RESULT: NEGATIVE — SIGNIFICANT CHANGE UP
UNFRACTIONATED HEPARIN-HIGH DOSE: 0 % — SIGNIFICANT CHANGE UP
UNFRACTIONATED HEPARIN-LOW DOSE: 0 % — SIGNIFICANT CHANGE UP
WBC # BLD: 6.17 K/UL — SIGNIFICANT CHANGE UP (ref 3.8–10.5)
WBC # BLD: 6.36 K/UL — SIGNIFICANT CHANGE UP (ref 3.8–10.5)
WBC # BLD: 6.55 K/UL — SIGNIFICANT CHANGE UP (ref 3.8–10.5)
WBC # FLD AUTO: 6.17 K/UL — SIGNIFICANT CHANGE UP (ref 3.8–10.5)
WBC # FLD AUTO: 6.36 K/UL — SIGNIFICANT CHANGE UP (ref 3.8–10.5)
WBC # FLD AUTO: 6.55 K/UL — SIGNIFICANT CHANGE UP (ref 3.8–10.5)

## 2023-09-20 PROCEDURE — 71045 X-RAY EXAM CHEST 1 VIEW: CPT | Mod: 26

## 2023-09-20 PROCEDURE — 99291 CRITICAL CARE FIRST HOUR: CPT

## 2023-09-20 PROCEDURE — 99233 SBSQ HOSP IP/OBS HIGH 50: CPT

## 2023-09-20 PROCEDURE — 99292 CRITICAL CARE ADDL 30 MIN: CPT

## 2023-09-20 RX ORDER — AMLODIPINE BESYLATE 2.5 MG/1
2.5 TABLET ORAL ONCE
Refills: 0 | Status: COMPLETED | OUTPATIENT
Start: 2023-09-20 | End: 2023-09-20

## 2023-09-20 RX ORDER — DEXAMETHASONE 0.5 MG/5ML
1 ELIXIR ORAL EVERY 8 HOURS
Refills: 0 | Status: COMPLETED | OUTPATIENT
Start: 2023-09-22 | End: 2023-09-22

## 2023-09-20 RX ORDER — AMLODIPINE BESYLATE 2.5 MG/1
10 TABLET ORAL DAILY
Refills: 0 | Status: DISCONTINUED | OUTPATIENT
Start: 2023-09-21 | End: 2023-09-21

## 2023-09-20 RX ORDER — POTASSIUM CHLORIDE 20 MEQ
20 PACKET (EA) ORAL ONCE
Refills: 0 | Status: COMPLETED | OUTPATIENT
Start: 2023-09-20 | End: 2023-09-21

## 2023-09-20 RX ORDER — DEXAMETHASONE 0.5 MG/5ML
4 ELIXIR ORAL EVERY 8 HOURS
Refills: 0 | Status: COMPLETED | OUTPATIENT
Start: 2023-09-20 | End: 2023-09-21

## 2023-09-20 RX ORDER — AMLODIPINE BESYLATE 2.5 MG/1
2.5 TABLET ORAL DAILY
Refills: 0 | Status: DISCONTINUED | OUTPATIENT
Start: 2023-09-20 | End: 2023-09-20

## 2023-09-20 RX ORDER — POTASSIUM CHLORIDE 20 MEQ
20 PACKET (EA) ORAL
Refills: 0 | Status: COMPLETED | OUTPATIENT
Start: 2023-09-20 | End: 2023-09-20

## 2023-09-20 RX ORDER — DEXAMETHASONE 0.5 MG/5ML
2 ELIXIR ORAL EVERY 8 HOURS
Refills: 0 | Status: COMPLETED | OUTPATIENT
Start: 2023-09-21 | End: 2023-09-21

## 2023-09-20 RX ADMIN — ATORVASTATIN CALCIUM 80 MILLIGRAM(S): 80 TABLET, FILM COATED ORAL at 21:38

## 2023-09-20 RX ADMIN — GABAPENTIN 300 MILLIGRAM(S): 400 CAPSULE ORAL at 21:38

## 2023-09-20 RX ADMIN — Medication 50 MILLIEQUIVALENT(S): at 06:00

## 2023-09-20 RX ADMIN — SERTRALINE 25 MILLIGRAM(S): 25 TABLET, FILM COATED ORAL at 12:11

## 2023-09-20 RX ADMIN — TIOTROPIUM BROMIDE 2 PUFF(S): 18 CAPSULE ORAL; RESPIRATORY (INHALATION) at 12:11

## 2023-09-20 RX ADMIN — AMLODIPINE BESYLATE 2.5 MILLIGRAM(S): 2.5 TABLET ORAL at 10:17

## 2023-09-20 RX ADMIN — AMLODIPINE BESYLATE 2.5 MILLIGRAM(S): 2.5 TABLET ORAL at 08:05

## 2023-09-20 RX ADMIN — CHLORHEXIDINE GLUCONATE 1 APPLICATION(S): 213 SOLUTION TOPICAL at 05:44

## 2023-09-20 RX ADMIN — PANTOPRAZOLE SODIUM 40 MILLIGRAM(S): 20 TABLET, DELAYED RELEASE ORAL at 12:11

## 2023-09-20 RX ADMIN — GABAPENTIN 300 MILLIGRAM(S): 400 CAPSULE ORAL at 13:12

## 2023-09-20 RX ADMIN — GABAPENTIN 300 MILLIGRAM(S): 400 CAPSULE ORAL at 05:38

## 2023-09-20 RX ADMIN — Medication 4 MILLIGRAM(S): at 16:54

## 2023-09-20 RX ADMIN — SENNA PLUS 2 TABLET(S): 8.6 TABLET ORAL at 21:39

## 2023-09-20 RX ADMIN — NICARDIPINE HYDROCHLORIDE 25 MG/HR: 30 CAPSULE, EXTENDED RELEASE ORAL at 07:47

## 2023-09-20 NOTE — PROGRESS NOTE ADULT - ASSESSMENT
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 Still reports intermittent RLE weakness, but reports it is improving. As per neurology, this appears to be patient's baseline from deconditioning.     Recommendations:  - Continue to monitor neuro and pulse exam  - F/U neuro and NSGY RE: lumbar drain output and LE weakness post lumbar drain removal  - Rest of care per ICU

## 2023-09-20 NOTE — OCCUPATIONAL THERAPY INITIAL EVALUATION ADULT - PHYSICAL ASSIST/NONPHYSICAL ASSIST: STAND/SIT, REHAB EVAL
requiring assist for controlled descent and safety awareness./verbal cues/nonverbal cues (demo/gestures)/2 person assist

## 2023-09-20 NOTE — OCCUPATIONAL THERAPY INITIAL EVALUATION ADULT - MODIFIED CLINICAL TEST OF SENSORY INTEGRATION IN BALANCE TEST
Pt performed functional mobility with BUE support on portable tele monitor and mod A x1 +chair follow for safety. Pt noted to have ataxic gait, narrow base of support, decreased step length, impaired weight-shifting, and noted to have b/l knees flexed throughout never reaching full extension.

## 2023-09-20 NOTE — PROGRESS NOTE ADULT - ASSESSMENT
68 year old man s/p aortic aneurysm stent and lumbar drain presenting with lower limb weakness in the setting of spinal subarachnoid hemorrhage detected on MRI. His case is complicated by history of right ICA occlusion whereby his right hemisphere is supplied by Acomm and Pcomm collaterals. Subararachnoid blood likely related to lumbar drain and caused local irritation of nerves. MR does not demonstrate spinal cord infarction nor does the exam or history suggest a periprocedural spinal cord infarction at this time. While cerebral vasospasm is a well documented complication of aneurysmal subararachnoid hemorrhage, spinal vasospasm is not well documented. If there is a change in his neurological examination should consider rebleeding, spinal cord infarction, hypoperfusion/hyperperfusion syndromes, vasospasm.  Patient complaining of worsening Lower back pain this morning, neurological exam unchanged.  Pain improved in the PM.    Recommendations:  - c/w Neuro checks q1hr  - c/w current ose of Gabapentin  - c/w Maintain MAP goal 85-90 mmHg to ensure good intraspinal perfusion pressure and intracerebral perfusion.   - c/w Physical therapy

## 2023-09-20 NOTE — OCCUPATIONAL THERAPY INITIAL EVALUATION ADULT - PERTINENT HX OF CURRENT PROBLEM, REHAB EVAL
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

## 2023-09-20 NOTE — OCCUPATIONAL THERAPY INITIAL EVALUATION ADULT - PHYSICAL ASSIST/NONPHYSICAL ASSIST: TOILET, REHAB EVAL
+b/l knee block. Performed from standard height toilet./verbal cues/nonverbal cues (demo/gestures)/2 person assist

## 2023-09-20 NOTE — OCCUPATIONAL THERAPY INITIAL EVALUATION ADULT - IMPAIRED TRANSFERS: SIT/STAND, REHAB EVAL
ataxic/impaired balance/impaired coordination/impaired motor control/decreased ROM/decreased strength

## 2023-09-20 NOTE — OCCUPATIONAL THERAPY INITIAL EVALUATION ADULT - ADDITIONAL COMMENTS
Pt lives in a house with his wife, with 5-6 BRIAN. 1FOS to navigate to bedroom/bathroom. Pt reports that prior to admission, pt was independent in all ADLs and IADLs, and ambulates with no device. Pt has a SC if needed. Pt has a bathtub shower with no DME.

## 2023-09-20 NOTE — OCCUPATIONAL THERAPY INITIAL EVALUATION ADULT - GENERAL OBSERVATIONS, REHAB EVAL
OT IE Completed. MRS 4. Pt's HECTOR Soriano cleared pt for OT. Pt received semisupine in bed, +tele, +[place], +4L O2 via NC, NAD, agreeable to OT. Pt tolerated session fairly. Pt left seated in bedside chair, all lines in tact, needs in reach, HECTOR Soriano aware.

## 2023-09-20 NOTE — OCCUPATIONAL THERAPY INITIAL EVALUATION ADULT - MD ORDER
Pt is POD#5 s/p TEVAR for descending aortic aneurysm (9/15). Post-op Course complicated by spinal subarachnoid hemorrhage.

## 2023-09-20 NOTE — PROGRESS NOTE ADULT - SUBJECTIVE AND OBJECTIVE BOX
Vascular Surgery Progress Note  STATUS POST: Second stage thoracic endovascular aortic repair (TEVAR)    POST OPERATIVE DAY #: 5    SUBJECTIVE:     Vital Signs Last 24 Hrs  T(C): 36.7 (20 Sep 2023 05:16), Max: 37.1 (20 Sep 2023 01:08)  T(F): 98.1 (20 Sep 2023 05:16), Max: 98.7 (20 Sep 2023 01:08)  HR: 55 (20 Sep 2023 06:00) (44 - 84)  BP: 187/81 (19 Sep 2023 21:00) (110/71 - 201/114)  BP(mean): 116 (19 Sep 2023 21:00) (84 - 146)  RR: 18 (20 Sep 2023 06:00) (16 - 19)  SpO2: 97% (20 Sep 2023 06:00) (94% - 100%)    Parameters below as of 20 Sep 2023 07:00  Patient On (Oxygen Delivery Method): nasal cannula, high flow  O2 Flow (L/min): 50  O2 Concentration (%): 70    I&O's Summary    18 Sep 2023 07:01  -  19 Sep 2023 07:00  --------------------------------------------------------  IN: 5430.4 mL / OUT: 3456 mL / NET: 1974.4 mL    19 Sep 2023 07:01  -  20 Sep 2023 06:47  --------------------------------------------------------  IN: 1804.8 mL / OUT: 2905 mL / NET: -1100.2 mL        Physical Exam:  General Appearance: Appears well, NAD  Pulmonary: Nonlabored breathing, no respiratory distress  Cardiovascular: NSR  Abdomen: Soft, nondisteded, appropriate incisional tenderness, dressings clean and dry and intact  Extremities: WWP, SCD's in place     LABS:                        10.8   6.17  )-----------( 169      ( 20 Sep 2023 03:36 )             30.7     09-20    135  |  98  |  19  ----------------------------<  158<H>  3.8   |  23  |  0.65    Ca    9.4      20 Sep 2023 03:36  Phos  3.8     09-20  Mg     2.2     09-20    TPro  7.4  /  Alb  4.0  /  TBili  0.7  /  DBili  x   /  AST  49<H>  /  ALT  40  /  AlkPhos  83  09-20    PT/INR - ( 20 Sep 2023 03:36 )   PT: 12.3 sec;   INR: 1.08          PTT - ( 20 Sep 2023 03:36 )  PTT:26.6 sec  Urinalysis Basic - ( 20 Sep 2023 03:36 )    Color: x / Appearance: x / SG: x / pH: x  Gluc: 158 mg/dL / Ketone: x  / Bili: x / Urobili: x   Blood: x / Protein: x / Nitrite: x   Leuk Esterase: x / RBC: x / WBC x   Sq Epi: x / Non Sq Epi: x / Bacteria: x       Vascular Surgery Progress Note  STATUS POST: Second stage thoracic endovascular aortic repair (TEVAR)    POST OPERATIVE DAY #: 5    SUBJECTIVE:   Patient seen and examined at bedside. Reports he had some persistent RLE weakness this AM, but it is now resolving. Denies any other pain or paresthesias in LEs. Continues to report chronic pack pain.    Vital Signs Last 24 Hrs  T(C): 36.7 (20 Sep 2023 05:16), Max: 37.1 (20 Sep 2023 01:08)  T(F): 98.1 (20 Sep 2023 05:16), Max: 98.7 (20 Sep 2023 01:08)  HR: 55 (20 Sep 2023 06:00) (44 - 84)  BP: 187/81 (19 Sep 2023 21:00) (110/71 - 201/114)  BP(mean): 116 (19 Sep 2023 21:00) (84 - 146)  RR: 18 (20 Sep 2023 06:00) (16 - 19)  SpO2: 97% (20 Sep 2023 06:00) (94% - 100%)    Parameters below as of 20 Sep 2023 07:00  Patient On (Oxygen Delivery Method): nasal cannula, high flow  O2 Flow (L/min): 50  O2 Concentration (%): 70    I&O's Summary    18 Sep 2023 07:01  -  19 Sep 2023 07:00  --------------------------------------------------------  IN: 5430.4 mL / OUT: 3456 mL / NET: 1974.4 mL    19 Sep 2023 07:01  -  20 Sep 2023 06:47  --------------------------------------------------------  IN: 1804.8 mL / OUT: 2905 mL / NET: -1100.2 mL        Physical Exam:  General Appearance: Appears well, NAD  Pulmonary: Nonlabored breathing, no respiratory distress  Cardiovascular: NSR  Abdomen: Soft, ND, NT  Groins: B/L groin access sites soft without evidence of hematoma, minimally tender to palpation  Extremities: WWP, SCD's in place, no calf pain. B/L DP pulses 2+    LABS:                        10.8   6.17  )-----------( 169      ( 20 Sep 2023 03:36 )             30.7     09-20    135  |  98  |  19  ----------------------------<  158<H>  3.8   |  23  |  0.65    Ca    9.4      20 Sep 2023 03:36  Phos  3.8     09-20  Mg     2.2     09-20    TPro  7.4  /  Alb  4.0  /  TBili  0.7  /  DBili  x   /  AST  49<H>  /  ALT  40  /  AlkPhos  83  09-20    PT/INR - ( 20 Sep 2023 03:36 )   PT: 12.3 sec;   INR: 1.08          PTT - ( 20 Sep 2023 03:36 )  PTT:26.6 sec  Urinalysis Basic - ( 20 Sep 2023 03:36 )    Color: x / Appearance: x / SG: x / pH: x  Gluc: 158 mg/dL / Ketone: x  / Bili: x / Urobili: x   Blood: x / Protein: x / Nitrite: x   Leuk Esterase: x / RBC: x / WBC x   Sq Epi: x / Non Sq Epi: x / Bacteria: x

## 2023-09-20 NOTE — OCCUPATIONAL THERAPY INITIAL EVALUATION ADULT - PLANNED THERAPY INTERVENTIONS, OT EVAL
ADL retraining/IADL retraining/balance training/bed mobility training/fine motor coordination training/neuromuscular re-education/ROM/strengthening/transfer training

## 2023-09-20 NOTE — PROGRESS NOTE ADULT - SUBJECTIVE AND OBJECTIVE BOX
CTICU  CRITICAL  CARE  attending     Hand off received 					   Pertinent clinical, laboratory, radiographic, hemodynamic, echocardiographic, respiratory data, microbiologic data and chart were reviewed and analyzed frequently throughout the course of the day and night  Patient seen and examined with CTS/ SH attending at bedside  Pt is a 68y , Male, HEALTH ISSUES - PROBLEM Dx:      , FAMILY HISTORY:  Family history of Marfan syndrome (Uncle)    FH: aortic aneurysm (Uncle)    PAST MEDICAL & SURGICAL HISTORY:  HTN (hypertension)      Aortic regurgitation      Anemia      Arthritis      COPD, mild      Bladder distention      BPH (benign prostatic hyperplasia)      H/O cataract extraction  B/L      Aortic valve replaced        Patient is a 68y old  Male who presents with a chief complaint of Thoracic Aortic Aneurysm (21 Sep 2023 06:58)      14 system review was unremarkable    Vital signs, hemodynamic and respiratory parameters were reviewed from the bedside nursing flowsheet.  ICU Vital Signs Last 24 Hrs  T(C): 36.8 (21 Sep 2023 12:35), Max: 37.1 (21 Sep 2023 01:01)  T(F): 98.3 (21 Sep 2023 12:35), Max: 98.7 (21 Sep 2023 01:01)  HR: 62 (21 Sep 2023 13:00) (43 - 67)  BP: --  BP(mean): --  ABP: 114/52 (21 Sep 2023 13:00) (114/52 - 168/58)  ABP(mean): 73 (21 Sep 2023 13:00) (73 - 111)  RR: 17 (21 Sep 2023 13:00) (16 - 18)  SpO2: 96% (21 Sep 2023 13:00) (94% - 99%)    O2 Parameters below as of 21 Sep 2023 13:00  Patient On (Oxygen Delivery Method): room air          Adult Advanced Hemodynamics Last 24 Hrs  CVP(mm Hg): --  CVP(cm H2O): --  CO: --  CI: --  PA: --  PA(mean): --  PCWP: --  SVR: --  SVRI: --  PVR: --  PVRI: --, ABG - ( 20 Sep 2023 22:11 )  pH, Arterial: 7.46  pH, Blood: x     /  pCO2: 33    /  pO2: 74    / HCO3: 24    / Base Excess: 0.3   /  SaO2: 95.4                Intake and output was reviewed and the fluid balance was calculated  Daily     Daily   I&O's Summary    20 Sep 2023 07:01  -  21 Sep 2023 07:00  --------------------------------------------------------  IN: 192.5 mL / OUT: 1975 mL / NET: -1782.5 mL    21 Sep 2023 07:01  -  21 Sep 2023 13:56  --------------------------------------------------------  IN: 2.5 mL / OUT: 1020 mL / NET: -1017.5 mL        All lines and drain sites were assessed  Glycemic trend was reviewedCAPILLARY BLOOD GLUCOSE        No acute change in mental status  Auscultation of the chest reveals equal bs  Abdomen is soft  Extremities are warm and well perfused  Wounds appear clean and unremarkable  Antibiotics are periop    labs  CBC Full  -  ( 20 Sep 2023 22:46 )  WBC Count : 6.55 K/uL  RBC Count : 3.41 M/uL  Hemoglobin : 11.1 g/dL  Hematocrit : 31.6 %  Platelet Count - Automated : 207 K/uL  Mean Cell Volume : 92.7 fl  Mean Cell Hemoglobin : 32.6 pg  Mean Cell Hemoglobin Concentration : 35.1 gm/dL  Auto Neutrophil # : 5.04 K/uL  Auto Lymphocyte # : 0.48 K/uL  Auto Monocyte # : 0.98 K/uL  Auto Eosinophil # : 0.01 K/uL  Auto Basophil # : 0.01 K/uL  Auto Neutrophil % : 76.8 %  Auto Lymphocyte % : 7.3 %  Auto Monocyte % : 15.0 %  Auto Eosinophil % : 0.2 %  Auto Basophil % : 0.2 %    09-20    134<L>  |  101  |  23  ----------------------------<  138<H>  3.8   |  23  |  0.59    Ca    9.4      20 Sep 2023 22:46  Phos  2.8     09-20  Mg     2.2     09-20    TPro  7.7  /  Alb  4.1  /  TBili  0.6  /  DBili  x   /  AST  91<H>  /  ALT  75<H>  /  AlkPhos  82  09-20    PT/INR - ( 20 Sep 2023 22:46 )   PT: 11.6 sec;   INR: 1.02          PTT - ( 20 Sep 2023 22:46 )  PTT:24.4 sec  The current medications were reviewed   MEDICATIONS  (STANDING):  atorvastatin 80 milliGRAM(s) Oral at bedtime  chlorhexidine 2% Cloths 1 Application(s) Topical daily  dexAMETHasone     Tablet 2 milliGRAM(s) Oral every 8 hours  gabapentin 300 milliGRAM(s) Oral every 8 hours  metoprolol tartrate 25 milliGRAM(s) Oral every 8 hours  niCARdipine Infusion 5 mG/Hr (25 mL/Hr) IV Continuous <Continuous>  NIFEdipine XL 30 milliGRAM(s) Oral daily  pantoprazole    Tablet 40 milliGRAM(s) Oral daily  polyethylene glycol 3350 17 Gram(s) Oral daily  senna 2 Tablet(s) Oral at bedtime  sertraline 25 milliGRAM(s) Oral daily  sodium chloride 0.9%. 1000 milliLiter(s) (10 mL/Hr) IV Continuous <Continuous>  tiotropium 2.5 MICROgram(s) Inhaler 2 Puff(s) Inhalation daily    MEDICATIONS  (PRN):  acetaminophen     Tablet .. 1000 milliGRAM(s) Oral every 6 hours PRN Moderate Pain (4 - 6)  oxyCODONE    IR 5 milliGRAM(s) Oral every 6 hours PRN Severe Pain (7 - 10)       PROBLEM LIST/ ASSESSMENT:  HEALTH ISSUES - PROBLEM Dx:      ,   Patient is a 68y old  Male who presents with a chief complaint of Thoracic Aortic Aneurysm (21 Sep 2023 06:58)     s/p cardiac surgery      Acute blood loss anemia with relative hypotension treated with > 1 unit PC    Acute post operative pulmonary insufficiency ruled in due to hypoxemia, O2 sats < 91% on RA treated with HFNC          My plan includes :  close hemodynamic, ventilatory and drain monitoring and management per post op routine    Monitor for arrhythmias and monitor parameters for organ perfusion  beta blockade not administered due to hemodynamic instability and bradycardia  monitor neurologic status  Head of the bed should remain elevated to 45 deg .   chest PT and IS will be encouraged  monitor adequacy of oxygenation and ventilation and attempt to wean oxygen  antibiotic regimen will be tailored to the clinical, laboratory and microbiologic data  Nutritional goals will be met using po eventually , ensure adequate caloric intake and montior the same  Stress ulcer and VTE prophylaxis will be achieved    Glycemic control is satisfactory  Electrolytes have been repleted as necessary and wound care has been carried out. Pain control has been achieved.   agressive physical therapy and early mobility and ambulation goals will be met   The family was updated about the course and plan  CRITICAL CARE TIME Upon my evaluation, this patient had a high probability of imminent or life-threatening deterioration due to the above problems which required my direct attention, intervention, and personal management.  I have personally provided 110 minutes of critical care time exclusive of time spent on separately billable procedures. Time included review of laboratory data, radiology results, discussion with consultants, and monitoring for potential decompensation. Interventions were performed as documented abovepersonally provided by me  in evaluation and management, reassessments, review and interpretation of labs and x-rays, ventilator and hemodynamic management, formulating a plan and coordinating care: ___110___ MIN.  Time does not include procedural time.    CTICU ATTENDING     					    Brando Castnaeda MD

## 2023-09-20 NOTE — PROGRESS NOTE ADULT - SUBJECTIVE AND OBJECTIVE BOX
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 He is followed by Dr. Toledo as an outpatient for his descending aortic aneurysm. He presented to [facility] on [facility]/23 for a pre-operative lumbar drain placement and underwent TEVAR on 0915. Neurology is being consulted for B/L LE weakness noticed on POD1 when patient was out of bed with assistance. Patient states that he felt his knees were buckling when he tried to walk. He also noticed pain over his both thighs while trying to walk. He felt weak from middle of his thigh upto his knees,     Off note, he had a right CR stroke back in 06/2022 following a Aortic valve repair surgery. c/f left sided paralysis, NIHSS 22. CTA significant for MELISSA occlusion with partial reconstitution of RMCA from R ACOMM. Angiogram revealing chronic occlusion of R ICA, cross filling from left to right via Acomm, no thrombectomy was performed. Patient states that the residual weakness and brain fog lasted for a while but he has been mostly at his baseline recently.       He denies any headache, blurry/double vision,slurred speech, tingling/paresthesia. He has fuchs in so unable to notice any urinary symptom denies any bowel symptom, no saddle anesthesia      PAST MEDICAL & SURGICAL HISTORY:  HTN (hypertension)    Aortic regurgitation    Anemia    Arthritis    COPD, mild    Bladder distention    BPH (benign prostatic hyperplasia)    H/O cataract extraction  B/L    Aortic valve replaced          MEDICATIONS  (STANDING):  amLODIPine   Tablet 2.5 milliGRAM(s) Oral daily  atorvastatin 80 milliGRAM(s) Oral at bedtime  chlorhexidine 2% Cloths 1 Application(s) Topical daily  gabapentin 300 milliGRAM(s) Oral every 8 hours  niCARdipine Infusion 5 mG/Hr (25 mL/Hr) IV Continuous <Continuous>  pantoprazole    Tablet 40 milliGRAM(s) Oral daily  polyethylene glycol 3350 17 Gram(s) Oral daily  senna 2 Tablet(s) Oral at bedtime  sertraline 25 milliGRAM(s) Oral daily  sodium chloride 0.9%. 1000 milliLiter(s) (10 mL/Hr) IV Continuous <Continuous>  tiotropium 2.5 MICROgram(s) Inhaler 2 Puff(s) Inhalation daily    MEDICATIONS  (PRN):  acetaminophen     Tablet .. 1000 milliGRAM(s) Oral every 6 hours PRN Moderate Pain (4 - 6)  oxyCODONE    IR 5 milliGRAM(s) Oral every 6 hours PRN Severe Pain (7 - 10)        Vital Signs Last 24 Hrs  T(C): 36.6 (20 Sep 2023 08:58), Max: 37.1 (20 Sep 2023 01:08)  T(F): 97.9 (20 Sep 2023 08:58), Max: 98.7 (20 Sep 2023 01:08)  HR: 44 (20 Sep 2023 10:00) (44 - 84)  BP: 171/79 (20 Sep 2023 10:00) (110/71 - 201/114)  BP(mean): 113 (20 Sep 2023 10:00) (84 - 146)  RR: 17 (20 Sep 2023 10:00) (16 - 19)  SpO2: 100% (20 Sep 2023 10:00) (91% - 100%)    Parameters below as of 20 Sep 2023 10:00  Patient On (Oxygen Delivery Method): nasal cannula, high flow  O2 Flow (L/min): 50  O2 Concentration (%): 70        Neurological Exam:   Mental status: Awake, alert and oriented x3.  Recent and remote memory intact.  Naming, repetition and comprehension intact.  Attention/concentration intact.  No dysarthria, no aphasia.  Fund of knowledge appropriate.    Cranial nerves: Pupils equally round and reactive to light, visual fields full, no nystagmus, extraocular muscles intact, V1 through V3 intact bilaterally and symmetric, face symmetric, hearing intact to finger rub, palate elevation symmetric, tongue was midline.  Motor:  MRC grading 5/5 b/l UE/LE.   strength 5/5.  Normal tone and bulk.  No abnormal movements.    Sensation: Intact to light touch, proprioception, and pinprick.   Coordination: No dysmetria on finger-to-nose   Reflexes: 2++ in bilateral UE/LE, Plantar: left: upgoing, right: downgoing.         Labs:  Fingerstick Blood Glucose: CAPILLARY BLOOD GLUCOSE        LABS:                        9.4    7.41  )-----------( 77       ( 18 Sep 2023 15:00 )             27.1     09-18    132<L>  |  99  |  13  ----------------------------<  104<H>  3.9   |  24  |  0.65    Ca    9.8      18 Sep 2023 10:35  Phos  3.2     09-18  Mg     2.2     09-18    TPro  7.4  /  Alb  4.3  /  TBili  1.0  /  DBili  x   /  AST  18  /  ALT  12  /  AlkPhos  66  09-18    PT/INR - ( 18 Sep 2023 15:00 )   PT: 14.2 sec;   INR: 1.25          PTT - ( 18 Sep 2023 15:00 )  PTT:27.8 sec      Urinalysis Basic - ( 18 Sep 2023 10:35 )    Color: x / Appearance: x / SG: x / pH: x  Gluc: 104 mg/dL / Ketone: x  / Bili: x / Urobili: x   Blood: x / Protein: x / Nitrite: x   Leuk Esterase: x / RBC: x / WBC x   Sq Epi: x / Non Sq Epi: x / Bacteria: x        < from: CT Lumbar Spine w/wo IV Cont (09.18.23 @ 10:10) >    Multilevel degenerative change as discussed above. This is most   significant at the L2-L4 levels where there is moderate to severe   narrowing of the spinal canal. Consider MRI of the lumbar spine if   clinically indicated.  2.  Chronic compression fractures seen in the lower thoracic and lumbar   spine, relatively stable when compared to prior imaging.  3.  Aneurysmal abdominal aorta measuring approximately 5 cm diameter. A   mural thrombus is seen throughout the abdominal aorta.      < from: CT Angio Head w/ IV Cont (09.18.23 @ 10:03) >  Intracranial CTA: Again demonstrated is lack of contrast filling of the   intracranial right internal carotid artery. 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    No new large vessel occlusion or high-grade stenosis.    Stable dilatation and fenestration of the anterior communicating artery   complex.    Extracranial CTA: Again demonstrated lack of contrast on the right   internal carotid artery which may be due to occlusion, possibly   congenital. No new steno-occlusive disease.      < from: CT Head No Cont (09.18.23 @ 10:01) >  .Encephalomalacia   secondary to chronic infarctions in the right frontal lobes (series 3   image 26). Encephalomalacia also noted in the posterior lobe of the left   cerebellar hemisphere (series 3 image 10).

## 2023-09-20 NOTE — OCCUPATIONAL THERAPY INITIAL EVALUATION ADULT - MANUAL MUSCLE TESTING RESULTS, REHAB EVAL
BUE shoulder flexion grossly 4/5, BUE elbow flexion/extension 5/5, BUE  strength 5/5. RLE hip flexion 4+/5, RLE knee flexion/extension 5/5, RLE ankle DF/PF 5/5. LLE hip flexion 3+/5, LLE knee flexion/extension 4+/5, LLE ankle DF/PF 4+/5.

## 2023-09-20 NOTE — OCCUPATIONAL THERAPY INITIAL EVALUATION ADULT - DIAGNOSIS, OT EVAL
Pt presents with impaired LLE strength, BLE coordination, functional endurance, balance, and weight-shifting impacting his ability to independently complete ADLs, functional transfers, and functional mobility.

## 2023-09-20 NOTE — SBIRT NOTE ADULT - NSSBIRTBRIEFINTDET_GEN_A_CORE
SW completed SBIRT screening with patient who scored a 12, indicating a harmful risk level.  Pt reported he had stopped drinking for a week in preparation for surgery, however prior to that he was drinking on a daily basis a "couple beers and whisky shots".  SW used motivational interviewing to assess pt's readiness to change, pt appears to be in pre-contemplative stage of change as he acknowledges he would like to "slow down" but not stop drinking completely.  SW discussed the risks of excessive alcohol use and offered to provide pt resources for alcohol use, which pt declined.

## 2023-09-20 NOTE — OCCUPATIONAL THERAPY INITIAL EVALUATION ADULT - PHYSICAL ASSIST/NONPHYSICAL ASSIST: SIT/STAND, REHAB EVAL
pt with b/l knee buckling x 3 attempts on first sit>stand from bed. With increased height, pt then able to perform transfer w modA x2 and b/l knee block./verbal cues/nonverbal cues (demo/gestures)/2 person assist

## 2023-09-21 PROCEDURE — 99232 SBSQ HOSP IP/OBS MODERATE 35: CPT

## 2023-09-21 PROCEDURE — 71045 X-RAY EXAM CHEST 1 VIEW: CPT | Mod: 26

## 2023-09-21 RX ORDER — LABETALOL HCL 100 MG
200 TABLET ORAL EVERY 8 HOURS
Refills: 0 | Status: DISCONTINUED | OUTPATIENT
Start: 2023-09-21 | End: 2023-09-21

## 2023-09-21 RX ORDER — NIFEDIPINE 30 MG
30 TABLET, EXTENDED RELEASE 24 HR ORAL DAILY
Refills: 0 | Status: DISCONTINUED | OUTPATIENT
Start: 2023-09-21 | End: 2023-09-22

## 2023-09-21 RX ORDER — METOPROLOL TARTRATE 50 MG
25 TABLET ORAL EVERY 8 HOURS
Refills: 0 | Status: DISCONTINUED | OUTPATIENT
Start: 2023-09-21 | End: 2023-09-23

## 2023-09-21 RX ADMIN — Medication 2 MILLIGRAM(S): at 14:25

## 2023-09-21 RX ADMIN — Medication 200 MILLIGRAM(S): at 08:30

## 2023-09-21 RX ADMIN — OXYCODONE HYDROCHLORIDE 5 MILLIGRAM(S): 5 TABLET ORAL at 10:17

## 2023-09-21 RX ADMIN — Medication 1000 MILLIGRAM(S): at 00:38

## 2023-09-21 RX ADMIN — PANTOPRAZOLE SODIUM 40 MILLIGRAM(S): 20 TABLET, DELAYED RELEASE ORAL at 11:07

## 2023-09-21 RX ADMIN — Medication 2 MILLIGRAM(S): at 21:48

## 2023-09-21 RX ADMIN — Medication 2 MILLIGRAM(S): at 06:52

## 2023-09-21 RX ADMIN — Medication 1000 MILLIGRAM(S): at 15:49

## 2023-09-21 RX ADMIN — Medication 20 MILLIEQUIVALENT(S): at 00:38

## 2023-09-21 RX ADMIN — SERTRALINE 25 MILLIGRAM(S): 25 TABLET, FILM COATED ORAL at 11:07

## 2023-09-21 RX ADMIN — GABAPENTIN 300 MILLIGRAM(S): 400 CAPSULE ORAL at 14:25

## 2023-09-21 RX ADMIN — Medication 4 MILLIGRAM(S): at 05:19

## 2023-09-21 RX ADMIN — Medication 4 MILLIGRAM(S): at 11:07

## 2023-09-21 RX ADMIN — AMLODIPINE BESYLATE 10 MILLIGRAM(S): 2.5 TABLET ORAL at 06:52

## 2023-09-21 RX ADMIN — GABAPENTIN 300 MILLIGRAM(S): 400 CAPSULE ORAL at 22:26

## 2023-09-21 RX ADMIN — Medication 1000 MILLIGRAM(S): at 10:17

## 2023-09-21 RX ADMIN — Medication 1000 MILLIGRAM(S): at 09:47

## 2023-09-21 RX ADMIN — GABAPENTIN 300 MILLIGRAM(S): 400 CAPSULE ORAL at 05:19

## 2023-09-21 RX ADMIN — OXYCODONE HYDROCHLORIDE 5 MILLIGRAM(S): 5 TABLET ORAL at 09:45

## 2023-09-21 RX ADMIN — TIOTROPIUM BROMIDE 2 PUFF(S): 18 CAPSULE ORAL; RESPIRATORY (INHALATION) at 11:06

## 2023-09-21 RX ADMIN — Medication 1000 MILLIGRAM(S): at 01:00

## 2023-09-21 RX ADMIN — OXYCODONE HYDROCHLORIDE 5 MILLIGRAM(S): 5 TABLET ORAL at 15:50

## 2023-09-21 RX ADMIN — SENNA PLUS 2 TABLET(S): 8.6 TABLET ORAL at 21:48

## 2023-09-21 RX ADMIN — CHLORHEXIDINE GLUCONATE 1 APPLICATION(S): 213 SOLUTION TOPICAL at 05:21

## 2023-09-21 RX ADMIN — ATORVASTATIN CALCIUM 80 MILLIGRAM(S): 80 TABLET, FILM COATED ORAL at 21:43

## 2023-09-21 NOTE — CHART NOTE - NSCHARTNOTEFT_GEN_A_CORE
Admitting Diagnosis:   Patient is a 68y old  Male who presents with a chief complaint of Thoracic Aortic Aneurysm (21 Sep 2023 06:58)      PAST MEDICAL & SURGICAL HISTORY:  HTN (hypertension)      Aortic regurgitation      Anemia      Arthritis      COPD, mild      Bladder distention      BPH (benign prostatic hyperplasia)      H/O cataract extraction  B/L      Aortic valve replaced          Current Nutrition Order:  DASH TLC/ensure MAX x3/day 150kcal/30gm prot per 1 can      PO Intake: Good (%) [   ]  Fair (50-75%) [  x-50% or less ] Poor (<25%) [   ]    GI Issues:   Pt Denies GI related distress; Had been with c/o constipation prior  Senna MIRALAX and Protonix ordered.    Pain:   Reports improved to pain    Skin Integrity:   Uvaldo 16  1+ Generalized Edema   No pressure ulcer- SX site noted     Labs:   09-20    134<L>  |  101  |  23  ----------------------------<  138<H>  3.8   |  23  |  0.59    Ca    9.4      20 Sep 2023 22:46  Phos  2.8     09-20  Mg     2.2     09-20    TPro  7.7  /  Alb  4.1  /  TBili  0.6  /  DBili  x   /  AST  91<H>  /  ALT  75<H>  /  AlkPhos  82  09-20    CAPILLARY BLOOD GLUCOSE          Medications:  MEDICATIONS  (STANDING):  atorvastatin 80 milliGRAM(s) Oral at bedtime  chlorhexidine 2% Cloths 1 Application(s) Topical daily  dexAMETHasone     Tablet 2 milliGRAM(s) Oral every 8 hours  dexAMETHasone     Tablet 4 milliGRAM(s) Oral every 8 hours  gabapentin 300 milliGRAM(s) Oral every 8 hours  metoprolol tartrate 25 milliGRAM(s) Oral every 8 hours  niCARdipine Infusion 5 mG/Hr (25 mL/Hr) IV Continuous <Continuous>  NIFEdipine XL 30 milliGRAM(s) Oral daily  pantoprazole    Tablet 40 milliGRAM(s) Oral daily  polyethylene glycol 3350 17 Gram(s) Oral daily  senna 2 Tablet(s) Oral at bedtime  sertraline 25 milliGRAM(s) Oral daily  sodium chloride 0.9%. 1000 milliLiter(s) (10 mL/Hr) IV Continuous <Continuous>  tiotropium 2.5 MICROgram(s) Inhaler 2 Puff(s) Inhalation daily    MEDICATIONS  (PRN):  acetaminophen     Tablet .. 1000 milliGRAM(s) Oral every 6 hours PRN Moderate Pain (4 - 6)  oxyCODONE    IR 5 milliGRAM(s) Oral every 6 hours PRN Severe Pain (7 - 10)        6'3''  pounds +-10%  Wt 166 pounds BMI 20.9 %IBW=85    Weight Change:  Pt Reports  pounds and being stable for sometime - overall is consistent with admit wt 166 pounds within 4 pound range  **No updated EMR wt at this time     Nutrition Focused Physical Exam:   ** Noted wasting/loss to temples MILD, clavicles MODERATE     Estimated energy needs:   current body wt used for energy calculations as pt falls within % IBW  adjusted for age, s/p OR, Noted wasting/loss; fluids per team d/t sodium   *rec upper end EER     Estimated Energy Needs From (robson/kg)	25   Estimated Energy Needs To (robson/kg)	30   Estimated Energy Needs Calculated From (robson/kg)	1880   Estimated Energy Needs Calculated To (robson/kg)	2256     Estimated Protein Needs From (g/kg)	1.1   Estimated Protein Needs To (g/kg)	1.3   Estimated Protein Needs Calculated From (g/kg)	82.72   Estimated Protein Needs Calculated To (g/kg)	97.76     Subjective: 67 y/o M, former smoker/former ETOH misuse, PMH HTN, anemia, arthritis, COPD and aortic pathology/aortic insufficiency s/p mini-AVR (27mmbio), ascending and hemiarch replacement EF normal 6/23/22, (postop course includes chronic occlusion of right ICA, 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 He is followed by Dr. Toledo as an outpatient for his descending aortic aneurysm. He presented to [facility] 9/14/23 for a pre-operative lumbar drain placement with plan for TEVAR. S/p lumbar drain placement 9/14, TEVAR 9/15. On 9/18: Decision made to drain more CSF but patrice blood started coming from drain -> Drain clamped. On 9/19: removal of lumbar drain. Exam stable. Neurology and Neurosurgery think weakness if from chronic spinal stenosis and deconditioning.     Pt seen this AM on 9EAST. Steroids are ordered with  150 158; suggested BG/POCT goal in ICU/s/p OR: 110-140. Diet order: DASH TLC/ensure MAX x3/day 150kcal/30gm prot per 1 can. During prior RD visit pt Reported decreased PO intake during admit d/t pain. Pt reports pain has much improved since time of prior RD visit. However limited intake remains. Reports he "Does want to eat too heavy." Breakfast david seen at bedside which had not yet been consumed. Reports however drinking ensure when given as PO supplement. At this time per pt No issues chewing/swallowing or GI related distress. Senna MIRALAX and Protonix ordered.  Please see below for nutritions recommendations.    Previous Nutrition Diagnosis: Increased Nutrient Needs... RT increased demands  AEB: s/p OR   Active [  X ]  Resolved [   ]  GOAL: Pt will meet at least 75% of nutrient needs     Recommendations:  1. Current diet: DASH TLC/ensure MAX x3/day 150kcal/30gm prot per 1 can.  - continue with diet.   - monitor %PO intake, and diet tolerance.  - encouraged PO at meals.  2. Monitor Skin, Wt. Pain/GI per team.  3. Labs: monitor BMP, CBC, glucose, lytes, trend renal indices, LFTs, POCT with continue steroid use.  4. RD to remain available for additional nutrition interventions as needed.     Education: Encouraged PO intake during meals & reviewed importance. Spoke about protein foods. Understanding stated, provide additional motivation as needed.     Risk Level: High [  X ] Moderate [   ] Low [   ]

## 2023-09-21 NOTE — PROGRESS NOTE ADULT - SUBJECTIVE AND OBJECTIVE BOX
CTICU  CRITICAL  CARE  attending     Hand off received 					   Pertinent clinical, laboratory, radiographic, hemodynamic, echocardiographic, respiratory data, microbiologic data and chart were reviewed and analyzed frequently throughout the course of the day and night  Patient seen and examined with CTS/ SH attending at bedside    Pt is a 68y , Male, POD # 6 s/p 2nd stage TEVAR; post procedure course c/b    R LE weakness ( proximal)  s/p therapeutic/permissive HTN  PRBC to maintain Hct close to 30  s/p CT/MRI of lumbar spine;       today:    ambulated with lower back brace  MAP maintained >85 ; per Neuro recs    67 y/o M, former smoker (40 yr hx,1/2PPD; quit 09/2022) and former ETOH misuse, with PMH of HTN, anemia, arthritis, COPD and aortic pathology/aortic insufficiency s/p mini-AVR (27mmbio), ascending and hemiarch replacement EF normal on 6/23/22, (postop course includes chronic occlusion of right ICA, 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 He is followed by Dr. Toledo as an outpatient for his descending aortic aneurysm. He presented to [facility] on 9/14/23 for a pre-operative lumbar drain placement with plan for TEVAR       , FAMILY HISTORY:  Family history of Marfan syndrome (Uncle)    FH: aortic aneurysm (Uncle)    PAST MEDICAL & SURGICAL HISTORY:  HTN (hypertension)      Aortic regurgitation      Anemia      Arthritis      COPD, mild      Bladder distention      BPH (benign prostatic hyperplasia)      H/O cataract extraction  B/L      Aortic valve replaced        Patient is a 68y old  Male who presents with a chief complaint of Thoracic Aortic Aneurysm (21 Sep 2023 14:21)      14 system review limited 2/2 post procedure morbidity    Vital signs, hemodynamic and respiratory parameters were reviewed from the bedside nursing flowsheet.  ICU Vital Signs Last 24 Hrs  T(C): 36.8 (21 Sep 2023 12:35), Max: 37.1 (21 Sep 2023 01:01)  T(F): 98.3 (21 Sep 2023 12:35), Max: 98.7 (21 Sep 2023 01:01)  HR: 57 (21 Sep 2023 16:00) (47 - 67)  BP: 162/72 (21 Sep 2023 16:00) (162/72 - 162/72)  BP(mean): 103 (21 Sep 2023 16:00) (103 - 103)  ABP: 158/72 (21 Sep 2023 16:00) (114/52 - 167/74)  ABP(mean): 99 (21 Sep 2023 16:00) (73 - 111)  RR: 17 (21 Sep 2023 16:00) (16 - 18)  SpO2: 96% (21 Sep 2023 16:00) (94% - 99%)    O2 Parameters below as of 21 Sep 2023 16:00  Patient On (Oxygen Delivery Method): room air          Adult Advanced Hemodynamics Last 24 Hrs  CVP(mm Hg): --  CVP(cm H2O): --  CO: --  CI: --  PA: --  PA(mean): --  PCWP: --  SVR: --  SVRI: --  PVR: --  PVRI: --, ABG - ( 20 Sep 2023 22:11 )  pH, Arterial: 7.46  pH, Blood: x     /  pCO2: 33    /  pO2: 74    / HCO3: 24    / Base Excess: 0.3   /  SaO2: 95.4                Intake and output was reviewed and the fluid balance was calculated  Daily     Daily   I&O's Summary    20 Sep 2023 07:01  -  21 Sep 2023 07:00  --------------------------------------------------------  IN: 192.5 mL / OUT: 1975 mL / NET: -1782.5 mL    21 Sep 2023 07:01  -  21 Sep 2023 16:24  --------------------------------------------------------  IN: 239.5 mL / OUT: 1020 mL / NET: -780.5 mL        All lines and drain sites were assessed  Glycemic trend was reviewedCAPILLARY BLOOD GLUCOSE        No acute change in mental status  Auscultation of the chest reveals equal bs  Abdomen is soft  Extremities are warm and well perfused  Wounds appear clean and unremarkable  Antibiotics are periop    labs  CBC Full  -  ( 20 Sep 2023 22:46 )  WBC Count : 6.55 K/uL  RBC Count : 3.41 M/uL  Hemoglobin : 11.1 g/dL  Hematocrit : 31.6 %  Platelet Count - Automated : 207 K/uL  Mean Cell Volume : 92.7 fl  Mean Cell Hemoglobin : 32.6 pg  Mean Cell Hemoglobin Concentration : 35.1 gm/dL  Auto Neutrophil # : 5.04 K/uL  Auto Lymphocyte # : 0.48 K/uL  Auto Monocyte # : 0.98 K/uL  Auto Eosinophil # : 0.01 K/uL  Auto Basophil # : 0.01 K/uL  Auto Neutrophil % : 76.8 %  Auto Lymphocyte % : 7.3 %  Auto Monocyte % : 15.0 %  Auto Eosinophil % : 0.2 %  Auto Basophil % : 0.2 %    09-20    134<L>  |  101  |  23  ----------------------------<  138<H>  3.8   |  23  |  0.59    Ca    9.4      20 Sep 2023 22:46  Phos  2.8     09-20  Mg     2.2     09-20    TPro  7.7  /  Alb  4.1  /  TBili  0.6  /  DBili  x   /  AST  91<H>  /  ALT  75<H>  /  AlkPhos  82  09-20    PT/INR - ( 20 Sep 2023 22:46 )   PT: 11.6 sec;   INR: 1.02          PTT - ( 20 Sep 2023 22:46 )  PTT:24.4 sec  The current medications were reviewed   MEDICATIONS  (STANDING):  atorvastatin 80 milliGRAM(s) Oral at bedtime  chlorhexidine 2% Cloths 1 Application(s) Topical daily  dexAMETHasone     Tablet 2 milliGRAM(s) Oral every 8 hours  gabapentin 300 milliGRAM(s) Oral every 8 hours  metoprolol tartrate 25 milliGRAM(s) Oral every 8 hours  niCARdipine Infusion 5 mG/Hr (25 mL/Hr) IV Continuous <Continuous>  NIFEdipine XL 30 milliGRAM(s) Oral daily  pantoprazole    Tablet 40 milliGRAM(s) Oral daily  polyethylene glycol 3350 17 Gram(s) Oral daily  senna 2 Tablet(s) Oral at bedtime  sertraline 25 milliGRAM(s) Oral daily  sodium chloride 0.9%. 1000 milliLiter(s) (10 mL/Hr) IV Continuous <Continuous>  tiotropium 2.5 MICROgram(s) Inhaler 2 Puff(s) Inhalation daily    MEDICATIONS  (PRN):  acetaminophen     Tablet .. 1000 milliGRAM(s) Oral every 6 hours PRN Moderate Pain (4 - 6)  oxyCODONE    IR 5 milliGRAM(s) Oral every 6 hours PRN Severe Pain (7 - 10)       PROBLEM LIST/ ASSESSMENT:  HEALTH ISSUES - PROBLEM Dx:      ,   Patient is a 68y old  Male who presents with a chief complaint of Thoracic Aortic Aneurysm (21 Sep 2023 14:21)     s/p TEVAR      My plan includes :    Appreciate Neuro/NSx recs  Maintain MAP >85-90  Maintain SBP <160  supplemental O2 via HFNC as needed  Titrate Fio2 to maintain Sao2 >95%  Serial ABGs  close hemodynamic, ventilatory and drain monitoring and management per post op routine    Monitor for arrhythmias and monitor parameters for organ perfusion  monitor neurologic status  Head of the bed should remain elevated to 45 deg .   chest PT and IS will be encouraged  monitor adequacy of oxygenation and ventilation and attempt to wean oxygen  Nutritional goals will be met using po eventually , ensure adequate caloric intake and montior the same  Stress ulcer and VTE prophylaxis will be achieved    Glycemic control is satisfactory  Electrolytes have been repleted as necessary and wound care has been carried out. Pain control has been achieved.   agressive physical therapy and early mobility and ambulation goals will be met   The family was updated about the course and plan  CRITICAL CARE TIME SPENT in evaluation and management, reassessments, review and interpretation of labs and x-rays, ventilator and hemodynamic management, formulating a plan and coordinating care: __30 MIN.  Time does not include procedural time.  CTICU ATTENDING     					    Jamaal Figueredo M.D.

## 2023-09-21 NOTE — PROGRESS NOTE ADULT - SUBJECTIVE AND OBJECTIVE BOX
Vascular Surgery Progress    STATUS POST:  Second stage thoracic endovascular aortic repair (TEVAR)  POST OPERATIVE DAY #: 6    SUBJECTIVE:   Patient seen and examined at bedside during AM rounds. No acute events noted overnight. Reports RLE weakness is improving and that he has been ambulating. Also reports back pain is improving.     Vital Signs Last 24 Hrs  T(C): 36.9 (21 Sep 2023 05:01), Max: 37.1 (21 Sep 2023 01:01)  T(F): 98.5 (21 Sep 2023 05:01), Max: 98.7 (21 Sep 2023 01:01)  HR: 52 (21 Sep 2023 06:00) (43 - 66)  BP: 134/61 (20 Sep 2023 13:00) (134/61 - 175/82)  BP(mean): 88 (20 Sep 2023 13:00) (88 - 118)  RR: 18 (21 Sep 2023 06:00) (16 - 18)  SpO2: 95% (21 Sep 2023 06:00) (91% - 100%)    Parameters below as of 21 Sep 2023 07:00  Patient On (Oxygen Delivery Method): room air        I&O's Summary    19 Sep 2023 07:01  -  20 Sep 2023 07:00  --------------------------------------------------------  IN: 1929.8 mL / OUT: 3005 mL / NET: -1075.2 mL    20 Sep 2023 07:01  -  21 Sep 2023 06:58  --------------------------------------------------------  IN: 192.5 mL / OUT: 1975 mL / NET: -1782.5 mL        Physical Exam:  General: Resting comfortably in bed, NAD  Pulmonary: Equal chest wall expansion b/l, no respiratory distress   Cardiovascular: NSR  Abdomen: Soft, nondistended, nontender   Groin: b/l groins soft without evidence of hematoma or ecchymosis   Extremities: WWP, SCD's in place, No calf pain or peripheral edema noted. 2+ palpable DP pulses b/l    LABS:                        11.1   6.55  )-----------( 207      ( 20 Sep 2023 22:46 )             31.6     09-20    134<L>  |  101  |  23  ----------------------------<  138<H>  3.8   |  23  |  0.59    Ca    9.4      20 Sep 2023 22:46  Phos  2.8     09-20  Mg     2.2     09-20    TPro  7.7  /  Alb  4.1  /  TBili  0.6  /  DBili  x   /  AST  91<H>  /  ALT  75<H>  /  AlkPhos  82  09-20    PT/INR - ( 20 Sep 2023 22:46 )   PT: 11.6 sec;   INR: 1.02          PTT - ( 20 Sep 2023 22:46 )  PTT:24.4 sec  Urinalysis Basic - ( 20 Sep 2023 22:46 )    Color: x / Appearance: x / SG: x / pH: x  Gluc: 138 mg/dL / Ketone: x  / Bili: x / Urobili: x   Blood: x / Protein: x / Nitrite: x   Leuk Esterase: x / RBC: x / WBC x   Sq Epi: x / Non Sq Epi: x / Bacteria: x

## 2023-09-21 NOTE — PROGRESS NOTE ADULT - SUBJECTIVE AND OBJECTIVE BOX
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 He is followed by Dr. Toledo as an outpatient for his descending aortic aneurysm. He presented to [facility] on 9/14/23 for a pre-operative lumbar drain placement and underwent TEVAR on 0915. Neurology is being consulted for B/L LE weakness noticed on POD1 when patient was out of bed with assistance. Patient states that he felt his knees were buckling when he tried to walk. He also noticed pain over his both thighs while trying to walk. He felt weak from middle of his thigh upto his knees,     Off note, he had a right CR stroke back in 06/2022 following a Aortic valve repair surgery. c/f left sided paralysis, NIHSS 22. CTA significant for MELISSA occlusion with partial reconstitution of RMCA from R ACOMM. Angiogram revealing chronic occlusion of R ICA, cross filling from left to right via Acomm, no thrombectomy was performed. Patient states that the residual weakness and brain fog lasted for a while but he has been mostly at his baseline recently.       He denies any headache, blurry/double vision,slurred speech, tingling/paresthesia. He has fuchs in so unable to notice any urinary symptom denies any bowel symptom, no saddle anesthesia      PAST MEDICAL & SURGICAL HISTORY:  HTN (hypertension)    Aortic regurgitation    Anemia    Arthritis    COPD, mild    Bladder distention    BPH (benign prostatic hyperplasia)    H/O cataract extraction  B/L    Aortic valve replaced          MEDICATIONS  (STANDING):  amLODIPine   Tablet 2.5 milliGRAM(s) Oral daily  atorvastatin 80 milliGRAM(s) Oral at bedtime  chlorhexidine 2% Cloths 1 Application(s) Topical daily  gabapentin 300 milliGRAM(s) Oral every 8 hours  niCARdipine Infusion 5 mG/Hr (25 mL/Hr) IV Continuous <Continuous>  pantoprazole    Tablet 40 milliGRAM(s) Oral daily  polyethylene glycol 3350 17 Gram(s) Oral daily  senna 2 Tablet(s) Oral at bedtime  sertraline 25 milliGRAM(s) Oral daily  sodium chloride 0.9%. 1000 milliLiter(s) (10 mL/Hr) IV Continuous <Continuous>  tiotropium 2.5 MICROgram(s) Inhaler 2 Puff(s) Inhalation daily    MEDICATIONS  (PRN):  acetaminophen     Tablet .. 1000 milliGRAM(s) Oral every 6 hours PRN Moderate Pain (4 - 6)  oxyCODONE    IR 5 milliGRAM(s) Oral every 6 hours PRN Severe Pain (7 - 10)        Vital Signs Last 24 Hrs  T(C): 36.6 (20 Sep 2023 08:58), Max: 37.1 (20 Sep 2023 01:08)  T(F): 97.9 (20 Sep 2023 08:58), Max: 98.7 (20 Sep 2023 01:08)  HR: 44 (20 Sep 2023 10:00) (44 - 84)  BP: 171/79 (20 Sep 2023 10:00) (110/71 - 201/114)  BP(mean): 113 (20 Sep 2023 10:00) (84 - 146)  RR: 17 (20 Sep 2023 10:00) (16 - 19)  SpO2: 100% (20 Sep 2023 10:00) (91% - 100%)    Parameters below as of 20 Sep 2023 10:00  Patient On (Oxygen Delivery Method): nasal cannula, high flow  O2 Flow (L/min): 50  O2 Concentration (%): 70        Neurological Exam:   Mental status: Awake, alert and oriented x3.  Recent and remote memory intact.  Naming, repetition and comprehension intact.  Attention/concentration intact.  No dysarthria, no aphasia.  Fund of knowledge appropriate.    Cranial nerves: Pupils equally round and reactive to light, visual fields full, no nystagmus, extraocular muscles intact, V1 through V3 intact bilaterally and symmetric, face symmetric, hearing intact to finger rub, palate elevation symmetric, tongue was midline.  Motor:  MRC grading 5/5 b/l UE/LE.   strength 5/5.  Normal tone and bulk.  No abnormal movements.    Sensation: Intact to light touch, proprioception, and pinprick.   Coordination: No dysmetria on finger-to-nose   Reflexes: 2++ in bilateral UE/LE, Plantar: left: upgoing, right: downgoing.         Labs:  Fingerstick Blood Glucose: CAPILLARY BLOOD GLUCOSE        LABS:                        9.4    7.41  )-----------( 77       ( 18 Sep 2023 15:00 )             27.1     09-18    132<L>  |  99  |  13  ----------------------------<  104<H>  3.9   |  24  |  0.65    Ca    9.8      18 Sep 2023 10:35  Phos  3.2     09-18  Mg     2.2     09-18    TPro  7.4  /  Alb  4.3  /  TBili  1.0  /  DBili  x   /  AST  18  /  ALT  12  /  AlkPhos  66  09-18    PT/INR - ( 18 Sep 2023 15:00 )   PT: 14.2 sec;   INR: 1.25          PTT - ( 18 Sep 2023 15:00 )  PTT:27.8 sec      Urinalysis Basic - ( 18 Sep 2023 10:35 )    Color: x / Appearance: x / SG: x / pH: x  Gluc: 104 mg/dL / Ketone: x  / Bili: x / Urobili: x   Blood: x / Protein: x / Nitrite: x   Leuk Esterase: x / RBC: x / WBC x   Sq Epi: x / Non Sq Epi: x / Bacteria: x        < from: CT Lumbar Spine w/wo IV Cont (09.18.23 @ 10:10) >    Multilevel degenerative change as discussed above. This is most   significant at the L2-L4 levels where there is moderate to severe   narrowing of the spinal canal. Consider MRI of the lumbar spine if   clinically indicated.  2.  Chronic compression fractures seen in the lower thoracic and lumbar   spine, relatively stable when compared to prior imaging.  3.  Aneurysmal abdominal aorta measuring approximately 5 cm diameter. A   mural thrombus is seen throughout the abdominal aorta.      < from: CT Angio Head w/ IV Cont (09.18.23 @ 10:03) >  Intracranial CTA: Again demonstrated is lack of contrast filling of the   intracranial right internal carotid artery. 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    No new large vessel occlusion or high-grade stenosis.    Stable dilatation and fenestration of the anterior communicating artery   complex.    Extracranial CTA: Again demonstrated lack of contrast on the right   internal carotid artery which may be due to occlusion, possibly   congenital. No new steno-occlusive disease.      < from: CT Head No Cont (09.18.23 @ 10:01) >  .Encephalomalacia   secondary to chronic infarctions in the right frontal lobes (series 3   image 26). Encephalomalacia also noted in the posterior lobe of the left   cerebellar hemisphere (series 3 image 10).

## 2023-09-22 LAB
ALBUMIN SERPL ELPH-MCNC: 4.1 G/DL — SIGNIFICANT CHANGE UP (ref 3.3–5)
ALP SERPL-CCNC: 75 U/L — SIGNIFICANT CHANGE UP (ref 40–120)
ALT FLD-CCNC: 92 U/L — HIGH (ref 10–45)
ANION GAP SERPL CALC-SCNC: 11 MMOL/L — SIGNIFICANT CHANGE UP (ref 5–17)
APTT BLD: 23.5 SEC — LOW (ref 24.5–35.6)
AST SERPL-CCNC: 66 U/L — HIGH (ref 10–40)
BASOPHILS # BLD AUTO: 0 K/UL — SIGNIFICANT CHANGE UP (ref 0–0.2)
BASOPHILS NFR BLD AUTO: 0 % — SIGNIFICANT CHANGE UP (ref 0–2)
BILIRUB SERPL-MCNC: 0.4 MG/DL — SIGNIFICANT CHANGE UP (ref 0.2–1.2)
BUN SERPL-MCNC: 29 MG/DL — HIGH (ref 7–23)
CALCIUM SERPL-MCNC: 9.3 MG/DL — SIGNIFICANT CHANGE UP (ref 8.4–10.5)
CHLORIDE SERPL-SCNC: 100 MMOL/L — SIGNIFICANT CHANGE UP (ref 96–108)
CO2 SERPL-SCNC: 23 MMOL/L — SIGNIFICANT CHANGE UP (ref 22–31)
CREAT SERPL-MCNC: 0.66 MG/DL — SIGNIFICANT CHANGE UP (ref 0.5–1.3)
EGFR: 102 ML/MIN/1.73M2 — SIGNIFICANT CHANGE UP
EOSINOPHIL # BLD AUTO: 0 K/UL — SIGNIFICANT CHANGE UP (ref 0–0.5)
EOSINOPHIL NFR BLD AUTO: 0 % — SIGNIFICANT CHANGE UP (ref 0–6)
GAS PNL BLDA: SIGNIFICANT CHANGE UP
GLUCOSE SERPL-MCNC: 144 MG/DL — HIGH (ref 70–99)
HCT VFR BLD CALC: 31.7 % — LOW (ref 39–50)
HGB BLD-MCNC: 10.8 G/DL — LOW (ref 13–17)
IMM GRANULOCYTES NFR BLD AUTO: 0.5 % — SIGNIFICANT CHANGE UP (ref 0–0.9)
INR BLD: 1.1 — SIGNIFICANT CHANGE UP (ref 0.85–1.18)
LYMPHOCYTES # BLD AUTO: 0.4 K/UL — LOW (ref 1–3.3)
LYMPHOCYTES # BLD AUTO: 7 % — LOW (ref 13–44)
MAGNESIUM SERPL-MCNC: 2.1 MG/DL — SIGNIFICANT CHANGE UP (ref 1.6–2.6)
MCHC RBC-ENTMCNC: 31.8 PG — SIGNIFICANT CHANGE UP (ref 27–34)
MCHC RBC-ENTMCNC: 34.1 GM/DL — SIGNIFICANT CHANGE UP (ref 32–36)
MCV RBC AUTO: 93.2 FL — SIGNIFICANT CHANGE UP (ref 80–100)
MONOCYTES # BLD AUTO: 0.74 K/UL — SIGNIFICANT CHANGE UP (ref 0–0.9)
MONOCYTES NFR BLD AUTO: 13 % — SIGNIFICANT CHANGE UP (ref 2–14)
NEUTROPHILS # BLD AUTO: 4.52 K/UL — SIGNIFICANT CHANGE UP (ref 1.8–7.4)
NEUTROPHILS NFR BLD AUTO: 79.5 % — HIGH (ref 43–77)
NRBC # BLD: 0 /100 WBCS — SIGNIFICANT CHANGE UP (ref 0–0)
PHOSPHATE SERPL-MCNC: 3.1 MG/DL — SIGNIFICANT CHANGE UP (ref 2.5–4.5)
PLATELET # BLD AUTO: 197 K/UL — SIGNIFICANT CHANGE UP (ref 150–400)
POTASSIUM SERPL-MCNC: 4.2 MMOL/L — SIGNIFICANT CHANGE UP (ref 3.5–5.3)
POTASSIUM SERPL-SCNC: 4.2 MMOL/L — SIGNIFICANT CHANGE UP (ref 3.5–5.3)
PROT SERPL-MCNC: 7.3 G/DL — SIGNIFICANT CHANGE UP (ref 6–8.3)
PROTHROM AB SERPL-ACNC: 12.5 SEC — SIGNIFICANT CHANGE UP (ref 9.5–13)
RBC # BLD: 3.4 M/UL — LOW (ref 4.2–5.8)
RBC # FLD: 15.3 % — HIGH (ref 10.3–14.5)
SODIUM SERPL-SCNC: 134 MMOL/L — LOW (ref 135–145)
WBC # BLD: 5.69 K/UL — SIGNIFICANT CHANGE UP (ref 3.8–10.5)
WBC # FLD AUTO: 5.69 K/UL — SIGNIFICANT CHANGE UP (ref 3.8–10.5)

## 2023-09-22 PROCEDURE — 71045 X-RAY EXAM CHEST 1 VIEW: CPT | Mod: 26

## 2023-09-22 PROCEDURE — 99232 SBSQ HOSP IP/OBS MODERATE 35: CPT

## 2023-09-22 RX ORDER — HYDRALAZINE HCL 50 MG
5 TABLET ORAL ONCE
Refills: 0 | Status: COMPLETED | OUTPATIENT
Start: 2023-09-22 | End: 2023-09-22

## 2023-09-22 RX ORDER — NIFEDIPINE 30 MG
30 TABLET, EXTENDED RELEASE 24 HR ORAL ONCE
Refills: 0 | Status: COMPLETED | OUTPATIENT
Start: 2023-09-22 | End: 2023-09-22

## 2023-09-22 RX ORDER — NIFEDIPINE 30 MG
60 TABLET, EXTENDED RELEASE 24 HR ORAL DAILY
Refills: 0 | Status: DISCONTINUED | OUTPATIENT
Start: 2023-09-23 | End: 2023-09-23

## 2023-09-22 RX ADMIN — Medication 5 MILLIGRAM(S): at 10:18

## 2023-09-22 RX ADMIN — TIOTROPIUM BROMIDE 2 PUFF(S): 18 CAPSULE ORAL; RESPIRATORY (INHALATION) at 11:33

## 2023-09-22 RX ADMIN — Medication 1 MILLIGRAM(S): at 14:52

## 2023-09-22 RX ADMIN — GABAPENTIN 300 MILLIGRAM(S): 400 CAPSULE ORAL at 21:20

## 2023-09-22 RX ADMIN — Medication 1 MILLIGRAM(S): at 06:39

## 2023-09-22 RX ADMIN — SERTRALINE 25 MILLIGRAM(S): 25 TABLET, FILM COATED ORAL at 11:28

## 2023-09-22 RX ADMIN — GABAPENTIN 300 MILLIGRAM(S): 400 CAPSULE ORAL at 06:38

## 2023-09-22 RX ADMIN — PANTOPRAZOLE SODIUM 40 MILLIGRAM(S): 20 TABLET, DELAYED RELEASE ORAL at 11:37

## 2023-09-22 RX ADMIN — Medication 1 MILLIGRAM(S): at 21:20

## 2023-09-22 RX ADMIN — Medication 1000 MILLIGRAM(S): at 14:51

## 2023-09-22 RX ADMIN — Medication 25 MILLIGRAM(S): at 21:21

## 2023-09-22 RX ADMIN — Medication 30 MILLIGRAM(S): at 09:16

## 2023-09-22 RX ADMIN — GABAPENTIN 300 MILLIGRAM(S): 400 CAPSULE ORAL at 14:50

## 2023-09-22 RX ADMIN — POLYETHYLENE GLYCOL 3350 17 GRAM(S): 17 POWDER, FOR SOLUTION ORAL at 11:28

## 2023-09-22 RX ADMIN — Medication 1000 MILLIGRAM(S): at 15:15

## 2023-09-22 RX ADMIN — ATORVASTATIN CALCIUM 80 MILLIGRAM(S): 80 TABLET, FILM COATED ORAL at 21:21

## 2023-09-22 RX ADMIN — Medication 25 MILLIGRAM(S): at 14:49

## 2023-09-22 RX ADMIN — CHLORHEXIDINE GLUCONATE 1 APPLICATION(S): 213 SOLUTION TOPICAL at 06:49

## 2023-09-22 RX ADMIN — Medication 30 MILLIGRAM(S): at 06:38

## 2023-09-22 NOTE — PROGRESS NOTE ADULT - ASSESSMENT
68 year old man s/p aortic aneurysm stent and lumbar drain presenting with lower limb weakness in the setting of spinal subarachnoid hemorrhage detected on MRI. His case is complicated by history of right ICA occlusion whereby his right hemisphere is supplied by Acomm and Pcomm collaterals. Subararachnoid blood likely related to lumbar drain and caused local irritation of nerves. MR does not demonstrate spinal cord infarction nor does the exam or history suggest a periprocedural spinal cord infarction at this time. While cerebral vasospasm is a well documented complication of aneurysmal subarachnoid hemorrhage, spinal vasospasm is not well documented. If there is a change in his neurological examination should consider rebleeding, spinal cord infarction, hypoperfusion/hyperperfusion syndromes, vasospasm.  Patient reported significant improvement compared to yesterday. He experiences minimal low back pain while standing up, but is able to ambulate with a walker without any signs of neurological  claudication.       Recommendations:  - c/w Neuro checks q1hr or as frequently allowed in medical floor if downgraded.   - c/w current dose of Gabapentin  - c/w Maintain MAP goal 85-90 mmHg to ensure good intraspinal perfusion pressure and intracerebral perfusion.   - Avoid BP lability.   - c/w Physical therapy  - Recall neurology as needed.

## 2023-09-22 NOTE — PROGRESS NOTE ADULT - SUBJECTIVE AND OBJECTIVE BOX
Patient discussed on morning rounds with Dr. Toledo    Operation / Date: 9/15 TEVAR EF 55%    SUBJECTIVE ASSESSMENT:  68y Male         Vital Signs Last 24 Hrs  T(C): 36.6 (22 Sep 2023 09:01), Max: 36.8 (21 Sep 2023 20:50)  T(F): 97.8 (22 Sep 2023 09:01), Max: 98.2 (21 Sep 2023 20:50)  HR: 66 (22 Sep 2023 11:22) (50 - 71)  BP: 158/74 (22 Sep 2023 11:22) (153/66 - 162/72)  BP(mean): 106 (22 Sep 2023 11:22) (95 - 106)  RR: 18 (22 Sep 2023 11:22) (16 - 18)  SpO2: 97% (22 Sep 2023 11:22) (94% - 99%)    Parameters below as of 22 Sep 2023 11:22  Patient On (Oxygen Delivery Method): room air      I&O's Detail    21 Sep 2023 07:01  -  22 Sep 2023 07:00  --------------------------------------------------------  IN:    NiCARdipine: 50 mL    Oral Fluid: 237 mL  Total IN: 287 mL    OUT:    Voided (mL): 3070 mL  Total OUT: 3070 mL    Total NET: -2783 mL      22 Sep 2023 07:01  -  22 Sep 2023 13:39  --------------------------------------------------------  IN:    IV PiggyBack: 100 mL  Total IN: 100 mL    OUT:  Total OUT: 0 mL    Total NET: 100 mL          CHEST TUBE:  Yes/No. AIR LEAKS: Yes/No. Suction / H2O SEAL.   EDMUNDO DRAIN:  Yes/No.  EPICARDIAL WIRES: Yes/No.  TIE DOWNS: Yes/No.  SWENSON: Yes/No.    PHYSICAL EXAM:    General:     Neurological:    Cardiovascular:    Respiratory:    Gastrointestinal:    Extremities:    Vascular:    Incision Sites:    LABS:                        10.8   5.69  )-----------( 197      ( 22 Sep 2023 02:25 )             31.7       COUMADIN:  Yes/No. REASON: .    PT/INR - ( 22 Sep 2023 02:25 )   PT: 12.5 sec;   INR: 1.10          PTT - ( 22 Sep 2023 02:25 )  PTT:23.5 sec    09-22    134<L>  |  100  |  29<H>  ----------------------------<  144<H>  4.2   |  23  |  0.66    Ca    9.3      22 Sep 2023 02:25  Phos  3.1     09-22  Mg     2.1     09-22    TPro  7.3  /  Alb  4.1  /  TBili  0.4  /  DBili  x   /  AST  66<H>  /  ALT  92<H>  /  AlkPhos  75  09-22      Urinalysis Basic - ( 22 Sep 2023 02:25 )    Color: x / Appearance: x / SG: x / pH: x  Gluc: 144 mg/dL / Ketone: x  / Bili: x / Urobili: x   Blood: x / Protein: x / Nitrite: x   Leuk Esterase: x / RBC: x / WBC x   Sq Epi: x / Non Sq Epi: x / Bacteria: x        MEDICATIONS  (STANDING):  atorvastatin 80 milliGRAM(s) Oral at bedtime  dexAMETHasone     Tablet 1 milliGRAM(s) Oral every 8 hours  gabapentin 300 milliGRAM(s) Oral every 8 hours  metoprolol tartrate 25 milliGRAM(s) Oral every 8 hours  niCARdipine Infusion 5 mG/Hr (25 mL/Hr) IV Continuous <Continuous>  pantoprazole    Tablet 40 milliGRAM(s) Oral daily  polyethylene glycol 3350 17 Gram(s) Oral daily  senna 2 Tablet(s) Oral at bedtime  sertraline 25 milliGRAM(s) Oral daily  sodium chloride 0.9%. 1000 milliLiter(s) (10 mL/Hr) IV Continuous <Continuous>  tiotropium 2.5 MICROgram(s) Inhaler 2 Puff(s) Inhalation daily    MEDICATIONS  (PRN):  acetaminophen     Tablet .. 1000 milliGRAM(s) Oral every 6 hours PRN Moderate Pain (4 - 6)  oxyCODONE    IR 5 milliGRAM(s) Oral every 6 hours PRN Severe Pain (7 - 10)        RADIOLOGY & ADDITIONAL TESTS:  < from: Xray Chest 1 View- PORTABLE-Routine (Xray Chest 1 View- PORTABLE-Routine in AM.) (09.22.23 @ 05:41) >  PROCEDURE DATE:  09/22/2023          INTERPRETATION:  Clinical history reason for exam: Postop.    Frontal chest.    COMPARISON: September 21, 2023.    Findings/  impression: Bibasilar focal atelectasis. Heart size within normal limits,   status post median sternotomy, aortic endovascular stent, aortic valve   replacement.. Stable bony structures    --- End of Report ---    < end of copied text >        < from: MR Lumbar Spine No Cont (09.18.23 @ 19:25) >  ACC: [id number] EXAM:  MR SPINE LUMBAR   ORDERED BY: MIQUEL CASTANEDA     PROCEDURE DATE:  09/18/2023          INTERPRETATION:  PROCEDURE: MRI of the lumbosacral spine without contrast    INDICATION: Leg weakness status post TEVAR    TECHNIQUE: Sagittal T1, T2, and STIR and axial T1 and T2 weighted images   of the lumbosacral spine were obtained.    COMPARISON: CT lumbar spine 9/18/2023    FINDINGS: The MRI examination demonstrates iso to mildly hyperintense T1   signal within the spinal canal which demonstrates mixed predominantly   hypointense T2 signal and appears to be layering at the L1 level (series   22 image 8) and completely filling the spinal canal and surrounding the   nerve roots. This may represent subarachnoid hemorrhage. The conus   medullaris ends at the T12/L1 level. There is a spinal catheter along the   right side of the spinal canal which enters the thecal sac at the L4/5   level. The catheter course is better seen on the prior CT.    The MRI examination demonstrates the lumbosacral alignment to be intact.   There are moderate compression fracture deformities involving L1, L2, and   L5 and mild compression deformity involving L3. There is abnormal   heterogeneous T1 marrow signal with micronodular areas of hypointense   which suppresses on the STIR images which may be secondary to   osteoporosis. Alternative considerations would include multiple myeloma.   The conus medullaris ends at T12/L1.    At the L1/2 level, there is disc bulge. There are degenerative changes   involving the facets bilaterally. There is mild central spinal canal   stenosis. There is mild bilateral neural foramen narrowing.    At the L2/3 level, there is diffuse disc bulge. There are degenerative   changes involving the facets bilaterally as well as mild omentum flavum   hypertrophy. This combination results in severe central spinal canal   stenosis. There is severe right and moderate left neural foramen   narrowing.    At the L3/4 level, there is diffuse disc bulge. There are degenerative   changes involving the facets bilaterally as well as mild ligamentum   flavum hypertrophy. There is severe central spinal canal stenosis. There   is moderate to severe right and moderate left neural foramen narrowing.    At the L4/5 level, there is disc bulge. There are degenerative changes   involving the facets bilaterally. There is mild central spinal canal   stenosis. There is mild bilateral neural foramen narrowing.    The above findings were discussed with Dr. Castaneda at 9:20 am.    At the L5/S1 level, there is minimum disc bulge. There are degenerative   changes involving the facets bilaterally. There is no spinal canal   stenosis there is moderate bilateral neural foramen narrowing.    IMPRESSION: Findings suggestive of spinal subarachnoid hemorrhage.    --- End of Report ---    < end of copied text >   Patient discussed on morning rounds with Dr. Toledo    Operation / Date: 9/15 TEVAR EF 55%    SUBJECTIVE ASSESSMENT:  68y Male reports feeling great.         Vital Signs Last 24 Hrs  T(C): 36.6 (22 Sep 2023 09:01), Max: 36.8 (21 Sep 2023 20:50)  T(F): 97.8 (22 Sep 2023 09:01), Max: 98.2 (21 Sep 2023 20:50)  HR: 66 (22 Sep 2023 11:22) (50 - 71)  BP: 158/74 (22 Sep 2023 11:22) (153/66 - 162/72)  BP(mean): 106 (22 Sep 2023 11:22) (95 - 106)  RR: 18 (22 Sep 2023 11:22) (16 - 18)  SpO2: 97% (22 Sep 2023 11:22) (94% - 99%)    Parameters below as of 22 Sep 2023 11:22  Patient On (Oxygen Delivery Method): room air      I&O's Detail    21 Sep 2023 07:01  -  22 Sep 2023 07:00  --------------------------------------------------------  IN:    NiCARdipine: 50 mL    Oral Fluid: 237 mL  Total IN: 287 mL    OUT:    Voided (mL): 3070 mL  Total OUT: 3070 mL    Total NET: -2783 mL      22 Sep 2023 07:01  -  22 Sep 2023 13:39  --------------------------------------------------------  IN:    IV PiggyBack: 100 mL  Total IN: 100 mL    OUT:  Total OUT: 0 mL    Total NET: 100 mL            PHYSICAL EXAM:    GEN: NAD, looks comfortable  Neuro: A&Ox3.  No focal deficits.  Moving all extremities.   CV: S1S2, regular, no murmurs appreciated.  No carotid bruits.  No JVD  Lungs: Clear B/L.  No wheezing, rales or rhonchi  ABD: Soft, non-tender, non-distended.  +Bowel sounds  EXT: Warm and well perfused.  No peripheral edema noted  Musculoskeletal: Moving all extremities with normal ROM, no joint swelling. Distal pulses palpable bilaterally.   Incision Sites: Right groin c/d/i     LABS:                        10.8   5.69  )-----------( 197      ( 22 Sep 2023 02:25 )             31.7       PT/INR - ( 22 Sep 2023 02:25 )   PT: 12.5 sec;   INR: 1.10          PTT - ( 22 Sep 2023 02:25 )  PTT:23.5 sec    09-22    134<L>  |  100  |  29<H>  ----------------------------<  144<H>  4.2   |  23  |  0.66    Ca    9.3      22 Sep 2023 02:25  Phos  3.1     09-22  Mg     2.1     09-22    TPro  7.3  /  Alb  4.1  /  TBili  0.4  /  DBili  x   /  AST  66<H>  /  ALT  92<H>  /  AlkPhos  75  09-22      Urinalysis Basic - ( 22 Sep 2023 02:25 )    Color: x / Appearance: x / SG: x / pH: x  Gluc: 144 mg/dL / Ketone: x  / Bili: x / Urobili: x   Blood: x / Protein: x / Nitrite: x   Leuk Esterase: x / RBC: x / WBC x   Sq Epi: x / Non Sq Epi: x / Bacteria: x        MEDICATIONS  (STANDING):  atorvastatin 80 milliGRAM(s) Oral at bedtime  dexAMETHasone     Tablet 1 milliGRAM(s) Oral every 8 hours  gabapentin 300 milliGRAM(s) Oral every 8 hours  metoprolol tartrate 25 milliGRAM(s) Oral every 8 hours  niCARdipine Infusion 5 mG/Hr (25 mL/Hr) IV Continuous <Continuous>  pantoprazole    Tablet 40 milliGRAM(s) Oral daily  polyethylene glycol 3350 17 Gram(s) Oral daily  senna 2 Tablet(s) Oral at bedtime  sertraline 25 milliGRAM(s) Oral daily  sodium chloride 0.9%. 1000 milliLiter(s) (10 mL/Hr) IV Continuous <Continuous>  tiotropium 2.5 MICROgram(s) Inhaler 2 Puff(s) Inhalation daily    MEDICATIONS  (PRN):  acetaminophen     Tablet .. 1000 milliGRAM(s) Oral every 6 hours PRN Moderate Pain (4 - 6)  oxyCODONE    IR 5 milliGRAM(s) Oral every 6 hours PRN Severe Pain (7 - 10)        RADIOLOGY & ADDITIONAL TESTS:  < from: Xray Chest 1 View- PORTABLE-Routine (Xray Chest 1 View- PORTABLE-Routine in AM.) (09.22.23 @ 05:41) >  PROCEDURE DATE:  09/22/2023          INTERPRETATION:  Clinical history reason for exam: Postop.    Frontal chest.    COMPARISON: September 21, 2023.    Findings/  impression: Bibasilar focal atelectasis. Heart size within normal limits,   status post median sternotomy, aortic endovascular stent, aortic valve   replacement.. Stable bony structures    --- End of Report ---    < end of copied text >        < from: MR Lumbar Spine No Cont (09.18.23 @ 19:25) >  ACC: [id number] EXAM:  MR SPINE LUMBAR   ORDERED BY: MIQUEL CASTANEDA     PROCEDURE DATE:  09/18/2023          INTERPRETATION:  PROCEDURE: MRI of the lumbosacral spine without contrast    INDICATION: Leg weakness status post TEVAR    TECHNIQUE: Sagittal T1, T2, and STIR and axial T1 and T2 weighted images   of the lumbosacral spine were obtained.    COMPARISON: CT lumbar spine 9/18/2023    FINDINGS: The MRI examination demonstrates iso to mildly hyperintense T1   signal within the spinal canal which demonstrates mixed predominantly   hypointense T2 signal and appears to be layering at the L1 level (series   22 image 8) and completely filling the spinal canal and surrounding the   nerve roots. This may represent subarachnoid hemorrhage. The conus   medullaris ends at the T12/L1 level. There is a spinal catheter along the   right side of the spinal canal which enters the thecal sac at the L4/5   level. The catheter course is better seen on the prior CT.    The MRI examination demonstrates the lumbosacral alignment to be intact.   There are moderate compression fracture deformities involving L1, L2, and   L5 and mild compression deformity involving L3. There is abnormal   heterogeneous T1 marrow signal with micronodular areas of hypointense   which suppresses on the STIR images which may be secondary to   osteoporosis. Alternative considerations would include multiple myeloma.   The conus medullaris ends at T12/L1.    At the L1/2 level, there is disc bulge. There are degenerative changes   involving the facets bilaterally. There is mild central spinal canal   stenosis. There is mild bilateral neural foramen narrowing.    At the L2/3 level, there is diffuse disc bulge. There are degenerative   changes involving the facets bilaterally as well as mild omentum flavum   hypertrophy. This combination results in severe central spinal canal   stenosis. There is severe right and moderate left neural foramen   narrowing.    At the L3/4 level, there is diffuse disc bulge. There are degenerative   changes involving the facets bilaterally as well as mild ligamentum   flavum hypertrophy. There is severe central spinal canal stenosis. There   is moderate to severe right and moderate left neural foramen narrowing.    At the L4/5 level, there is disc bulge. There are degenerative changes   involving the facets bilaterally. There is mild central spinal canal   stenosis. There is mild bilateral neural foramen narrowing.    The above findings were discussed with Dr. Castaneda at 9:20 am.    At the L5/S1 level, there is minimum disc bulge. There are degenerative   changes involving the facets bilaterally. There is no spinal canal   stenosis there is moderate bilateral neural foramen narrowing.    IMPRESSION: Findings suggestive of spinal subarachnoid hemorrhage.    --- End of Report ---    < end of copied text >

## 2023-09-22 NOTE — PROGRESS NOTE ADULT - ASSESSMENT
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 Patient was being followed by Dr. Toledo for his descending aortic aneurysm. He was admitted for completion TEVAR. On 9/14 he had a Lumbar drain placed by Neurosurgery. On 9/15 he underwent the second stage of his TEVAR 2nd stage. He was extubated in the  ICU. On 9/16 ambulated without incident with the Lumbar drain capped.  9/17 he developed right leg weakness while ambulating. The drain was uncapped and drained overnight. On 9/18 while ambulating with PT he had right leg weakness again. He was placed back into bed for CSF removal and was noted to have patrice blood in the tubing. Lumbar drain was capped. CT Lspine and CTH unremarkable. Blood pressure goals were increased. Patient was also given FFP and Platelets.  MRI obtained and revealed blood in spinal canal and subarachnoid space. On 9/19 the lumbar drain was removed. He was also given given IV steroids and decreased BP goal SBP <160. His motor strength improved. 9/20 a decadron taper started. 9/21 he was started on cardene gtt for tighter blood pressure control and ambulated with a lower back brace. 9/22 the cardene gtt was weaned using nifedepine. He was then transfered to [facility].        Neuro: Subarachnoid bleed s/p Lumbar drain. 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 Patient was being followed by Dr. Toledo for his descending aortic aneurysm. He was admitted for completion TEVAR. On 9/14 he had a Lumbar drain placed by Neurosurgery. On 9/15 he underwent the second stage of his TEVAR 2nd stage. He was extubated in the  ICU. On 9/16 ambulated without incident with the Lumbar drain capped.  9/17 he developed right leg weakness while ambulating. The drain was uncapped and drained overnight. On 9/18 while ambulating with PT he had right leg weakness again. He was placed back into bed for CSF removal and was noted to have patrice blood in the tubing. Lumbar drain was capped. CT Lspine and CTH unremarkable. Blood pressure goals were increased. Patient was also given FFP and Platelets.  MRI obtained and revealed blood in spinal canal and subarachnoid space. On 9/19 the lumbar drain was removed. He was also given given IV steroids and decreased BP goal SBP <160. His motor strength improved. 9/20 a decadron taper started. 9/21 he was started on cardene gtt for tighter blood pressure control and ambulated with a lower back brace. 9/22 the cardene gtt was weaned using nifedepine. He was then trans[facility].        Neuro: Subarachnoid bleed s/p Lumbar drain. Lower extremity strength   improved   - continue Decadron taper.   - continue gabapentin 300 TID   - neuro checks q 4   - continue Zoloft 25 mg PO daily for h/o depression.    CVS: history of AVR ascending and hemiarch replacement 2022 now POD# 7 TEVAR   -BP goal < 160: controlled on Lopressor 25 every 8hours and Procardia 60mg Daily  - continue atorvastatin 80mg Daily.     Respiratory: Saturating well on RA  - Wean off O2 sat > 92%  - IS and ambulation  - Chest PT.   - continue spiriva.    GI: tolerating diet  - GI PPX   - Bowel regimen with Senna and Miralax     : BUN/ Creatinine. 29/0.66 - House   - monitor I/Os.   - euvolemic     Endo: FS well contolled.   - ISS     ID:   - completed periop ABX   - continue to monitor fever curve     Heme: DVT PPX   - SQH     Dispo plan:     Monica Salgado PA-C

## 2023-09-22 NOTE — PROGRESS NOTE ADULT - SUBJECTIVE AND OBJECTIVE BOX
Vascular Surgery Progress    STATUS POST:  Second stage thoracic endovascular aortic repair (TEVAR)  POST OPERATIVE DAY #: 7    SUBJECTIVE:   Patient seen and examined at bedside during AM rounds. No acute events noted overnight. Reports RLE weakness is improving and that he has been ambulating. Also reports back pain is improving. State of mild back spasm when working with PT yesterday but denies any spams while in bed. Feels that his legs have gotten stronger.     Social   metoprolol tartrate 25  niCARdipine Infusion 5  NIFEdipine XL 30      Allergies    No Known Allergies    Intolerances        Vital Signs Last 24 Hrs  T(C): 36.2 (22 Sep 2023 05:11), Max: 36.8 (21 Sep 2023 12:35)  T(F): 97.2 (22 Sep 2023 05:11), Max: 98.3 (21 Sep 2023 12:35)  HR: 56 (22 Sep 2023 05:00) (50 - 67)  BP: 162/72 (21 Sep 2023 16:00) (162/72 - 162/72)  BP(mean): 103 (21 Sep 2023 16:00) (103 - 103)  RR: 17 (22 Sep 2023 05:00) (16 - 18)  SpO2: 96% (22 Sep 2023 05:00) (94% - 97%)    Parameters below as of 22 Sep 2023 05:00  Patient On (Oxygen Delivery Method): room air      I&O's Summary    20 Sep 2023 07:01  -  21 Sep 2023 07:00  --------------------------------------------------------  IN: 192.5 mL / OUT: 1975 mL / NET: -1782.5 mL    21 Sep 2023 07:01  -  22 Sep 2023 06:49  --------------------------------------------------------  IN: 287 mL / OUT: 2220 mL / NET: -1933 mL        Physical Exam:  General: Resting comfortably in bed, NAD  Pulmonary: Equal chest wall expansion b/l, no respiratory distress   Cardiovascular: NSR  Abdomen: Soft, nondistended, nontender   Groin: b/l groins soft without evidence of hematoma or ecchymosis   Extremities: WWP, SCD's in place, No calf pain or peripheral edema noted. 2+ palpable DP pulses b/l      LABS:                        10.8   5.69  )-----------( 197      ( 22 Sep 2023 02:25 )             31.7     09-22    134<L>  |  100  |  29<H>  ----------------------------<  144<H>  4.2   |  23  |  0.66    Ca    9.3      22 Sep 2023 02:25  Phos  3.1     09-22  Mg     2.1     09-22    TPro  7.3  /  Alb  4.1  /  TBili  0.4  /  DBili  x   /  AST  66<H>  /  ALT  92<H>  /  AlkPhos  75  09-22    PT/INR - ( 22 Sep 2023 02:25 )   PT: 12.5 sec;   INR: 1.10          PTT - ( 22 Sep 2023 02:25 )  PTT:23.5 sec    Radiology and Additional Studies:

## 2023-09-22 NOTE — PROGRESS NOTE ADULT - PROVIDER SPECIALTY LIST ADULT
Critical Care
Vascular Surgery
CT Surgery
Critical Care
Neurology
Neurosurgery
Vascular Surgery
Critical Care
Critical Care
Neurology
Neurology
Vascular Surgery
Vascular Surgery
Neurosurgery
Vascular Surgery
Vascular Surgery

## 2023-09-22 NOTE — PROGRESS NOTE ADULT - REASON FOR ADMISSION
Thoracic Aortic Aneurysm

## 2023-09-22 NOTE — PROGRESS NOTE ADULT - ASSESSMENT
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 Still reports intermittent RLE weakness, but reports it is improving.     Recommendations:  - Continue to monitor neuro and pulse exam  - F/U neuro and NSGY RE: RLE weakness  - Rest of care per primary team

## 2023-09-22 NOTE — PROGRESS NOTE ADULT - ATTENDING COMMENTS
postop day 7, AAA stent c/b spinal subarachnoid from lumbar drain, thrombocytopenia and permissive hypertension to preserve spinal cord perfusion. stable from neurological status with maintenance of MAPs in 85-90 range. Neuropathic pain manageable per patient on gabapentin likely related to irritative blood product that should improve over time. ABle to engage in physical therapy and ambulate assited. Full strength and normal tone on assessment. No signs of spinal cord infarction clinically or on prior MRI. Can follow up in neuro clinic after rehab
s/p aortic aneurysm repair c/b spinal subarachnoid hemorrhage in setting of lumbar drain, periprocedural permissive hypertension to promote robust intraspinal perfusion for spinal cord infarction prevention, thrombocytopenia. Patient tolerating OOB and ambulation with assistance/PT. MAPs maintained at 85-90. No change in exam today with good strength throughout, some pain related to irritative blood product on gabapentin. 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 Recommended maintaining MAP goal 85-90, close q1h neurocheck and avoidance of BP lability.
stable from neurological status with maintenance of MAPs in 85-90 range. Neuropathic pain manageable per patient on gabapentin likely related to irritative blood product that should improve over time. ABle to engage in physical therapy and ambulate assited. Full strength and normal tone on assessment. No signs of spinal cord inforction clinically or on prior MRI.

## 2023-09-22 NOTE — PROGRESS NOTE ADULT - SUBJECTIVE AND OBJECTIVE BOX
HPI:  Patient is a 67 y/o M, former smoker (40 yr hx,1/2PPD; quit 09/2022) and former ETOH misuse, with PMH of HTN, anemia, arthritis, COPD and aortic pathology/aortic insufficiency s/p mini-AVR (27mmbio), ascending and hemiarch replacement EF normal on 6/23/22, (postop course includes chronic occlusion of right ICA, 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 He is followed by Dr. Toledo as an outpatient for his descending aortic aneurysm. He presented to [facility] on 9/14/23 for a pre-operative lumbar drain placement and underwent TEVAR on 0915. Neurology is being consulted for B/L LE weakness noticed on POD1 when patient was out of bed with assistance. Patient states that he felt his knees were buckling when he tried to walk. He also noticed pain over his both thighs while trying to walk. He felt weak from middle of his thigh upto his knees,     Off note, he had a right CR stroke back in 06/2022 following a Aortic valve repair surgery. c/f left sided paralysis, NIHSS 22. CTA significant for MELISSA occlusion with partial reconstitution of RMCA from R ACOMM. Angiogram revealing chronic occlusion of R ICA, cross filling from left to right via Acomm, no thrombectomy was performed. Patient states that the residual weakness and brain fog lasted for a while but he has been mostly at his baseline recently.       He denies any headache, blurry/double vision,slurred speech, tingling/paresthesia. He has fuchs in so unable to notice any urinary symptom denies any bowel symptom, no saddle anesthesia      PAST MEDICAL & SURGICAL HISTORY:  HTN (hypertension)    Aortic regurgitation    Anemia    Arthritis    COPD, mild    Bladder distention    BPH (benign prostatic hyperplasia)    H/O cataract extraction  B/L    Aortic valve replaced          MEDICATIONS  (STANDING):  amLODIPine   Tablet 2.5 milliGRAM(s) Oral daily  atorvastatin 80 milliGRAM(s) Oral at bedtime  chlorhexidine 2% Cloths 1 Application(s) Topical daily  gabapentin 300 milliGRAM(s) Oral every 8 hours  niCARdipine Infusion 5 mG/Hr (25 mL/Hr) IV Continuous <Continuous>  pantoprazole    Tablet 40 milliGRAM(s) Oral daily  polyethylene glycol 3350 17 Gram(s) Oral daily  senna 2 Tablet(s) Oral at bedtime  sertraline 25 milliGRAM(s) Oral daily  sodium chloride 0.9%. 1000 milliLiter(s) (10 mL/Hr) IV Continuous <Continuous>  tiotropium 2.5 MICROgram(s) Inhaler 2 Puff(s) Inhalation daily    MEDICATIONS  (PRN):  acetaminophen     Tablet .. 1000 milliGRAM(s) Oral every 6 hours PRN Moderate Pain (4 - 6)  oxyCODONE    IR 5 milliGRAM(s) Oral every 6 hours PRN Severe Pain (7 - 10)        Vital Signs Last 24 Hrs  T(C): 36.6 (20 Sep 2023 08:58), Max: 37.1 (20 Sep 2023 01:08)  T(F): 97.9 (20 Sep 2023 08:58), Max: 98.7 (20 Sep 2023 01:08)  HR: 44 (20 Sep 2023 10:00) (44 - 84)  BP: 171/79 (20 Sep 2023 10:00) (110/71 - 201/114)  BP(mean): 113 (20 Sep 2023 10:00) (84 - 146)  RR: 17 (20 Sep 2023 10:00) (16 - 19)  SpO2: 100% (20 Sep 2023 10:00) (91% - 100%)    Parameters below as of 20 Sep 2023 10:00  Patient On (Oxygen Delivery Method): nasal cannula, high flow  O2 Flow (L/min): 50  O2 Concentration (%): 70        Neurological Exam:   Mental status: Awake, alert and oriented x3.  Recent and remote memory intact.  Naming, repetition and comprehension intact.  Attention/concentration intact.  No dysarthria, no aphasia.  Fund of knowledge appropriate.    Cranial nerves: Pupils equally round and reactive to light, visual fields full, no nystagmus, extraocular muscles intact, V1 through V3 intact bilaterally and symmetric, face symmetric, hearing intact to finger rub, palate elevation symmetric, tongue was midline.  Motor:  MRC grading 5/5 b/l UE/LE.   strength 5/5.  Normal tone and bulk.  No abnormal movements.    Sensation: Intact to light touch, proprioception, and pinprick.   Coordination: No dysmetria on finger-to-nose   Reflexes: 2++ in bilateral UE/LE, Plantar: left: upgoing, right: downgoing.         Labs:  Fingerstick Blood Glucose: CAPILLARY BLOOD GLUCOSE        LABS:                        9.4    7.41  )-----------( 77       ( 18 Sep 2023 15:00 )             27.1     09-18    132<L>  |  99  |  13  ----------------------------<  104<H>  3.9   |  24  |  0.65    Ca    9.8      18 Sep 2023 10:35  Phos  3.2     09-18  Mg     2.2     09-18    TPro  7.4  /  Alb  4.3  /  TBili  1.0  /  DBili  x   /  AST  18  /  ALT  12  /  AlkPhos  66  09-18    PT/INR - ( 18 Sep 2023 15:00 )   PT: 14.2 sec;   INR: 1.25          PTT - ( 18 Sep 2023 15:00 )  PTT:27.8 sec      Urinalysis Basic - ( 18 Sep 2023 10:35 )    Color: x / Appearance: x / SG: x / pH: x  Gluc: 104 mg/dL / Ketone: x  / Bili: x / Urobili: x   Blood: x / Protein: x / Nitrite: x   Leuk Esterase: x / RBC: x / WBC x   Sq Epi: x / Non Sq Epi: x / Bacteria: x        < from: CT Lumbar Spine w/wo IV Cont (09.18.23 @ 10:10) >    Multilevel degenerative change as discussed above. This is most   significant at the L2-L4 levels where there is moderate to severe   narrowing of the spinal canal. Consider MRI of the lumbar spine if   clinically indicated.  2.  Chronic compression fractures seen in the lower thoracic and lumbar   spine, relatively stable when compared to prior imaging.  3.  Aneurysmal abdominal aorta measuring approximately 5 cm diameter. A   mural thrombus is seen throughout the abdominal aorta.      < from: CT Angio Head w/ IV Cont (09.18.23 @ 10:03) >  Intracranial CTA: Again demonstrated is lack of contrast filling of the   intracranial right internal carotid artery. 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    No new large vessel occlusion or high-grade stenosis.    Stable dilatation and fenestration of the anterior communicating artery   complex.    Extracranial CTA: Again demonstrated lack of contrast on the right   internal carotid artery which may be due to occlusion, possibly   congenital. No new steno-occlusive disease.      < from: CT Head No Cont (09.18.23 @ 10:01) >  .Encephalomalacia   secondary to chronic infarctions in the right frontal lobes (series 3   image 26). Encephalomalacia also noted in the posterior lobe of the left   cerebellar hemisphere (series 3 image 10).

## 2023-09-23 ENCOUNTER — TRANSCRIPTION ENCOUNTER (OUTPATIENT)
Age: 68
End: 2023-09-23

## 2023-09-23 VITALS — TEMPERATURE: 98 F

## 2023-09-23 LAB
ANION GAP SERPL CALC-SCNC: 9 MMOL/L — SIGNIFICANT CHANGE UP (ref 5–17)
BUN SERPL-MCNC: 32 MG/DL — HIGH (ref 7–23)
CALCIUM SERPL-MCNC: 9.4 MG/DL — SIGNIFICANT CHANGE UP (ref 8.4–10.5)
CHLORIDE SERPL-SCNC: 101 MMOL/L — SIGNIFICANT CHANGE UP (ref 96–108)
CO2 SERPL-SCNC: 24 MMOL/L — SIGNIFICANT CHANGE UP (ref 22–31)
CREAT SERPL-MCNC: 0.63 MG/DL — SIGNIFICANT CHANGE UP (ref 0.5–1.3)
EGFR: 103 ML/MIN/1.73M2 — SIGNIFICANT CHANGE UP
GLUCOSE SERPL-MCNC: 115 MG/DL — HIGH (ref 70–99)
HCT VFR BLD CALC: 34.1 % — LOW (ref 39–50)
HGB BLD-MCNC: 11.7 G/DL — LOW (ref 13–17)
MAGNESIUM SERPL-MCNC: 2.2 MG/DL — SIGNIFICANT CHANGE UP (ref 1.6–2.6)
MCHC RBC-ENTMCNC: 32.5 PG — SIGNIFICANT CHANGE UP (ref 27–34)
MCHC RBC-ENTMCNC: 34.3 GM/DL — SIGNIFICANT CHANGE UP (ref 32–36)
MCV RBC AUTO: 94.7 FL — SIGNIFICANT CHANGE UP (ref 80–100)
NRBC # BLD: 0 /100 WBCS — SIGNIFICANT CHANGE UP (ref 0–0)
PLATELET # BLD AUTO: 197 K/UL — SIGNIFICANT CHANGE UP (ref 150–400)
POTASSIUM SERPL-MCNC: 4 MMOL/L — SIGNIFICANT CHANGE UP (ref 3.5–5.3)
POTASSIUM SERPL-SCNC: 4 MMOL/L — SIGNIFICANT CHANGE UP (ref 3.5–5.3)
RBC # BLD: 3.6 M/UL — LOW (ref 4.2–5.8)
RBC # FLD: 15.2 % — HIGH (ref 10.3–14.5)
SODIUM SERPL-SCNC: 134 MMOL/L — LOW (ref 135–145)
WBC # BLD: 6.64 K/UL — SIGNIFICANT CHANGE UP (ref 3.8–10.5)
WBC # FLD AUTO: 6.64 K/UL — SIGNIFICANT CHANGE UP (ref 3.8–10.5)

## 2023-09-23 PROCEDURE — 84132 ASSAY OF SERUM POTASSIUM: CPT

## 2023-09-23 PROCEDURE — 85610 PROTHROMBIN TIME: CPT

## 2023-09-23 PROCEDURE — 74174 CTA ABD&PLVS W/CONTRAST: CPT

## 2023-09-23 PROCEDURE — 86985 SPLIT BLOOD OR PRODUCTS: CPT

## 2023-09-23 PROCEDURE — C1887: CPT

## 2023-09-23 PROCEDURE — C1751: CPT

## 2023-09-23 PROCEDURE — 83880 ASSAY OF NATRIURETIC PEPTIDE: CPT

## 2023-09-23 PROCEDURE — 97530 THERAPEUTIC ACTIVITIES: CPT

## 2023-09-23 PROCEDURE — C1874: CPT

## 2023-09-23 PROCEDURE — 72146 MRI CHEST SPINE W/O DYE: CPT

## 2023-09-23 PROCEDURE — 71275 CT ANGIOGRAPHY CHEST: CPT

## 2023-09-23 PROCEDURE — P9037: CPT

## 2023-09-23 PROCEDURE — P9045: CPT

## 2023-09-23 PROCEDURE — 80053 COMPREHEN METABOLIC PANEL: CPT

## 2023-09-23 PROCEDURE — 72133 CT LUMBAR SPINE W/O & W/DYE: CPT

## 2023-09-23 PROCEDURE — 83605 ASSAY OF LACTIC ACID: CPT

## 2023-09-23 PROCEDURE — 82947 ASSAY GLUCOSE BLOOD QUANT: CPT

## 2023-09-23 PROCEDURE — P9016: CPT

## 2023-09-23 PROCEDURE — C1769: CPT

## 2023-09-23 PROCEDURE — 82803 BLOOD GASES ANY COMBINATION: CPT

## 2023-09-23 PROCEDURE — 80048 BASIC METABOLIC PNL TOTAL CA: CPT

## 2023-09-23 PROCEDURE — 85025 COMPLETE CBC W/AUTO DIFF WBC: CPT

## 2023-09-23 PROCEDURE — 86022 PLATELET ANTIBODIES: CPT

## 2023-09-23 PROCEDURE — C1760: CPT

## 2023-09-23 PROCEDURE — C1894: CPT

## 2023-09-23 PROCEDURE — 97166 OT EVAL MOD COMPLEX 45 MIN: CPT

## 2023-09-23 PROCEDURE — 97116 GAIT TRAINING THERAPY: CPT

## 2023-09-23 PROCEDURE — 82728 ASSAY OF FERRITIN: CPT

## 2023-09-23 PROCEDURE — 86923 COMPATIBILITY TEST ELECTRIC: CPT

## 2023-09-23 PROCEDURE — 85014 HEMATOCRIT: CPT

## 2023-09-23 PROCEDURE — 84295 ASSAY OF SERUM SODIUM: CPT

## 2023-09-23 PROCEDURE — 83036 HEMOGLOBIN GLYCOSYLATED A1C: CPT

## 2023-09-23 PROCEDURE — 71045 X-RAY EXAM CHEST 1 VIEW: CPT | Mod: 26

## 2023-09-23 PROCEDURE — 85384 FIBRINOGEN ACTIVITY: CPT

## 2023-09-23 PROCEDURE — 97535 SELF CARE MNGMENT TRAINING: CPT

## 2023-09-23 PROCEDURE — 94640 AIRWAY INHALATION TREATMENT: CPT

## 2023-09-23 PROCEDURE — 36430 TRANSFUSION BLD/BLD COMPNT: CPT

## 2023-09-23 PROCEDURE — 82553 CREATINE MB FRACTION: CPT

## 2023-09-23 PROCEDURE — 84443 ASSAY THYROID STIM HORMONE: CPT

## 2023-09-23 PROCEDURE — P9100: CPT

## 2023-09-23 PROCEDURE — 86891 AUTOLOGOUS BLOOD OP SALVAGE: CPT

## 2023-09-23 PROCEDURE — 80061 LIPID PANEL: CPT

## 2023-09-23 PROCEDURE — C8929: CPT

## 2023-09-23 PROCEDURE — P9011: CPT

## 2023-09-23 PROCEDURE — 83690 ASSAY OF LIPASE: CPT

## 2023-09-23 PROCEDURE — 81003 URINALYSIS AUTO W/O SCOPE: CPT

## 2023-09-23 PROCEDURE — 84484 ASSAY OF TROPONIN QUANT: CPT

## 2023-09-23 PROCEDURE — 70498 CT ANGIOGRAPHY NECK: CPT

## 2023-09-23 PROCEDURE — 85576 BLOOD PLATELET AGGREGATION: CPT

## 2023-09-23 PROCEDURE — 72148 MRI LUMBAR SPINE W/O DYE: CPT

## 2023-09-23 PROCEDURE — 82330 ASSAY OF CALCIUM: CPT

## 2023-09-23 PROCEDURE — 84100 ASSAY OF PHOSPHORUS: CPT

## 2023-09-23 PROCEDURE — 70450 CT HEAD/BRAIN W/O DYE: CPT

## 2023-09-23 PROCEDURE — 86900 BLOOD TYPING SEROLOGIC ABO: CPT

## 2023-09-23 PROCEDURE — 86850 RBC ANTIBODY SCREEN: CPT

## 2023-09-23 PROCEDURE — 85027 COMPLETE CBC AUTOMATED: CPT

## 2023-09-23 PROCEDURE — 71045 X-RAY EXAM CHEST 1 VIEW: CPT

## 2023-09-23 PROCEDURE — 93005 ELECTROCARDIOGRAM TRACING: CPT

## 2023-09-23 PROCEDURE — 36415 COLL VENOUS BLD VENIPUNCTURE: CPT

## 2023-09-23 PROCEDURE — 85730 THROMBOPLASTIN TIME PARTIAL: CPT

## 2023-09-23 PROCEDURE — 70496 CT ANGIOGRAPHY HEAD: CPT

## 2023-09-23 PROCEDURE — P9059: CPT

## 2023-09-23 PROCEDURE — 76000 FLUOROSCOPY <1 HR PHYS/QHP: CPT

## 2023-09-23 PROCEDURE — 82550 ASSAY OF CK (CPK): CPT

## 2023-09-23 PROCEDURE — 82150 ASSAY OF AMYLASE: CPT

## 2023-09-23 PROCEDURE — 86901 BLOOD TYPING SEROLOGIC RH(D): CPT

## 2023-09-23 PROCEDURE — 83735 ASSAY OF MAGNESIUM: CPT

## 2023-09-23 PROCEDURE — 82962 GLUCOSE BLOOD TEST: CPT

## 2023-09-23 RX ORDER — METOPROLOL TARTRATE 50 MG
1 TABLET ORAL
Qty: 0 | Refills: 0 | DISCHARGE

## 2023-09-23 RX ORDER — GABAPENTIN 400 MG/1
1 CAPSULE ORAL
Qty: 15 | Refills: 0
Start: 2023-09-23 | End: 2023-09-27

## 2023-09-23 RX ORDER — INDAPAMIDE 1.25 MG
1 TABLET ORAL
Refills: 0 | DISCHARGE

## 2023-09-23 RX ORDER — ASPIRIN/CALCIUM CARB/MAGNESIUM 324 MG
1 TABLET ORAL
Refills: 0 | DISCHARGE

## 2023-09-23 RX ORDER — ACETAMINOPHEN 500 MG
2 TABLET ORAL
Qty: 56 | Refills: 0
Start: 2023-09-23 | End: 2023-09-29

## 2023-09-23 RX ORDER — SENNA PLUS 8.6 MG/1
2 TABLET ORAL
Qty: 10 | Refills: 0
Start: 2023-09-23 | End: 2023-09-27

## 2023-09-23 RX ORDER — ASPIRIN/CALCIUM CARB/MAGNESIUM 324 MG
1 TABLET ORAL
Qty: 30 | Refills: 0
Start: 2023-09-23 | End: 2023-10-22

## 2023-09-23 RX ORDER — NIFEDIPINE 30 MG
1 TABLET, EXTENDED RELEASE 24 HR ORAL
Qty: 0 | Refills: 0 | DISCHARGE

## 2023-09-23 RX ORDER — ATORVASTATIN CALCIUM 80 MG/1
1 TABLET, FILM COATED ORAL
Qty: 30 | Refills: 0
Start: 2023-09-23 | End: 2023-10-22

## 2023-09-23 RX ORDER — NIFEDIPINE 30 MG
30 TABLET, EXTENDED RELEASE 24 HR ORAL ONCE
Refills: 0 | Status: COMPLETED | OUTPATIENT
Start: 2023-09-23 | End: 2023-09-23

## 2023-09-23 RX ORDER — NIFEDIPINE 30 MG
90 TABLET, EXTENDED RELEASE 24 HR ORAL DAILY
Refills: 0 | Status: CANCELLED | OUTPATIENT
Start: 2023-09-24 | End: 2023-09-23

## 2023-09-23 RX ORDER — NIFEDIPINE 30 MG
1 TABLET, EXTENDED RELEASE 24 HR ORAL
Qty: 30 | Refills: 0
Start: 2023-09-23 | End: 2023-10-22

## 2023-09-23 RX ADMIN — Medication 60 MILLIGRAM(S): at 05:34

## 2023-09-23 RX ADMIN — GABAPENTIN 300 MILLIGRAM(S): 400 CAPSULE ORAL at 05:34

## 2023-09-23 RX ADMIN — Medication 30 MILLIGRAM(S): at 09:14

## 2023-09-23 NOTE — DISCHARGE NOTE PROVIDER - CARE PROVIDERS DIRECT ADDRESSES
,[email or web address]

## 2023-09-23 NOTE — DISCHARGE NOTE PROVIDER - PROVIDER TOKENS
PROVIDER:[TOKEN:[[id number]]],PROVIDER:[TOKEN:[[id number]]],PROVIDER:[TOKEN:[[id number]]]

## 2023-09-23 NOTE — DISCHARGE NOTE NURSING/CASE MANAGEMENT/SOCIAL WORK - PATIENT PORTAL LINK FT
You can access the FollowMyHealth Patient Portal offered by [facility] by registering at the following website: [email or web address]. By joining [employer]’s FollowMyHealth portal, you will also be able to view your health information using other applications (apps) compatible with our system.

## 2023-09-23 NOTE — DISCHARGE NOTE PROVIDER - HOSPITAL COURSE
Patient discussed on morning rounds with Dr. Carrera     Operation Date: 9/15/23 -- TEVAR, EF 55%     Primary Surgeon/Attending MD: Dr. Toledo     Referring Physician: Dr. Bharat Romano  _ _ _ _ _ _ _ _ _ _ _ _  HOSPITAL COURSE:  67 y/o former smoker (40 yr hx,1/2PPD; quit 09/2022) and former ETOH misuse, HTN, anemia, arthritis, COPD, hx mini-AVR (27mmbio), ascending and hemiarch replacement 6/23/22 (postop course includes chronic occlusion of right ICA, infarct to right precentral gyrus - no intervention; medical management), urinary retention now (s/p ThuLEP procedure 10/6/22), incidental fall 11/2022 (s/p left hip replacement). Patient was being followed by Dr. Toledo for his descending aortic aneurysm and admitted for completion TEVAR. On 9/14 he had a Lumbar drain placed by Neurosurgery. 9/15 underwent the second stage of his TEVAR. He was extubated in the  ICU. On 9/16 ambulated without incident with the Lumbar drain capped.  9/17 he developed right leg weakness while ambulating. The drain was uncapped and drained overnight. On 9/18 while ambulating with PT he had right leg weakness again. He was placed back into bed for CSF removal and was noted to have patrice blood in the tubing. Lumbar drain was capped. CT Lspine and CTH unremarkable. Blood pressure goals were increased. Patient was also given FFP and Platelets.  MRI obtained and revealed blood in spinal canal and subarachnoid space. Neuro on, nothing to do except monitor improvement. On 9/19 the lumbar drain was removed after pt improvement of weakness. He was also given given IV steroids and decreased BP goal SBP <160. His motor strength improved. 9/20 a decadron taper started. 9/21 he was started on cardene gtt for tighter blood pressure control and ambulated with a lower back brace. 9/22 the cardene gtt was weaned using Nifedipine. He was then transferred to [facility]. POD8 pt is doing well with no complaints. HR in 40s in morning, NSR. BB was stopped per Dr. Carrera. Ready to be discharged home. Ambulating without assistance, eating/drinking well, moving bowels and urinating appropriately. Per Dr. Carrera pt can be discharged home.     _ _ _ _ _ _ _ _ _ _ _ _  DISCHARGE PHYSICAL EXAM:  General: well appearing sitting up in bed in NAD  Neuro: AOx3, motor skills grossly intact  Cardio: normal s1/s2   Pulm: cta b/l, no wheezing   GI/: +BS 4 quadrants, soft, nontender   Vascular: radial 2+ b/l, DP 2+ b/l   Extremities: no edema, no calf tenderness. 5/5 strength in both LE b/l   Incisions: groin incision healing well, mild ecchymosis but no hematoma present.   _ _ _ _ _ _ _ _ _ _ _ _  REMOVAL CHECKLIST:  n/a - nothing to remove   _ _ _ _ _ _ _ _ _ _ _ _   MEDICATION DISCHARGE CHECKLIST   TEVAR     -holding BB for bradycardia     -blood pressure control with Nifedipine 90mg , increased on morning of discharge from 60 for SBP in 140-150s range   _ _ _ _ _ _ _ _ _ _ _ _  RELEVANT LABS/IMAGING:  none   _ _ _ _ _ _ _ _ _ _ _ _  CLINICAL FOLLOW UP NEEDS:  -none   _ _ _ _ _ _ _ _ _ _ _ _  Over 35 minutes was spent with the patient reviewing the discharge material including medications, follow up appointments, recovery, concerning symptoms, and how to contact their health care providers if they have questions   Patient discussed on morning rounds with Dr. Carrera     Operation Date: 9/15/23 -- TEVAR, EF 55%     Primary Surgeon/Attending MD: Dr. Toledo     Referring Physician: Dr. Bharat Romano  _ _ _ _ _ _ _ _ _ _ _ _  HOSPITAL COURSE:  67 y/o former smoker (40 yr hx,1/2PPD; quit 09/2022) and former ETOH misuse, HTN, anemia, arthritis, COPD, hx mini-AVR (27mmbio), ascending and hemiarch replacement 6/23/22 (postop course includes chronic occlusion of right ICA, infarct to right precentral gyrus - no intervention; medical management), urinary retention now (s/p ThuLEP procedure 10/6/22), incidental fall 11/2022 (s/p left hip replacement). Patient was being followed by Dr. Toledo for his descending aortic aneurysm and admitted for completion TEVAR. On 9/14 he had a Lumbar drain placed by Neurosurgery. 9/15 underwent the second stage of his TEVAR. He was extubated in the  ICU. On 9/16 ambulated without incident with the Lumbar drain capped.  9/17 he developed right leg weakness while ambulating. The drain was uncapped and drained overnight. On 9/18 while ambulating with PT he had right leg weakness again. He was placed back into bed for CSF removal and was noted to have patrice blood in the tubing. Lumbar drain was capped. CT Lspine and CTH unremarkable. Blood pressure goals were increased. Patient was also given FFP and Platelets.  MRI obtained and revealed blood in spinal canal and subarachnoid space. Neuro on, nothing to do except monitor improvement. On 9/19 the lumbar drain was removed after pt improvement of weakness. He was also given given IV steroids and decreased BP goal SBP <160. His motor strength improved. 9/20 a decadron taper started. 9/21 he was started on cardene gtt for tighter blood pressure control and ambulated with a lower back brace. 9/22 the cardene gtt was weaned using Nifedipine. He was then transferred to [facility]. POD8 pt is doing well with no complaints. HR in 40s in morning, NSR. BB was stopped per Dr. Carrera, BP control with Nifedipine only. Ready to be discharged home. Ambulating without assistance, eating/drinking well, moving bowels and urinating appropriately. Per Dr. Carrera pt can be discharged home.     _ _ _ _ _ _ _ _ _ _ _ _  DISCHARGE PHYSICAL EXAM:  General: well appearing sitting up in bed in NAD  Neuro: AOx3, motor skills grossly intact  Cardio: normal s1/s2   Pulm: cta b/l, no wheezing   GI/: +BS 4 quadrants, soft, nontender   Vascular: radial 2+ b/l, DP 2+ b/l   Extremities: no edema, no calf tenderness. 5/5 strength in both LE b/l   Incisions: groin incision healing well, mild ecchymosis but no hematoma present.   _ _ _ _ _ _ _ _ _ _ _ _  REMOVAL CHECKLIST:  n/a - nothing to remove   _ _ _ _ _ _ _ _ _ _ _ _   MEDICATION DISCHARGE CHECKLIST   TEVAR     -holding BB for bradycardia     -blood pressure control with Nifedipine 90mg , increased on morning of discharge from 60 for SBP in 140-150s range   _ _ _ _ _ _ _ _ _ _ _ _  RELEVANT LABS/IMAGING:  none   _ _ _ _ _ _ _ _ _ _ _ _  CLINICAL FOLLOW UP NEEDS:  -none   _ _ _ _ _ _ _ _ _ _ _ _  Over 35 minutes was spent with the patient reviewing the discharge material including medications, follow up appointments, recovery, concerning symptoms, and how to contact their health care providers if they have questions

## 2023-09-23 NOTE — DISCHARGE NOTE PROVIDER - NSDCMRMEDTOKEN_GEN_ALL_CORE_FT
acetaminophen 325 mg oral capsule: 2 cap(s) orally every 6 hours as needed for  mild pain Take 2-3 tablets every 6 hours, as needed, for pain  aspirin 81 mg oral tablet: 1 tab(s) orally once a day  atorvastatin 80 mg oral tablet: 1 tab(s) orally once a day (at bedtime)  gabapentin 300 mg oral capsule: 1 cap(s) orally every 8 hours  NIFEdipine 90 mg oral tablet, extended release: 1 tab(s) orally once a day  senna leaf extract oral tablet: 2 tab(s) orally once a day (at bedtime) as needed for  constipation  Spiriva 18 mcg inhalation capsule: 1 cap(s) inhaled once a day  Wixela Inhub 250 mcg-50 mcg inhalation powder: 1 inhaled once a day  Zoloft 25 mg oral tablet: 1 orally once a day

## 2023-09-23 NOTE — DISCHARGE NOTE PROVIDER - NSDCACTIVITY_GEN_ALL_CORE
Stairs allowed/Walking - Indoors allowed/No heavy lifting/straining/Walking - Outdoors allowed/Follow Instructions Provided by your Surgical Team Ivermectin Pregnancy And Lactation Text: This medication is Pregnancy Category C and it isn't known if it is safe during pregnancy. It is also excreted in breast milk.

## 2023-09-23 NOTE — DISCHARGE NOTE PROVIDER - CARE PROVIDER_API CALL
Faisal Toledo  Thoracic and Cardiac Surgery  [address] [place] [address]  Phone: [phone]  Fax: [phone]  Follow Up Time:     Bharat Romano.  Internal Medicine  [address] [place] [address]  Phone: ([phone]  Fax: ()-  Follow Up Time:     Richard Rock  Vascular Surgery  [address]  [place] [address]  Phone: [phone]  Fax: [phone]  Follow Up Time:

## 2023-09-27 DIAGNOSIS — I71.23 ANEURYSM OF THE DESCENDING THORACIC AORTA, WITHOUT RUPTURE: ICD-10-CM

## 2023-09-27 DIAGNOSIS — Z95.3 PRESENCE OF XENOGENIC HEART VALVE: ICD-10-CM

## 2023-09-27 DIAGNOSIS — Z96.642 PRESENCE OF LEFT ARTIFICIAL HIP JOINT: ICD-10-CM

## 2023-09-27 DIAGNOSIS — Z84.89 FAMILY HISTORY OF OTHER SPECIFIED CONDITIONS: ICD-10-CM

## 2023-09-27 DIAGNOSIS — F10.11 ALCOHOL ABUSE, IN REMISSION: ICD-10-CM

## 2023-09-27 DIAGNOSIS — J95.2 ACUTE PULMONARY INSUFFICIENCY FOLLOWING NONTHORACIC SURGERY: ICD-10-CM

## 2023-09-27 DIAGNOSIS — Z98.42 CATARACT EXTRACTION STATUS, LEFT EYE: ICD-10-CM

## 2023-09-27 DIAGNOSIS — J44.9 CHRONIC OBSTRUCTIVE PULMONARY DISEASE, UNSPECIFIED: ICD-10-CM

## 2023-09-27 DIAGNOSIS — D69.6 THROMBOCYTOPENIA, UNSPECIFIED: ICD-10-CM

## 2023-09-27 DIAGNOSIS — Z79.82 LONG TERM (CURRENT) USE OF ASPIRIN: ICD-10-CM

## 2023-09-27 DIAGNOSIS — Z86.73 PERSONAL HISTORY OF TRANSIENT ISCHEMIC ATTACK (TIA), AND CEREBRAL INFARCTION WITHOUT RESIDUAL DEFICITS: ICD-10-CM

## 2023-09-27 DIAGNOSIS — D62 ACUTE POSTHEMORRHAGIC ANEMIA: ICD-10-CM

## 2023-09-27 DIAGNOSIS — G93.89 OTHER SPECIFIED DISORDERS OF BRAIN: ICD-10-CM

## 2023-09-27 DIAGNOSIS — I95.9 HYPOTENSION, UNSPECIFIED: ICD-10-CM

## 2023-09-27 DIAGNOSIS — I60.8 OTHER NONTRAUMATIC SUBARACHNOID HEMORRHAGE: ICD-10-CM

## 2023-09-27 DIAGNOSIS — I10 ESSENTIAL (PRIMARY) HYPERTENSION: ICD-10-CM

## 2023-09-27 DIAGNOSIS — N40.0 BENIGN PROSTATIC HYPERPLASIA WITHOUT LOWER URINARY TRACT SYMPTOMS: ICD-10-CM

## 2023-09-27 DIAGNOSIS — Z98.41 CATARACT EXTRACTION STATUS, RIGHT EYE: ICD-10-CM

## 2023-09-27 DIAGNOSIS — M19.90 UNSPECIFIED OSTEOARTHRITIS, UNSPECIFIED SITE: ICD-10-CM

## 2023-09-27 DIAGNOSIS — Z87.891 PERSONAL HISTORY OF NICOTINE DEPENDENCE: ICD-10-CM

## 2023-09-27 DIAGNOSIS — I65.21 OCCLUSION AND STENOSIS OF RIGHT CAROTID ARTERY: ICD-10-CM

## 2023-09-27 DIAGNOSIS — G83.11 MONOPLEGIA OF LOWER LIMB AFFECTING RIGHT DOMINANT SIDE: ICD-10-CM

## 2023-09-27 DIAGNOSIS — R00.1 BRADYCARDIA, UNSPECIFIED: ICD-10-CM

## 2023-10-02 RX ORDER — GABAPENTIN 300 MG/1
300 CAPSULE ORAL 3 TIMES DAILY
Qty: 90 | Refills: 3 | Status: ACTIVE | COMMUNITY
Start: 1900-01-01 | End: 1900-01-01

## 2023-10-04 ENCOUNTER — APPOINTMENT (OUTPATIENT)
Dept: CARDIOTHORACIC SURGERY | Facility: CLINIC | Age: 68
End: 2023-10-04

## 2023-10-04 ENCOUNTER — INPATIENT (INPATIENT)
Facility: HOSPITAL | Age: 68
LOS: 14 days | Discharge: HOME HEALTH | DRG: 98 | End: 2023-10-19
Attending: THORACIC SURGERY (CARDIOTHORACIC VASCULAR SURGERY) | Admitting: THORACIC SURGERY (CARDIOTHORACIC VASCULAR SURGERY)
Payer: COMMERCIAL

## 2023-10-04 VITALS
OXYGEN SATURATION: 99 % | DIASTOLIC BLOOD PRESSURE: 90 MMHG | SYSTOLIC BLOOD PRESSURE: 108 MMHG | HEART RATE: 104 BPM | RESPIRATION RATE: 16 BRPM

## 2023-10-04 DIAGNOSIS — Z95.2 PRESENCE OF PROSTHETIC HEART VALVE: Chronic | ICD-10-CM

## 2023-10-04 DIAGNOSIS — Z98.49 CATARACT EXTRACTION STATUS, UNSPECIFIED EYE: Chronic | ICD-10-CM

## 2023-10-04 LAB
A1C WITH ESTIMATED AVERAGE GLUCOSE RESULT: 5.5 % — SIGNIFICANT CHANGE UP (ref 4–5.6)
ALBUMIN SERPL ELPH-MCNC: 3.7 G/DL — SIGNIFICANT CHANGE UP (ref 3.3–5)
ALP SERPL-CCNC: 113 U/L — SIGNIFICANT CHANGE UP (ref 40–120)
ALT FLD-CCNC: SIGNIFICANT CHANGE UP U/L (ref 10–45)
ANION GAP SERPL CALC-SCNC: 13 MMOL/L — SIGNIFICANT CHANGE UP (ref 5–17)
APTT BLD: 26.8 SEC — SIGNIFICANT CHANGE UP (ref 24.5–35.6)
AST SERPL-CCNC: SIGNIFICANT CHANGE UP U/L (ref 10–40)
BASOPHILS # BLD AUTO: 0.04 K/UL — SIGNIFICANT CHANGE UP (ref 0–0.2)
BASOPHILS NFR BLD AUTO: 0.4 % — SIGNIFICANT CHANGE UP (ref 0–2)
BILIRUB SERPL-MCNC: 0.6 MG/DL — SIGNIFICANT CHANGE UP (ref 0.2–1.2)
BLD GP AB SCN SERPL QL: NEGATIVE — SIGNIFICANT CHANGE UP
BUN SERPL-MCNC: 20 MG/DL — SIGNIFICANT CHANGE UP (ref 7–23)
CALCIUM SERPL-MCNC: 9.5 MG/DL — SIGNIFICANT CHANGE UP (ref 8.4–10.5)
CHLORIDE SERPL-SCNC: 97 MMOL/L — SIGNIFICANT CHANGE UP (ref 96–108)
CHOLEST SERPL-MCNC: 103 MG/DL — SIGNIFICANT CHANGE UP
CK MB CFR SERPL CALC: 1.1 NG/ML — SIGNIFICANT CHANGE UP (ref 0–6.7)
CK SERPL-CCNC: 62 U/L — SIGNIFICANT CHANGE UP (ref 30–200)
CO2 SERPL-SCNC: 22 MMOL/L — SIGNIFICANT CHANGE UP (ref 22–31)
CREAT SERPL-MCNC: 0.6 MG/DL — SIGNIFICANT CHANGE UP (ref 0.5–1.3)
EGFR: 104 ML/MIN/1.73M2 — SIGNIFICANT CHANGE UP
EOSINOPHIL # BLD AUTO: 0.09 K/UL — SIGNIFICANT CHANGE UP (ref 0–0.5)
EOSINOPHIL NFR BLD AUTO: 0.9 % — SIGNIFICANT CHANGE UP (ref 0–6)
ESTIMATED AVERAGE GLUCOSE: 111 MG/DL — SIGNIFICANT CHANGE UP (ref 68–114)
GLUCOSE SERPL-MCNC: 110 MG/DL — HIGH (ref 70–99)
HCT VFR BLD CALC: 37.7 % — LOW (ref 39–50)
HDLC SERPL-MCNC: 27 MG/DL — LOW
HGB BLD-MCNC: 13 G/DL — SIGNIFICANT CHANGE UP (ref 13–17)
IMM GRANULOCYTES NFR BLD AUTO: 0.2 % — SIGNIFICANT CHANGE UP (ref 0–0.9)
INR BLD: 1.14 — SIGNIFICANT CHANGE UP (ref 0.85–1.18)
LIPID PNL WITH DIRECT LDL SERPL: 51 MG/DL — SIGNIFICANT CHANGE UP
LYMPHOCYTES # BLD AUTO: 1.2 K/UL — SIGNIFICANT CHANGE UP (ref 1–3.3)
LYMPHOCYTES # BLD AUTO: 11.8 % — LOW (ref 13–44)
MCHC RBC-ENTMCNC: 32.4 PG — SIGNIFICANT CHANGE UP (ref 27–34)
MCHC RBC-ENTMCNC: 34.5 GM/DL — SIGNIFICANT CHANGE UP (ref 32–36)
MCV RBC AUTO: 94 FL — SIGNIFICANT CHANGE UP (ref 80–100)
MONOCYTES # BLD AUTO: 1.04 K/UL — HIGH (ref 0–0.9)
MONOCYTES NFR BLD AUTO: 10.2 % — SIGNIFICANT CHANGE UP (ref 2–14)
NEUTROPHILS # BLD AUTO: 7.77 K/UL — HIGH (ref 1.8–7.4)
NEUTROPHILS NFR BLD AUTO: 76.5 % — SIGNIFICANT CHANGE UP (ref 43–77)
NON HDL CHOLESTEROL: 76 MG/DL — SIGNIFICANT CHANGE UP
NRBC # BLD: 0 /100 WBCS — SIGNIFICANT CHANGE UP (ref 0–0)
NT-PROBNP SERPL-SCNC: 271 PG/ML — SIGNIFICANT CHANGE UP (ref 0–300)
PLATELET # BLD AUTO: 209 K/UL — SIGNIFICANT CHANGE UP (ref 150–400)
POTASSIUM SERPL-MCNC: SIGNIFICANT CHANGE UP MMOL/L (ref 3.5–5.3)
POTASSIUM SERPL-SCNC: SIGNIFICANT CHANGE UP MMOL/L (ref 3.5–5.3)
PROT SERPL-MCNC: 8.6 G/DL — HIGH (ref 6–8.3)
PROTHROM AB SERPL-ACNC: 13 SEC — SIGNIFICANT CHANGE UP (ref 9.5–13)
RBC # BLD: 4.01 M/UL — LOW (ref 4.2–5.8)
RBC # FLD: 15.2 % — HIGH (ref 10.3–14.5)
RH IG SCN BLD-IMP: POSITIVE — SIGNIFICANT CHANGE UP
SODIUM SERPL-SCNC: 132 MMOL/L — LOW (ref 135–145)
TRIGL SERPL-MCNC: 123 MG/DL — SIGNIFICANT CHANGE UP
TROPONIN T, HIGH SENSITIVITY RESULT: 17 NG/L — SIGNIFICANT CHANGE UP (ref 0–51)
TSH SERPL-MCNC: 1.2 UIU/ML — SIGNIFICANT CHANGE UP (ref 0.27–4.2)
WBC # BLD: 10.16 K/UL — SIGNIFICANT CHANGE UP (ref 3.8–10.5)
WBC # FLD AUTO: 10.16 K/UL — SIGNIFICANT CHANGE UP (ref 3.8–10.5)

## 2023-10-04 PROCEDURE — 71045 X-RAY EXAM CHEST 1 VIEW: CPT | Mod: 26

## 2023-10-04 PROCEDURE — 93010 ELECTROCARDIOGRAM REPORT: CPT

## 2023-10-04 RX ORDER — SERTRALINE 25 MG/1
25 TABLET, FILM COATED ORAL DAILY
Refills: 0 | Status: DISCONTINUED | OUTPATIENT
Start: 2023-10-05 | End: 2023-10-19

## 2023-10-04 RX ORDER — INFLUENZA VIRUS VACCINE 15; 15; 15; 15 UG/.5ML; UG/.5ML; UG/.5ML; UG/.5ML
0.7 SUSPENSION INTRAMUSCULAR ONCE
Refills: 0 | Status: DISCONTINUED | OUTPATIENT
Start: 2023-10-04 | End: 2023-10-19

## 2023-10-04 RX ORDER — GABAPENTIN 400 MG/1
300 CAPSULE ORAL EVERY 8 HOURS
Refills: 0 | Status: DISCONTINUED | OUTPATIENT
Start: 2023-10-04 | End: 2023-10-06

## 2023-10-04 RX ORDER — PANTOPRAZOLE SODIUM 20 MG/1
40 TABLET, DELAYED RELEASE ORAL
Refills: 0 | Status: DISCONTINUED | OUTPATIENT
Start: 2023-10-04 | End: 2023-10-19

## 2023-10-04 RX ORDER — SODIUM CHLORIDE 9 MG/ML
3 INJECTION INTRAMUSCULAR; INTRAVENOUS; SUBCUTANEOUS EVERY 8 HOURS
Refills: 0 | Status: DISCONTINUED | OUTPATIENT
Start: 2023-10-04 | End: 2023-10-19

## 2023-10-04 RX ORDER — ASPIRIN/CALCIUM CARB/MAGNESIUM 324 MG
81 TABLET ORAL DAILY
Refills: 0 | Status: DISCONTINUED | OUTPATIENT
Start: 2023-10-05 | End: 2023-10-19

## 2023-10-04 RX ORDER — ATORVASTATIN CALCIUM 80 MG/1
80 TABLET, FILM COATED ORAL AT BEDTIME
Refills: 0 | Status: DISCONTINUED | OUTPATIENT
Start: 2023-10-04 | End: 2023-10-19

## 2023-10-04 RX ORDER — SENNA PLUS 8.6 MG/1
2 TABLET ORAL AT BEDTIME
Refills: 0 | Status: DISCONTINUED | OUTPATIENT
Start: 2023-10-04 | End: 2023-10-19

## 2023-10-04 RX ORDER — HEPARIN SODIUM 5000 [USP'U]/ML
5000 INJECTION INTRAVENOUS; SUBCUTANEOUS EVERY 8 HOURS
Refills: 0 | Status: DISCONTINUED | OUTPATIENT
Start: 2023-10-04 | End: 2023-10-19

## 2023-10-04 RX ORDER — TIOTROPIUM BROMIDE 18 UG/1
2 CAPSULE ORAL; RESPIRATORY (INHALATION) DAILY
Refills: 0 | Status: DISCONTINUED | OUTPATIENT
Start: 2023-10-05 | End: 2023-10-19

## 2023-10-04 RX ORDER — ACETAMINOPHEN 500 MG
650 TABLET ORAL EVERY 6 HOURS
Refills: 0 | Status: DISCONTINUED | OUTPATIENT
Start: 2023-10-04 | End: 2023-10-19

## 2023-10-04 RX ADMIN — Medication 10 MILLIGRAM(S): at 18:51

## 2023-10-04 RX ADMIN — GABAPENTIN 300 MILLIGRAM(S): 400 CAPSULE ORAL at 21:52

## 2023-10-04 RX ADMIN — SODIUM CHLORIDE 3 MILLILITER(S): 9 INJECTION INTRAMUSCULAR; INTRAVENOUS; SUBCUTANEOUS at 21:52

## 2023-10-04 RX ADMIN — ATORVASTATIN CALCIUM 80 MILLIGRAM(S): 80 TABLET, FILM COATED ORAL at 21:52

## 2023-10-04 NOTE — PATIENT PROFILE ADULT - DO YOU FEEL THREATENED BY OTHERS?
normal... Well appearing, well nourished, awake, alert, not oriented to  place, time/situation and in no apparent distress. no

## 2023-10-04 NOTE — H&P ADULT - NSHPPHYSICALEXAM_GEN_ALL_CORE
PHYSICAL EXAM:  GENERAL: NAD, lying in bed comfortably  HEAD:  Atraumatic, Normocephalic  EYES: EOMI, PERRLA, conjunctiva and sclera clear  ENT: Moist mucous membranes  NECK: Supple, No JVD  CHEST/LUNG: Clear to auscultation bilaterally; No rales, rhonchi, wheezing, or rubs. Unlabored respirations  HEART: Regular rate and rhythm; No murmurs, rubs, or gallops  ABDOMEN: Bowel sounds present; Soft, Nontender, Nondistended. No hepatomegally  EXTREMITIES:  2+ Peripheral Pulses, brisk capillary refill. No clubbing, cyanosis, or edema  NERVOUS SYSTEM:  Alert & Oriented X3, speech clear. No deficits   MSK: FROM all 4 extremities, full and equal strength  SKIN: No rashes or lesions PHYSICAL EXAM:  GENERAL: NAD, lying in bed comfortably  HEAD:  Atraumatic, Normocephalic  EYES: EOMI, PERRLA, conjunctiva and sclera clear  ENT: Moist mucous membranes  NECK: Supple, No JVD  CHEST/LUNG: CTAB; previous MSI well-healed  HEART: RRR  ABDOMEN: Soft, Nontender, Nondistended. No hepatomegally  EXTREMITIES:  2+ Peripheral Pulses, brisk capillary refill. No clubbing, cyanosis, or edema  NERVOUS SYSTEM:  Alert & Oriented X3, speech clear. No deficits   MSK: FROM all 4 extremities, full and equal strength  SKIN: No rashes or lesions PHYSICAL EXAM:  GENERAL: NAD, lying in bed comfortably  HEAD:  Atraumatic, Normocephalic  EYES: EOMI, PERRLA, conjunctiva and sclera clear  ENT: Moist mucous membranes  NECK: Supple, No JVD  CHEST/LUNG: CTAB; previous MSI well-healed  HEART: RRR  ABDOMEN: Soft, Nontender, Nondistended. No hepatomegally  BACK: stitch present in lower back  EXTREMITIES:  2+ Peripheral Pulses, brisk capillary refill. No clubbing, cyanosis, or edema  NERVOUS SYSTEM:  Alert & Oriented X3, speech clear. No deficits   MSK: FROM all 4 extremities, full and equal strength  SKIN: No rashes or lesions

## 2023-10-04 NOTE — CONSULT NOTE ADULT - SUBJECTIVE AND OBJECTIVE BOX
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 from 9/14-9/23 where he had pre-op lumbar drain placed with Neuro then underwent completion TEVAR with Dr. Toledo for descending aortic aneurysm. Post-op course significant for RLE weakness and blood in CSF, CT Lspine and CTH at the time were unremarkable, but MRI noted to have blood in spinal canal and subarachnoid space, and was monitored by Neuro. Patient was discharged on 9/23 then presented to [facility] following syncopal episode. He was then transferred to [facility] under the care of Dr. Toledo for further work-up and management.      Neurology was consulted for continued lower back pain and leg pain.  He says he has lower back pain and pain in legs up to mid-thigh, with muscle spasms when he attempts to walk, which keeps him from wanting to get up to walk.  He can walk, but the pain and discomfort when he rises or sits is deterring him (however he does walk around his home).  The pain has been neither worse nor better since his procedure.  Has not been seeing PT since discharge, was encouraged to do so.        VITAL SIGNS:  Vital Signs Last 24 Hrs  T(C): 37.2 (04 Oct 2023 21:01), Max: 37.2 (04 Oct 2023 21:01)  T(F): 98.9 (04 Oct 2023 21:01), Max: 98.9 (04 Oct 2023 21:01)  HR: 99 (04 Oct 2023 21:08) (99 - 107)  BP: 92/68 (04 Oct 2023 21:08) (92/68 - 157/99)  BP(mean): 76 (04 Oct 2023 21:08) (76 - 117)  RR: 16 (04 Oct 2023 21:08) (16 - 18)  SpO2: 96% (04 Oct 2023 21:08) (91% - 99%)    Parameters below as of 04 Oct 2023 21:08  Patient On (Oxygen Delivery Method): room air      I&O's Summary      PHYSICAL EXAM:  Mental status: Awake, alert and oriented x3.  Recent and remote memory intact.  Naming, repetition and comprehension intact.  Attention/concentration intact.  No dysarthria, no aphasia.  Fund of knowledge appropriate.    Cranial nerves: Pupils equally round and reactive to light, visual fields full, no nystagmus, extraocular muscles intact, V1 through V3 intact bilaterally and symmetric, face symmetric, hearing intact to finger rub, palate elevation symmetric, tongue was midline.  Motor:  MRC grading 5/5 b/l UE/LE.   strength 5/5.  Normal tone and bulk.  No abnormal movements.    Sensation: Intact to light touch, proprioception, and pinprick.   Coordination: No dysmetria on finger-to-nose   Reflexes: 2++ in bilateral UE/LE  Gait: Deferred    MEDICATIONS:  MEDICATIONS  (STANDING):  atorvastatin 80 milliGRAM(s) Oral at bedtime  heparin   Injectable 5000 Unit(s) SubCutaneous every 8 hours  influenza  Vaccine (HIGH DOSE) 0.7 milliLiter(s) IntraMuscular once  pantoprazole    Tablet 40 milliGRAM(s) Oral before breakfast  senna 2 Tablet(s) Oral at bedtime  sodium chloride 0.9% lock flush 3 milliLiter(s) IV Push every 8 hours    MEDICATIONS  (PRN):  acetaminophen     Tablet .. 650 milliGRAM(s) Oral every 6 hours PRN Temp greater or equal to 38C (100.4F), Mild Pain (1 - 3)  gabapentin 300 milliGRAM(s) Oral every 8 hours PRN muscle spasms      ALLERGIES:  Allergies    No Known Allergies    Intolerances        LABS:                        13.0   10.16 )-----------( 209      ( 04 Oct 2023 18:06 )             37.7     10-04    132<L>  |  97  |  20  ----------------------------<  110<H>  See note   |  22  |  0.60    Ca    9.5      04 Oct 2023 18:06    TPro  8.6<H>  /  Alb  3.7  /  TBili  0.6  /  DBili  x   /  AST  See note  /  ALT  See note  /  AlkPhos  113  10-04    PT/INR - ( 04 Oct 2023 18:06 )   PT: 13.0 sec;   INR: 1.14          PTT - ( 04 Oct 2023 18:06 )  PTT:26.8 sec  Urinalysis Basic - ( 04 Oct 2023 18:06 )    Color: x / Appearance: x / SG: x / pH: x  Gluc: 110 mg/dL / Ketone: x  / Bili: x / Urobili: x   Blood: x / Protein: x / Nitrite: x   Leuk Esterase: x / RBC: x / WBC x   Sq Epi: x / Non Sq Epi: x / Bacteria: x      CAPILLARY BLOOD GLUCOSE          RADIOLOGY & ADDITIONAL TESTS: Reviewed.   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 Toledo for descending aortic aneurysm.  Post-op course significant for RLE weakness and blood in CSF, CT Lspine and CTH at the time were unremarkable, but MRI noted to have blood in spinal canal and subarachnoid space, and was monitored by Neuro.  Lumbar drain removed 9/19.  Patient was discharged on 9/23 then presented to [facility] following syncopal episode. He was then transferred to [facility] under the care of Dr. Toledo for further work-up and management.      Neurology was consulted for continued lower back pain and leg pain.  He says he has lower back pain and pain in legs up to mid-thigh, with muscle spasms when he attempts to walk, which keeps him from wanting to get up to walk.  He can walk, but the pain and discomfort when he rises or sits is deterring him (however he does walk around his home).  The pain has been neither worse nor better since his procedure.  Has not been seeing PT since discharge, was encouraged to do so.        VITAL SIGNS:  Vital Signs Last 24 Hrs  T(C): 37.2 (04 Oct 2023 21:01), Max: 37.2 (04 Oct 2023 21:01)  T(F): 98.9 (04 Oct 2023 21:01), Max: 98.9 (04 Oct 2023 21:01)  HR: 99 (04 Oct 2023 21:08) (99 - 107)  BP: 92/68 (04 Oct 2023 21:08) (92/68 - 157/99)  BP(mean): 76 (04 Oct 2023 21:08) (76 - 117)  RR: 16 (04 Oct 2023 21:08) (16 - 18)  SpO2: 96% (04 Oct 2023 21:08) (91% - 99%)    Parameters below as of 04 Oct 2023 21:08  Patient On (Oxygen Delivery Method): room air      I&O's Summary      PHYSICAL EXAM:  Mental status: Awake, alert and oriented x3.  Recent and remote memory intact.  Naming, repetition and comprehension intact.  Attention/concentration intact.  No dysarthria, no aphasia.  Fund of knowledge appropriate.    Cranial nerves: Pupils equally round and reactive to light, visual fields full, no nystagmus, extraocular muscles intact, V1 through V3 intact bilaterally and symmetric, face symmetric, hearing intact to finger rub, palate elevation symmetric, tongue was midline.  Motor:  MRC grading 5/5 b/l UE/LE.   strength 5/5.  Normal tone and bulk.  No abnormal movements.    Sensation: Intact to light touch, proprioception, and pinprick.   Coordination: No dysmetria on finger-to-nose   Reflexes: 2++ in bilateral UE/LE  Gait: Deferred    MEDICATIONS:  MEDICATIONS  (STANDING):  atorvastatin 80 milliGRAM(s) Oral at bedtime  heparin   Injectable 5000 Unit(s) SubCutaneous every 8 hours  influenza  Vaccine (HIGH DOSE) 0.7 milliLiter(s) IntraMuscular once  pantoprazole    Tablet 40 milliGRAM(s) Oral before breakfast  senna 2 Tablet(s) Oral at bedtime  sodium chloride 0.9% lock flush 3 milliLiter(s) IV Push every 8 hours    MEDICATIONS  (PRN):  acetaminophen     Tablet .. 650 milliGRAM(s) Oral every 6 hours PRN Temp greater or equal to 38C (100.4F), Mild Pain (1 - 3)  gabapentin 300 milliGRAM(s) Oral every 8 hours PRN muscle spasms      ALLERGIES:  Allergies    No Known Allergies    Intolerances        LABS:                        13.0   10.16 )-----------( 209      ( 04 Oct 2023 18:06 )             37.7     10-04    132<L>  |  97  |  20  ----------------------------<  110<H>  See note   |  22  |  0.60    Ca    9.5      04 Oct 2023 18:06    TPro  8.6<H>  /  Alb  3.7  /  TBili  0.6  /  DBili  x   /  AST  See note  /  ALT  See note  /  AlkPhos  113  10-04    PT/INR - ( 04 Oct 2023 18:06 )   PT: 13.0 sec;   INR: 1.14          PTT - ( 04 Oct 2023 18:06 )  PTT:26.8 sec  Urinalysis Basic - ( 04 Oct 2023 18:06 )    Color: x / Appearance: x / SG: x / pH: x  Gluc: 110 mg/dL / Ketone: x  / Bili: x / Urobili: x   Blood: x / Protein: x / Nitrite: x   Leuk Esterase: x / RBC: x / WBC x   Sq Epi: x / Non Sq Epi: x / Bacteria: x      CAPILLARY BLOOD GLUCOSE          RADIOLOGY & ADDITIONAL TESTS: Reviewed.   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 Toledo for descending aortic aneurysm.  Post-op course significant for RLE weakness and blood in CSF, CT Lspine and CTH at the time were unremarkable, but MRI noted to have blood in spinal canal and subarachnoid space, and was monitored by Neuro.  Lumbar drain removed 9/19.  Patient was discharged on 9/23 then presented to [facility] following a near syncopal episode.  Per patient, he was walking down the stairs of his home and into the hallway when his lower back started to spasm, his vision got blurry and he felt as if he was going to pass out. He slid down the wall and sat down, but denies losing consciousness or hitting his head. Upon EMS arrival, his BP was 80/30, per patient his systolic pressure has been in the 130s since discharge. In [facility] ED, EKG nonischemic, BP normotensive, TTE unremarkable. He was then transferred to [facility] under the care of Dr. Toledo for further work-up and management.    Neurology was consulted for continued lower back pain and leg pain.  He says he has lower back pain and pain in legs up to mid-thigh, with muscle spasms when he attempts to walk, which keeps him from wanting to get up to walk.  He can walk, but the pain and discomfort when he rises or sits is deterring him (however he does walk around his home).  The pain has been neither worse nor better since his procedure.  Has not been seeing PT since discharge, was encouraged to do so.        VITAL SIGNS:  Vital Signs Last 24 Hrs  T(C): 37.2 (04 Oct 2023 21:01), Max: 37.2 (04 Oct 2023 21:01)  T(F): 98.9 (04 Oct 2023 21:01), Max: 98.9 (04 Oct 2023 21:01)  HR: 99 (04 Oct 2023 21:08) (99 - 107)  BP: 92/68 (04 Oct 2023 21:08) (92/68 - 157/99)  BP(mean): 76 (04 Oct 2023 21:08) (76 - 117)  RR: 16 (04 Oct 2023 21:08) (16 - 18)  SpO2: 96% (04 Oct 2023 21:08) (91% - 99%)    Parameters below as of 04 Oct 2023 21:08  Patient On (Oxygen Delivery Method): room air      I&O's Summary      PHYSICAL EXAM:  Mental status: Awake, alert and oriented x3.  Recent and remote memory intact.  Naming, repetition and comprehension intact.  Attention/concentration intact.  No dysarthria, no aphasia.  Fund of knowledge appropriate.    Cranial nerves: Pupils equally round and reactive to light, visual fields full, no nystagmus, extraocular muscles intact, V1 through V3 intact bilaterally and symmetric, face symmetric, hearing intact to finger rub, palate elevation symmetric, tongue was midline.  Motor:  MRC grading 5/5 b/l UE/LE.   strength 5/5.  Normal tone and bulk.  No abnormal movements.    Sensation: Intact to light touch, proprioception, and pinprick.   Coordination: No dysmetria on finger-to-nose   Reflexes: 2++ in bilateral UE/LE  Gait: Deferred    MEDICATIONS:  MEDICATIONS  (STANDING):  atorvastatin 80 milliGRAM(s) Oral at bedtime  heparin   Injectable 5000 Unit(s) SubCutaneous every 8 hours  influenza  Vaccine (HIGH DOSE) 0.7 milliLiter(s) IntraMuscular once  pantoprazole    Tablet 40 milliGRAM(s) Oral before breakfast  senna 2 Tablet(s) Oral at bedtime  sodium chloride 0.9% lock flush 3 milliLiter(s) IV Push every 8 hours    MEDICATIONS  (PRN):  acetaminophen     Tablet .. 650 milliGRAM(s) Oral every 6 hours PRN Temp greater or equal to 38C (100.4F), Mild Pain (1 - 3)  gabapentin 300 milliGRAM(s) Oral every 8 hours PRN muscle spasms      ALLERGIES:  Allergies    No Known Allergies    Intolerances        LABS:                        13.0   10.16 )-----------( 209      ( 04 Oct 2023 18:06 )             37.7     10-04    132<L>  |  97  |  20  ----------------------------<  110<H>  See note   |  22  |  0.60    Ca    9.5      04 Oct 2023 18:06    TPro  8.6<H>  /  Alb  3.7  /  TBili  0.6  /  DBili  x   /  AST  See note  /  ALT  See note  /  AlkPhos  113  10-04    PT/INR - ( 04 Oct 2023 18:06 )   PT: 13.0 sec;   INR: 1.14          PTT - ( 04 Oct 2023 18:06 )  PTT:26.8 sec  Urinalysis Basic - ( 04 Oct 2023 18:06 )    Color: x / Appearance: x / SG: x / pH: x  Gluc: 110 mg/dL / Ketone: x  / Bili: x / Urobili: x   Blood: x / Protein: x / Nitrite: x   Leuk Esterase: x / RBC: x / WBC x   Sq Epi: x / Non Sq Epi: x / Bacteria: x      CAPILLARY BLOOD GLUCOSE          RADIOLOGY & ADDITIONAL TESTS: Reviewed.

## 2023-10-04 NOTE — H&P ADULT - ASSESSMENT
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 Toledo for descending aortic aneurysm. Post-op course significant for RLE weakness and blood in CSF, CT Lspine and CTH at the time were unremarkable, but MRI noted to have blood in spinal canal and subarachnoid space, and was monitored by Neuro. Patient was discharged on 9/23 then presented to [facility] following syncopal episode. He was then transferred to [facility] under the care of Dr. Toledo for further work-up and management.     Plan:    Neurovascular:   -Pain well controlled with current regimen. PRN's:    Cardiovascular:   -Hemodynamically stable.   -Monitor: BP, HR, tele    Respiratory:   -Oxygenating well on room air  -Encourage continued use of IS 10x/hr and frequent ambulation  -CXR:    GI:  -GI PPX:  -PO Diet  -Bowel Regimen:     Renal / :  -Continue to monitor renal function: BUN/Cr  -Monitor I/O's daily     Endocrine:    -No hx of DM or thyroid dx  -A1c:  -TSH:    Hematologic:  -CBC: H/H-  -Coagulation Panel.    ID:  -Temperature:   -CBC: WBC-  -Continue to observe for SIRS/Sepsis Syndrome.    Prophylaxis:  -DVT prophylaxis with 5000 SubQ Heparin q8h.  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 from 9/14-9/23 where he had pre-op lumbar drain placed with Neuro then underwent completion TEVAR with Dr. Toledo for descending aortic aneurysm. Post-op course significant for RLE weakness and blood in CSF, CT Lspine and CTH at the time were unremarkable, but MRI noted to have blood in spinal canal and subarachnoid space, and was monitored by Neuro. Patient was discharged on 9/23 then presented to [facility] following syncopal episode. He was then transferred to [facility] under the care of Dr. Toledo for further work-up and management.     Plan:    Neurovascular:   -Syncopal episode  -Pain well controlled with current regimen. PRN's:    Cardiovascular:   -Descending aortic aneurysm s/p TEVAR on 9/15/23  -Hx of HTN  -Hemodynamically stable.   -Monitor: BP, HR, tele    Respiratory:   -Hx of COPD  -Oxygenating well on room air  -Encourage continued use of IS 10x/hr and frequent ambulation  -CXR:    GI:  -GI PPX: Protonix  -PO Diet  -Bowel Regimen: Senna     Renal / :  -Hx of urinary retention s/p ThuLEP procedure (10/6/22)  -Continue to monitor renal function: BUN/Cr  -Monitor I/O's daily     Endocrine:    -No hx of DM or thyroid dx  -A1c:  -TSH:    Hematologic:  -Hx of anemia  -CBC: H/H-  -Coagulation Panel.    ID:  -Temperature: afebrile   -CBC: WBC-  -Continue to observe for SIRS/Sepsis Syndrome.    Prophylaxis:  -DVT prophylaxis with 5000 SubQ Heparin q8h.  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 Toledo for descending aortic aneurysm. Post-op course significant for RLE weakness and blood in CSF, CT Lspine and CTH at the time were unremarkable, but MRI noted to have blood in spinal canal and subarachnoid space, and was monitored by Neuro. Patient was discharged on 9/23 then presented to [facility] following syncopal episode. He was then transferred to [facility] under the care of Dr. Toledo for further work-up and management.     Plan:    Neurovascular:   -Syncopal episode  -Pain well controlled with current regimen. PRN's:    Cardiovascular:   -Descending aortic aneurysm s/p TEVAR on 9/15/23  -Hx of HTN  -Nifedipine held for hypotension  -Hemodynamically stable.   -Monitor: BP, HR, tele    Respiratory:   -Hx of COPD  -Oxygenating well on room air  -Encourage continued use of IS 10x/hr and frequent ambulation  -CXR: pending    GI:  -GI PPX: Protonix  -PO Diet  -Bowel Regimen: Senna     Renal / :  -Hx of urinary retention s/p ThuLEP procedure (10/6/22)  -Continue to monitor renal function: BUN/Cr  -Monitor I/O's daily     Endocrine:    -No hx of DM or thyroid dx  -A1c:  -TSH:    Hematologic:  -Hx of anemia  -CBC: H/H-  -Coagulation Panel.    ID:  -Temperature: afebrile   -CBC: WBC-  -Continue to observe for SIRS/Sepsis Syndrome.    Prophylaxis:  -DVT prophylaxis with 5000 SubQ Heparin q8h.  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 from 9/14-9/23 where he had pre-op lumbar drain placed with Neuro then underwent completion TEVAR with Dr. Toledo for descending aortic aneurysm. Post-op course significant for RLE weakness and blood in CSF, CT Lspine and CTH at the time were unremarkable, but MRI noted to have blood in spinal canal and subarachnoid space, and was monitored by Neuro. Patient was discharged on 9/23 then presented to [facility] following syncopal episode. He was then transferred to [facility] under the care of Dr. Toledo for further work-up and management.     Plan:    Neurovascular:   -Syncopal episode  -Pending CT C/A/P  -Neuro consulted  -Pain well controlled with current regimen. PRN's: Tylenol and Gabapentin    Cardiovascular:   -Descending aortic aneurysm s/p TEVAR on 9/15/23  -Cont ASA and statin  -Monitor BP on both arms  -Hx of HTN  -Nifedipine held for soft BP  -Hemodynamically stable.   -Monitor: BP, HR, tele    Respiratory:   -Hx of COPD  -Cont Spiriva  -Oxygenating well on room air  -Encourage continued use of IS 10x/hr and frequent ambulation  -CXR: pending    GI:  -GI PPX: Protonix  -PO Diet  -Bowel Regimen: Senna, Dulcolax     Renal / :  -Hx of urinary retention s/p ThuLEP procedure (10/6/22)  -Continue to monitor renal function: BUN/Cr: pending  -Monitor I/O's daily     Endocrine:    -No hx of DM or thyroid dx  -A1c: 5.3  -TSH: 2.51    Hematologic:  -Hx of anemia  -CBC: H/H- pending  -Coagulation Panel.    ID:  -Temperature: afebrile   -CBC: WBC- pending  -Continue to observe for SIRS/Sepsis Syndrome.    Prophylaxis:  -DVT prophylaxis with 5000 SubQ Heparin q8h.  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 Toledo for descending aortic aneurysm. Post-op course significant for RLE weakness and blood in CSF, CT Lspine and CTH at the time were unremarkable, but MRI noted to have blood in spinal canal and subarachnoid space, and was monitored by Neuro. Patient was discharged on 9/23 then presented to [facility] following syncopal episode. He was then transferred to [facility] under the care of Dr. Toledo for further work-up and management.     Plan:    Neurovascular:   -Syncopal episode  -Pending CT C/A/P  -Neuro consulted, recs pending  -Pain well controlled with current regimen. PRN's: Tylenol and Gabapentin    Cardiovascular:   -Descending aortic aneurysm s/p TEVAR on 9/15/23  -Cont ASA and statin  -Monitor BP on both arms  -Hx of HTN  -Nifedipine held for soft BP  -Hemodynamically stable.   -Monitor: BP, HR, tele    Respiratory:   -Hx of COPD  -Cont Spiriva  -Oxygenating well on room air  -Encourage continued use of IS 10x/hr and frequent ambulation  -CXR: pending    GI:  -GI PPX: Protonix  -PO Diet  -Bowel Regimen: Senna, Dulcolax     Renal / :  -Hx of urinary retention s/p ThuLEP procedure (10/6/22)  -Continue to monitor renal function: BUN/Cr: pending  -Monitor I/O's daily     Endocrine:    -No hx of DM or thyroid dx  -A1c: 5.3  -TSH: 2.51    Hematologic:  -Hx of anemia  -CBC: H/H- pending  -Coagulation Panel.    ID:  -Temperature: afebrile   -CBC: WBC- pending  -Continue to observe for SIRS/Sepsis Syndrome.    Prophylaxis:  -DVT prophylaxis with 5000 SubQ Heparin q8h.  -Continue with SCD's b/l while patient is at rest     Disposition:  -Near-syncopal work-up

## 2023-10-04 NOTE — H&P ADULT - NSICDXPASTMEDICALHX_GEN_ALL_CORE_FT
PAST MEDICAL HISTORY:  Anemia     Aortic regurgitation     Arthritis     Bladder distention     BPH (benign prostatic hyperplasia)     COPD, mild     HTN (hypertension)      Comment: Scar improved, will defer ILK today to avoid atrophy Render Risk Assessment In Note?: no Detail Level: Simple

## 2023-10-04 NOTE — H&P ADULT - HISTORY OF PRESENT ILLNESS
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 from 9/14-9/23 where he had pre-op lumbar drain placed with Neuro then underwent completion TEVAR with Dr. Toledo for descending aortic aneurysm. Post-op course significant for RLE weakness and blood in CSF, CT Lspine and CTH at the time were unremarkable, but MRI noted to have blood in spinal canal and subarachnoid space, and was monitored by Neuro. Patient was discharged on 9/23 then presented to [facility] following syncopal episode. He was then transferred to [facility] under the care of Dr. Toledo for further work-up and management.  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 Toledo for descending aortic aneurysm. Post-op course significant for RLE weakness and blood in CSF, CT Lspine and CTH at the time were unremarkable, but MRI noted to have blood in spinal canal and subarachnoid space, and was monitored by Neuro. Patient was discharged on 9/23 then presented to [facility] following syncopal episode. He was then transferred to [facility] under the care of Dr. Toledo for further work-up and management.  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 Toledo for descending aortic aneurysm. Post-op course significant for RLE weakness and blood in CSF, CT Lspine and CTH at the time were unremarkable, but MRI noted to have blood in spinal canal and subarachnoid space, and was monitored by Neuro. Patient was discharged on 9/23 then presented to [facility] following a near syncopal episode. Per patient, he was walking down the stairs of his home and into the hallway when his lower back started to spasm, his vision got blurry and he felt as if he was going to pass out. He slid down the wall and sat down, but denies losing consciousness or hitting his head. Upon EMS arrival, his BP was 80/30, per patient his systolic pressure has been in the 130s since discharge. In [facility] ED, EKG nonischemic, BP normotensive, TTE unremarkable. He was then transferred to [facility] under the care of Dr. Toledo for further work-up and management. At present, patient denies fever, chills, chest pain, SOB, palpitations, N/V, dizziness or lightheadedness.  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 Toledo for descending aortic aneurysm. Post-op course significant for RLE weakness and blood in CSF, CT Lspine and CTH at the time were unremarkable, but MRI noted to have blood in spinal canal and subarachnoid space, and was monitored by Neuro. Patient was discharged on 9/23 then presented to [facility] today following a near syncopal episode. Per patient, he was walking down the stairs of his home and into the hallway when his lower back started to spasm, his vision got blurry and he felt as if he was going to pass out. He slid down the wall and sat down, but denies losing consciousness or hitting his head. Per wife at bedside, patient has not been ambulating out of bed for the past 3 days due to the spasms and pain he has been experiencing in his lower back. Upon EMS arrival, his BP was 80/30, per patient his systolic pressure has been in the 130s since discharge. In [facility] ED, EKG nonischemic, BP normotensive, TTE unremarkable. He was then transferred to [facility] under the care of Dr. Toledo for further work-up and management. At present, patient denies fever, chills, chest pain, SOB, palpitations, N/V, dizziness or lightheadedness.  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 from 9/14-9/23 where he had pre-op lumbar drain placed with Neuro then underwent completion TEVAR on 9/15 with Dr. Toledo for descending aortic aneurysm. Post-op course significant for RLE weakness and blood in CSF, CT Lspine and CTH at the time were unremarkable, but MRI significant for spinal subarachnoid hemorrhage, and was monitored by Neuro. Patient was discharged on 9/23 then presented to [facility] today following a near syncopal episode. Per patient, he was walking down the stairs of his home and into the hallway when his lower back started to spasm, his vision got blurry and he felt as if he was going to pass out. He slid down the wall and sat down, but denies losing consciousness or hitting his head. Per wife at bedside, patient has not been ambulating out of bed for the past 3 days due to the spasms and pain he has been experiencing in his lower back. Upon EMS arrival, his BP was 80/30, per patient his systolic pressure has been in the 130s since discharge. In [facility] ED, EKG nonischemic, BP normotensive, TTE unremarkable. He was then transferred to [facility] under the care of Dr. Toledo for further work-up and management. At present, patient denies fever, chills, chest pain, SOB, palpitations, N/V, dizziness or lightheadedness.

## 2023-10-04 NOTE — H&P ADULT - NSHPLABSRESULTS_GEN_ALL_CORE
VITAL SIGNS:    LABS:     RADIOLOGY: VITAL SIGNS:  Vital Signs Last 24 Hrs  T(C): --  T(F): --  HR: 104 (04 Oct 2023 16:13) (104 - 104)  BP: 108/90 (04 Oct 2023 16:13) (108/90 - 108/90)  BP(mean): 96 (04 Oct 2023 16:13) (96 - 96)  RR: 16 (04 Oct 2023 16:13) (16 - 16)  SpO2: 99% (04 Oct 2023 16:13) (99% - 99%)    Parameters below as of 04 Oct 2023 16:13  Patient On (Oxygen Delivery Method): room air    LABS:     RADIOLOGY: VITAL SIGNS:  Vital Signs Last 24 Hrs  T(C): --  T(F): --  HR: 104 (04 Oct 2023 16:13) (104 - 104)  BP: 108/90 (04 Oct 2023 16:13) (108/90 - 108/90)  BP(mean): 96 (04 Oct 2023 16:13) (96 - 96)  RR: 16 (04 Oct 2023 16:13) (16 - 16)  SpO2: 99% (04 Oct 2023 16:13) (99% - 99%)    Parameters below as of 04 Oct 2023 16:13  Patient On (Oxygen Delivery Method): room air    LABS: Pending    RADIOLOGY: Pending CT C/A/P

## 2023-10-04 NOTE — CONSULT NOTE ADULT - ASSESSMENT
67yo man recently discharged (9/23/23) s/p AAA stent c/b spinal subarachnoid from lumbar drain. Neuropathic pain manageable per patient on gabapentin likely related to irritative blood product that should improve over time.  Full strength and normal tone on assessment. No signs of spinal cord infarction on prior MRI (9/19/23). Can follow up in neuro clinic.  Encouraged to do physical therapy upon discharge as well.    - pain well controlled with current regimen  - c/w Gabapentin 300mg q8  - work with PT while inpatient and continue PT outpatient    - follow up in neuro clinic outpatient    *** THIS NOTE IS INCOMPLETE*** 67yo man recently discharged (9/23/23) s/p AAA stent c/b spinal subarachnoid from lumbar drain. Neuropathic pain manageable per patient on gabapentin likely related to irritative blood product that should improve over time.  MR Lumbar spine on 9/18/23 additionally showed L2-L3 severe central spinal canal stenosis and severe right and moderate left neural foramen narrowing which may contribute to pain of lower back and legs.  Full strength and normal tone on assessment.  No signs of spinal cord infarction on prior MRI.    - pain well controlled with current regimen  - c/w Gabapentin 300mg q8  - work with PT while inpatient and continue PT outpatient    - follow up in neuro clinic outpatient    *** THIS NOTE IS INCOMPLETE*** 67yo man recently discharged (9/23/23) s/p AAA stent c/b spinal subarachnoid from lumbar drain. Neuropathic pain manageable per patient on gabapentin likely related to irritative blood product that should improve over time.  MR Lumbar spine on 9/18/23 additionally showed L2-L3 severe central spinal canal stenosis and severe right and moderate left neural foramen narrowing which may contribute to pain of lower back and legs.  Full strength and normal tone on assessment.  No signs of spinal cord infarction on prior MRI.      - pain well controlled with current regimen  - c/w Gabapentin 300mg q8  - work with PT while inpatient and continue PT outpatient  - no need for repeat imaging at this time given recent imaging  - follow up in neuro clinic outpatient

## 2023-10-05 LAB
ALBUMIN SERPL ELPH-MCNC: 4.2 G/DL — SIGNIFICANT CHANGE UP (ref 3.3–5)
ALP SERPL-CCNC: 122 U/L — HIGH (ref 40–120)
ALT FLD-CCNC: 44 U/L — SIGNIFICANT CHANGE UP (ref 10–45)
ANION GAP SERPL CALC-SCNC: 13 MMOL/L — SIGNIFICANT CHANGE UP (ref 5–17)
AST SERPL-CCNC: 36 U/L — SIGNIFICANT CHANGE UP (ref 10–40)
BASOPHILS # BLD AUTO: 0.03 K/UL — SIGNIFICANT CHANGE UP (ref 0–0.2)
BASOPHILS NFR BLD AUTO: 0.3 % — SIGNIFICANT CHANGE UP (ref 0–2)
BILIRUB SERPL-MCNC: 0.9 MG/DL — SIGNIFICANT CHANGE UP (ref 0.2–1.2)
BUN SERPL-MCNC: 21 MG/DL — SIGNIFICANT CHANGE UP (ref 7–23)
CALCIUM SERPL-MCNC: 10 MG/DL — SIGNIFICANT CHANGE UP (ref 8.4–10.5)
CHLORIDE SERPL-SCNC: 96 MMOL/L — SIGNIFICANT CHANGE UP (ref 96–108)
CO2 SERPL-SCNC: 22 MMOL/L — SIGNIFICANT CHANGE UP (ref 22–31)
CREAT SERPL-MCNC: 0.66 MG/DL — SIGNIFICANT CHANGE UP (ref 0.5–1.3)
EGFR: 102 ML/MIN/1.73M2 — SIGNIFICANT CHANGE UP
EOSINOPHIL # BLD AUTO: 0.07 K/UL — SIGNIFICANT CHANGE UP (ref 0–0.5)
EOSINOPHIL NFR BLD AUTO: 0.7 % — SIGNIFICANT CHANGE UP (ref 0–6)
GLUCOSE SERPL-MCNC: 104 MG/DL — HIGH (ref 70–99)
HCT VFR BLD CALC: 37.7 % — LOW (ref 39–50)
HGB BLD-MCNC: 12.5 G/DL — LOW (ref 13–17)
IMM GRANULOCYTES NFR BLD AUTO: 0.4 % — SIGNIFICANT CHANGE UP (ref 0–0.9)
LYMPHOCYTES # BLD AUTO: 1.19 K/UL — SIGNIFICANT CHANGE UP (ref 1–3.3)
LYMPHOCYTES # BLD AUTO: 12.6 % — LOW (ref 13–44)
MAGNESIUM SERPL-MCNC: 2.1 MG/DL — SIGNIFICANT CHANGE UP (ref 1.6–2.6)
MCHC RBC-ENTMCNC: 32 PG — SIGNIFICANT CHANGE UP (ref 27–34)
MCHC RBC-ENTMCNC: 33.2 GM/DL — SIGNIFICANT CHANGE UP (ref 32–36)
MCV RBC AUTO: 96.4 FL — SIGNIFICANT CHANGE UP (ref 80–100)
MONOCYTES # BLD AUTO: 1 K/UL — HIGH (ref 0–0.9)
MONOCYTES NFR BLD AUTO: 10.6 % — SIGNIFICANT CHANGE UP (ref 2–14)
NEUTROPHILS # BLD AUTO: 7.1 K/UL — SIGNIFICANT CHANGE UP (ref 1.8–7.4)
NEUTROPHILS NFR BLD AUTO: 75.4 % — SIGNIFICANT CHANGE UP (ref 43–77)
NRBC # BLD: 0 /100 WBCS — SIGNIFICANT CHANGE UP (ref 0–0)
PLATELET # BLD AUTO: 201 K/UL — SIGNIFICANT CHANGE UP (ref 150–400)
POTASSIUM SERPL-MCNC: 4.2 MMOL/L — SIGNIFICANT CHANGE UP (ref 3.5–5.3)
POTASSIUM SERPL-SCNC: 4.2 MMOL/L — SIGNIFICANT CHANGE UP (ref 3.5–5.3)
PROT SERPL-MCNC: 9 G/DL — HIGH (ref 6–8.3)
RBC # BLD: 3.91 M/UL — LOW (ref 4.2–5.8)
RBC # FLD: 15.2 % — HIGH (ref 10.3–14.5)
SODIUM SERPL-SCNC: 131 MMOL/L — LOW (ref 135–145)
WBC # BLD: 9.43 K/UL — SIGNIFICANT CHANGE UP (ref 3.8–10.5)
WBC # FLD AUTO: 9.43 K/UL — SIGNIFICANT CHANGE UP (ref 3.8–10.5)

## 2023-10-05 PROCEDURE — 72158 MRI LUMBAR SPINE W/O & W/DYE: CPT | Mod: 26

## 2023-10-05 PROCEDURE — 71275 CT ANGIOGRAPHY CHEST: CPT | Mod: 26

## 2023-10-05 PROCEDURE — 74174 CTA ABD&PLVS W/CONTRAST: CPT | Mod: 26

## 2023-10-05 PROCEDURE — 99232 SBSQ HOSP IP/OBS MODERATE 35: CPT

## 2023-10-05 RX ORDER — CYCLOBENZAPRINE HYDROCHLORIDE 10 MG/1
10 TABLET, FILM COATED ORAL THREE TIMES A DAY
Refills: 0 | Status: DISCONTINUED | OUTPATIENT
Start: 2023-10-05 | End: 2023-10-19

## 2023-10-05 RX ORDER — CYCLOBENZAPRINE HYDROCHLORIDE 10 MG/1
5 TABLET, FILM COATED ORAL THREE TIMES A DAY
Refills: 0 | Status: DISCONTINUED | OUTPATIENT
Start: 2023-10-05 | End: 2023-10-05

## 2023-10-05 RX ADMIN — CYCLOBENZAPRINE HYDROCHLORIDE 5 MILLIGRAM(S): 10 TABLET, FILM COATED ORAL at 11:25

## 2023-10-05 RX ADMIN — CYCLOBENZAPRINE HYDROCHLORIDE 10 MILLIGRAM(S): 10 TABLET, FILM COATED ORAL at 23:30

## 2023-10-05 RX ADMIN — GABAPENTIN 300 MILLIGRAM(S): 400 CAPSULE ORAL at 07:45

## 2023-10-05 RX ADMIN — Medication 650 MILLIGRAM(S): at 07:46

## 2023-10-05 RX ADMIN — HEPARIN SODIUM 5000 UNIT(S): 5000 INJECTION INTRAVENOUS; SUBCUTANEOUS at 23:30

## 2023-10-05 RX ADMIN — HEPARIN SODIUM 5000 UNIT(S): 5000 INJECTION INTRAVENOUS; SUBCUTANEOUS at 00:37

## 2023-10-05 RX ADMIN — HEPARIN SODIUM 5000 UNIT(S): 5000 INJECTION INTRAVENOUS; SUBCUTANEOUS at 06:09

## 2023-10-05 RX ADMIN — SODIUM CHLORIDE 3 MILLILITER(S): 9 INJECTION INTRAMUSCULAR; INTRAVENOUS; SUBCUTANEOUS at 13:33

## 2023-10-05 RX ADMIN — Medication 81 MILLIGRAM(S): at 11:25

## 2023-10-05 RX ADMIN — PANTOPRAZOLE SODIUM 40 MILLIGRAM(S): 20 TABLET, DELAYED RELEASE ORAL at 06:08

## 2023-10-05 RX ADMIN — SERTRALINE 25 MILLIGRAM(S): 25 TABLET, FILM COATED ORAL at 11:25

## 2023-10-05 RX ADMIN — SODIUM CHLORIDE 3 MILLILITER(S): 9 INJECTION INTRAMUSCULAR; INTRAVENOUS; SUBCUTANEOUS at 06:07

## 2023-10-05 RX ADMIN — HEPARIN SODIUM 5000 UNIT(S): 5000 INJECTION INTRAVENOUS; SUBCUTANEOUS at 13:57

## 2023-10-05 RX ADMIN — SODIUM CHLORIDE 3 MILLILITER(S): 9 INJECTION INTRAMUSCULAR; INTRAVENOUS; SUBCUTANEOUS at 21:28

## 2023-10-05 RX ADMIN — GABAPENTIN 300 MILLIGRAM(S): 400 CAPSULE ORAL at 16:42

## 2023-10-05 RX ADMIN — TIOTROPIUM BROMIDE 2 PUFF(S): 18 CAPSULE ORAL; RESPIRATORY (INHALATION) at 18:32

## 2023-10-05 RX ADMIN — Medication 650 MILLIGRAM(S): at 08:00

## 2023-10-05 RX ADMIN — ATORVASTATIN CALCIUM 80 MILLIGRAM(S): 80 TABLET, FILM COATED ORAL at 23:29

## 2023-10-05 NOTE — PROGRESS NOTE ADULT - ASSESSMENT
A/P:      Neurovascular:   - No delirium. Pain well controlled with current regimen.  -Continue tylenol PRN    Cardiovascular:   - POD_ s/p _  -Hemodynamically stable. HR controlled (X-X)  - Hx of HTN, BP controlled (X-X)  - ASA, Plavix, BB, statin  - EKG. TTE. Cardiac Panel. Lipid Panel. BNP.      Respiratory:   - 02 Sat = 98% on RA.  -If on oxygen wean to RA from for O2 Sat > 93%.  -Encourage C+DB and Use of IS 10x / hr while awake.  -CXR.    GI:   - Stable.  -NPO after MN.  -Continue GI PPX with protonix  -PO Diet.    Renal / :  - BUN/Cr stable at X/X  -Continue to monitor renal function.  -Monitor I/O's.  - Replete electrolytes PRN    Endocrine:    -A1c.  -TSH.    Hematologic:  -H/H stable at X/X with no obvious signs of bleeding  -Coagulation Panel.    ID:  -Pt remains afebrile with no elevation in WBC or signs of infection  -Continue to monitor CBC  -Observe for SIRS/Sepsis Syndrome.    Prophylaxis:  -DVT prophylaxis with 5000 SubQ Heparin q8h.  -SCD's    Disposition:  -Home when medically appropriate.   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 from 9/14-9/23 where he had pre-op lumbar drain placed with Neuro then underwent completion TEVAR on 9/15 with Dr. Toledo for descending aortic aneurysm. Post-op course significant for RLE weakness and blood in CSF, CT Lspine and CTH at the time were unremarkable, but MRI significant for spinal subarachnoid hemorrhage. Patient was discharged on 9/23 then presented to [facility] 10/4 following a near syncopal episode with back spasm. He was then transferred to [facility] under the care of Dr. Toledo for further work-up and management. Neurology re-consulted, pending MRI L spine.     Neurovascular:   - No delirium.  - S/p lumbar drain for TEVAR, episode of near syncope prior to readmission and ongoing lower back spasms  - Neurology consulted, pending MRI L spine  - Neurosurgery removed back proline suture today  - For spasms trialing flexeril PRN today  - Continue gabapentin 300 mg q8 hours, tylenol PRN  - Continue home zoloft 25 mg daily     Cardiovascular:   - Aortic aneurysm s/p TEVAR 9/15  - CT chest this admission negative for endoleak   -Hemodynamically stable. HR controlled ()  - Hx of HTN, BP controlled (92//99)  - Continue ASA, atorvastatin 80 mg daily     Respiratory:   - 02 Sat = 98% on RA.  -Encourage C+DB and Use of IS 10x / hr while awake.  -CXR without effusions, consolidations     GI:   - Stable.  -Continue GI PPX with protonix  -PO Diet.    Renal / :  - BUN/Cr stable at 20/0.60  -Continue to monitor renal function.  -Monitor I/O's.  - Replete electrolytes PRN    Endocrine:    -A1c 5.5, no known hx DM  -TSH 1.200, no known hx thyroid disease     Hematologic:  -H/H stable at 12.5/37.7 with no obvious signs of bleeding  -Coagulation Panel.    ID:  -Pt remains afebrile with no elevation in WBC or signs of infection  -Continue to monitor CBC  -Observe for SIRS/Sepsis Syndrome.    Prophylaxis:  -DVT prophylaxis with 5000 SubQ Heparin q8h.  -SCD's    Disposition:  -Home when medically appropriate.

## 2023-10-05 NOTE — PROGRESS NOTE ADULT - SUBJECTIVE AND OBJECTIVE BOX
Patient discussed on morning rounds with Dr. Toledo    OPERATION & DATE: s/p TEVAR 9/15, represent 10/4 with near syncope     SUBJECTIVE ASSESSMENT:  Assessed at bedside today. Patient with ongoing complaints of back pain (?spasm) when he walks. Denies chest pain, SOB. Denies dizziness, lightheadedness. Does note he has not been taking in much food/ water at home.     VITAL SIGNS:  Vital Signs Last 24 Hrs  T(C): 36.3 (05 Oct 2023 14:23), Max: 37.2 (04 Oct 2023 21:01)  T(F): 97.4 (05 Oct 2023 14:23), Max: 98.9 (04 Oct 2023 21:01)  HR: 82 (05 Oct 2023 11:31) (82 - 107)  BP: 110/84 (05 Oct 2023 11:31) (92/68 - 165/90)  BP(mean): 93 (05 Oct 2023 11:31) (76 - 120)  RR: 18 (05 Oct 2023 11:29) (16 - 18)  SpO2: 100% (05 Oct 2023 11:31) (91% - 100%)    Parameters below as of 05 Oct 2023 11:29  Patient On (Oxygen Delivery Method): room air      I&O's Detail    04 Oct 2023 07:01  -  05 Oct 2023 07:00  --------------------------------------------------------  IN:  Total IN: 0 mL    OUT:    Voided (mL): 200 mL  Total OUT: 200 mL    Total NET: -200 mL    Physical Exam  CONSTITUTIONAL: Well appearing in NAD assessed laying comfortably in bed   NEURO: A&OX3. No focal deficits noted, moving bilateral upper and lower extremities                    CV: RRR, no murmurs, rubs, gallops  RESPIRATORY: Clear to auscultation bilateral posterior lung fields, no wheezes, rales, rhonchi   GI: +BS, NT/ND  MUSKULOSKELETAL: No peripheral edema or calf tenderness. Full strength and ROM bilateral upper and lower extremities   VASCULAR: Bilateral distal pulses 2+  INCISIONS: prior MSI well healed     LABS:                        12.5   9.43  )-----------( 201      ( 05 Oct 2023 09:13 )             37.7     PT/INR - ( 04 Oct 2023 18:06 )   PT: 13.0 sec;   INR: 1.14          PTT - ( 04 Oct 2023 18:06 )  PTT:26.8 sec  10-05    131<L>  |  96  |  21  ----------------------------<  104<H>  4.2   |  22  |  0.66    Ca    10.0      05 Oct 2023 09:13  Mg     2.1     10-05    TPro  9.0<H>  /  Alb  4.2  /  TBili  0.9  /  DBili  x   /  AST  36  /  ALT  44  /  AlkPhos  122<H>  10-05    Urinalysis Basic - ( 05 Oct 2023 09:13 )    Color: x / Appearance: x / SG: x / pH: x  Gluc: 104 mg/dL / Ketone: x  / Bili: x / Urobili: x   Blood: x / Protein: x / Nitrite: x   Leuk Esterase: x / RBC: x / WBC x   Sq Epi: x / Non Sq Epi: x / Bacteria: x      MEDICATIONS  (STANDING):  aspirin  chewable 81 milliGRAM(s) Oral daily  atorvastatin 80 milliGRAM(s) Oral at bedtime  heparin   Injectable 5000 Unit(s) SubCutaneous every 8 hours  influenza  Vaccine (HIGH DOSE) 0.7 milliLiter(s) IntraMuscular once  pantoprazole    Tablet 40 milliGRAM(s) Oral before breakfast  senna 2 Tablet(s) Oral at bedtime  sertraline 25 milliGRAM(s) Oral daily  sodium chloride 0.9% lock flush 3 milliLiter(s) IV Push every 8 hours  tiotropium 2.5 MICROgram(s) Inhaler 2 Puff(s) Inhalation daily    MEDICATIONS  (PRN):  acetaminophen     Tablet .. 650 milliGRAM(s) Oral every 6 hours PRN Temp greater or equal to 38C (100.4F), Mild Pain (1 - 3)  cyclobenzaprine 5 milliGRAM(s) Oral three times a day PRN Muscle Spasm  gabapentin 300 milliGRAM(s) Oral every 8 hours PRN muscle spasms    RADIOLOGY & ADDITIONAL TESTS:    < from: CT Angio Abdomen and Pelvis w/ IV Cont (10.05.23 @ 04:38) >  IMPRESSION:  1.   Resolved recently noted thoracic aortic endoleak.  2.   Stable infrarenal abdominal aortic aneurysm.    < end of copied text >

## 2023-10-05 NOTE — PROCEDURE NOTE - ADDITIONAL PROCEDURE DETAILS
Lumbar drain exit site was prepped with chlorhexidine. One suture was removed from LD exit site. No sutures are remaining at this time. Pt tolerated procedure well. No bleeding, no complications. LD exit site is nonerythematous, well healed, no drainage or signs of infection.

## 2023-10-05 NOTE — PROGRESS NOTE ADULT - ASSESSMENT
69yo man recently discharged (9/23/23) s/p AAA stent c/b spinal subarachnoid hemorrhage from lumbar drain. Neuropathic pain manageable per patient on gabapentin.  MR Lumbar spine on 9/18/23 additionally showed L2-L3 severe central spinal canal stenosis and severe right and moderate left neural foramen narrowing which may contribute to pain of lower back and legs.  Full strength and normal tone on assessment.  No signs of spinal cord infarction on prior MRI.  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    Recommendations:  - Would obtain MRI L spine w/ and w/o reed  - pain well controlled with current regimen  - c/w Gabapentin 300mg q8  - work with PT while inpatient and continue PT outpatient    Discussed with Dr. Rae. Will continue to follow.  69yo man recently discharged (9/23/23) s/p AAA stent c/b spinal subarachnoid hemorrhage from lumbar drain. Neuropathic pain manageable per patient on gabapentin.  MR Lumbar spine on 9/18/23 additionally showed L2-L3 severe central spinal canal stenosis and severe right and moderate left neural foramen narrowing which may contribute to pain of lower back and legs.  Full strength and normal tone on assessment.  No signs of spinal cord infarction on prior MRI.  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    Recommendations:  - Would obtain MRI L spine w/ and w/o reed  - pain well controlled with current regimen  - c/w Gabapentin 300mg q8, please change to prn  - work with PT while inpatient and continue PT outpatient    Discussed with Dr. Rae. Will continue to follow.

## 2023-10-05 NOTE — PROGRESS NOTE ADULT - SUBJECTIVE AND OBJECTIVE BOX
Neurology Progress Note    Interval History:  The patient was seen and examined at the bedside. He endorses b/l leg proximal leg weakness since his last surgery.        PAST MEDICAL & SURGICAL HISTORY:  HTN (hypertension)    Aortic regurgitation    Anemia    Arthritis    COPD, mild    Bladder distention    BPH (benign prostatic hyperplasia)    H/O cataract extraction  B/L    Aortic valve replaced            Medications:  acetaminophen     Tablet .. 650 milliGRAM(s) Oral every 6 hours PRN  aspirin  chewable 81 milliGRAM(s) Oral daily  atorvastatin 80 milliGRAM(s) Oral at bedtime  cyclobenzaprine 5 milliGRAM(s) Oral three times a day PRN  gabapentin 300 milliGRAM(s) Oral every 8 hours PRN  heparin   Injectable 5000 Unit(s) SubCutaneous every 8 hours  influenza  Vaccine (HIGH DOSE) 0.7 milliLiter(s) IntraMuscular once  pantoprazole    Tablet 40 milliGRAM(s) Oral before breakfast  senna 2 Tablet(s) Oral at bedtime  sertraline 25 milliGRAM(s) Oral daily  sodium chloride 0.9% lock flush 3 milliLiter(s) IV Push every 8 hours  tiotropium 2.5 MICROgram(s) Inhaler 2 Puff(s) Inhalation daily      Vital Signs Last 24 Hrs  T(C): 36.4 (05 Oct 2023 09:05), Max: 37.2 (04 Oct 2023 21:01)  T(F): 97.5 (05 Oct 2023 09:05), Max: 98.9 (04 Oct 2023 21:01)  HR: 82 (05 Oct 2023 11:31) (82 - 107)  BP: 110/84 (05 Oct 2023 11:31) (92/68 - 165/90)  BP(mean): 93 (05 Oct 2023 11:31) (76 - 120)  RR: 18 (05 Oct 2023 11:29) (16 - 18)  SpO2: 100% (05 Oct 2023 11:31) (91% - 100%)    Parameters below as of 05 Oct 2023 11:29  Patient On (Oxygen Delivery Method): room air      PHYSICAL EXAM:  Mental status: Awake, alert and oriented x3.  Recent and remote memory intact.  Naming, repetition and comprehension intact.  Attention/concentration intact.  No dysarthria, no aphasia.  Fund of knowledge appropriate.    Cranial nerves: Pupils equally round and reactive to light, visual fields full, no nystagmus, extraocular muscles intact, V1 through V3 intact bilaterally and symmetric, face symmetric, hearing intact to finger rub, palate elevation symmetric, tongue was midline.  Motor:  MRC gradin/5 in the hip flexors b/l. 5/5 in he remainder of LE. Normal tone and bulk.  No abnormal movements.    Sensation: Intact to light touch, proprioception, and pinprick.   Coordination: No dysmetria on finger-to-nose   Reflexes: 2++ in bilateral UE/LE  Gait: Attempted but unable to stand due to significant iliopsoas weakness.    Labs:  CBC Full  -  ( 05 Oct 2023 09:13 )  WBC Count : 9.43 K/uL  RBC Count : 3.91 M/uL  Hemoglobin : 12.5 g/dL  Hematocrit : 37.7 %  Platelet Count - Automated : 201 K/uL  Mean Cell Volume : 96.4 fl  Mean Cell Hemoglobin : 32.0 pg  Mean Cell Hemoglobin Concentration : 33.2 gm/dL  Auto Neutrophil # : 7.10 K/uL  Auto Lymphocyte # : 1.19 K/uL  Auto Monocyte # : 1.00 K/uL  Auto Eosinophil # : 0.07 K/uL  Auto Basophil # : 0.03 K/uL  Auto Neutrophil % : 75.4 %  Auto Lymphocyte % : 12.6 %  Auto Monocyte % : 10.6 %  Auto Eosinophil % : 0.7 %  Auto Basophil % : 0.3 %    10-05    131<L>  |  96  |  21  ----------------------------<  104<H>  4.2   |  22  |  0.66    Ca    10.0      05 Oct 2023 09:13    TPro  9.0<H>  /  Alb  4.2  /  TBili  0.9  /  DBili  x   /  AST  36  /  ALT  44  /  AlkPhos  122<H>  10-05    LIVER FUNCTIONS - ( 05 Oct 2023 09:13 )  Alb: 4.2 g/dL / Pro: 9.0 g/dL / ALK PHOS: 122 U/L / ALT: 44 U/L / AST: 36 U/L / GGT: x           PT/INR - ( 04 Oct 2023 18:06 )   PT: 13.0 sec;   INR: 1.14          PTT - ( 04 Oct 2023 18:06 )  PTT:26.8 sec  Urinalysis Basic - ( 05 Oct 2023 09:13 )    Color: x / Appearance: x / SG: x / pH: x  Gluc: 104 mg/dL / Ketone: x  / Bili: x / Urobili: x   Blood: x / Protein: x / Nitrite: x   Leuk Esterase: x / RBC: x / WBC x   Sq Epi: x / Non Sq Epi: x / Bacteria: x        Assessment:  This is a 68y Male w/ h/o     Plan:

## 2023-10-06 PROCEDURE — 99232 SBSQ HOSP IP/OBS MODERATE 35: CPT | Mod: 24

## 2023-10-06 RX ORDER — GABAPENTIN 400 MG/1
600 CAPSULE ORAL THREE TIMES A DAY
Refills: 0 | Status: DISCONTINUED | OUTPATIENT
Start: 2023-10-06 | End: 2023-10-19

## 2023-10-06 RX ADMIN — GABAPENTIN 300 MILLIGRAM(S): 400 CAPSULE ORAL at 05:05

## 2023-10-06 RX ADMIN — SODIUM CHLORIDE 3 MILLILITER(S): 9 INJECTION INTRAMUSCULAR; INTRAVENOUS; SUBCUTANEOUS at 13:10

## 2023-10-06 RX ADMIN — HEPARIN SODIUM 5000 UNIT(S): 5000 INJECTION INTRAVENOUS; SUBCUTANEOUS at 05:05

## 2023-10-06 RX ADMIN — GABAPENTIN 600 MILLIGRAM(S): 400 CAPSULE ORAL at 22:08

## 2023-10-06 RX ADMIN — SODIUM CHLORIDE 3 MILLILITER(S): 9 INJECTION INTRAMUSCULAR; INTRAVENOUS; SUBCUTANEOUS at 06:53

## 2023-10-06 RX ADMIN — SERTRALINE 25 MILLIGRAM(S): 25 TABLET, FILM COATED ORAL at 11:04

## 2023-10-06 RX ADMIN — ATORVASTATIN CALCIUM 80 MILLIGRAM(S): 80 TABLET, FILM COATED ORAL at 22:08

## 2023-10-06 RX ADMIN — Medication 650 MILLIGRAM(S): at 16:33

## 2023-10-06 RX ADMIN — Medication 24 MILLIGRAM(S): at 18:15

## 2023-10-06 RX ADMIN — SENNA PLUS 2 TABLET(S): 8.6 TABLET ORAL at 22:08

## 2023-10-06 RX ADMIN — HEPARIN SODIUM 5000 UNIT(S): 5000 INJECTION INTRAVENOUS; SUBCUTANEOUS at 22:08

## 2023-10-06 RX ADMIN — HEPARIN SODIUM 5000 UNIT(S): 5000 INJECTION INTRAVENOUS; SUBCUTANEOUS at 13:15

## 2023-10-06 RX ADMIN — PANTOPRAZOLE SODIUM 40 MILLIGRAM(S): 20 TABLET, DELAYED RELEASE ORAL at 06:41

## 2023-10-06 RX ADMIN — CYCLOBENZAPRINE HYDROCHLORIDE 10 MILLIGRAM(S): 10 TABLET, FILM COATED ORAL at 05:05

## 2023-10-06 RX ADMIN — GABAPENTIN 600 MILLIGRAM(S): 400 CAPSULE ORAL at 13:14

## 2023-10-06 RX ADMIN — Medication 81 MILLIGRAM(S): at 11:04

## 2023-10-06 RX ADMIN — Medication 650 MILLIGRAM(S): at 17:33

## 2023-10-06 RX ADMIN — SODIUM CHLORIDE 3 MILLILITER(S): 9 INJECTION INTRAMUSCULAR; INTRAVENOUS; SUBCUTANEOUS at 22:16

## 2023-10-06 NOTE — PHYSICAL THERAPY INITIAL EVALUATION ADULT - NSPTDISCHREC_GEN_A_CORE
patient was functionally independent at home prior to this episode of back pain/spasms that radiate to bilateral posterior LEs/Acute Inpatient Rehab

## 2023-10-06 NOTE — PROGRESS NOTE ADULT - ASSESSMENT
67 YO former smoker (40 yr hx,1/2PPD; quit 09/2022) w/ PMHx of HTN, anemia, arthritis, COPD, s/p mini-AVR (27mmbio), ascending and hemiarch replacement 6/23/22 (postop course c/b infarct to right precentral gyrus), urinary retention s/p ThuLEP procedure (10/6/22), incidental fall 11/2022 (s/p left hip replacement) who was recently admitted to [facility] from 9/14-9/23 where he had pre-op lumbar drain placed with Neuro then underwent completion TEVAR on 9/15 with Dr. Toledo for descending aortic aneurysm. Post-op course significant for RLE weakness and blood in CSF, CT Lspine and CTH at the time were unremarkable, but MRI significant for spinal subarachnoid hemorrhage. Patient was discharged on 9/23 then presented to [facility] 10/4 following a near syncopal episode with back spasm. He was then transferred to [facility] under the care of Dr. Toledo for further work-up and management. Neurology re-consulted, pending MRI L spine.  67 YO former smoker (40 yr hx,1/2PPD; quit 09/2022) w/ PMHx of HTN, anemia, arthritis, COPD, s/p mini-AVR (27mmbio), ascending and hemiarch replacement 6/23/22 (postop course c/b infarct to right precentral gyrus), urinary retention s/p ThuLEP procedure (10/6/22), incidental fall 11/2022 (s/p left hip replacement) who was recently admitted to [facility] from 9/14-9/23 where he had pre-op lumbar drain placed with Neuro then underwent completion TEVAR on 9/15 with Dr. Toledo for descending aortic aneurysm. Post-op course significant for RLE weakness and blood in CSF, CT Lspine and CTH at the time were unremarkable, but MRI significant for spinal subarachnoid hemorrhage. Patient was discharged on 9/23 then presented to [facility] 10/4 following a near syncopal episode with back spasm. He was then transferred to [facility] under the care of Dr. Toledo for further work-up and management. Neurology re-consulted, MRI spine performed. Patient started on flexeril and reports improvement in lower back pain and ability to stand and walk independently.     Neuro: s/p lumbar drain for TEVAR complicated by subarchnoid hemorrhage. Readmitted for near syncope and ongoing lower back spasm. MRI lumbar spine read.   - awaiting neurology recs based on new MRI.   - Increasing gabapentin to 600mg TID   - continue Flexeril 10mg TID PRN. Hold if too sedated.   - If increase in gabapentin in insufficient than will consider starting on steroids.   - continue Zoloft for depression.     CVS: Aortic Aneusym s/p TEVAR CT chest negative for endoleak.   - Continue ASA and Atorvastatin for CAD   - history of HTN. Normotensive now. Consider starting BB if blood pressure trends up.    Respiratory: Saturating well on RA   - Wean off O2 sat > 92%  - IS and ambulation  - Chest PT.   - continue spiriva.     GI: tolerating PO diet.   - GI PPX   - Bowel regimen with Senna and Miralax     : BUN/ Creatinine. 21/.66 - House   - monitor I/Os.     Endo: FS well contolled.   - ISS     ID:   - completed periop ABX   - continue to monitor fever curve     Heme: DVT PPX   - [facility]     Dispo plan: home over the weekend if pain better controlled.     Monica Salgado PA-C

## 2023-10-06 NOTE — PHYSICAL THERAPY INITIAL EVALUATION ADULT - PERTINENT HX OF CURRENT PROBLEM, REHAB EVAL
68 year old male  recently admitted to [facility] from 9/14-9/23 where he had pre-op lumbar drain placed with Neuro then underwent completion TEVAR on 9/15 with Dr. Toledo for descending aortic aneurysm. Post-op course significant for RLE weakness and blood in CSF, CT Lspine and CTH at the time were unremarkable, but MRI significant for spinal subarachnoid hemorrhage. Patient was discharged on 9/23 then presented to [facility] 10/4 following a near syncopal episode with back spasm. He was then transferred to [facility] under the care of Dr. Toledo for further work-up and management. Neurology re-consulted, MRI spine performed. 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

## 2023-10-06 NOTE — PROGRESS NOTE ADULT - SUBJECTIVE AND OBJECTIVE BOX
Patient discussed on morning rounds with Dr. Toledo     Operation / Date: s/p TEVAR 9/15, represent 10/4 with near syncope    SUBJECTIVE ASSESSMENT:  68y Male reports that he was unable to get out of bed this morning because of pain but after he got the flexeril he was able to move a little better. He will attempt to get out of bed with the nursing staff in a little while. Also reports that he is drowsy after the felxeril. No bowel or bladder incompetence.         Vital Signs Last 24 Hrs  T(C): 37 (06 Oct 2023 09:19), Max: 37.2 (06 Oct 2023 01:04)  T(F): 98.6 (06 Oct 2023 09:19), Max: 99 (06 Oct 2023 01:04)  HR: 84 (06 Oct 2023 09:19) (81 - 95)  BP: 154/73 (06 Oct 2023 09:19) (103/64 - 169/90)  BP(mean): 103 (06 Oct 2023 09:19) (79 - 112)  RR: 15 (06 Oct 2023 09:19) (15 - 17)  SpO2: 97% (06 Oct 2023 09:19) (97% - 98%)    Parameters below as of 06 Oct 2023 09:19  Patient On (Oxygen Delivery Method): room air      I&O's Detail    05 Oct 2023 07:01  -  06 Oct 2023 07:00  --------------------------------------------------------  IN:    Oral Fluid: 120 mL  Total IN: 120 mL    OUT:    Voided (mL): 650 mL  Total OUT: 650 mL    Total NET: -530 mL      06 Oct 2023 07:01  -  06 Oct 2023 11:55  --------------------------------------------------------  IN:  Total IN: 0 mL    OUT:    Voided (mL): 300 mL  Total OUT: 300 mL    Total NET: -300 mL        PHYSICAL EXAM:  GEN: NAD, looks comfortable. Drowsy but arousable and appropriate.   Neuro: A&Ox3.  No focal deficits.  Moving all extremities.   CV: S1S2, regular, no murmurs appreciated.  No carotid bruits.  No JVD  Lungs: Clear B/L.  No wheezing, rales or rhonchi  ABD: Soft, non-tender, non-distended.  +Bowel sounds  EXT: Warm and well perfused.  No peripheral edema noted  Musculoskeletal: Moving all extremities. 5/5 strength to lower extremity. Has trouble initiating flexion at the hips but once able to initiate has full range of motion.   Incision: Lumbar drain site clean.       LABS:                        12.5   9.43  )-----------( 201      ( 05 Oct 2023 09:13 )             37.7       PT/INR - ( 04 Oct 2023 18:06 )   PT: 13.0 sec;   INR: 1.14          PTT - ( 04 Oct 2023 18:06 )  PTT:26.8 sec    10-05    131<L>  |  96  |  21  ----------------------------<  104<H>  4.2   |  22  |  0.66    Ca    10.0      05 Oct 2023 09:13  Mg     2.1     10-05    TPro  9.0<H>  /  Alb  4.2  /  TBili  0.9  /  DBili  x   /  AST  36  /  ALT  44  /  AlkPhos  122<H>  10-05      Urinalysis Basic - ( 05 Oct 2023 09:13 )    Color: x / Appearance: x / SG: x / pH: x  Gluc: 104 mg/dL / Ketone: x  / Bili: x / Urobili: x   Blood: x / Protein: x / Nitrite: x   Leuk Esterase: x / RBC: x / WBC x   Sq Epi: x / Non Sq Epi: x / Bacteria: x        MEDICATIONS  (STANDING):  aspirin  chewable 81 milliGRAM(s) Oral daily  atorvastatin 80 milliGRAM(s) Oral at bedtime  gabapentin 600 milliGRAM(s) Oral three times a day  heparin   Injectable 5000 Unit(s) SubCutaneous every 8 hours  influenza  Vaccine (HIGH DOSE) 0.7 milliLiter(s) IntraMuscular once  pantoprazole    Tablet 40 milliGRAM(s) Oral before breakfast  senna 2 Tablet(s) Oral at bedtime  sertraline 25 milliGRAM(s) Oral daily  sodium chloride 0.9% lock flush 3 milliLiter(s) IV Push every 8 hours  tiotropium 2.5 MICROgram(s) Inhaler 2 Puff(s) Inhalation daily    MEDICATIONS  (PRN):  acetaminophen     Tablet .. 650 milliGRAM(s) Oral every 6 hours PRN Temp greater or equal to 38C (100.4F), Mild Pain (1 - 3)  cyclobenzaprine 10 milliGRAM(s) Oral three times a day PRN Muscle Spasm        RADIOLOGY & ADDITIONAL TESTS:     Patient discussed on morning rounds with Dr. Toledo     Operation / Date: s/p TEVAR 9/15, represent 10/4 with near syncope    SUBJECTIVE ASSESSMENT:  68y Male reports that he was unable to get out of bed this morning because of pain but after he got the flexeril he was able to move a little better. He will attempt to get out of bed with the nursing staff in a little while. Also reports that he is drowsy after the felxeril. No bowel or bladder incompetence.         Vital Signs Last 24 Hrs  T(C): 37 (06 Oct 2023 09:19), Max: 37.2 (06 Oct 2023 01:04)  T(F): 98.6 (06 Oct 2023 09:19), Max: 99 (06 Oct 2023 01:04)  HR: 84 (06 Oct 2023 09:19) (81 - 95)  BP: 154/73 (06 Oct 2023 09:19) (103/64 - 169/90)  BP(mean): 103 (06 Oct 2023 09:19) (79 - 112)  RR: 15 (06 Oct 2023 09:19) (15 - 17)  SpO2: 97% (06 Oct 2023 09:19) (97% - 98%)    Parameters below as of 06 Oct 2023 09:19  Patient On (Oxygen Delivery Method): room air      I&O's Detail    05 Oct 2023 07:01  -  06 Oct 2023 07:00  --------------------------------------------------------  IN:    Oral Fluid: 120 mL  Total IN: 120 mL    OUT:    Voided (mL): 650 mL  Total OUT: 650 mL    Total NET: -530 mL      06 Oct 2023 07:01  -  06 Oct 2023 11:55  --------------------------------------------------------  IN:  Total IN: 0 mL    OUT:    Voided (mL): 300 mL  Total OUT: 300 mL    Total NET: -300 mL        PHYSICAL EXAM:  GEN: NAD, looks comfortable. Drowsy but arousable and appropriate.   Neuro: A&Ox3.  No focal deficits.  Moving all extremities.   CV: S1S2, regular, no murmurs appreciated.  No carotid bruits.  No JVD  Lungs: Clear B/L.  No wheezing, rales or rhonchi  ABD: Soft, non-tender, non-distended.  +Bowel sounds  EXT: Warm and well perfused.  No peripheral edema noted  Musculoskeletal: Moving all extremities. 5/5 strength to lower extremity. Has trouble initiating flexion at the hips but once able to initiate has full range of motion.   Incision: Lumbar drain site clean.       LABS:                        12.5   9.43  )-----------( 201      ( 05 Oct 2023 09:13 )             37.7       PT/INR - ( 04 Oct 2023 18:06 )   PT: 13.0 sec;   INR: 1.14          PTT - ( 04 Oct 2023 18:06 )  PTT:26.8 sec    10-05    131<L>  |  96  |  21  ----------------------------<  104<H>  4.2   |  22  |  0.66    Ca    10.0      05 Oct 2023 09:13  Mg     2.1     10-05    TPro  9.0<H>  /  Alb  4.2  /  TBili  0.9  /  DBili  x   /  AST  36  /  ALT  44  /  AlkPhos  122<H>  10-05      Urinalysis Basic - ( 05 Oct 2023 09:13 )    Color: x / Appearance: x / SG: x / pH: x  Gluc: 104 mg/dL / Ketone: x  / Bili: x / Urobili: x   Blood: x / Protein: x / Nitrite: x   Leuk Esterase: x / RBC: x / WBC x   Sq Epi: x / Non Sq Epi: x / Bacteria: x        MEDICATIONS  (STANDING):  aspirin  chewable 81 milliGRAM(s) Oral daily  atorvastatin 80 milliGRAM(s) Oral at bedtime  gabapentin 600 milliGRAM(s) Oral three times a day  heparin   Injectable 5000 Unit(s) SubCutaneous every 8 hours  influenza  Vaccine (HIGH DOSE) 0.7 milliLiter(s) IntraMuscular once  pantoprazole    Tablet 40 milliGRAM(s) Oral before breakfast  senna 2 Tablet(s) Oral at bedtime  sertraline 25 milliGRAM(s) Oral daily  sodium chloride 0.9% lock flush 3 milliLiter(s) IV Push every 8 hours  tiotropium 2.5 MICROgram(s) Inhaler 2 Puff(s) Inhalation daily    MEDICATIONS  (PRN):  acetaminophen     Tablet .. 650 milliGRAM(s) Oral every 6 hours PRN Temp greater or equal to 38C (100.4F), Mild Pain (1 - 3)  cyclobenzaprine 10 milliGRAM(s) Oral three times a day PRN Muscle Spasm        RADIOLOGY & ADDITIONAL TESTS:    < from: MR Lumbar Spine w/wo IV Cont (10.05.23 @ 22:20) >    IMPRESSION:  1.   Small amount of fluid is seen in left L3-L4 facet joint with mild   enhancement after contrast administration in the facet joint and adjacent   soft  tissues. Septic facet arthropathy with adjacent cellulitis can have this   imaging finding and should be considered. Please correlate clinically.  2.  Increased T2 signal consistent with edema is seen in paraspinal   musculature at L5-S1 level.  Finding is consistent with paraspinal   muscular  strain.  3.   Old compression fractures at L1, L2 and L5 vertebral bodies. No   evidence of acute fracture.    --- End of Report ---    < end of copied text >

## 2023-10-06 NOTE — PROGRESS NOTE ADULT - SUBJECTIVE AND OBJECTIVE BOX
Neurology Progress Note    Interval History:  The patient was seen and examined at the bedside. Pain is improving, s/p flexeril and gabapentin this AM, but had some drowsiness.       PAST MEDICAL & SURGICAL HISTORY:  HTN (hypertension)    Aortic regurgitation    Anemia    Arthritis    COPD, mild    Bladder distention    BPH (benign prostatic hyperplasia)    H/O cataract extraction  B/L    Aortic valve replaced            Medications:  acetaminophen     Tablet .. 650 milliGRAM(s) Oral every 6 hours PRN  aspirin  chewable 81 milliGRAM(s) Oral daily  atorvastatin 80 milliGRAM(s) Oral at bedtime  cyclobenzaprine 10 milliGRAM(s) Oral three times a day PRN  gabapentin 600 milliGRAM(s) Oral three times a day  heparin   Injectable 5000 Unit(s) SubCutaneous every 8 hours  influenza  Vaccine (HIGH DOSE) 0.7 milliLiter(s) IntraMuscular once  pantoprazole    Tablet 40 milliGRAM(s) Oral before breakfast  senna 2 Tablet(s) Oral at bedtime  sertraline 25 milliGRAM(s) Oral daily  sodium chloride 0.9% lock flush 3 milliLiter(s) IV Push every 8 hours  tiotropium 2.5 MICROgram(s) Inhaler 2 Puff(s) Inhalation daily      Vital Signs Last 24 Hrs  T(C): 37 (06 Oct 2023 09:19), Max: 37.2 (06 Oct 2023 01:04)  T(F): 98.6 (06 Oct 2023 09:19), Max: 99 (06 Oct 2023 01:04)  HR: 84 (06 Oct 2023 09:19) (81 - 95)  BP: 154/73 (06 Oct 2023 09:19) (103/64 - 169/90)  BP(mean): 103 (06 Oct 2023 09:19) (79 - 112)  RR: 15 (06 Oct 2023 09:19) (15 - 17)  SpO2: 97% (06 Oct 2023 09:19) (97% - 98%)    Parameters below as of 06 Oct 2023 09:19  Patient On (Oxygen Delivery Method): room air    PHYSICAL EXAM:  Mental status: Awake, alert and oriented x3.  Recent and remote memory intact.  Naming, repetition and comprehension intact.  Attention/concentration intact.  No dysarthria, no aphasia.  Fund of knowledge appropriate.    Cranial nerves: Pupils equally round and reactive to light, visual fields full, no nystagmus, extraocular muscles intact, V1 through V3 intact bilaterally and symmetric, face symmetric, hearing intact to finger rub, palate elevation symmetric, tongue was midline.  Motor:  MRC gradin/5 in the hip flexors b/l. 5/5 in he remainder of LE. Normal tone and bulk.  No abnormal movements.    Sensation: Intact to light touch, proprioception, and pinprick.   Coordination: No dysmetria on finger-to-nose   Reflexes: 2++ in bilateral UE/LE  Gait: Attempted but unable to stand due to significant iliopsoas weakness.    Labs:  CBC Full  -  ( 05 Oct 2023 09:13 )  WBC Count : 9.43 K/uL  RBC Count : 3.91 M/uL  Hemoglobin : 12.5 g/dL  Hematocrit : 37.7 %  Platelet Count - Automated : 201 K/uL  Mean Cell Volume : 96.4 fl  Mean Cell Hemoglobin : 32.0 pg  Mean Cell Hemoglobin Concentration : 33.2 gm/dL  Auto Neutrophil # : 7.10 K/uL  Auto Lymphocyte # : 1.19 K/uL  Auto Monocyte # : 1.00 K/uL  Auto Eosinophil # : 0.07 K/uL  Auto Basophil # : 0.03 K/uL  Auto Neutrophil % : 75.4 %  Auto Lymphocyte % : 12.6 %  Auto Monocyte % : 10.6 %  Auto Eosinophil % : 0.7 %  Auto Basophil % : 0.3 %    10-    131<L>  |  96  |  21  ----------------------------<  104<H>  4.2   |  22  |  0.66    Ca    10.0      05 Oct 2023 09:13  Mg     2.1     10-    TPro  9.0<H>  /  Alb  4.2  /  TBili  0.9  /  DBili  x   /  AST  36  /  ALT  44  /  AlkPhos  122<H>  10-05    LIVER FUNCTIONS - ( 05 Oct 2023 09:13 )  Alb: 4.2 g/dL / Pro: 9.0 g/dL / ALK PHOS: 122 U/L / ALT: 44 U/L / AST: 36 U/L / GGT: x           PT/INR - ( 04 Oct 2023 18:06 )   PT: 13.0 sec;   INR: 1.14          PTT - ( 04 Oct 2023 18:06 )  PTT:26.8 sec  Urinalysis Basic - ( 05 Oct 2023 09:13 )    Color: x / Appearance: x / SG: x / pH: x  Gluc: 104 mg/dL / Ketone: x  / Bili: x / Urobili: x   Blood: x / Protein: x / Nitrite: x   Leuk Esterase: x / RBC: x / WBC x   Sq Epi: x / Non Sq Epi: x / Bacteria: x        Assessment:  This is a 68y Male w/ h/o     Plan:

## 2023-10-06 NOTE — PROGRESS NOTE ADULT - ASSESSMENT
69yo man recently discharged (9/23/23) s/p AAA stent c/b spinal subarachnoid hemorrhage from lumbar drain. Neuropathic pain manageable per patient on gabapentin.  MR Lumbar spine on 9/18/23 additionally showed L2-L3 severe central spinal canal stenosis and severe right and moderate left neural foramen narrowing which may contribute to pain of lower back and legs.  Full strength and normal tone on assessment.  No signs of spinal cord infarction on prior MRI. MRI L-spine (10/5): There now is hyperintense T1 and T2 signal within the spinal canal which appears to be within the thecal sac and is felt to be subarachnoid in nature and not artifact. The signal characteristics would correspond with late subacute hemorrhage. The nerve roots appear thickened and clumped which may be secondary to arachnoiditis. 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 Rae. Will continue to follow.        67yo man recently discharged (9/23/23) s/p AAA stent c/b spinal subarachnoid hemorrhage from lumbar drain. Neuropathic pain manageable per patient on gabapentin.  MR Lumbar spine on 9/18/23 additionally showed L2-L3 severe central spinal canal stenosis and severe right and moderate left neural foramen narrowing which may contribute to pain of lower back and legs.   signs of spinal cord infarction on prior MRI. Suspect pre-syncopal episode is orthostatic in nature.  MRI L-spine (10/5): There now is hyperintense T1 and T2 signal within the spinal canal which appears to be within the thecal sac and is felt to be subarachnoid in nature and not artifact. The signal characteristics would correspond with late subacute hemorrhage. The nerve roots appear thickened and clumped which may be secondary to arachnoiditis. 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 Rae. Will continue to follow.

## 2023-10-06 NOTE — PHYSICAL THERAPY INITIAL EVALUATION ADULT - IMPAIRMENTS CONTRIBUTING IMPAIRED BED MOBILITY, REHAB EVAL
dangle with contact guard/supervision/impaired balance/pain/impaired postural control/decreased strength

## 2023-10-07 LAB
ALBUMIN SERPL ELPH-MCNC: 3.5 G/DL — SIGNIFICANT CHANGE UP (ref 3.3–5)
ALP SERPL-CCNC: 111 U/L — SIGNIFICANT CHANGE UP (ref 40–120)
ALT FLD-CCNC: 48 U/L — HIGH (ref 10–45)
ANION GAP SERPL CALC-SCNC: 10 MMOL/L — SIGNIFICANT CHANGE UP (ref 5–17)
ANISOCYTOSIS BLD QL: SLIGHT — SIGNIFICANT CHANGE UP
AST SERPL-CCNC: 34 U/L — SIGNIFICANT CHANGE UP (ref 10–40)
BASOPHILS # BLD AUTO: 0 K/UL — SIGNIFICANT CHANGE UP (ref 0–0.2)
BASOPHILS NFR BLD AUTO: 0 % — SIGNIFICANT CHANGE UP (ref 0–2)
BILIRUB SERPL-MCNC: 0.5 MG/DL — SIGNIFICANT CHANGE UP (ref 0.2–1.2)
BUN SERPL-MCNC: 18 MG/DL — SIGNIFICANT CHANGE UP (ref 7–23)
BURR CELLS BLD QL SMEAR: PRESENT — SIGNIFICANT CHANGE UP
CALCIUM SERPL-MCNC: 9.6 MG/DL — SIGNIFICANT CHANGE UP (ref 8.4–10.5)
CHLORIDE SERPL-SCNC: 100 MMOL/L — SIGNIFICANT CHANGE UP (ref 96–108)
CO2 SERPL-SCNC: 22 MMOL/L — SIGNIFICANT CHANGE UP (ref 22–31)
CREAT SERPL-MCNC: 0.59 MG/DL — SIGNIFICANT CHANGE UP (ref 0.5–1.3)
DACRYOCYTES BLD QL SMEAR: SLIGHT — SIGNIFICANT CHANGE UP
EGFR: 105 ML/MIN/1.73M2 — SIGNIFICANT CHANGE UP
EOSINOPHIL # BLD AUTO: 0 K/UL — SIGNIFICANT CHANGE UP (ref 0–0.5)
EOSINOPHIL NFR BLD AUTO: 0 % — SIGNIFICANT CHANGE UP (ref 0–6)
GLUCOSE SERPL-MCNC: 145 MG/DL — HIGH (ref 70–99)
HCT VFR BLD CALC: 32.1 % — LOW (ref 39–50)
HGB BLD-MCNC: 10.9 G/DL — LOW (ref 13–17)
LYMPHOCYTES # BLD AUTO: 0.25 K/UL — LOW (ref 1–3.3)
LYMPHOCYTES # BLD AUTO: 3.5 % — LOW (ref 13–44)
MACROCYTES BLD QL: SLIGHT — SIGNIFICANT CHANGE UP
MAGNESIUM SERPL-MCNC: 2 MG/DL — SIGNIFICANT CHANGE UP (ref 1.6–2.6)
MANUAL SMEAR VERIFICATION: SIGNIFICANT CHANGE UP
MCHC RBC-ENTMCNC: 31.9 PG — SIGNIFICANT CHANGE UP (ref 27–34)
MCHC RBC-ENTMCNC: 34 GM/DL — SIGNIFICANT CHANGE UP (ref 32–36)
MCV RBC AUTO: 93.9 FL — SIGNIFICANT CHANGE UP (ref 80–100)
MONOCYTES # BLD AUTO: 0.43 K/UL — SIGNIFICANT CHANGE UP (ref 0–0.9)
MONOCYTES NFR BLD AUTO: 6.1 % — SIGNIFICANT CHANGE UP (ref 2–14)
NEUTROPHILS # BLD AUTO: 6.37 K/UL — SIGNIFICANT CHANGE UP (ref 1.8–7.4)
NEUTROPHILS NFR BLD AUTO: 90.4 % — HIGH (ref 43–77)
OVALOCYTES BLD QL SMEAR: SLIGHT — SIGNIFICANT CHANGE UP
PHOSPHATE SERPL-MCNC: 3.4 MG/DL — SIGNIFICANT CHANGE UP (ref 2.5–4.5)
PLAT MORPH BLD: NORMAL — SIGNIFICANT CHANGE UP
PLATELET # BLD AUTO: 177 K/UL — SIGNIFICANT CHANGE UP (ref 150–400)
POIKILOCYTOSIS BLD QL AUTO: SIGNIFICANT CHANGE UP
POTASSIUM SERPL-MCNC: 4.5 MMOL/L — SIGNIFICANT CHANGE UP (ref 3.5–5.3)
POTASSIUM SERPL-SCNC: 4.5 MMOL/L — SIGNIFICANT CHANGE UP (ref 3.5–5.3)
PROT SERPL-MCNC: 8.1 G/DL — SIGNIFICANT CHANGE UP (ref 6–8.3)
RBC # BLD: 3.42 M/UL — LOW (ref 4.2–5.8)
RBC # FLD: 14.7 % — HIGH (ref 10.3–14.5)
RBC BLD AUTO: ABNORMAL
SODIUM SERPL-SCNC: 132 MMOL/L — LOW (ref 135–145)
WBC # BLD: 7.05 K/UL — SIGNIFICANT CHANGE UP (ref 3.8–10.5)
WBC # FLD AUTO: 7.05 K/UL — SIGNIFICANT CHANGE UP (ref 3.8–10.5)

## 2023-10-07 PROCEDURE — 99232 SBSQ HOSP IP/OBS MODERATE 35: CPT | Mod: 24

## 2023-10-07 PROCEDURE — 71045 X-RAY EXAM CHEST 1 VIEW: CPT | Mod: 26

## 2023-10-07 RX ADMIN — PANTOPRAZOLE SODIUM 40 MILLIGRAM(S): 20 TABLET, DELAYED RELEASE ORAL at 06:55

## 2023-10-07 RX ADMIN — HEPARIN SODIUM 5000 UNIT(S): 5000 INJECTION INTRAVENOUS; SUBCUTANEOUS at 23:11

## 2023-10-07 RX ADMIN — Medication 650 MILLIGRAM(S): at 23:26

## 2023-10-07 RX ADMIN — SODIUM CHLORIDE 3 MILLILITER(S): 9 INJECTION INTRAMUSCULAR; INTRAVENOUS; SUBCUTANEOUS at 13:51

## 2023-10-07 RX ADMIN — Medication 4 MILLIGRAM(S): at 06:56

## 2023-10-07 RX ADMIN — Medication 81 MILLIGRAM(S): at 11:25

## 2023-10-07 RX ADMIN — Medication 650 MILLIGRAM(S): at 15:59

## 2023-10-07 RX ADMIN — HEPARIN SODIUM 5000 UNIT(S): 5000 INJECTION INTRAVENOUS; SUBCUTANEOUS at 06:55

## 2023-10-07 RX ADMIN — Medication 8 MILLIGRAM(S): at 23:08

## 2023-10-07 RX ADMIN — SERTRALINE 25 MILLIGRAM(S): 25 TABLET, FILM COATED ORAL at 11:25

## 2023-10-07 RX ADMIN — GABAPENTIN 600 MILLIGRAM(S): 400 CAPSULE ORAL at 06:55

## 2023-10-07 RX ADMIN — ATORVASTATIN CALCIUM 80 MILLIGRAM(S): 80 TABLET, FILM COATED ORAL at 23:06

## 2023-10-07 RX ADMIN — HEPARIN SODIUM 5000 UNIT(S): 5000 INJECTION INTRAVENOUS; SUBCUTANEOUS at 14:05

## 2023-10-07 RX ADMIN — SODIUM CHLORIDE 3 MILLILITER(S): 9 INJECTION INTRAMUSCULAR; INTRAVENOUS; SUBCUTANEOUS at 06:00

## 2023-10-07 RX ADMIN — CYCLOBENZAPRINE HYDROCHLORIDE 10 MILLIGRAM(S): 10 TABLET, FILM COATED ORAL at 14:44

## 2023-10-07 RX ADMIN — Medication 4 MILLIGRAM(S): at 17:41

## 2023-10-07 RX ADMIN — Medication 4 MILLIGRAM(S): at 14:23

## 2023-10-07 RX ADMIN — SODIUM CHLORIDE 3 MILLILITER(S): 9 INJECTION INTRAMUSCULAR; INTRAVENOUS; SUBCUTANEOUS at 21:24

## 2023-10-07 RX ADMIN — GABAPENTIN 600 MILLIGRAM(S): 400 CAPSULE ORAL at 14:06

## 2023-10-07 RX ADMIN — GABAPENTIN 600 MILLIGRAM(S): 400 CAPSULE ORAL at 23:06

## 2023-10-07 RX ADMIN — Medication 650 MILLIGRAM(S): at 16:44

## 2023-10-07 RX ADMIN — CYCLOBENZAPRINE HYDROCHLORIDE 10 MILLIGRAM(S): 10 TABLET, FILM COATED ORAL at 08:26

## 2023-10-07 NOTE — PROGRESS NOTE ADULT - SUBJECTIVE AND OBJECTIVE BOX
Neurology Progress Note    Interval History:  The patient was seen and examined at the bedside.      PAST MEDICAL & SURGICAL HISTORY:  HTN (hypertension)    Aortic regurgitation    Anemia    Arthritis    COPD, mild    Bladder distention    BPH (benign prostatic hyperplasia)    H/O cataract extraction  B/L    Aortic valve replaced            Medications:  acetaminophen     Tablet .. 650 milliGRAM(s) Oral every 6 hours PRN  aspirin  chewable 81 milliGRAM(s) Oral daily  atorvastatin 80 milliGRAM(s) Oral at bedtime  cyclobenzaprine 10 milliGRAM(s) Oral three times a day PRN  gabapentin 600 milliGRAM(s) Oral three times a day  heparin   Injectable 5000 Unit(s) SubCutaneous every 8 hours  influenza  Vaccine (HIGH DOSE) 0.7 milliLiter(s) IntraMuscular once  pantoprazole    Tablet 40 milliGRAM(s) Oral before breakfast  senna 2 Tablet(s) Oral at bedtime  sertraline 25 milliGRAM(s) Oral daily  sodium chloride 0.9% lock flush 3 milliLiter(s) IV Push every 8 hours  tiotropium 2.5 MICROgram(s) Inhaler 2 Puff(s) Inhalation daily      Vital Signs Last 24 Hrs  T(C): 37 (06 Oct 2023 09:19), Max: 37.2 (06 Oct 2023 01:04)  T(F): 98.6 (06 Oct 2023 09:19), Max: 99 (06 Oct 2023 01:04)  HR: 84 (06 Oct 2023 09:19) (81 - 95)  BP: 154/73 (06 Oct 2023 09:19) (103/64 - 169/90)  BP(mean): 103 (06 Oct 2023 09:19) (79 - 112)  RR: 15 (06 Oct 2023 09:19) (15 - 17)  SpO2: 97% (06 Oct 2023 09:19) (97% - 98%)    Parameters below as of 06 Oct 2023 09:19  Patient On (Oxygen Delivery Method): room air    PHYSICAL EXAM:  Mental status: Awake, alert and oriented x3.  Recent and remote memory intact.  Naming, repetition and comprehension intact.  Attention/concentration intact.  No dysarthria, no aphasia.  Fund of knowledge appropriate.    Cranial nerves: Pupils equally round and reactive to light, visual fields full, no nystagmus, extraocular muscles intact, V1 through V3 intact bilaterally and symmetric, face symmetric, hearing intact to finger rub, palate elevation symmetric, tongue was midline.  Motor:  MRC gradin/5 in the hip flexors b/l. 5/5 in he remainder of LE. Normal tone and bulk.  No abnormal movements.    Sensation: Intact to light touch, proprioception, and pinprick.   Coordination: No dysmetria on finger-to-nose   Reflexes: 2++ in bilateral UE/LE  Gait: Attempted but unable to stand due to significant iliopsoas weakness.    Labs:  CBC Full  -  ( 05 Oct 2023 09:13 )  WBC Count : 9.43 K/uL  RBC Count : 3.91 M/uL  Hemoglobin : 12.5 g/dL  Hematocrit : 37.7 %  Platelet Count - Automated : 201 K/uL  Mean Cell Volume : 96.4 fl  Mean Cell Hemoglobin : 32.0 pg  Mean Cell Hemoglobin Concentration : 33.2 gm/dL  Auto Neutrophil # : 7.10 K/uL  Auto Lymphocyte # : 1.19 K/uL  Auto Monocyte # : 1.00 K/uL  Auto Eosinophil # : 0.07 K/uL  Auto Basophil # : 0.03 K/uL  Auto Neutrophil % : 75.4 %  Auto Lymphocyte % : 12.6 %  Auto Monocyte % : 10.6 %  Auto Eosinophil % : 0.7 %  Auto Basophil % : 0.3 %    10-05    131<L>  |  96  |  21  ----------------------------<  104<H>  4.2   |  22  |  0.66    Ca    10.0      05 Oct 2023 09:13  Mg     2.1     10-05    TPro  9.0<H>  /  Alb  4.2  /  TBili  0.9  /  DBili  x   /  AST  36  /  ALT  44  /  AlkPhos  122<H>  10-05    LIVER FUNCTIONS - ( 05 Oct 2023 09:13 )  Alb: 4.2 g/dL / Pro: 9.0 g/dL / ALK PHOS: 122 U/L / ALT: 44 U/L / AST: 36 U/L / GGT: x           PT/INR - ( 04 Oct 2023 18:06 )   PT: 13.0 sec;   INR: 1.14          PTT - ( 04 Oct 2023 18:06 )  PTT:26.8 sec  Urinalysis Basic - ( 05 Oct 2023 09:13 )    Color: x / Appearance: x / SG: x / pH: x  Gluc: 104 mg/dL / Ketone: x  / Bili: x / Urobili: x   Blood: x / Protein: x / Nitrite: x   Leuk Esterase: x / RBC: x / WBC x   Sq Epi: x / Non Sq Epi: x / Bacteria: x        Assessment:  This is a 68y Male w/ h/o     Plan: unintentional

## 2023-10-07 NOTE — PROGRESS NOTE ADULT - ASSESSMENT
Assessment:     67 YO former smoker (40 yr hx,1/2PPD; quit 09/2022) w/ PMHx of HTN, anemia, arthritis, COPD, s/p mini-AVR (27mmbio), ascending and hemiarch replacement 6/23/22 (postop course c/b infarct to right precentral gyrus), urinary retention s/p ThuLEP procedure (10/6/22), incidental fall 11/2022 (s/p left hip replacement) who was recently admitted to [facility] from 9/14-9/23 where he had pre-op lumbar drain placed with Neuro then underwent completion TEVAR on 9/15 with Dr. Toledo for descending aortic aneurysm. Post-op course significant for RLE weakness and blood in CSF, CT Lspine and CTH at the time were unremarkable, but MRI significant for spinal subarachnoid hemorrhage. Patient was discharged on 9/23 then presented to [facility] 10/4 following a near syncopal episode with back spasm. He was then transferred to [facility] under the care of Dr. Toledo for further work-up and management. Neurology re-consulted, MRI with evidence of arachnoiditis. Medrol dosepak started 10/6. C/w Gabapentin and Flexeril. PT following.     Plan:    Neurovascular:   - s/p lumbar drain 9/14 prior to TEVAR c/b subarachnoid hemorrhage; now readmit for near syncope/lower back spasm  - MRI L-spine 10/5: hyperintense T1 and T2 signal within the spinal canal, correspond with late subacute hemorrhage, nerve root thickened and clumped likely secondary to arachnoiditis  - neuro following: recc gabapentin to 600mg TID, Flexeril 10mg TID PRN  - started on medrol dosepak 10/6  - continue Zoloft for depression     Cardiovascular:   Aortic Aneurysm s/p TEVAR 9/15 with Dr. Toledo   - CT chest negative for endoleak   Hx of mini-AVR (27mmbio), ascending and hemiarch replacement 6/23/22   CAD   - continue ASA and statin   HTN   - currently normotensive, consider starting BB if blood pressure trends up  -Hemodynamically stable.   -Monitor: BP, HR, tele    Respiratory:   COPD  - continue home Spiriva   -Oxygenating well on room air  -Encourage continued use of IS 10x/hr and frequent ambulation  -CXR: no acute abnormalities     GI:  -GI PPX: protonix   -PO Diet  -Bowel Regimen: senna/miralax     Renal / :  Hyponatremia  - Na+ 132 today  -Continue to monitor renal function: BUN/Cr: 0.59/18  -Monitor I/O's daily     Endocrine:    -No hx of DM or thyroid dx  -A1c: 5.5  -TSH: 1.200    Hematologic:  Anemia   -CBC: H/H-10.9/32.1  -Coagulation Panel.    ID:  -Temperature: afebrile   -CBC: WBC-no leukocytosis   -Continue to observe for SIRS/Sepsis Syndrome.    Prophylaxis:  -DVT prophylaxis with 5000 SubQ Heparin q8h.  -Continue with SCD's b/l while patient is at rest     Disposition:  Neuro following, recc continuation of gabapentin and flexeril   Medrol dosepak started yesterday   PT following, may need rehab at discharge pending improvement in functional status

## 2023-10-07 NOTE — PROGRESS NOTE ADULT - SUBJECTIVE AND OBJECTIVE BOX
Patient discussed on morning rounds with Dr. Escalante      OPERATION & DATE: s/p TEVAR 9/15, represent 10/4 with near syncope    SUBJECTIVE ASSESSMENT: Seen resting in bed. Reports persistent lower back pain and difficulty ambulating. Denies chest pain or SOB.     VITAL SIGNS:  Vital Signs Last 24 Hrs  T(C): 36.6 (07 Oct 2023 12:58), Max: 37.1 (07 Oct 2023 01:01)  T(F): 97.9 (07 Oct 2023 12:58), Max: 98.8 (07 Oct 2023 01:01)  HR: 72 (07 Oct 2023 13:00) (69 - 97)  BP: 152/85 (07 Oct 2023 13:00) (131/73 - 158/80)  BP(mean): 112 (07 Oct 2023 13:00) (96 - 113)  RR: 18 (07 Oct 2023 13:00) (18 - 18)  SpO2: 96% (07 Oct 2023 13:00) (96% - 98%)    Parameters below as of 07 Oct 2023 13:00  Patient On (Oxygen Delivery Method): room air    I&O's Detail    06 Oct 2023 07:01  -  07 Oct 2023 07:00  --------------------------------------------------------  IN:  Total IN: 0 mL    OUT:    Voided (mL): 1600 mL  Total OUT: 1600 mL    Total NET: -1600 mL    07 Oct 2023 07:01  -  07 Oct 2023 15:03  --------------------------------------------------------  IN:    Oral Fluid: 150 mL  Total IN: 150 mL    OUT:    Voided (mL): 300 mL  Total OUT: 300 mL    Total NET: -150 mL    CHEST TUBE:n    EDMUNDO DRAIN: n   EPICARDIAL WIRES: n  STITCHES:n  STAPLES:n  SWENSON: n  CENTRAL LINE:n  MIDLINE/PICC:n  WOUND VAC:n    PHYSICAL EXAM:  General: NAD, resting in bed   Neurological: alert and oriented   Cardiovascular: RRR, Clear S1 and S2, no murmurs appreciated  Respiratory: chest expansion symmetrical, CTA b/l, no wheezing noted  Gastrointestinal: +BS, soft, NT, ND  Extremities: no calf tenderness or edema, pain with hip flection, pain with lifting of right leg   Vascular: warm, well perfused. DP/PT pulses palpable b/l.  Incisions: none     LABS:                        10.9   7.05  )-----------( 177      ( 07 Oct 2023 06:48 )             32.1     10-07    132<L>  |  100  |  18  ----------------------------<  145<H>  4.5   |  22  |  0.59    Ca    9.6      07 Oct 2023 06:48  Phos  3.4     10-07  Mg     2.0     10-07    TPro  8.1  /  Alb  3.5  /  TBili  0.5  /  DBili  x   /  AST  34  /  ALT  48<H>  /  AlkPhos  111  10-07    Urinalysis Basic - ( 07 Oct 2023 06:48 )    Color: x / Appearance: x / SG: x / pH: x  Gluc: 145 mg/dL / Ketone: x  / Bili: x / Urobili: x   Blood: x / Protein: x / Nitrite: x   Leuk Esterase: x / RBC: x / WBC x   Sq Epi: x / Non Sq Epi: x / Bacteria: x    MEDICATIONS  (STANDING):  aspirin  chewable 81 milliGRAM(s) Oral daily  atorvastatin 80 milliGRAM(s) Oral at bedtime  gabapentin 600 milliGRAM(s) Oral three times a day  heparin   Injectable 5000 Unit(s) SubCutaneous every 8 hours  influenza  Vaccine (HIGH DOSE) 0.7 milliLiter(s) IntraMuscular once  methylPREDNISolone 4 milliGRAM(s) Oral before breakfast  methylPREDNISolone 8 milliGRAM(s) Oral at bedtime  methylPREDNISolone   Oral   methylPREDNISolone 4 milliGRAM(s) Oral after lunch  methylPREDNISolone 4 milliGRAM(s) Oral after dinner  pantoprazole    Tablet 40 milliGRAM(s) Oral before breakfast  senna 2 Tablet(s) Oral at bedtime  sertraline 25 milliGRAM(s) Oral daily  sodium chloride 0.9% lock flush 3 milliLiter(s) IV Push every 8 hours  tiotropium 2.5 MICROgram(s) Inhaler 2 Puff(s) Inhalation daily    MEDICATIONS  (PRN):  acetaminophen     Tablet .. 650 milliGRAM(s) Oral every 6 hours PRN Temp greater or equal to 38C (100.4F), Mild Pain (1 - 3)  cyclobenzaprine 10 milliGRAM(s) Oral three times a day PRN Muscle Spasm    RADIOLOGY & ADDITIONAL TESTS:

## 2023-10-07 NOTE — PROGRESS NOTE ADULT - ASSESSMENT
69yo man recently discharged (9/23/23) s/p AAA stent c/b spinal subarachnoid hemorrhage from lumbar drain. Neuropathic pain manageable per patient on gabapentin.  MR Lumbar spine on 9/18/23 additionally showed L2-L3 severe central spinal canal stenosis and severe right and moderate left neural foramen narrowing which may contribute to pain of lower back and legs.   signs of spinal cord infarction on prior MRI. Suspect pre-syncopal episode is orthostatic in nature.  MRI L-spine (10/5): There now is hyperintense T1 and T2 signal within the spinal canal which appears to be within the thecal sac and is felt to be subarachnoid in nature and not artifact. The signal characteristics would correspond with late subacute hemorrhage. The nerve roots appear thickened and clumped which may be secondary to arachnoiditis. 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 Rae. Will continue to follow.        67yo man recently discharged (9/23/23) s/p AAA stent c/b spinal subarachnoid hemorrhage from lumbar drain. Neuropathic pain manageable per patient on gabapentin.  MR Lumbar spine on 9/18/23 additionally showed L2-L3 severe central spinal canal stenosis and severe right and moderate left neural foramen narrowing which may contribute to pain of lower back and legs.   signs of spinal cord infarction on prior MRI. Suspect pre-syncopal episode is orthostatic in nature.  MRI L-spine (10/5): There now is hyperintense T1 and T2 signal within the spinal canal which appears to be within the thecal sac and is felt to be subarachnoid in nature and not artifact. The signal characteristics would correspond with late subacute hemorrhage. The nerve roots appear thickened and clumped which may be secondary to arachnoiditis. 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 Rae.     69yo man recently discharged (9/23/23) s/p AAA stent c/b spinal subarachnoid hemorrhage from lumbar drain. Neuropathic pain manageable per patient on gabapentin.  MR Lumbar spine on 9/18/23 additionally showed L2-L3 severe central spinal canal stenosis and severe right and moderate left neural foramen narrowing which may contribute to pain of lower back and legs.   signs of spinal cord infarction on prior MRI. Suspect pre-syncopal episode is orthostatic in nature.  MRI L-spine (10/5): There now is hyperintense T1 and T2 signal within the spinal canal which appears to be within the thecal sac and is felt to be subarachnoid in nature and not artifact. The signal characteristics would correspond with late subacute hemorrhage. The nerve roots appear thickened and clumped which may be secondary to arachnoiditis. 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 Rae.

## 2023-10-08 LAB
ANION GAP SERPL CALC-SCNC: 8 MMOL/L — SIGNIFICANT CHANGE UP (ref 5–17)
BUN SERPL-MCNC: 28 MG/DL — HIGH (ref 7–23)
CALCIUM SERPL-MCNC: 9.6 MG/DL — SIGNIFICANT CHANGE UP (ref 8.4–10.5)
CHLORIDE SERPL-SCNC: 102 MMOL/L — SIGNIFICANT CHANGE UP (ref 96–108)
CO2 SERPL-SCNC: 24 MMOL/L — SIGNIFICANT CHANGE UP (ref 22–31)
CREAT SERPL-MCNC: 0.62 MG/DL — SIGNIFICANT CHANGE UP (ref 0.5–1.3)
EGFR: 103 ML/MIN/1.73M2 — SIGNIFICANT CHANGE UP
GLUCOSE SERPL-MCNC: 130 MG/DL — HIGH (ref 70–99)
HCT VFR BLD CALC: 29.1 % — LOW (ref 39–50)
HGB BLD-MCNC: 9.8 G/DL — LOW (ref 13–17)
MAGNESIUM SERPL-MCNC: 2 MG/DL — SIGNIFICANT CHANGE UP (ref 1.6–2.6)
MCHC RBC-ENTMCNC: 32 PG — SIGNIFICANT CHANGE UP (ref 27–34)
MCHC RBC-ENTMCNC: 33.7 GM/DL — SIGNIFICANT CHANGE UP (ref 32–36)
MCV RBC AUTO: 95.1 FL — SIGNIFICANT CHANGE UP (ref 80–100)
NRBC # BLD: 0 /100 WBCS — SIGNIFICANT CHANGE UP (ref 0–0)
PLATELET # BLD AUTO: 205 K/UL — SIGNIFICANT CHANGE UP (ref 150–400)
POTASSIUM SERPL-MCNC: 4.8 MMOL/L — SIGNIFICANT CHANGE UP (ref 3.5–5.3)
POTASSIUM SERPL-SCNC: 4.8 MMOL/L — SIGNIFICANT CHANGE UP (ref 3.5–5.3)
RBC # BLD: 3.06 M/UL — LOW (ref 4.2–5.8)
RBC # FLD: 14.9 % — HIGH (ref 10.3–14.5)
SODIUM SERPL-SCNC: 134 MMOL/L — LOW (ref 135–145)
WBC # BLD: 7.58 K/UL — SIGNIFICANT CHANGE UP (ref 3.8–10.5)
WBC # FLD AUTO: 7.58 K/UL — SIGNIFICANT CHANGE UP (ref 3.8–10.5)

## 2023-10-08 PROCEDURE — 99232 SBSQ HOSP IP/OBS MODERATE 35: CPT

## 2023-10-08 PROCEDURE — 71045 X-RAY EXAM CHEST 1 VIEW: CPT | Mod: 26

## 2023-10-08 RX ORDER — NIFEDIPINE 30 MG
90 TABLET, EXTENDED RELEASE 24 HR ORAL DAILY
Refills: 0 | Status: DISCONTINUED | OUTPATIENT
Start: 2023-10-08 | End: 2023-10-19

## 2023-10-08 RX ORDER — MAGNESIUM OXIDE 400 MG ORAL TABLET 241.3 MG
400 TABLET ORAL ONCE
Refills: 0 | Status: COMPLETED | OUTPATIENT
Start: 2023-10-08 | End: 2023-10-08

## 2023-10-08 RX ADMIN — GABAPENTIN 600 MILLIGRAM(S): 400 CAPSULE ORAL at 05:46

## 2023-10-08 RX ADMIN — SERTRALINE 25 MILLIGRAM(S): 25 TABLET, FILM COATED ORAL at 11:20

## 2023-10-08 RX ADMIN — GABAPENTIN 600 MILLIGRAM(S): 400 CAPSULE ORAL at 22:25

## 2023-10-08 RX ADMIN — Medication 4 MILLIGRAM(S): at 22:25

## 2023-10-08 RX ADMIN — Medication 650 MILLIGRAM(S): at 11:22

## 2023-10-08 RX ADMIN — PANTOPRAZOLE SODIUM 40 MILLIGRAM(S): 20 TABLET, DELAYED RELEASE ORAL at 06:17

## 2023-10-08 RX ADMIN — GABAPENTIN 600 MILLIGRAM(S): 400 CAPSULE ORAL at 13:08

## 2023-10-08 RX ADMIN — HEPARIN SODIUM 5000 UNIT(S): 5000 INJECTION INTRAVENOUS; SUBCUTANEOUS at 05:45

## 2023-10-08 RX ADMIN — HEPARIN SODIUM 5000 UNIT(S): 5000 INJECTION INTRAVENOUS; SUBCUTANEOUS at 22:27

## 2023-10-08 RX ADMIN — SODIUM CHLORIDE 3 MILLILITER(S): 9 INJECTION INTRAMUSCULAR; INTRAVENOUS; SUBCUTANEOUS at 05:47

## 2023-10-08 RX ADMIN — SODIUM CHLORIDE 3 MILLILITER(S): 9 INJECTION INTRAMUSCULAR; INTRAVENOUS; SUBCUTANEOUS at 13:49

## 2023-10-08 RX ADMIN — Medication 650 MILLIGRAM(S): at 22:25

## 2023-10-08 RX ADMIN — Medication 650 MILLIGRAM(S): at 00:30

## 2023-10-08 RX ADMIN — MAGNESIUM OXIDE 400 MG ORAL TABLET 400 MILLIGRAM(S): 241.3 TABLET ORAL at 13:08

## 2023-10-08 RX ADMIN — Medication 4 MILLIGRAM(S): at 17:53

## 2023-10-08 RX ADMIN — Medication 4 MILLIGRAM(S): at 13:08

## 2023-10-08 RX ADMIN — HEPARIN SODIUM 5000 UNIT(S): 5000 INJECTION INTRAVENOUS; SUBCUTANEOUS at 13:08

## 2023-10-08 RX ADMIN — Medication 650 MILLIGRAM(S): at 23:00

## 2023-10-08 RX ADMIN — CYCLOBENZAPRINE HYDROCHLORIDE 10 MILLIGRAM(S): 10 TABLET, FILM COATED ORAL at 06:18

## 2023-10-08 RX ADMIN — Medication 90 MILLIGRAM(S): at 13:35

## 2023-10-08 RX ADMIN — SODIUM CHLORIDE 3 MILLILITER(S): 9 INJECTION INTRAMUSCULAR; INTRAVENOUS; SUBCUTANEOUS at 22:28

## 2023-10-08 RX ADMIN — Medication 81 MILLIGRAM(S): at 11:20

## 2023-10-08 RX ADMIN — SENNA PLUS 2 TABLET(S): 8.6 TABLET ORAL at 22:28

## 2023-10-08 RX ADMIN — Medication 650 MILLIGRAM(S): at 12:22

## 2023-10-08 RX ADMIN — Medication 4 MILLIGRAM(S): at 06:17

## 2023-10-08 RX ADMIN — ATORVASTATIN CALCIUM 80 MILLIGRAM(S): 80 TABLET, FILM COATED ORAL at 22:25

## 2023-10-08 NOTE — PROGRESS NOTE ADULT - SUBJECTIVE AND OBJECTIVE BOX
Patient discussed on morning rounds with Dr. Escalante     OPERATION & DATE: s/p TEVAR 9/15, represent 10/4 with near syncope    SUBJECTIVE ASSESSMENT:    VITAL SIGNS:  Vital Signs Last 24 Hrs  T(C): 36.7 (08 Oct 2023 09:02), Max: 36.8 (07 Oct 2023 17:09)  T(F): 98 (08 Oct 2023 09:02), Max: 98.2 (07 Oct 2023 17:09)  HR: 72 (08 Oct 2023 09:05) (59 - 77)  BP: 154/82 (08 Oct 2023 09:05) (142/84 - 162/82)  BP(mean): 111 (08 Oct 2023 09:05) (102 - 114)  RR: 18 (08 Oct 2023 09:05) (18 - 18)  SpO2: 96% (08 Oct 2023 09:05) (94% - 96%)    Parameters below as of 08 Oct 2023 09:05  Patient On (Oxygen Delivery Method): room air    I&O's Detail    07 Oct 2023 07:01  -  08 Oct 2023 07:00  --------------------------------------------------------  IN:    Oral Fluid: 400 mL  Total IN: 400 mL    OUT:    Voided (mL): 1000 mL  Total OUT: 1000 mL    Total NET: -600 mL    CHEST TUBE:n    EDMUNDO DRAIN:n    EPICARDIAL WIRES: n  STITCHES:n  STAPLES:n  SWENSON: n  CENTRAL LINE:n  MIDLINE/PICC:n  WOUND VAC:n    PHYSICAL EXAM:  General: NAD, resting in bed   Neurological: alert and oriented   Cardiovascular: RRR, Clear S1 and S2, no murmurs appreciated  Respiratory: chest expansion symmetrical, CTA b/l, no wheezing noted  Gastrointestinal: +BS, soft, NT, ND  Extremities: no calf tenderness or edema, pain with hip flection, pain with lifting of right leg   Vascular: warm, well perfused. DP/PT pulses palpable b/l.  Incisions: none     LABS:                        9.8    7.58  )-----------( 205      ( 08 Oct 2023 06:16 )             29.1     10-08    134<L>  |  102  |  28<H>  ----------------------------<  130<H>  4.8   |  24  |  0.62    Ca    9.6      08 Oct 2023 06:16  Phos  3.4     10-07  Mg     2.0     10-08    TPro  8.1  /  Alb  3.5  /  TBili  0.5  /  DBili  x   /  AST  34  /  ALT  48<H>  /  AlkPhos  111  10-07    Urinalysis Basic - ( 08 Oct 2023 06:16 )    Color: x / Appearance: x / SG: x / pH: x  Gluc: 130 mg/dL / Ketone: x  / Bili: x / Urobili: x   Blood: x / Protein: x / Nitrite: x   Leuk Esterase: x / RBC: x / WBC x   Sq Epi: x / Non Sq Epi: x / Bacteria: x    MEDICATIONS  (STANDING):  aspirin  chewable 81 milliGRAM(s) Oral daily  atorvastatin 80 milliGRAM(s) Oral at bedtime  gabapentin 600 milliGRAM(s) Oral three times a day  heparin   Injectable 5000 Unit(s) SubCutaneous every 8 hours  influenza  Vaccine (HIGH DOSE) 0.7 milliLiter(s) IntraMuscular once  methylPREDNISolone   Oral   methylPREDNISolone 4 milliGRAM(s) Oral after lunch  methylPREDNISolone 4 milliGRAM(s) Oral after dinner  methylPREDNISolone 4 milliGRAM(s) Oral before breakfast  methylPREDNISolone 4 milliGRAM(s) Oral at bedtime  pantoprazole    Tablet 40 milliGRAM(s) Oral before breakfast  senna 2 Tablet(s) Oral at bedtime  sertraline 25 milliGRAM(s) Oral daily  sodium chloride 0.9% lock flush 3 milliLiter(s) IV Push every 8 hours  tiotropium 2.5 MICROgram(s) Inhaler 2 Puff(s) Inhalation daily    MEDICATIONS  (PRN):  acetaminophen     Tablet .. 650 milliGRAM(s) Oral every 6 hours PRN Temp greater or equal to 38C (100.4F), Mild Pain (1 - 3)  cyclobenzaprine 10 milliGRAM(s) Oral three times a day PRN Muscle Spasm    RADIOLOGY & ADDITIONAL TESTS:     Patient discussed on morning rounds with Dr. Escalante     OPERATION & DATE: s/p TEVAR 9/15, represent 10/4 with near syncope    SUBJECTIVE ASSESSMENT: Seen sitting beside in NAD. Says he feels like the pain is getting slightly better. No significant events overnight.     VITAL SIGNS:  Vital Signs Last 24 Hrs  T(C): 36.7 (08 Oct 2023 09:02), Max: 36.8 (07 Oct 2023 17:09)  T(F): 98 (08 Oct 2023 09:02), Max: 98.2 (07 Oct 2023 17:09)  HR: 72 (08 Oct 2023 09:05) (59 - 77)  BP: 154/82 (08 Oct 2023 09:05) (142/84 - 162/82)  BP(mean): 111 (08 Oct 2023 09:05) (102 - 114)  RR: 18 (08 Oct 2023 09:05) (18 - 18)  SpO2: 96% (08 Oct 2023 09:05) (94% - 96%)    Parameters below as of 08 Oct 2023 09:05  Patient On (Oxygen Delivery Method): room air    I&O's Detail    07 Oct 2023 07:01  -  08 Oct 2023 07:00  --------------------------------------------------------  IN:    Oral Fluid: 400 mL  Total IN: 400 mL    OUT:    Voided (mL): 1000 mL  Total OUT: 1000 mL    Total NET: -600 mL    CHEST TUBE:n    EDMUNDO DRAIN:n    EPICARDIAL WIRES: n  STITCHES:n  STAPLES:n  SWENSON: n  CENTRAL LINE:n  MIDLINE/PICC:n  WOUND VAC:n    PHYSICAL EXAM:  General: NAD, resting in bed   Neurological: alert and oriented   Cardiovascular: RRR, Clear S1 and S2, no murmurs appreciated  Respiratory: chest expansion symmetrical, CTA b/l, no wheezing noted  Gastrointestinal: +BS, soft, NT, ND  Extremities: no calf tenderness or edema, pain with hip flection, pain with lifting of right leg   Vascular: warm, well perfused. DP/PT pulses palpable b/l.  Incisions: none     LABS:                        9.8    7.58  )-----------( 205      ( 08 Oct 2023 06:16 )             29.1     10-08    134<L>  |  102  |  28<H>  ----------------------------<  130<H>  4.8   |  24  |  0.62    Ca    9.6      08 Oct 2023 06:16  Phos  3.4     10-07  Mg     2.0     10-08    TPro  8.1  /  Alb  3.5  /  TBili  0.5  /  DBili  x   /  AST  34  /  ALT  48<H>  /  AlkPhos  111  10-07    Urinalysis Basic - ( 08 Oct 2023 06:16 )    Color: x / Appearance: x / SG: x / pH: x  Gluc: 130 mg/dL / Ketone: x  / Bili: x / Urobili: x   Blood: x / Protein: x / Nitrite: x   Leuk Esterase: x / RBC: x / WBC x   Sq Epi: x / Non Sq Epi: x / Bacteria: x    MEDICATIONS  (STANDING):  aspirin  chewable 81 milliGRAM(s) Oral daily  atorvastatin 80 milliGRAM(s) Oral at bedtime  gabapentin 600 milliGRAM(s) Oral three times a day  heparin   Injectable 5000 Unit(s) SubCutaneous every 8 hours  influenza  Vaccine (HIGH DOSE) 0.7 milliLiter(s) IntraMuscular once  methylPREDNISolone   Oral   methylPREDNISolone 4 milliGRAM(s) Oral after lunch  methylPREDNISolone 4 milliGRAM(s) Oral after dinner  methylPREDNISolone 4 milliGRAM(s) Oral before breakfast  methylPREDNISolone 4 milliGRAM(s) Oral at bedtime  pantoprazole    Tablet 40 milliGRAM(s) Oral before breakfast  senna 2 Tablet(s) Oral at bedtime  sertraline 25 milliGRAM(s) Oral daily  sodium chloride 0.9% lock flush 3 milliLiter(s) IV Push every 8 hours  tiotropium 2.5 MICROgram(s) Inhaler 2 Puff(s) Inhalation daily    MEDICATIONS  (PRN):  acetaminophen     Tablet .. 650 milliGRAM(s) Oral every 6 hours PRN Temp greater or equal to 38C (100.4F), Mild Pain (1 - 3)  cyclobenzaprine 10 milliGRAM(s) Oral three times a day PRN Muscle Spasm    RADIOLOGY & ADDITIONAL TESTS:     Patient discussed on morning rounds with Dr. Escalante     OPERATION & DATE: s/p TEVAR 9/15, represent 10/4 with near syncope    SUBJECTIVE ASSESSMENT: Seen sitting beside in NAD. Says he feels like the pain is getting slightly better. No significant events overnight.     VITAL SIGNS:  Vital Signs Last 24 Hrs  T(C): 36.7 (08 Oct 2023 09:02), Max: 36.8 (07 Oct 2023 17:09)  T(F): 98 (08 Oct 2023 09:02), Max: 98.2 (07 Oct 2023 17:09)  HR: 72 (08 Oct 2023 09:05) (59 - 77)  BP: 154/82 (08 Oct 2023 09:05) (142/84 - 162/82)  BP(mean): 111 (08 Oct 2023 09:05) (102 - 114)  RR: 18 (08 Oct 2023 09:05) (18 - 18)  SpO2: 96% (08 Oct 2023 09:05) (94% - 96%)    Parameters below as of 08 Oct 2023 09:05  Patient On (Oxygen Delivery Method): room air    I&O's Detail    07 Oct 2023 07:01  -  08 Oct 2023 07:00  --------------------------------------------------------  IN:    Oral Fluid: 400 mL  Total IN: 400 mL    OUT:    Voided (mL): 1000 mL  Total OUT: 1000 mL    Total NET: -600 mL    CHEST TUBE:n    EDMUNDO DRAIN:n    EPICARDIAL WIRES: n  STITCHES:n  STAPLES:n  SWESNON: n  CENTRAL LINE:n  MIDLINE/PICC:n  WOUND VAC:n    PHYSICAL EXAM:  General: NAD, resting in bed   Neurological: alert and oriented   Cardiovascular: RRR, Clear S1 and S2, no murmurs appreciated  Respiratory: chest expansion symmetrical, CTA b/l, no wheezing noted  Gastrointestinal: +BS, soft, NT, ND  Extremities: no calf tenderness or edema, pain with hip flection, pain with lifting of right leg   Vascular: warm, well perfused. DP/PT pulses palpable b/l.  Incisions: none     LABS:                        9.8    7.58  )-----------( 205      ( 08 Oct 2023 06:16 )             29.1     10-08    134<L>  |  102  |  28<H>  ----------------------------<  130<H>  4.8   |  24  |  0.62    Ca    9.6      08 Oct 2023 06:16  Phos  3.4     10-07  Mg     2.0     10-08    TPro  8.1  /  Alb  3.5  /  TBili  0.5  /  DBili  x   /  AST  34  /  ALT  48<H>  /  AlkPhos  111  10-07    Urinalysis Basic - ( 08 Oct 2023 06:16 )    Color: x / Appearance: x / SG: x / pH: x  Gluc: 130 mg/dL / Ketone: x  / Bili: x / Urobili: x   Blood: x / Protein: x / Nitrite: x   Leuk Esterase: x / RBC: x / WBC x   Sq Epi: x / Non Sq Epi: x / Bacteria: x    MEDICATIONS  (STANDING):  aspirin  chewable 81 milliGRAM(s) Oral daily  atorvastatin 80 milliGRAM(s) Oral at bedtime  gabapentin 600 milliGRAM(s) Oral three times a day  heparin   Injectable 5000 Unit(s) SubCutaneous every 8 hours  influenza  Vaccine (HIGH DOSE) 0.7 milliLiter(s) IntraMuscular once  methylPREDNISolone   Oral   methylPREDNISolone 4 milliGRAM(s) Oral after lunch  methylPREDNISolone 4 milliGRAM(s) Oral after dinner  methylPREDNISolone 4 milliGRAM(s) Oral before breakfast  methylPREDNISolone 4 milliGRAM(s) Oral at bedtime  pantoprazole    Tablet 40 milliGRAM(s) Oral before breakfast  senna 2 Tablet(s) Oral at bedtime  sertraline 25 milliGRAM(s) Oral daily  sodium chloride 0.9% lock flush 3 milliLiter(s) IV Push every 8 hours  tiotropium 2.5 MICROgram(s) Inhaler 2 Puff(s) Inhalation daily    MEDICATIONS  (PRN):  acetaminophen     Tablet .. 650 milliGRAM(s) Oral every 6 hours PRN Temp greater or equal to 38C (100.4F), Mild Pain (1 - 3)  cyclobenzaprine 10 milliGRAM(s) Oral three times a day PRN Muscle Spasm    RADIOLOGY & ADDITIONAL TESTS:  CXR 10/8  No acute infiltrates    MRI 10/5  1. Small amount of fluid is seen in left L3-L4 facet joint with mild enhancement after contrast administration in the facet joint and adjacent soft  tissues. Septic facet arthropathy with adjacent cellulitis can have this imaging finding and should be considered. Please correlate clinically.  2. Increased T2 signal consistent with edema is seen in paraspinal musculature at L5-S1 level. Finding is consistent with paraspinal muscular  strain.  3. Old compression fractures at L1, L2 and L5 vertebral bodies. No evidence of acute fracture.    CTA 10/5  1. Resolved recently noted thoracic aortic endoleak.  2. Stable infrarenal abdominal aortic aneurysm.

## 2023-10-08 NOTE — PROGRESS NOTE ADULT - ASSESSMENT
69 yo man recently discharged (9/23/23) s/p AAA stent c/b spinal subarachnoid hemorrhage from lumbar drain. Neuropathic pain manageable per patient on gabapentin.  MR Lumbar spine on 9/18/23 additionally showed L2-L3 severe central spinal canal stenosis and severe right and moderate left neural foramen narrowing which may contribute to pain of lower back and legs. Some signs of spinal cord infarction on prior MRI. Suspect pre-syncopal episode is orthostatic in nature.  MRI L-spine (10/5): There now is hyperintense T1 and T2 signal within the spinal canal which appears to be within the thecal sac and is felt to be subarachnoid in nature and not artifact. The signal characteristics would correspond with late subacute hemorrhage. The nerve roots appear thickened and clumped which may be secondary to arachnoiditis. No definite enhancement of the nerve roots is identified. Today, during the exam he was able to stand and may try to walk, but was afraid to cause the pain and he would benefit from PT/OT.     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 Rae.

## 2023-10-08 NOTE — PROGRESS NOTE ADULT - SUBJECTIVE AND OBJECTIVE BOX
Neurology Progress Note    Interval History:    Patient was seen and examined, no acute events over night. Still having some short "spasmodic" pain in his L buttock area when he twist or with certain movements. States that meds helping  for pain, no overt drowsiness.      Medications:  acetaminophen     Tablet .. 650 milliGRAM(s) Oral every 6 hours PRN  aspirin  chewable 81 milliGRAM(s) Oral daily  atorvastatin 80 milliGRAM(s) Oral at bedtime  cyclobenzaprine 10 milliGRAM(s) Oral three times a day PRN  gabapentin 600 milliGRAM(s) Oral three times a day  heparin   Injectable 5000 Unit(s) SubCutaneous every 8 hours  influenza  Vaccine (HIGH DOSE) 0.7 milliLiter(s) IntraMuscular once  methylPREDNISolone 4 milliGRAM(s) Oral before breakfast  methylPREDNISolone   Oral   methylPREDNISolone 4 milliGRAM(s) Oral after lunch  methylPREDNISolone 4 milliGRAM(s) Oral after dinner  methylPREDNISolone 4 milliGRAM(s) Oral at bedtime  pantoprazole    Tablet 40 milliGRAM(s) Oral before breakfast  senna 2 Tablet(s) Oral at bedtime  sertraline 25 milliGRAM(s) Oral daily  sodium chloride 0.9% lock flush 3 milliLiter(s) IV Push every 8 hours  tiotropium 2.5 MICROgram(s) Inhaler 2 Puff(s) Inhalation daily      Vital Signs Last 24 Hrs  T(C): 36.7 (08 Oct 2023 09:02), Max: 36.8 (07 Oct 2023 17:09)  T(F): 98 (08 Oct 2023 09:02), Max: 98.2 (07 Oct 2023 17:09)  HR: 72 (08 Oct 2023 09:05) (59 - 77)  BP: 154/82 (08 Oct 2023 09:05) (142/84 - 162/82)  BP(mean): 111 (08 Oct 2023 09:05) (102 - 114)  RR: 18 (08 Oct 2023 09:05) (18 - 18)  SpO2: 96% (08 Oct 2023 09:05) (94% - 96%)    Parameters below as of 08 Oct 2023 09:05  Patient On (Oxygen Delivery Method): room air        PHYSICAL EXAM:  Mental status: Awake, alert and oriented x3.  Recent and remote memory intact.  Naming, repetition and comprehension intact.  Attention/concentration intact.  No dysarthria, no aphasia.  Fund of knowledge appropriate.    Cranial nerves: Pupils equally round and reactive to light, visual fields full, no nystagmus, extraocular muscles intact, V1 through V3 intact bilaterally and symmetric, face symmetric, hearing intact to finger rub, palate elevation symmetric, tongue was midline.  Motor:  MRC gradin/5 in the hip flexors b/l. 5/5 in he remainder of LE. Normal tone and bulk.  No abnormal movements.    Sensation: Intact to light touch, proprioception, and pinprick.   Coordination: No dysmetria on finger-to-nose   Reflexes: 2+ in bilateral UE/LE  Gait: Was able to stand for several seconds but anxious about walking may induce the pain.     Labs:  CBC Full  -  ( 08 Oct 2023 06:16 )  WBC Count : 7.58 K/uL  RBC Count : 3.06 M/uL  Hemoglobin : 9.8 g/dL  Hematocrit : 29.1 %  Platelet Count - Automated : 205 K/uL  Mean Cell Volume : 95.1 fl  Mean Cell Hemoglobin : 32.0 pg  Mean Cell Hemoglobin Concentration : 33.7 gm/dL     10-08    134<L>  |  102  |  28<H>  ----------------------------<  130<H>  4.8   |  24  |  0.62    Ca    9.6      08 Oct 2023 06:16  Phos  3.4     10-07  Mg     2.0     10-    TPro  8.1  /  Alb  3.5  /  TBili  0.5  /  DBili  x   /  AST  34  /  ALT  48<H>  /  AlkPhos  111  10-07    LIVER FUNCTIONS - ( 07 Oct 2023 06:48 )  Alb: 3.5 g/dL / Pro: 8.1 g/dL / ALK PHOS: 111 U/L / ALT: 48 U/L / AST: 34 U/L / GGT: x             Urinalysis Basic - ( 08 Oct 2023 06:16 )    Color: x / Appearance: x / SG: x / pH: x  Gluc: 130 mg/dL / Ketone: x  / Bili: x / Urobili: x   Blood: x / Protein: x / Nitrite: x   Leuk Esterase: x / RBC: x / WBC x   Sq Epi: x / Non Sq Epi: x / Bacteria: x    RADIOLOGY & ADDITIONAL TESTS:

## 2023-10-08 NOTE — PROGRESS NOTE ADULT - ASSESSMENT
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 Toledo for descending aortic aneurysm. Post-op course significant for RLE weakness and blood in CSF, CT Lspine and CTH at the time were unremarkable, but MRI significant for spinal subarachnoid hemorrhage. Patient was discharged on 9/23 then presented to [facility] 10/4 following a near syncopal episode with back spasm. He was then transferred to [facility] under the care of Dr. Toledo for further work-up and management. Neurology re-consulted, MRI with evidence of arachnoiditis. Medrol dosepak started 10/6. C/w Gabapentin and Flexeril. PT following, recc discharge to rehab.     Plan:    Neurovascular:   - s/p lumbar drain 9/14 prior to TEVAR c/b subarachnoid hemorrhage; now readmit for near syncope/lower back spasm  - MRI L-spine 10/5: hyperintense T1 and T2 signal within the spinal canal, correspond with late subacute hemorrhage, nerve root thickened and clumped likely secondary to arachnoiditis  - neuro following: recc gabapentin to 600mg TID, Flexeril 10mg TID PRN  - started on medrol dosepak 10/6  - continue Zoloft for depression     Cardiovascular:   Aortic Aneurysm s/p TEVAR 9/15 with Dr. Toledo   - CT chest negative for endoleak   Hx of mini-AVR (27mmbio), ascending and hemiarch replacement 6/23/22   CAD   - continue ASA and statin   HTN   - BB restarted today   -Hemodynamically stable.   -Monitor: BP, HR, tele    Respiratory:   COPD  - continue home Spiriva   -Oxygenating well on room air  -Encourage continued use of IS 10x/hr and frequent ambulation  -CXR: no acute abnormalities     GI:  -GI PPX: protonix   -PO Diet  -Bowel Regimen: senna/miralax     Renal / :  Hyponatremia  - Na+ 132 today  -Continue to monitor renal function: BUN/Cr: 0.59/18  -Monitor I/O's daily     Endocrine:    -No hx of DM or thyroid dx  -A1c: 5.5  -TSH: 1.200    Hematologic:  Anemia   -CBC: H/H-10.9/32.1  -Coagulation Panel.    ID:  -Temperature: afebrile   -CBC: WBC-no leukocytosis   -Continue to observe for SIRS/Sepsis Syndrome.    Prophylaxis:  -DVT prophylaxis with 5000 SubQ Heparin q8h.  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 from 9/14-9/23 where he had pre-op lumbar drain placed with Neuro then underwent completion TEVAR on 9/15 with Dr. Toledo for descending aortic aneurysm. Post-op course significant for RLE weakness and blood in CSF, CT Lspine and CTH at the time were unremarkable, but MRI significant for spinal subarachnoid hemorrhage. Patient was discharged on 9/23 then presented to [facility] 10/4 following a near syncopal episode with back spasm. He was then transferred to [facility] under the care of Dr. Toledo for further work-up and management. Neurology re-consulted, MRI with evidence of arachnoiditis. Medrol dosepak started 10/6. C/w Gabapentin and Flexeril. PT following, recc discharge to rehab.     Plan:    Neurovascular:   - s/p lumbar drain 9/14 prior to TEVAR c/b subarachnoid hemorrhage; now readmit for near syncope/lower back spasm  - MRI L-spine 10/5: hyperintense T1 and T2 signal within the spinal canal, correspond with late subacute hemorrhage, nerve root thickened and clumped likely secondary to arachnoiditis  - neuro following: recc gabapentin to 600mg TID, Flexeril 10mg TID PRN  - started on medrol dosepak 10/6  - continue Zoloft for depression     Cardiovascular:   Aortic Aneurysm s/p TEVAR 9/15 with Dr. Toledo   - CT chest negative for endoleak   Hx of mini-AVR (27mmbio), ascending and hemiarch replacement 6/23/22   CAD   - continue ASA and statin   HTN   - home Nifedipine 90mg qd resumed today   -Hemodynamically stable.   -Monitor: BP, HR, tele    Respiratory:   COPD  - continue home Spiriva   -Oxygenating well on room air  -Encourage continued use of IS 10x/hr and frequent ambulation  -CXR: no acute abnormalities     GI:  -GI PPX: protonix   -PO Diet  -Bowel Regimen: senna/miralax     Renal / :  Hyponatremia  - Na+ 134 today  -Continue to monitor renal function: BUN/Cr: 0.62/28  -Monitor I/O's daily     Endocrine:    -No hx of DM or thyroid dx  -A1c: 5.5  -TSH: 1.200    Hematologic:  Anemia   -CBC: H/H-9.8/29.1  -Coagulation Panel.    ID:  -Temperature: afebrile   -CBC: WBC-no leukocytosis   -Continue to observe for SIRS/Sepsis Syndrome.    Prophylaxis:  -DVT prophylaxis with 5000 SubQ Heparin q8h.  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 from 9/14-9/23 where he had pre-op lumbar drain placed with Neuro then underwent completion TEVAR on 9/15 with Dr. Toledo for descending aortic aneurysm. Post-op course significant for RLE weakness and blood in CSF, CT Lspine and CTH at the time were unremarkable, but MRI significant for spinal subarachnoid hemorrhage. Patient was discharged on 9/23 then presented to [facility] 10/4 following a near syncopal episode with back spasm. He was then transferred to [facility] under the care of Dr. Toledo for further work-up and management. Neurology re-consulted, MRI with evidence of arachnoiditis. Medrol dosepak started 10/6. C/w Gabapentin and Flexeril. PT following, recc discharge to rehab.    Plan:    Neurovascular:   Lumbar drain placed 9/14 prior to TEVAR c/b subarachnoid hemorrhage; now readmit for near syncope/lower back spasm  - MRI L-spine 10/5: hyperintense T1 and T2 signal within the spinal canal, correspond with late subacute hemorrhage, nerve root thickened and clumped likely secondary to arachnoiditis  - neuro following: recc gabapentin to 600mg TID, Flexeril 10mg TID PRN  - started on medrol dosepak 10/6  Depression   - continue Zoloft    Cardiovascular:   Aortic Aneurysm s/p TEVAR 9/15 with Dr. Toledo   - CT chest negative for endoleak   Hx of mini-AVR (27mmbio), ascending and hemiarch replacement 6/23/22   CAD   - continue ASA and statin   HTN, elevated    - home Nifedipine 90mg qd resumed today  -Hemodynamically stable.   -Monitor: BP, HR, tele    Respiratory:   COPD  - continue home Spiriva   -Oxygenating well on room air  -Encourage continued use of IS 10x/hr and frequent ambulation  -CXR: no acute abnormalities     GI:  -GI PPX: protonix   -PO Diet  -Bowel Regimen: senna/miralax     Renal / :  Hyponatremia  - Na+ 134 today  -Continue to monitor renal function: BUN/Cr: 0.62/28  -Monitor I/O's daily     Endocrine:    -No hx of DM or thyroid dx  -A1c: 5.5  -TSH: 1.200    Hematologic:  Anemia   -CBC: H/H-9.8/29.1  -Coagulation Panel.    ID:  -Temperature: afebrile   -CBC: WBC-no leukocytosis   -Continue to observe for SIRS/Sepsis Syndrome.    Prophylaxis:  -DVT prophylaxis with 5000 SubQ Heparin q8h.  -Continue with SCD's b/l while patient is at rest     Disposition:  Neuro following, recc continuation of gabapentin and flexeril   Medrol dosepak started 10/6  Process started for rehab placement per   PT following; OT consult (order placed, f/up in AM)  Home Nifedipine restarted

## 2023-10-09 LAB
ANION GAP SERPL CALC-SCNC: 10 MMOL/L — SIGNIFICANT CHANGE UP (ref 5–17)
BUN SERPL-MCNC: 26 MG/DL — HIGH (ref 7–23)
CALCIUM SERPL-MCNC: 9.7 MG/DL — SIGNIFICANT CHANGE UP (ref 8.4–10.5)
CHLORIDE SERPL-SCNC: 100 MMOL/L — SIGNIFICANT CHANGE UP (ref 96–108)
CO2 SERPL-SCNC: 27 MMOL/L — SIGNIFICANT CHANGE UP (ref 22–31)
CREAT SERPL-MCNC: 0.61 MG/DL — SIGNIFICANT CHANGE UP (ref 0.5–1.3)
EGFR: 104 ML/MIN/1.73M2 — SIGNIFICANT CHANGE UP
GLUCOSE SERPL-MCNC: 122 MG/DL — HIGH (ref 70–99)
HCT VFR BLD CALC: 32.3 % — LOW (ref 39–50)
HGB BLD-MCNC: 11 G/DL — LOW (ref 13–17)
MAGNESIUM SERPL-MCNC: 1.9 MG/DL — SIGNIFICANT CHANGE UP (ref 1.6–2.6)
MCHC RBC-ENTMCNC: 32.2 PG — SIGNIFICANT CHANGE UP (ref 27–34)
MCHC RBC-ENTMCNC: 34.1 GM/DL — SIGNIFICANT CHANGE UP (ref 32–36)
MCV RBC AUTO: 94.4 FL — SIGNIFICANT CHANGE UP (ref 80–100)
NRBC # BLD: 0 /100 WBCS — SIGNIFICANT CHANGE UP (ref 0–0)
PLATELET # BLD AUTO: 271 K/UL — SIGNIFICANT CHANGE UP (ref 150–400)
POTASSIUM SERPL-MCNC: 4.4 MMOL/L — SIGNIFICANT CHANGE UP (ref 3.5–5.3)
POTASSIUM SERPL-SCNC: 4.4 MMOL/L — SIGNIFICANT CHANGE UP (ref 3.5–5.3)
RBC # BLD: 3.42 M/UL — LOW (ref 4.2–5.8)
RBC # FLD: 15.1 % — HIGH (ref 10.3–14.5)
SODIUM SERPL-SCNC: 137 MMOL/L — SIGNIFICANT CHANGE UP (ref 135–145)
WBC # BLD: 7.88 K/UL — SIGNIFICANT CHANGE UP (ref 3.8–10.5)
WBC # FLD AUTO: 7.88 K/UL — SIGNIFICANT CHANGE UP (ref 3.8–10.5)

## 2023-10-09 PROCEDURE — 99233 SBSQ HOSP IP/OBS HIGH 50: CPT

## 2023-10-09 PROCEDURE — 71045 X-RAY EXAM CHEST 1 VIEW: CPT | Mod: 26

## 2023-10-09 RX ADMIN — Medication 90 MILLIGRAM(S): at 05:24

## 2023-10-09 RX ADMIN — HEPARIN SODIUM 5000 UNIT(S): 5000 INJECTION INTRAVENOUS; SUBCUTANEOUS at 05:25

## 2023-10-09 RX ADMIN — HEPARIN SODIUM 5000 UNIT(S): 5000 INJECTION INTRAVENOUS; SUBCUTANEOUS at 13:39

## 2023-10-09 RX ADMIN — Medication 4 MILLIGRAM(S): at 22:30

## 2023-10-09 RX ADMIN — CYCLOBENZAPRINE HYDROCHLORIDE 10 MILLIGRAM(S): 10 TABLET, FILM COATED ORAL at 13:39

## 2023-10-09 RX ADMIN — SERTRALINE 25 MILLIGRAM(S): 25 TABLET, FILM COATED ORAL at 11:14

## 2023-10-09 RX ADMIN — HEPARIN SODIUM 5000 UNIT(S): 5000 INJECTION INTRAVENOUS; SUBCUTANEOUS at 22:25

## 2023-10-09 RX ADMIN — GABAPENTIN 600 MILLIGRAM(S): 400 CAPSULE ORAL at 22:30

## 2023-10-09 RX ADMIN — GABAPENTIN 600 MILLIGRAM(S): 400 CAPSULE ORAL at 05:25

## 2023-10-09 RX ADMIN — Medication 650 MILLIGRAM(S): at 05:24

## 2023-10-09 RX ADMIN — Medication 4 MILLIGRAM(S): at 06:05

## 2023-10-09 RX ADMIN — Medication 650 MILLIGRAM(S): at 06:30

## 2023-10-09 RX ADMIN — Medication 81 MILLIGRAM(S): at 11:14

## 2023-10-09 RX ADMIN — GABAPENTIN 600 MILLIGRAM(S): 400 CAPSULE ORAL at 13:38

## 2023-10-09 RX ADMIN — Medication 4 MILLIGRAM(S): at 14:47

## 2023-10-09 RX ADMIN — SODIUM CHLORIDE 3 MILLILITER(S): 9 INJECTION INTRAMUSCULAR; INTRAVENOUS; SUBCUTANEOUS at 21:29

## 2023-10-09 RX ADMIN — SODIUM CHLORIDE 3 MILLILITER(S): 9 INJECTION INTRAMUSCULAR; INTRAVENOUS; SUBCUTANEOUS at 05:15

## 2023-10-09 RX ADMIN — SODIUM CHLORIDE 3 MILLILITER(S): 9 INJECTION INTRAMUSCULAR; INTRAVENOUS; SUBCUTANEOUS at 13:27

## 2023-10-09 RX ADMIN — PANTOPRAZOLE SODIUM 40 MILLIGRAM(S): 20 TABLET, DELAYED RELEASE ORAL at 05:24

## 2023-10-09 RX ADMIN — SENNA PLUS 2 TABLET(S): 8.6 TABLET ORAL at 22:24

## 2023-10-09 RX ADMIN — ATORVASTATIN CALCIUM 80 MILLIGRAM(S): 80 TABLET, FILM COATED ORAL at 22:24

## 2023-10-09 NOTE — DIETITIAN INITIAL EVALUATION ADULT - OTHER INFO
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 Toledo for descending aortic aneurysm. Post-op course significant for RLE weakness and blood in CSF, CT Lspine and CTH at the time were unremarkable, but MRI significant for spinal subarachnoid hemorrhage. Patient was discharged on 9/23 then presented to [facility] 10/4 following a near syncopal episode with back spasm. He was then transferred to [facility] under the care of Dr. Toledo for further work-up and management. Neurology re-consulted, MRI with evidence of arachnoiditis. Medrol dosepak started 10/6. C/w Gabapentin and Flexeril. PT following, recc discharge to rehab.    On assessment, pt sitting in chair at bedside. Labs and medication orders reviewed. On Regular diet with Ensure x3 x3/day. Pt reports fair intake of meals but good completion of oral nutrition supplements, noted counting calories in supplements to ensure he maintains adequate intake. Estimate pt meeting >75% nutrient needs. Pt reports UBW ~170lb consistent with admission wt 165lb, pt denies wt changes PTA. Denies nausea/vomiting/diarrhea/constipation, last BM yesterday 10/8. Confirms NKFA. Pt denies difficulty chewing/swallowing. No Jainism/ethnic/cultural food preferences noted. No pressure injuries or edema documented, Uvaldo score 20. Pt requests continuation of x9 oral nutrition supplements daily. See nutrition recommendations. RD to follow-up per protocol.

## 2023-10-09 NOTE — DIETITIAN INITIAL EVALUATION ADULT - OTHER CALCULATIONS
Estimated needs based on CBW as within % IBW 196lb (84%). Needs adjusted for age, COPD, status post recent TEVAR, and clinical status.

## 2023-10-09 NOTE — DIETITIAN INITIAL EVALUATION ADULT - NS FNS DIET ORDER
Diet, Regular:   Supplement Feeding Modality:  Oral  Ensure Enlive Cans or Servings Per Day:  3       Frequency:  Three Times a day (10-05-23 @ 11:02) [Active]

## 2023-10-09 NOTE — PROGRESS NOTE ADULT - ASSESSMENT
67 yo man recently discharged (9/23/23) s/p AAA stent c/b spinal subarachnoid hemorrhage from lumbar drain. Neuropathic pain manageable per patient on gabapentin.  MR Lumbar spine on 9/18/23 additionally showed L2-L3 severe central spinal canal stenosis and severe right and moderate left neural foramen narrowing which may contribute to pain of lower back and legs. Some signs of spinal cord infarction on prior MRI. Suspect pre-syncopal episode is orthostatic in nature.  MRI L-spine (10/5): There now is hyperintense T1 and T2 signal within the spinal canal which appears to be within the thecal sac and is felt to be subarachnoid in nature and not artifact. The signal characteristics would correspond with late subacute hemorrhage. The nerve roots appear thickened and clumped which may be secondary to arachnoiditis. No definite enhancement of the nerve roots is identified. Today, during the exam he was able to stand and may try to walk, but was afraid to cause the pain and he would benefit from PT/OT.     Impression: weakness 2/2 spinal arachnoiditis vs less likely lumbar     Recommendations:  - C/w Gabapentin to 600mg TID standing  - c/w Flexeril 10 mg qhs (if no drowsiness in AM, may c/w 5 mg AM and 10 mg qhs)  - c/w Medrol dosepak   - C/w PT/OT    Discussed with Dr. Russ.      69 yo man recently discharged (9/23/23) s/p AAA stent c/b spinal subarachnoid hemorrhage from lumbar drain. Neuropathic pain manageable per patient on gabapentin.  MR Lumbar spine on 9/18/23 additionally showed L2-L3 severe central spinal canal stenosis and severe right and moderate left neural foramen narrowing which may contribute to pain of lower back and legs. Some signs of spinal cord infarction on prior MRI. Suspect pre-syncopal episode is orthostatic in nature.  MRI L-spine (10/5): There now is hyperintense T1 and T2 signal within the spinal canal which appears to be within the thecal sac and is felt to be subarachnoid in nature and not artifact. The signal characteristics would correspond with late subacute hemorrhage. The nerve roots appear thickened and clumped which may be secondary to arachnoiditis. No definite enhancement of the nerve roots is identified. Today, during the exam he was able to stand and may try to walk, but was afraid to cause the pain and he would benefit from PT/OT.     Impression: weakness 2/2 spinal arachnoiditis vs less likely lumbar spinal stenosis    Recommendations:  - C/w Gabapentin to 600mg TID standing  - c/w Flexeril 10 mg qhs (if no drowsiness in AM, may c/w 5 mg AM and 10 mg qhs)  - c/w Medrol dosepak   - C/w PT/OT    Discussed with Dr. Russ.

## 2023-10-09 NOTE — PROGRESS NOTE ADULT - SUBJECTIVE AND OBJECTIVE BOX
Neurology Progress Note    Interval History:    Patient was seen and examined, no acute events over night. Still having some short "spasmodic" pain in his L buttock area when he twist or with certain movements. States that meds helping  for pain, no overt drowsiness.      Medications:  acetaminophen     Tablet .. 650 milliGRAM(s) Oral every 6 hours PRN  aspirin  chewable 81 milliGRAM(s) Oral daily  atorvastatin 80 milliGRAM(s) Oral at bedtime  cyclobenzaprine 10 milliGRAM(s) Oral three times a day PRN  gabapentin 600 milliGRAM(s) Oral three times a day  heparin   Injectable 5000 Unit(s) SubCutaneous every 8 hours  influenza  Vaccine (HIGH DOSE) 0.7 milliLiter(s) IntraMuscular once  methylPREDNISolone 4 milliGRAM(s) Oral before breakfast  methylPREDNISolone   Oral   methylPREDNISolone 4 milliGRAM(s) Oral after lunch  methylPREDNISolone 4 milliGRAM(s) Oral after dinner  methylPREDNISolone 4 milliGRAM(s) Oral at bedtime  pantoprazole    Tablet 40 milliGRAM(s) Oral before breakfast  senna 2 Tablet(s) Oral at bedtime  sertraline 25 milliGRAM(s) Oral daily  sodium chloride 0.9% lock flush 3 milliLiter(s) IV Push every 8 hours  tiotropium 2.5 MICROgram(s) Inhaler 2 Puff(s) Inhalation daily      Vital Signs Last 24 Hrs  T(C): 36.7 (08 Oct 2023 09:02), Max: 36.8 (07 Oct 2023 17:09)  T(F): 98 (08 Oct 2023 09:02), Max: 98.2 (07 Oct 2023 17:09)  HR: 72 (08 Oct 2023 09:05) (59 - 77)  BP: 154/82 (08 Oct 2023 09:05) (142/84 - 162/82)  BP(mean): 111 (08 Oct 2023 09:05) (102 - 114)  RR: 18 (08 Oct 2023 09:05) (18 - 18)  SpO2: 96% (08 Oct 2023 09:05) (94% - 96%)    Parameters below as of 08 Oct 2023 09:05  Patient On (Oxygen Delivery Method): room air        PHYSICAL EXAM:  Mental status: Awake, alert and oriented x3.  Recent and remote memory intact.  Naming, repetition and comprehension intact.  Attention/concentration intact.  No dysarthria, no aphasia.  Fund of knowledge appropriate.    Cranial nerves: Pupils equally round and reactive to light, visual fields full, no nystagmus, extraocular muscles intact, V1 through V3 intact bilaterally and symmetric, face symmetric, hearing intact to finger rub, palate elevation symmetric, tongue was midline.  Motor:  MRC gradin+/5 in the hip flexors on the L, 5/5 on the R hip. 5/5 in he remainder of LE. Normal tone and bulk.  No abnormal movements.    Sensation: Intact to light touch, proprioception, and pinprick.   Coordination: No dysmetria on finger-to-nose   Reflexes: 2+ in bilateral UE/LE  Gait: Was able to stand for several seconds but anxious about walking may induce the pain.     Labs:  CBC Full  -  ( 08 Oct 2023 06:16 )  WBC Count : 7.58 K/uL  RBC Count : 3.06 M/uL  Hemoglobin : 9.8 g/dL  Hematocrit : 29.1 %  Platelet Count - Automated : 205 K/uL  Mean Cell Volume : 95.1 fl  Mean Cell Hemoglobin : 32.0 pg  Mean Cell Hemoglobin Concentration : 33.7 gm/dL     10-    134<L>  |  102  |  28<H>  ----------------------------<  130<H>  4.8   |  24  |  0.62    Ca    9.6      08 Oct 2023 06:16  Phos  3.4     10-07  Mg     2.0     10-    TPro  8.1  /  Alb  3.5  /  TBili  0.5  /  DBili  x   /  AST  34  /  ALT  48<H>  /  AlkPhos  111  10-07    LIVER FUNCTIONS - ( 07 Oct 2023 06:48 )  Alb: 3.5 g/dL / Pro: 8.1 g/dL / ALK PHOS: 111 U/L / ALT: 48 U/L / AST: 34 U/L / GGT: x             Urinalysis Basic - ( 08 Oct 2023 06:16 )    Color: x / Appearance: x / SG: x / pH: x  Gluc: 130 mg/dL / Ketone: x  / Bili: x / Urobili: x   Blood: x / Protein: x / Nitrite: x   Leuk Esterase: x / RBC: x / WBC x   Sq Epi: x / Non Sq Epi: x / Bacteria: x    RADIOLOGY & ADDITIONAL TESTS:

## 2023-10-09 NOTE — DIETITIAN INITIAL EVALUATION ADULT - PERTINENT MEDS FT
MEDICATIONS  (STANDING):  aspirin  chewable 81 milliGRAM(s) Oral daily  atorvastatin 80 milliGRAM(s) Oral at bedtime  gabapentin 600 milliGRAM(s) Oral three times a day  heparin   Injectable 5000 Unit(s) SubCutaneous every 8 hours  influenza  Vaccine (HIGH DOSE) 0.7 milliLiter(s) IntraMuscular once  methylPREDNISolone 4 milliGRAM(s) Oral before breakfast  methylPREDNISolone   Oral   methylPREDNISolone 4 milliGRAM(s) Oral after lunch  methylPREDNISolone 4 milliGRAM(s) Oral at bedtime  NIFEdipine XL 90 milliGRAM(s) Oral daily  pantoprazole    Tablet 40 milliGRAM(s) Oral before breakfast  senna 2 Tablet(s) Oral at bedtime  sertraline 25 milliGRAM(s) Oral daily  sodium chloride 0.9% lock flush 3 milliLiter(s) IV Push every 8 hours  tiotropium 2.5 MICROgram(s) Inhaler 2 Puff(s) Inhalation daily    MEDICATIONS  (PRN):  acetaminophen     Tablet .. 650 milliGRAM(s) Oral every 6 hours PRN Temp greater or equal to 38C (100.4F), Mild Pain (1 - 3)  cyclobenzaprine 10 milliGRAM(s) Oral three times a day PRN Muscle Spasm

## 2023-10-09 NOTE — PROGRESS NOTE ADULT - SUBJECTIVE AND OBJECTIVE BOX
Patient discussed on morning rounds with Dr. Toledo    OPERATION & DATE:   TEVAR 9/15/23  Readmit 10/4/23  with near syncope    SUBJECTIVE ASSESSMENT:  Patient feels "better" today.  He still has pain and weakness in his legs and lower back especially upon standing up.  Needs assistance to walk and move around.  Tolerating PO diet, only "fair" appetite.  Denies CP/SOB/N/V/D/dizziness/cough/fever/chills.        Vital Signs Last 24 Hrs  T(C): 36.3 (09 Oct 2023 13:15), Max: 36.8 (08 Oct 2023 21:24)  T(F): 97.3 (09 Oct 2023 13:15), Max: 98.2 (08 Oct 2023 21:24)  HR: 82 (09 Oct 2023 12:02) (63 - 87)  BP: 122/64 (09 Oct 2023 12:02) (119/73 - 148/80)  BP(mean): 88 (09 Oct 2023 12:02) (88 - 106)  RR: 16 (09 Oct 2023 12:02) (12 - 18)  SpO2: 96% (09 Oct 2023 12:02) (96% - 100%)    Parameters below as of 09 Oct 2023 12:02  Patient On (Oxygen Delivery Method): room air      I&O's Detail    08 Oct 2023 07:01  -  09 Oct 2023 07:00  --------------------------------------------------------  IN:    Oral Fluid: 60 mL  Total IN: 60 mL    OUT:    Voided (mL): 1900 mL  Total OUT: 1900 mL    Total NET: -1840 mL      09 Oct 2023 07:01  -  09 Oct 2023 14:13  --------------------------------------------------------  IN:    Oral Fluid: 500 mL  Total IN: 500 mL    OUT:    Voided (mL): 520 mL  Total OUT: 520 mL    Total NET: -20 mL      PHYSICAL EXAM:    GEN: NAD, looks comfortable; Smiling.  Seems to be in slightly better spirits today.    Psych: Mood appropriate  Neuro: A&Ox3.  Moving all extremities. Weak in lower extremities overall.    HEENT: No obvious abnormalities  CV: S1S2, regular, no murmurs appreciated.  No carotid bruits.  No JVD  Lungs: Clear B/L.  No wheezing, rales or rhonchi  ABD: Soft, non-tender, non-distended.  +Bowel sounds  EXT: Warm and well perfused.  No peripheral edema noted  Musculoskeletal: Moving all extremities with normal ROM, no joint swelling  PV: Pedal pulses palpable      LABS:                        11.0   7.88  )-----------( 271      ( 09 Oct 2023 05:30 )             32.3         10-09    137  |  100  |  26<H>  ----------------------------<  122<H>  4.4   |  27  |  0.61    Ca    9.7      09 Oct 2023 05:30  Mg     1.9     10-09        Urinalysis Basic - ( 09 Oct 2023 05:30 )    Color: x / Appearance: x / SG: x / pH: x  Gluc: 122 mg/dL / Ketone: x  / Bili: x / Urobili: x   Blood: x / Protein: x / Nitrite: x   Leuk Esterase: x / RBC: x / WBC x   Sq Epi: x / Non Sq Epi: x / Bacteria: x        MEDICATIONS  (STANDING):  aspirin  chewable 81 milliGRAM(s) Oral daily  atorvastatin 80 milliGRAM(s) Oral at bedtime  gabapentin 600 milliGRAM(s) Oral three times a day  heparin   Injectable 5000 Unit(s) SubCutaneous every 8 hours  influenza  Vaccine (HIGH DOSE) 0.7 milliLiter(s) IntraMuscular once  methylPREDNISolone 4 milliGRAM(s) Oral before breakfast  methylPREDNISolone   Oral   methylPREDNISolone 4 milliGRAM(s) Oral after lunch  methylPREDNISolone 4 milliGRAM(s) Oral at bedtime  NIFEdipine XL 90 milliGRAM(s) Oral daily  pantoprazole    Tablet 40 milliGRAM(s) Oral before breakfast  senna 2 Tablet(s) Oral at bedtime  sertraline 25 milliGRAM(s) Oral daily  sodium chloride 0.9% lock flush 3 milliLiter(s) IV Push every 8 hours  tiotropium 2.5 MICROgram(s) Inhaler 2 Puff(s) Inhalation daily    MEDICATIONS  (PRN):  acetaminophen     Tablet .. 650 milliGRAM(s) Oral every 6 hours PRN Temp greater or equal to 38C (100.4F), Mild Pain (1 - 3)  cyclobenzaprine 10 milliGRAM(s) Oral three times a day PRN Muscle Spasm        RADIOLOGY & ADDITIONAL TESTS:

## 2023-10-09 NOTE — DIETITIAN INITIAL EVALUATION ADULT - PERTINENT LABORATORY DATA
10-09    137  |  100  |  26<H>  ----------------------------<  122<H>  4.4   |  27  |  0.61    Ca    9.7      09 Oct 2023 05:30  Mg     1.9     10-09    A1C with Estimated Average Glucose Result: 5.5 % (10-04-23 @ 18:06)  A1C with Estimated Average Glucose Result: 5.3 % (09-14-23 @ 12:49)

## 2023-10-09 NOTE — OCCUPATIONAL THERAPY INITIAL EVALUATION ADULT - ADDITIONAL COMMENTS
Pt lives w/ his wife in private house w/ ~9 stairs to negotiate. Pt states that he was independent prior to admission. Pt used a cane for ambulation

## 2023-10-09 NOTE — DIETITIAN INITIAL EVALUATION ADULT - EDUCATION DIETARY MODIFICATIONS
Discussed continued used of oral nutrition supplements between meals to maintain good nutrition. Encouraged pt to try best with whole foods and prioritize proteins to promote post-op healing. Pt aware RD remains available for additional questions/concerns./(2) meets goals/outcomes/verbalization

## 2023-10-09 NOTE — OCCUPATIONAL THERAPY INITIAL EVALUATION ADULT - MD ORDER
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

## 2023-10-09 NOTE — OCCUPATIONAL THERAPY INITIAL EVALUATION ADULT - PERTINENT HX OF CURRENT PROBLEM, REHAB EVAL
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 Post-op course significant for RLE weakness and blood in CSF, CT Lspine and CTH at the time were unremarkable, but MRI noted to have blood in spinal canal and subarachnoid space, and was monitored by Neuro. Patient was discharged on 9/23 then presented to [facility] following syncopal episode. He was then transferred to [facility] under the care for further work-up and management.

## 2023-10-09 NOTE — PROGRESS NOTE ADULT - ASSESSMENT
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 Toledo for descending aortic aneurysm. Post-op course significant for RLE weakness and blood in CSF, CT Lspine and CTH at the time were unremarkable, but MRI significant for spinal subarachnoid hemorrhage. Patient was discharged on 9/23/23 then presented to [facility] 10/4/23 following a near syncopal episode with back spasm. He was then transferred to [facility] under the care of Dr. Toledo for further work-up and management. Neurology re-consulted, MRI with evidence of arachnoiditis. Medrol dosepak started 10/6/23. C/w Gabapentin and Flexeril. PT following, recommending discharge to neuro rehab.  Awaiting acceptance.      Plan:    Neurovascular:   Lumbar drain placed 9/14/23 prior to TEVAR c/b subarachnoid hemorrhage; now readmit for near syncope/lower back spasm  - MRI L-spine 10/5: hyperintense T1 and T2 signal within the spinal canal, correspond with late subacute hemorrhage, nerve root thickened and clumped likely secondary to arachnoiditis  - neuro following: rec gabapentin to 600mg TID, Flexeril 10mg TID PRN  - started on medrol dosepak 10/6/23  Depression   - continue Zoloft    Cardiovascular:   Aortic Aneurysm s/p TEVAR 9/15/23 with Dr. Toledo   - CT chest negative for endoleak   Hx of mini-AVR (27mmbio), ascending and hemiarch replacement 6/23/22   CAD   - continue ASA and statin   HTN, elevated    -Nifedipine 90mg qd resumed   -Hemodynamically stable.   -Monitor: BP, HR, tele    Respiratory:   COPD  -continue home Spiriva   -Oxygenating well on room air  -Encouraged continued use of IS  -CXR: no acute abnormalities (CXR holiday tomorrow)    GI:  -GI PPX: protonix   -PO Diet  -Bowel Regimen: senna/miralax     Renal / :  Hyponatremia - up to 137 today.    -BUN/Cr: 0.62/26.  Trend BUN.        Endocrine:    -No hx of DM or thyroid dx  -A1c: 5.5  -TSH: 1.200    Hematologic:  -Anemia   -CBC: H/H stable      ID:  -Temperature: afebrile   -CBC: WBC-no leukocytosis       Prophylaxis:  -DVT prophylaxis with 5000 SubQ Heparin q8h.      Disposition:  Neuro following, recommends continuation of gabapentin and flexeril   Process started for rehab placement per SW- awaiting acceptance and bed.    PT and OT following  Home Nifedipine back on

## 2023-10-10 LAB
ANION GAP SERPL CALC-SCNC: 11 MMOL/L — SIGNIFICANT CHANGE UP (ref 5–17)
BUN SERPL-MCNC: 30 MG/DL — HIGH (ref 7–23)
CALCIUM SERPL-MCNC: 10.4 MG/DL — SIGNIFICANT CHANGE UP (ref 8.4–10.5)
CHLORIDE SERPL-SCNC: 95 MMOL/L — LOW (ref 96–108)
CO2 SERPL-SCNC: 27 MMOL/L — SIGNIFICANT CHANGE UP (ref 22–31)
CREAT SERPL-MCNC: 0.54 MG/DL — SIGNIFICANT CHANGE UP (ref 0.5–1.3)
EGFR: 108 ML/MIN/1.73M2 — SIGNIFICANT CHANGE UP
GLUCOSE SERPL-MCNC: 115 MG/DL — HIGH (ref 70–99)
MAGNESIUM SERPL-MCNC: 2 MG/DL — SIGNIFICANT CHANGE UP (ref 1.6–2.6)
POTASSIUM SERPL-MCNC: 4.8 MMOL/L — SIGNIFICANT CHANGE UP (ref 3.5–5.3)
POTASSIUM SERPL-SCNC: 4.8 MMOL/L — SIGNIFICANT CHANGE UP (ref 3.5–5.3)
SODIUM SERPL-SCNC: 133 MMOL/L — LOW (ref 135–145)

## 2023-10-10 PROCEDURE — 99232 SBSQ HOSP IP/OBS MODERATE 35: CPT | Mod: 24

## 2023-10-10 RX ADMIN — SERTRALINE 25 MILLIGRAM(S): 25 TABLET, FILM COATED ORAL at 11:53

## 2023-10-10 RX ADMIN — SODIUM CHLORIDE 3 MILLILITER(S): 9 INJECTION INTRAMUSCULAR; INTRAVENOUS; SUBCUTANEOUS at 13:39

## 2023-10-10 RX ADMIN — Medication 81 MILLIGRAM(S): at 11:53

## 2023-10-10 RX ADMIN — GABAPENTIN 600 MILLIGRAM(S): 400 CAPSULE ORAL at 06:14

## 2023-10-10 RX ADMIN — Medication 4 MILLIGRAM(S): at 07:40

## 2023-10-10 RX ADMIN — PANTOPRAZOLE SODIUM 40 MILLIGRAM(S): 20 TABLET, DELAYED RELEASE ORAL at 06:14

## 2023-10-10 RX ADMIN — HEPARIN SODIUM 5000 UNIT(S): 5000 INJECTION INTRAVENOUS; SUBCUTANEOUS at 13:45

## 2023-10-10 RX ADMIN — SODIUM CHLORIDE 3 MILLILITER(S): 9 INJECTION INTRAMUSCULAR; INTRAVENOUS; SUBCUTANEOUS at 06:29

## 2023-10-10 RX ADMIN — Medication 650 MILLIGRAM(S): at 22:57

## 2023-10-10 RX ADMIN — GABAPENTIN 600 MILLIGRAM(S): 400 CAPSULE ORAL at 13:45

## 2023-10-10 RX ADMIN — Medication 90 MILLIGRAM(S): at 06:14

## 2023-10-10 RX ADMIN — HEPARIN SODIUM 5000 UNIT(S): 5000 INJECTION INTRAVENOUS; SUBCUTANEOUS at 06:14

## 2023-10-10 NOTE — PROGRESS NOTE ADULT - ASSESSMENT
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 Toledo for descending aortic aneurysm. Post-op course significant for RLE weakness and blood in CSF, CT Lspine and CTH at the time were unremarkable, but MRI significant for spinal subarachnoid hemorrhage. Patient was discharged on 9/23/23 then presented to [facility] 10/4/23 following a near syncopal episode with back spasm. He was then transferred to [facility] under the care of Dr. Toledo for further work-up and management. Neurology re-consulted, MRI with evidence of arachnoiditis. Medrol dosepak started 10/6/23. C/w Gabapentin and Flexeril. PT following, recommending discharge to neuro rehab.  Awaiting acceptance.          Plan:    Neurovascular:   -Pain well controlled with current regimen. PRN's: gabapentin, flexeril, tylenol  -Arachnoiditis    -c/w medrol dose pack    -plan for acute neuro rehab    -c/w PT work  -Depression    -c/w zoloft    Cardiovascular:   -Hemodynamically stable.   -Monitor: BP, HR, tele  -c/w ASA  -HTN    -c/w nifedepine 90mg QD  -HLD    -c/w lipitor    Respiratory:   -Oxygenating well on room air  -Encourage continued use of IS 10x/hr and frequent ambulation  -CXR: WNL  -COPD    -c/w spiriva    GI:  -GI PPX: protonix  -PO Diet  -Bowel Regimen: senna    Renal / :  -Continue to monitor renal function: BUN/Cr 30/.54  -Monitor I/O's daily     Endocrine:    -No hx of DM or thyroid dx  -A1c: 5.5  -TSH: 1.2    Hematologic:  -CBC: H/H- 11/32  -Coagulation Panel.    ID:  -Temperature: 36.7  -CBC: WBC- 7.8  -Continue to observe for SIRS/Sepsis Syndrome.    Prophylaxis:  -DVT prophylaxis with 5000 SubQ Heparin q8h.  -Continue with SCD's b/l while patient is at rest     Disposition:  -Pending placement for neuro rehab

## 2023-10-10 NOTE — PROGRESS NOTE ADULT - SUBJECTIVE AND OBJECTIVE BOX
Patient discussed on morning rounds with Dr. Toledo    OPERATION & DATE: TEVAR 9/15    SUBJECTIVE ASSESSMENT: Resting comfortably in chair in NAD. Denies pain. Reports baseline weakness.     VITAL SIGNS:  Vital Signs Last 24 Hrs  T(C): 35.9 (10 Oct 2023 09:12), Max: 37.3 (09 Oct 2023 21:16)  T(F): 96.6 (10 Oct 2023 09:12), Max: 99.2 (09 Oct 2023 21:16)  HR: 89 (10 Oct 2023 09:10) (75 - 89)  BP: 128/86 (10 Oct 2023 09:10) (114/65 - 148/72)  BP(mean): 102 (10 Oct 2023 09:10) (84 - 103)  RR: 16 (10 Oct 2023 09:10) (16 - 18)  SpO2: 99% (10 Oct 2023 09:10) (94% - 99%)    Parameters below as of 10 Oct 2023 09:10  Patient On (Oxygen Delivery Method): room air      I&O's Detail    09 Oct 2023 07:01  -  10 Oct 2023 07:00  --------------------------------------------------------  IN:    Oral Fluid: 500 mL  Total IN: 500 mL    OUT:    Voided (mL): 2770 mL  Total OUT: 2770 mL    Total NET: -2270 mL        PHYSICAL EXAM:  General: resting comfortably in chair in NAD  Neurological: AOx3. Bilat LE strong against resistance.   Cardiovascular: Normal S1/S2. Regular rate/rhythm. No murmurs  Respiratory: Lungs CTA bilaterally. No wheezing or rales  Gastrointestinal: +BS in all 4 quadrants. Non-distended. Soft. Non-tender  Extremities: Strength 5/5 b/l upper/lower extremities. Sensation grossly intact upper/lower extremities. No edema. No calf tenderness.  Vascular: Radial 2+bilaterally, DP 2+ b/l  Incision Sites: NA      LABS:                        11.0   7.88  )-----------( 271      ( 09 Oct 2023 05:30 )             32.3       10-10    133<L>  |  95<L>  |  30<H>  ----------------------------<  115<H>  4.8   |  27  |  0.54    Ca    10.4      10 Oct 2023 05:30  Mg     2.0     10-10      Urinalysis Basic - ( 10 Oct 2023 05:30 )    Color: x / Appearance: x / SG: x / pH: x  Gluc: 115 mg/dL / Ketone: x  / Bili: x / Urobili: x   Blood: x / Protein: x / Nitrite: x   Leuk Esterase: x / RBC: x / WBC x   Sq Epi: x / Non Sq Epi: x / Bacteria: x      MEDICATIONS  (STANDING):  aspirin  chewable 81 milliGRAM(s) Oral daily  atorvastatin 80 milliGRAM(s) Oral at bedtime  gabapentin 600 milliGRAM(s) Oral three times a day  heparin   Injectable 5000 Unit(s) SubCutaneous every 8 hours  influenza  Vaccine (HIGH DOSE) 0.7 milliLiter(s) IntraMuscular once  methylPREDNISolone 4 milliGRAM(s) Oral before breakfast  methylPREDNISolone   Oral   methylPREDNISolone 4 milliGRAM(s) Oral at bedtime  NIFEdipine XL 90 milliGRAM(s) Oral daily  pantoprazole    Tablet 40 milliGRAM(s) Oral before breakfast  senna 2 Tablet(s) Oral at bedtime  sertraline 25 milliGRAM(s) Oral daily  sodium chloride 0.9% lock flush 3 milliLiter(s) IV Push every 8 hours  tiotropium 2.5 MICROgram(s) Inhaler 2 Puff(s) Inhalation daily    MEDICATIONS  (PRN):  acetaminophen     Tablet .. 650 milliGRAM(s) Oral every 6 hours PRN Temp greater or equal to 38C (100.4F), Mild Pain (1 - 3)  cyclobenzaprine 10 milliGRAM(s) Oral three times a day PRN Muscle Spasm    RADIOLOGY & ADDITIONAL TESTS:

## 2023-10-11 LAB
ANION GAP SERPL CALC-SCNC: 10 MMOL/L — SIGNIFICANT CHANGE UP (ref 5–17)
BUN SERPL-MCNC: 26 MG/DL — HIGH (ref 7–23)
CALCIUM SERPL-MCNC: 10.3 MG/DL — SIGNIFICANT CHANGE UP (ref 8.4–10.5)
CHLORIDE SERPL-SCNC: 96 MMOL/L — SIGNIFICANT CHANGE UP (ref 96–108)
CO2 SERPL-SCNC: 29 MMOL/L — SIGNIFICANT CHANGE UP (ref 22–31)
CREAT SERPL-MCNC: 0.63 MG/DL — SIGNIFICANT CHANGE UP (ref 0.5–1.3)
EGFR: 103 ML/MIN/1.73M2 — SIGNIFICANT CHANGE UP
GLUCOSE SERPL-MCNC: 106 MG/DL — HIGH (ref 70–99)
HCT VFR BLD CALC: 37 % — LOW (ref 39–50)
HGB BLD-MCNC: 12.1 G/DL — LOW (ref 13–17)
MAGNESIUM SERPL-MCNC: 2.1 MG/DL — SIGNIFICANT CHANGE UP (ref 1.6–2.6)
MCHC RBC-ENTMCNC: 31.8 PG — SIGNIFICANT CHANGE UP (ref 27–34)
MCHC RBC-ENTMCNC: 32.7 GM/DL — SIGNIFICANT CHANGE UP (ref 32–36)
MCV RBC AUTO: 97.1 FL — SIGNIFICANT CHANGE UP (ref 80–100)
NRBC # BLD: 0 /100 WBCS — SIGNIFICANT CHANGE UP (ref 0–0)
PLATELET # BLD AUTO: 350 K/UL — SIGNIFICANT CHANGE UP (ref 150–400)
POTASSIUM SERPL-MCNC: 5 MMOL/L — SIGNIFICANT CHANGE UP (ref 3.5–5.3)
POTASSIUM SERPL-SCNC: 5 MMOL/L — SIGNIFICANT CHANGE UP (ref 3.5–5.3)
RBC # BLD: 3.81 M/UL — LOW (ref 4.2–5.8)
RBC # FLD: 15.3 % — HIGH (ref 10.3–14.5)
SODIUM SERPL-SCNC: 135 MMOL/L — SIGNIFICANT CHANGE UP (ref 135–145)
WBC # BLD: 8.47 K/UL — SIGNIFICANT CHANGE UP (ref 3.8–10.5)
WBC # FLD AUTO: 8.47 K/UL — SIGNIFICANT CHANGE UP (ref 3.8–10.5)

## 2023-10-11 PROCEDURE — 99232 SBSQ HOSP IP/OBS MODERATE 35: CPT | Mod: 24

## 2023-10-11 PROCEDURE — 99232 SBSQ HOSP IP/OBS MODERATE 35: CPT

## 2023-10-11 PROCEDURE — 71045 X-RAY EXAM CHEST 1 VIEW: CPT | Mod: 26

## 2023-10-11 RX ADMIN — Medication 4 MILLIGRAM(S): at 06:45

## 2023-10-11 RX ADMIN — GABAPENTIN 600 MILLIGRAM(S): 400 CAPSULE ORAL at 06:16

## 2023-10-11 RX ADMIN — SODIUM CHLORIDE 3 MILLILITER(S): 9 INJECTION INTRAMUSCULAR; INTRAVENOUS; SUBCUTANEOUS at 21:51

## 2023-10-11 RX ADMIN — SODIUM CHLORIDE 3 MILLILITER(S): 9 INJECTION INTRAMUSCULAR; INTRAVENOUS; SUBCUTANEOUS at 06:44

## 2023-10-11 RX ADMIN — HEPARIN SODIUM 5000 UNIT(S): 5000 INJECTION INTRAVENOUS; SUBCUTANEOUS at 13:37

## 2023-10-11 RX ADMIN — SODIUM CHLORIDE 3 MILLILITER(S): 9 INJECTION INTRAMUSCULAR; INTRAVENOUS; SUBCUTANEOUS at 13:42

## 2023-10-11 RX ADMIN — Medication 650 MILLIGRAM(S): at 18:30

## 2023-10-11 RX ADMIN — HEPARIN SODIUM 5000 UNIT(S): 5000 INJECTION INTRAVENOUS; SUBCUTANEOUS at 21:42

## 2023-10-11 RX ADMIN — GABAPENTIN 600 MILLIGRAM(S): 400 CAPSULE ORAL at 13:37

## 2023-10-11 RX ADMIN — Medication 650 MILLIGRAM(S): at 09:00

## 2023-10-11 RX ADMIN — CYCLOBENZAPRINE HYDROCHLORIDE 10 MILLIGRAM(S): 10 TABLET, FILM COATED ORAL at 17:38

## 2023-10-11 RX ADMIN — Medication 81 MILLIGRAM(S): at 11:26

## 2023-10-11 RX ADMIN — GABAPENTIN 600 MILLIGRAM(S): 400 CAPSULE ORAL at 21:42

## 2023-10-11 RX ADMIN — SERTRALINE 25 MILLIGRAM(S): 25 TABLET, FILM COATED ORAL at 11:26

## 2023-10-11 RX ADMIN — CYCLOBENZAPRINE HYDROCHLORIDE 10 MILLIGRAM(S): 10 TABLET, FILM COATED ORAL at 08:58

## 2023-10-11 RX ADMIN — Medication 650 MILLIGRAM(S): at 17:37

## 2023-10-11 RX ADMIN — TIOTROPIUM BROMIDE 2 PUFF(S): 18 CAPSULE ORAL; RESPIRATORY (INHALATION) at 11:26

## 2023-10-11 RX ADMIN — ATORVASTATIN CALCIUM 80 MILLIGRAM(S): 80 TABLET, FILM COATED ORAL at 21:42

## 2023-10-11 RX ADMIN — Medication 5 MILLIGRAM(S): at 21:42

## 2023-10-11 RX ADMIN — Medication 650 MILLIGRAM(S): at 08:03

## 2023-10-11 RX ADMIN — HEPARIN SODIUM 5000 UNIT(S): 5000 INJECTION INTRAVENOUS; SUBCUTANEOUS at 06:16

## 2023-10-11 RX ADMIN — Medication 90 MILLIGRAM(S): at 06:16

## 2023-10-11 RX ADMIN — PANTOPRAZOLE SODIUM 40 MILLIGRAM(S): 20 TABLET, DELAYED RELEASE ORAL at 06:16

## 2023-10-11 NOTE — PROGRESS NOTE ADULT - SUBJECTIVE AND OBJECTIVE BOX
Neurology Progress Note    Interval History:    Patient was seen and examined, no acute events over night. Some drowsiness from the medications but overall exercise tolerance is improving.     Medications:  acetaminophen     Tablet .. 650 milliGRAM(s) Oral every 6 hours PRN  aspirin  chewable 81 milliGRAM(s) Oral daily  atorvastatin 80 milliGRAM(s) Oral at bedtime  cyclobenzaprine 10 milliGRAM(s) Oral three times a day PRN  gabapentin 600 milliGRAM(s) Oral three times a day  heparin   Injectable 5000 Unit(s) SubCutaneous every 8 hours  influenza  Vaccine (HIGH DOSE) 0.7 milliLiter(s) IntraMuscular once  methylPREDNISolone 4 milliGRAM(s) Oral before breakfast  methylPREDNISolone   Oral   methylPREDNISolone 4 milliGRAM(s) Oral after lunch  methylPREDNISolone 4 milliGRAM(s) Oral after dinner  methylPREDNISolone 4 milliGRAM(s) Oral at bedtime  pantoprazole    Tablet 40 milliGRAM(s) Oral before breakfast  senna 2 Tablet(s) Oral at bedtime  sertraline 25 milliGRAM(s) Oral daily  sodium chloride 0.9% lock flush 3 milliLiter(s) IV Push every 8 hours  tiotropium 2.5 MICROgram(s) Inhaler 2 Puff(s) Inhalation daily      Vital Signs Last 24 Hrs  T(C): 36.7 (08 Oct 2023 09:02), Max: 36.8 (07 Oct 2023 17:09)  T(F): 98 (08 Oct 2023 09:02), Max: 98.2 (07 Oct 2023 17:09)  HR: 72 (08 Oct 2023 09:05) (59 - 77)  BP: 154/82 (08 Oct 2023 09:05) (142/84 - 162/82)  BP(mean): 111 (08 Oct 2023 09:05) (102 - 114)  RR: 18 (08 Oct 2023 09:05) (18 - 18)  SpO2: 96% (08 Oct 2023 09:05) (94% - 96%)    Parameters below as of 08 Oct 2023 09:05  Patient On (Oxygen Delivery Method): room air        PHYSICAL EXAM:  Mental status: Awake, alert and oriented x3.  Recent and remote memory intact.  Naming, repetition and comprehension intact.  Attention/concentration intact.  No dysarthria, no aphasia.  Fund of knowledge appropriate.    Cranial nerves: Pupils equally round and reactive to light, visual fields full, no nystagmus, extraocular muscles intact, V1 through V3 intact bilaterally and symmetric, face symmetric, hearing intact to finger rub, palate elevation symmetric, tongue was midline.  Motor:  MRC gradin+/5 in the hip flexors on the L, 5/5 on the R hip. 5/5 in he remainder of LE. Normal tone and bulk.  No abnormal movements.    Sensation: Intact to light touch, proprioception, and pinprick.   Coordination: No dysmetria on finger-to-nose   Reflexes: 2+ in bilateral UE/LE  Gait: Able to ambulate several steps with walker    Labs:  CBC Full  -  ( 08 Oct 2023 06:16 )  WBC Count : 7.58 K/uL  RBC Count : 3.06 M/uL  Hemoglobin : 9.8 g/dL  Hematocrit : 29.1 %  Platelet Count - Automated : 205 K/uL  Mean Cell Volume : 95.1 fl  Mean Cell Hemoglobin : 32.0 pg  Mean Cell Hemoglobin Concentration : 33.7 gm/dL     10-08    134<L>  |  102  |  28<H>  ----------------------------<  130<H>  4.8   |  24  |  0.62    Ca    9.6      08 Oct 2023 06:16  Phos  3.4     10-07  Mg     2.0     10-    TPro  8.1  /  Alb  3.5  /  TBili  0.5  /  DBili  x   /  AST  34  /  ALT  48<H>  /  AlkPhos  111  10-07    LIVER FUNCTIONS - ( 07 Oct 2023 06:48 )  Alb: 3.5 g/dL / Pro: 8.1 g/dL / ALK PHOS: 111 U/L / ALT: 48 U/L / AST: 34 U/L / GGT: x             Urinalysis Basic - ( 08 Oct 2023 06:16 )    Color: x / Appearance: x / SG: x / pH: x  Gluc: 130 mg/dL / Ketone: x  / Bili: x / Urobili: x   Blood: x / Protein: x / Nitrite: x   Leuk Esterase: x / RBC: x / WBC x   Sq Epi: x / Non Sq Epi: x / Bacteria: x    RADIOLOGY & ADDITIONAL TESTS:

## 2023-10-11 NOTE — PROGRESS NOTE ADULT - SUBJECTIVE AND OBJECTIVE BOX
Patient discussed on morning rounds with Dr. Toledo     OPERATION & DATE:  s/p TEVAR 9/15  readmit 10/4 with near syncope    SUBJECTIVE ASSESSMENT: Seen sitting bedside. Says his pain is better and his ambulation has improved.     VITAL SIGNS:  Vital Signs Last 24 Hrs  T(C): 36.4 (11 Oct 2023 13:45), Max: 36.8 (11 Oct 2023 01:10)  T(F): 97.5 (11 Oct 2023 13:45), Max: 98.3 (11 Oct 2023 01:10)  HR: 92 (11 Oct 2023 12:11) (75 - 99)  BP: 150/71 (11 Oct 2023 12:11) (121/67 - 150/71)  BP(mean): 102 (11 Oct 2023 12:11) (88 - 102)  RR: 23 (11 Oct 2023 12:11) (16 - 24)  SpO2: 97% (11 Oct 2023 12:11) (96% - 99%)    Parameters below as of 11 Oct 2023 12:11  Patient On (Oxygen Delivery Method): room air    I&O's Detail    10 Oct 2023 07:01  -  11 Oct 2023 07:00  --------------------------------------------------------  IN:    Oral Fluid: 960 mL  Total IN: 960 mL    OUT:    Voided (mL): 1590 mL  Total OUT: 1590 mL    Total NET: -630 mL      11 Oct 2023 07:01  -  11 Oct 2023 16:48  --------------------------------------------------------  IN:  Total IN: 0 mL    OUT:    Voided (mL): 700 mL  Total OUT: 700 mL    Total NET: -700 mL    CHEST TUBE: n   EDMUNDO DRAIN:  n  EPICARDIAL WIRES: n  STITCHES:n  STAPLES:n  SWENSON: n  CENTRAL LINE:n  MIDLINE/PICC:n  WOUND VAC:n    PHYSICAL EXAM:  General: NAD, resting in bed   Neurological: alert and oriented   Cardiovascular: RRR, Clear S1 and S2, no murmurs appreciated  Respiratory: chest expansion symmetrical, CTA b/l, no wheezing noted  Gastrointestinal: +BS, soft, NT, ND  Extremities: no calf tenderness or edema, pain with hip flection, pain with lifting of right leg   Vascular: warm, well perfused. DP/PT pulses palpable b/l.  Incisions: none     LABS:                        12.1   8.47  )-----------( 350      ( 11 Oct 2023 05:30 )             37.0       10-11    135  |  96  |  26<H>  ----------------------------<  106<H>  5.0   |  29  |  0.63    Ca    10.3      11 Oct 2023 05:30  Mg     2.1     10-11    Urinalysis Basic - ( 11 Oct 2023 05:30 )    Color: x / Appearance: x / SG: x / pH: x  Gluc: 106 mg/dL / Ketone: x  / Bili: x / Urobili: x   Blood: x / Protein: x / Nitrite: x   Leuk Esterase: x / RBC: x / WBC x   Sq Epi: x / Non Sq Epi: x / Bacteria: x    MEDICATIONS  (STANDING):  aspirin  chewable 81 milliGRAM(s) Oral daily  atorvastatin 80 milliGRAM(s) Oral at bedtime  gabapentin 600 milliGRAM(s) Oral three times a day  heparin   Injectable 5000 Unit(s) SubCutaneous every 8 hours  influenza  Vaccine (HIGH DOSE) 0.7 milliLiter(s) IntraMuscular once  NIFEdipine XL 90 milliGRAM(s) Oral daily  pantoprazole    Tablet 40 milliGRAM(s) Oral before breakfast  senna 2 Tablet(s) Oral at bedtime  sertraline 25 milliGRAM(s) Oral daily  sodium chloride 0.9% lock flush 3 milliLiter(s) IV Push every 8 hours  tiotropium 2.5 MICROgram(s) Inhaler 2 Puff(s) Inhalation daily    MEDICATIONS  (PRN):  acetaminophen     Tablet .. 650 milliGRAM(s) Oral every 6 hours PRN Temp greater or equal to 38C (100.4F), Mild Pain (1 - 3)  bisacodyl 5 milliGRAM(s) Oral every 12 hours PRN Constipation  cyclobenzaprine 10 milliGRAM(s) Oral three times a day PRN Muscle Spasm    RADIOLOGY & ADDITIONAL TESTS:

## 2023-10-11 NOTE — PROGRESS NOTE ADULT - ASSESSMENT
67 yo man recently discharged (9/23/23) s/p AAA stent c/b spinal subarachnoid hemorrhage from lumbar drain. Neuropathic pain manageable per patient on gabapentin.  MR Lumbar spine on 9/18/23 additionally showed L2-L3 severe central spinal canal stenosis and severe right and moderate left neural foramen narrowing which may contribute to pain of lower back and legs. Some signs of spinal cord infarction on prior MRI. Suspect pre-syncopal episode is orthostatic in nature.  MRI L-spine (10/5): There now is hyperintense T1 and T2 signal within the spinal canal which appears to be within the thecal sac and is felt to be subarachnoid in nature and not artifact. The signal characteristics would correspond with late subacute hemorrhage. The nerve roots appear thickened and clumped which may be secondary to arachnoiditis. No definite enhancement of the nerve roots is identified.     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 Russ.  Thank you for the courtesy of this consult. Neurology will sign off. Please reach out for any questions or change in neurologic status.   69 yo man recently discharged (9/23/23) s/p AAA stent c/b spinal subarachnoid hemorrhage from lumbar drain. Neuropathic pain manageable per patient on gabapentin.  MR Lumbar spine on 9/18/23 additionally showed L2-L3 severe central spinal canal stenosis and severe right and moderate left neural foramen narrowing which may contribute to pain of lower back and legs. Some signs of spinal cord infarction on prior MRI. Suspect pre-syncopal episode is orthostatic in nature.  MRI L-spine (10/5): There now is hyperintense T1 and T2 signal within the spinal canal which appears to be within the thecal sac and is felt to be subarachnoid in nature and not artifact. The signal characteristics would correspond with late subacute hemorrhage. The nerve roots appear thickened and clumped which may be secondary to arachnoiditis. No definite enhancement of the nerve roots is identified.     Impression: leg pain due to lumbar nerve root irritation from hemorrhagic products, may be developing arachnoiditis although too early to tell. also has spinal stenosis although not changed from prior MRI and likely incidental     Recommendations:  - C/w Gabapentin to 600mg TID standing  - c/w Flexeril 10 mg qhs (if no drowsiness in AM, may c/w 5 mg AM and 10 mg qhs)  - c/w Medrol dosepak   - C/w PT/OT    Discussed with Dr. Russ.  Thank you for the courtesy of this consult. Neurology will sign off. Please reach out for any questions or change in neurologic status.

## 2023-10-11 NOTE — PROGRESS NOTE ADULT - ATTENDING COMMENTS
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 also has spinal stenosis although not changed from prior and likely incidental  continues to improve gradually and able to walk with a walker for us today  continue meds as above  dispo to rehab  call back with further questions
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 He also has severe spinal stenosis at L2/3 with clumping of the nerve roots above this level, that in retrospect was present on prior MRI and is not likely contributing significantly to symptoms since they started only after spinal hemorrhage.   Continue meds as above, will follow

## 2023-10-11 NOTE — PROGRESS NOTE ADULT - ASSESSMENT
Assessment:     69 y/o former smoker (40 yr hx,1/2PPD; quit 09/2022) w/ PMHx of HTN, anemia, arthritis, COPD, s/p mini-AVR (27mmbio), ascending and hemiarch replacement 6/23/22 (postop course c/b infarct to right precentral gyrus), urinary retention s/p ThuLEP procedure (10/6/22), incidental fall 11/2022 (s/p left hip replacement) who was recently admitted to [facility] from 9/14-9/23 where he had pre-op lumbar drain placed with Neuro then underwent completion TEVAR on 9/15/23 with Dr. Toledo for descending aortic aneurysm. Post-op course significant for RLE weakness and blood in CSF, CT Lspine and CTH at the time were unremarkable, but MRI significant for spinal subarachnoid hemorrhage. Patient was discharged on 9/23/23 then presented to [facility] 10/4/23 following a near syncopal episode with back spasm. He was then transferred to [facility] under the care of Dr. Toledo for further work-up and management. Neurology re-consulted, MRI with evidence of arachnoiditis. Medrol dosepak started 10/6/23. C/w Gabapentin and Flexeril. PT following, recommending discharge to neuro rehab.  Awaiting acceptance.      Plan:    Neurovascular:   Arachnoiditis  - neuro following  - c/w medrol dose pack, gabapentin, flexeril, tylenol  - awaiting placement for acute neuro rehab  -Depression  - continue home Zoloft     Cardiovascular:   Aortic Aneurysm s/p TEVAR 9/15 with Dr. Toledo   - CT chest negative for endoleak   Hx of mini-AVR (27mmbio), ascending and hemiarch replacement 6/23/22   CAD   - continue ASA and statin   HTN  - continue Nifedipine 90mg qd   -Hemodynamically stable.   -Monitor: BP, HR, tele    Respiratory:   COPD  - continue home Spiriva   -Oxygenating well on room air  -Encourage continued use of IS 10x/hr and frequent ambulation  -CXR: no acute abnormalities     GI:  -GI PPX: protonix   -PO Diet  -Bowel Regimen: senna/miralax/dulcolax      Renal / :  -Continue to monitor renal function: BUN/Cr: 0.63/26  -Monitor I/O's daily     Endocrine:    -No hx of DM or thyroid dx  -A1c: 5.5   -TSH: 1,2    Hematologic:  Anemia, stable   - no overt s/s of active bleeding   -CBC: H/H-12.1/37.0  -Coagulation Panel.    ID:  -Temperature: afebrile   -CBC: WBC 8.47  -Continue to observe for SIRS/Sepsis Syndrome.    Prophylaxis:  -DVT prophylaxis with 5000 SubQ Heparin q8h.  -Continue with SCD's b/l while patient is at rest     Disposition:  Pending placement to neuro rehab

## 2023-10-12 ENCOUNTER — APPOINTMENT (OUTPATIENT)
Dept: CARDIOTHORACIC SURGERY | Facility: HOSPITAL | Age: 68
End: 2023-10-12

## 2023-10-12 LAB
ANION GAP SERPL CALC-SCNC: 9 MMOL/L — SIGNIFICANT CHANGE UP (ref 5–17)
BUN SERPL-MCNC: 30 MG/DL — HIGH (ref 7–23)
CALCIUM SERPL-MCNC: 9.7 MG/DL — SIGNIFICANT CHANGE UP (ref 8.4–10.5)
CHLORIDE SERPL-SCNC: 99 MMOL/L — SIGNIFICANT CHANGE UP (ref 96–108)
CO2 SERPL-SCNC: 28 MMOL/L — SIGNIFICANT CHANGE UP (ref 22–31)
CREAT SERPL-MCNC: 0.63 MG/DL — SIGNIFICANT CHANGE UP (ref 0.5–1.3)
EGFR: 103 ML/MIN/1.73M2 — SIGNIFICANT CHANGE UP
GLUCOSE SERPL-MCNC: 121 MG/DL — HIGH (ref 70–99)
HCT VFR BLD CALC: 32.6 % — LOW (ref 39–50)
HGB BLD-MCNC: 10.8 G/DL — LOW (ref 13–17)
MAGNESIUM SERPL-MCNC: 2.1 MG/DL — SIGNIFICANT CHANGE UP (ref 1.6–2.6)
MCHC RBC-ENTMCNC: 32 PG — SIGNIFICANT CHANGE UP (ref 27–34)
MCHC RBC-ENTMCNC: 33.1 GM/DL — SIGNIFICANT CHANGE UP (ref 32–36)
MCV RBC AUTO: 96.4 FL — SIGNIFICANT CHANGE UP (ref 80–100)
NRBC # BLD: 0 /100 WBCS — SIGNIFICANT CHANGE UP (ref 0–0)
PLATELET # BLD AUTO: 315 K/UL — SIGNIFICANT CHANGE UP (ref 150–400)
POTASSIUM SERPL-MCNC: 4.4 MMOL/L — SIGNIFICANT CHANGE UP (ref 3.5–5.3)
POTASSIUM SERPL-SCNC: 4.4 MMOL/L — SIGNIFICANT CHANGE UP (ref 3.5–5.3)
RBC # BLD: 3.38 M/UL — LOW (ref 4.2–5.8)
RBC # FLD: 15.2 % — HIGH (ref 10.3–14.5)
SARS-COV-2 RNA SPEC QL NAA+PROBE: SIGNIFICANT CHANGE UP
SODIUM SERPL-SCNC: 136 MMOL/L — SIGNIFICANT CHANGE UP (ref 135–145)
WBC # BLD: 10.48 K/UL — SIGNIFICANT CHANGE UP (ref 3.8–10.5)
WBC # FLD AUTO: 10.48 K/UL — SIGNIFICANT CHANGE UP (ref 3.8–10.5)

## 2023-10-12 PROCEDURE — 99232 SBSQ HOSP IP/OBS MODERATE 35: CPT

## 2023-10-12 PROCEDURE — 71045 X-RAY EXAM CHEST 1 VIEW: CPT | Mod: 26

## 2023-10-12 RX ADMIN — SODIUM CHLORIDE 3 MILLILITER(S): 9 INJECTION INTRAMUSCULAR; INTRAVENOUS; SUBCUTANEOUS at 21:20

## 2023-10-12 RX ADMIN — ATORVASTATIN CALCIUM 80 MILLIGRAM(S): 80 TABLET, FILM COATED ORAL at 21:48

## 2023-10-12 RX ADMIN — GABAPENTIN 600 MILLIGRAM(S): 400 CAPSULE ORAL at 21:48

## 2023-10-12 RX ADMIN — PANTOPRAZOLE SODIUM 40 MILLIGRAM(S): 20 TABLET, DELAYED RELEASE ORAL at 07:17

## 2023-10-12 RX ADMIN — CYCLOBENZAPRINE HYDROCHLORIDE 10 MILLIGRAM(S): 10 TABLET, FILM COATED ORAL at 18:51

## 2023-10-12 RX ADMIN — Medication 90 MILLIGRAM(S): at 07:15

## 2023-10-12 RX ADMIN — Medication 650 MILLIGRAM(S): at 07:16

## 2023-10-12 RX ADMIN — CYCLOBENZAPRINE HYDROCHLORIDE 10 MILLIGRAM(S): 10 TABLET, FILM COATED ORAL at 07:16

## 2023-10-12 RX ADMIN — HEPARIN SODIUM 5000 UNIT(S): 5000 INJECTION INTRAVENOUS; SUBCUTANEOUS at 21:48

## 2023-10-12 RX ADMIN — SODIUM CHLORIDE 3 MILLILITER(S): 9 INJECTION INTRAMUSCULAR; INTRAVENOUS; SUBCUTANEOUS at 07:30

## 2023-10-12 RX ADMIN — GABAPENTIN 600 MILLIGRAM(S): 400 CAPSULE ORAL at 07:16

## 2023-10-12 RX ADMIN — SODIUM CHLORIDE 3 MILLILITER(S): 9 INJECTION INTRAMUSCULAR; INTRAVENOUS; SUBCUTANEOUS at 16:18

## 2023-10-12 RX ADMIN — Medication 650 MILLIGRAM(S): at 17:18

## 2023-10-12 RX ADMIN — Medication 650 MILLIGRAM(S): at 19:46

## 2023-10-12 RX ADMIN — HEPARIN SODIUM 5000 UNIT(S): 5000 INJECTION INTRAVENOUS; SUBCUTANEOUS at 07:17

## 2023-10-12 NOTE — PROGRESS NOTE ADULT - SUBJECTIVE AND OBJECTIVE BOX
Patient discussed on morning rounds with Dr. Toledo    Operation / Date: TEVAR 9/15  readmit 10/4 with near syncope   v  SUBJECTIVE ASSESSMENT:  68y Male assessed at bedside. Reports improved muscle spasms, strength and improved sleep. Denies cp, sob, n/v, fever/chills.         Vital Signs Last 24 Hrs  T(C): 36.3 (12 Oct 2023 13:50), Max: 36.9 (11 Oct 2023 17:08)  T(F): 97.4 (12 Oct 2023 13:50), Max: 98.5 (11 Oct 2023 17:08)  HR: 79 (12 Oct 2023 09:37) (79 - 95)  BP: 152/74 (12 Oct 2023 09:37) (131/72 - 152/74)  BP(mean): 106 (12 Oct 2023 09:37) (95 - 106)  RR: 18 (12 Oct 2023 09:37) (15 - 22)  SpO2: 100% (12 Oct 2023 09:37) (97% - 100%)    Parameters below as of 12 Oct 2023 09:37  Patient On (Oxygen Delivery Method): room air      I&O's Detail    11 Oct 2023 07:01  -  12 Oct 2023 07:00  --------------------------------------------------------  IN:    Oral Fluid: 600 mL  Total IN: 600 mL    OUT:    Voided (mL): 1800 mL  Total OUT: 1800 mL    Total NET: -1200 mL      12 Oct 2023 07:01  -  12 Oct 2023 14:44  --------------------------------------------------------  IN:  Total IN: 0 mL    OUT:    Voided (mL): 350 mL  Total OUT: 350 mL    Total NET: -350 mL          CHEST TUBE: No  EDMUNDO DRAIN:  No.  EPICARDIAL WIRES: No.  TIE DOWNS: No.  SWENSON: No.    PHYSICAL EXAM:    Appearance: No acute distress.  Neurologic: AAOx3, no AMS or focal deficits.  Responds appropriately to verbal and physical stimuli; exhibits purposeful movement in all extremities.  HEENT:   MMM, PERRLA, EOMI	b/l  Neck: Supple  Cardiovascular: RRR, S1 S2. No m/r/g.  Respiratory: No acute respiratory distress. CTA b/l, no w/r/r.   Gastrointestinal:  Soft, non-tender, non-distended, + BS.	  Skin: No rashes. No ecchymoses. No cyanosis.  Extremities: Exhibits normal range of motion, no clubbing, cyanosis or edema.  Vascular: Peripheral pulses palpable 2+ bilaterally.      LABS:                        10.8   10.48 )-----------( 315      ( 12 Oct 2023 05:30 )             32.6       COUMADIN:  No        10-12    136  |  99  |  30<H>  ----------------------------<  121<H>  4.4   |  28  |  0.63    Ca    9.7      12 Oct 2023 05:30  Mg     2.1     10-12        Urinalysis Basic - ( 12 Oct 2023 05:30 )    Color: x / Appearance: x / SG: x / pH: x  Gluc: 121 mg/dL / Ketone: x  / Bili: x / Urobili: x   Blood: x / Protein: x / Nitrite: x   Leuk Esterase: x / RBC: x / WBC x   Sq Epi: x / Non Sq Epi: x / Bacteria: x        MEDICATIONS  (STANDING):  aspirin  chewable 81 milliGRAM(s) Oral daily  atorvastatin 80 milliGRAM(s) Oral at bedtime  gabapentin 600 milliGRAM(s) Oral three times a day  heparin   Injectable 5000 Unit(s) SubCutaneous every 8 hours  influenza  Vaccine (HIGH DOSE) 0.7 milliLiter(s) IntraMuscular once  NIFEdipine XL 90 milliGRAM(s) Oral daily  pantoprazole    Tablet 40 milliGRAM(s) Oral before breakfast  senna 2 Tablet(s) Oral at bedtime  sertraline 25 milliGRAM(s) Oral daily  sodium chloride 0.9% lock flush 3 milliLiter(s) IV Push every 8 hours  tiotropium 2.5 MICROgram(s) Inhaler 2 Puff(s) Inhalation daily    MEDICATIONS  (PRN):  acetaminophen     Tablet .. 650 milliGRAM(s) Oral every 6 hours PRN Temp greater or equal to 38C (100.4F), Mild Pain (1 - 3)  bisacodyl 5 milliGRAM(s) Oral every 12 hours PRN Constipation  cyclobenzaprine 10 milliGRAM(s) Oral three times a day PRN Muscle Spasm        RADIOLOGY & ADDITIONAL TESTS:  < from: Xray Chest 1 View- PORTABLE-Routine (Xray Chest 1 View- PORTABLE-Routine in AM.) (10.12.23 @ 05:34) >  IMPRESSION:    Similar appearance to prior exam 10/11/2023. Postop change. Minimal right   mid lower lung focal atelectasis. No acute infiltrates appearing. No   pleural effusion or pneumothorax.    < end of copied text >

## 2023-10-12 NOTE — PROGRESS NOTE ADULT - ASSESSMENT
67 y/o former smoker (40 yr hx,1/2PPD; quit 09/2022) w/ PMHx of HTN, anemia, arthritis, COPD, s/p mini-AVR (27mmbio), ascending and hemiarch replacement 6/23/22 (postop course c/b infarct to right precentral gyrus), urinary retention s/p ThuLEP procedure (10/6/22), incidental fall 11/2022 (s/p left hip replacement) who was recently admitted to [facility] from 9/14-9/23 where he had pre-op lumbar drain placed with Neuro then underwent completion TEVAR on 9/15/23 with Dr. Toledo for descending aortic aneurysm. Post-op course significant for RLE weakness and blood in CSF, CT Lspine and CTH at the time were unremarkable, but MRI significant for spinal subarachnoid hemorrhage. Patient was discharged on 9/23/23 then presented to [facility] 10/4/23 following a near syncopal episode with back spasm. He was then transferred to [facility] under the care of Dr. Toledo for further work-up and management. Neurology re-consulted, MRI with evidence of arachnoiditis. Medrol dosepak started 10/6/23. C/w Gabapentin and Flexeril. PT following, recommending discharge to neuro rehab.  Awaiting acceptance.      Plan:    Neurovascular:   Arachnoiditis  - neuro following  - c/w gabapentin, flexeril, tylenol  - awaiting placement for acute neuro rehab  -Depression  - continue home Zoloft     Cardiovascular:   Aortic Aneurysm s/p TEVAR 9/15 with Dr. Toledo   - CT chest negative for endoleak   Hx of mini-AVR (27mmbio), ascending and hemiarch replacement 6/23/22   CAD   - continue ASA and statin   HTN  - continue Nifedipine 90mg qd   -Hemodynamically stable.   -Monitor: BP, HR, tele    Respiratory:   COPD  - continue home Spiriva   -Oxygenating well on room air  -Encourage continued use of IS 10x/hr and frequent ambulation  -CXR: no acute abnormalities     GI:  -GI PPX: protonix   -PO Diet  -Bowel Regimen: senna/miralax/dulcolax      Renal / :  -Continue to monitor renal function: BUN/Cr: 30 & 0.63  -Monitor I/O's daily     Endocrine:    -No hx of DM or thyroid dx  -A1c: 5.5   -TSH: 1,2    Hematologic:  Anemia, stable   - no overt s/s of active bleeding   -CBC: H/H-10.8 & 32.6     ID:  -Temperature: afebrile   -CBC: WBC 10.48  -Continue to observe for SIRS/Sepsis Syndrome.    Prophylaxis:  -DVT prophylaxis with 5000 SubQ Heparin q8h.  -Continue with SCD's b/l while patient is at rest     Disposition:  Pending placement to neuro rehab

## 2023-10-13 ENCOUNTER — NON-APPOINTMENT (OUTPATIENT)
Age: 68
End: 2023-10-13

## 2023-10-13 ENCOUNTER — TRANSCRIPTION ENCOUNTER (OUTPATIENT)
Age: 68
End: 2023-10-13

## 2023-10-13 PROCEDURE — 99232 SBSQ HOSP IP/OBS MODERATE 35: CPT | Mod: 24

## 2023-10-13 RX ADMIN — CYCLOBENZAPRINE HYDROCHLORIDE 10 MILLIGRAM(S): 10 TABLET, FILM COATED ORAL at 03:25

## 2023-10-13 RX ADMIN — ATORVASTATIN CALCIUM 80 MILLIGRAM(S): 80 TABLET, FILM COATED ORAL at 21:28

## 2023-10-13 RX ADMIN — GABAPENTIN 600 MILLIGRAM(S): 400 CAPSULE ORAL at 13:24

## 2023-10-13 RX ADMIN — SERTRALINE 25 MILLIGRAM(S): 25 TABLET, FILM COATED ORAL at 13:24

## 2023-10-13 RX ADMIN — Medication 90 MILLIGRAM(S): at 05:38

## 2023-10-13 RX ADMIN — CYCLOBENZAPRINE HYDROCHLORIDE 10 MILLIGRAM(S): 10 TABLET, FILM COATED ORAL at 13:47

## 2023-10-13 RX ADMIN — Medication 650 MILLIGRAM(S): at 09:36

## 2023-10-13 RX ADMIN — SODIUM CHLORIDE 3 MILLILITER(S): 9 INJECTION INTRAMUSCULAR; INTRAVENOUS; SUBCUTANEOUS at 05:32

## 2023-10-13 RX ADMIN — SODIUM CHLORIDE 3 MILLILITER(S): 9 INJECTION INTRAMUSCULAR; INTRAVENOUS; SUBCUTANEOUS at 16:48

## 2023-10-13 RX ADMIN — HEPARIN SODIUM 5000 UNIT(S): 5000 INJECTION INTRAVENOUS; SUBCUTANEOUS at 21:33

## 2023-10-13 RX ADMIN — SENNA PLUS 2 TABLET(S): 8.6 TABLET ORAL at 21:33

## 2023-10-13 RX ADMIN — GABAPENTIN 600 MILLIGRAM(S): 400 CAPSULE ORAL at 05:38

## 2023-10-13 RX ADMIN — GABAPENTIN 600 MILLIGRAM(S): 400 CAPSULE ORAL at 21:28

## 2023-10-13 RX ADMIN — PANTOPRAZOLE SODIUM 40 MILLIGRAM(S): 20 TABLET, DELAYED RELEASE ORAL at 06:04

## 2023-10-13 RX ADMIN — HEPARIN SODIUM 5000 UNIT(S): 5000 INJECTION INTRAVENOUS; SUBCUTANEOUS at 05:40

## 2023-10-13 RX ADMIN — SODIUM CHLORIDE 3 MILLILITER(S): 9 INJECTION INTRAMUSCULAR; INTRAVENOUS; SUBCUTANEOUS at 20:54

## 2023-10-13 RX ADMIN — HEPARIN SODIUM 5000 UNIT(S): 5000 INJECTION INTRAVENOUS; SUBCUTANEOUS at 13:24

## 2023-10-13 RX ADMIN — Medication 81 MILLIGRAM(S): at 13:23

## 2023-10-13 RX ADMIN — Medication 650 MILLIGRAM(S): at 16:48

## 2023-10-13 NOTE — DISCHARGE NOTE PROVIDER - NSDCMRMEDTOKEN_GEN_ALL_CORE_FT
acetaminophen 325 mg oral capsule: 2 cap(s) orally every 6 hours as needed for  mild pain Take 2-3 tablets every 6 hours, as needed, for pain  aspirin 81 mg oral tablet: 1 tab(s) orally once a day  atorvastatin 80 mg oral tablet: 1 tab(s) orally once a day (at bedtime)  gabapentin 300 mg oral capsule: 1 cap(s) orally every 8 hours  NIFEdipine 90 mg oral tablet, extended release: 1 tab(s) orally once a day  senna leaf extract oral tablet: 2 tab(s) orally once a day (at bedtime) as needed for  constipation  Spiriva 18 mcg inhalation capsule: 1 cap(s) inhaled once a day  Wixela Inhub 250 mcg-50 mcg inhalation powder: 1 inhaled once a day  Zoloft 25 mg oral tablet: 1 orally once a day   acetaminophen 325 mg oral capsule: 2 cap(s) orally every 6 hours as needed for  mild pain Take 2-3 tablets every 6 hours, as needed, for pain  aspirin 81 mg oral tablet: 1 tab(s) orally once a day  atorvastatin 80 mg oral tablet: 1 tab(s) orally once a day (at bedtime)  cyclobenzaprine 10 mg oral tablet: 1 tab(s) orally 3 times a day as needed for Muscle Spasm  gabapentin 300 mg oral capsule: 1 cap(s) orally every 8 hours  NIFEdipine 90 mg oral tablet, extended release: 1 tab(s) orally once a day  pantoprazole 40 mg oral delayed release tablet: 1 tab(s) orally once a day (before a meal)  senna leaf extract oral tablet: 2 tab(s) orally once a day (at bedtime) as needed for  constipation  Spiriva 18 mcg inhalation capsule: 1 cap(s) inhaled once a day  Wixela Inhub 250 mcg-50 mcg inhalation powder: 1 inhaled once a day  Zoloft 25 mg oral tablet: 1 orally once a day   acetaminophen 325 mg oral capsule: 2 cap(s) orally every 6 hours as needed for  mild pain Take 2-3 tablets every 6 hours, as needed, for pain  aspirin 81 mg oral tablet: 1 tab(s) orally once a day  atorvastatin 80 mg oral tablet: 1 tab(s) orally once a day (at bedtime)  cyclobenzaprine 10 mg oral tablet: 1 tab(s) orally 3 times a day as needed for Muscle Spasm  gabapentin 300 mg oral capsule: 1 cap(s) orally every 8 hours  meclizine 12.5 mg oral tablet: 1 tab(s) orally as needed for  dizziness  NIFEdipine 90 mg oral tablet, extended release: 1 tab(s) orally once a day  pantoprazole 40 mg oral delayed release tablet: 1 tab(s) orally once a day (before a meal)  senna leaf extract oral tablet: 2 tab(s) orally once a day (at bedtime) as needed for  constipation  Spiriva 18 mcg inhalation capsule: 1 cap(s) inhaled once a day  Wixela Inhub 250 mcg-50 mcg inhalation powder: 1 inhaled once a day  Zoloft 25 mg oral tablet: 1 orally once a day

## 2023-10-13 NOTE — DISCHARGE NOTE PROVIDER - NSDCFUSCHEDAPPT_GEN_ALL_CORE_FT
Richard Rock  [facility]  VASCULAR [address]  Scheduled Appointment: 10/17/2023    Marc Shaver  [facility]  NEUROLOGY [address]  Scheduled Appointment: 12/07/2023     Richard Rock  [facility]  VASCULAR [address]  Scheduled Appointment: 10/17/2023    Faisal Toledo  [facility]  CTSURG [address]  Scheduled Appointment: 11/01/2023    Marc Shaver  [facility]  NEUROLOGY [address]  Scheduled Appointment: 12/07/2023     Faisal Toledo  [facility]  CTSURG [address]  Scheduled Appointment: 11/01/2023    Marc Shaver  [facility]  NEUROLOGY [address]  Scheduled Appointment: 12/07/2023

## 2023-10-13 NOTE — DISCHARGE NOTE PROVIDER - NSDCFUADDAPPT_GEN_ALL_CORE_FT
Please call office to schedule appointments once discharged from rehab. Please call our office at [phone] if you need to change your appointment with Dr. Toledo.

## 2023-10-13 NOTE — DISCHARGE NOTE PROVIDER - HOSPITAL COURSE
Patient discussed on morning rounds with Dr. Toledo    Operation Date: re-admitted with near-syncopal episode & lower back pain  s/p completion TEVAR on 9/15/23    Primary Surgeon/Attending MD: Dr. 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 Toledo for descending aortic aneurysm. Post-op course significant for RLE weakness and blood in CSF, CT Lspine and CTH at the time were unremarkable, but MRI significant for spinal subarachnoid hemorrhage. Patient was discharged on 9/23/23 then presented to [facility] 10/4/23 following a near syncopal episode with back spasm. He was then transferred to [facility] under the care of Dr. Toledo for further work-up and management. Neurology re-consulted, MRI with evidence of arachnoiditis. Medrol dosepak started 10/6/23, continued on Gabapentin and Flexeril. PT recommended discharge to neuro rehab. On _____ patient accepted to _____ and cleared for discharge per Dr. Toledo.   _ _ _ _ _ _ _ _ _ _ _ _  DISCHARGE PHYSICAL EXAM:  General:  Neuro:  Cardio:  Pulm:  GI/:  Vascular:  Extremities:  Incisions:  _ _ _ _ _ _ _ _ _ _ _ _  REMOVAL CHECKLIST:        [X ] Epicardial wires          [X ] Stitches/tie downs,   If no, why? ______          [X ] PICC/Midline,   If no, why? ________  _ _ _ _ _ _ _ _ _ _ _ _  CLINICAL FOLLOW UP NEEDS:     [ ] Labwork           Labs needed:           When/Timing:           Outpatient team aware: YES/NO         [ ] Imaging            Type:           When/Timing:           Outpatient team aware: YES/NO       [ ] Home equipment           Type: (i.e. wound vac, pneumostat, prevena, wet/dry dressings, picc/midlines, MCOT, fuchs etc)           Specific needs:           Outpatient team aware: YES/NO  _ _ _ _ _ _ _ _ _ _ _ _  Over 35 minutes was spent with the patient reviewing the discharge material including medications, follow up appointments, recovery, concerning symptoms, and how to contact their health care providers if they have questions   Patient discussed on morning rounds with Dr. Toledo    Operation Date: re-admitted with near-syncopal episode & lower back pain  s/p completion TEVAR on 9/15/23    Primary Surgeon/Attending MD: Dr. 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 from 9/14-9/23 where he had pre-op lumbar drain placed with Neuro then underwent completion TEVAR on 9/15/23 with Dr. Toledo for descending aortic aneurysm. Post-op course significant for RLE weakness and blood in CSF, CT Lspine and CTH at the time were unremarkable, but MRI significant for spinal subarachnoid hemorrhage. Patient was discharged on 9/23/23 then presented to [facility] 10/4/23 following a near syncopal episode with back spasm. He was then transferred to [facility] under the care of Dr. Toledo for further work-up and management. Neurology re-consulted, MRI with evidence of arachnoiditis. Medrol dosepak started 10/6/23 and completed on 10/11/2023. Patient was maintained on gabapentin and Flexeril throughout his stay with improvement in his pain. PT recommended discharge to neuro rehab. On _____ patient accepted to _____ and cleared for discharge per Dr. Toledo. At time of discharge, patient was hemodynamically stable, voiding appropriately, ambulating in hallways, and had a bowel movement. Per Dr. Toledo, aniya to send patient to neuro rehab   _ _ _ _ _ _ _ _ _ _ _ _  DISCHARGE PHYSICAL EXAM:  General: Sitting in chair in NAD   Neuro:A&Ox3, no focal deficits, follows commands. Strength 5/5 in RLE, 4/5 in LLE. UE strength 5/5.   Cardio: Regular rate and rhythm, no murmurs, rubs, or gallops  Pulm: Breathing comfortably on RA, lungs are CTA b/l without wheezes, rales, or rhonchi   GI/: Soft, non-distended, non-tender   Vascular: Distal pulses 2+, no varicosities   Extremities: Warm and well perfused, no peripheral edema appreciated     _ _ _ _ _ _ _ _ _ _ _ _  REMOVAL CHECKLIST:        [X ] Epicardial wires          [X ] Stitches/tie downs,   If no, why? ______          [X ] PICC/Midline,   If no, why? ________  _ _ _ _ _ _ _ _ _ _ _ _  CLINICAL FOLLOW UP NEEDS:  None  _ _ _ _ _ _ _ _ _ _ _ _  Over 35 minutes was spent with the patient reviewing the discharge material including medications, follow up appointments, recovery, concerning symptoms, and how to contact their health care providers if they have questions   Patient discussed on morning rounds with Dr. Toledo    Operation Date: re-admitted with near-syncopal episode & lower back pain  s/p completion TEVAR on 9/15/23    Primary Surgeon/Attending MD: Dr. Toledo    Referring Physician: No referring  _ _ _ _ _ _ _ _ _ _ _ _  HOSPITAL COURSE:  Mr. 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 Toledo for descending aortic aneurysm. Post-op course significant for RLE weakness and blood in CSF, CT lumbar spine and CTH at the time were unremarkable, but MRI significant for spinal subarachnoid hemorrhage. Patient was discharged on 9/23/23 then presented to [facility] 10/4/23 following a near syncopal episode with back spasm. He was then transferred to [facility] under the care of Dr. Toledo for further work-up and management. Neurology re-consulted, MRI showed arachnoiditis. Medrol dosepak started 10/6/23 and completed on 10/11/2023. Patient was maintained on gabapentin and Flexeril throughout his stay with improvement in his pain. He worked with PT and PT originally recommended discharge to neuro rehab. While awaiting acceptance from 10/10/23 to 10/19/23, he worked with PT frequently and was cleared for discharge home with home PT. He was cleared for discharge home per Dr. Toledo. At time of discharge, patient was hemodynamically stable, voiding appropriately, ambulating in hallways, and had a bowel movement.   _ _ _ _ _ _ _ _ _ _ _ _  DISCHARGE PHYSICAL EXAM:  General: Sitting in chair in NAD   Neuro:A&Ox3, no focal deficits, follows commands. Strength 5/5 in RLE, 4/5 in LLE. UE strength 5/5.   Cardio: Regular rate and rhythm, no murmurs, rubs, or gallops  Pulm: Breathing comfortably on RA, lungs are CTA b/l without wheezes, rales, or rhonchi   GI/: Soft, non-distended, non-tender   Vascular: Distal pulses 2+, no varicosities   Extremities: Warm and well perfused, no peripheral edema appreciated     _ _ _ _ _ _ _ _ _ _ _ _  REMOVAL CHECKLIST:        [X ] Epicardial wires          [X ] Stitches/tie downs,   If no, why? ______          [X ] PICC/Midline,   If no, why? ________  _ _ _ _ _ _ _ _ _ _ _ _  CLINICAL FOLLOW UP NEEDS:  None  _ _ _ _ _ _ _ _ _ _ _ _  Over 35 minutes was spent with the patient reviewing the discharge material including medications, follow up appointments, recovery, concerning symptoms, and how to contact their health care providers if they have questions   Patient discussed on morning rounds with Dr. Toledo    Operation Date: re-admitted with near-syncopal episode & lower back pain  s/p completion TEVAR on 9/15/23    Primary Surgeon/Attending MD: Dr. Toledo    Referring Physician: No referring  _ _ _ _ _ _ _ _ _ _ _ _  HOSPITAL COURSE:  Mr. 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 Toledo for descending aortic aneurysm. Post-op course significant for RLE weakness and blood in CSF, CT lumbar spine and CTH at the time were unremarkable, but MRI significant for spinal subarachnoid hemorrhage. Patient was discharged on 9/23/23 then presented to [facility] 10/4/23 following a near syncopal episode with back spasm. He was then transferred to [facility] under the care of Dr. Toledo for further work-up and management. Neurology re-consulted, MRI showed arachnoiditis. Medrol dosepak started 10/6/23 and completed on 10/11/2023. Patient was maintained on gabapentin and Flexeril throughout his stay with improvement in his pain. He worked with PT and PT originally recommended discharge to neuro rehab. While awaiting acceptance from 10/10/23 to 10/19/23, he worked with PT frequently and was cleared for discharge home with home PT. He was cleared for discharge home per Dr. Toledo. At time of discharge, patient was hemodynamically stable, voiding appropriately, ambulating in hallways, and had a bowel movement.   _ _ _ _ _ _ _ _ _ _ _ _  DISCHARGE PHYSICAL EXAM:  General: Sitting in chair in NAD   Neuro:A&Ox3, no focal deficits, follows commands. Strength 5/5 in RLE, 4/5 in LLE. UE strength 5/5.   Cardio: Regular rate and rhythm, no murmurs, rubs, or gallops  Pulm: Breathing comfortably on RA, lungs are CTA b/l without wheezes, rales, or rhonchi   GI/: Soft, non-distended, non-tender   Vascular: Distal pulses 2+, no varicosities   Extremities: Warm and well perfused, no peripheral edema appreciated   _ _ _ _ _ _ _ _ _ _ _ _  REMOVAL CHECKLIST:        [X ] Epicardial wires          [X ] Stitches/tie downs          [X ] PICC/Midline  _ _ _ _ _ _ _ _ _ _ _ _  CLINICAL FOLLOW UP NEEDS:  None  _ _ _ _ _ _ _ _ _ _ _ _  Over 35 minutes was spent with the patient reviewing the discharge material including medications, follow up appointments, recovery, concerning symptoms, and how to contact their health care providers if they have questions

## 2023-10-13 NOTE — DISCHARGE NOTE PROVIDER - CARE PROVIDERS DIRECT ADDRESSES
,[email or web address]

## 2023-10-13 NOTE — DISCHARGE NOTE PROVIDER - NSDCCPCAREPLAN_GEN_ALL_CORE_FT
PRINCIPAL DISCHARGE DIAGNOSIS  Diagnosis: Arachnoiditis  Assessment and Plan of Treatment:      PRINCIPAL DISCHARGE DIAGNOSIS  Diagnosis: Arachnoiditis  Assessment and Plan of Treatment: You were treated with steroids. It is important to continue exercises at home and take your medications as prescribed.

## 2023-10-13 NOTE — DISCHARGE NOTE PROVIDER - PROVIDER TOKENS
PROVIDER:[TOKEN:[[id number]]],PROVIDER:[TOKEN:[[id number]]] PROVIDER:[TOKEN:[[id number]],SCHEDULEDAPPT:[11/01/2023],SCHEDULEDAPPTTIME:[01:15 PM]],PROVIDER:[TOKEN:[[id number]],SCHEDULEDAPPT:[12/07/2023],SCHEDULEDAPPTTIME:[10:00 AM]]

## 2023-10-13 NOTE — DISCHARGE NOTE PROVIDER - CARE PROVIDER_API CALL
Faisal Toledo  Thoracic and Cardiac Surgery  [address], [address]  [place] [address]  Phone: [phone]  Fax: [phone]  Follow Up Time:     Richard Rock  Vascular Surgery  [address], [address]  [place] [address]  Phone: [phone]  Fax: [phone]  Follow Up Time:    Faisal Toledo  Thoracic and Cardiac Surgery  [address], [address]  [place] [address]  Phone: [phone]  Fax: [phone]  Scheduled Appointment: 11/01/2023 01:15 PM    Marc Shaver  Neurology  [address] [place] [address], [address]  [place] [address]  Phone: [phone]  Fax: [phone]  Scheduled Appointment: 12/07/2023 10:00 AM

## 2023-10-13 NOTE — PROGRESS NOTE ADULT - SUBJECTIVE AND OBJECTIVE BOX
Patient discussed on morning rounds with Dr. Toledo      Operation / Date: re-admitted with near-syncopal episode & lower back pain  s/p completion TEVAR on 9/15/23    SUBJECTIVE ASSESSMENT: Patient seen and examined at bedside. Patient with persistent lower back pain, somewhat improved but still exists with ambulation. Denies chills, chest pain, SOB, palpitations.    Vital Signs Last 24 Hrs  T(C): 36.2 (13 Oct 2023 09:12), Max: 36.8 (13 Oct 2023 01:07)  T(F): 97.2 (13 Oct 2023 09:12), Max: 98.2 (13 Oct 2023 01:07)  HR: 100 (13 Oct 2023 09:24) (90 - 105)  BP: 118/76 (13 Oct 2023 09:24) (108/71 - 160/75)  BP(mean): 93 (13 Oct 2023 09:24) (85 - 108)  RR: 18 (13 Oct 2023 09:24) (15 - 18)  SpO2: 94% (13 Oct 2023 05:00) (94% - 99%)    Parameters below as of 13 Oct 2023 09:24  Patient On (Oxygen Delivery Method): room air    I&O's Detail    12 Oct 2023 07:01  -  13 Oct 2023 07:00  --------------------------------------------------------  IN:  Total IN: 0 mL    OUT:    Voided (mL): 1650 mL  Total OUT: 1650 mL    Total NET: -1650 mL    CHEST TUBE:  None  EDMUNDO DRAIN:  None  EPICARDIAL WIRES: None  TIE DOWNS: None  SWENSON: None    PHYSICAL EXAM:  GENERAL: NAD, lying in bed  HEAD:  Atraumatic, Normocephalic  EYES: EOMI, PERRLA, conjunctiva and sclera clear  ENT: Moist mucous membranes  NECK: Supple, No JVD  CHEST/LUNG: CTAB; MSI healing well  HEART: RRR  ABDOMEN: Soft, Nontender, Nondistended. No hepatomegally  EXTREMITIES:  2+ Peripheral Pulses, brisk capillary refill. No clubbing, cyanosis, or edema  NERVOUS SYSTEM:  Alert & Oriented X3, speech clear. No deficits     LABS:                        10.8   10.48 )-----------( 315      ( 12 Oct 2023 05:30 )             32.6     10-12    136  |  99  |  30<H>  ----------------------------<  121<H>  4.4   |  28  |  0.63    Ca    9.7      12 Oct 2023 05:30  Mg     2.1     10-12    Urinalysis Basic - ( 12 Oct 2023 05:30 )    Color: x / Appearance: x / SG: x / pH: x  Gluc: 121 mg/dL / Ketone: x  / Bili: x / Urobili: x   Blood: x / Protein: x / Nitrite: x   Leuk Esterase: x / RBC: x / WBC x   Sq Epi: x / Non Sq Epi: x / Bacteria: x    MEDICATIONS  (STANDING):  aspirin  chewable 81 milliGRAM(s) Oral daily  atorvastatin 80 milliGRAM(s) Oral at bedtime  gabapentin 600 milliGRAM(s) Oral three times a day  heparin   Injectable 5000 Unit(s) SubCutaneous every 8 hours  influenza  Vaccine (HIGH DOSE) 0.7 milliLiter(s) IntraMuscular once  NIFEdipine XL 90 milliGRAM(s) Oral daily  pantoprazole    Tablet 40 milliGRAM(s) Oral before breakfast  senna 2 Tablet(s) Oral at bedtime  sertraline 25 milliGRAM(s) Oral daily  sodium chloride 0.9% lock flush 3 milliLiter(s) IV Push every 8 hours  tiotropium 2.5 MICROgram(s) Inhaler 2 Puff(s) Inhalation daily    MEDICATIONS  (PRN):  acetaminophen     Tablet .. 650 milliGRAM(s) Oral every 6 hours PRN Temp greater or equal to 38C (100.4F), Mild Pain (1 - 3)  bisacodyl 5 milliGRAM(s) Oral every 12 hours PRN Constipation  cyclobenzaprine 10 milliGRAM(s) Oral three times a day PRN Muscle Spasm    RADIOLOGY & ADDITIONAL TESTS:  < from: Xray Chest 1 View- PORTABLE-Routine (Xray Chest 1 View- PORTABLE-Routine in AM.) (10.12.23 @ 05:34) >    IMPRESSION:    Similar appearance to prior exam 10/11/2023. Postop change. Minimal right   mid lower lung focal atelectasis. No acute infiltrates appearing. No   pleural effusion or pneumothorax.    < end of copied text >

## 2023-10-13 NOTE — PROGRESS NOTE ADULT - ASSESSMENT
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 from 9/14-9/23 where he had pre-op lumbar drain placed with Neuro then underwent completion TEVAR on 9/15/23 with Dr. Toledo for descending aortic aneurysm. Post-op course significant for RLE weakness and blood in CSF, CT Lspine and CTH at the time were unremarkable, but MRI significant for spinal subarachnoid hemorrhage. Patient was discharged on 9/23/23 then presented to [facility] 10/4/23 following a near syncopal episode with back spasm. He was then transferred to [facility] under the care of Dr. Toledo for further work-up and management. Neurology re-consulted, MRI with evidence of arachnoiditis. Medrol dosepak started 10/6/23, continued on Gabapentin and Flexeril. PT recommended discharge to neuro rehab. Presently pending authorization.    Plan:    Neurovascular:   -Cont Zoloft  -Arachnoiditis  -Neuro following  -Cont present pain regimen  -Pain well controlled with current regimen. PRN's: Tylenol, Flexeril and Gabapentin    Cardiovascular:   -Descending aortic aneurysm s/p TEVAR on 9/15/23  -Cont ASA and statin  -Hx of HTN  -Cont Nifedepine  -Hemodynamically stable.   -Monitor: BP, HR, tele    Respiratory:   -Hx of COPD  -Cont Spiriva  -Oxygenating well on room air  -Encourage continued use of IS 10x/hr and frequent ambulation  -CXR: pending    GI:  -GI PPX: Protonix  -PO Diet  -Bowel Regimen: Senna, Dulcolax     Renal / :  -Hx of urinary retention s/p ThuLEP procedure (10/6/22)  -Continue to monitor renal function: BUN/Cr: lab holiday  -Monitor I/O's daily     Endocrine:    -No hx of DM or thyroid dx  -A1c: 5.3  -TSH: 2.51    Hematologic:  -Hx of anemia  -CBC: H/H- lab holiday  -Coagulation Panel.    ID:  -Temperature: afebrile   -CBC: WBC- lab holiday  -Continue to observe for SIRS/Sepsis Syndrome.    Prophylaxis:  -DVT prophylaxis with 5000 SubQ Heparin q8h.  -Continue with SCD's b/l while patient is at rest     Disposition:  -neuro rehab auth and bed placement

## 2023-10-14 PROCEDURE — 99232 SBSQ HOSP IP/OBS MODERATE 35: CPT

## 2023-10-14 RX ADMIN — SERTRALINE 25 MILLIGRAM(S): 25 TABLET, FILM COATED ORAL at 11:45

## 2023-10-14 RX ADMIN — Medication 90 MILLIGRAM(S): at 07:58

## 2023-10-14 RX ADMIN — PANTOPRAZOLE SODIUM 40 MILLIGRAM(S): 20 TABLET, DELAYED RELEASE ORAL at 06:31

## 2023-10-14 RX ADMIN — Medication 81 MILLIGRAM(S): at 11:45

## 2023-10-14 RX ADMIN — HEPARIN SODIUM 5000 UNIT(S): 5000 INJECTION INTRAVENOUS; SUBCUTANEOUS at 21:12

## 2023-10-14 RX ADMIN — CYCLOBENZAPRINE HYDROCHLORIDE 10 MILLIGRAM(S): 10 TABLET, FILM COATED ORAL at 06:31

## 2023-10-14 RX ADMIN — HEPARIN SODIUM 5000 UNIT(S): 5000 INJECTION INTRAVENOUS; SUBCUTANEOUS at 14:50

## 2023-10-14 RX ADMIN — SODIUM CHLORIDE 3 MILLILITER(S): 9 INJECTION INTRAMUSCULAR; INTRAVENOUS; SUBCUTANEOUS at 06:27

## 2023-10-14 RX ADMIN — Medication 650 MILLIGRAM(S): at 17:36

## 2023-10-14 RX ADMIN — GABAPENTIN 600 MILLIGRAM(S): 400 CAPSULE ORAL at 07:58

## 2023-10-14 RX ADMIN — HEPARIN SODIUM 5000 UNIT(S): 5000 INJECTION INTRAVENOUS; SUBCUTANEOUS at 06:32

## 2023-10-14 RX ADMIN — ATORVASTATIN CALCIUM 80 MILLIGRAM(S): 80 TABLET, FILM COATED ORAL at 21:12

## 2023-10-14 RX ADMIN — SODIUM CHLORIDE 3 MILLILITER(S): 9 INJECTION INTRAMUSCULAR; INTRAVENOUS; SUBCUTANEOUS at 21:50

## 2023-10-14 RX ADMIN — SODIUM CHLORIDE 3 MILLILITER(S): 9 INJECTION INTRAMUSCULAR; INTRAVENOUS; SUBCUTANEOUS at 13:01

## 2023-10-14 RX ADMIN — GABAPENTIN 600 MILLIGRAM(S): 400 CAPSULE ORAL at 21:12

## 2023-10-14 RX ADMIN — Medication 650 MILLIGRAM(S): at 17:35

## 2023-10-14 RX ADMIN — GABAPENTIN 600 MILLIGRAM(S): 400 CAPSULE ORAL at 14:50

## 2023-10-14 RX ADMIN — CYCLOBENZAPRINE HYDROCHLORIDE 10 MILLIGRAM(S): 10 TABLET, FILM COATED ORAL at 14:50

## 2023-10-14 NOTE — PROGRESS NOTE ADULT - ASSESSMENT
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 from 9/14-9/23 where he had pre-op lumbar drain placed with Neuro then underwent completion TEVAR on 9/15/23 with Dr. Toledo for descending aortic aneurysm. Post-op course significant for RLE weakness and blood in CSF, CT Lspine and CTH at the time were unremarkable, but MRI significant for spinal subarachnoid hemorrhage. Patient was discharged on 9/23/23 then presented to [facility] 10/4/23 following a near syncopal episode with back spasm. He was then transferred to [facility] under the care of Dr. Toledo for further work-up and management. Neurology re-consulted, MRI with evidence of arachnoiditis. Medrol dosepak started 10/6/23, continued on Gabapentin and Flexeril. PT recommended discharge to neuro rehab. Presently pending authorization.    Plan:    Neurovascular:   -Cont Zoloft  -Arachnoiditis  -Neuro following  -Cont present pain regimen  -Pain well controlled with current regimen. PRN's: Tylenol, Flexeril and Gabapentin    Cardiovascular:   -Descending aortic aneurysm s/p TEVAR on 9/15/23  -Cont ASA and statin  -Hx of HTN  -Cont Nifedepine  -Hemodynamically stable.   -Monitor: BP, HR, tele    Respiratory:   -Hx of COPD  -Cont Spiriva  -Oxygenating well on room air  -Encourage continued use of IS 10x/hr and frequent ambulation  -CXR: pending    GI:  -GI PPX: Protonix  -PO Diet  -Bowel Regimen: Senna, Dulcolax     Renal / :  -Hx of urinary retention s/p ThuLEP procedure (10/6/22)  -Continue to monitor renal function: BUN/Cr: lab holiday  -Monitor I/O's daily     Endocrine:    -No hx of DM or thyroid dx  -A1c: 5.3  -TSH: 2.51    Hematologic:  -Hx of anemia  -CBC: H/H- lab holiday  -Coagulation Panel.    ID:  -Temperature: afebrile   -CBC: WBC- lab holiday  -Continue to observe for SIRS/Sepsis Syndrome.    Prophylaxis:  -DVT prophylaxis with 5000 SubQ Heparin q8h.  -Continue with SCD's b/l while patient is at rest     Disposition:  -neuro rehab auth and bed placement

## 2023-10-14 NOTE — PROGRESS NOTE ADULT - SUBJECTIVE AND OBJECTIVE BOX
Patient discussed on morning rounds with Dr. Carrera     Operation / Date:      SUBJECTIVE ASSESSMENT:  68y Male       Vital Signs Last 24 Hrs  T(C): 37.2 (14 Oct 2023 08:58), Max: 37.7 (14 Oct 2023 01:01)  T(F): 99 (14 Oct 2023 08:58), Max: 99.9 (14 Oct 2023 01:01)  HR: 103 (14 Oct 2023 11:45) (94 - 108)  BP: 158/74 (14 Oct 2023 11:45) (138/78 - 158/74)  BP(mean): 106 (14 Oct 2023 11:45) (99 - 106)  RR: 18 (14 Oct 2023 11:45) (16 - 18)  SpO2: 95% (14 Oct 2023 11:45) (90% - 97%)    Parameters below as of 14 Oct 2023 11:45  Patient On (Oxygen Delivery Method): room air    I&O's Detail    13 Oct 2023 07:01  -  14 Oct 2023 07:00  --------------------------------------------------------  IN:    Oral Fluid: 240 mL  Total IN: 240 mL    OUT:    Voided (mL): 1200 mL  Total OUT: 1200 mL    Total NET: -960 mL      14 Oct 2023 07:01  -  14 Oct 2023 13:18  --------------------------------------------------------  IN:  Total IN: 0 mL    OUT:    Voided (mL): 600 mL  Total OUT: 600 mL    Total NET: -600 mL    CHEST TUBE:    EDMUNDO DRAIN:    EPICARDIAL WIRES:   TIE DOWNS:   LEELA:     PHYSICAL EXAM:  *****    MEDICATIONS  (STANDING):  aspirin  chewable 81 milliGRAM(s) Oral daily  atorvastatin 80 milliGRAM(s) Oral at bedtime  gabapentin 600 milliGRAM(s) Oral three times a day  heparin   Injectable 5000 Unit(s) SubCutaneous every 8 hours  influenza  Vaccine (HIGH DOSE) 0.7 milliLiter(s) IntraMuscular once  NIFEdipine XL 90 milliGRAM(s) Oral daily  pantoprazole    Tablet 40 milliGRAM(s) Oral before breakfast  senna 2 Tablet(s) Oral at bedtime  sertraline 25 milliGRAM(s) Oral daily  sodium chloride 0.9% lock flush 3 milliLiter(s) IV Push every 8 hours  tiotropium 2.5 MICROgram(s) Inhaler 2 Puff(s) Inhalation daily    MEDICATIONS  (PRN):  acetaminophen     Tablet .. 650 milliGRAM(s) Oral every 6 hours PRN Temp greater or equal to 38C (100.4F), Mild Pain (1 - 3)  bisacodyl 5 milliGRAM(s) Oral every 12 hours PRN Constipation  cyclobenzaprine 10 milliGRAM(s) Oral three times a day PRN Muscle Spasm        RADIOLOGY & ADDITIONAL TESTS:     Patient discussed on morning rounds with Dr. Carrera     Operation / Date:  re-admitted with near-syncopal episode & lower back pain  s/p completion TEVAR on 9/15/23    SUBJECTIVE ASSESSMENT:  68y Male seen and examined at bedside with no complaints. Pt denies dizziness, vision changes, chest pain, palpitations, shortness of breath, cough, n/v/d, extremity swelling, calf tenderness.     Vital Signs Last 24 Hrs  T(C): 37.2 (14 Oct 2023 08:58), Max: 37.7 (14 Oct 2023 01:01)  T(F): 99 (14 Oct 2023 08:58), Max: 99.9 (14 Oct 2023 01:01)  HR: 103 (14 Oct 2023 11:45) (94 - 108)  BP: 158/74 (14 Oct 2023 11:45) (138/78 - 158/74)  BP(mean): 106 (14 Oct 2023 11:45) (99 - 106)  RR: 18 (14 Oct 2023 11:45) (16 - 18)  SpO2: 95% (14 Oct 2023 11:45) (90% - 97%)    Parameters below as of 14 Oct 2023 11:45  Patient On (Oxygen Delivery Method): room air    I&O's Detail    13 Oct 2023 07:01  -  14 Oct 2023 07:00  --------------------------------------------------------  IN:    Oral Fluid: 240 mL  Total IN: 240 mL    OUT:    Voided (mL): 1200 mL  Total OUT: 1200 mL    Total NET: -960 mL    14 Oct 2023 07:01  -  14 Oct 2023 13:18  --------------------------------------------------------  IN:  Total IN: 0 mL    OUT:    Voided (mL): 600 mL  Total OUT: 600 mL    Total NET: -600 mL    PHYSICAL EXAM:  GENERAL: NAD, lying in bed  HEAD:  Atraumatic, Normocephalic  EYES: EOMI, PERRLA, conjunctiva and sclera clear  ENT: Moist mucous membranes  NECK: Supple, No JVD  CHEST/LUNG: CTAB; MSI healing well  HEART: RRR  ABDOMEN: Soft, Nontender, Nondistended. No hepatomegally  EXTREMITIES:  2+ Peripheral Pulses, brisk capillary refill. No clubbing, cyanosis, or edema  NERVOUS SYSTEM:  Alert & Oriented X3, speech clear. No deficits     MEDICATIONS  (STANDING):  aspirin  chewable 81 milliGRAM(s) Oral daily  atorvastatin 80 milliGRAM(s) Oral at bedtime  gabapentin 600 milliGRAM(s) Oral three times a day  heparin   Injectable 5000 Unit(s) SubCutaneous every 8 hours  influenza  Vaccine (HIGH DOSE) 0.7 milliLiter(s) IntraMuscular once  NIFEdipine XL 90 milliGRAM(s) Oral daily  pantoprazole    Tablet 40 milliGRAM(s) Oral before breakfast  senna 2 Tablet(s) Oral at bedtime  sertraline 25 milliGRAM(s) Oral daily  sodium chloride 0.9% lock flush 3 milliLiter(s) IV Push every 8 hours  tiotropium 2.5 MICROgram(s) Inhaler 2 Puff(s) Inhalation daily    MEDICATIONS  (PRN):  acetaminophen     Tablet .. 650 milliGRAM(s) Oral every 6 hours PRN Temp greater or equal to 38C (100.4F), Mild Pain (1 - 3)  bisacodyl 5 milliGRAM(s) Oral every 12 hours PRN Constipation  cyclobenzaprine 10 milliGRAM(s) Oral three times a day PRN Muscle Spasm    RADIOLOGY & ADDITIONAL TESTS:  < from: Xray Chest 1 View- PORTABLE-Routine (Xray Chest 1 View- PORTABLE-Routine in AM.) (10.12.23 @ 05:34) >  IMPRESSION:    Similar appearance to prior exam 10/11/2023. Postop change. Minimal right   mid lower lung focal atelectasis. No acute infiltrates appearing. No   pleural effusion or pneumothorax.    --- End of Report ---    < end of copied text >

## 2023-10-15 PROCEDURE — 99232 SBSQ HOSP IP/OBS MODERATE 35: CPT

## 2023-10-15 RX ADMIN — GABAPENTIN 600 MILLIGRAM(S): 400 CAPSULE ORAL at 21:05

## 2023-10-15 RX ADMIN — SODIUM CHLORIDE 3 MILLILITER(S): 9 INJECTION INTRAMUSCULAR; INTRAVENOUS; SUBCUTANEOUS at 14:17

## 2023-10-15 RX ADMIN — HEPARIN SODIUM 5000 UNIT(S): 5000 INJECTION INTRAVENOUS; SUBCUTANEOUS at 06:40

## 2023-10-15 RX ADMIN — GABAPENTIN 600 MILLIGRAM(S): 400 CAPSULE ORAL at 06:40

## 2023-10-15 RX ADMIN — HEPARIN SODIUM 5000 UNIT(S): 5000 INJECTION INTRAVENOUS; SUBCUTANEOUS at 21:06

## 2023-10-15 RX ADMIN — Medication 650 MILLIGRAM(S): at 17:39

## 2023-10-15 RX ADMIN — SERTRALINE 25 MILLIGRAM(S): 25 TABLET, FILM COATED ORAL at 11:14

## 2023-10-15 RX ADMIN — SODIUM CHLORIDE 3 MILLILITER(S): 9 INJECTION INTRAMUSCULAR; INTRAVENOUS; SUBCUTANEOUS at 21:17

## 2023-10-15 RX ADMIN — SENNA PLUS 2 TABLET(S): 8.6 TABLET ORAL at 21:05

## 2023-10-15 RX ADMIN — SODIUM CHLORIDE 3 MILLILITER(S): 9 INJECTION INTRAMUSCULAR; INTRAVENOUS; SUBCUTANEOUS at 06:45

## 2023-10-15 RX ADMIN — ATORVASTATIN CALCIUM 80 MILLIGRAM(S): 80 TABLET, FILM COATED ORAL at 21:05

## 2023-10-15 RX ADMIN — Medication 90 MILLIGRAM(S): at 06:40

## 2023-10-15 RX ADMIN — Medication 81 MILLIGRAM(S): at 11:14

## 2023-10-15 RX ADMIN — Medication 650 MILLIGRAM(S): at 16:45

## 2023-10-15 RX ADMIN — GABAPENTIN 600 MILLIGRAM(S): 400 CAPSULE ORAL at 14:32

## 2023-10-15 RX ADMIN — HEPARIN SODIUM 5000 UNIT(S): 5000 INJECTION INTRAVENOUS; SUBCUTANEOUS at 14:32

## 2023-10-15 RX ADMIN — PANTOPRAZOLE SODIUM 40 MILLIGRAM(S): 20 TABLET, DELAYED RELEASE ORAL at 06:40

## 2023-10-15 RX ADMIN — CYCLOBENZAPRINE HYDROCHLORIDE 10 MILLIGRAM(S): 10 TABLET, FILM COATED ORAL at 21:05

## 2023-10-15 NOTE — PROGRESS NOTE ADULT - SUBJECTIVE AND OBJECTIVE BOX
Patient discussed on morning rounds with Dr. Carrera     Operation / Date: re-admitted with near-syncopal episode & lower back pain  s/p completion TEVAR on 9/15/23    SUBJECTIVE ASSESSMENT:  68y Male seen and examined at bedside with no complaints. Pt stated his muscle spasms have improved and are almost completely gone. Pt denies dizziness, vision changes, chest pain, palpitations, shortness of breath, cough, n/v/d, extremity swelling, calf tenderness.     Vital Signs Last 24 Hrs  T(C): 36.5 (15 Oct 2023 14:53), Max: 38.2 (14 Oct 2023 18:55)  T(F): 97.7 (15 Oct 2023 14:53), Max: 100.7 (14 Oct 2023 18:55)  HR: 97 (15 Oct 2023 12:45) (83 - 108)  BP: 131/74 (15 Oct 2023 12:45) (119/70 - 143/75)  BP(mean): 97 (15 Oct 2023 12:45) (86 - 103)  RR: 16 (15 Oct 2023 12:45) (16 - 18)  SpO2: 99% (15 Oct 2023 12:45) (94% - 99%)    Parameters below as of 15 Oct 2023 12:45  Patient On (Oxygen Delivery Method): room air      I&O's Detail    14 Oct 2023 07:01  -  15 Oct 2023 07:00  --------------------------------------------------------  IN:    Oral Fluid: 920 mL  Total IN: 920 mL    OUT:    Voided (mL): 2050 mL  Total OUT: 2050 mL    Total NET: -1130 mL      15 Oct 2023 07:01  -  15 Oct 2023 16:39  --------------------------------------------------------  IN:    Oral Fluid: 240 mL  Total IN: 240 mL    OUT:    Voided (mL): 300 mL  Total OUT: 300 mL    Total NET: -60 mL    PHYSICAL EXAM:  GENERAL: NAD, lying in bed  HEAD:  Atraumatic, Normocephalic  EYES: EOMI, PERRLA, conjunctiva and sclera clear  ENT: Moist mucous membranes  NECK: Supple, No JVD  CHEST/LUNG: CTAB; MSI healing well  HEART: RRR  ABDOMEN: Soft, Nontender, Nondistended. No hepatomegally  EXTREMITIES:  2+ Peripheral Pulses, brisk capillary refill. No clubbing, cyanosis, or edema  NERVOUS SYSTEM:  Alert & Oriented X3, speech clear. No deficits     MEDICATIONS  (STANDING):  aspirin  chewable 81 milliGRAM(s) Oral daily  atorvastatin 80 milliGRAM(s) Oral at bedtime  gabapentin 600 milliGRAM(s) Oral three times a day  heparin   Injectable 5000 Unit(s) SubCutaneous every 8 hours  influenza  Vaccine (HIGH DOSE) 0.7 milliLiter(s) IntraMuscular once  NIFEdipine XL 90 milliGRAM(s) Oral daily  pantoprazole    Tablet 40 milliGRAM(s) Oral before breakfast  senna 2 Tablet(s) Oral at bedtime  sertraline 25 milliGRAM(s) Oral daily  sodium chloride 0.9% lock flush 3 milliLiter(s) IV Push every 8 hours  tiotropium 2.5 MICROgram(s) Inhaler 2 Puff(s) Inhalation daily    MEDICATIONS  (PRN):  acetaminophen     Tablet .. 650 milliGRAM(s) Oral every 6 hours PRN Temp greater or equal to 38C (100.4F), Mild Pain (1 - 3)  bisacodyl 5 milliGRAM(s) Oral every 12 hours PRN Constipation  cyclobenzaprine 10 milliGRAM(s) Oral three times a day PRN Muscle Spasm    RADIOLOGY & ADDITIONAL TESTS:  < from: Xray Chest 1 View- PORTABLE-Routine (Xray Chest 1 View- PORTABLE-Routine in AM.) (10.12.23 @ 05:34) >  IMPRESSION:    Similar appearance to prior exam 10/11/2023. Postop change. Minimal right   mid lower lung focal atelectasis. No acute infiltrates appearing. No   pleural effusion or pneumothorax.    --- End of Report ---      < end of copied text >

## 2023-10-15 NOTE — PROGRESS NOTE ADULT - ASSESSMENT
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 Toledo for descending aortic aneurysm. Post-op course significant for RLE weakness and blood in CSF, CT Lspine and CTH at the time were unremarkable, but MRI significant for spinal subarachnoid hemorrhage. Patient was discharged on 9/23/23 then presented to [facility] 10/4/23 following a near syncopal episode with back spasm. He was then transferred to [facility] under the care of Dr. Toledo for further work-up and management. Neurology re-consulted, MRI with evidence of arachnoiditis. Medrol dosepak started 10/6/23, continued on Gabapentin and Flexeril. PT recommended discharge to neuro rehab. Presently pending authorization, possibly Monday 10/16    Plan:    Neurovascular:   -Cont Zoloft  -Arachnoiditis  -Neuro following  -Cont present pain regimen  -Pain well controlled with current regimen. PRN's: Tylenol, Flexeril and Gabapentin    Cardiovascular:   -Descending aortic aneurysm s/p TEVAR on 9/15/23  -Cont ASA and statin  -Hx of HTN  -Cont Nifedepine  -Hemodynamically stable.   -Monitor: BP, HR, tele    Respiratory:   -Hx of COPD  -Cont Spiriva  -Oxygenating well on room air  -Encourage continued use of IS 10x/hr and frequent ambulation  -CXR: pending    GI:  -GI PPX: Protonix  -PO Diet  -Bowel Regimen: Senna, Dulcolax     Renal / :  -Hx of urinary retention s/p ThuLEP procedure (10/6/22)  -Continue to monitor renal function: BUN/Cr: lab holiday  -Monitor I/O's daily     Endocrine:    -No hx of DM or thyroid dx  -A1c: 5.3  -TSH: 2.51    Hematologic:  -Hx of anemia  -CBC: H/H- lab holiday  -Coagulation Panel.    ID:  -Temperature: afebrile   -CBC: WBC- lab holiday  -Continue to observe for SIRS/Sepsis Syndrome.    Prophylaxis:  -DVT prophylaxis with 5000 SubQ Heparin q8h.  -Continue with SCD's b/l while patient is at rest     Disposition:  -neuro rehab auth and bed placement

## 2023-10-16 LAB
ANION GAP SERPL CALC-SCNC: 11 MMOL/L — SIGNIFICANT CHANGE UP (ref 5–17)
BUN SERPL-MCNC: 24 MG/DL — HIGH (ref 7–23)
CALCIUM SERPL-MCNC: 9.9 MG/DL — SIGNIFICANT CHANGE UP (ref 8.4–10.5)
CHLORIDE SERPL-SCNC: 97 MMOL/L — SIGNIFICANT CHANGE UP (ref 96–108)
CO2 SERPL-SCNC: 26 MMOL/L — SIGNIFICANT CHANGE UP (ref 22–31)
CREAT SERPL-MCNC: 0.72 MG/DL — SIGNIFICANT CHANGE UP (ref 0.5–1.3)
EGFR: 99 ML/MIN/1.73M2 — SIGNIFICANT CHANGE UP
GLUCOSE SERPL-MCNC: 103 MG/DL — HIGH (ref 70–99)
HCT VFR BLD CALC: 35.8 % — LOW (ref 39–50)
HGB BLD-MCNC: 11.6 G/DL — LOW (ref 13–17)
MAGNESIUM SERPL-MCNC: 2 MG/DL — SIGNIFICANT CHANGE UP (ref 1.6–2.6)
MCHC RBC-ENTMCNC: 31.5 PG — SIGNIFICANT CHANGE UP (ref 27–34)
MCHC RBC-ENTMCNC: 32.4 GM/DL — SIGNIFICANT CHANGE UP (ref 32–36)
MCV RBC AUTO: 97.3 FL — SIGNIFICANT CHANGE UP (ref 80–100)
NRBC # BLD: 0 /100 WBCS — SIGNIFICANT CHANGE UP (ref 0–0)
PLATELET # BLD AUTO: 263 K/UL — SIGNIFICANT CHANGE UP (ref 150–400)
POTASSIUM SERPL-MCNC: 4.4 MMOL/L — SIGNIFICANT CHANGE UP (ref 3.5–5.3)
POTASSIUM SERPL-SCNC: 4.4 MMOL/L — SIGNIFICANT CHANGE UP (ref 3.5–5.3)
RBC # BLD: 3.68 M/UL — LOW (ref 4.2–5.8)
RBC # FLD: 14.8 % — HIGH (ref 10.3–14.5)
SODIUM SERPL-SCNC: 134 MMOL/L — LOW (ref 135–145)
WBC # BLD: 6.34 K/UL — SIGNIFICANT CHANGE UP (ref 3.8–10.5)
WBC # FLD AUTO: 6.34 K/UL — SIGNIFICANT CHANGE UP (ref 3.8–10.5)

## 2023-10-16 PROCEDURE — 99232 SBSQ HOSP IP/OBS MODERATE 35: CPT

## 2023-10-16 PROCEDURE — 71275 CT ANGIOGRAPHY CHEST: CPT | Mod: 26

## 2023-10-16 RX ORDER — SODIUM CHLORIDE 9 MG/ML
500 INJECTION, SOLUTION INTRAVENOUS ONCE
Refills: 0 | Status: COMPLETED | OUTPATIENT
Start: 2023-10-16 | End: 2023-10-16

## 2023-10-16 RX ORDER — SODIUM CHLORIDE 9 MG/ML
500 INJECTION, SOLUTION INTRAVENOUS ONCE
Refills: 0 | Status: DISCONTINUED | OUTPATIENT
Start: 2023-10-16 | End: 2023-10-16

## 2023-10-16 RX ADMIN — SODIUM CHLORIDE 3 MILLILITER(S): 9 INJECTION INTRAMUSCULAR; INTRAVENOUS; SUBCUTANEOUS at 07:09

## 2023-10-16 RX ADMIN — Medication 81 MILLIGRAM(S): at 11:43

## 2023-10-16 RX ADMIN — PANTOPRAZOLE SODIUM 40 MILLIGRAM(S): 20 TABLET, DELAYED RELEASE ORAL at 06:44

## 2023-10-16 RX ADMIN — HEPARIN SODIUM 5000 UNIT(S): 5000 INJECTION INTRAVENOUS; SUBCUTANEOUS at 06:44

## 2023-10-16 RX ADMIN — ATORVASTATIN CALCIUM 80 MILLIGRAM(S): 80 TABLET, FILM COATED ORAL at 21:28

## 2023-10-16 RX ADMIN — CYCLOBENZAPRINE HYDROCHLORIDE 10 MILLIGRAM(S): 10 TABLET, FILM COATED ORAL at 21:28

## 2023-10-16 RX ADMIN — HEPARIN SODIUM 5000 UNIT(S): 5000 INJECTION INTRAVENOUS; SUBCUTANEOUS at 21:27

## 2023-10-16 RX ADMIN — SERTRALINE 25 MILLIGRAM(S): 25 TABLET, FILM COATED ORAL at 11:44

## 2023-10-16 RX ADMIN — TIOTROPIUM BROMIDE 2 PUFF(S): 18 CAPSULE ORAL; RESPIRATORY (INHALATION) at 11:46

## 2023-10-16 RX ADMIN — Medication 90 MILLIGRAM(S): at 06:44

## 2023-10-16 RX ADMIN — SENNA PLUS 2 TABLET(S): 8.6 TABLET ORAL at 21:27

## 2023-10-16 RX ADMIN — SODIUM CHLORIDE 3 MILLILITER(S): 9 INJECTION INTRAMUSCULAR; INTRAVENOUS; SUBCUTANEOUS at 21:12

## 2023-10-16 RX ADMIN — GABAPENTIN 600 MILLIGRAM(S): 400 CAPSULE ORAL at 21:27

## 2023-10-16 RX ADMIN — SODIUM CHLORIDE 500 MILLILITER(S): 9 INJECTION, SOLUTION INTRAVENOUS at 12:16

## 2023-10-16 RX ADMIN — SODIUM CHLORIDE 500 MILLILITER(S): 9 INJECTION, SOLUTION INTRAVENOUS at 13:45

## 2023-10-16 RX ADMIN — Medication 5 MILLIGRAM(S): at 14:36

## 2023-10-16 RX ADMIN — HEPARIN SODIUM 5000 UNIT(S): 5000 INJECTION INTRAVENOUS; SUBCUTANEOUS at 13:56

## 2023-10-16 RX ADMIN — GABAPENTIN 600 MILLIGRAM(S): 400 CAPSULE ORAL at 13:57

## 2023-10-16 RX ADMIN — SODIUM CHLORIDE 3 MILLILITER(S): 9 INJECTION INTRAMUSCULAR; INTRAVENOUS; SUBCUTANEOUS at 14:01

## 2023-10-16 RX ADMIN — GABAPENTIN 600 MILLIGRAM(S): 400 CAPSULE ORAL at 06:44

## 2023-10-16 NOTE — PROGRESS NOTE ADULT - SUBJECTIVE AND OBJECTIVE BOX
Patient discussed on morning rounds with       Operation / Date:     SUBJECTIVE ASSESSMENT:  Patient complaining of shortness of breath this AM, states it began yesterday with ambulation. Yesterday was first day patient has ambulated in hallway. Noted to be tachycardic to 120 during encounter   Patient also endorses dizziness with rising from sitting to standing   Voiding independently, moving his bowels, pain controlled with current regimen, using Is appropriately   Denies any fevers, chills, headache, chest pain, abdominal pain, nausea, vomiting, changes in bowel or bladder, paresthesias, or any other acute complaint.      Vital Signs Last 24 Hrs  T(C): 36.8 (16 Oct 2023 13:21), Max: 37 (16 Oct 2023 09:27)  T(F): 98.2 (16 Oct 2023 13:21), Max: 98.6 (16 Oct 2023 09:27)  HR: 108 (16 Oct 2023 12:28) (82 - 110)  BP: 121/72 (16 Oct 2023 12:28) (119/70 - 131/74)  BP(mean): 91 (16 Oct 2023 12:28) (89 - 102)  RR: 18 (16 Oct 2023 12:28) (15 - 18)  SpO2: 98% (16 Oct 2023 12:28) (95% - 98%)    Parameters below as of 16 Oct 2023 12:28  Patient On (Oxygen Delivery Method): room air      I&O's Detail    15 Oct 2023 07:01  -  16 Oct 2023 07:00  --------------------------------------------------------  IN:    Oral Fluid: 1200 mL  Total IN: 1200 mL    OUT:    Voided (mL): 1975 mL  Total OUT: 1975 mL    Total NET: -775 mL      16 Oct 2023 07:01  -  16 Oct 2023 14:08  --------------------------------------------------------  IN:    Albumin 5%  - 250 mL: 500 mL    Oral Fluid: 760 mL  Total IN: 1260 mL    OUT:    Voided (mL): 600 mL  Total OUT: 600 mL    Total NET: 660 mL          CHEST TUBE:  No.    EDMUNDO DRAIN:  No.  EPICARDIAL WIRES: No.  TIE DOWNS: No.  SWENSON: No.    PHYSICAL EXAM:    General: Sitting in chair comfortably in NAD  Neuro: A&Ox3, no focal deficits. follows commands, Strength 5/5 in RLE, 4/5 in LLE. Sensation to light touch intact   HEENT: NCAT, EOMI   Cardiac: Tachycardic rate and rhythm, normal S1 and S2. No m/r/g   Pulm: Breathing comfortably on RA. No signs of respiratory distress. Lungs are CTA b/l without wheezes, rales, or rhonchi   Abdomen: Soft, non-distended, non-tender.   : No Swenson  Extremities: Warm and well perfused, no peripheral edema, distal pulses 2+. No calf tenderness. SCDs and TEDs in place  MSK: Full AROM     LABS:                        11.6   6.34  )-----------( 263      ( 16 Oct 2023 05:30 )             35.8       10-16    134<L>  |  97  |  24<H>  ----------------------------<  103<H>  4.4   |  26  |  0.72    Ca    9.9      16 Oct 2023 05:30  Mg     2.0     10-16        Urinalysis Basic - ( 16 Oct 2023 05:30 )    Color: x / Appearance: x / SG: x / pH: x  Gluc: 103 mg/dL / Ketone: x  / Bili: x / Urobili: x   Blood: x / Protein: x / Nitrite: x   Leuk Esterase: x / RBC: x / WBC x   Sq Epi: x / Non Sq Epi: x / Bacteria: x        MEDICATIONS  (STANDING):  aspirin  chewable 81 milliGRAM(s) Oral daily  atorvastatin 80 milliGRAM(s) Oral at bedtime  gabapentin 600 milliGRAM(s) Oral three times a day  heparin   Injectable 5000 Unit(s) SubCutaneous every 8 hours  influenza  Vaccine (HIGH DOSE) 0.7 milliLiter(s) IntraMuscular once  lactated ringers Bolus 500 milliLiter(s) IV Bolus once  NIFEdipine XL 90 milliGRAM(s) Oral daily  pantoprazole    Tablet 40 milliGRAM(s) Oral before breakfast  senna 2 Tablet(s) Oral at bedtime  sertraline 25 milliGRAM(s) Oral daily  sodium chloride 0.9% lock flush 3 milliLiter(s) IV Push every 8 hours  tiotropium 2.5 MICROgram(s) Inhaler 2 Puff(s) Inhalation daily    MEDICATIONS  (PRN):  acetaminophen     Tablet .. 650 milliGRAM(s) Oral every 6 hours PRN Temp greater or equal to 38C (100.4F), Mild Pain (1 - 3)  bisacodyl 5 milliGRAM(s) Oral every 12 hours PRN Constipation  cyclobenzaprine 10 milliGRAM(s) Oral three times a day PRN Muscle Spasm        RADIOLOGY & ADDITIONAL TESTS:

## 2023-10-16 NOTE — PROGRESS NOTE ADULT - ASSESSMENT
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 from 9/14-9/23 where he had pre-op lumbar drain placed with Neuro then underwent completion TEVAR on 9/15/23 with Dr. Toledo for descending aortic aneurysm. Post-op course significant for RLE weakness and blood in CSF, CT Lspine and CTH at the time were unremarkable, but MRI significant for spinal subarachnoid hemorrhage. Patient was discharged on 9/23/23. He then presented to [facility] on 10/4/23 following a near syncopal episode with back spasm. He was transferred to [facility] under the care of Dr. Toledo for further work-up and management. Neurology was re-consulted and recommended MRI which showed evidence of arachnoiditis. Medrol dosepak started 10/6/23 and finished on 10/11/2023. His pain was been controlled on gabapentin and flexeril. PT has recommended discharge to neuro rehab, currently awaiting auth. On 10/16/2023, patient was noted to be tachycardic and short of breath. CT PE study obtained, which was negative. Given 1 L of LR fluid for suspected dehydration.     Plan:    Neurovascular:   -Arachnoiditis/Lower Back Spasm      - gabapentin 600 mg TID, APAP and Flexeril PRN for pain which is well controlled   - Zoloft   -Neurology has signed off on 10/11/2023     Cardiovascular:   - Tachycardia/Shortness of breath      - CT PE study, negative for PE      - given 1 L LR for suspected dehydration   -Descending aortic aneurysm s/p TEVAR on 9/15/23     -Cont ASA and statin  -Hx of HTN    -Cont Nifedepine  -Hemodynamically stable, -120, -130/70-75      Respiratory:   -Hx of COPD    -Cont Spiriva  -Oxygenating well on room air  -Encourage continued use of IS 10x/hr and frequent ambulation  -CTA Chest: Negative for PE      GI:  -GI PPX: Protonix  -PO Diet, tolerating well   -Bowel Regimen: Senna, Dulcolax     Renal / :  -Hx of urinary retention s/p ThuLEP procedure (10/6/22)  -BUN/Cr 24/0.72, stable  -Monitor I/O's daily     Endocrine:    -No hx of DM or thyroid dx  -A1c: 5.3  -TSH: 2.51    Hematologic:  -Hx of anemia  - H/h @ 11.6/35.8   - DVT prophylaxis with 5000 U SubQ heparin     ID:  -Temperature: afebrile   WBC 6.3   -Continue to observe for SIRS/Sepsis Syndrome.    Prophylaxis:  -DVT prophylaxis with 5000 SubQ Heparin q8h.  -Continue with SCD's b/l while patient is at rest     Disposition:  - Pending auth for neuro rehab

## 2023-10-17 ENCOUNTER — APPOINTMENT (OUTPATIENT)
Dept: VASCULAR SURGERY | Facility: CLINIC | Age: 68
End: 2023-10-17

## 2023-10-17 PROCEDURE — 99232 SBSQ HOSP IP/OBS MODERATE 35: CPT | Mod: 24

## 2023-10-17 RX ORDER — MECLIZINE HCL 12.5 MG
12.5 TABLET ORAL ONCE
Refills: 0 | Status: COMPLETED | OUTPATIENT
Start: 2023-10-17 | End: 2023-10-17

## 2023-10-17 RX ADMIN — HEPARIN SODIUM 5000 UNIT(S): 5000 INJECTION INTRAVENOUS; SUBCUTANEOUS at 06:23

## 2023-10-17 RX ADMIN — SODIUM CHLORIDE 3 MILLILITER(S): 9 INJECTION INTRAMUSCULAR; INTRAVENOUS; SUBCUTANEOUS at 07:40

## 2023-10-17 RX ADMIN — TIOTROPIUM BROMIDE 2 PUFF(S): 18 CAPSULE ORAL; RESPIRATORY (INHALATION) at 14:59

## 2023-10-17 RX ADMIN — PANTOPRAZOLE SODIUM 40 MILLIGRAM(S): 20 TABLET, DELAYED RELEASE ORAL at 06:23

## 2023-10-17 RX ADMIN — SODIUM CHLORIDE 3 MILLILITER(S): 9 INJECTION INTRAMUSCULAR; INTRAVENOUS; SUBCUTANEOUS at 22:22

## 2023-10-17 RX ADMIN — GABAPENTIN 600 MILLIGRAM(S): 400 CAPSULE ORAL at 13:48

## 2023-10-17 RX ADMIN — Medication 81 MILLIGRAM(S): at 11:18

## 2023-10-17 RX ADMIN — GABAPENTIN 600 MILLIGRAM(S): 400 CAPSULE ORAL at 06:23

## 2023-10-17 RX ADMIN — ATORVASTATIN CALCIUM 80 MILLIGRAM(S): 80 TABLET, FILM COATED ORAL at 21:52

## 2023-10-17 RX ADMIN — SERTRALINE 25 MILLIGRAM(S): 25 TABLET, FILM COATED ORAL at 11:19

## 2023-10-17 RX ADMIN — Medication 650 MILLIGRAM(S): at 19:13

## 2023-10-17 RX ADMIN — Medication 650 MILLIGRAM(S): at 21:00

## 2023-10-17 RX ADMIN — HEPARIN SODIUM 5000 UNIT(S): 5000 INJECTION INTRAVENOUS; SUBCUTANEOUS at 13:48

## 2023-10-17 RX ADMIN — GABAPENTIN 600 MILLIGRAM(S): 400 CAPSULE ORAL at 21:52

## 2023-10-17 RX ADMIN — Medication 90 MILLIGRAM(S): at 06:23

## 2023-10-17 RX ADMIN — Medication 12.5 MILLIGRAM(S): at 10:45

## 2023-10-17 RX ADMIN — SODIUM CHLORIDE 3 MILLILITER(S): 9 INJECTION INTRAMUSCULAR; INTRAVENOUS; SUBCUTANEOUS at 13:53

## 2023-10-17 RX ADMIN — HEPARIN SODIUM 5000 UNIT(S): 5000 INJECTION INTRAVENOUS; SUBCUTANEOUS at 21:53

## 2023-10-17 NOTE — PROGRESS NOTE ADULT - SUBJECTIVE AND OBJECTIVE BOX
Patient discussed on morning rounds with Dr. Toledo    Operation / Date: re-admitted with near-syncopal episode & lower back pain  s/p completion TEVAR on 9/15/23    SUBJECTIVE ASSESSMENT: Patient seen and examined at bedside. Patient states that he feels a lot better today, his back spasms have resolved, but now he has a bit of vertigo with moving his head. Denies chills, chest pain, SOB, palpitations, N/V    Vital Signs Last 24 Hrs  T(C): 36.4 (17 Oct 2023 10:00), Max: 37.1 (16 Oct 2023 21:24)  T(F): 97.6 (17 Oct 2023 10:00), Max: 98.8 (16 Oct 2023 21:24)  HR: 95 (17 Oct 2023 08:55) (85 - 108)  BP: 114/75 (17 Oct 2023 08:55) (114/75 - 137/64)  BP(mean): 88 (17 Oct 2023 08:55) (88 - 93)  RR: 18 (17 Oct 2023 08:55) (14 - 18)  SpO2: 100% (17 Oct 2023 08:55) (95% - 100%)    Parameters below as of 17 Oct 2023 08:55  Patient On (Oxygen Delivery Method): room air    I&O's Detail    16 Oct 2023 07:01  -  17 Oct 2023 07:00  --------------------------------------------------------  IN:    Lactated Ringers Bolus: 1000 mL    Oral Fluid: 1120 mL  Total IN: 2120 mL    OUT:    Voided (mL): 1660 mL  Total OUT: 1660 mL    Total NET: 460 mL      17 Oct 2023 07:01  -  17 Oct 2023 10:06  --------------------------------------------------------  IN:    Oral Fluid: 440 mL  Total IN: 440 mL    OUT:    Voided (mL): 200 mL  Total OUT: 200 mL    Total NET: 240 mL    CHEST TUBE:  None  EDMUNDO DRAIN:  None  EPICARDIAL WIRES: None  TIE DOWNS: None  SWENSON: None    PHYSICAL EXAM:  GENERAL: NAD, sitting upright in chair  HEAD:  Atraumatic, Normocephalic  EYES: EOMI, PERRLA, conjunctiva and sclera clear  ENT: Moist mucous membranes  NECK: Supple, No JVD  CHEST/LUNG: CTAB; Previous MSI healing well  HEART: RRR  ABDOMEN: Soft, Nontender, Nondistended. No hepatomegally  EXTREMITIES:  2+ Peripheral Pulses, brisk capillary refill. No clubbing, cyanosis, or edema  NERVOUS SYSTEM:  Alert & Oriented X3, speech clear. No deficits     LABS:                        11.6   6.34  )-----------( 263      ( 16 Oct 2023 05:30 )             35.8     10-16    134<L>  |  97  |  24<H>  ----------------------------<  103<H>  4.4   |  26  |  0.72    Ca    9.9      16 Oct 2023 05:30  Mg     2.0     10-16    Urinalysis Basic - ( 16 Oct 2023 05:30 )    Color: x / Appearance: x / SG: x / pH: x  Gluc: 103 mg/dL / Ketone: x  / Bili: x / Urobili: x   Blood: x / Protein: x / Nitrite: x   Leuk Esterase: x / RBC: x / WBC x   Sq Epi: x / Non Sq Epi: x / Bacteria: x    MEDICATIONS  (STANDING):  aspirin  chewable 81 milliGRAM(s) Oral daily  atorvastatin 80 milliGRAM(s) Oral at bedtime  gabapentin 600 milliGRAM(s) Oral three times a day  heparin   Injectable 5000 Unit(s) SubCutaneous every 8 hours  influenza  Vaccine (HIGH DOSE) 0.7 milliLiter(s) IntraMuscular once  meclizine 12.5 milliGRAM(s) Oral once  NIFEdipine XL 90 milliGRAM(s) Oral daily  pantoprazole    Tablet 40 milliGRAM(s) Oral before breakfast  senna 2 Tablet(s) Oral at bedtime  sertraline 25 milliGRAM(s) Oral daily  sodium chloride 0.9% lock flush 3 milliLiter(s) IV Push every 8 hours  tiotropium 2.5 MICROgram(s) Inhaler 2 Puff(s) Inhalation daily    MEDICATIONS  (PRN):  acetaminophen     Tablet .. 650 milliGRAM(s) Oral every 6 hours PRN Temp greater or equal to 38C (100.4F), Mild Pain (1 - 3)  bisacodyl 5 milliGRAM(s) Oral every 12 hours PRN Constipation  cyclobenzaprine 10 milliGRAM(s) Oral three times a day PRN Muscle Spasm    RADIOLOGY & ADDITIONAL TESTS:  < from: CT Angio Chest PE Protocol w/ IV Cont (10.16.23 @ 11:23) >  FINDINGS:    LUNGS AND AIRWAYS: Patent central airways.  Mild bibasilar atelectasis.   No significant interval change few scattered nodules/micronodules the   largest measuring up to 5 mm within the apical posterior segment left   upper lobe (6:70) additional stable solid micronodules are noted in the   right upper lobe (6:161 and within the inferior segment of the lingula   measuring up to 4 mm (6:163). No developing suspicious masses or nodules.   Again linear atelectasis is noted within the posterior basal segment of   the right lower lobe as well as some compressive atelectasis surrounding   the aneurysmal descending thoracic aorta.  PLEURA: No pleural effusion.  MEDIASTINUM AND AREN: No lymphadenopathy.  VESSELS: Limited evaluation of the bilateral lower lobe   segmental/subsegmental pulmonary arteries due to motion artifact. No   central pulmonary artery embolism is identified. No evidence of right   ventricular strain. Status post endograft repair of thoracic aortic   aneurysm, without significant interval change in the extent of aneurysmal   dilatation of the native thoracic aorta allowing for differences in   imaging technique.Although not protocoled for stent graft leakage no   obvious leakage of contrast is noted. 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 No pericardial effusion. Status post aortic   valve replacement.  CHEST WALL AND LOWER NECK: Within normal limits.  VISUALIZED UPPER ABDOMEN: Right hepatic 1.4 cm probable cyst, without   significant interval change. Multiple nonobstructing left renal upper   pole parapelvic calculi  BONES: Status post median sternotomy with malunion. Chronic compression   deformity of the T7 and T8 vertebral body. Multilevel Schmorl's nodes.    IMPRESSION:  1.  No pulmonary embolism identified, though evaluation of the bilateral   lower lobe segmental/subsegmental pulmonary arteries is limited due to   motion artifact.  2.  Status post endograft repair of thoracic aortic aneurysm without   evidenceof change in the caliber of the native thoracic aortic aneurysm   or gross stent graft leakage although not protocoled for this process.  3.  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  4. Multiple stable solid subcentimeter and micronodules measuring up to 5   mm within the apical posterior segment of the left upper lobe. In a low   risk individual, no additional imaging surveillance is indicated. If this   patient is high risk forthe development of lung carcinoma, optional 12   month surveillance thoracic CT may be in order.

## 2023-10-17 NOTE — PROGRESS NOTE ADULT - ASSESSMENT
67 YO Male, former smoker (40 yr hx,1/2PPD; quit 09/2022) w/ PMHx of HTN, anemia, arthritis, COPD, s/p mini-AVR (27mmbio), ascending and hemiarch replacement 6/23/22 (postop course c/b infarct to right precentral gyrus), urinary retention s/p ThuLEP procedure (10/6/22), incidental fall 11/2022 (s/p left hip replacement) who was recently admitted to [facility] from 9/14-9/23 where he had pre-op lumbar drain placed with Neuro then underwent completion TEVAR on 9/15/23 with Dr. Toledo for descending aortic aneurysm. Post-op course significant for RLE weakness and blood in CSF, CT Lspine and CTH at the time were unremarkable, but MRI significant for spinal subarachnoid hemorrhage. Patient was discharged on 9/23/23 then presented to [facility] 10/4/23 following a near syncopal episode with back spasm. He was then transferred to [facility] under the care of Dr. Toledo for further work-up and management. Neurology re-consulted, MRI with evidence of arachnoiditis. Medrol dosepak started 10/6/23, continued on Gabapentin and Flexeril. PT recommended discharge to neuro rehab. On 10/16 noted to be tachy to 120s and SOB, CTA negative for PE, resolved with IVF. Presently pending rehab authorization.    Plan:    Neurovascular:   -Cont Zoloft  -Arachnoiditis  -Cont present pain regimen  -Pending neuro rehab  -Pain well controlled with current regimen. PRN's: Tylenol, Flexeril and Gabapentin    Cardiovascular:   -Descending aortic aneurysm s/p TEVAR on 9/15/23  -Cont ASA and statin  -Hx of HTN  -Cont Nifedepine  -Hemodynamically stable.   -Monitor: BP, HR, tele    Respiratory:   -Hx of COPD  -Cont Spiriva  -Oxygenating well on room air  -Encourage continued use of IS 10x/hr and frequent ambulation  -CXR: stable    GI:  -GI PPX: Protonix  -PO Diet  -Bowel Regimen: Senna, Dulcolax     Renal / :  -Hx of urinary retention s/p ThuLEP procedure (10/6/22)  -Continue to monitor renal function: BUN/Cr: lab holiday  -Monitor I/O's daily     Endocrine:    -No hx of DM or thyroid dx  -A1c: 5.3  -TSH: 2.51    Hematologic:  -Hx of anemia  -CBC: H/H- lab holiday  -Coagulation Panel.    ID:  -Temperature: afebrile   -CBC: WBC- lab holiday  -Continue to observe for SIRS/Sepsis Syndrome.    Prophylaxis:  -DVT prophylaxis with 5000 SubQ Heparin q8h.  -Continue with SCD's b/l while patient is at rest     Disposition:  -neuro rehab auth and bed placement

## 2023-10-17 NOTE — CHART NOTE - NSCHARTNOTEFT_GEN_A_CORE
Admitting Diagnosis:   Patient is a 68y old  Male who presents with a chief complaint of Near-Syncope (17 Oct 2023 10:06)    PAST MEDICAL & SURGICAL HISTORY:  HTN (hypertension)  Aortic regurgitation  Anemia  Arthritis  COPD, mild  Bladder distention  BPH (benign prostatic hyperplasia)  H/O cataract extraction  B/L  Aortic valve replaced    Current Nutrition Order:  Regular  Ensure Enlive x3 x3/day     PO Intake: Good (%) [ x ]  Fair (50-75%) [   ] Poor (<25%) [   ]    GI Issues:   Pt denies nausea/vomiting/diarrhea/constipation  Last BM 10/16  No abdominal distension/discomfort noted     Pain:  No pain reported     Skin Integrity:  No pressure injuries or edema documented, Uvaldo score 20    Labs:   10-16    134<L>  |  97  |  24<H>  ----------------------------<  103<H>  4.4   |  26  |  0.72    Ca    9.9      16 Oct 2023 05:30  Mg     2.0     10-16    Medications:  MEDICATIONS  (STANDING):  aspirin  chewable 81 milliGRAM(s) Oral daily  atorvastatin 80 milliGRAM(s) Oral at bedtime  gabapentin 600 milliGRAM(s) Oral three times a day  heparin   Injectable 5000 Unit(s) SubCutaneous every 8 hours  influenza  Vaccine (HIGH DOSE) 0.7 milliLiter(s) IntraMuscular once  NIFEdipine XL 90 milliGRAM(s) Oral daily  pantoprazole    Tablet 40 milliGRAM(s) Oral before breakfast  senna 2 Tablet(s) Oral at bedtime  sertraline 25 milliGRAM(s) Oral daily  sodium chloride 0.9% lock flush 3 milliLiter(s) IV Push every 8 hours  tiotropium 2.5 MICROgram(s) Inhaler 2 Puff(s) Inhalation daily    MEDICATIONS  (PRN):  acetaminophen     Tablet .. 650 milliGRAM(s) Oral every 6 hours PRN Temp greater or equal to 38C (100.4F), Mild Pain (1 - 3)  bisacodyl 5 milliGRAM(s) Oral every 12 hours PRN Constipation  cyclobenzaprine 10 milliGRAM(s) Oral three times a day PRN Muscle Spasm    Anthropometrics:  Height: 6'3"  Weight: 165lb/74.8kg  BMI: 20.6    IBW: 196lb/89.1kg    84% IBW    Weight Change:   No new weights obtained since admission. Recommend nursing to trend weights weekly. RD to continue monitoring weights as able.     Estimated energy needs:   Calories: 25-30kcal/k-2244kcal/d  Protein: 1.2-1.4g/k-105g/d  Fluid: 25-30kcal/k-2244mL/d  Estimated needs based on CBW as within % IBW 196lb (84%). Needs adjusted for age, COPD, status post recent TEVAR, and clinical status.    Subjective:   67 YO Male, former smoker (40 yr hx,1/2PPD; quit 2022) w/ PMHx of HTN, anemia, arthritis, COPD, s/p mini-AVR (27mmbio), ascending and hemiarch replacement 22 (postop course c/b infarct to right precentral gyrus), urinary retention s/p ThuLEP procedure (10/6/22), incidental fall 2022 (s/p left hip replacement) who was recently admitted to [facility] from - where he had pre-op lumbar drain placed with Neuro then underwent completion TEVAR on 9/15/23 with Dr. Toledo for descending aortic aneurysm. Post-op course significant for RLE weakness and blood in CSF, CT Lspine and CTH at the time were unremarkable, but MRI significant for spinal subarachnoid hemorrhage. Patient was discharged on 23 then presented to [facility] 10/4/23 following a near syncopal episode with back spasm. He was then transferred to [facility] under the care of Dr. Toledo for further work-up and management. Neurology re-consulted, MRI with evidence of arachnoiditis. Medrol dosepak started 10/6/23, continued on Gabapentin and Flexeril. PT recommended discharge to neuro rehab. On 10/16 noted to be tachy to 120s and SOB, CTA negative for PE, resolved with IVF. Presently pending rehab authorization.    On follow-up, pt sitting in chair at bedside. Labs and medication orders reviewed. Na 134 <L>, BUN/Cr 24 <H>/0.72 WNL. On Regular diet with Ensure x3 x3/day. Pt endorses ongoing good intake of meals and supplements. Observed completion of breakfast this AM 10/. Endorses good tolerance of current diet and denies GI concerns. See nutrition recommendations. RD to follow-up per protocol.     Previous Nutrition Diagnosis:  Increased nutrient needs related to increased physiological demand for nutrients as evidenced by status post recent TEVAR, COPD    Active [ x ]  Resolved [   ]    Goal:  Consistently meet >75% nutrient needs.     Recommendations:  1. Continue Regular diet with Ensure High Protein (350kcal, 20g protein) x3 x3/day as ordered.   >>Encourage & monitor PO intake. [facility] dietary preferences as able.   2. Monitor GI tolerance, weight trends, labs, & skin integrity.  3. Defer bowel and pain regimens to team.   4. RD to remain available for diet education/intervention prn.    Education:   Pt aware RD remains available for additional questions/concerns.     Risk Level: High [   ] Moderate [ x ] Low [   ]

## 2023-10-18 LAB
SARS-COV-2 RNA SPEC QL NAA+PROBE: SIGNIFICANT CHANGE UP
SARS-COV-2 RNA SPEC QL NAA+PROBE: SIGNIFICANT CHANGE UP

## 2023-10-18 RX ADMIN — HEPARIN SODIUM 5000 UNIT(S): 5000 INJECTION INTRAVENOUS; SUBCUTANEOUS at 22:20

## 2023-10-18 RX ADMIN — GABAPENTIN 600 MILLIGRAM(S): 400 CAPSULE ORAL at 06:28

## 2023-10-18 RX ADMIN — ATORVASTATIN CALCIUM 80 MILLIGRAM(S): 80 TABLET, FILM COATED ORAL at 22:20

## 2023-10-18 RX ADMIN — Medication 650 MILLIGRAM(S): at 13:00

## 2023-10-18 RX ADMIN — Medication 650 MILLIGRAM(S): at 12:20

## 2023-10-18 RX ADMIN — GABAPENTIN 600 MILLIGRAM(S): 400 CAPSULE ORAL at 15:51

## 2023-10-18 RX ADMIN — SODIUM CHLORIDE 3 MILLILITER(S): 9 INJECTION INTRAMUSCULAR; INTRAVENOUS; SUBCUTANEOUS at 22:24

## 2023-10-18 RX ADMIN — GABAPENTIN 600 MILLIGRAM(S): 400 CAPSULE ORAL at 22:31

## 2023-10-18 RX ADMIN — SODIUM CHLORIDE 3 MILLILITER(S): 9 INJECTION INTRAMUSCULAR; INTRAVENOUS; SUBCUTANEOUS at 15:41

## 2023-10-18 RX ADMIN — HEPARIN SODIUM 5000 UNIT(S): 5000 INJECTION INTRAVENOUS; SUBCUTANEOUS at 06:27

## 2023-10-18 RX ADMIN — SERTRALINE 25 MILLIGRAM(S): 25 TABLET, FILM COATED ORAL at 12:20

## 2023-10-18 RX ADMIN — Medication 650 MILLIGRAM(S): at 06:32

## 2023-10-18 RX ADMIN — PANTOPRAZOLE SODIUM 40 MILLIGRAM(S): 20 TABLET, DELAYED RELEASE ORAL at 06:28

## 2023-10-18 RX ADMIN — Medication 90 MILLIGRAM(S): at 06:28

## 2023-10-18 RX ADMIN — HEPARIN SODIUM 5000 UNIT(S): 5000 INJECTION INTRAVENOUS; SUBCUTANEOUS at 15:51

## 2023-10-18 RX ADMIN — Medication 650 MILLIGRAM(S): at 07:47

## 2023-10-18 RX ADMIN — Medication 81 MILLIGRAM(S): at 12:20

## 2023-10-18 RX ADMIN — SODIUM CHLORIDE 3 MILLILITER(S): 9 INJECTION INTRAMUSCULAR; INTRAVENOUS; SUBCUTANEOUS at 06:47

## 2023-10-18 NOTE — PROGRESS NOTE ADULT - PROVIDER SPECIALTY LIST ADULT
CT Surgery
Neurology
CT Surgery
Neurology
CT Surgery
Neurology
Neurology
CT Surgery

## 2023-10-18 NOTE — PROGRESS NOTE ADULT - REASON FOR ADMISSION
Near-Syncope

## 2023-10-18 NOTE — PROGRESS NOTE ADULT - ASSESSMENT
69 y/o Male, former smoker (40 yr hx,1/2PPD; quit 09/2022) w/ PMHx of HTN, anemia, arthritis, COPD, s/p mini-AVR (27mmbio), ascending and hemiarch replacement 6/23/22 (postop course c/b infarct to right precentral gyrus), urinary retention s/p ThuLEP procedure (10/6/22), incidental fall 11/2022 (s/p left hip replacement) who was recently admitted to [facility] from 9/14-9/23 where he had pre-op lumbar drain placed with Neuro then underwent completion TEVAR on 9/15/23 with Dr. Toledo for descending aortic aneurysm. Post-op course significant for RLE weakness and blood in CSF, CT Lspine and CTH at the time were unremarkable, but MRI significant for spinal subarachnoid hemorrhage. Patient was discharged on 9/23/23 then presented to [facility] 10/4/23 following a near syncopal episode with back spasm. He was then transferred to [facility] under the care of Dr. Toledo for further work-up and management. Neurology re-consulted, MRI with evidence of arachnoiditis. Medrol dosepak started 10/6/23, continued on Gabapentin and Flexeril. PT recommended discharge to neuro rehab. On 10/16 noted to be tachy to 120s and SOB, CTA negative for PE, resolved with IVF. On 10/17-10/18 Pt currently pending rehab authorization.    Plan:  Neurovascular:   -Mood:  Zoloft  -Arachnoiditis/subarachnoid hemorrhagic     -stable at this time      -Cont present pain regimen     -s/p steroids      -Pending neuro rehab  -Pain well controlled with current regimen. PRN's: Tylenol, Flexeril and Gabapentin  -continue with flexeril    Cardiovascular:   -Descending aortic aneurysm s/p TEVAR on 9/15/23     -continue with ASA and statin  -Hx of HTN: Continue with Nifedepine 90mg daily  -Hemodynamically stable.   -Monitor: BP, HR, tele    Respiratory:   -Hx of COPD: continue with inhalers (spiriva)  -oxygenating well on room air  -Encourage continued use of IS 10x/hr and frequent ambulation  -CXR: stable    GI:  -GI PPX: Protonix  -PO Diet: regular   -Bowel Regimen: Senna, Dulcolax     Renal / :  -Hx of urinary retention s/p ThuLEP procedure (10/6/22)  -Continue to monitor renal function: BUN/Cr: lab holiday  -Monitor I/O's daily     Endocrine:    -No hx of DM or thyroid dx  -A1c: 5.3  -TSH: 2.51    Hematologic:  -Hx of anemia  -CBC: H/H- lab holiday    ID:  -Temperature: afebrile   -CBC: WBC- lab holiday  -Continue to observe for SIRS/Sepsis Syndrome.    Prophylaxis:  -DVT prophylaxis with 5000 SubQ Heparin q8h.  -Continue with SCD's b/l while patient is at rest     Disposition:  -pending neuro rehab

## 2023-10-18 NOTE — PROGRESS NOTE ADULT - SUBJECTIVE AND OBJECTIVE BOX
Patient discussed on morning rounds with Dr. Toledo    OPERATION & DATE:  re-admitted with near-syncopal episode & lower back pain  s/p completion TEVAR on 9/15/23    SUBJECTIVE ASSESSMENT: Pt seen at bedside. Denies any chest pain, palpitations, orthopnea, dyspnea on exertion, shortness of breath, wheezing, abd pain, nausea, vomiting, constipation, lightheadedness, headaches, fevers, or chills.    VITAL SIGNS:  Vital Signs Last 24 Hrs  T(C): 36.1 (18 Oct 2023 09:12), Max: 37 (17 Oct 2023 20:50)  T(F): 97 (18 Oct 2023 09:12), Max: 98.6 (17 Oct 2023 20:50)  HR: 86 (18 Oct 2023 09:00) (73 - 108)  BP: 143/73 (18 Oct 2023 09:00) (97/67 - 156/79)  BP(mean): 103 (18 Oct 2023 09:00) (76 - 110)  RR: 18 (18 Oct 2023 09:00) (18 - 18)  SpO2: 100% (18 Oct 2023 09:00) (97% - 100%)    Parameters below as of 18 Oct 2023 09:00  Patient On (Oxygen Delivery Method): room air      I&O's Detail    17 Oct 2023 07:01  -  18 Oct 2023 07:00  --------------------------------------------------------  IN:    Oral Fluid: 1630 mL  Total IN: 1630 mL    OUT:    Voided (mL): 2100 mL  Total OUT: 2100 mL    Total NET: -470 mL    CHEST TUBE:  no  EDMUNDO DRAIN:   no  EPICARDIAL WIRES:  no  STITCHES: no  STAPLES: no  SWENSON:  no  CENTRAL LINE: no  MIDLINE/PICC: no  WOUND VAC: no    PHYSICAL EXAM:  General:   HEENT:  Cardio:  Pulm:  GI:  Extremities:  Vascular:  Incisions:    LABS:  lab holiday today    MEDICATIONS  (STANDING):  aspirin  chewable 81 milliGRAM(s) Oral daily  atorvastatin 80 milliGRAM(s) Oral at bedtime  gabapentin 600 milliGRAM(s) Oral three times a day  heparin   Injectable 5000 Unit(s) SubCutaneous every 8 hours  influenza  Vaccine (HIGH DOSE) 0.7 milliLiter(s) IntraMuscular once  NIFEdipine XL 90 milliGRAM(s) Oral daily  pantoprazole    Tablet 40 milliGRAM(s) Oral before breakfast  senna 2 Tablet(s) Oral at bedtime  sertraline 25 milliGRAM(s) Oral daily  sodium chloride 0.9% lock flush 3 milliLiter(s) IV Push every 8 hours  tiotropium 2.5 MICROgram(s) Inhaler 2 Puff(s) Inhalation daily    MEDICATIONS  (PRN):  acetaminophen     Tablet .. 650 milliGRAM(s) Oral every 6 hours PRN Temp greater or equal to 38C (100.4F), Mild Pain (1 - 3)  bisacodyl 5 milliGRAM(s) Oral every 12 hours PRN Constipation  cyclobenzaprine 10 milliGRAM(s) Oral three times a day PRN Muscle Spasm    RADIOLOGY & ADDITIONAL TEST:  none   Patient discussed on morning rounds with Dr. Toledo    OPERATION & DATE:  re-admitted with near-syncopal episode & lower back pain  s/p completion TEVAR on 9/15/23    SUBJECTIVE ASSESSMENT: Pt seen at bedside. Denies any chest pain, palpitations, orthopnea, dyspnea on exertion, shortness of breath, wheezing, abd pain, nausea, vomiting, constipation, lightheadedness, headaches, fevers, or chills.    VITAL SIGNS:  Vital Signs Last 24 Hrs  T(C): 36.1 (18 Oct 2023 09:12), Max: 37 (17 Oct 2023 20:50)  T(F): 97 (18 Oct 2023 09:12), Max: 98.6 (17 Oct 2023 20:50)  HR: 86 (18 Oct 2023 09:00) (73 - 108)  BP: 143/73 (18 Oct 2023 09:00) (97/67 - 156/79)  BP(mean): 103 (18 Oct 2023 09:00) (76 - 110)  RR: 18 (18 Oct 2023 09:00) (18 - 18)  SpO2: 100% (18 Oct 2023 09:00) (97% - 100%)    Parameters below as of 18 Oct 2023 09:00  Patient On (Oxygen Delivery Method): room air      I&O's Detail    17 Oct 2023 07:01  -  18 Oct 2023 07:00  --------------------------------------------------------  IN:    Oral Fluid: 1630 mL  Total IN: 1630 mL    OUT:    Voided (mL): 2100 mL  Total OUT: 2100 mL    Total NET: -470 mL    CHEST TUBE:  no  EDMUNDO DRAIN:   no  EPICARDIAL WIRES:  no  STITCHES: no  STAPLES: no  SWENSON:  no  CENTRAL LINE: no  MIDLINE/PICC: no  WOUND VAC: no    PHYSICAL EXAM   GENERAL: NAD, sitting upright in chair  HEAD:  Atraumatic, Normocephalic  EYES: EOMI, PERRLA, conjunctiva and sclera clear  ENT: Moist mucous membranes  NECK: Supple, No JVD  CHEST/LUNG: CTAB; Previous MSI healing well  HEART: RRR  ABDOMEN: Soft, Nontender, Nondistended. No hepatomegally  EXTREMITIES:  2+ Peripheral Pulses, brisk capillary refill. No clubbing, cyanosis, or edema  NERVOUS SYSTEM:  Alert & Oriented X3, speech clear. No deficits     LABS:  lab holiday today    MEDICATIONS  (STANDING):  aspirin  chewable 81 milliGRAM(s) Oral daily  atorvastatin 80 milliGRAM(s) Oral at bedtime  gabapentin 600 milliGRAM(s) Oral three times a day  heparin   Injectable 5000 Unit(s) SubCutaneous every 8 hours  influenza  Vaccine (HIGH DOSE) 0.7 milliLiter(s) IntraMuscular once  NIFEdipine XL 90 milliGRAM(s) Oral daily  pantoprazole    Tablet 40 milliGRAM(s) Oral before breakfast  senna 2 Tablet(s) Oral at bedtime  sertraline 25 milliGRAM(s) Oral daily  sodium chloride 0.9% lock flush 3 milliLiter(s) IV Push every 8 hours  tiotropium 2.5 MICROgram(s) Inhaler 2 Puff(s) Inhalation daily    MEDICATIONS  (PRN):  acetaminophen     Tablet .. 650 milliGRAM(s) Oral every 6 hours PRN Temp greater or equal to 38C (100.4F), Mild Pain (1 - 3)  bisacodyl 5 milliGRAM(s) Oral every 12 hours PRN Constipation  cyclobenzaprine 10 milliGRAM(s) Oral three times a day PRN Muscle Spasm    RADIOLOGY & ADDITIONAL TEST:  none

## 2023-10-19 ENCOUNTER — TRANSCRIPTION ENCOUNTER (OUTPATIENT)
Age: 68
End: 2023-10-19

## 2023-10-19 VITALS — TEMPERATURE: 97 F

## 2023-10-19 PROCEDURE — 97116 GAIT TRAINING THERAPY: CPT

## 2023-10-19 PROCEDURE — 94640 AIRWAY INHALATION TREATMENT: CPT

## 2023-10-19 PROCEDURE — 72158 MRI LUMBAR SPINE W/O & W/DYE: CPT

## 2023-10-19 PROCEDURE — 97165 OT EVAL LOW COMPLEX 30 MIN: CPT

## 2023-10-19 PROCEDURE — 86850 RBC ANTIBODY SCREEN: CPT

## 2023-10-19 PROCEDURE — 36415 COLL VENOUS BLD VENIPUNCTURE: CPT

## 2023-10-19 PROCEDURE — 86901 BLOOD TYPING SEROLOGIC RH(D): CPT

## 2023-10-19 PROCEDURE — 85730 THROMBOPLASTIN TIME PARTIAL: CPT

## 2023-10-19 PROCEDURE — 83036 HEMOGLOBIN GLYCOSYLATED A1C: CPT

## 2023-10-19 PROCEDURE — 71275 CT ANGIOGRAPHY CHEST: CPT

## 2023-10-19 PROCEDURE — 86900 BLOOD TYPING SEROLOGIC ABO: CPT

## 2023-10-19 PROCEDURE — 93005 ELECTROCARDIOGRAM TRACING: CPT

## 2023-10-19 PROCEDURE — A9585: CPT

## 2023-10-19 PROCEDURE — 82553 CREATINE MB FRACTION: CPT

## 2023-10-19 PROCEDURE — 80048 BASIC METABOLIC PNL TOTAL CA: CPT

## 2023-10-19 PROCEDURE — 80061 LIPID PANEL: CPT

## 2023-10-19 PROCEDURE — 85610 PROTHROMBIN TIME: CPT

## 2023-10-19 PROCEDURE — 87635 SARS-COV-2 COVID-19 AMP PRB: CPT

## 2023-10-19 PROCEDURE — 82550 ASSAY OF CK (CPK): CPT

## 2023-10-19 PROCEDURE — 74174 CTA ABD&PLVS W/CONTRAST: CPT

## 2023-10-19 PROCEDURE — 85027 COMPLETE CBC AUTOMATED: CPT

## 2023-10-19 PROCEDURE — 84100 ASSAY OF PHOSPHORUS: CPT

## 2023-10-19 PROCEDURE — 83880 ASSAY OF NATRIURETIC PEPTIDE: CPT

## 2023-10-19 PROCEDURE — 97535 SELF CARE MNGMENT TRAINING: CPT

## 2023-10-19 PROCEDURE — 80053 COMPREHEN METABOLIC PANEL: CPT

## 2023-10-19 PROCEDURE — 84443 ASSAY THYROID STIM HORMONE: CPT

## 2023-10-19 PROCEDURE — 97530 THERAPEUTIC ACTIVITIES: CPT

## 2023-10-19 PROCEDURE — 83735 ASSAY OF MAGNESIUM: CPT

## 2023-10-19 PROCEDURE — 84484 ASSAY OF TROPONIN QUANT: CPT

## 2023-10-19 PROCEDURE — 97110 THERAPEUTIC EXERCISES: CPT

## 2023-10-19 PROCEDURE — 71045 X-RAY EXAM CHEST 1 VIEW: CPT

## 2023-10-19 PROCEDURE — 85025 COMPLETE CBC W/AUTO DIFF WBC: CPT

## 2023-10-19 PROCEDURE — 97161 PT EVAL LOW COMPLEX 20 MIN: CPT

## 2023-10-19 RX ORDER — ATORVASTATIN CALCIUM 80 MG/1
1 TABLET, FILM COATED ORAL
Qty: 30 | Refills: 0
Start: 2023-10-19 | End: 2023-11-17

## 2023-10-19 RX ORDER — CYCLOBENZAPRINE HYDROCHLORIDE 10 MG/1
1 TABLET, FILM COATED ORAL
Qty: 90 | Refills: 0
Start: 2023-10-19 | End: 2023-11-17

## 2023-10-19 RX ORDER — ASPIRIN/CALCIUM CARB/MAGNESIUM 324 MG
1 TABLET ORAL
Qty: 30 | Refills: 0
Start: 2023-10-19 | End: 2023-11-17

## 2023-10-19 RX ORDER — NIFEDIPINE 30 MG
1 TABLET, EXTENDED RELEASE 24 HR ORAL
Qty: 30 | Refills: 0
Start: 2023-10-19 | End: 2023-11-17

## 2023-10-19 RX ORDER — MECLIZINE HCL 12.5 MG
12.5 TABLET ORAL ONCE
Refills: 0 | Status: COMPLETED | OUTPATIENT
Start: 2023-10-19 | End: 2023-10-19

## 2023-10-19 RX ORDER — PANTOPRAZOLE SODIUM 20 MG/1
1 TABLET, DELAYED RELEASE ORAL
Qty: 30 | Refills: 0
Start: 2023-10-19 | End: 2023-11-17

## 2023-10-19 RX ORDER — ACETAMINOPHEN 500 MG
2 TABLET ORAL
Qty: 240 | Refills: 0
Start: 2023-10-19 | End: 2023-11-17

## 2023-10-19 RX ORDER — MECLIZINE HCL 12.5 MG
1 TABLET ORAL
Qty: 10 | Refills: 0
Start: 2023-10-19

## 2023-10-19 RX ORDER — GABAPENTIN 400 MG/1
1 CAPSULE ORAL
Qty: 21 | Refills: 0
Start: 2023-10-19 | End: 2023-10-25

## 2023-10-19 RX ADMIN — Medication 81 MILLIGRAM(S): at 13:33

## 2023-10-19 RX ADMIN — Medication 650 MILLIGRAM(S): at 02:30

## 2023-10-19 RX ADMIN — HEPARIN SODIUM 5000 UNIT(S): 5000 INJECTION INTRAVENOUS; SUBCUTANEOUS at 06:07

## 2023-10-19 RX ADMIN — GABAPENTIN 600 MILLIGRAM(S): 400 CAPSULE ORAL at 15:24

## 2023-10-19 RX ADMIN — Medication 650 MILLIGRAM(S): at 01:30

## 2023-10-19 RX ADMIN — PANTOPRAZOLE SODIUM 40 MILLIGRAM(S): 20 TABLET, DELAYED RELEASE ORAL at 06:07

## 2023-10-19 RX ADMIN — HEPARIN SODIUM 5000 UNIT(S): 5000 INJECTION INTRAVENOUS; SUBCUTANEOUS at 15:23

## 2023-10-19 RX ADMIN — GABAPENTIN 600 MILLIGRAM(S): 400 CAPSULE ORAL at 06:07

## 2023-10-19 RX ADMIN — Medication 12.5 MILLIGRAM(S): at 15:25

## 2023-10-19 RX ADMIN — SODIUM CHLORIDE 3 MILLILITER(S): 9 INJECTION INTRAMUSCULAR; INTRAVENOUS; SUBCUTANEOUS at 05:15

## 2023-10-19 RX ADMIN — SERTRALINE 25 MILLIGRAM(S): 25 TABLET, FILM COATED ORAL at 13:34

## 2023-10-19 RX ADMIN — Medication 90 MILLIGRAM(S): at 06:07

## 2023-10-19 NOTE — DISCHARGE NOTE NURSING/CASE MANAGEMENT/SOCIAL WORK - PATIENT PORTAL LINK FT
You can access the FollowMyHealth Patient Portal offered by [facility] by registering at the following website: [email or web address]. By joining [facility]’s FollowMyHealth portal, you will also be able to view your health information using other applications (apps) compatible with our system.

## 2023-10-23 DIAGNOSIS — D64.9 ANEMIA, UNSPECIFIED: ICD-10-CM

## 2023-10-23 DIAGNOSIS — M13.80 OTHER SPECIFIED ARTHRITIS, UNSPECIFIED SITE: ICD-10-CM

## 2023-10-23 DIAGNOSIS — Z87.891 PERSONAL HISTORY OF NICOTINE DEPENDENCE: ICD-10-CM

## 2023-10-23 DIAGNOSIS — Z79.82 LONG TERM (CURRENT) USE OF ASPIRIN: ICD-10-CM

## 2023-10-23 DIAGNOSIS — J44.9 CHRONIC OBSTRUCTIVE PULMONARY DISEASE, UNSPECIFIED: ICD-10-CM

## 2023-10-23 DIAGNOSIS — F32.A DEPRESSION, UNSPECIFIED: ICD-10-CM

## 2023-10-23 DIAGNOSIS — M54.50 LOW BACK PAIN, UNSPECIFIED: ICD-10-CM

## 2023-10-23 DIAGNOSIS — R55 SYNCOPE AND COLLAPSE: ICD-10-CM

## 2023-10-23 DIAGNOSIS — N40.0 BENIGN PROSTATIC HYPERPLASIA WITHOUT LOWER URINARY TRACT SYMPTOMS: ICD-10-CM

## 2023-10-23 DIAGNOSIS — Z95.3 PRESENCE OF XENOGENIC HEART VALVE: ICD-10-CM

## 2023-10-23 DIAGNOSIS — R00.0 TACHYCARDIA, UNSPECIFIED: ICD-10-CM

## 2023-10-23 DIAGNOSIS — E87.1 HYPO-OSMOLALITY AND HYPONATREMIA: ICD-10-CM

## 2023-10-23 DIAGNOSIS — G03.9 MENINGITIS, UNSPECIFIED: ICD-10-CM

## 2023-10-23 DIAGNOSIS — Z96.642 PRESENCE OF LEFT ARTIFICIAL HIP JOINT: ICD-10-CM

## 2023-10-23 DIAGNOSIS — Z86.73 PERSONAL HISTORY OF TRANSIENT ISCHEMIC ATTACK (TIA), AND CEREBRAL INFARCTION WITHOUT RESIDUAL DEFICITS: ICD-10-CM

## 2023-10-23 DIAGNOSIS — I10 ESSENTIAL (PRIMARY) HYPERTENSION: ICD-10-CM

## 2023-10-23 DIAGNOSIS — R25.2 CRAMP AND SPASM: ICD-10-CM

## 2023-11-01 ENCOUNTER — APPOINTMENT (OUTPATIENT)
Dept: CARDIOTHORACIC SURGERY | Facility: CLINIC | Age: 68
End: 2023-11-01
Payer: COMMERCIAL

## 2023-11-01 VITALS
SYSTOLIC BLOOD PRESSURE: 147 MMHG | RESPIRATION RATE: 16 BRPM | OXYGEN SATURATION: 99 % | BODY MASS INDEX: 21.14 KG/M2 | TEMPERATURE: 96.9 F | HEIGHT: 75 IN | HEART RATE: 99 BPM | DIASTOLIC BLOOD PRESSURE: 91 MMHG | WEIGHT: 170 LBS

## 2023-11-01 DIAGNOSIS — Z09 ENCOUNTER FOR FOLLOW-UP EXAMINATION AFTER COMPLETED TREATMENT FOR CONDITIONS OTHER THAN MALIGNANT NEOPLASM: ICD-10-CM

## 2023-11-01 PROCEDURE — 99024 POSTOP FOLLOW-UP VISIT: CPT

## 2023-11-02 PROBLEM — Z09 POSTOP CHECK: Status: ACTIVE | Noted: 2022-07-08

## 2023-11-09 ENCOUNTER — APPOINTMENT (OUTPATIENT)
Dept: NEUROLOGY | Facility: CLINIC | Age: 68
End: 2023-11-09
Payer: COMMERCIAL

## 2023-11-09 VITALS
HEIGHT: 75 IN | DIASTOLIC BLOOD PRESSURE: 89 MMHG | WEIGHT: 167 LBS | TEMPERATURE: 97.3 F | OXYGEN SATURATION: 96 % | SYSTOLIC BLOOD PRESSURE: 144 MMHG | BODY MASS INDEX: 20.76 KG/M2 | HEART RATE: 109 BPM

## 2023-11-09 PROCEDURE — XXXXX: CPT | Mod: 1L

## 2023-11-20 NOTE — DISCHARGE NOTE NURSING/CASE MANAGEMENT/SOCIAL WORK - WILL THE PATIENT ACCEPT THE PFIZER COVID-19 VACCINE IF ELIGIBLE AND IT IS AVAILABLE?
Pt read [facility] [employer] but did not schedule or return ACT.     Mailed letter and ACT   Not applicable

## 2023-12-19 NOTE — SBIRT NOTE ADULT - NSSBIRTINJURYRES_GEN_A_CORE
No
NSR on admission; on home eliquis 5mg PO BID; not on rate/rhythm control agents    Plan:  C/w home meds

## 2023-12-26 NOTE — ED CLERICAL - NS ED CLERK NOTE PRE-ARRIVAL INFORMATION; ADDITIONAL PRE-ARRIVAL INFORMATION
This patient is enrolled in the Follow Your Heart program and has undergone a cardiac surgery procedure and has active care navigation. This patient can be followed up by the care navigation team within 24 hours. To arrange close follow-up or to obtain additional clinical information about this patient, please call the contact number above. Please call the cardiac surgery team once patient is registered at [phone] for consultation PRIOR to disposition decision.  The patient recently underwent a cardiac surgery procedure and the team can assist in acute medical management. [phone]

## 2024-02-15 NOTE — ED PROVIDER NOTE - CARDIAC, MLM
[FreeTextEntry1] : Lab work reviewed. #1) Colon cancer/h/o elevated CEA-clinically stable at present. Patient had wished to continue Keytruda which she felt she tolerated well. Had reviewed that the usual duration of immunotherapy would be for up to 2 years or until disease progression or intolerance to the medication. Patient had a break from treatment and resumed it with stability of her disease. However, immunotherapy then held due to renal issues-last dose 6/14/22.  12/13/2022-PET/CT scan-Morphologically stable RIGHT mesenteric mass demonstrates mild uptake, with portions exhibiting photopenia. Mucinous lesions are often minimally or non-FDG-avid; these findings are consistent with that history. 2. Uptake overlying the RIGHT hemicolectomy anastomosis, where residual/recurrent disease is not excluded. 3. Multiple hepatic hypodensities, some of which may have enlarged. Some of these demonstrate mildly decreased uptake compared to the remaining liver. If this will change patient management, MRI of the liver may be helpful to exclude mucinous metastases. 4. Heterogeneous heterogeneous thyroid with mild uptake and substernal extension. 5. Small RIGHT lung nodule versus motion artifact. Additional previously seen RIGHT-sided micronodule is unchanged.  1/11/2023-MRI Abdomen-No obvious mucinous liver metastases, however difficult to exclude given lack of intravenous contrast and motion artifact as well as multiplicity of background hepatic cysts. Grossly stable right mesenteric mass 1/18/2023-CT chest-Stable two very small nodules in the left upper and left lower lobes when compared to previous exam. -patient wished for continued treatment break. Colonoscopy 3/2023 unremarkable.  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 Otherwise stable examination. Numerous cystic liver lesions are not significantly changed and are not well characterized on this unenhanced CT scan. Reviewed CT scan results with patient in detail. Suggestive of mild increase in large mesenteric mass. Cystic liver lesions overall stable. Discussed management options. Patient currently currently remains asymptomatic with regard to the mesenteric mass. -Review of prior [facility] testing (8/2016)-no reportable alterations identified in K-abbey/N-abbey genes; BRAF V600E genomic alteration identified. Recommended consider encorafenib/cetuximab regimen-potential side effects were reviewed including but not limited to rash, hypomagnesemia, diarrhea, nausea/vomiting, stomatitis, LFT elevation, fatigue, neuropathy, fever, infusion reaction, hyperglycemia/hyponatremia, cytopenias. Patient refused regimen suggested, only wishing to resume immunotherapy if possible, in hopes it would at least slow POD, considering her QOL in decision making. Saw nephrologist who suggested a kidney biopsy, though patient declined this. If no kidney biopsy to be done, held further immunotherapy. Then could consider regorafenib pending course as well, should patient wish to resume DMT.  9/11/2023-CT A/P-central necrotic right abdominal mass involving the pancreatic head and possibly the adjacent duodenum is slightly decreased in size from 6/22/2023. Unchanged small volume ascites. --again had discussed treatment options with respective pro's/con's. As patient's disease was slightly decreased on most recent imaging, and she is without related symptoms, patient has wished to continue with expectant surveillance. --clinically stable in this regard at present.  #2) history of anemia/leukopenia/thrombocytopenia. With progressive thrombocytopenia, patient had bone marrow biopsy 11/2021-cellular marrow with erythroid predominant maturing trilineage hematopoiesis. Normal cytogenetics. Genomic analysis with no mutations identified. ? May have immune component to cytopenia(s). Kidney disease  contributes to the anemia as well. Had explained that some BM disorders may evolve over time, such as MDS.  With refractory anemia, f/u BMB was done 8/23/2023: Bone marrow biopsy and bone marrow aspirate  - Normocellular marrow with trilineage hematopoiesis with maturation and increased iron stores.  -Karyotype: 46,XX[21]. FISH Result: NORMAL FISH - MDS PANEL. [facility] Advanced NGS Myeloid Panel Final Report-RESULT SUMMARY: ABNORMAL-DETECTED GENOMIC ALTERATIONS:  Tier II: Variants of Potential Clinical Significance  TP53 [id number] Follow clinically for the development of MDS and/or leukemia. Abnormal NGS noted.  --Supportive heme care for this elderly patient with co-morbidities, and who is declining HD. T/C PRBC for hemoglobin <7/related symptoms.  --procrit support as needed (CKD contributing to anemia)  #3) mediport maintenance-patient agreeable to this. Last POF was 1/25/2024.  Patient was given the opportunity to ask questions. Her questions have been answered to her apparent satisfaction. She is agreeable to f/u.  -->Procrit 40,000 units SQ weekly x 4, pending interval lab work; RTO ~5 weeks for POF/labs, MD visit.   **Note-8/14/2023-had a separate phone discussion with patient's son and granddaughter-had reviewed patient's diagnosis/prognosis and palliative treatment options with respective pros/cons. They expressed their understanding of patient's illness and support patient's wishes for quality of life. They understand that patient's cancer may not be as treatable if/when she agrees to treatment again in the future. Their questions answered, and they were appreciative of care.       Normal rate, regular rhythm.   No murmurs, rubs or gallops.

## 2024-05-08 ENCOUNTER — APPOINTMENT (OUTPATIENT)
Dept: CARDIOTHORACIC SURGERY | Facility: CLINIC | Age: 69
End: 2024-05-08

## 2024-05-14 ENCOUNTER — APPOINTMENT (OUTPATIENT)
Dept: CT IMAGING | Facility: HOSPITAL | Age: 69
End: 2024-05-14
Payer: COMMERCIAL

## 2024-05-14 ENCOUNTER — OUTPATIENT (OUTPATIENT)
Dept: OUTPATIENT SERVICES | Facility: HOSPITAL | Age: 69
LOS: 1 days | End: 2024-05-14
Payer: COMMERCIAL

## 2024-05-14 DIAGNOSIS — Z98.49 CATARACT EXTRACTION STATUS, UNSPECIFIED EYE: Chronic | ICD-10-CM

## 2024-05-14 DIAGNOSIS — Z95.2 PRESENCE OF PROSTHETIC HEART VALVE: Chronic | ICD-10-CM

## 2024-05-14 LAB — POCT ISTAT CREATININE: 0.8 MG/DL — SIGNIFICANT CHANGE UP (ref 0.5–1.3)

## 2024-05-14 PROCEDURE — 74174 CTA ABD&PLVS W/CONTRAST: CPT | Mod: 26

## 2024-05-14 PROCEDURE — 74174 CTA ABD&PLVS W/CONTRAST: CPT

## 2024-05-14 PROCEDURE — 71275 CT ANGIOGRAPHY CHEST: CPT

## 2024-05-14 PROCEDURE — 71275 CT ANGIOGRAPHY CHEST: CPT | Mod: 26

## 2024-05-14 PROCEDURE — 82565 ASSAY OF CREATININE: CPT

## 2024-05-22 ENCOUNTER — APPOINTMENT (OUTPATIENT)
Dept: CARDIOTHORACIC SURGERY | Facility: CLINIC | Age: 69
End: 2024-05-22
Payer: COMMERCIAL

## 2024-06-24 NOTE — PHYSICAL THERAPY INITIAL EVALUATION ADULT - PHYSICAL ASSIST/NONPHYSICAL ASSIST, REHAB EVAL
verbal cues/1 person assist Treatment Goal Explanation (Does Not Render In The Note): Stable for the purposes of categorizing medical decision making is defined by the specific treatment goals for an individual patient. A patient that is not at their treatment goal is not stable, even if the condition has not changed and there is no short- term threat to life or function.

## 2024-06-26 ENCOUNTER — APPOINTMENT (OUTPATIENT)
Dept: CARDIOTHORACIC SURGERY | Facility: CLINIC | Age: 69
End: 2024-06-26
Payer: COMMERCIAL

## 2024-06-26 VITALS
RESPIRATION RATE: 16 BRPM | OXYGEN SATURATION: 96 % | HEART RATE: 63 BPM | DIASTOLIC BLOOD PRESSURE: 78 MMHG | BODY MASS INDEX: 19.27 KG/M2 | WEIGHT: 155 LBS | SYSTOLIC BLOOD PRESSURE: 175 MMHG | HEIGHT: 75 IN

## 2024-06-26 DIAGNOSIS — I71.9 AORTIC ANEURYSM OF UNSPECIFIED SITE, W/OUT RUPTURE: ICD-10-CM

## 2024-06-26 DIAGNOSIS — Z95.828 PRESENCE OF OTHER VASCULAR IMPLANTS AND GRAFTS: ICD-10-CM

## 2024-06-26 DIAGNOSIS — I71.43 INFRARENAL ABDOMINAL AORTIC ANEURYSM, WITHOUT RUPTURE: ICD-10-CM

## 2024-06-26 DIAGNOSIS — Z95.2 PRESENCE OF PROSTHETIC HEART VALVE: ICD-10-CM

## 2024-06-26 PROCEDURE — 99213 OFFICE O/P EST LOW 20 MIN: CPT

## 2024-06-27 PROBLEM — Z95.2 S/P AVR (AORTIC VALVE REPLACEMENT): Status: ACTIVE | Noted: 2022-07-07

## 2024-06-27 PROBLEM — I71.43 ANEURYSM OF INFRARENAL ABDOMINAL AORTA: Status: ACTIVE | Noted: 2022-03-17

## 2024-06-27 PROBLEM — Z95.828 S/P ASCENDING AORTIC REPLACEMENT: Status: ACTIVE | Noted: 2022-07-07

## 2024-06-27 PROBLEM — Z95.828 S/P INSERTION OF ENDOVASCULAR THORACIC AORTIC STENT GRAFT: Status: ACTIVE | Noted: 2023-11-02

## 2024-06-27 PROBLEM — I71.9 ANEURYSM OF DESCENDING AORTA: Status: ACTIVE | Noted: 2022-03-17

## 2024-06-28 NOTE — END OF VISIT
[Time Spent: ___ minutes] : I have spent [unfilled] minutes of time on the encounter. [FreeTextEntry3] : I, JESSICA Conley personally performed the evaluation and management (E/M) services for this established patient who presents today with (a) new problem(s)/exacerbation of (an) existing condition(s).  That E/M includes conducting the clinically appropriate interval history &/or exam, assessing all new/exacerbated conditions, and establishing a new plan of care.  Today, my GERARDO, was here to observe &/or participate in the visit & follow plan of care established by me.

## 2024-06-28 NOTE — DATA REVIEWED
[FreeTextEntry1] : CTA 9/18/23 Post endograft repair of a thoracic aortic aneurysm since the previous study. The greatest dimensions of the proximal descending thoracic aortic aneurysm have not significantly changed. Contrast is noted external to the endograft, within the native aneurysm sac, which is indeterminate.  CTA 10/18/23 1. No pulmonary embolism identified, though evaluation of the bilateral lower lobe segmental/subsegmental pulmonary arteries is limited due to motion artifact. 2. Status post endograft repair of thoracic aortic aneurysm without evidence of change in the caliber of the native thoracic aortic aneurysm or gross stent graft leakage although not protocoled for this process. 3. Persistent marked near complete narrowing of the origin of the left subclavian artery unchanged in extent. 4. Multiple stable solid subcentimeter and micronodules measuring up to 5 mm within the apical posterior segment of the left upper lobe. In a low risk individual, no additional imaging surveillance is indicated. If this patient is high risk for the development of lung carcinoma, optional 12 month surveillance thoracic CT may be in order.  CTA 8/31/22 LUNGS AND LARGE AIRWAYS: Patent central airways. Mild linear atelectatic changes. Stable 4 mm nodule right lower lobe image 61 series 10. New 7 mm nodule left upper lobe image 10 series 22, likely due to bronchial mucous plugging. Stable 4 mm nodule lingula segment left upper lobe image 7 series 10. PLEURA: No pleural effusion. VESSELS: Status post bioprosthetic aortic valve replacement and graft replacement of the ascending aorta and portion of aortic arch. 4.5 cm aortic root at the level of sinus of Valsalva. 3.3 cm ascending aorta at the level of the graft. 3.5 cm aortic arch. Patent branches of the aortic arch. Ulcerative plaques along the descending thoracic aorta. Proximal descending aorta 4.5 cm just distal to the origin of left subclavian artery on sagittal images. Midportion of descending thoracic aorta 5.2 cm in transverse diameter on axial images without significant change. Distal descending thoracic aorta 3.2 cm. No pulmonary emboli. HEART: Aortic valve replacement. Normal heart size. No pericardial effusion. MEDIASTINUM AND AREN: No lymphadenopathy. CHEST WALL AND LOWER NECK: Within normal limits.  ABDOMEN AND PELVIS: LIVER: 1.5 cm cyst segment 8. BILE DUCTS: Normal caliber. GALLBLADDER: Within normal limits. SPLEEN: Borderline splenomegaly 14 cm, unchanged. PANCREAS: Within normal limits. ADRENALS: Within normal limits. KIDNEYS/URETERS: Mild bilateral hydronephrosis likely due to distended urinary bladder. BLADDER: Bladder is distended with the dome above the umbilicus. REPRODUCTIVE ORGANS: Enlarged prostate. BOWEL: No bowel obstruction. Appendix is normal. PERITONEUM: No ascites. VESSELS: Bilobed infrarenal aortic aneurysm with the largest segment measuring 5 cm in AP diameter and 5 cm transverse diameter below the origin of inferior mesenteric artery. Aneurysm measures about 9 cm in length and starts about 1 cm distal to the origin of the renal arteries. Patent celiac artery. Patent SMA. Patent bilateral renal arteries. Accessory artery present to the upper pole of the left kidney. DIMITRI is patent. Bilateral common, external and internal iliac arteries are patent. RETROPERITONEUM/LYMPH NODES: No lymphadenopathy. ABDOMINAL WALL: Within normal limits. BONES: Median sternotomy. Moderate compression deformity of L2 and L3 vertebral bodies, new since prior studies.  IMPRESSION: Status post aortic valve replacement and graft replacement of the ascending aorta and portion of aortic arch. Descending thoracic aortic aneurysm measuring up to 5.2 cm with ulcerated plaques. Infrarenal abdominal aortic aneurysm measuring up to 5 cm. 7 mm left upper lobe lung nodule, new, probably due to bronchial mucoid impaction. Recommend follow-up CT in 6 months. Enlarged prostate. Distended urinary bladder. Mild bilateral hydronephrosis. New compression deformity of L3 and L4 vertebral bodies.   CTA chest, abdomen and pelvis 1-18-23: stable descending thoracic aortic aneurysm. Ulcerative plaques along the descending thoracic aorta. Proximal descending aorta 4.5 cm just distal to the origin of left subclavian artery on sagittal images. Midportion of descending thoracic aorta 5.2 cm in transverse diameter on axial images without significant change.  CTA The diameter of the aorta was measured at the following levels: Aortic root (sinuses of Valsalva):: 4.5 x 4.9 cm (stable) Ascending aorta: 3.4 x 3.7 cm (stable) Aortic arch: 3.5 cm (stable) Descending thoracic aorta, mid 5.9 x 5.0 cm (slightly increased, previously, 5.0 x 5.0 cm) Suprarenal abdominal aorta: 2.7 x 3.0 cm (stable) Infrarenal abdominal aorta: 4.1 x 5.2 cm (stable)

## 2024-06-28 NOTE — PROCEDURE
[FreeTextEntry1] : Patient was advised to view the educational video prior to this visit regarding aortic pathology, risk factors, surgical procedures, and lifestyle modifications. Video can be retrieved at [email or web address].

## 2024-06-28 NOTE — CONSULT LETTER
[Dear  ___] : Dear  [unfilled], [Please see my note below.] : Please see my note below. [FreeTextEntry2] : Esequiel Kraft MD  [FreeTextEntry1] : Please find attached our consultation on your patient, Mr. PADMINI ABEL .   We take a multidisciplinary team approach to patient care and consider you, the referring physician, an extension of our team. We will maintain an open line of communication with you throughout your patient's treatment course.    It is our commitment to provide your patient with the highest quality of advanced therapeutic options. We thank you for allowing us to participate in the care of your patient.  Please do not hesitate to contact our team with any questions or concerns at [phone].   [FreeTextEntry3] : Sincerely,       Faisal Toledo M.D. Professor of Cardiovascular and Thoracic Surgery Minimally Invasive Valve Surgeon Director of Aortic Surgery, [facility] Cell: [phone] Email: [email or web address]: [address], [address] Floor, [place] [address] Office: [phone] Fax: [phone]  [facility]: Department of Cardiovascular and Thoracic Surgery [address] [place] [address], [facility] Office: [phone] Fax: [phone]  Practice Manager: Ms. Roz Median Email: [email or web address] Phone: [phone]

## 2024-06-28 NOTE — ASSESSMENT
[FreeTextEntry1] : I have reviewed the patient's medical records, diagnostic images during the time of this office consultation and have made the following recommendation. CT was completed of the thoracic/abdominal aorta. The report was reviewed and noted in the chart, I independently reviewed and interpreted images and report and I reviewed the films/CD and agree. The surgical repair of the thoracic aorta  is intact and stable. However, there appears to be an expansion of AAA which will require surgical intervention.   - Refer to vascular sx for evaluation and management of AAA.  - Follow up in [facility]  with TTE and CTA chest, abdomen, and pelvis. - Continue medication regimen. - Follow up with cardiologist and PCP. - Blood pressure management. Of note, patient's blood pressure was noted to be elevated at this visit - 175/78. I have recommended the patient to monitor his blood pressure closely.  I have asked the patient to follow up with you for medication adjustment.  - Discussed signs and symptoms that warrant emergency medical attention.

## 2024-06-28 NOTE — HISTORY OF PRESENT ILLNESS
[FreeTextEntry1] : 69 year old male, current smoker (40 yr hx,1/2PPD) and former ETOH misuse, with Family Hx of Marfan's and aortic aneurysm (nephew) and PMHx of HTN, anemia, arthritis, COPD, aortic pathology/aortic insufficiency s/p mini-AVR (27mmbio), ascending and hemiarch replacement EF normal on 6/23/22, (postop course includes chronic occlusion of right ICA, infarct to right precentral gyrus - no intervention; medical management), urinary retention (s/p ThuLEP procedure on 10/6/22), incidental fall 11/2022 (s/p left hip replacement), Descending aortic aneurysm (s/p TEVAR 9/15/23, preop lumbar drain with Dr. Toledo), post op arachnoiditis who presents for a follow up visit with repeat diagnostic imaging. Pt had negative genetic testing.   CTA CAP 5/14/24:  Post aortic valve replacement and replacement of the ascending aorta. Endovascular stent graft again extends from distal ascending aorta through the aortic arch to the distal descending aorta. No endoleak. No aortic dissection. No perigraft fluid collection. No kinking of the coronary arteries. The origin of the left subclavian artery is again stenotic. No significant change in the size of the thoracic aorta. The diameter of the aorta was measured at the following levels: Aortic root (sinuses of Valsalva): 4.7 x 4.8 cm Ascending aorta: 2.9 x 3.4 cm Aortic arch: 3.3 x 3.6 cm Descending thoracic aorta: 5.9 x 5.2 cm. Level at which measurement was made: Mid Penetrating atherosclerotic along the right side of the proximal abdominal aorta at the diaphragmatic hiatus has increased in size. Penetrating atherosclerotic ulcer on left side of proximal abdominal aorta at the diaphragmatic hiatus is not significantly changed. Increase in size of infrarenal abdominal aortic aneurysm which contains large amount of mural thrombus. The diameter of the aorta was measured at the following levels: Suprarenal abdominal aorta: 2.7 x 3.7 cm Infrarenal abdominal aorta: 5.3 x 5.7 cm. Previously measured 5.0 x 5.5 cm. * New compression fractures including T7, T9 & L4 vertebral bodies.

## 2024-07-16 ENCOUNTER — APPOINTMENT (OUTPATIENT)
Dept: VASCULAR SURGERY | Facility: CLINIC | Age: 69
End: 2024-07-16
Payer: COMMERCIAL

## 2024-07-16 VITALS
HEIGHT: 75 IN | HEART RATE: 82 BPM | BODY MASS INDEX: 19.89 KG/M2 | DIASTOLIC BLOOD PRESSURE: 80 MMHG | SYSTOLIC BLOOD PRESSURE: 142 MMHG | WEIGHT: 160 LBS

## 2024-07-16 DIAGNOSIS — Z87.891 PERSONAL HISTORY OF NICOTINE DEPENDENCE: ICD-10-CM

## 2024-07-16 DIAGNOSIS — I71.9 AORTIC ANEURYSM OF UNSPECIFIED SITE, W/OUT RUPTURE: ICD-10-CM

## 2024-07-16 DIAGNOSIS — I71.43 INFRARENAL ABDOMINAL AORTIC ANEURYSM, WITHOUT RUPTURE: ICD-10-CM

## 2024-07-16 PROCEDURE — 99214 OFFICE O/P EST MOD 30 MIN: CPT

## 2024-07-16 RX ORDER — ATORVASTATIN CALCIUM 80 MG/1
80 TABLET, FILM COATED ORAL
Refills: 0 | Status: ACTIVE | COMMUNITY

## 2024-08-13 DIAGNOSIS — Z01.818 ENCOUNTER FOR OTHER PREPROCEDURAL EXAMINATION: ICD-10-CM

## 2024-08-13 DIAGNOSIS — I71.43 INFRARENAL ABDOMINAL AORTIC ANEURYSM, WITHOUT RUPTURE: ICD-10-CM

## 2024-08-18 ENCOUNTER — TRANSCRIPTION ENCOUNTER (OUTPATIENT)
Age: 69
End: 2024-08-18

## 2024-08-19 ENCOUNTER — TRANSCRIPTION ENCOUNTER (OUTPATIENT)
Age: 69
End: 2024-08-19

## 2024-08-19 ENCOUNTER — INPATIENT (INPATIENT)
Facility: HOSPITAL | Age: 69
LOS: 0 days | Discharge: HOME | End: 2024-08-20
Attending: SURGERY | Admitting: SURGERY
Payer: COMMERCIAL

## 2024-08-19 VITALS — WEIGHT: 160.06 LBS

## 2024-08-19 DIAGNOSIS — Z98.49 CATARACT EXTRACTION STATUS, UNSPECIFIED EYE: Chronic | ICD-10-CM

## 2024-08-19 DIAGNOSIS — Z95.2 PRESENCE OF PROSTHETIC HEART VALVE: Chronic | ICD-10-CM

## 2024-08-19 LAB
ALBUMIN SERPL ELPH-MCNC: 4.8 G/DL — SIGNIFICANT CHANGE UP (ref 3.3–5)
ALP SERPL-CCNC: 136 U/L — HIGH (ref 40–120)
ALT FLD-CCNC: 44 U/L — SIGNIFICANT CHANGE UP (ref 10–45)
ANION GAP SERPL CALC-SCNC: 11 MMOL/L — SIGNIFICANT CHANGE UP (ref 5–17)
ANION GAP SERPL CALC-SCNC: 13 MMOL/L — SIGNIFICANT CHANGE UP (ref 5–17)
APTT BLD: 29 SEC — SIGNIFICANT CHANGE UP (ref 24.5–35.6)
APTT BLD: 29.8 SEC — SIGNIFICANT CHANGE UP (ref 24.5–35.6)
AST SERPL-CCNC: 42 U/L — HIGH (ref 10–40)
BASOPHILS # BLD AUTO: 0.02 K/UL — SIGNIFICANT CHANGE UP (ref 0–0.2)
BASOPHILS NFR BLD AUTO: 0.3 % — SIGNIFICANT CHANGE UP (ref 0–2)
BILIRUB DIRECT SERPL-MCNC: <0.2 MG/DL — SIGNIFICANT CHANGE UP (ref 0–0.3)
BILIRUB INDIRECT FLD-MCNC: SIGNIFICANT CHANGE UP (ref 0.2–1)
BILIRUB SERPL-MCNC: 0.7 MG/DL — SIGNIFICANT CHANGE UP (ref 0.2–1.2)
BLD GP AB SCN SERPL QL: NEGATIVE — SIGNIFICANT CHANGE UP
BUN SERPL-MCNC: 18 MG/DL — SIGNIFICANT CHANGE UP (ref 7–23)
BUN SERPL-MCNC: 19 MG/DL — SIGNIFICANT CHANGE UP (ref 7–23)
CALCIUM SERPL-MCNC: 10 MG/DL — SIGNIFICANT CHANGE UP (ref 8.4–10.5)
CALCIUM SERPL-MCNC: 8.4 MG/DL — SIGNIFICANT CHANGE UP (ref 8.4–10.5)
CHLORIDE SERPL-SCNC: 101 MMOL/L — SIGNIFICANT CHANGE UP (ref 96–108)
CHLORIDE SERPL-SCNC: 107 MMOL/L — SIGNIFICANT CHANGE UP (ref 96–108)
CO2 SERPL-SCNC: 22 MMOL/L — SIGNIFICANT CHANGE UP (ref 22–31)
CO2 SERPL-SCNC: 24 MMOL/L — SIGNIFICANT CHANGE UP (ref 22–31)
CREAT SERPL-MCNC: 0.77 MG/DL — SIGNIFICANT CHANGE UP (ref 0.5–1.3)
CREAT SERPL-MCNC: 0.79 MG/DL — SIGNIFICANT CHANGE UP (ref 0.5–1.3)
EGFR: 96 ML/MIN/1.73M2 — SIGNIFICANT CHANGE UP
EGFR: 96 ML/MIN/1.73M2 — SIGNIFICANT CHANGE UP
EOSINOPHIL # BLD AUTO: 0.17 K/UL — SIGNIFICANT CHANGE UP (ref 0–0.5)
EOSINOPHIL NFR BLD AUTO: 2.3 % — SIGNIFICANT CHANGE UP (ref 0–6)
GLUCOSE SERPL-MCNC: 103 MG/DL — HIGH (ref 70–99)
GLUCOSE SERPL-MCNC: 129 MG/DL — HIGH (ref 70–99)
HCT VFR BLD CALC: 29.3 % — LOW (ref 39–50)
HCT VFR BLD CALC: 40 % — SIGNIFICANT CHANGE UP (ref 39–50)
HGB BLD-MCNC: 12.9 G/DL — LOW (ref 13–17)
HGB BLD-MCNC: 9.5 G/DL — LOW (ref 13–17)
IMM GRANULOCYTES NFR BLD AUTO: 0.5 % — SIGNIFICANT CHANGE UP (ref 0–0.9)
INR BLD: 1.07 — SIGNIFICANT CHANGE UP (ref 0.85–1.18)
INR BLD: 1.32 — HIGH (ref 0.85–1.18)
ISTAT ACTK (ACTIVATED CLOTTING TIME KAOLIN): 238 SEC — HIGH (ref 74–137)
ISTAT ACTK (ACTIVATED CLOTTING TIME KAOLIN): 238 SEC — HIGH (ref 74–137)
ISTAT ACTK (ACTIVATED CLOTTING TIME KAOLIN): 244 SEC — HIGH (ref 74–137)
ISTAT ACTK (ACTIVATED CLOTTING TIME KAOLIN): 244 SEC — HIGH (ref 74–137)
ISTAT ACTK (ACTIVATED CLOTTING TIME KAOLIN): 268 SEC — HIGH (ref 74–137)
ISTAT ACTK (ACTIVATED CLOTTING TIME KAOLIN): 275 SEC — HIGH (ref 74–137)
ISTAT ACTK (ACTIVATED CLOTTING TIME KAOLIN): 287 SEC — HIGH (ref 74–137)
LYMPHOCYTES # BLD AUTO: 1.73 K/UL — SIGNIFICANT CHANGE UP (ref 1–3.3)
LYMPHOCYTES # BLD AUTO: 23.2 % — SIGNIFICANT CHANGE UP (ref 13–44)
MAGNESIUM SERPL-MCNC: 1.4 MG/DL — LOW (ref 1.6–2.6)
MAGNESIUM SERPL-MCNC: 1.8 MG/DL — SIGNIFICANT CHANGE UP (ref 1.6–2.6)
MCHC RBC-ENTMCNC: 31 PG — SIGNIFICANT CHANGE UP (ref 27–34)
MCHC RBC-ENTMCNC: 31.3 PG — SIGNIFICANT CHANGE UP (ref 27–34)
MCHC RBC-ENTMCNC: 32.3 GM/DL — SIGNIFICANT CHANGE UP (ref 32–36)
MCHC RBC-ENTMCNC: 32.4 GM/DL — SIGNIFICANT CHANGE UP (ref 32–36)
MCV RBC AUTO: 95.8 FL — SIGNIFICANT CHANGE UP (ref 80–100)
MCV RBC AUTO: 97.1 FL — SIGNIFICANT CHANGE UP (ref 80–100)
MONOCYTES # BLD AUTO: 0.72 K/UL — SIGNIFICANT CHANGE UP (ref 0–0.9)
MONOCYTES NFR BLD AUTO: 9.7 % — SIGNIFICANT CHANGE UP (ref 2–14)
NEUTROPHILS # BLD AUTO: 4.78 K/UL — SIGNIFICANT CHANGE UP (ref 1.8–7.4)
NEUTROPHILS NFR BLD AUTO: 64 % — SIGNIFICANT CHANGE UP (ref 43–77)
NRBC # BLD: 0 /100 WBCS — SIGNIFICANT CHANGE UP (ref 0–0)
NRBC # BLD: 0 /100 WBCS — SIGNIFICANT CHANGE UP (ref 0–0)
PHOSPHATE SERPL-MCNC: 3 MG/DL — SIGNIFICANT CHANGE UP (ref 2.5–4.5)
PHOSPHATE SERPL-MCNC: 4.5 MG/DL — SIGNIFICANT CHANGE UP (ref 2.5–4.5)
PLATELET # BLD AUTO: 144 K/UL — LOW (ref 150–400)
PLATELET # BLD AUTO: 205 K/UL — SIGNIFICANT CHANGE UP (ref 150–400)
POTASSIUM SERPL-MCNC: 3.5 MMOL/L — SIGNIFICANT CHANGE UP (ref 3.5–5.3)
POTASSIUM SERPL-MCNC: 4.1 MMOL/L — SIGNIFICANT CHANGE UP (ref 3.5–5.3)
POTASSIUM SERPL-SCNC: 3.5 MMOL/L — SIGNIFICANT CHANGE UP (ref 3.5–5.3)
POTASSIUM SERPL-SCNC: 4.1 MMOL/L — SIGNIFICANT CHANGE UP (ref 3.5–5.3)
PROT SERPL-MCNC: 10.9 G/DL — HIGH (ref 6–8.3)
PROTHROM AB SERPL-ACNC: 12.2 SEC — SIGNIFICANT CHANGE UP (ref 9.5–13)
PROTHROM AB SERPL-ACNC: 14.9 SEC — HIGH (ref 9.5–13)
RBC # BLD: 3.06 M/UL — LOW (ref 4.2–5.8)
RBC # BLD: 4.12 M/UL — LOW (ref 4.2–5.8)
RBC # FLD: 16.1 % — HIGH (ref 10.3–14.5)
RBC # FLD: 16.1 % — HIGH (ref 10.3–14.5)
RH IG SCN BLD-IMP: POSITIVE — SIGNIFICANT CHANGE UP
SODIUM SERPL-SCNC: 138 MMOL/L — SIGNIFICANT CHANGE UP (ref 135–145)
SODIUM SERPL-SCNC: 140 MMOL/L — SIGNIFICANT CHANGE UP (ref 135–145)
WBC # BLD: 7.46 K/UL — SIGNIFICANT CHANGE UP (ref 3.8–10.5)
WBC # BLD: 7.98 K/UL — SIGNIFICANT CHANGE UP (ref 3.8–10.5)
WBC # FLD AUTO: 7.46 K/UL — SIGNIFICANT CHANGE UP (ref 3.8–10.5)
WBC # FLD AUTO: 7.98 K/UL — SIGNIFICANT CHANGE UP (ref 3.8–10.5)

## 2024-08-19 PROCEDURE — 34713 PERQ ACCESS & CLSR FEM ART: CPT | Mod: 50,GC

## 2024-08-19 PROCEDURE — 93010 ELECTROCARDIOGRAM REPORT: CPT

## 2024-08-19 PROCEDURE — 34846 VISC & INFRAREN ABD 2 PROSTH: CPT | Mod: GC

## 2024-08-19 RX ORDER — SENNA 187 MG
2 TABLET ORAL AT BEDTIME
Refills: 0 | Status: DISCONTINUED | OUTPATIENT
Start: 2024-08-19 | End: 2024-08-20

## 2024-08-19 RX ORDER — TIOTROPIUM BROMIDE INHALATION SPRAY 3.12 UG/1
2 SPRAY, METERED RESPIRATORY (INHALATION) DAILY
Refills: 0 | Status: DISCONTINUED | OUTPATIENT
Start: 2024-08-19 | End: 2024-08-20

## 2024-08-19 RX ORDER — OFLOXACIN 3 MG/ML
1 SOLUTION/ DROPS OPHTHALMIC
Refills: 0 | Status: COMPLETED | OUTPATIENT
Start: 2024-08-19 | End: 2024-08-20

## 2024-08-19 RX ORDER — MECLIZINE HYDROCHLORIDE 25 MG/1
12.5 TABLET ORAL EVERY 24 HOURS
Refills: 0 | Status: DISCONTINUED | OUTPATIENT
Start: 2024-08-19 | End: 2024-08-20

## 2024-08-19 RX ORDER — CHLORHEXIDINE GLUCONATE 40 MG/ML
1 SOLUTION TOPICAL ONCE
Refills: 0 | Status: DISCONTINUED | OUTPATIENT
Start: 2024-08-19 | End: 2024-08-20

## 2024-08-19 RX ORDER — OXYCODONE AND ACETAMINOPHEN 7.5; 325 MG/1; MG/1
1 TABLET ORAL EVERY 4 HOURS
Refills: 0 | Status: DISCONTINUED | OUTPATIENT
Start: 2024-08-19 | End: 2024-08-20

## 2024-08-19 RX ORDER — NIFEDIPINE 60 MG/1
90 TABLET, FILM COATED, EXTENDED RELEASE ORAL DAILY
Refills: 0 | Status: DISCONTINUED | OUTPATIENT
Start: 2024-08-19 | End: 2024-08-20

## 2024-08-19 RX ORDER — OXYCODONE AND ACETAMINOPHEN 7.5; 325 MG/1; MG/1
2 TABLET ORAL EVERY 4 HOURS
Refills: 0 | Status: DISCONTINUED | OUTPATIENT
Start: 2024-08-19 | End: 2024-08-20

## 2024-08-19 RX ORDER — TETRACAINE HCL 0.5 %
1 DROPS OPHTHALMIC (EYE) ONCE
Refills: 0 | Status: COMPLETED | OUTPATIENT
Start: 2024-08-19 | End: 2024-08-19

## 2024-08-19 RX ORDER — SODIUM CHLORIDE 9 MG/ML
1000 INJECTION INTRAMUSCULAR; INTRAVENOUS; SUBCUTANEOUS
Refills: 0 | Status: DISCONTINUED | OUTPATIENT
Start: 2024-08-19 | End: 2024-08-19

## 2024-08-19 RX ORDER — SODIUM CHLORIDE 9 MG/ML
1000 INJECTION INTRAMUSCULAR; INTRAVENOUS; SUBCUTANEOUS
Refills: 0 | Status: DISCONTINUED | OUTPATIENT
Start: 2024-08-19 | End: 2024-08-20

## 2024-08-19 RX ORDER — PANTOPRAZOLE SODIUM 40 MG
40 TABLET, DELAYED RELEASE (ENTERIC COATED) ORAL
Refills: 0 | Status: DISCONTINUED | OUTPATIENT
Start: 2024-08-19 | End: 2024-08-20

## 2024-08-19 RX ORDER — ASPIRIN 81 MG
81 TABLET, DELAYED RELEASE (ENTERIC COATED) ORAL DAILY
Refills: 0 | Status: DISCONTINUED | OUTPATIENT
Start: 2024-08-19 | End: 2024-08-20

## 2024-08-19 RX ORDER — CYCLOBENZAPRINE HCL 10 MG
10 TABLET ORAL THREE TIMES A DAY
Refills: 0 | Status: DISCONTINUED | OUTPATIENT
Start: 2024-08-19 | End: 2024-08-20

## 2024-08-19 RX ORDER — SERTRALINE HYDROCHLORIDE 50 MG/1
25 TABLET, FILM COATED ORAL DAILY
Refills: 0 | Status: DISCONTINUED | OUTPATIENT
Start: 2024-08-19 | End: 2024-08-20

## 2024-08-19 RX ORDER — CEFAZOLIN SODIUM 2 G/100ML
2000 INJECTION, SOLUTION INTRAVENOUS EVERY 8 HOURS
Refills: 0 | Status: COMPLETED | OUTPATIENT
Start: 2024-08-19 | End: 2024-08-20

## 2024-08-19 RX ADMIN — Medication 81 MILLIGRAM(S): at 15:18

## 2024-08-19 RX ADMIN — CEFAZOLIN SODIUM 100 MILLIGRAM(S): 2 INJECTION, SOLUTION INTRAVENOUS at 22:06

## 2024-08-19 RX ADMIN — Medication 1 DROP(S): at 16:42

## 2024-08-19 RX ADMIN — Medication 25 GRAM(S): at 15:18

## 2024-08-19 RX ADMIN — Medication 80 MILLIGRAM(S): at 22:06

## 2024-08-19 RX ADMIN — SODIUM CHLORIDE 90 MILLILITER(S): 9 INJECTION INTRAMUSCULAR; INTRAVENOUS; SUBCUTANEOUS at 16:43

## 2024-08-19 NOTE — DISCHARGE NOTE PROVIDER - HOSPITAL COURSE
69yoM w/PMHx of HTN, HLD, CAD s/p TVAR then AVR (in late 2023 w/Tre, complicated by CVA), BPH s/p TURP, OA s/p L THR, aortic dissection s/p TEVAR w/Dr. Toledo in 2022, former smoker who is being referred by Dr. Toledo for evaluation of his aortic aneurysm which has demonstrated growth in the past year. He is now s/p FEVAR on 8/19. 69yoM w/PMHx of HTN, HLD, CAD s/p TVAR then AVR (in late 2023 w/Tre, complicated by CVA), BPH s/p TURP, OA s/p L THR, aortic dissection s/p TEVAR w/Dr. Toledo in 2022, former smoker who is being referred by Dr. Toledo for evaluation of his aortic aneurysm which has demonstrated growth in the past year. He is now s/p FEVAR on 8/19. Patient tolerated the procedure well. Today patient is feeling well, all the VS and blood work is within normal limits, the pain is well controlled on oral pain medication, tolerating diet without nausea, and ambulating independently - patient is stable and ready for discharge today.

## 2024-08-19 NOTE — DISCHARGE NOTE PROVIDER - NSDCCPTREATMENT_GEN_ALL_CORE_FT
PRINCIPAL PROCEDURE  Procedure: Endovascular repair of abdominal aortic aneurysm with insertion of fenestrated stent  Findings and Treatment:

## 2024-08-19 NOTE — H&P ADULT - ASSESSMENT
69yoM w/PMHx of HTN, HLD, CAD s/p TVAR then AVR (in late 2023 w/Tre, complicated by CVA), BPH s/p TURP, OA s/p L THR, aortic dissection s/p TEVAR w/Dr. Toledo in 2022, former smoker who is being referred by Dr. Toledo for evaluation of his aortic aneurysm which has demonstrated growth in the past year. He is now s/p FEVAR.    Plan:  Admit to Vascular Surgery telemetry. 5Uris.   Shawanda-op Ancef   CLD adv for dinner if tolerating   Home meds  SQH if groins soft  d/c jef @ [place], TOV in AM

## 2024-08-19 NOTE — CHART NOTE - NSCHARTNOTEFT_GEN_A_CORE
[facility] Care Transitions Initial Follow Up Call    Call within 2 business days of discharge: Yes    Patient Current Location:  Home: [place] [address]  [address]    Care Transition Nurse contacted the family by telephone to perform post hospital discharge assessment. Verified name and  with family as identifiers. Provided introduction to self, and explanation of the Care Transition Nurse role. Patient: Skyla Robison Patient : 1971   MRN: [id number]  Reason for Admission: AMS  Discharge Date: 23 RARS: Readmission Risk Score: 11.1      Last Discharge [address]       Date Complaint Diagnosis Description Type Department Provider    23 Cerebrovascular Accident Altered mental status, unspecified altered mental status type ED to Hosp-Admission (Discharged) (ADMITTED) [facility] 5A Glo Moritz, MD; Venda Files,... Was this an external facility discharge? No Discharge Facility: [facility]  No  Challenges to be reviewed by the provider   Additional needs identified to be addressed with provider: No  none               Method of communication with provider: none. Spoke with patient's wife this morning, states patient is doing good today, is tired and fatigued today but his mentation has returned to baseline. Patient was brought in with concerns for stroke when wife said he was lethargic, confused, and not acting right. He was brought in by EMS who noted facial droop and slurred speech. CVA was ruled out and his confusion was thought to be medication related. Wife states he was recently started on Topamax for Migraines and said this may be the Charol Mustard that broke the camels back\". Topamax was stopped at discharge. Patient also has CGM, states they will put new one on in a couple of days. Reports that blood sugars have been \"all over the place\" with readings as high as 500 and as low as 39. Patient follows with Dr. Veronica Tabor who manages all his medications.     Discharge instructions RN reports pt c/o right eye discomfort. Seen sitting up in bed. Wife at bedside.  Reports burning sensation in right eye that started in PACU. Denies use of contact lens.  Left eye benign. Right eye with scleral erythema, mild conjunctival edema, moderate epiphora.  RIght eye flushed with sterile NS without change in symptoms.  Tetracaine ophthalmic drop administered to right eye with relief of symptoms. Will treat for likely right corneal abrasion.  Plan   No further tetracaine please as this can impede healing/cause irritation (patient notified this was diagnostic)  Ofloxacin ophthalmic drop to right eye four times/day for one day  If worsening or no change by tomorrow please consult ophthalmology  Discussed with vascular PA at bedside. Patient and wife verbalized understanding. reviewed, 1111F  done. They are waiting to hear back from PCP office about his follow up appointment. They deny any needs or concerns at this time. Expressed to call with any new questions or concerns. Care Transition Nurse reviewed discharge instructions with family who verbalized understanding. The family was given an opportunity to ask questions and does not have any further questions or concerns at this time. Were discharge instructions available to patient? Yes. Reviewed appropriate site of care based on symptoms and resources available to patient including: PCP. The family agrees to contact the PCP office for questions related to their healthcare. Advance Care Planning:   Does patient have an Advance Directive: not on file; education provided. Medication reconciliation was performed with family, who verbalizes understanding of administration of home medications. Medications reviewed, 1111F entered: yes    Was patient discharged with a pulse oximeter? no    Non-face-to-face services provided:  Obtained and reviewed discharge summary and/or continuity of care documents    Offered patient enrollment in the Remote Patient Monitoring (RPM) program for in-home monitoring: Patient is not eligible for RPM program.    Care Transitions 24 Hour Call    Schedule Follow Up Appointment with PCP: Completed  Do you have a copy of your discharge instructions?: Yes  Do you have all of your prescriptions and are they filled?: Yes  Have you been contacted by a [facility] Pharmacist?: No  Have you scheduled your follow up appointment?: No  Do you have support at home?: Partner/Spouse/SO  Do you feel like you have everything you need to keep you well at home?: Yes  Are you an active caregiver in your home?: No  Care Transitions Interventions   Home Care Waiver: Declined       Specialty Service Referral: Declined             Discussed follow-up appointments.  If no appointment was previously scheduled, appointment scheduling offered: No.   Is follow up appointment scheduled within 7 days of discharge? Waiting for office to call back . Follow Up  No future appointments. Care Transition Nurse provided contact information. Plan for follow-up call in 5-7 days based on severity of symptoms and risk factors. Plan for next call:  check if any new confusion, see how blood sugars are doing.      Slim Gimenez RN

## 2024-08-19 NOTE — DISCHARGE NOTE PROVIDER - NSDCMRMEDTOKEN_GEN_ALL_CORE_FT
acetaminophen 325 mg oral capsule: 2 cap(s) orally every 6 hours as needed for  mild pain Take 2-3 tablets every 6 hours, as needed, for pain  aspirin 81 mg oral tablet: 1 tab(s) orally once a day  atorvastatin 80 mg oral tablet: 1 tab(s) orally once a day (at bedtime)  cyclobenzaprine 10 mg oral tablet: 1 tab(s) orally 3 times a day as needed for Muscle Spasm  gabapentin 300 mg oral capsule: 1 cap(s) orally every 8 hours  meclizine 12.5 mg oral tablet: 1 tab(s) orally as needed for  dizziness  NIFEdipine 90 mg oral tablet, extended release: 1 tab(s) orally once a day  pantoprazole 40 mg oral delayed release tablet: 1 tab(s) orally once a day (before a meal)  senna leaf extract oral tablet: 2 tab(s) orally once a day (at bedtime) as needed for  constipation  Spiriva 18 mcg inhalation capsule: 1 cap(s) inhaled once a day  Wixela Inhub 250 mcg-50 mcg inhalation powder: 1 inhaled once a day  Zoloft 25 mg oral tablet: 1 orally once a day   acetaminophen 325 mg oral capsule: 2 cap(s) orally every 6 hours as needed for  mild pain Take 2-3 tablets every 6 hours, as needed, for pain  aspirin 81 mg oral tablet: 1 tab(s) orally once a day  atorvastatin 80 mg oral tablet: 1 tab(s) orally once a day (at bedtime)  cyclobenzaprine 10 mg oral tablet: 1 tab(s) orally 3 times a day as needed for Muscle Spasm  gabapentin 300 mg oral capsule: 1 cap(s) orally every 8 hours  meclizine 12.5 mg oral tablet: 1 tab(s) orally once a day as needed for  dizziness  NIFEdipine 90 mg oral tablet, extended release: 1 tab(s) orally once a day  ofloxacin 0.3% ophthalmic solution: 1 drop(s) to each affected eye 4 times a day  pantoprazole 40 mg oral delayed release tablet: 1 tab(s) orally once a day (before a meal)  senna leaf extract oral tablet: 2 tab(s) orally once a day (at bedtime) as needed for  constipation  Spiriva 18 mcg inhalation capsule: 1 cap(s) inhaled once a day  Wixela Inhub 250 mcg-50 mcg inhalation powder: 1 inhaled once a day  Zoloft 25 mg oral tablet: 1 orally once a day

## 2024-08-19 NOTE — PATIENT PROFILE ADULT - FALL HARM RISK - HARM RISK INTERVENTIONS

## 2024-08-19 NOTE — DISCHARGE NOTE PROVIDER - NSDCFUADDINST_GEN_ALL_CORE_FT
NEPHROLOGY / [place] / [place]     As our patient we look forward to providing you with the best quality of care and         helping to facilitate your needs.    On your way out, you may schedule your next appointment at the reception desk where you checked in at, or you can call us back at either of the locations noted below, to schedule with Dr. Man Gastelum, in the Nephrology Dept. (kidney doctor)    Your next appointment with Dr. Conway is due in:4-6 weeks    Your NON-FASTING LABS will be due few days prior to your next office visit. These orders will be entered in the system. You may present to any [facility] lab for your tests, no appointment is necessary *Results of these tests will then be given to you at your next scheduled office visit with     For any tests that you may need, please see: \"Non-Medication Orders\"section  __________________________________________________________________________________________    *Appointment hours for Dr. Conway at both locations below are 12:30pm to 3:50pm.  *Please note we are out of the office on Friday, Saturday, and Sunday    [facility]    (Tues., Wed., & Thurs.)  [address] [place] [address]   Phone: [phone]   Fax: [phone]    [facility]  (Monday's only)  [address], [address]  *[place] [address]  Phone: [phone]  Fax: [phone]    Additional Educational Resources:  For additional resources regarding your symptoms, diagnosis, or further health information, please visit the Health Resources section on [email or web address] or the Online Health Resources section in [employer].    FOLLOW UP: Dr. Rock in 1 week. Your appointment has been made for _______. Call the office at  with any questions.    WOUND CARE: You may shower; soap and water over incision sites. Do not scrub. Pat dry when done.     ACTIVITY: Ambulate as tolerated, but no heavy lifting (>10lbs) or strenuous exercise.    Call the office if you experience increasing pain, redness, swelling or drainage from incision sites/wounds, or temperature >101.4F.     NEW MEDICATIONS:    ADDITIONAL FOLLOW UP AFTER DISCHARGE:  Follow up with your PCP per routine.    DISCHARGE DESTINATION: home    Discharge Education provided: yes   [employer] FOLLOW UP: Dr. Rock in 1 week. Your appointment has been made for _______. Call the office at  with any questions.    WOUND CARE: You may shower; soap and water over incision sites. Do not scrub. Pat dry when done.     ACTIVITY: Ambulate as tolerated, but no heavy lifting (>10lbs) or strenuous exercise.  Call the office if you experience increasing pain, redness, swelling or drainage from incision sites/wounds, or temperature >101.4F.     NEW MEDICATIONS: continue Ofloxacin drop for 4 more days.    ADDITIONAL FOLLOW UP AFTER DISCHARGE:  Follow up with your PCP per routine.    DISCHARGE DESTINATION: home    Discharge Education provided: yes   FOLLOW UP: Dr. Rock in 1 week. Your appointment has been made for 8/27 at 10:30am. Call the office at  with any questions.    WOUND CARE: You may shower; soap and water over incision sites. Do not scrub. Pat dry when done.     ACTIVITY: Ambulate as tolerated, but no heavy lifting (>10lbs) or strenuous exercise.  Call the office if you experience increasing pain, redness, swelling or drainage from incision sites/wounds, or temperature >101.4F.     NEW MEDICATIONS: continue Ofloxacin drop for 4 more days.    ADDITIONAL FOLLOW UP AFTER DISCHARGE:  Follow up with your PCP per routine.    DISCHARGE DESTINATION: home    Discharge Education provided: yes

## 2024-08-19 NOTE — PROGRESS NOTE ADULT - SUBJECTIVE AND OBJECTIVE BOX
Vascular Surgery Post-Op Note    Procedure: fEVAR    Diagnosis/Indication: iAAA    Surgeon: Dr. Rock    S: Pt has no complaints immediately post-op besides dryness in the right eye. Denies abdominal pain, back pain, or weakness. Denies CP, SOB, KAUFMAN, calf tenderness. Pain controlled with medication.    O:  T(C): --  T(F): --  HR: 65 (08-19-24 @ 14:41) (62 - 73)  BP: 148/67 (08-19-24 @ 14:41) (91/60 - 150/74)  RR: 16 (08-19-24 @ 14:41) (13 - 20)  SpO2: 99% (08-19-24 @ 14:41) (94% - 99%)  Wt(kg): --                        9.5    7.98  )-----------( 144      ( 19 Aug 2024 13:59 )             29.3     08-19    140  |  107  |  18  ----------------------------<  129<H>  4.1   |  22  |  0.77    Ca    8.4      19 Aug 2024 13:59  Phos  4.5     08-19  Mg     1.4     08-19    TPro  10.9<H>  /  Alb  4.8  /  TBili  0.7  /  DBili  <0.2  /  AST  42<H>  /  ALT  44  /  AlkPhos  136<H>  08-19      Gen: NAD, resting comfortably in bed  C/V: NSR  Pulm: Nonlabored breathing, no respiratory distress  Abd: soft, NT/ND  : fuchs catheter draining clear yellow urine   Extremity: WWP, no calf edema, Strength 4/4 on RLE, 3/4 on LLE (baseline - pt with history of CVA)       Pulses: R DP palpable, PT with triphasic signals                  L DP palpable, PT with biphasic signals         A/P: 69yMale with pmhx of HTN, HLD, CAD s/p TVAR then AVR (in late 2023 w/Tre, complicated by CVA), BPH s/p TURP, OA s/p L THR, aortic dissection s/p TEVAR w/Dr Toledo in 2022, and former smoker who is now s/p fEVAR with Dr. Rock.     Diet: CLD  IVF: NS @90  Pain/nausea control prn  DVT ppx: holding SQH  Dispo plan: home, pending recovery

## 2024-08-19 NOTE — BRIEF OPERATIVE NOTE - NSICDXBRIEFPROCEDURE_GEN_ALL_CORE_FT
PROCEDURES:  Endovascular repair of abdominal aortic aneurysm with insertion of fenestrated stent 19-Aug-2024 14:03:57  Sosa Ibrahim

## 2024-08-19 NOTE — H&P ADULT - HISTORY OF PRESENT ILLNESS
69yoM w/PMHx of HTN, HLD, CAD s/p TVAR then AVR (in late 2023 w/Tre, complicated by CVA), BPH s/p TURP, OA s/p L THR, aortic dissection s/p TEVAR w/Dr. Toledo in 2022, former smoker who is being referred by Dr. Toledo for evaluation of his aortic aneurysm which has demonstrated growth in the past year. Pt denies abd or back pain and does not note any new claudication or pain in the feet/toes. Today he presents for elective FEVAR

## 2024-08-19 NOTE — PATIENT PROFILE ADULT - FUNCTIONAL ASSESSMENT - BASIC MOBILITY 6.
4-calculated by average/Not able to assess (calculate score using [facility] averaging method)

## 2024-08-19 NOTE — BRIEF OPERATIVE NOTE - OPERATION/FINDINGS
General anesthesia. Bilateral groin access. Max sheath size 20F both groins. Groins both perclosed x2. Cook zFEN endograft deployed in 4 pieces. Main body 32mm x 72mm. Distal body 28mm x 76mm. L contra limb 16mm. R contra limb 20mm. Left renal stented with 6x22 iCAST stent. Completion angiogram shows resolution of AAA with no significant endoleak. Renal arteries both patent and no evidence of hypogastric compromise. Heparin reversed with protamine. Postop bilateral palpable DP pulses.

## 2024-08-19 NOTE — H&P ADULT - NSVTERISKREFERASSESS_GEN_ALL_CORE
Refer to the Assessment tab to view/cancel completed assessment.
no loss of consciousness, no gait abnormality, no headache, no sensory deficits, and no weakness.

## 2024-08-19 NOTE — DISCHARGE NOTE PROVIDER - NSDCFUSCHEDAPPT_GEN_ALL_CORE_FT
Richard Rock  [facility]  VASCULAR [address]  Scheduled Appointment: 08/27/2024

## 2024-08-19 NOTE — DISCHARGE NOTE PROVIDER - CARE PROVIDER_API CALL
Richard Rcok  Vascular Surgery  [address]  [place] [address]  Phone: [phone]  Fax: [phone]  Follow Up Time: 1 week

## 2024-08-19 NOTE — BRIEF OPERATIVE NOTE - NSICDXBRIEFPREOP_GEN_ALL_CORE_FT
PRE-OP DIAGNOSIS:  AAA (abdominal aortic aneurysm) without rupture 19-Aug-2024 14:04:21  Sosa Ibrahim

## 2024-08-19 NOTE — DISCHARGE NOTE PROVIDER - NSDCCPCAREPLAN_GEN_ALL_CORE_FT
PRINCIPAL DISCHARGE DIAGNOSIS  Diagnosis: AAA (abdominal aortic aneurysm)  Assessment and Plan of Treatment:       SECONDARY DISCHARGE DIAGNOSES  Diagnosis: Hypertension  Assessment and Plan of Treatment:     Diagnosis: Hyperlipemia  Assessment and Plan of Treatment:     Diagnosis: CAD (coronary artery disease)  Assessment and Plan of Treatment:     Diagnosis: S/P TAVR (transcatheter aortic valve replacement)  Assessment and Plan of Treatment:     Diagnosis: BPH (benign prostatic hyperplasia)  Assessment and Plan of Treatment:     Diagnosis: S/P TURP  Assessment and Plan of Treatment:     Diagnosis: History of aortic dissection  Assessment and Plan of Treatment:     Diagnosis: S/P insertion of endovascular thoracic aortic stent graft  Assessment and Plan of Treatment:     Diagnosis: Hypomagnesemia  Assessment and Plan of Treatment:

## 2024-08-20 ENCOUNTER — TRANSCRIPTION ENCOUNTER (OUTPATIENT)
Age: 69
End: 2024-08-20

## 2024-08-20 VITALS
TEMPERATURE: 98 F | HEART RATE: 57 BPM | SYSTOLIC BLOOD PRESSURE: 147 MMHG | RESPIRATION RATE: 18 BRPM | OXYGEN SATURATION: 97 % | DIASTOLIC BLOOD PRESSURE: 67 MMHG

## 2024-08-20 LAB
ANION GAP SERPL CALC-SCNC: 7 MMOL/L — SIGNIFICANT CHANGE UP (ref 5–17)
APTT BLD: 25 SEC — SIGNIFICANT CHANGE UP (ref 24.5–35.6)
BUN SERPL-MCNC: 31 MG/DL — HIGH (ref 7–23)
CALCIUM SERPL-MCNC: 8.2 MG/DL — LOW (ref 8.4–10.5)
CHLORIDE SERPL-SCNC: 103 MMOL/L — SIGNIFICANT CHANGE UP (ref 96–108)
CO2 SERPL-SCNC: 25 MMOL/L — SIGNIFICANT CHANGE UP (ref 22–31)
CREAT SERPL-MCNC: 1.08 MG/DL — SIGNIFICANT CHANGE UP (ref 0.5–1.3)
EGFR: 74 ML/MIN/1.73M2 — SIGNIFICANT CHANGE UP
GLUCOSE SERPL-MCNC: 139 MG/DL — HIGH (ref 70–99)
HCT VFR BLD CALC: 26.6 % — LOW (ref 39–50)
HGB BLD-MCNC: 8.4 G/DL — LOW (ref 13–17)
INR BLD: 1.16 — SIGNIFICANT CHANGE UP (ref 0.85–1.18)
MAGNESIUM SERPL-MCNC: 2.1 MG/DL — SIGNIFICANT CHANGE UP (ref 1.6–2.6)
MCHC RBC-ENTMCNC: 29.8 PG — SIGNIFICANT CHANGE UP (ref 27–34)
MCHC RBC-ENTMCNC: 31.6 GM/DL — LOW (ref 32–36)
MCV RBC AUTO: 94.3 FL — SIGNIFICANT CHANGE UP (ref 80–100)
NRBC # BLD: 0 /100 WBCS — SIGNIFICANT CHANGE UP (ref 0–0)
PHOSPHATE SERPL-MCNC: 3.9 MG/DL — SIGNIFICANT CHANGE UP (ref 2.5–4.5)
PLATELET # BLD AUTO: 129 K/UL — LOW (ref 150–400)
POTASSIUM SERPL-MCNC: 3.7 MMOL/L — SIGNIFICANT CHANGE UP (ref 3.5–5.3)
POTASSIUM SERPL-SCNC: 3.7 MMOL/L — SIGNIFICANT CHANGE UP (ref 3.5–5.3)
PROTHROM AB SERPL-ACNC: 13.2 SEC — HIGH (ref 9.5–13)
RBC # BLD: 2.82 M/UL — LOW (ref 4.2–5.8)
RBC # FLD: 15.9 % — HIGH (ref 10.3–14.5)
SODIUM SERPL-SCNC: 135 MMOL/L — SIGNIFICANT CHANGE UP (ref 135–145)
WBC # BLD: 5.62 K/UL — SIGNIFICANT CHANGE UP (ref 3.8–10.5)
WBC # FLD AUTO: 5.62 K/UL — SIGNIFICANT CHANGE UP (ref 3.8–10.5)

## 2024-08-20 PROCEDURE — C1894: CPT

## 2024-08-20 PROCEDURE — 85610 PROTHROMBIN TIME: CPT

## 2024-08-20 PROCEDURE — 85730 THROMBOPLASTIN TIME PARTIAL: CPT

## 2024-08-20 PROCEDURE — 85027 COMPLETE CBC AUTOMATED: CPT

## 2024-08-20 PROCEDURE — C9399: CPT

## 2024-08-20 PROCEDURE — 36415 COLL VENOUS BLD VENIPUNCTURE: CPT

## 2024-08-20 PROCEDURE — 80076 HEPATIC FUNCTION PANEL: CPT

## 2024-08-20 PROCEDURE — C1760: CPT

## 2024-08-20 PROCEDURE — 76000 FLUOROSCOPY <1 HR PHYS/QHP: CPT

## 2024-08-20 PROCEDURE — 86901 BLOOD TYPING SEROLOGIC RH(D): CPT

## 2024-08-20 PROCEDURE — C2628: CPT

## 2024-08-20 PROCEDURE — C1887: CPT

## 2024-08-20 PROCEDURE — 85347 COAGULATION TIME ACTIVATED: CPT

## 2024-08-20 PROCEDURE — C1766: CPT

## 2024-08-20 PROCEDURE — C1769: CPT

## 2024-08-20 PROCEDURE — 86900 BLOOD TYPING SEROLOGIC ABO: CPT

## 2024-08-20 PROCEDURE — 86850 RBC ANTIBODY SCREEN: CPT

## 2024-08-20 PROCEDURE — 80048 BASIC METABOLIC PNL TOTAL CA: CPT

## 2024-08-20 PROCEDURE — 85025 COMPLETE CBC W/AUTO DIFF WBC: CPT

## 2024-08-20 PROCEDURE — C1725: CPT

## 2024-08-20 PROCEDURE — C1874: CPT

## 2024-08-20 PROCEDURE — 83735 ASSAY OF MAGNESIUM: CPT

## 2024-08-20 PROCEDURE — 99222 1ST HOSP IP/OBS MODERATE 55: CPT

## 2024-08-20 PROCEDURE — 84100 ASSAY OF PHOSPHORUS: CPT

## 2024-08-20 PROCEDURE — 93005 ELECTROCARDIOGRAM TRACING: CPT

## 2024-08-20 PROCEDURE — 86923 COMPATIBILITY TEST ELECTRIC: CPT

## 2024-08-20 RX ORDER — POTASSIUM CHLORIDE 10 MEQ
40 TABLET, EXT RELEASE, PARTICLES/CRYSTALS ORAL ONCE
Refills: 0 | Status: COMPLETED | OUTPATIENT
Start: 2024-08-20 | End: 2024-08-20

## 2024-08-20 RX ORDER — OFLOXACIN 3 MG/ML
1 SOLUTION/ DROPS OPHTHALMIC
Qty: 0 | Refills: 0 | DISCHARGE
Start: 2024-08-20

## 2024-08-20 RX ORDER — MECLIZINE HYDROCHLORIDE 25 MG/1
1 TABLET ORAL
Qty: 10 | Refills: 0
Start: 2024-08-20

## 2024-08-20 RX ADMIN — SERTRALINE HYDROCHLORIDE 25 MILLIGRAM(S): 50 TABLET, FILM COATED ORAL at 11:33

## 2024-08-20 RX ADMIN — OFLOXACIN 1 DROP(S): 3 SOLUTION/ DROPS OPHTHALMIC at 12:26

## 2024-08-20 RX ADMIN — Medication 40 MILLIGRAM(S): at 06:44

## 2024-08-20 RX ADMIN — NIFEDIPINE 90 MILLIGRAM(S): 60 TABLET, FILM COATED, EXTENDED RELEASE ORAL at 06:44

## 2024-08-20 RX ADMIN — CEFAZOLIN SODIUM 100 MILLIGRAM(S): 2 INJECTION, SOLUTION INTRAVENOUS at 06:43

## 2024-08-20 RX ADMIN — OFLOXACIN 1 DROP(S): 3 SOLUTION/ DROPS OPHTHALMIC at 06:45

## 2024-08-20 RX ADMIN — Medication 40 MILLIEQUIVALENT(S): at 11:33

## 2024-08-20 RX ADMIN — Medication 81 MILLIGRAM(S): at 11:32

## 2024-08-20 NOTE — CONSULT NOTE ADULT - SUBJECTIVE AND OBJECTIVE BOX
HPI as per vascular surgery H&P: 69yoM w/PMHx of HTN, HLD, CAD s/p TVAR then AVR (in late 2023 w/Tre, complicated by CVA), BPH s/p TURP, OA s/p L THR, aortic dissection s/p TEVAR w/Dr. Toledo in 2022, former smoker who is being referred by Dr. Toledo for evaluation of his aortic aneurysm which has demonstrated growth in the past year. Pt denies abd or back pain and does not note any new claudication or pain in the feet/toes. Today he presents for elective FEVAR    Today, patient is doing well.  Denies any pain.  Passed TOV and tolerating PO intake.  No BM since the OR.  Denies HA, CP, SOB, abdominal pain, nausea, vomiting, fever, chills or diarrhea.     PMHx: as per HPI     PSHx: AVR, cataracts     FHx: Marfans (uncle)     SHx: denies alcohol, tobacco or illicit drug use    Allergies: NKDA    Home Medications:   · aspirin 81 mg oral tablet: 1 tab(s) orally once a day  · cyclobenzaprine 10 mg oral tablet: 1 tab(s) orally 3 times a day as needed for Muscle Spasm  · atorvastatin 80 mg oral tablet: 1 tab(s) orally once a day (at bedtime)  · NIFEdipine 90 mg oral tablet, extended release: 1 tab(s) orally once a day  · acetaminophen 325 mg oral capsule: 2 cap(s) orally every 6 hours as needed for  mild pain Take 2-3 tablets every 6 hours, as needed, for pain  · senna leaf extract oral tablet: 2 tab(s) orally once a day (at bedtime) as needed for  constipation  · Zoloft 25 mg oral tablet: 1 orally once a day  · Wixela Inhub 250 mcg-50 mcg inhalation powder: 1 inhaled once a day  · Spiriva 18 mcg inhalation capsule: 1 cap(s) inhaled once a day    Objective:   Vital Signs Last 24 Hrs  T(C): 36.7 (20 Aug 2024 09:00), Max: 37.1 (19 Aug 2024 20:28)  T(F): 98.1 (20 Aug 2024 09:00), Max: 98.8 (19 Aug 2024 20:28)  HR: 54 (20 Aug 2024 09:00) (54 - 87)  BP: 155/72 (20 Aug 2024 09:00) (91/60 - 157/74)  BP(mean): 100 (20 Aug 2024 09:00) (71 - 105)  RR: 17 (20 Aug 2024 09:00) (13 - 20)  SpO2: 96% (20 Aug 2024 09:00) (94% - 99%)    Parameters below as of 20 Aug 2024 09:00  Patient On (Oxygen Delivery Method): room air    Physical Exam:   -Gen: NAD, resting in bed  -HEENT: EOMI, PERRL, no scleral icterus  -CV: normal S1 and S2, no murmurs appreciated   -Lungs: CTABL, normal respiratory effort on RA  -Ab: soft, NT, ND, normal BS  -Ext: no LE edema  -Neuro: A&O x 3, no focal deficits     Labs:                        8.4    5.62  )-----------( 129      ( 20 Aug 2024 05:30 )             26.6       08-20    135  |  103  |  31<H>  ----------------------------<  139<H>  3.7   |  25  |  1.08    Ca    8.2<L>      20 Aug 2024 05:30  Phos  3.9     08-20  Mg     2.1     08-20    TPro  10.9<H>  /  Alb  4.8  /  TBili  0.7  /  DBili  <0.2  /  AST  42<H>  /  ALT  44  /  AlkPhos  136<H>  08-19    Medications:  MEDICATIONS  (STANDING):  aspirin  chewable 81 milliGRAM(s) Oral daily  atorvastatin 80 milliGRAM(s) Oral at bedtime  chlorhexidine 4% Liquid 1 Application(s) Topical once  NIFEdipine XL 90 milliGRAM(s) Oral daily  ofloxacin 0.3% Solution 1 Drop(s) Right EYE four times a day  pantoprazole    Tablet 40 milliGRAM(s) Oral before breakfast  potassium chloride    Tablet ER 40 milliEquivalent(s) Oral once  sertraline 25 milliGRAM(s) Oral daily  sodium chloride 0.9%. 1000 milliLiter(s) (90 mL/Hr) IV Continuous <Continuous>    MEDICATIONS  (PRN):  cyclobenzaprine 10 milliGRAM(s) Oral three times a day PRN Muscle Spasm  meclizine 12.5 milliGRAM(s) Oral every 24 hours PRN for dizziness  oxycodone    5 mG/acetaminophen 325 mG 2 Tablet(s) Oral every 4 hours PRN Severe Pain (7 - 10)  oxycodone    5 mG/acetaminophen 325 mG 1 Tablet(s) Oral every 4 hours PRN Moderate Pain (4 - 6)  senna 2 Tablet(s) Oral at bedtime PRN for constipation  tiotropium 2.5 MICROgram(s) Inhaler 2 Puff(s) Inhalation daily PRN shortness of breath

## 2024-08-20 NOTE — DISCHARGE NOTE NURSING/CASE MANAGEMENT/SOCIAL WORK - NSDCPEFALRISK_GEN_ALL_CORE
For information on Fall & Injury Prevention, visit: [email or web address] OR  [email or web address] OR  https://www.cdc.gov/steadi/patient.html

## 2024-08-20 NOTE — CONSULT NOTE ADULT - ASSESSMENT
69-year-old male with a PMHx of HTN, HLD, CAD, aortic stenosis (s/p AVR c/b CVA), BPH (s/p TURP) and aortic dissection (s/p TEVAR) who presented for elective FEVAR.      #AAA   -further management as per vascular surgery, s/p FEVAR (8/19)   -currently on ASA 81mg daily and Atorvastatin 80mg daily      #Acute Blood Loss Anemia    -hemodynamically stable without evidence of active bleed, continue to monitor      #Acute Kidney Injury    -encourage PO intake upon discharge      #HTN   -continue with Nifedipine 90mg daily      #COPD   -continue with Spiriva      #Anxiety   -continuer with Sertraline 25mg daily      DVT PPx: held post-op     Dispo: home

## 2024-08-20 NOTE — DISCHARGE NOTE NURSING/CASE MANAGEMENT/SOCIAL WORK - PATIENT PORTAL LINK FT
You can access the FollowMyHealth Patient Portal offered by [facility] by registering at the following website: [email or web address]. By joining [facility]’s FollowMyHealth portal, you will also be able to view your health information using other applications (apps) compatible with our system.

## 2024-08-20 NOTE — PROGRESS NOTE ADULT - SUBJECTIVE AND OBJECTIVE BOX
O/N: lina perez [place], VSS        ---------------------------------------------------------------------------  PLEASE CHECK WHEN PRESENT:     [  ]Heart Failure     [  ] Acute     [  ] Acute on Chronic     [  ] Chronic  -------------------------------------------------------------------     [  ]Diastolic [HFpEF]     [  ]Systolic [HFrEF]     [  ]Combined [HFpEF & HFrEF]  .................................................................................     [  ]Other:     [ ] Pulmonary Hypertension     [ ] Chronic A-fib     [ ] Persistet A-fib     [ ] Permanent A-fib     [ ] Paroxysmal A-fib     [ ] Hypertensive Heart Disease  -------------------------------------------------------------------  [ ] Respiratory failure  [ ] Acute cor pulmonale  [ ] Asthma/COPD Exacerbation  [ ]COPD on home O2 (Chronic renal Failure)   [ ] Pleural effusion  [ ] Aspiration pneumonia  [ ] Obstructive Sleep Apnea  [ ]Atelectasis   [ ] Acute PE   -------------------------------------------------------------------  [  ]Acute Kidney Injury      [  ]Acute Tubular Necrosis      [  ]Reneal Medullary Necrosis     [  ]Renal Cortical Necrosis     [  ]Other Pathological Lesions:    [  ]CKD 1  [  ]CKD 2  [  ]CKD 3  [  ]CKD 4  [  ]CKD 5 (ESRD)  [  ]Other  -------------------------------------------------------------------  [  ]Diabetes  [  ] Diabetic PVD Ulcer  [  ] Neuropathic ulcer to DM  [  ] Diabetes with Nephropathy  [  ] Osteomyelitis due to diabetes  [  ] Hyperglycemia   [  ]hypoglycemia   --------------------------------------------------------------------  [  ]Malnutrition: See Nutrition Note  [  ]Cachexia  [  ]Other:   [  ]Supplement Ordered:  [  ]Morbid Obesity (BMI >=40]  [ ] Ileus  ---------------------------------------------------------------------  [ ] Sepsis/severe sepsis/septic shock  [ ] Noninfectious SIRS  [ ] UTI  [ ] Pneumonia  [ ] Thrombophlebitis     -----------------------------------------------------------------------  [ ] Acidosis/alkalosis  [ ] Fluid overload  [ ] Hypokalemia  [ ] Hyperkalemia  [ ] Hypomagnesemia  [ ] Hypophosphatemia  [ ] Hyperphosphatemia  ------------------------------------------------------------------------  [ ] Acute blood loss anemia  [ ] Post op blood loss anemia  [ ] Iron deficiency anemia  [ ] Anemia due to chronic disease  [ ] Hypercoagulable state  [ ] Thrombocytopenia  ----------------------------------------------------------------------  [ ] Cerebral infarction  [ ] Transient ischemia attack  [ ] Encephalopathy - Toxic or Metabolic      A/P: 69yoM w/PMHx of HTN, HLD, CAD s/p TVAR then AVR (in late 2023 w/Tre, complicated by CVA), BPH s/p TURP, OA s/p L THR, aortic dissection s/p TEVAR w/Dr. Toledo in 2022, former smoker who is being referred by Dr. Toledo for evaluation of his aortic aneurysm which has demonstrated growth in the past year. He is now s/p FEVAR.    Vascular:  -s/p FEVAR  -emily-op ancef  -lina fuchs at [place], pending TOV  -pain control  -c/w asa/statin    HTN/HLD:  -c/w nifedipine, statin    COPD:  -c/w tiotropium    Psych:  -c/w sertraline    Home meds:  -c/w flexeril & meclizine    Diet: DASH  DVT PPX: holding  Activity: bedrest ON  Dispo: 5 Uris           O/N: dc fuchs at [place], S    Subjective: This am, pt has no complaints. He endorses urinating since fuchs catheter removal overnight, and understands the plan for discharge today.     ROS:   Denies back pain, abdominal pain, LE weakness, numbness, headache, blurred vision, chest Pain, SOB, nausea or vomiting     Social   aspirin  chewable 81  NIFEdipine XL 90      Allergies    No Known Allergies    Intolerances        Vital Signs Last 24 Hrs  T(C): 36.8 (20 Aug 2024 06:31), Max: 37.1 (19 Aug 2024 20:28)  T(F): 98.2 (20 Aug 2024 06:31), Max: 98.8 (19 Aug 2024 20:28)  HR: 62 (20 Aug 2024 06:31) (62 - 87)  BP: 127/61 (20 Aug 2024 06:31) (91/60 - 157/74)  BP(mean): 88 (20 Aug 2024 06:31) (71 - 105)  RR: 18 (20 Aug 2024 06:31) (13 - 20)  SpO2: 97% (20 Aug 2024 06:31) (94% - 99%)    Parameters below as of 20 Aug 2024 06:31  Patient On (Oxygen Delivery Method): room air      I&O's Summary    19 Aug 2024 07:01  -  20 Aug 2024 07:00  --------------------------------------------------------  IN: 870 mL / OUT: 1325 mL / NET: -455 mL        PHYSICAL EXAM:  General: NAD, pt resting comfortably in bed  Pulm: No respiratory distress, nonlabored breathing, on room air  CVS: NSR, HDS  Abd: Soft, NT, ND  :fuchs catheter no longer in place  Extremities: WWP, no edema  Extremity: WWP, no calf edema, Strength 4/4 on RLE, 3/4 on LLE (baseline - pt with history of CVA)       Pulses: R DP palpable, PT with triphasic signals                  L DP palpable, PT with biphasic signals       LABS:                        8.4    5.62  )-----------( 129      ( 20 Aug 2024 05:30 )             26.6     08-20    135  |  103  |  31<H>  ----------------------------<  139<H>  3.7   |  25  |  1.08    Ca    8.2<L>      20 Aug 2024 05:30  Phos  3.9     08-20  Mg     2.1     08-20    TPro  10.9<H>  /  Alb  4.8  /  TBili  0.7  /  DBili  <0.2  /  AST  42<H>  /  ALT  44  /  AlkPhos  136<H>  08-19    PT/INR - ( 20 Aug 2024 05:30 )   PT: 13.2 sec;   INR: 1.16          PTT - ( 20 Aug 2024 05:30 )  PTT:25.0 sec      ---------------------------------------------------------------------------  PLEASE CHECK WHEN PRESENT:     [  ]Heart Failure     [  ] Acute     [  ] Acute on Chronic     [  ] Chronic  -------------------------------------------------------------------     [  ]Diastolic [HFpEF]     [  ]Systolic [HFrEF]     [  ]Combined [HFpEF & HFrEF]  .................................................................................     [  ]Other:     [ ] Pulmonary Hypertension     [ ] Chronic A-fib     [ ] Persistet A-fib     [ ] Permanent A-fib     [ ] Paroxysmal A-fib     [ ] Hypertensive Heart Disease  -------------------------------------------------------------------  [ ] Respiratory failure  [ ] Acute cor pulmonale  [ ] Asthma/COPD Exacerbation  [ ]COPD on home O2 (Chronic renal Failure)   [ ] Pleural effusion  [ ] Aspiration pneumonia  [ ] Obstructive Sleep Apnea  [ ]Atelectasis   [ ] Acute PE   -------------------------------------------------------------------  [  ]Acute Kidney Injury      [  ]Acute Tubular Necrosis      [  ]Reneal Medullary Necrosis     [  ]Renal Cortical Necrosis     [  ]Other Pathological Lesions:    [  ]CKD 1  [  ]CKD 2  [  ]CKD 3  [  ]CKD 4  [  ]CKD 5 (ESRD)  [  ]Other  -------------------------------------------------------------------  [  ]Diabetes  [  ] Diabetic PVD Ulcer  [  ] Neuropathic ulcer to DM  [  ] Diabetes with Nephropathy  [  ] Osteomyelitis due to diabetes  [  ] Hyperglycemia   [  ]hypoglycemia   --------------------------------------------------------------------  [  ]Malnutrition: See Nutrition Note  [  ]Cachexia  [  ]Other:   [  ]Supplement Ordered:  [  ]Morbid Obesity (BMI >=40]  [ ] Ileus  ---------------------------------------------------------------------  [ ] Sepsis/severe sepsis/septic shock  [ ] Noninfectious SIRS  [ ] UTI  [ ] Pneumonia  [ ] Thrombophlebitis     -----------------------------------------------------------------------  [ ] Acidosis/alkalosis  [ ] Fluid overload  [ ] Hypokalemia  [ ] Hyperkalemia  [ ] Hypomagnesemia  [ ] Hypophosphatemia  [ ] Hyperphosphatemia  ------------------------------------------------------------------------  [ ] Acute blood loss anemia  [ ] Post op blood loss anemia  [ ] Iron deficiency anemia  [ ] Anemia due to chronic disease  [ ] Hypercoagulable state  [ ] Thrombocytopenia  ----------------------------------------------------------------------  [ x] Cerebral infarction  [ ] Transient ischemia attack  [ ] Encephalopathy - Toxic or Metabolic      A/P: 69yoM w/PMHx of HTN, HLD, CAD s/p TVAR then AVR (in late 2023 w/Tre, complicated by CVA), BPH s/p TURP, OA s/p L THR, aortic dissection s/p TEVAR w/Dr. Toledo in 2022, former smoker who is being referred by Dr. Toledo for evaluation of his aortic aneurysm which has demonstrated growth in the past year. He is now POD#1 s/p FEVAR. Pt is recovering well this AM, and his discharge is pending trial of void.     Vascular:  -s/p FEVAR  -emily-op ancef  -pending TOV  -pain control  -c/w asa/statin    HTN/HLD:  -c/w nifedipine, statin    COPD:  -c/w tiotropium    Psych:  -c/w sertraline    Possible corneal abrasion:  -c/w ofloxacin eye drops    Home meds:  -c/w flexeril & meclizine    Diet: DASH  DVT PPX: holding  Activity: bedrest ON  Dispo: 5 [facility]ris

## 2024-08-22 DIAGNOSIS — I69.354 HEMIPLEGIA AND HEMIPARESIS FOLLOWING CEREBRAL INFARCTION AFFECTING LEFT NON-DOMINANT SIDE: ICD-10-CM

## 2024-08-22 DIAGNOSIS — Z96.642 PRESENCE OF LEFT ARTIFICIAL HIP JOINT: ICD-10-CM

## 2024-08-22 DIAGNOSIS — Z87.891 PERSONAL HISTORY OF NICOTINE DEPENDENCE: ICD-10-CM

## 2024-08-22 DIAGNOSIS — Z79.52 LONG TERM (CURRENT) USE OF SYSTEMIC STEROIDS: ICD-10-CM

## 2024-08-22 DIAGNOSIS — Z95.828 PRESENCE OF OTHER VASCULAR IMPLANTS AND GRAFTS: ICD-10-CM

## 2024-08-22 DIAGNOSIS — Z95.3 PRESENCE OF XENOGENIC HEART VALVE: ICD-10-CM

## 2024-08-22 DIAGNOSIS — I71.20 THORACIC AORTIC ANEURYSM, WITHOUT RUPTURE, UNSPECIFIED: ICD-10-CM

## 2024-08-22 DIAGNOSIS — M19.90 UNSPECIFIED OSTEOARTHRITIS, UNSPECIFIED SITE: ICD-10-CM

## 2024-08-22 DIAGNOSIS — L94.9 LOCALIZED CONNECTIVE TISSUE DISORDER, UNSPECIFIED: ICD-10-CM

## 2024-08-22 DIAGNOSIS — J44.9 CHRONIC OBSTRUCTIVE PULMONARY DISEASE, UNSPECIFIED: ICD-10-CM

## 2024-08-22 DIAGNOSIS — Z79.82 LONG TERM (CURRENT) USE OF ASPIRIN: ICD-10-CM

## 2024-08-22 DIAGNOSIS — I77.1 STRICTURE OF ARTERY: ICD-10-CM

## 2024-08-22 DIAGNOSIS — I71.42 JUXTARENAL ABDOMINAL AORTIC ANEURYSM, WITHOUT RUPTURE: ICD-10-CM

## 2024-08-22 DIAGNOSIS — N40.0 BENIGN PROSTATIC HYPERPLASIA WITHOUT LOWER URINARY TRACT SYMPTOMS: ICD-10-CM

## 2024-08-22 DIAGNOSIS — D64.9 ANEMIA, UNSPECIFIED: ICD-10-CM

## 2024-08-27 ENCOUNTER — APPOINTMENT (OUTPATIENT)
Dept: VASCULAR SURGERY | Facility: CLINIC | Age: 69
End: 2024-08-27
Payer: COMMERCIAL

## 2024-08-27 VITALS
BODY MASS INDEX: 19.89 KG/M2 | DIASTOLIC BLOOD PRESSURE: 82 MMHG | SYSTOLIC BLOOD PRESSURE: 159 MMHG | HEIGHT: 75 IN | HEART RATE: 88 BPM | WEIGHT: 160 LBS

## 2024-08-27 PROCEDURE — 99213 OFFICE O/P EST LOW 20 MIN: CPT

## 2024-08-27 PROCEDURE — 93978 VASCULAR STUDY: CPT

## 2024-08-28 NOTE — ADDENDUM
[FreeTextEntry1] : This note was written by Rj Jalloh, acting as a scribe for Dr. Richard Rock.  I, Dr. Richard Rock, have read and attest that all the information, medical decision-making, and discharge instructions within are true and accurate.  I, Dr. Richard Rock, personally performed the evaluation and management (E/M) services for this established patient who presents today with (an) existing condition(s).  That E/M includes conducting the examination, assessing all conditions, and (re)establishing/reinforcing a plan of care.  Today, my ACP, Rj Jalloh, was here to observe my evaluation and management services for this condition to be followed going forward.

## 2024-08-28 NOTE — ADDENDUM
[FreeTextEntry1] : This note was written by Rj Jlaloh, acting as a scribe for Dr. Richard Rock.  I, Dr. Richard Rock, have read and attest that all the information, medical decision-making, and discharge instructions within are true and accurate.  I, Dr. Richard Rock, personally performed the evaluation and management (E/M) services for this established patient who presents today with (an) existing condition(s).  That E/M includes conducting the examination, assessing all conditions, and (re)establishing/reinforcing a plan of care.  Today, my ACP, Rj Jalloh, was here to observe my evaluation and management services for this condition to be followed going forward.

## 2024-08-28 NOTE — REASON FOR VISIT
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  Aortic coverage was limited to only the aneurysmal segments to minimize the risk of spinal cord ischemia.  Pt returns today for reevaluation of his repair, reports no back or leg pain or LE weakness, denies any abd/back pain.

## 2024-08-28 NOTE — DISCUSSION/SUMMARY
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  Aortic coverage was limited to only the aneurysmal segments to minimize the risk of spinal cord ischemia.  Pt returns today for reevaluation of his repair, reports no back or leg pain or LE weakness, denies any abd/back pain.  Normal abd exam noted today and aortic duplex performed to assess for endograft patency and renal perfusion demonstrates widely patent endograft w/type II endoleak due to DIMITRI patency, adequate renal perfusion b/l.  Instructed pt to continue all meds and explained that he will require further evaluation of his repair in 6mos w/CTA a/p.

## 2024-08-28 NOTE — PHYSICAL EXAM
[Normal Thyroid] : the thyroid was normal [Normal Breath Sounds] : Normal breath sounds [Respiratory Effort] : normal respiratory effort [Normal Heart Sounds] : normal heart sounds [Normal Rate and Rhythm] : normal rate and rhythm [2+] : left 2+ [No Rash or Lesion] : No rash or lesion [Alert] : alert [Calm] : calm [JVD] : no jugular venous distention  [Carotid Bruits] : no carotid bruits [Right Carotid Bruit] : no bruit heard over the right carotid [Left Carotid Bruit] : no bruit heard over the left carotid [Ankle Swelling (On Exam)] : not present [Varicose Veins Of Lower Extremities] : not present [] : not present [Abdomen Masses] : No abdominal masses [Abdomen Tenderness] : ~T ~M No abdominal tenderness [Purpura] : no purpura  [Petechiae] : no petechiae [Skin Ulcer] : no ulcer [Skin Induration] : no induration [de-identified] : Tall, healthy, NAD [de-identified] : NC/AT, anicteric [de-identified] : FROM throughout, strength 5/5x4

## 2024-08-28 NOTE — REASON FOR VISIT
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  Aortic coverage was limited to only the aneurysmal segments to minimize the risk of spinal cord ischemia.  Pt returns today for reevaluation of his repair, reports no back or leg pain or LE weakness, denies any abd/back pain.

## 2024-08-28 NOTE — PHYSICAL EXAM
[Normal Thyroid] : the thyroid was normal [Normal Breath Sounds] : Normal breath sounds [Respiratory Effort] : normal respiratory effort [Normal Heart Sounds] : normal heart sounds [Normal Rate and Rhythm] : normal rate and rhythm [2+] : left 2+ [No Rash or Lesion] : No rash or lesion [Alert] : alert [Calm] : calm [JVD] : no jugular venous distention  [Carotid Bruits] : no carotid bruits [Right Carotid Bruit] : no bruit heard over the right carotid [Left Carotid Bruit] : no bruit heard over the left carotid [Ankle Swelling (On Exam)] : not present [Varicose Veins Of Lower Extremities] : not present [] : not present [Abdomen Masses] : No abdominal masses [Abdomen Tenderness] : ~T ~M No abdominal tenderness [Purpura] : no purpura  [Petechiae] : no petechiae [Skin Ulcer] : no ulcer [Skin Induration] : no induration [de-identified] : Tall, healthy, NAD [de-identified] : NC/AT, anicteric [de-identified] : FROM throughout, strength 5/5x4

## 2024-09-24 VITALS
RESPIRATION RATE: 18 BRPM | WEIGHT: 154.98 LBS | DIASTOLIC BLOOD PRESSURE: 97 MMHG | OXYGEN SATURATION: 97 % | TEMPERATURE: 98 F | HEART RATE: 110 BPM | HEIGHT: 75 IN | SYSTOLIC BLOOD PRESSURE: 183 MMHG

## 2024-09-24 LAB
ADD ON TEST-SPECIMEN IN LAB: SIGNIFICANT CHANGE UP
ANION GAP SERPL CALC-SCNC: 12 MMOL/L — SIGNIFICANT CHANGE UP (ref 5–17)
APPEARANCE UR: CLEAR — SIGNIFICANT CHANGE UP
BACTERIA # UR AUTO: NEGATIVE /HPF — SIGNIFICANT CHANGE UP
BASOPHILS # BLD AUTO: 0.02 K/UL — SIGNIFICANT CHANGE UP (ref 0–0.2)
BASOPHILS NFR BLD AUTO: 0.3 % — SIGNIFICANT CHANGE UP (ref 0–2)
BILIRUB UR-MCNC: NEGATIVE — SIGNIFICANT CHANGE UP
BLD GP AB SCN SERPL QL: NEGATIVE — SIGNIFICANT CHANGE UP
BUN SERPL-MCNC: 68 MG/DL — HIGH (ref 7–23)
CALCIUM SERPL-MCNC: 9.2 MG/DL — SIGNIFICANT CHANGE UP (ref 8.4–10.5)
CAST: 3 /LPF — SIGNIFICANT CHANGE UP (ref 0–4)
CHLORIDE SERPL-SCNC: 93 MMOL/L — LOW (ref 96–108)
CO2 SERPL-SCNC: 25 MMOL/L — SIGNIFICANT CHANGE UP (ref 22–31)
COLOR SPEC: YELLOW — SIGNIFICANT CHANGE UP
CREAT SERPL-MCNC: 2.68 MG/DL — HIGH (ref 0.5–1.3)
DIFF PNL FLD: ABNORMAL
EGFR: 25 ML/MIN/1.73M2 — LOW
EOSINOPHIL # BLD AUTO: 0.07 K/UL — SIGNIFICANT CHANGE UP (ref 0–0.5)
EOSINOPHIL NFR BLD AUTO: 1 % — SIGNIFICANT CHANGE UP (ref 0–6)
GLUCOSE SERPL-MCNC: 106 MG/DL — HIGH (ref 70–99)
GLUCOSE UR QL: NEGATIVE MG/DL — SIGNIFICANT CHANGE UP
HCT VFR BLD CALC: 23.9 % — LOW (ref 39–50)
HGB BLD-MCNC: 7.6 G/DL — LOW (ref 13–17)
IMM GRANULOCYTES NFR BLD AUTO: 0.4 % — SIGNIFICANT CHANGE UP (ref 0–0.9)
KETONES UR-MCNC: NEGATIVE MG/DL — SIGNIFICANT CHANGE UP
LEUKOCYTE ESTERASE UR-ACNC: NEGATIVE — SIGNIFICANT CHANGE UP
LYMPHOCYTES # BLD AUTO: 0.76 K/UL — LOW (ref 1–3.3)
LYMPHOCYTES # BLD AUTO: 10.8 % — LOW (ref 13–44)
MAGNESIUM SERPL-MCNC: 2 MG/DL — SIGNIFICANT CHANGE UP (ref 1.6–2.6)
MCHC RBC-ENTMCNC: 28.7 PG — SIGNIFICANT CHANGE UP (ref 27–34)
MCHC RBC-ENTMCNC: 31.8 GM/DL — LOW (ref 32–36)
MCV RBC AUTO: 90.2 FL — SIGNIFICANT CHANGE UP (ref 80–100)
MONOCYTES # BLD AUTO: 0.69 K/UL — SIGNIFICANT CHANGE UP (ref 0–0.9)
MONOCYTES NFR BLD AUTO: 9.8 % — SIGNIFICANT CHANGE UP (ref 2–14)
NEUTROPHILS # BLD AUTO: 5.47 K/UL — SIGNIFICANT CHANGE UP (ref 1.8–7.4)
NEUTROPHILS NFR BLD AUTO: 77.7 % — HIGH (ref 43–77)
NITRITE UR-MCNC: NEGATIVE — SIGNIFICANT CHANGE UP
NRBC # BLD: 0 /100 WBCS — SIGNIFICANT CHANGE UP (ref 0–0)
PH UR: 7.5 — SIGNIFICANT CHANGE UP (ref 5–8)
PLATELET # BLD AUTO: 121 K/UL — LOW (ref 150–400)
POTASSIUM SERPL-MCNC: 3.8 MMOL/L — SIGNIFICANT CHANGE UP (ref 3.5–5.3)
POTASSIUM SERPL-SCNC: 3.8 MMOL/L — SIGNIFICANT CHANGE UP (ref 3.5–5.3)
PROT UR-MCNC: >=1000 MG/DL
RBC # BLD: 2.65 M/UL — LOW (ref 4.2–5.8)
RBC # FLD: 16.8 % — HIGH (ref 10.3–14.5)
RBC CASTS # UR COMP ASSIST: 11 /HPF — HIGH (ref 0–4)
RH IG SCN BLD-IMP: POSITIVE — SIGNIFICANT CHANGE UP
SODIUM SERPL-SCNC: 130 MMOL/L — LOW (ref 135–145)
SP GR SPEC: 1.02 — SIGNIFICANT CHANGE UP (ref 1–1.03)
SQUAMOUS # UR AUTO: 1 /HPF — SIGNIFICANT CHANGE UP (ref 0–5)
UROBILINOGEN FLD QL: 0.2 MG/DL — SIGNIFICANT CHANGE UP (ref 0.2–1)
WBC # BLD: 7.04 K/UL — SIGNIFICANT CHANGE UP (ref 3.8–10.5)
WBC # FLD AUTO: 7.04 K/UL — SIGNIFICANT CHANGE UP (ref 3.8–10.5)
WBC UR QL: 4 /HPF — SIGNIFICANT CHANGE UP (ref 0–5)

## 2024-09-24 PROCEDURE — 99285 EMERGENCY DEPT VISIT HI MDM: CPT

## 2024-09-24 PROCEDURE — 71045 X-RAY EXAM CHEST 1 VIEW: CPT | Mod: 26

## 2024-09-24 RX ADMIN — Medication 90 MILLIGRAM(S): at 22:33

## 2024-09-24 NOTE — ED ADULT TRIAGE NOTE - CHIEF COMPLAINT QUOTE
Pt presents to ED here for feeling weak and lightheaded x days. Pt has history of + aortic stents. Pt reports he did a CT and was told has a leaking valve. Denies CP or SOB. EKG in prog.

## 2024-09-24 NOTE — ED ADULT NURSE NOTE - OBJECTIVE STATEMENT
Pt with PMH HTN, HLD, CAD aortic stents, COPD, anemia, BPH presents to ED c/o medical eval. Reports feeling weak and lightheaded x days and had a near syncope episode yesterday. Was seen at another ED yesterday but was told by outpatient provider to come today for low Hg.  Denies CP or SOB, dizziness, n/v/d, fever, headache, vision changes, LOC. EKG, finger stick completed. IV access obtained with labs sent. A&Ox4

## 2024-09-24 NOTE — ED ADULT NURSE NOTE - NSFALLUNIVINTERV_ED_ALL_ED
Bed/Stretcher in lowest position, wheels locked, appropriate side rails in place/Call bell, personal items and telephone in reach/Instruct patient to call for assistance before getting out of bed/chair/stretcher/Non-slip footwear applied when patient is off stretcher/[place] to call system/Physically safe environment - no spills, clutter or unnecessary equipment/Purposeful proactive rounding/Room/bathroom lighting operational, light cord in reach

## 2024-09-24 NOTE — ED PROVIDER NOTE - CARE PLAN
1 Principal Discharge DX:	ANKITA (acute kidney injury)  Secondary Diagnosis:	Symptomatic anemia  Secondary Diagnosis:	Hyponatremia  Secondary Diagnosis:	Hypertension

## 2024-09-24 NOTE — ED PROVIDER NOTE - WR INTERPRETATION 1
CXR negative - No CHF, No cardiomegaly, No pleural effusions, aorta graft, AVR, mediaan sternotomy wires

## 2024-09-24 NOTE — ED PROVIDER NOTE - PHYSICAL EXAMINATION
Constitutional: Well appearing, awake, alert, oriented to person, place, time/situation and in no apparent distress.  ENMT: Airway patent. Normal MM  Eyes: Clear bilaterally, no conjunctival pallor  Cardiac: Normal rate, regular rhythm.  Heart sounds S1, S2.  No murmurs, rubs or gallops.  Respiratory: Breaths sounds equal and clear b/l. No W/R/R. No increased WOB, tachypnea, hypoxia, or accessory mm use. Pt speaks in full sentences.   Gastrointestinal: Abd soft, NT, ND, NABS. No guarding, rebound, or rigidity. No pulsatile abdominal masses. No organomegaly appreciated.   Vasc: 2+ pedal pulses. no calf ttp. no leg edema or wounds  Musculoskeletal: Range of motion is not limited  Neuro: Alert and oriented x 3, face symmetric and speech fluent. Strength 5/5 x 4 ext and symmetric, nml gross motor movement, nml gait. No focal deficits noted.  Skin: Skin normal color for race, warm, dry and intact. No evidence of rash.  Psych: Alert and oriented to person, place, time/situation. normal mood and affect. no apparent risk to self or others.

## 2024-09-24 NOTE — ED PROVIDER NOTE - OBJECTIVE STATEMENT
Pt w/ PMHx HTN, anemia, arthritis, COPD, and a family history of marfan disease and aortic aneurysm (nephew), s/p AVR with Dr. Toledo on 6/23/22 (underwent a mini-AVR (27mm bio), ascending and hemiarch replacement; upoon waking up noted to not move LUE/LLE and a stroke code was called. CTA revealed occlusion of R ICA. Overnight into POD1 he was brought emergently to OR by neurosurgery for angiogram which revealed chronic occlusion of R ICA. No thrombectomy performed). 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 He was referred by Dr. Toledo to Dr Rock for evaluation of his aortic aneurysm which has demonstrated growth in the past year, and is now s/p FEVAR on 8/19/24.   He presented to [facility] yesterday for lightheadedness, feeling faint. Denies CP, SOB, back pain, abd pain.   Labs: Trop 95-> 92, Hb 7.7/Hct 23.7, BNP 31,875, BUN 65/Creatinine 2.4, ALT 58, AST 58, Na 127, K 3.2  CTa chest/abd/pelv impression: Graft/repai of the thoracic and abd aorta w/o endoleak. Penetrating atherosclerotic ulcers of the prox abd aorta, aneursymal dilatation of the thoracic and intra-abd aorta and R common iliac artery+ other incidentals  He was discharged home and called his PCP in the Bx whom told him to come here  He reports weakness and lightheadness  Did not take his antihypertensives yesterday, nor today

## 2024-09-24 NOTE — ED ADULT NURSE REASSESSMENT NOTE - NS ED NURSE REASSESS COMMENT FT1
Patient to receive blood transfusion. Labs reviewed. Patient consent in chart. Patient educated on possible signs of blood transfusion and informed to notify staff if patient were to develop any severe respiratory distress, flank pain, or any other major concerns. Patient with large bore IV access site. Patient to receive VS per policy and procedure. Blood product checked with second RN.

## 2024-09-24 NOTE — ED PROVIDER NOTE - PROGRESS NOTE DETAILS
Pt seen by vascular surgery. Requesting CTS consult (they will speak with them) and US to eval for renal artery stenosis (placed L renal artery stent during FEVAR)

## 2024-09-24 NOTE — ED PROVIDER NOTE - CLINICAL SUMMARY MEDICAL DECISION MAKING FREE TEXT BOX
Pt p/w symptomatic anemia, ANKITA, BNP 38K w/o CP, SOB, nor signs of fluid overload s/p FEVAR 1 month ago, echo last year wnl. HTN w/ medication noncompliance in the last 2 days. There are no EKG changes to suggest ischemia, infarction, or pericarditis. BP meds, pRBCs, vascular consult. Dispo pending w/u and clinical status

## 2024-09-24 NOTE — ED PROVIDER NOTE - NS ED ROS FT
Constitutional: No fever or chills.   Cardiac: No chest pain, SOB or edema. No chest pain with exertion.  Respiratory: No cough or respiratory distress. No hemoptysis. No history of asthma or RAD.  GI: No nausea, vomiting, diarrhea or abdominal pain.  MS: No myalgia, muscle weakness, joint pain or back pain.  Neuro: No headache or focal weakness. No LOC.  Skin: No skin rash.   Except as documented in the HPI, all other systems are negative.

## 2024-09-25 ENCOUNTER — RESULT REVIEW (OUTPATIENT)
Age: 69
End: 2024-09-25

## 2024-09-25 ENCOUNTER — INPATIENT (INPATIENT)
Facility: HOSPITAL | Age: 69
LOS: 7 days | Discharge: HOME HEALTH | DRG: 252 | End: 2024-10-03
Attending: SURGERY | Admitting: SURGERY
Payer: COMMERCIAL

## 2024-09-25 DIAGNOSIS — Z95.2 PRESENCE OF PROSTHETIC HEART VALVE: Chronic | ICD-10-CM

## 2024-09-25 DIAGNOSIS — Z98.49 CATARACT EXTRACTION STATUS, UNSPECIFIED EYE: Chronic | ICD-10-CM

## 2024-09-25 LAB
ADD ON TEST-SPECIMEN IN LAB: SIGNIFICANT CHANGE UP
ALBUMIN, RANDOM URINE: >440 MG/DL — SIGNIFICANT CHANGE UP
ALBUMIN/CREATININE RATIO (ACR): SIGNIFICANT CHANGE UP (ref 0–30)
ANION GAP SERPL CALC-SCNC: 12 MMOL/L — SIGNIFICANT CHANGE UP (ref 5–17)
ANION GAP SERPL CALC-SCNC: 14 MMOL/L — SIGNIFICANT CHANGE UP (ref 5–17)
APPEARANCE UR: CLEAR — SIGNIFICANT CHANGE UP
APPEARANCE UR: CLEAR — SIGNIFICANT CHANGE UP
APTT BLD: 28.3 SEC — SIGNIFICANT CHANGE UP (ref 24.5–35.6)
BACTERIA # UR AUTO: NEGATIVE /HPF — SIGNIFICANT CHANGE UP
BACTERIA # UR AUTO: NEGATIVE /HPF — SIGNIFICANT CHANGE UP
BILIRUB UR-MCNC: NEGATIVE — SIGNIFICANT CHANGE UP
BILIRUB UR-MCNC: NEGATIVE — SIGNIFICANT CHANGE UP
BLD GP AB SCN SERPL QL: NEGATIVE — SIGNIFICANT CHANGE UP
BUN SERPL-MCNC: 73 MG/DL — HIGH (ref 7–23)
BUN SERPL-MCNC: 78 MG/DL — HIGH (ref 7–23)
CALCIUM SERPL-MCNC: 8.7 MG/DL — SIGNIFICANT CHANGE UP (ref 8.4–10.5)
CALCIUM SERPL-MCNC: 9.2 MG/DL — SIGNIFICANT CHANGE UP (ref 8.4–10.5)
CAST: 0 /LPF — SIGNIFICANT CHANGE UP (ref 0–4)
CAST: 13 /LPF — HIGH (ref 0–4)
CHLORIDE SERPL-SCNC: 94 MMOL/L — LOW (ref 96–108)
CHLORIDE SERPL-SCNC: 95 MMOL/L — LOW (ref 96–108)
CO2 SERPL-SCNC: 22 MMOL/L — SIGNIFICANT CHANGE UP (ref 22–31)
CO2 SERPL-SCNC: 24 MMOL/L — SIGNIFICANT CHANGE UP (ref 22–31)
COLOR SPEC: YELLOW — SIGNIFICANT CHANGE UP
COLOR SPEC: YELLOW — SIGNIFICANT CHANGE UP
CREAT ?TM UR-MCNC: 50 MG/DL — SIGNIFICANT CHANGE UP
CREAT ?TM UR-MCNC: 54 MG/DL — SIGNIFICANT CHANGE UP
CREAT ?TM UR-MCNC: 97 MG/DL — SIGNIFICANT CHANGE UP
CREAT ?TM UR-MCNC: 98 MG/DL — SIGNIFICANT CHANGE UP
CREAT SERPL-MCNC: 3.53 MG/DL — HIGH (ref 0.5–1.3)
CREAT SERPL-MCNC: 3.89 MG/DL — HIGH (ref 0.5–1.3)
CYSTATIN C SERPL-MCNC: 4.43 MG/L — HIGH (ref 0.82–1.52)
DIFF PNL FLD: ABNORMAL
DIFF PNL FLD: ABNORMAL
EGFR: 16 ML/MIN/1.73M2 — LOW
EGFR: 18 ML/MIN/1.73M2 — LOW
GFR/BSA.PRED SERPLBLD CYS-BASED-ARV: 10 ML/MIN/1.73M2 — LOW
GLUCOSE SERPL-MCNC: 102 MG/DL — HIGH (ref 70–99)
GLUCOSE SERPL-MCNC: 134 MG/DL — HIGH (ref 70–99)
GLUCOSE UR QL: NEGATIVE MG/DL — SIGNIFICANT CHANGE UP
GLUCOSE UR QL: NEGATIVE MG/DL — SIGNIFICANT CHANGE UP
HAPTOGLOB SERPL-MCNC: SIGNIFICANT CHANGE UP (ref 34–200)
HCT VFR BLD CALC: 27.4 % — LOW (ref 39–50)
HGB BLD-MCNC: 8.8 G/DL — LOW (ref 13–17)
HYALINE CASTS # UR AUTO: PRESENT
INR BLD: 1.18 — HIGH (ref 0.85–1.16)
KETONES UR-MCNC: NEGATIVE MG/DL — SIGNIFICANT CHANGE UP
KETONES UR-MCNC: NEGATIVE MG/DL — SIGNIFICANT CHANGE UP
LDH SERPL L TO P-CCNC: SIGNIFICANT CHANGE UP (ref 50–242)
LEUKOCYTE ESTERASE UR-ACNC: ABNORMAL
LEUKOCYTE ESTERASE UR-ACNC: NEGATIVE — SIGNIFICANT CHANGE UP
MAGNESIUM SERPL-MCNC: 2.1 MG/DL — SIGNIFICANT CHANGE UP (ref 1.6–2.6)
MCHC RBC-ENTMCNC: 28.6 PG — SIGNIFICANT CHANGE UP (ref 27–34)
MCHC RBC-ENTMCNC: 32.1 GM/DL — SIGNIFICANT CHANGE UP (ref 32–36)
MCV RBC AUTO: 89 FL — SIGNIFICANT CHANGE UP (ref 80–100)
NITRITE UR-MCNC: NEGATIVE — SIGNIFICANT CHANGE UP
NITRITE UR-MCNC: NEGATIVE — SIGNIFICANT CHANGE UP
NRBC # BLD: 0 /100 WBCS — SIGNIFICANT CHANGE UP (ref 0–0)
NT-PROBNP SERPL-SCNC: HIGH PG/ML (ref 0–300)
OSMOLALITY SERPL: 306 MOSM/KG — HIGH (ref 280–301)
OSMOLALITY SERPL: 316 MOSM/KG — HIGH (ref 280–301)
OSMOLALITY UR: 325 MOSM/KG — SIGNIFICANT CHANGE UP (ref 300–900)
OSMOLALITY UR: 335 MOSM/KG — SIGNIFICANT CHANGE UP (ref 300–900)
PH UR: 6.5 — SIGNIFICANT CHANGE UP (ref 5–8)
PH UR: 7.5 — SIGNIFICANT CHANGE UP (ref 5–8)
PHOSPHATE SERPL-MCNC: 4.5 MG/DL — SIGNIFICANT CHANGE UP (ref 2.5–4.5)
PLATELET # BLD AUTO: 130 K/UL — LOW (ref 150–400)
POTASSIUM SERPL-MCNC: 3.2 MMOL/L — LOW (ref 3.5–5.3)
POTASSIUM SERPL-MCNC: 3.4 MMOL/L — LOW (ref 3.5–5.3)
POTASSIUM SERPL-SCNC: 3.2 MMOL/L — LOW (ref 3.5–5.3)
POTASSIUM SERPL-SCNC: 3.4 MMOL/L — LOW (ref 3.5–5.3)
POTASSIUM UR-SCNC: 35 MMOL/L — SIGNIFICANT CHANGE UP
POTASSIUM UR-SCNC: 41 MMOL/L — SIGNIFICANT CHANGE UP
PROT ?TM UR-MCNC: 745 MG/DL — HIGH (ref 0–12)
PROT ?TM UR-MCNC: SIGNIFICANT CHANGE UP MG/DL (ref 0–12)
PROT ?TM UR-MCNC: SIGNIFICANT CHANGE UP MG/DL (ref 0–12)
PROT UR-MCNC: >=1000 MG/DL
PROT UR-MCNC: >=1000 MG/DL
PROT/CREAT UR-RTO: 13.7 RATIO — HIGH (ref 0–0.2)
PROT/CREAT UR-RTO: SIGNIFICANT CHANGE UP (ref 0–0.2)
PROT/CREAT UR-RTO: SIGNIFICANT CHANGE UP RATIO (ref 0–0.2)
PROTHROM AB SERPL-ACNC: 13.6 SEC — HIGH (ref 9.9–13.4)
RBC # BLD: 3.08 M/UL — LOW (ref 4.2–5.8)
RBC # FLD: 18.3 % — HIGH (ref 10.3–14.5)
RBC CASTS # UR COMP ASSIST: 16 /HPF — HIGH (ref 0–4)
RBC CASTS # UR COMP ASSIST: 7 /HPF — HIGH (ref 0–4)
RETICS #: 97.5 K/UL — SIGNIFICANT CHANGE UP (ref 25–125)
RETICS/RBC NFR: 3.7 % — HIGH (ref 0.5–2.5)
RH IG SCN BLD-IMP: POSITIVE — SIGNIFICANT CHANGE UP
SODIUM SERPL-SCNC: 130 MMOL/L — LOW (ref 135–145)
SODIUM SERPL-SCNC: 131 MMOL/L — LOW (ref 135–145)
SODIUM UR-SCNC: 30 MMOL/L — SIGNIFICANT CHANGE UP
SODIUM UR-SCNC: 52 MMOL/L — SIGNIFICANT CHANGE UP
SP GR SPEC: 1.02 — SIGNIFICANT CHANGE UP (ref 1–1.03)
SP GR SPEC: 1.02 — SIGNIFICANT CHANGE UP (ref 1–1.03)
SQUAMOUS # UR AUTO: 1 /HPF — SIGNIFICANT CHANGE UP (ref 0–5)
SQUAMOUS # UR AUTO: 2 /HPF — SIGNIFICANT CHANGE UP (ref 0–5)
UROBILINOGEN FLD QL: 0.2 MG/DL — SIGNIFICANT CHANGE UP (ref 0.2–1)
UROBILINOGEN FLD QL: 0.2 MG/DL — SIGNIFICANT CHANGE UP (ref 0.2–1)
UUN UR-MCNC: 367 MG/DL — SIGNIFICANT CHANGE UP
UUN UR-MCNC: 433 MG/DL — SIGNIFICANT CHANGE UP
WBC # BLD: 6.78 K/UL — SIGNIFICANT CHANGE UP (ref 3.8–10.5)
WBC # FLD AUTO: 6.78 K/UL — SIGNIFICANT CHANGE UP (ref 3.8–10.5)
WBC UR QL: 5 /HPF — SIGNIFICANT CHANGE UP (ref 0–5)
WBC UR QL: 6 /HPF — HIGH (ref 0–5)

## 2024-09-25 PROCEDURE — 93306 TTE W/DOPPLER COMPLETE: CPT | Mod: 26

## 2024-09-25 PROCEDURE — 93976 VASCULAR STUDY: CPT | Mod: 26

## 2024-09-25 PROCEDURE — 99223 1ST HOSP IP/OBS HIGH 75: CPT

## 2024-09-25 PROCEDURE — 76775 US EXAM ABDO BACK WALL LIM: CPT | Mod: 26,59

## 2024-09-25 PROCEDURE — 99221 1ST HOSP IP/OBS SF/LOW 40: CPT

## 2024-09-25 RX ORDER — SODIUM CHLORIDE 0.9 % (FLUSH) 0.9 %
1000 SYRINGE (ML) INJECTION
Refills: 0 | Status: DISCONTINUED | OUTPATIENT
Start: 2024-09-25 | End: 2024-09-26

## 2024-09-25 RX ORDER — TIOTROPIUM BROMIDE INHALATION SPRAY 3.12 UG/1
2 SPRAY, METERED RESPIRATORY (INHALATION) DAILY
Refills: 0 | Status: DISCONTINUED | OUTPATIENT
Start: 2024-09-25 | End: 2024-10-03

## 2024-09-25 RX ORDER — FLUTICASONE PROPION/SALMETEROL 100-50 MCG
1 BLISTER, WITH INHALATION DEVICE INHALATION
Refills: 0 | Status: DISCONTINUED | OUTPATIENT
Start: 2024-09-25 | End: 2024-10-03

## 2024-09-25 RX ORDER — INFLUENZA VIRUS VACCINE 15; 15; 15; 15 UG/.5ML; UG/.5ML; UG/.5ML; UG/.5ML
0.5 SUSPENSION INTRAMUSCULAR ONCE
Refills: 0 | Status: DISCONTINUED | OUTPATIENT
Start: 2024-09-25 | End: 2024-10-03

## 2024-09-25 RX ORDER — SERTRALINE HYDROCHLORIDE 100 MG/1
25 TABLET, FILM COATED ORAL DAILY
Refills: 0 | Status: DISCONTINUED | OUTPATIENT
Start: 2024-09-25 | End: 2024-10-03

## 2024-09-25 RX ORDER — PANTOPRAZOLE SODIUM 40 MG/1
40 TABLET, DELAYED RELEASE ORAL
Refills: 0 | Status: DISCONTINUED | OUTPATIENT
Start: 2024-09-25 | End: 2024-10-03

## 2024-09-25 RX ORDER — ATORVASTATIN CALCIUM 10 MG/1
80 TABLET, FILM COATED ORAL AT BEDTIME
Refills: 0 | Status: DISCONTINUED | OUTPATIENT
Start: 2024-09-25 | End: 2024-10-03

## 2024-09-25 RX ORDER — SENNOSIDES 8.6 MG
2 TABLET ORAL AT BEDTIME
Refills: 0 | Status: DISCONTINUED | OUTPATIENT
Start: 2024-09-25 | End: 2024-10-03

## 2024-09-25 RX ORDER — ACETAMINOPHEN 325 MG
650 TABLET ORAL EVERY 6 HOURS
Refills: 0 | Status: DISCONTINUED | OUTPATIENT
Start: 2024-09-25 | End: 2024-10-03

## 2024-09-25 RX ORDER — ASPIRIN 325 MG
81 TABLET ORAL DAILY
Refills: 0 | Status: DISCONTINUED | OUTPATIENT
Start: 2024-09-25 | End: 2024-10-03

## 2024-09-25 RX ADMIN — Medication 5000 UNIT(S): at 15:19

## 2024-09-25 RX ADMIN — Medication 81 MILLIGRAM(S): at 15:19

## 2024-09-25 RX ADMIN — Medication 80 MILLILITER(S): at 20:39

## 2024-09-25 RX ADMIN — Medication 5000 UNIT(S): at 22:52

## 2024-09-25 RX ADMIN — Medication 1 DOSE(S): at 22:52

## 2024-09-25 RX ADMIN — ATORVASTATIN CALCIUM 80 MILLIGRAM(S): 10 TABLET, FILM COATED ORAL at 21:16

## 2024-09-25 RX ADMIN — Medication 2 TABLET(S): at 21:15

## 2024-09-25 RX ADMIN — SERTRALINE HYDROCHLORIDE 25 MILLIGRAM(S): 100 TABLET, FILM COATED ORAL at 15:19

## 2024-09-25 RX ADMIN — TIOTROPIUM BROMIDE INHALATION SPRAY 2 PUFF(S): 3.12 SPRAY, METERED RESPIRATORY (INHALATION) at 22:52

## 2024-09-25 RX ADMIN — PANTOPRAZOLE SODIUM 40 MILLIGRAM(S): 40 TABLET, DELAYED RELEASE ORAL at 05:20

## 2024-09-25 RX ADMIN — Medication 50 MILLILITER(S): at 18:09

## 2024-09-25 RX ADMIN — Medication 40 MILLIEQUIVALENT(S): at 22:52

## 2024-09-25 RX ADMIN — Medication 90 MILLIGRAM(S): at 05:20

## 2024-09-25 RX ADMIN — Medication 5000 UNIT(S): at 05:20

## 2024-09-25 NOTE — H&P ADULT - NSHPPHYSICALEXAM_GEN_ALL_CORE
General: NAD, pt resting comfortably in bed  Pulm: No respiratory distress, nonlabored breathing, on room air  CVS: NSR, HDS  Abd: Soft, NT, ND  Extremities: WWP, no edema in b/l LE. motor and sensory intact bilaterally  Pulses: palpable DP bilaterally

## 2024-09-25 NOTE — CONSULT NOTE ADULT - ASSESSMENT
69-year-old male with a PMHx of HTN, HLD, anemia, COPD, BPH (s/p TURP), AVR, ascending aortic arch replacement and recent FEVAR/renal artery stent (8/19/24) who presented with symptomatic anemia and ANKITA.      #AAA   -further management as per vascular surgery   -pending renal artery duplex and TTE   -currently on ASA 81mg daily and Atorvastatin 80mg qhs      #ANKITA  -possible multifactorial from poor PO intake vs. NSAID use vs. contrast load at OSH (CTA) vs urinary retention  -nephrology consulted, follow up recommendations    -follow up UA, urine studies, cystatin and renal US    -obtain PVR x 1 to ensure no retention   -follow up BMP from today     #Symptomatic Anemia    -no evidence of active bleed and patient is hemodynamically stable   -follow up CBC s/p 1-unit pRBCs   -iron studies and hemolysis labs will be difficult to interpret due to recent transfusion, obtain tomorrow AM    #Elevated proBNP   -agree with TTE +/- cardiology consult pending results      #Hyponatremia    -follow up AM BMP    -obtain serum osm, urine osm and urine electrolytes if Na < 130      #COPD   -continue with Spiriva and Advair      #GERD  -continue with Pantoprazole 40mg daily      #HTN   -continue with Nifedipine 90mg daily      #Anxiety   -continue with Zoloft 25mg daily      DVT PPx: SQH    Dispo: pending

## 2024-09-25 NOTE — PROGRESS NOTE ADULT - SUBJECTIVE AND OBJECTIVE BOX
O/N: a/w ANKITA, symptomatic anemia s/p FEVAR and L renal stent 8/2024. hgb 7.6 s/p 1u PRBC, Cr 2.68 (baseline 0.7-1/0), FENa 2.1% intrinsic, FEUrea 28.9 prerenal        ---------------------------------------------------------------------------  PLEASE CHECK WHEN PRESENT:     [  ]Heart Failure     [  ] Acute     [  ] Acute on Chronic     [  ] Chronic  -------------------------------------------------------------------     [  ]Diastolic [HFpEF]     [  ]Systolic [HFrEF]     [  ]Combined [HFpEF & HFrEF]  .................................................................................     [  ]Other:     [ ] Pulmonary Hypertension     [ ] Chronic A-fib     [ ] Persistet A-fib     [ ] Permanent A-fib     [ ] Paroxysmal A-fib     [x ] Hypertensive Heart Disease  -------------------------------------------------------------------  [ ] Respiratory failure  [ ] Acute cor pulmonale  [ ] Asthma/COPD Exacerbation  [ ]COPD on home O2 (Chronic renal Failure)   [ ] Pleural effusion  [ ] Aspiration pneumonia  [ ] Obstructive Sleep Apnea  [ ]Atelectasis   [ ] Acute PE   -------------------------------------------------------------------  [x  ]Acute Kidney Injury      [  ]Acute Tubular Necrosis      [  ]Reneal Medullary Necrosis     [  ]Renal Cortical Necrosis     [  ]Other Pathological Lesions:    [  ]CKD 1  [  ]CKD 2  [  ]CKD 3  [  ]CKD 4  [  ]CKD 5 (ESRD)  [  ]Other  -------------------------------------------------------------------  [  ]Diabetes  [  ] Diabetic PVD Ulcer  [  ] Neuropathic ulcer to DM  [  ] Diabetes with Nephropathy  [  ] Osteomyelitis due to diabetes  [  ] Hyperglycemia   [  ]hypoglycemia   --------------------------------------------------------------------  [  ]Malnutrition: See Nutrition Note  [  ]Cachexia  [  ]Other:   [  ]Supplement Ordered:  [  ]Morbid Obesity (BMI >=40]  [ ] Ileus  ---------------------------------------------------------------------  [ ] Sepsis/severe sepsis/septic shock  [ ] Noninfectious SIRS  [ ] UTI  [ ] Pneumonia  [ ] Thrombophlebitis     -----------------------------------------------------------------------  [ ] Acidosis/alkalosis  [ ] Fluid overload  [ ] Hypokalemia  [ ] Hyperkalemia  [ ] Hypomagnesemia  [ ] Hypophosphatemia  [ ] Hyperphosphatemia  ------------------------------------------------------------------------  [ ] Acute blood loss anemia  [ ] Post op blood loss anemia  [ ] Iron deficiency anemia  [ ] Anemia due to chronic disease  [ ] Hypercoagulable state  [ ] Thrombocytopenia  ----------------------------------------------------------------------  [ ] Cerebral infarction  [ ] Transient ischemia attack  [ ] Encephalopathy - Toxic or Metabolic    A/P: 69 yoM w/ PMHx HLD, anemia (follows w/ heme, Dr. Wong), arthritis, COPD, BPH (s/p TURP), s/p L THR, AVR and ascending and hemiarch replacement w/ Dr. 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 Patient presents c/o lightheadedness, weakness, loss of appetite, weight loss. CTA at [facility] shows no endoleak. Lab significant for hgb 7.6, Na 130, Cr 2.68, BNP 38,000. Patient s/p 1u PRBC in ED with mild improvement of weakness.    Vascular:  - s/p FEVAR and L Renal artery stent 8/18/24  - pending renal artery duplex  - pending echo  - obtain CTA C/A/P disc from [facility]  - c/w aspirin    HTN/HLD:  - c/w nifedipine  - c/w atorvastatin    ANKITA:  - baseline Cr 0.7-1.0  - Cr on admission 2.64, 9/23 BUN 65, Cr 2.4  - nephro consult in AM  - f/u urine studies, cystatin C, serum osm, AM Cr    Anemia:   - follows w/ outpt Hematologist, Dr. Wong  - s/p 1u PRBC 9/24 for symptomatic anemia/hgb 7.6  - f/u post transfusion cbc in AM    COPD:  - c/w Spiriva and Advair inhaler    Diet: renal  Activity: as tolerated  DVTPPx: SQH  Dispo: 5 Uris                 O/N: a/w ANKITA, symptomatic anemia s/p FEVAR and L renal stent 8/2024. hgb 7.6 s/p 1u PRBC, Cr 2.68 (baseline 0.7-1/0), FENa 2.1% intrinsic, FEUrea 28.9 prerenal      S: Patient does not have any complaints    O: Examined in bed resting comfortably     ROS: Denies headache, blurred vision, chest pain, SOB, abdominal pain, nausea or vomiting.         aspirin  chewable 81  heparin   Injectable 5000  NIFEdipine XL 90      Allergies    No Known Allergies    Intolerances        Vital Signs Last 24 Hrs  T(C): 36.7 (25 Sep 2024 03:15), Max: 37.6 (24 Sep 2024 20:53)  T(F): 98 (25 Sep 2024 03:15), Max: 99.6 (24 Sep 2024 20:53)  HR: 71 (25 Sep 2024 04:53) (70 - 110)  BP: 158/77 (25 Sep 2024 04:53) (158/77 - 183/97)  BP(mean): 110 (25 Sep 2024 04:53) (110 - 131)  RR: 18 (25 Sep 2024 04:53) (18 - 18)  SpO2: 97% (25 Sep 2024 04:53) (97% - 100%)    Parameters below as of 25 Sep 2024 04:53  Patient On (Oxygen Delivery Method): room air      I&O's Summary    24 Sep 2024 07:01  -  25 Sep 2024 07:00  --------------------------------------------------------  IN: 0 mL / OUT: 275 mL / NET: -275 mL        Physical Exam:  General: alert and awake, NAD  Pulmonary: no respiratory distress  Cardiovascular: RRR  Abdominal: soft  Extremities: WWP, no edema in b/l LE. motor and sensory intact bilaterally  Pulses: palpable DP bilaterally    LABS:                        7.6    7.04  )-----------( 121      ( 24 Sep 2024 20:45 )             23.9     09-24    130[L]  |  93[L]  |  68[H]  ----------------------------<  106[H]  3.8   |  25  |  2.68[H]    Ca    9.2      24 Sep 2024 20:45  Mg     2.0     09-24          Radiology and Additional Studies:  ---------------------------------------------------------------------------  PLEASE CHECK WHEN PRESENT:     [  ]Heart Failure     [  ] Acute     [  ] Acute on Chronic     [  ] Chronic  -------------------------------------------------------------------     [  ]Diastolic [HFpEF]     [  ]Systolic [HFrEF]     [  ]Combined [HFpEF & HFrEF]  .................................................................................     [  ]Other:     [ ] Pulmonary Hypertension     [ ] Chronic A-fib     [ ] Persistet A-fib     [ ] Permanent A-fib     [ ] Paroxysmal A-fib     [x ] Hypertensive Heart Disease  -------------------------------------------------------------------  [ ] Respiratory failure  [ ] Acute cor pulmonale  [ ] Asthma/COPD Exacerbation  [ ]COPD on home O2 (Chronic renal Failure)   [ ] Pleural effusion  [ ] Aspiration pneumonia  [ ] Obstructive Sleep Apnea  [ ]Atelectasis   [ ] Acute PE   -------------------------------------------------------------------  [x  ]Acute Kidney Injury      [  ]Acute Tubular Necrosis      [  ]Reneal Medullary Necrosis     [  ]Renal Cortical Necrosis     [  ]Other Pathological Lesions:    [  ]CKD 1  [  ]CKD 2  [  ]CKD 3  [  ]CKD 4  [  ]CKD 5 (ESRD)  [  ]Other  -------------------------------------------------------------------  [  ]Diabetes  [  ] Diabetic PVD Ulcer  [  ] Neuropathic ulcer to DM  [  ] Diabetes with Nephropathy  [  ] Osteomyelitis due to diabetes  [  ] Hyperglycemia   [  ]hypoglycemia   --------------------------------------------------------------------  [  ]Malnutrition: See Nutrition Note  [  ]Cachexia  [  ]Other:   [  ]Supplement Ordered:  [  ]Morbid Obesity (BMI >=40]  [ ] Ileus  ---------------------------------------------------------------------  [ ] Sepsis/severe sepsis/septic shock  [ ] Noninfectious SIRS  [ ] UTI  [ ] Pneumonia  [ ] Thrombophlebitis     -----------------------------------------------------------------------  [ ] Acidosis/alkalosis  [ ] Fluid overload  [ ] Hypokalemia  [ ] Hyperkalemia  [ ] Hypomagnesemia  [ ] Hypophosphatemia  [ ] Hyperphosphatemia  ------------------------------------------------------------------------  [ ] Acute blood loss anemia  [ ] Post op blood loss anemia  [ ] Iron deficiency anemia  [ ] Anemia due to chronic disease  [ ] Hypercoagulable state  [ ] Thrombocytopenia  ----------------------------------------------------------------------  [ ] Cerebral infarction  [ ] Transient ischemia attack  [ ] Encephalopathy - Toxic or Metabolic    A/P: 69 yoM w/ PMHx HLD, anemia (follows w/ [facility], Dr. Wong), arthritis, COPD, BPH (s/p TURP), s/p L THR, AVR and ascending and hemiarch replacement w/ Dr. 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 Patient presents c/o lightheadedness, weakness, loss of appetite, weight loss. CTA at [facility] shows no endoleak. Lab significant for hgb 7.6, Na 130, Cr 2.68, BNP 38,000. Patient s/p 1u PRBC in ED with mild improvement of weakness.    Vascular:  - s/p FEVAR and L Renal artery stent 8/18/24  - pending renal artery duplex  - pending echo  - obtain CTA C/A/P disc from [facility]  - c/w aspirin    HTN/HLD:  - c/w nifedipine  - c/w atorvastatin    ANKITA:  - baseline Cr 0.7-1.0  - Cr on admission 2.64, 9/23 BUN 65, Cr 2.4  - nephro consult in AM  - f/u urine studies, cystatin C, serum osm, AM Cr    Anemia:   - follows w/ outpt Hematologist, Dr. Wong  - s/p 1u PRBC 9/24 for symptomatic anemia/hgb 7.6  - f/u post transfusion cbc in AM    COPD:  - c/w Spiriva and Advair inhaler    Diet: renal  Activity: as tolerated  DVTPPx: SQH  Dispo: 5 Uris

## 2024-09-25 NOTE — H&P ADULT - HISTORY OF PRESENT ILLNESS
HPI: 69 yoM w/ PMHx HTN, HLD, anemia (follows w/ heme, Dr. Wong), arthritis, COPD, BPH (s/p TURP), s/p L THR, AVR and ascending and hemiarch replacement w/ Dr. Toledo 6/2022 (course complicated by R ICA). 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 Patient then referred to Dr. Rock for eval of aortic aneurysm which demonstrated growth over last year and he is now s/p FEVAR and L renal artery stent 8/19/24. Patient's post op course was uncomplicated and he was discharged home on 8/20/24. Patient presented to [facility] yesterday for lightheadedness, weakness and feeling faint. He was discharged home and presented with similar complaints today. Endorses loss of appetite, low energy, lightheadedness and weakness. Patient denies abdominal pain, chest pain, back pain. Reports mild SOB after walking/standing for too long. States he has not taken his BP meds for the last two days. Of note, patient reports 40lb weight loss over the last 1 year.    At [facility] CTA C/A/P preformed impression: Graft/repair of thoracic and abdominal aorta w/o endoleak. Penetrating atherosclerotic ulcers of proximal abdominal aorta. Aneurysmal dilatation of thoracic and intra-abdominal aorta and right common iliac. Mild constipation, 5mm nodule within left lower lobe.     In the ED: afebrile, /90s systolic, HR 75 (110 on arrival), on Room air  Hgb 7.6 (8.4 on dc), WBC 7, Na 130 (135), Cr 2.68 (1.0), BNP 38,000   HPI: 69 yoM w/ PMHx HTN, HLD, anemia (follows w/ heme, Dr. Wong), arthritis, COPD, BPH (s/p TURP), s/p L THR, AVR and ascending and hemiarch replacement w/ Dr. Toledo 6/2022 (course complicated by R ICA). 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 Patient then referred to Dr. Rock for eval of aortic aneurysm which demonstrated growth over last year and he is now s/p FEVAR and L renal artery stent 8/19/24. Patient's post op course was uncomplicated and he was discharged home on 8/20/24. Patient presented to [facility] yesterday for lightheadedness, weakness and feeling faint. He was discharged home and presented with similar complaints today. Endorses loss of appetite, low energy, lightheadedness and weakness. Patient denies abdominal pain, chest pain, back pain. Reports mild SOB after walking/standing for too long. States he has not taken his BP meds for the last two days. Patient reports history of back pain which has completely resolved. However, patient previously taking 2 pills Ibuprofen daily for approximately 1 month, stopped in beginning on Sept. Of note, patient reports 40lb weight loss over the last 1 year.    At [facility] CTA C/A/P preformed impression: Graft/repair of thoracic and abdominal aorta w/o endoleak. Penetrating atherosclerotic ulcers of proximal abdominal aorta. Aneurysmal dilatation of thoracic and intra-abdominal aorta and right common iliac. Mild constipation, 5mm nodule within left lower lobe.     In the ED: afebrile, /90s systolic, HR 75 (110 on arrival), on Room air  Hgb 7.6 (8.4 on dc), WBC 7, Na 130 (135), Cr 2.68 (1.0), BNP 38,000

## 2024-09-25 NOTE — PROVIDER CONTACT NOTE (OTHER) - SITUATION
As per Mell Mason from lab, total protein and creatinine levels are elevated from urine sample sent this evening. It was sent to [facility] lab for confirmation.

## 2024-09-25 NOTE — H&P ADULT - ASSESSMENT
A/P: 69 yoM w/ PMHx HLD, anemia (follows w/ heme, Dr. Wong), arthritis, COPD, BPH (s/p TURP), s/p L THR, AVR and ascending and hemiarch replacement w/ Dr. 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 Patient presents c/o lightheadedness, weakness, loss of appetite, weight loss. CTA at [facility] shows no endoleak. Lab significant for hgb 7.6, Na 130, Cr 2.68, BNP 38,000. Patient s/p 1u PRBC in ED with mild improvement of weakness.    Vascular/PAD:  -Is the patietn on ASA     HTN/HLD:  -Is the Patient on a statin (if not why)?    CAD/CHF:     DM:    CKD:     Anemia:     ID:    Diet: renal  Activity: as tolerated  DVTPPx: SQH  Dispo: 5 Uris   A/P: 69 yoM w/ PMHx HLD, anemia (follows w/ heme, Dr. Wong), arthritis, COPD, BPH (s/p TURP), s/p L THR, AVR and ascending and hemiarch replacement w/ Dr. 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 Patient presents c/o lightheadedness, weakness, loss of appetite, weight loss. CTA at [facility] shows no endoleak. Lab significant for hgb 7.6, Na 130, Cr 2.68, BNP 38,000. Patient s/p 1u PRBC in ED with mild improvement of weakness.    Vascular:  - s/p FEVAR and L Renal artery stent 8/18/24  - pending renal artery duplex  - pending echo  - obtain CTA C/A/P disc from [facility]  - c/w aspirin    HTN/HLD:  - c/w nifedipine  - c/w atorvastatin    ANKITA:  - baseline Cr 0.7-1.0  - Cr on admission 2.64, 9/23 BUN 65, Cr 2.4  - nephro consult in AM  - f/u urine studies, cystatin C, serum osm, AM Cr    Anemia:   - follows w/ outpt Hematologist, Dr. Wong  - s/p 1u PRBC 9/24 for symptomatic anemia/hgb 7.6  - f/u post transfusion cbc in AM    COPD:  - c/w Spiriva and Advair inhaler    Diet: renal  Activity: as tolerated  DVTPPx: SQH  Dispo: 5 Uris

## 2024-09-25 NOTE — H&P ADULT - NSHPSOCIALHISTORY_GEN_ALL_CORE
smoker for 20 years, quit 2 years ago  previous daily drinker in  30-40s. denies current alcohol use  denies other drug use currently
96

## 2024-09-25 NOTE — CONSULT NOTE ADULT - ASSESSMENT
69-year-old M with PMH of HTN, BPH, recent fenestrated endovascular aneurysm repair left renal artery stent placement on 8/19/2024 after which she was discharged on 8/28/2024   Initially admitted at [facility] with complaints of lightheadedness and weakness.  CTA chest was done and did not show any endoleak after which patient was discharged home.  Patient currently admitted with same persistent complaints.  Nephrology consulted for ANKITA    – Nonoliguric NAKITA: When consulted SCr 2.68 | CO2 25 | K 3.8 | UA (9/24): RBC 11, protein> 1000     69-year-old M with PMH of HTN, BPH, recent fenestrated endovascular aneurysm repair & left renal artery stent placement on 8/19/2024 after which he was discharged on 8/28/2024   Initially admitted at [facility] with complaints of lightheadedness and weakness.  CTA chest was done and did not show any endoleak.  Patient currently admitted with same complaints.  Nephrology consulted for ANKITA    1– Nonoliguric ANKITA: When consulted SCr 2.68 | CO2 25 | K 3.8 | UA (9/24): RBC 11, protein> 1000 | Uosm: 335 | Pratik 52  DDx include prerenal azotemia, JAYDON, glomerulopathy  –Start NaCl 0.9% at 80 mL/h for 12 hours  –Repeat Urinalysis  –UPC, UAC  –Renal ultrasound to rule obstructive uropathy  –Avoid nephrotoxic agents  –Strict I/O monitoring    Thank you for consulting Nephrology. We will continue to follow the patient closely and provide recommendations as needed.    Aleja Lozoya MD  PGY-4 Nephrology Fellow

## 2024-09-25 NOTE — PATIENT PROFILE ADULT - FALL HARM RISK - HARM RISK INTERVENTIONS
Home Communicate Risk of Fall with Harm to all staff/Reinforce activity limits and safety measures with patient and family/Tailored Fall Risk Interventions/Visual Cue: Yellow wristband and red socks/Bed in lowest position, wheels locked, appropriate side rails in place/Call bell, personal items and telephone in reach/Instruct patient to call for assistance before getting out of bed or chair/Non-slip footwear when patient is out of bed/[place] to call system/Physically safe environment - no spills, clutter or unnecessary equipment/Purposeful Proactive Rounding/Room/bathroom lighting operational, light cord in reach

## 2024-09-26 LAB
ADD ON TEST-SPECIMEN IN LAB: SIGNIFICANT CHANGE UP
ALBUMIN SERPL ELPH-MCNC: 3.1 G/DL — LOW (ref 3.3–5)
ALP SERPL-CCNC: 115 U/L — SIGNIFICANT CHANGE UP (ref 40–120)
ALT FLD-CCNC: 76 U/L — HIGH (ref 10–45)
ANION GAP SERPL CALC-SCNC: 14 MMOL/L — SIGNIFICANT CHANGE UP (ref 5–17)
APPEARANCE UR: CLEAR — SIGNIFICANT CHANGE UP
AST SERPL-CCNC: 47 U/L — HIGH (ref 10–40)
BASOPHILS # BLD AUTO: 0.03 K/UL — SIGNIFICANT CHANGE UP (ref 0–0.2)
BASOPHILS NFR BLD AUTO: 0.5 % — SIGNIFICANT CHANGE UP (ref 0–2)
BILIRUB DIRECT SERPL-MCNC: <0.2 MG/DL — SIGNIFICANT CHANGE UP (ref 0–0.3)
BILIRUB INDIRECT FLD-MCNC: SIGNIFICANT CHANGE UP MG/DL (ref 0.2–1)
BILIRUB SERPL-MCNC: 0.4 MG/DL — SIGNIFICANT CHANGE UP (ref 0.2–1.2)
BILIRUB UR-MCNC: NEGATIVE — SIGNIFICANT CHANGE UP
BUN SERPL-MCNC: 77 MG/DL — HIGH (ref 7–23)
CALCIUM SERPL-MCNC: 8.8 MG/DL — SIGNIFICANT CHANGE UP (ref 8.4–10.5)
CHLORIDE SERPL-SCNC: 98 MMOL/L — SIGNIFICANT CHANGE UP (ref 96–108)
CO2 SERPL-SCNC: 23 MMOL/L — SIGNIFICANT CHANGE UP (ref 22–31)
COLOR SPEC: YELLOW — SIGNIFICANT CHANGE UP
CREAT ?TM UR-MCNC: 90 MG/DL — SIGNIFICANT CHANGE UP
CREAT SERPL-MCNC: 3.82 MG/DL — HIGH (ref 0.5–1.3)
DAT POLY-SP REAG RBC QL: POSITIVE — SIGNIFICANT CHANGE UP
DIFF PNL FLD: ABNORMAL
EGFR: 16 ML/MIN/1.73M2 — LOW
EOSINOPHIL # BLD AUTO: 0.07 K/UL — SIGNIFICANT CHANGE UP (ref 0–0.5)
EOSINOPHIL NFR BLD AUTO: 1.1 % — SIGNIFICANT CHANGE UP (ref 0–6)
FERRITIN SERPL-MCNC: 1789 NG/ML — HIGH (ref 30–400)
GLUCOSE BLDC GLUCOMTR-MCNC: 149 MG/DL — HIGH (ref 70–99)
GLUCOSE SERPL-MCNC: 101 MG/DL — HIGH (ref 70–99)
GLUCOSE UR QL: NEGATIVE MG/DL — SIGNIFICANT CHANGE UP
HAPTOGLOB SERPL-MCNC: 43 MG/DL — SIGNIFICANT CHANGE UP (ref 34–200)
HAV IGM SER-ACNC: SIGNIFICANT CHANGE UP
HBV CORE AB SER-ACNC: SIGNIFICANT CHANGE UP
HBV CORE IGM SER-ACNC: SIGNIFICANT CHANGE UP
HBV SURFACE AB SER-ACNC: SIGNIFICANT CHANGE UP
HBV SURFACE AG SER-ACNC: SIGNIFICANT CHANGE UP
HCT VFR BLD CALC: 25.6 % — LOW (ref 39–50)
HCT VFR BLD CALC: 30.7 % — LOW (ref 39–50)
HCV AB S/CO SERPL IA: 0.05 S/CO — SIGNIFICANT CHANGE UP
HCV AB SERPL-IMP: SIGNIFICANT CHANGE UP
HGB BLD-MCNC: 7.9 G/DL — LOW (ref 13–17)
HGB BLD-MCNC: 9.9 G/DL — LOW (ref 13–17)
HIV 1+2 AB+HIV1 P24 AG SERPL QL IA: SIGNIFICANT CHANGE UP
IMM GRANULOCYTES NFR BLD AUTO: 0.5 % — SIGNIFICANT CHANGE UP (ref 0–0.9)
IRON SATN MFR SERPL: 26 % — SIGNIFICANT CHANGE UP (ref 16–55)
IRON SATN MFR SERPL: 45 UG/DL — SIGNIFICANT CHANGE UP (ref 45–165)
KETONES UR-MCNC: NEGATIVE MG/DL — SIGNIFICANT CHANGE UP
LDH SERPL L TO P-CCNC: 300 U/L — HIGH (ref 50–242)
LEUKOCYTE ESTERASE UR-ACNC: NEGATIVE — SIGNIFICANT CHANGE UP
LYMPHOCYTES # BLD AUTO: 0.72 K/UL — LOW (ref 1–3.3)
LYMPHOCYTES # BLD AUTO: 11.5 % — LOW (ref 13–44)
MAGNESIUM SERPL-MCNC: 2.1 MG/DL — SIGNIFICANT CHANGE UP (ref 1.6–2.6)
MCHC RBC-ENTMCNC: 27.3 PG — SIGNIFICANT CHANGE UP (ref 27–34)
MCHC RBC-ENTMCNC: 28.9 PG — SIGNIFICANT CHANGE UP (ref 27–34)
MCHC RBC-ENTMCNC: 30.9 GM/DL — LOW (ref 32–36)
MCHC RBC-ENTMCNC: 32.2 GM/DL — SIGNIFICANT CHANGE UP (ref 32–36)
MCV RBC AUTO: 88.6 FL — SIGNIFICANT CHANGE UP (ref 80–100)
MCV RBC AUTO: 89.5 FL — SIGNIFICANT CHANGE UP (ref 80–100)
MONOCYTES # BLD AUTO: 0.52 K/UL — SIGNIFICANT CHANGE UP (ref 0–0.9)
MONOCYTES NFR BLD AUTO: 8.3 % — SIGNIFICANT CHANGE UP (ref 2–14)
NEUTROPHILS # BLD AUTO: 4.87 K/UL — SIGNIFICANT CHANGE UP (ref 1.8–7.4)
NEUTROPHILS NFR BLD AUTO: 78.1 % — HIGH (ref 43–77)
NITRITE UR-MCNC: NEGATIVE — SIGNIFICANT CHANGE UP
NRBC # BLD: 0 /100 WBCS — SIGNIFICANT CHANGE UP (ref 0–0)
NRBC # BLD: 0 /100 WBCS — SIGNIFICANT CHANGE UP (ref 0–0)
OSMOLALITY SERPL: 310 MOSM/KG — HIGH (ref 280–301)
PH UR: 7 — SIGNIFICANT CHANGE UP (ref 5–8)
PHOSPHATE SERPL-MCNC: 5.4 MG/DL — HIGH (ref 2.5–4.5)
PLATELET # BLD AUTO: 123 K/UL — LOW (ref 150–400)
PLATELET # BLD AUTO: 132 K/UL — LOW (ref 150–400)
POTASSIUM SERPL-MCNC: 3.5 MMOL/L — SIGNIFICANT CHANGE UP (ref 3.5–5.3)
POTASSIUM SERPL-SCNC: 3.5 MMOL/L — SIGNIFICANT CHANGE UP (ref 3.5–5.3)
PROT ?TM UR-MCNC: 1169 MG/DL — HIGH (ref 0–12)
PROT SERPL-MCNC: 8.7 G/DL — HIGH (ref 6–8.3)
PROT UR-MCNC: 300 MG/DL
PROT/CREAT UR-RTO: 13 RATIO — HIGH (ref 0–0.2)
RBC # BLD: 2.89 M/UL — LOW (ref 4.2–5.8)
RBC # BLD: 2.89 M/UL — LOW (ref 4.2–5.8)
RBC # BLD: 3.43 M/UL — LOW (ref 4.2–5.8)
RBC # FLD: 17.9 % — HIGH (ref 10.3–14.5)
RBC # FLD: 18 % — HIGH (ref 10.3–14.5)
RETICS #: 79.5 K/UL — SIGNIFICANT CHANGE UP (ref 25–125)
RETICS/RBC NFR: 2.8 % — HIGH (ref 0.5–2.5)
SODIUM SERPL-SCNC: 135 MMOL/L — SIGNIFICANT CHANGE UP (ref 135–145)
SP GR SPEC: 1.02 — SIGNIFICANT CHANGE UP (ref 1–1.03)
TIBC SERPL-MCNC: 176 UG/DL — LOW (ref 220–430)
UIBC SERPL-MCNC: 131 UG/DL — SIGNIFICANT CHANGE UP (ref 110–370)
UROBILINOGEN FLD QL: 0.2 MG/DL — SIGNIFICANT CHANGE UP (ref 0.2–1)
WBC # BLD: 5.19 K/UL — SIGNIFICANT CHANGE UP (ref 3.8–10.5)
WBC # BLD: 6.21 K/UL — SIGNIFICANT CHANGE UP (ref 3.8–10.5)
WBC # FLD AUTO: 5.19 K/UL — SIGNIFICANT CHANGE UP (ref 3.8–10.5)
WBC # FLD AUTO: 6.21 K/UL — SIGNIFICANT CHANGE UP (ref 3.8–10.5)

## 2024-09-26 PROCEDURE — 86077 PHYS BLOOD BANK SERV XMATCH: CPT

## 2024-09-26 PROCEDURE — 99223 1ST HOSP IP/OBS HIGH 75: CPT

## 2024-09-26 PROCEDURE — 99232 SBSQ HOSP IP/OBS MODERATE 35: CPT

## 2024-09-26 PROCEDURE — 99233 SBSQ HOSP IP/OBS HIGH 50: CPT

## 2024-09-26 RX ADMIN — Medication 81 MILLIGRAM(S): at 12:24

## 2024-09-26 RX ADMIN — SERTRALINE HYDROCHLORIDE 25 MILLIGRAM(S): 100 TABLET, FILM COATED ORAL at 12:25

## 2024-09-26 RX ADMIN — Medication 90 MILLIGRAM(S): at 06:36

## 2024-09-26 RX ADMIN — ATORVASTATIN CALCIUM 80 MILLIGRAM(S): 10 TABLET, FILM COATED ORAL at 21:34

## 2024-09-26 RX ADMIN — Medication 1 DOSE(S): at 09:47

## 2024-09-26 RX ADMIN — Medication 5000 UNIT(S): at 06:36

## 2024-09-26 RX ADMIN — TIOTROPIUM BROMIDE INHALATION SPRAY 2 PUFF(S): 3.12 SPRAY, METERED RESPIRATORY (INHALATION) at 09:47

## 2024-09-26 RX ADMIN — Medication 1 DOSE(S): at 21:34

## 2024-09-26 RX ADMIN — Medication 40 MILLIEQUIVALENT(S): at 09:34

## 2024-09-26 RX ADMIN — PANTOPRAZOLE SODIUM 40 MILLIGRAM(S): 40 TABLET, DELAYED RELEASE ORAL at 06:36

## 2024-09-26 NOTE — CONSULT NOTE ADULT - ATTENDING COMMENTS
70 y/o M with recent endovascular repair and stent in L renal artery. CR at base line 1.1-1.2. Went to OSH for dizziness and underwent contrast imaging that did not show any endoleak. Eventually transferred to [facility]. Here noted to have CR of 2.6 for which we are consulted.     #ANKITA. Likely ischemic ATN. UA with proteinuria but no significant hematuria. Renal US pending.     #Plan  -> UPCR, UACR  -> IV fluids as mentioned above.     We will follow.
Mr. Fried is a 69 year old gentleman with recent history of FEVAR and left renal artery stenting one month prior to presentation. Since that time he has developed significant anemia, thrombocytopenia (modest) and renal dysfunction. Given constellation of symptoms, critical to rule out TMA. Given minimal shistocytes on smear and no evidence of intravascular hemolysis, this is unlikely. We will continue to review peripheral smear daily to ensure this was not a single aberrant sample. Other etiologies of renal dysfunction could include occlusion of stent; dopplers with peak systolic flow < 180, so unlikely. Multifactorial etiologies including recent  major operation, advanced age and decreased PO intake could contribute, but would not expect this significant of anemia or renal dysfunction stemming from that. Autoimmune causes remain in the differential and appreciate nephrology input. We will continue to see the patient daily and will reassess peripheral smear to assess for shistocyte formation as TMA would explain this constellation of symptoms. No role for [id number] or directed therapy at this time.

## 2024-09-26 NOTE — PROGRESS NOTE ADULT - SUBJECTIVE AND OBJECTIVE BOX
NEPHROLOGY PROGRESS NOTE:    Interval history:  No overnight events. Patient seen and examined at bedside.    Vitals:  T(F): 97.5 (24 @ 12:06), Max: 97.6 (24 @ 05:05)  HR: 80 (24 @ 12:06)  BP: 173/82 (24 @ 12:06)  RR: 18 (24 @ 12:06)  SpO2: 94% (24 @ 12:06)  Wt(kg): --     @ 07:  -   @ 07:00  --------------------------------------------------------  IN: 0 mL / OUT: 275 mL / NET: -275 mL     @ 07:  -   @ 07:00  --------------------------------------------------------  IN: 2040 mL / OUT: 695 mL / NET: 1345 mL     @ 07:01  -   @ 13:52  --------------------------------------------------------  IN: 480 mL / OUT: 0 mL / NET: 480 mL          PE:  General: Not in acute distress, well-nourished  Neck: No visible mass, No JVD noted  Chest: CTAP b/l, no use of accessory respiratory muscles  Heart: RRR, S1/S2 wnl, no MRG  Abdomen: Soft, nontender, nondistended,  no hepatosplenomegaly  Extremities: No clubbing, cyanosis or edema  Neuro:  Alert, no apparent focal deficits, answer questions appropriately      Pertinent labs & Imagin-26    135  |  98  |  77[H]  ----------------------------<  101[H]  3.5   |  23  |  3.82[H]    Ca    8.8      26 Sep 2024 05:30  Phos  5.4       Mg     2.1         TPro  8.7[H]  /  Alb  3.1[L]  /  TBili  0.4  /  DBili  <0.2  /  AST  47[H]  /  ALT  76[H]  /  AlkPhos  115                            7.9    5.19  )-----------( 123      ( 26 Sep 2024 05:30 )             25.6       Urine Studies:  Creatinine Trend: 3.82<--, 3.89<--, 3.53<--, 2.68<--  Urinalysis Basic - ( 26 Sep 2024 05:30 )    Color:  / Appearance:  / SG:  / pH:   Gluc: 101 mg/dL / Ketone:   / Bili:  / Urobili:    Blood:  / Protein:  / Nitrite:    Leuk Esterase:  / RBC:  / WBC    Sq Epi:  / Non Sq Epi:  / Bacteria:       Creatinine, Random Urine: 90 mg/dL ( @ 20:55)  Protein/Creatinine Ratio Calculation: 13.0 Ratio ( 20:55)  Sodium, Random Urine: 30 mmol/L ( @ 20:53)  Creatinine, Random Urine: See Note ( @ 20:53)  Protein/Creatinine Ratio Calculation: see note Ratio ( @ 20:53)  Osmolality, Random Urine: 325 mosm/kg ( @ 20:53)  Potassium, Random Urine: 41 mmol/L ( @ 20:53)  Creatinine, Random Urine: 97 mg/dL ( @ 20:53)  Sodium, Random Urine: 52 mmol/L ( @ 03:33)  Creatinine, Random Urine: See Note (:33)  Protein/Creatinine Ratio Calculation: See Note Ratio ( @ :33)  Osmolality, Random Urine: 335 mosm/kg ( @ 03:33)  Potassium, Random Urine: 35 mmol/L ( @ 03:33)  Creatinine, Random Urine: 54 mg/dL ( @ 03:33)  Protein/Creatinine Ratio Calculation: 13.7 Ratio ( @ 03:33)      MEDICATIONS  (STANDING):  aspirin  chewable 81 milliGRAM(s) Oral daily  atorvastatin 80 milliGRAM(s) Oral at bedtime  fluticasone propionate/ salmeterol 250-50 MICROgram(s) Diskus 1 Dose(s) Inhalation two times a day  heparin   Injectable 5000 Unit(s) SubCutaneous every 8 hours  influenza  Vaccine (HIGH DOSE) 0.5 milliLiter(s) IntraMuscular once  NIFEdipine XL 90 milliGRAM(s) Oral daily  pantoprazole    Tablet 40 milliGRAM(s) Oral before breakfast  sertraline 25 milliGRAM(s) Oral daily  tiotropium 2.5 MICROgram(s) Inhaler 2 Puff(s) Inhalation daily    MEDICATIONS  (PRN):  acetaminophen     Tablet .. 650 milliGRAM(s) Oral every 6 hours PRN Mild Pain (1 - 3)  senna 2 Tablet(s) Oral at bedtime PRN for constipation

## 2024-09-26 NOTE — CONSULT NOTE ADULT - NS ATTEST RISK PROBLEM GEN_ALL_CORE FT
Critical illness with renal dysfunction, anemia and thrombocytopenia. Review of prior records, ordering and interpretation of studies. Discussion with patient.

## 2024-09-26 NOTE — CONSULT NOTE ADULT - ASSESSMENT
Mr. Corky Kaminski is a 69 yoM w/ PMHx HTN, HLD, anemia, arthritis, COPD, BPH (s/p TURP), s/p L THR, AVR and ascending and hemiarch replacement w/ Dr. Toledo 6/2022 (course complicated by R ICA). 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 He was then referred to Dr. Rock for eval of aortic aneurysm which demonstrated growth over last year and he is now s/p FEVAR and L renal artery stent 8/19/24. Patient's post op course was uncomplicated and he was discharged home on 8/20/24.     He presented for lightheadedness, weakness, confusion and SOB on exertion. He received 1 unit blood transfusion and his symptoms resolved.       # Normocytic anemia  # Thrombocytopenia  - Workup so far: - Hb 7.9 today ( Hb has been b/w 7.5-9.5 since last 1 month, 12.9 on 8/19/24), MCV 88.6, retic index 1.14( hypo proliferative), IDP suggestive of AOCD.   - Platelet count 123 (), INR 1.18, PTT unremarkable.   - Hemolysis less likely given LDH only mildly elevated- 300, Haptoglobin normal 43, T bili and D bili unremarkable.  - No evidence of schistocytes on PBS- reviewed 9/26/24. Low likelihood of MAHA.    Plan  - Check B12 and folate levels.  - Check fibrinogen levels.   - Check billingsley test.  - Check CBC with diff daily.  - Transfuse for Hb < 7 or in the event of significant bleeding.    Rest of the care per primary team.    Please follow up on attending attestation.   Mr. Corky Kaminski is a 69 yoM w/ PMHx HTN, HLD, anemia, arthritis, COPD, BPH (s/p TURP), s/p L THR, AVR and ascending and hemiarch replacement w/ Dr. Toledo 6/2022 (course complicated by R ICA). 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 He was then referred to Dr. Rock for eval of aortic aneurysm which demonstrated growth over last year and he is now s/p FEVAR and L renal artery stent 8/19/24. Patient's post op course was uncomplicated and he was discharged home on 8/20/24.     He presented for lightheadedness, weakness, confusion and SOB on exertion. He received 1 unit blood transfusion and his symptoms resolved.       # Normocytic anemia  # Thrombocytopenia  - Workup so far: - Hb 7.9 today ( Hb has been b/w 7.5-9.5 since last 1 month, 12.9 on 8/19/24), MCV 88.6, retic index 1.14( hypo proliferative), IDP suggestive of AOCD.   - Platelet count 123 (), INR 1.18, PTT unremarkable.   - Hemolysis less likely given LDH only mildly elevated- 300, Haptoglobin normal 43, T bili and D bili unremarkable.  - No evidence of schistocytes on PBS- reviewed 9/26/24. Low likelihood of MAHA/TMA.  - ANKITA noted on labs- creat 3.82.  - Doopler USG- Limited visualization of renal arteries    Plan  - Check B12 and folate levels.  - Check fibrinogen levels.   - Check billingsley test.  - Will f/up on HIV/ hep panel   - Check CBC with diff daily.  - Transfuse for Hb < 7 or in the event of significant bleeding.    Rest of the care per primary team.    Please follow up on attending attestation.   Mr. Corky Kaminski is a 69 yoM w/ PMHx HTN, HLD, anemia, arthritis, COPD, BPH (s/p TURP), s/p L THR, AVR and ascending and hemiarch replacement w/ Dr. Toledo 6/2022 (course complicated by R ICA). 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 He was then referred to Dr. Rock for eval of aortic aneurysm which demonstrated growth over last year and he is now s/p FEVAR and L renal artery stent 8/19/24. Patient's post op course was uncomplicated and he was discharged home on 8/20/24.     He presented for lightheadedness, weakness, confusion and SOB on exertion. He received 1 unit blood transfusion and his symptoms resolved.       # Normocytic anemia  # Thrombocytopenia  - Workup so far: - Hb 7.9 today ( Hb has been b/w 7.5-9.5 since last 1 month, 12.9 on 8/19/24), MCV 88.6, retic index 1.14( hypo proliferative), IDP suggestive of AOCD.   - Platelet count 123 (), INR 1.18, PTT unremarkable.   - Hemolysis less likely given LDH only mildly elevated- 300, Haptoglobin normal 43, T bili and D bili unremarkable.  - No evidence of schistocytes on PBS- reviewed 9/26/24. Low likelihood of MAHA/TMA.  - ANKITA noted on labs- creat 3.82.  - Doppler USG- Limited visualization of renal arteries    Plan  - Check B12 and folate levels.  - Check fibrinogen levels.   - Check billingsley test.  - Will f/up on HIV/ hep panel   - Check CBC with diff daily.  - Transfuse for Hb < 7 or in the event of significant bleeding.    Rest of the care per primary team.    Please follow up on attending attestation.

## 2024-09-26 NOTE — PROGRESS NOTE ADULT - SUBJECTIVE AND OBJECTIVE BOX
Patient was seen and examined at bedside. Case discuss with the primary team. Pt s/p transfusion of 1U of PRBCs.     OBJECTIVE:  Vital Signs Last 24 Hrs  T(C): 36.4 (26 Sep 2024 12:06), Max: 36.4 (25 Sep 2024 20:39)  T(F): 97.5 (26 Sep 2024 12:06), Max: 97.6 (26 Sep 2024 05:05)  HR: 80 (26 Sep 2024 12:06) (73 - 88)  BP: 173/82 (26 Sep 2024 12:06) (124/77 - 173/82)  BP(mean): 117 (26 Sep 2024 12:06) (96 - 117)  RR: 18 (26 Sep 2024 12:06) (17 - 18)  SpO2: 94% (26 Sep 2024 12:06) (94% - 100%)    Parameters below as of 26 Sep 2024 12:06  Patient On (Oxygen Delivery Method): room air    PHYSICAL EXAM:  Gen: NAD, resting in bed  -HEENT: EOMI, PERRL, no scleral icterus  -CV: normal S1 and S2  -Lungs: CTABL, normal respiratory effort on RA  -Ab: soft, NT, ND, normal BS  -Ext: no LE edema  -Neuro: A&O x 3, no focal deficits       LABS:                        7.9    5.19  )-----------( 123      ( 26 Sep 2024 05:30 )             25.6     09-26    135  |  98  |  77[H]  ----------------------------<  101[H]  3.5   |  23  |  3.82[H]    Ca    8.8      26 Sep 2024 05:30  Phos  5.4     09-26  Mg     2.1     09-26    TPro  8.7[H]  /  Alb  3.1[L]  /  TBili  0.4  /  DBili  <0.2  /  AST  47[H]  /  ALT  76[H]  /  AlkPhos  115  09-26    LIVER FUNCTIONS - ( 26 Sep 2024 05:30 )  Alb: 3.1 g/dL / Pro: 8.7 g/dL / ALK PHOS: 115 U/L / ALT: 76 U/L / AST: 47 U/L / GGT: x            PT: 13.6 sec;   INR: 1.18    PTT:28.3 sec    CAPILLARY BLOOD GLUCOSE  POCT Blood Glucose.: 149 mg/dL (26 Sep 2024 11:33)    9/25 TTE:  1. Borderline normal left ventricular systolic function. Left   ventricular ejection fraction is 50-55%.   2. Normal right ventricular size and systolic function.   3. Normal atria.   4. Bioprosthetic valve is seen in the aortic position. The peak   transvalvular velocity is 2.86 m/s, the mean transvalvular gradient is   21.00 mmHg, and the LVOT/AV velocity ratio is 0.42.   5. No pericardial effusion.   6. Mildly dilated aortic root.   7. Compared to the previous TTE performed on 9/18/2023, there have been   no significant interval changes.    Renal US: Left main renal artery origin, proximal, and middle segments were not   visualized. Visualization of right main renal artery origin is limited.    No hemodynamically significant stenosis is based on the intrarenal   arteries assessment.      acetaminophen     Tablet .. 650 milliGRAM(s) Oral every 6 hours PRN  aspirin  chewable 81 milliGRAM(s) Oral daily  atorvastatin 80 milliGRAM(s) Oral at bedtime  fluticasone propionate/ salmeterol 250-50 MICROgram(s) Diskus 1 Dose(s) Inhalation two times a day  heparin   Injectable 5000 Unit(s) SubCutaneous every 8 hours  influenza  Vaccine (HIGH DOSE) 0.5 milliLiter(s) IntraMuscular once  NIFEdipine XL 90 milliGRAM(s) Oral daily  pantoprazole    Tablet 40 milliGRAM(s) Oral before breakfast  senna 2 Tablet(s) Oral at bedtime PRN  sertraline 25 milliGRAM(s) Oral daily  sodium chloride 0.9%. 1000 milliLiter(s) IV Continuous <Continuous>  tiotropium 2.5 MICROgram(s) Inhaler 2 Puff(s) Inhalation daily

## 2024-09-26 NOTE — CONSULT NOTE ADULT - SUBJECTIVE AND OBJECTIVE BOX
HPI as per vascular surgery H&P: 69 yoM w/ PMHx HTN, HLD, anemia (follows w/ heme, Dr. Wong), arthritis, COPD, BPH (s/p TURP), s/p L THR, AVR and ascending and hemiarch replacement w/ Dr. Toledo 6/2022 (course complicated by R ICA). 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 Patient then referred to Dr. Rock for eval of aortic aneurysm which demonstrated growth over last year and he is now s/p FEVAR and L renal artery stent 8/19/24. Patient's post op course was uncomplicated and he was discharged home on 8/20/24. Patient presented to [facility] yesterday for lightheadedness, weakness and feeling faint. He was discharged home and presented with similar complaints today. Endorses loss of appetite, low energy, lightheadedness and weakness. Patient denies abdominal pain, chest pain, back pain. Reports mild SOB after walking/standing for too long. States he has not taken his BP meds for the last two days. Patient reports history of back pain which has completely resolved. However, patient previously taking 2 pills Ibuprofen daily for approximately 1 month, stopped in beginning on Sept. Of note, patient reports 40lb weight loss over the last 1 year.    Patient states overall he has been feeling weaker than usual over the past couple of week.  States he has been taking 2 tablets of Ibuprofen 1x/day (sometimes twice) for that past month or so but unsure of the dosing of ibuprofen.  Denies any abdominal pain, BRBPR or melena.  Additionally, PO intake has been poor lately due to lack of appetite but denies any abdominal pain, nausea or vomiting when he does eat.   Denies HA, CP, SOB, abdominal pain, nausea, vomiting, fever, chills or diarrhea.    PMHx: as per HPI     PSHx: AVR, cataracts     FHx: Marfans (uncle)     SHx: denies alcohol, tobacco or illicit drug use    Allergies: NKDA    Home Medications:   · meclizine 12.5 mg oral tablet: 1 tab(s) orally once a day as needed for  dizziness  · ofloxacin 0.3% ophthalmic solution: 1 drop(s) to each affected eye 4 times a day  · pantoprazole 40 mg oral delayed release tablet: 1 tab(s) orally once a day (before a meal)  · aspirin 81 mg oral tablet: 1 tab(s) orally once a day  · gabapentin 300 mg oral capsule: 1 cap(s) orally every 8 hours  · cyclobenzaprine 10 mg oral tablet: 1 tab(s) orally 3 times a day as needed for Muscle Spasm  · atorvastatin 80 mg oral tablet: 1 tab(s) orally once a day (at bedtime)  · NIFEdipine 90 mg oral tablet, extended release: 1 tab(s) orally once a day  · acetaminophen 325 mg oral capsule: 2 cap(s) orally every 6 hours as needed for  mild pain Take 2-3 tablets every 6 hours, as needed, for pain  · senna leaf extract oral tablet: 2 tab(s) orally once a day (at bedtime) as needed for  constipation  · Zoloft 25 mg oral tablet: 1 orally once a day  · Wixela Inhub 250 mcg-50 mcg inhalation powder: 1 inhaled once a day  · Spiriva 18 mcg inhalation capsule: 1 cap(s) inhaled once a day    Objective:   Vital Signs Last 24 Hrs  T(C): 36.5 (25 Sep 2024 09:00), Max: 37.6 (24 Sep 2024 20:53)  T(F): 97.7 (25 Sep 2024 09:00), Max: 99.6 (24 Sep 2024 20:53)  HR: 75 (25 Sep 2024 09:00) (70 - 110)  BP: 158/71 (25 Sep 2024 09:00) (158/71 - 183/97)  BP(mean): 102 (25 Sep 2024 09:00) (102 - 131)  RR: 18 (25 Sep 2024 09:00) (18 - 18)  SpO2: 99% (25 Sep 2024 09:00) (97% - 100%)    Parameters below as of 25 Sep 2024 09:00  Patient On (Oxygen Delivery Method): room air    Physical Exam:   -Gen: NAD, resting in bed  -HEENT: EOMI, PERRL, no scleral icterus  -CV: normal S1 and S2  -Lungs: CTABL, normal respiratory effort on RA  -Ab: soft, NT, ND, normal BS  -Ext: no LE edema  -Neuro: A&O x 3, no focal deficits     Labs:                        7.6    7.04  )-----------( 121      ( 24 Sep 2024 20:45 )             23.9       09-24    130[L]  |  93[L]  |  68[H]  ----------------------------<  106[H]  3.8   |  25  |  2.68[H]    Ca    9.2      24 Sep 2024 20:45  Mg     2.0     09-24    Medications:  MEDICATIONS  (STANDING):  aspirin  chewable 81 milliGRAM(s) Oral daily  atorvastatin 80 milliGRAM(s) Oral at bedtime  fluticasone propionate/ salmeterol 250-50 MICROgram(s) Diskus 1 Dose(s) Inhalation two times a day  heparin   Injectable 5000 Unit(s) SubCutaneous every 8 hours  influenza  Vaccine (HIGH DOSE) 0.5 milliLiter(s) IntraMuscular once  NIFEdipine XL 90 milliGRAM(s) Oral daily  pantoprazole    Tablet 40 milliGRAM(s) Oral before breakfast  sertraline 25 milliGRAM(s) Oral daily  tiotropium 2.5 MICROgram(s) Inhaler 2 Puff(s) Inhalation daily    MEDICATIONS  (PRN):  acetaminophen     Tablet .. 650 milliGRAM(s) Oral every 6 hours PRN Mild Pain (1 - 3)  senna 2 Tablet(s) Oral at bedtime PRN for constipation  
Hematology-Oncology Consult note    Patient is a 69y old  Male who presents with a chief complaint of ANKITA, symptomatic anemia (26 Sep 2024 13:51)      HPI - Mr. Corky Kaminski is a 69 yoM w/ PMHx HTN, HLD, anemia, arthritis, COPD, BPH (s/p TURP), s/p L THR, AVR and ascending and hemiarch replacement w/ Dr. Toledo 6/2022 (course complicated by R ICA). 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 He was then referred to Dr. Rock for eval of aortic aneurysm which demonstrated growth over last year and he is now s/p FEVAR and L renal artery stent 8/19/24. Patient's post op course was uncomplicated and he was discharged home on 8/20/24.     He presented for lightheadedness, weakness, confusion and SOB on exertion. He received 1 unit blood transfusion and his symptoms resolved.     Per chart review-- At [facility] CTA C/A/P performed impression: Graft/repair of thoracic and abdominal aorta w/o endoleak. Penetrating atherosclerotic ulcers of proximal abdominal aorta. Aneurysmal dilatation of thoracic and intra-abdominal aorta and right common iliac. Mild constipation, 5mm nodule within left lower lobe.     Hematology-Oncology was consulted for anemia.     Labs reviewed- Hb 7.9 today ( Hb has been b/w 7.5-9.5 since last 1 month, 12.9 on 8/19/24), MCV 88.6, retic index 1.14( hypo proliferative), IDP suggestive of AOCD.   Platelet count 123 (), INR 1.18, PTT unremarkable.   LDH mildly elevated- 300, Haptoglobin- 43   T bili and D bili unremarkable     REVIEW OF SYSTEMS:  All other review of systems is negative unless indicated above.    OBJECTIVE:  T(C): 36.4 (09-26-24 @ 12:06), Max: 36.4 (09-25-24 @ 20:39)  HR: 80 (09-26-24 @ 12:06) (73 - 87)  BP: 173/82 (09-26-24 @ 12:06) (124/77 - 173/82)  RR: 18 (09-26-24 @ 12:06) (17 - 18)  SpO2: 94% (09-26-24 @ 12:06) (94% - 100%)  Daily     Daily     Physical Exam:  General: in no acute distress  Eyes: Pale conjunctivae  HENT: dry mucous membranes  Neck: Trachea midline, supple  Lungs: No respiratory distress  Abdomen: non-tender non-distended; No rebound or guarding  Extremities: WWP, No clubbing, cyanosis or edema  MSK: No midline bony tenderness. No CVA tenderness bilaterally  Neurological: Alert and oriented x3  Skin: Warm and dry. No obvious rash     Medications:  MEDICATIONS  (STANDING):  aspirin  chewable 81 milliGRAM(s) Oral daily  atorvastatin 80 milliGRAM(s) Oral at bedtime  fluticasone propionate/ salmeterol 250-50 MICROgram(s) Diskus 1 Dose(s) Inhalation two times a day  heparin   Injectable 5000 Unit(s) SubCutaneous every 8 hours  influenza  Vaccine (HIGH DOSE) 0.5 milliLiter(s) IntraMuscular once  NIFEdipine XL 90 milliGRAM(s) Oral daily  pantoprazole    Tablet 40 milliGRAM(s) Oral before breakfast  sertraline 25 milliGRAM(s) Oral daily  tiotropium 2.5 MICROgram(s) Inhaler 2 Puff(s) Inhalation daily    MEDICATIONS  (PRN):  acetaminophen     Tablet .. 650 milliGRAM(s) Oral every 6 hours PRN Mild Pain (1 - 3)  senna 2 Tablet(s) Oral at bedtime PRN for constipation      Labs:                        7.9    5.19  )-----------( 123      ( 26 Sep 2024 05:30 )             25.6     09-26    135  |  98  |  77[H]  ----------------------------<  101[H]  3.5   |  23  |  3.82[H]    Ca    8.8      26 Sep 2024 05:30  Phos  5.4     09-26  Mg     2.1     09-26    TPro  8.7[H]  /  Alb  3.1[L]  /  TBili  0.4  /  DBili  <0.2  /  AST  47[H]  /  ALT  76[H]  /  AlkPhos  115  09-26    PT/INR - ( 25 Sep 2024 15:18 )   PT: 13.6 sec;   INR: 1.18          PTT - ( 25 Sep 2024 15:18 )  PTT:28.3 sec  Urinalysis Basic - ( 26 Sep 2024 05:30 )    Color: x / Appearance: x / SG: x / pH: x  Gluc: 101 mg/dL / Ketone: x  / Bili: x / Urobili: x   Blood: x / Protein: x / Nitrite: x   Leuk Esterase: x / RBC: x / WBC x   Sq Epi: x / Non Sq Epi: x / Bacteria: x          Radiology: Reviewed
NEPHROLOGY SERVICE INITIAL CONSULT NOTE    HPI:  HPI: 69 yoM w/ PMHx HTN, HLD, anemia (follows w/ heme, Dr. Wong), arthritis, COPD, BPH (s/p TURP), s/p L THR, AVR and ascending and hemiarch replacement w/ Dr. Toledo 2022 (course complicated by R ICA). 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 Patient then referred to Dr. Rock for eval of aortic aneurysm which demonstrated growth over last year and he is now s/p FEVAR and L renal artery stent 24. Patient's post op course was uncomplicated and he was discharged home on 24. Patient presented to [facility] yesterday for lightheadedness, weakness and feeling faint. He was discharged home and presented with similar complaints today. Endorses loss of appetite, low energy, lightheadedness and weakness. Patient denies abdominal pain, chest pain, back pain. Reports mild SOB after walking/standing for too long. States he has not taken his BP meds for the last two days. Patient reports history of back pain which has completely resolved. However, patient previously taking 2 pills Ibuprofen daily for approximately 1 month, stopped in beginning on Sept. Of note, patient reports 40lb weight loss over the last 1 year.    At [facility] CTA C/A/P preformed impression: Graft/repair of thoracic and abdominal aorta w/o endoleak. Penetrating atherosclerotic ulcers of proximal abdominal aorta. Aneurysmal dilatation of thoracic and intra-abdominal aorta and right common iliac. Mild constipation, 5mm nodule within left lower lobe.     In the ED: afebrile, /90s systolic, HR 75 (110 on arrival), on Room air  Hgb 7.6 (8.4 on dc), WBC 7, Na 130 (135), Cr 2.68 (1.0), BNP 38,000   (25 Sep 2024 02:03)        REVIEW OF SYSTEMS: Otherwise negative except as specified in HPI  PAST MEDICAL & SURGICAL HISTORY:  HTN (hypertension)      Aortic regurgitation      Anemia      Arthritis      COPD, mild      Bladder distention      BPH (benign prostatic hyperplasia)      H/O cataract extraction  B/L      Aortic valve replaced        FAMILY HISTORY:  Family history of Marfan syndrome (Uncle)    FH: aortic aneurysm (Uncle)      SOCIAL HISTORY:  HOME MEDICATIONS:    Allergies    No Known Allergies    Intolerances        ACTIVE MEDICATIONS:  MEDICATIONS  (STANDING):  aspirin  chewable 81 milliGRAM(s) Oral daily  atorvastatin 80 milliGRAM(s) Oral at bedtime  fluticasone propionate/ salmeterol 250-50 MICROgram(s) Diskus 1 Dose(s) Inhalation two times a day  heparin   Injectable 5000 Unit(s) SubCutaneous every 8 hours  influenza  Vaccine (HIGH DOSE) 0.5 milliLiter(s) IntraMuscular once  NIFEdipine XL 90 milliGRAM(s) Oral daily  pantoprazole    Tablet 40 milliGRAM(s) Oral before breakfast  sertraline 25 milliGRAM(s) Oral daily  tiotropium 2.5 MICROgram(s) Inhaler 2 Puff(s) Inhalation daily    MEDICATIONS  (PRN):  acetaminophen     Tablet .. 650 milliGRAM(s) Oral every 6 hours PRN Mild Pain (1 - 3)  senna 2 Tablet(s) Oral at bedtime PRN for constipation        VITAL SIGNS:  Vital Signs Last 24 Hrs  T(C): 36.5 (25 Sep 2024 09:00), Max: 37.6 (24 Sep 2024 20:53)  T(F): 97.7 (25 Sep 2024 09:00), Max: 99.6 (24 Sep 2024 20:53)  HR: 75 (25 Sep 2024 09:00) (70 - 110)  BP: 158/71 (25 Sep 2024 09:00) (158/71 - 183/97)  BP(mean): 102 (25 Sep 2024 09:00) (102 - 131)  RR: 18 (25 Sep 2024 09:00) (18 - 18)  SpO2: 99% (25 Sep 2024 09:00) (97% - 100%)    Parameters below as of 25 Sep 2024 09:00  Patient On (Oxygen Delivery Method): room air        24 @ 07:01  -  24 @ 07:00  --------------------------------------------------------  IN:  Total IN: 0 mL    OUT:    Voided (mL): 275 mL  Total OUT: 275 mL    Total NET: -275 mL      24 @ 07:24 @ 13:46  --------------------------------------------------------  IN:    Oral Fluid: 180 mL  Total IN: 180 mL    OUT:  Total OUT: 0 mL    Total NET: 180 mL          PE:  General: Not in acute distress, well-nourished  Neck: No visible mass, No JVD noted  Chest: CTAP b/l, no use of accessory respiratory muscles  Heart: RRR, S1/S2 wnl, no MRG  Abdomen: Soft, nontender, nondistended,  no hepatosplenomegaly  Extremities: No clubbing, cyanosis or edema  Neuro:  Alert, no apparent focal deficits, answer questions appropriately  Access:    Pertinent labs & Imagin.6    7.04  )-----------( 121      ( 24 Sep 2024 20:45 )             23.9     -    130[L]  |  93[L]  |  68[H]  ----------------------------<  106[H]  3.8   |  25  |  2.68[H]    Ca    9.2      24 Sep 2024 20:45  Mg     2.0             Urinalysis Basic - ( 25 Sep 2024 03:33 )    Color: Yellow / Appearance: Clear / S.021 / pH: x  Gluc: x / Ketone: Negative mg/dL  / Bili: Negative / Urobili: 0.2 mg/dL   Blood: x / Protein: >=1000 mg/dL / Nitrite: Negative   Leuk Esterase: Negative / RBC: 7 /HPF / WBC 6 /HPF   Sq Epi: x / Non Sq Epi: 1 /HPF / Bacteria: Negative /HPF          CAPILLARY BLOOD GLUCOSE      POCT Blood Glucose.: 113 mg/dL (24 Sep 2024 20:50)          RADIOLOGY & ADDITIONAL TESTS: Reviewed.

## 2024-09-26 NOTE — PROGRESS NOTE ADULT - ATTENDING COMMENTS
70 y/o M with recent endovascular repair and stent in L renal artery. CR at base line 1.1-1.2. Went to OSH for dizziness and underwent contrast imaging that did not show any endoleak. Eventually transferred to [facility]. Here noted to have CR of 2.6 for which we are consulted.     Interval Events  -> CR worsening.   -> Drop in Hg requiring blood transfusion. Hapto normal. LDH elevated.   ->  Repeat UA with 5 WBC, 16 RBC and UPCR of 13g/g.     #Plan  -> Concerns for GN. Will do urine microscopy in morning. Agree with starting autoimmune work up as listed above.   -> Currently volume, acid base and electrolytes remain acceptable.     We will follow

## 2024-09-26 NOTE — PROGRESS NOTE ADULT - ASSESSMENT
69-year-old male with a PMHx of HTN, HLD, anemia, COPD, BPH (s/p TURP), AVR, ascending aortic arch replacement and recent FEVAR/renal artery stent (8/19/24) who presented with symptomatic anemia and ANKITA.      #AAA   -Continue management as per vascular surgery   -ContinueASA 81mg daily and Atorvastatin 80mg qhs      #ANKITA  -possible multifactorial from poor PO intake vs. NSAID use vs. contrast load at OSH (CTA) vs urinary retention  -nephrology following and will follow up recommendations    -Urine creatinine 90; urine NA 30protein/creatinine ratio: 13 and urine total protein 1169   -Pt w/o urinary retention     #Symptomatic Anemia    -Hematology consulted; Pt with outpt Hematologist Dr. Wong [phone]/ [phone] Will  contact to obtain information regarding previous w/u   -Will follow up post CBC s/p 1-unit pRBCs on 9/26 as well as 9/25   -iron studies and hemolysis labs will be difficult to interpret due to recent transfusion, obtain tomorrow AM    #Elevated proBNP   -Pt s/p TTE on 9/25 see results above but compared to the previous TTE performed on 9/18/2023, there have been   no significant interval changes.    #Hyponatremia   -Pt's NA is now   -Will continue to monitor BMP      #COPD   -continue with Spiriva and Advair      #GERD  -continue with Pantoprazole 40mg daily      #HTN   -continue with Nifedipine 90mg daily      #Anxiety   -continue with Zoloft 25mg daily      #DVT PPx:   -Continue Heparin SQ q8     #Dispo:  -Once clinically improved      40 minutes spent on total encounter. The necessity of the time spent during the encounter on this date of service was due to:    Review of hospital course, labs, vitals, radiology and medical records.  Direct patient encounter including bedside exam   Discussed plan of care with primary team   Documenting the encounter. 69-year-old male with a PMHx of HTN, HLD, anemia, COPD, BPH (s/p TURP), AVR, ascending aortic arch replacement and recent FEVAR/renal artery stent (8/19/24) who presented with symptomatic anemia and ANKITA.      #AAA   -Continue management as per vascular surgery   -ContinueASA 81mg daily and Atorvastatin 80mg qhs      #ANKITA  -possible multifactorial from poor PO intake vs. NSAID use vs. contrast load at OSH (CTA) vs urinary retention  -nephrology following and will follow up recommendations    -Urine creatinine 90; urine NA 30protein/creatinine ratio: 13 and urine total protein 1169   -Pt w/o urinary retention     #Symptomatic Anemia    -Hematology consulted; Pt with outpt Hematologist Dr. Wong [phone]/ [phone] Will  contact to obtain information regarding previous w/u   -Will follow up post CBC s/p 1-unit pRBCs on 9/26 as well as 9/25   -iron studies and hemolysis labs will be difficult to interpret due to recent transfusion, obtain tomorrow AM    #Elevated proBNP   -Pt s/p TTE on 9/25 see results above but compared to the previous TTE performed on 9/18/2023, there have been   no significant interval changes.    #Hyponatremia   -Pt's NA is now   -Will continue to monitor BMP      #COPD   -continue with Spiriva and Advair      #GERD  -continue with Pantoprazole 40mg daily      #HTN   -continue with Nifedipine 90mg daily      #Anxiety   -continue with Zoloft 25mg daily      #DVT PPx:   -Continue Heparin SQ q8     #Dispo:  -Once clinically improved      55 minutes spent on total encounter. The necessity of the time spent during the encounter on this date of service was due to:    Review of hospital course, labs, vitals, radiology and medical records.  Direct patient encounter including bedside exam   Discussed plan of care with primary team   Documenting the encounter. 69-year-old male with a PMHx of HTN, HLD, anemia, COPD, BPH (s/p TURP), AVR, ascending aortic arch replacement and recent FEVAR/renal artery stent (8/19/24) who presented with symptomatic anemia and ANKITA.      #AAA   -Continue management as per vascular surgery   -ContinueASA 81mg daily and Atorvastatin 80mg qhs      #ANKITA  -possible multifactorial from poor PO intake vs. NSAID use vs. contrast load at OSH (CTA) vs urinary retention  -nephrology following and will follow up recommendations    -Urine creatinine 90; urine NA 30protein/creatinine ratio: 13 and urine total protein 1169   -Pt w/o urinary retention     #Symptomatic Anemia    -Hematology consulted; Pt with outpt Hematologist Dr. Wong [phone]/ [phone] Will  contact to obtain information regarding previous w/u   -Will follow up post CBC s/p 1-unit pRBCs on 9/26 as well as 9/25   -iron studies and hemolysis labs will be difficult to interpret due to recent transfusion, obtain tomorrow AM    #Elevated proBNP   -Pt s/p TTE on 9/25 see results above but compared to the previous TTE performed on 9/18/2023, there have been   no significant interval changes.    #Hyponatremia   -Pt's Na is now   -Will continue to monitor BMP      #COPD   -continue with Spiriva and Advair      #GERD  -continue with Pantoprazole 40mg daily      #HTN   -Pt with elevated BP  -continue with Nifedipine 90mg daily    -Will continue to monitor BP and add additional antihypertensive agent if required     #Anxiety   -continue with Zoloft 25mg daily      #DVT PPx:   -Continue Heparin SQ q8     #Dispo:  -Once clinically improved      55 minutes spent on total encounter. The necessity of the time spent during the encounter on this date of service was due to:    Review of hospital course, labs, vitals, radiology and medical records.  Direct patient encounter including bedside exam   Discussed plan of care with primary team   Documenting the encounter.

## 2024-09-26 NOTE — PROGRESS NOTE ADULT - SUBJECTIVE AND OBJECTIVE BOX
O/N:       ------------------------------------------------------------------------  PLEASE CHECK WHEN PRESENT:     [  ]Heart Failure     [  ] Acute     [  ] Acute on Chronic     [  ] Chronic  -------------------------------------------------------------------     [  ]Diastolic [HFpEF]     [  ]Systolic [HFrEF]     [  ]Combined [HFpEF & HFrEF]  .................................................................................     [  ]Other:     [ ] Pulmonary Hypertension     [ ] Chronic A-fib     [ ] Persistet A-fib     [ ] Permanent A-fib     [ ] Paroxysmal A-fib     [x ] Hypertensive Heart Disease  -------------------------------------------------------------------  [ ] Respiratory failure  [ ] Acute cor pulmonale  [ ] Asthma/COPD Exacerbation  [ ]COPD on home O2 (Chronic renal Failure)   [ ] Pleural effusion  [ ] Aspiration pneumonia  [ ] Obstructive Sleep Apnea  [ ]Atelectasis   [ ] Acute PE   -------------------------------------------------------------------  [x  ]Acute Kidney Injury      [  ]Acute Tubular Necrosis      [  ]Reneal Medullary Necrosis     [  ]Renal Cortical Necrosis     [  ]Other Pathological Lesions:    [  ]CKD 1  [  ]CKD 2  [  ]CKD 3  [  ]CKD 4  [  ]CKD 5 (ESRD)  [  ]Other  -------------------------------------------------------------------  [  ]Diabetes  [  ] Diabetic PVD Ulcer  [  ] Neuropathic ulcer to DM  [  ] Diabetes with Nephropathy  [  ] Osteomyelitis due to diabetes  [  ] Hyperglycemia   [  ]hypoglycemia   --------------------------------------------------------------------  [  ]Malnutrition: See Nutrition Note  [  ]Cachexia  [  ]Other:   [  ]Supplement Ordered:  [  ]Morbid Obesity (BMI >=40]  [ ] Ileus  ---------------------------------------------------------------------  [ ] Sepsis/severe sepsis/septic shock  [ ] Noninfectious SIRS  [ ] UTI  [ ] Pneumonia  [ ] Thrombophlebitis     -----------------------------------------------------------------------  [ ] Acidosis/alkalosis  [ ] Fluid overload  [ ] Hypokalemia  [ ] Hyperkalemia  [ ] Hypomagnesemia  [ ] Hypophosphatemia  [ ] Hyperphosphatemia  ------------------------------------------------------------------------  [ ] Acute blood loss anemia  [ ] Post op blood loss anemia  [ ] Iron deficiency anemia  [ ] Anemia due to chronic disease  [ ] Hypercoagulable state  [ ] Thrombocytopenia  ----------------------------------------------------------------------  [ ] Cerebral infarction  [ ] Transient ischemia attack  [ ] Encephalopathy - Toxic or Metabolic    A/P: 69 yoM w/ PMHx HLD, anemia (follows w/ heme, Dr. Wong), arthritis, COPD, BPH (s/p TURP), s/p L THR, AVR and ascending and hemiarch replacement w/ Dr. 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 Patient presents c/o lightheadedness, weakness, loss of appetite, weight loss. CTA at [facility] shows no endoleak. Lab significant for hgb 7.6, Na 130, Cr 2.68, BNP 38,000. Patient s/p 1u PRBC in ED with mild improvement of weakness.    Vascular:  - s/p FEVAR and L Renal artery stent 8/18/24  - renal artery duplex neg for stenosis, 1/1 cm cyst lower pole R kidney  - ECHO 9/25 EF 50-55%, Bioprosthetic valve is seen in the aortic position  - obtain CTA C/A/P disc from [facility]  - c/w aspirin    HTN/HLD:  - c/w nifedipine  - c/w atorvastatin    ANKITA:  - baseline Cr 0.7-1.0  - Cr on admission 2.64, 3.89 9/25 PM  - multifactorial from poor PO intake vs. NSAID use vs. contrast load at OSH (CTA)  - nephro consult, appreciate recs  - f/u AM BMP    Anemia:   - follows w/ outpt Hematologist, Dr. Wong  - s/p 1u PRBC 9/24 for symptomatic anemia/hgb 7.6, responded appropriately  - f/u iron studies and hemolysis labs    COPD:  - c/w Spiriva and Advair inhaler    GERD:  - c/w PPI    Anxiety:  - c/w zoloft    Diet: renal  Activity: as tolerated  DVTPPx: SQH  Dispo: 5 Uris   O/N: post transfusion CBC 8.8 (7.6), Started on NS @80 per nephro, BS 194cc, Cr up to 3.89 (3.5), FENa and FEUrea both demonstrating prerenal ANKITA    Subjective: No complaints this AM, doing well, no pain, feeling better overall.     Objective:   PHYSICAL EXAM:  General: NAD, pt resting comfortably in bed  Pulm: No respiratory distress, nonlabored breathing, on room air  CVS: NSR, HDS  Abd: Soft, NT, ND  Extremities: WWP, no edema      ------------------------------------------------------------------------  PLEASE CHECK WHEN PRESENT:     [  ]Heart Failure     [  ] Acute     [  ] Acute on Chronic     [  ] Chronic  -------------------------------------------------------------------     [  ]Diastolic [HFpEF]     [  ]Systolic [HFrEF]     [  ]Combined [HFpEF & HFrEF]  .................................................................................     [  ]Other:     [ ] Pulmonary Hypertension     [ ] Chronic A-fib     [ ] Persistet A-fib     [ ] Permanent A-fib     [ ] Paroxysmal A-fib     [x ] Hypertensive Heart Disease  -------------------------------------------------------------------  [ ] Respiratory failure  [ ] Acute cor pulmonale  [ ] Asthma/COPD Exacerbation  [ ]COPD on home O2 (Chronic renal Failure)   [ ] Pleural effusion  [ ] Aspiration pneumonia  [ ] Obstructive Sleep Apnea  [ ]Atelectasis   [ ] Acute PE   -------------------------------------------------------------------  [x  ]Acute Kidney Injury      [  ]Acute Tubular Necrosis      [  ]Reneal Medullary Necrosis     [  ]Renal Cortical Necrosis     [  ]Other Pathological Lesions:    [  ]CKD 1  [  ]CKD 2  [  ]CKD 3  [  ]CKD 4  [  ]CKD 5 (ESRD)  [  ]Other  -------------------------------------------------------------------  [  ]Diabetes  [  ] Diabetic PVD Ulcer  [  ] Neuropathic ulcer to DM  [  ] Diabetes with Nephropathy  [  ] Osteomyelitis due to diabetes  [  ] Hyperglycemia   [  ]hypoglycemia   --------------------------------------------------------------------  [  ]Malnutrition: See Nutrition Note  [  ]Cachexia  [  ]Other:   [  ]Supplement Ordered:  [  ]Morbid Obesity (BMI >=40]  [ ] Ileus  ---------------------------------------------------------------------  [ ] Sepsis/severe sepsis/septic shock  [ ] Noninfectious SIRS  [ ] UTI  [ ] Pneumonia  [ ] Thrombophlebitis     -----------------------------------------------------------------------  [ ] Acidosis/alkalosis  [ ] Fluid overload  [ ] Hypokalemia  [ ] Hyperkalemia  [ ] Hypomagnesemia  [ ] Hypophosphatemia  [ ] Hyperphosphatemia  ------------------------------------------------------------------------  [ ] Acute blood loss anemia  [ ] Post op blood loss anemia  [ ] Iron deficiency anemia  [ ] Anemia due to chronic disease  [ ] Hypercoagulable state  [ ] Thrombocytopenia  ----------------------------------------------------------------------  [ ] Cerebral infarction  [ ] Transient ischemia attack  [ ] Encephalopathy - Toxic or Metabolic    A/P: 69 yoM w/ PMHx HLD, anemia (follows w/ heme, Dr. Wong), arthritis, COPD, BPH (s/p TURP), s/p L THR, AVR and ascending and hemiarch replacement w/ Dr. 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 Patient presents c/o lightheadedness, weakness, loss of appetite, weight loss. CTA at [facility] shows no endoleak. Lab significant for hgb 7.6, Na 130, Cr 2.68, BNP 38,000. Patient s/p 1u PRBC in ED with mild improvement of weakness.    Vascular:  - s/p FEVAR and L Renal artery stent 8/18/24  - renal artery duplex neg for stenosis, 1/1 cm cyst lower pole R kidney  - ECHO 9/25 EF 50-55%, Bioprosthetic valve is seen in the aortic position  - obtain CTA C/A/P disc from [facility]  - c/w aspirin    HTN/HLD:  - c/w nifedipine  - c/w atorvastatin    ANKITA:  - baseline Cr 0.7-1.0  - Cr on admission 2.64, 3.89 9/25 PM  - multifactorial from poor PO intake vs. NSAID use vs. contrast load at OSH (CTA)  - nephro consult, appreciate recs  - f/u AM BMP    Anemia:   - follows w/ outpt Hematologist, Dr. Wong  - s/p 1u PRBC 9/24 for symptomatic anemia/hgb 7.6, responded appropriately  - f/u iron studies and hemolysis labs    COPD:  - c/w Spiriva and Advair inhaler    GERD:  - c/w PPI    Anxiety:  - c/w zoloft    Diet: renal  Activity: as tolerated  DVTPPx: SQH  Dispo: 5 Uris

## 2024-09-26 NOTE — PROGRESS NOTE ADULT - ASSESSMENT
69-year-old M with PMH of HTN, BPH, recent fenestrated endovascular aneurysm repair & left renal artery stent placement on 8/19/2024 after which he was discharged on 8/28/2024   Initially admitted at [facility] with complaints of lightheadedness and weakness.  CTA chest was done and did not show any endoleak.  Patient currently admitted with same complaints.  Course complicated by new onset of hemolytic anemia with normocytic anemia, increased reticulocyte count, increased LDH and low platelet count  Nephrology consulted for ANKITA    1– Nonoliguric ANKITA: When consulted SCr 2.68 | CO2 25 | K 3.8 | UA (9/24): RBC 11, protein> 1000 | Uosm: 335 | Pratik 52| UPC 13  Renal ultrasound with Doppler: Left renal artery origin, middle and proximal segment not visualized, no hydronephrosis  No improvement of SCr after 1L of NaCl 0.9% infusion  DDx include glomerular disease in the setting of nephrotic range proteinuria & hematuria, TMA, JAYDON, ischemic ATN   –Send VINNIE, peripheral blood smear, consider consulting hematology  –LA, Anti-dsDNA, Complement levels C3, C4, SPEP, UPEP, HBV, HCV serology, HIV test, ANCA, Cryoglobulin  –Strict I/O monitoring    Thank you for consulting Nephrology. We will continue to follow the patient closely and provide recommendations as needed.    Aleja Lozoya MD  PGY-4 Nephrology Fellow

## 2024-09-27 LAB
ANION GAP SERPL CALC-SCNC: 10 MMOL/L — SIGNIFICANT CHANGE UP (ref 5–17)
BACTERIA # UR AUTO: NEGATIVE /HPF — SIGNIFICANT CHANGE UP
BASOPHILS # BLD AUTO: 0.02 K/UL — SIGNIFICANT CHANGE UP (ref 0–0.2)
BASOPHILS NFR BLD AUTO: 0.3 % — SIGNIFICANT CHANGE UP (ref 0–2)
BUN SERPL-MCNC: 70 MG/DL — HIGH (ref 7–23)
C3 SERPL-MCNC: 150 MG/DL — SIGNIFICANT CHANGE UP (ref 81–157)
C4 SERPL-MCNC: 13 MG/DL — SIGNIFICANT CHANGE UP (ref 13–39)
CALCIUM SERPL-MCNC: 8.5 MG/DL — SIGNIFICANT CHANGE UP (ref 8.4–10.5)
CAST: 15 /LPF — HIGH (ref 0–4)
CHLORIDE SERPL-SCNC: 102 MMOL/L — SIGNIFICANT CHANGE UP (ref 96–108)
CO2 SERPL-SCNC: 22 MMOL/L — SIGNIFICANT CHANGE UP (ref 22–31)
CREAT SERPL-MCNC: 3.21 MG/DL — HIGH (ref 0.5–1.3)
DAT POLY-SP REAG RBC QL: POSITIVE — SIGNIFICANT CHANGE UP
DSDNA AB SER-ACNC: <1 IU/ML — SIGNIFICANT CHANGE UP
EGFR: 20 ML/MIN/1.73M2 — LOW
EOSINOPHIL # BLD AUTO: 0.04 K/UL — SIGNIFICANT CHANGE UP (ref 0–0.5)
EOSINOPHIL NFR BLD AUTO: 0.7 % — SIGNIFICANT CHANGE UP (ref 0–6)
FIBRINOGEN PPP-MCNC: 326 MG/DL — SIGNIFICANT CHANGE UP (ref 200–445)
FOLATE SERPL-MCNC: 8.2 NG/ML — SIGNIFICANT CHANGE UP
GLUCOSE SERPL-MCNC: 95 MG/DL — SIGNIFICANT CHANGE UP (ref 70–99)
HCT VFR BLD CALC: 28.7 % — LOW (ref 39–50)
HGB BLD-MCNC: 9.3 G/DL — LOW (ref 13–17)
HYALINE CASTS # UR AUTO: PRESENT
IMM GRANULOCYTES NFR BLD AUTO: 0.5 % — SIGNIFICANT CHANGE UP (ref 0–0.9)
LYMPHOCYTES # BLD AUTO: 0.51 K/UL — LOW (ref 1–3.3)
LYMPHOCYTES # BLD AUTO: 8.9 % — LOW (ref 13–44)
MAGNESIUM SERPL-MCNC: 1.9 MG/DL — SIGNIFICANT CHANGE UP (ref 1.6–2.6)
MCHC RBC-ENTMCNC: 28.9 PG — SIGNIFICANT CHANGE UP (ref 27–34)
MCHC RBC-ENTMCNC: 32.4 GM/DL — SIGNIFICANT CHANGE UP (ref 32–36)
MCV RBC AUTO: 89.1 FL — SIGNIFICANT CHANGE UP (ref 80–100)
MONOCYTES # BLD AUTO: 0.49 K/UL — SIGNIFICANT CHANGE UP (ref 0–0.9)
MONOCYTES NFR BLD AUTO: 8.5 % — SIGNIFICANT CHANGE UP (ref 2–14)
NEUTROPHILS # BLD AUTO: 4.66 K/UL — SIGNIFICANT CHANGE UP (ref 1.8–7.4)
NEUTROPHILS NFR BLD AUTO: 81.1 % — HIGH (ref 43–77)
NRBC # BLD: 0 /100 WBCS — SIGNIFICANT CHANGE UP (ref 0–0)
PHOSPHATE SERPL-MCNC: 4.7 MG/DL — HIGH (ref 2.5–4.5)
PLATELET # BLD AUTO: 125 K/UL — LOW (ref 150–400)
POTASSIUM SERPL-MCNC: 3.5 MMOL/L — SIGNIFICANT CHANGE UP (ref 3.5–5.3)
POTASSIUM SERPL-SCNC: 3.5 MMOL/L — SIGNIFICANT CHANGE UP (ref 3.5–5.3)
PROT ?TM UR-MCNC: 514 MG/DL — HIGH (ref 0–12)
PROT SERPL-MCNC: 8.9 G/DL — HIGH (ref 6–8.3)
RBC # BLD: 3.22 M/UL — LOW (ref 4.2–5.8)
RBC # FLD: 18 % — HIGH (ref 10.3–14.5)
RBC CASTS # UR COMP ASSIST: 13 /HPF — HIGH (ref 0–4)
SODIUM SERPL-SCNC: 134 MMOL/L — LOW (ref 135–145)
SPERM AB SPEC-ACNC: PRESENT
SQUAMOUS # UR AUTO: 3 /HPF — SIGNIFICANT CHANGE UP (ref 0–5)
VIT B12 SERPL-MCNC: 948 PG/ML — SIGNIFICANT CHANGE UP (ref 232–1245)
WBC # BLD: 5.75 K/UL — SIGNIFICANT CHANGE UP (ref 3.8–10.5)
WBC # FLD AUTO: 5.75 K/UL — SIGNIFICANT CHANGE UP (ref 3.8–10.5)
WBC UR QL: 18 /HPF — HIGH (ref 0–5)

## 2024-09-27 PROCEDURE — 99231 SBSQ HOSP IP/OBS SF/LOW 25: CPT

## 2024-09-27 PROCEDURE — 86077 PHYS BLOOD BANK SERV XMATCH: CPT

## 2024-09-27 PROCEDURE — 99233 SBSQ HOSP IP/OBS HIGH 50: CPT

## 2024-09-27 RX ORDER — CARVEDILOL 3.125 MG
3.12 TABLET ORAL EVERY 12 HOURS
Refills: 0 | Status: DISCONTINUED | OUTPATIENT
Start: 2024-09-27 | End: 2024-09-27

## 2024-09-27 RX ORDER — CARVEDILOL 3.125 MG
3.12 TABLET ORAL EVERY 12 HOURS
Refills: 0 | Status: DISCONTINUED | OUTPATIENT
Start: 2024-09-27 | End: 2024-09-28

## 2024-09-27 RX ADMIN — ATORVASTATIN CALCIUM 80 MILLIGRAM(S): 10 TABLET, FILM COATED ORAL at 21:54

## 2024-09-27 RX ADMIN — Medication 20 MILLIEQUIVALENT(S): at 10:50

## 2024-09-27 RX ADMIN — Medication 90 MILLIGRAM(S): at 05:35

## 2024-09-27 RX ADMIN — Medication 5000 UNIT(S): at 13:53

## 2024-09-27 RX ADMIN — Medication 650 MILLIGRAM(S): at 21:54

## 2024-09-27 RX ADMIN — Medication 1 DOSE(S): at 21:53

## 2024-09-27 RX ADMIN — TIOTROPIUM BROMIDE INHALATION SPRAY 2 PUFF(S): 3.12 SPRAY, METERED RESPIRATORY (INHALATION) at 13:53

## 2024-09-27 RX ADMIN — Medication 81 MILLIGRAM(S): at 10:50

## 2024-09-27 RX ADMIN — PANTOPRAZOLE SODIUM 40 MILLIGRAM(S): 40 TABLET, DELAYED RELEASE ORAL at 05:35

## 2024-09-27 RX ADMIN — Medication 1 DOSE(S): at 10:50

## 2024-09-27 RX ADMIN — Medication 650 MILLIGRAM(S): at 22:28

## 2024-09-27 RX ADMIN — SERTRALINE HYDROCHLORIDE 25 MILLIGRAM(S): 100 TABLET, FILM COATED ORAL at 10:50

## 2024-09-27 RX ADMIN — Medication 3.12 MILLIGRAM(S): at 16:35

## 2024-09-27 RX ADMIN — Medication 5000 UNIT(S): at 21:57

## 2024-09-27 NOTE — PROGRESS NOTE ADULT - SUBJECTIVE AND OBJECTIVE BOX
O/N:      ------------------------------------------------------------------------  PLEASE CHECK WHEN PRESENT:     [  ]Heart Failure     [  ] Acute     [  ] Acute on Chronic     [  ] Chronic  -------------------------------------------------------------------     [  ]Diastolic [HFpEF]     [  ]Systolic [HFrEF]     [  ]Combined [HFpEF & HFrEF]  .................................................................................     [  ]Other:     [ ] Pulmonary Hypertension     [ ] Chronic A-fib     [ ] Persistet A-fib     [ ] Permanent A-fib     [ ] Paroxysmal A-fib     [x ] Hypertensive Heart Disease  -------------------------------------------------------------------  [ ] Respiratory failure  [ ] Acute cor pulmonale  [ ] Asthma/COPD Exacerbation  [ ]COPD on home O2 (Chronic renal Failure)   [ ] Pleural effusion  [ ] Aspiration pneumonia  [ ] Obstructive Sleep Apnea  [ ]Atelectasis   [ ] Acute PE   -------------------------------------------------------------------  [x  ]Acute Kidney Injury      [  ]Acute Tubular Necrosis      [  ]Reneal Medullary Necrosis     [  ]Renal Cortical Necrosis     [  ]Other Pathological Lesions:    [  ]CKD 1  [  ]CKD 2  [  ]CKD 3  [  ]CKD 4  [  ]CKD 5 (ESRD)  [  ]Other  -------------------------------------------------------------------  [  ]Diabetes  [  ] Diabetic PVD Ulcer  [  ] Neuropathic ulcer to DM  [  ] Diabetes with Nephropathy  [  ] Osteomyelitis due to diabetes  [  ] Hyperglycemia   [  ]hypoglycemia   --------------------------------------------------------------------  [  ]Malnutrition: See Nutrition Note  [  ]Cachexia  [  ]Other:   [  ]Supplement Ordered:  [  ]Morbid Obesity (BMI >=40]  [ ] Ileus  ---------------------------------------------------------------------  [ ] Sepsis/severe sepsis/septic shock  [ ] Noninfectious SIRS  [ ] UTI  [ ] Pneumonia  [ ] Thrombophlebitis     -----------------------------------------------------------------------  [ ] Acidosis/alkalosis  [ ] Fluid overload  [ ] Hypokalemia  [ ] Hyperkalemia  [ ] Hypomagnesemia  [ ] Hypophosphatemia  [ ] Hyperphosphatemia  ------------------------------------------------------------------------  [ ] Acute blood loss anemia  [ ] Post op blood loss anemia  [ ] Iron deficiency anemia  [ ] Anemia due to chronic disease  [ ] Hypercoagulable state  [ ] Thrombocytopenia  ----------------------------------------------------------------------  [ ] Cerebral infarction  [ ] Transient ischemia attack  [ ] Encephalopathy - Toxic or Metabolic    A/P: 69 yoM w/ PMHx HLD, anemia (follows w/ heme, Dr. Wong), arthritis, COPD, BPH (s/p TURP), s/p L THR, AVR and ascending and hemiarch replacement w/ Dr. 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 Patient presents c/o lightheadedness, weakness, loss of appetite, weight loss. CTA at [facility] shows no endoleak. Lab significant for hgb 7.6, Na 130, Cr 2.68, BNP 38,000. Patient s/p 1u PRBC in ED with mild improvement of weakness.    Vascular:  - s/p FEVAR and L Renal artery stent 8/18/24  - renal artery duplex neg for stenosis, 1.1 cm cyst lower pole R kidney  - ECHO 9/25 EF 50-55%, Bioprosthetic valve is seen in the aortic position  - obtain CTA C/A/P disc from [facility]  - c/w aspirin    HTN/HLD:  - c/w nifedipine  - c/w atorvastatin    ANKITA:  - baseline Cr 0.7-1.0  - Cr on admission 2.64, 3.82 9/26  - multifactorial from poor PO intake vs. NSAID use vs. contrast load at OSH (CTA)  - nephro consult, appreciate recs  - f/u AM BMP, pending autoimmune work up    Anemia:   - follows w/ outpt Hematologist, Dr. Wong - will obtain collateral  - s/p 1u PRBC 9/24, 1u PRBC 9/26  - f/u B12/folate, fibrinogen  - heme consulted, appreciate recs    COPD:  - c/w Spiriva and Advair inhaler    GERD:  - c/w PPI    Anxiety:  - c/w zoloft    Diet: renal  Activity: as tolerated  DVTPPx: SQH  Dispo: 5 Uris   O/N: protein electrophoresis elevated 8.9    Subjective: patient seen and examined at bedside, patient states that yesterday he did feel lightheaded but currently that is resolved.    ROS:   Denies Headache, blurred vision, Chest Pain, SOB, Abdominal pain, nausea or vomiting     Social   aspirin  chewable 81  heparin   Injectable 5000  NIFEdipine XL 90      Allergies    No Known Allergies    Intolerances        Vital Signs Last 24 Hrs  T(C): 36.7 (27 Sep 2024 05:32), Max: 36.7 (26 Sep 2024 18:20)  T(F): 98 (27 Sep 2024 05:32), Max: 98 (26 Sep 2024 18:20)  HR: 86 (27 Sep 2024 05:32) (80 - 99)  BP: 166/92 (27 Sep 2024 05:32) (119/78 - 173/82)  BP(mean): 105 (26 Sep 2024 23:56) (94 - 118)  RR: 17 (27 Sep 2024 05:32) (17 - 18)  SpO2: 94% (27 Sep 2024 05:32) (94% - 100%)    Parameters below as of 27 Sep 2024 05:32  Patient On (Oxygen Delivery Method): room air      I&O's Summary    26 Sep 2024 07:01  -  27 Sep 2024 07:00  --------------------------------------------------------  IN: 480 mL / OUT: 1645 mL / NET: -1165 mL        PHYSICAL EXAM:  General: NAD, pt resting comfortably in bed  Pulm: No respiratory distress, nonlabored breathing, on room air  CVS: NSR, HDS  Abd: Soft, NT, ND  Extremities: WWP        LABS:                        9.9    6.21  )-----------( 132      ( 26 Sep 2024 16:00 )             30.7     09-26    135  |  98  |  77[H]  ----------------------------<  101[H]  3.5   |  23  |  3.82[H]    Ca    8.8      26 Sep 2024 05:30  Phos  5.4     09-26  Mg     2.1     09-26    TPro  8.9[H]  /  Alb  x   /  TBili  x   /  DBili  x   /  AST  x   /  ALT  x   /  AlkPhos  x   09-26    PT/INR - ( 25 Sep 2024 15:18 )   PT: 13.6 sec;   INR: 1.18          PTT - ( 25 Sep 2024 15:18 )  PTT:28.3 sec        ------------------------------------------------------------------------  PLEASE CHECK WHEN PRESENT:     [  ]Heart Failure     [  ] Acute     [  ] Acute on Chronic     [  ] Chronic  -------------------------------------------------------------------     [  ]Diastolic [HFpEF]     [  ]Systolic [HFrEF]     [  ]Combined [HFpEF & HFrEF]  .................................................................................     [  ]Other:     [ ] Pulmonary Hypertension     [ ] Chronic A-fib     [ ] Persistet A-fib     [ ] Permanent A-fib     [ ] Paroxysmal A-fib     [x ] Hypertensive Heart Disease  -------------------------------------------------------------------  [ ] Respiratory failure  [ ] Acute cor pulmonale  [ ] Asthma/COPD Exacerbation  [ ]COPD on home O2 (Chronic renal Failure)   [ ] Pleural effusion  [ ] Aspiration pneumonia  [ ] Obstructive Sleep Apnea  [ ]Atelectasis   [ ] Acute PE   -------------------------------------------------------------------  [x  ]Acute Kidney Injury      [  ]Acute Tubular Necrosis      [  ]Reneal Medullary Necrosis     [  ]Renal Cortical Necrosis     [  ]Other Pathological Lesions:    [  ]CKD 1  [  ]CKD 2  [  ]CKD 3  [  ]CKD 4  [  ]CKD 5 (ESRD)  [  ]Other  -------------------------------------------------------------------  [  ]Diabetes  [  ] Diabetic PVD Ulcer  [  ] Neuropathic ulcer to DM  [  ] Diabetes with Nephropathy  [  ] Osteomyelitis due to diabetes  [  ] Hyperglycemia   [  ]hypoglycemia   --------------------------------------------------------------------  [  ]Malnutrition: See Nutrition Note  [  ]Cachexia  [  ]Other:   [  ]Supplement Ordered:  [  ]Morbid Obesity (BMI >=40]  [ ] Ileus  ---------------------------------------------------------------------  [ ] Sepsis/severe sepsis/septic shock  [ ] Noninfectious SIRS  [ ] UTI  [ ] Pneumonia  [ ] Thrombophlebitis     -----------------------------------------------------------------------  [ ] Acidosis/alkalosis  [ ] Fluid overload  [ ] Hypokalemia  [ ] Hyperkalemia  [ ] Hypomagnesemia  [ ] Hypophosphatemia  [ ] Hyperphosphatemia  ------------------------------------------------------------------------  [ ] Acute blood loss anemia  [ ] Post op blood loss anemia  [ ] Iron deficiency anemia  [ ] Anemia due to chronic disease  [ ] Hypercoagulable state  [ ] Thrombocytopenia  ----------------------------------------------------------------------  [ ] Cerebral infarction  [ ] Transient ischemia attack  [ ] Encephalopathy - Toxic or Metabolic    A/P: 69 yoM w/ PMHx HLD, anemia (follows w/ heme, Dr. Wong), arthritis, COPD, BPH (s/p TURP), s/p L THR, AVR and ascending and hemiarch replacement w/ Dr. 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 Patient presents c/o lightheadedness, weakness, loss of appetite, weight loss. CTA at [facility] shows no endoleak. Lab significant for hgb 7.6, Na 130, Cr 2.68, BNP 38,000. Patient s/p 1u PRBC in ED with mild improvement of weakness.    Vascular:  - s/p FEVAR and L Renal artery stent 8/18/24  - renal artery duplex neg for stenosis, 1.1 cm cyst lower pole R kidney  - ECHO 9/25 EF 50-55%, Bioprosthetic valve is seen in the aortic position  - obtain CTA C/A/P disc from [facility]  - c/w aspirin    HTN/HLD:  - c/w nifedipine  - c/w atorvastatin    ANKITA:  - baseline Cr 0.7-1.0  - Cr on admission 2.64, monitor Cr with daily BMP  - multifactorial from poor PO intake vs. NSAID use vs. contrast load at OSH (CTA)  - pending autoimmune work up  - hiv neg, hepatitis panel neg  - nephro consult, appreciate recs    Anemia:   - follows w/ outpt Hematologist, Dr. Wong - will obtain collateral  - s/p 1u PRBC 9/24, 1u PRBC 9/26  - f/u B12/folate, fibrinogen  - heme consulted, appreciate recs    COPD:  - c/w Spiriva and Advair inhaler    GERD:  - c/w PPI    Anxiety:  - c/w zoloft    Diet: renal  Activity: as tolerated  DVTPPx: SQH  Dispo: 5 Uris, pending improvement in Cr

## 2024-09-27 NOTE — PROGRESS NOTE ADULT - ASSESSMENT
INCOMPLETE    69-year-old M with PMH of HTN, BPH, recent fenestrated endovascular aneurysm repair & left renal artery stent placement on 8/19/2024 after which he was discharged on 8/28/2024   Initially admitted at [facility] with complaints of lightheadedness and weakness.  CTA chest was done and did not show any endoleak.  Patient currently admitted with same complaints.  Course complicated by new onset of hemolytic anemia with normocytic anemia, increased reticulocyte count, increased LDH and low platelet count  Nephrology consulted for ANKITA    1– Nonoliguric ANKITA: When consulted SCr 2.68 | CO2 25 | K 3.8 | UA (9/24): RBC 11, protein> 1000 | Uosm: 335 | Pratik 52| UPC 13  Renal ultrasound with Doppler: Left renal artery origin, middle and proximal segment not visualized, no hydronephrosis  No improvement of SCr after 1L of NaCl 0.9% infusion  DDx include glomerular disease in the setting of nephrotic range proteinuria & hematuria, TMA, JAYDON, ischemic ATN   –Send VINNIE, peripheral blood smear, consider consulting hematology  –LA, Anti-dsDNA, Complement levels C3, C4, SPEP, UPEP, HBV, HCV serology, HIV test, ANCA, Cryoglobulin  –Strict I/O monitoring    Thank you for consulting Nephrology. We will continue to follow the patient closely and provide recommendations as needed.    Aleja Lozoya MD  PGY-4 Nephrology Fellow INCOMPLETE    69-year-old M with PMH of HTN, BPH, recent fenestrated endovascular aneurysm repair & left renal artery stent placement on 8/19/2024 after which he was discharged on 8/28/2024  CTA chest was done and did not show any endoleak.  Patient currently admitted with same complaints.  Course complicated by new onset of hemolytic anemia with normocytic anemia, increased reticulocyte count, increased LDH and low platelet count  Nephrology consulted for ANKITA    Nonoliguric ANKITA: When consulted SCr 2.68 | CO2 25 | K 3.8 | UA (9/24): RBC 11, protein> 1000 | Uosm: 335 | Pratik 52| UPC 13  Labs today: SCr 3.21 | BUN 70 | Na 134 | K 3.5 | CO2 22 | Phosphorus 4.7  - Direct Hardik IgG postive, Heme Onc following   - SCr improving today  –Strict I/O monitoring    Thank you for consulting Nephrology. We will continue to follow the patient closely and provide recommendations as needed.

## 2024-09-27 NOTE — PROGRESS NOTE ADULT - SUBJECTIVE AND OBJECTIVE BOX
OVERNIGHT EVENTS: NAEO    SUBJECTIVE / INTERVAL HPI: Patient seen and examined at bedside. Patient denying chest pain, SOB, palpitations, cough.     Remaining ROS negative       PHYSICAL EXAM:  General:NAD, appears comfortable    HEENT:  PERRL, anicteric sclera  Cardiovascular:  RRR  Respiratory: CTA B/L  Gastrointestinal: soft, NT/ND; +BSx4  Extremities: no edema to LE  Vascular: 2+ radial pulses  Neurological: AAOx3; no focal deficits  Psychiatric: pleasant mood and affect  Dermatologic: no appreciable wounds or damage to the skin    VITAL SIGNS:  Vital Signs Last 24 Hrs  T(C): 36.7 (27 Sep 2024 05:32), Max: 36.7 (26 Sep 2024 18:20)  T(F): 98 (27 Sep 2024 05:32), Max: 98 (26 Sep 2024 18:20)  HR: 86 (27 Sep 2024 05:32) (80 - 99)  BP: 166/92 (27 Sep 2024 05:32) (119/78 - 167/89)  BP(mean): 105 (26 Sep 2024 23:56) (94 - 118)  RR: 17 (27 Sep 2024 05:32) (17 - 18)  SpO2: 94% (27 Sep 2024 05:32) (94% - 100%)    Parameters below as of 27 Sep 2024 05:32  Patient On (Oxygen Delivery Method): room air          MEDICATIONS:  MEDICATIONS  (STANDING):  aspirin  chewable 81 milliGRAM(s) Oral daily  atorvastatin 80 milliGRAM(s) Oral at bedtime  carvedilol 3.125 milliGRAM(s) Oral every 12 hours  fluticasone propionate/ salmeterol 250-50 MICROgram(s) Diskus 1 Dose(s) Inhalation two times a day  heparin   Injectable 5000 Unit(s) SubCutaneous every 8 hours  influenza  Vaccine (HIGH DOSE) 0.5 milliLiter(s) IntraMuscular once  NIFEdipine XL 90 milliGRAM(s) Oral daily  pantoprazole    Tablet 40 milliGRAM(s) Oral before breakfast  sertraline 25 milliGRAM(s) Oral daily  tiotropium 2.5 MICROgram(s) Inhaler 2 Puff(s) Inhalation daily    MEDICATIONS  (PRN):  acetaminophen     Tablet .. 650 milliGRAM(s) Oral every 6 hours PRN Mild Pain (1 - 3)  senna 2 Tablet(s) Oral at bedtime PRN for constipation      ALLERGIES:  Allergies    No Known Allergies    Intolerances        LABS:                        9.3    5.75  )-----------( 125      ( 27 Sep 2024 07:44 )             28.7     09-27    134[L]  |  102  |  70[H]  ----------------------------<  95  3.5   |  22  |  3.21[H]    Ca    8.5      27 Sep 2024 07:44  Phos  4.7     09-27  Mg     1.9     09-27    TPro  8.9[H]  /  Alb  x   /  TBili  x   /  DBili  x   /  AST  x   /  ALT  x   /  AlkPhos  x   09-26    PT/INR - ( 25 Sep 2024 15:18 )   PT: 13.6 sec;   INR: 1.18          PTT - ( 25 Sep 2024 15:18 )  PTT:28.3 sec  Urinalysis Basic - ( 27 Sep 2024 07:44 )    Color: x / Appearance: x / SG: x / pH: x  Gluc: 95 mg/dL / Ketone: x  / Bili: x / Urobili: x   Blood: x / Protein: x / Nitrite: x   Leuk Esterase: x / RBC: x / WBC x   Sq Epi: x / Non Sq Epi: x / Bacteria: x      CAPILLARY BLOOD GLUCOSE      POCT Blood Glucose.: 149 mg/dL (26 Sep 2024 11:33)      RADIOLOGY & ADDITIONAL TESTS: Reviewed.

## 2024-09-27 NOTE — PROGRESS NOTE ADULT - SUBJECTIVE AND OBJECTIVE BOX
Patient was seen and examined at bedside. Case discuss with the primary team. Pt s/p transfusion of 1 U PRBCs on 9/26 with appropriate response. Pt without any complaint this morning.     OBJECTIVE:  Vital Signs Last 24 Hrs  T(C): 36.7 (27 Sep 2024 05:32), Max: 36.7 (26 Sep 2024 18:20)  T(F): 98 (27 Sep 2024 05:32), Max: 98 (26 Sep 2024 18:20)  HR: 86 (27 Sep 2024 05:32) (80 - 99)  BP: 166/92 (27 Sep 2024 05:32) (119/78 - 173/82)  BP(mean): 105 (26 Sep 2024 23:56) (94 - 118)  RR: 17 (27 Sep 2024 05:32) (17 - 18)  SpO2: 94% (27 Sep 2024 05:32) (94% - 100%)    Parameters below as of 27 Sep 2024 05:32  Patient On (Oxygen Delivery Method): room air        PHYSICAL EXAM:  Gen: NAD laying in bed  CV: RRR, +S1/S2, no mumur  Pulm: CTA b/l no wheezing or crackles   Abd: soft, NTND + BS no rebound or guarding   Neuro: AAOX3; nonfocal       LABS:                        9.3    5.75  )-----------( 125      ( 27 Sep 2024 07:44 )             28.7       134[L]  |  102  |  70[H]  ----------------------------<  95  3.5   |  22  |  3.21[H]    Ca    8.5      27 Sep 2024 07:44  Phos  4.7     09-27  Mg     1.9     09-27    TPro  8.9[H]  /  Alb  x   /  TBili  x   /  DBili  x   /  AST  x   /  ALT  x   /  AlkPhos  x   09-26    LIVER FUNCTIONS - ( 26 Sep 2024 16:00 )  Alb: x     / Pro: 8.9 g/dL / ALK PHOS: x     / ALT: x     / AST: x     / GGT: x            PT: 13.6 sec;   INR: 1.18     PTT:28.3 sec      acetaminophen     Tablet .. 650 milliGRAM(s) Oral every 6 hours PRN  aspirin  chewable 81 milliGRAM(s) Oral daily  atorvastatin 80 milliGRAM(s) Oral at bedtime  carvedilol 3.125 milliGRAM(s) Oral every 12 hours  fluticasone propionate/ salmeterol 250-50 MICROgram(s) Diskus 1 Dose(s) Inhalation two times a day  heparin   Injectable 5000 Unit(s) SubCutaneous every 8 hours  influenza  Vaccine (HIGH DOSE) 0.5 milliLiter(s) IntraMuscular once  NIFEdipine XL 90 milliGRAM(s) Oral daily  pantoprazole    Tablet 40 milliGRAM(s) Oral before breakfast  senna 2 Tablet(s) Oral at bedtime PRN  sertraline 25 milliGRAM(s) Oral daily  tiotropium 2.5 MICROgram(s) Inhaler 2 Puff(s) Inhalation daily

## 2024-09-27 NOTE — PROGRESS NOTE ADULT - ATTENDING COMMENTS
70 y/o M with recent endovascular repair and stent in L renal artery. CR at base line 1.1-1.2. Went to OSH for dizziness and underwent contrast imaging that did not show any endoleak. Eventually transferred to [facility]. Here noted to have CR of 2.6 for which we are consulted.     Problem list  -> ANKITA on CKD: CR peaked at 3.8. Active urinary sediment let to autoimmune work up which is pending.   -> Nephrotic range proteinuria. 13g/g. Albumin 3.1  -> Anemia. Elevated retic count. Direct Ram test positive. Elevated LDH and normal Haptoglobin.     #Plan  -> CR improving. Follow up autoimmune work up. Send PLA2-R with next blood draw.   -> Phos mildly elevated. If renal function continues to improve won't need a phos binder.   -> Volume, acid base okay for time being.     We will follow . 68 y/o M with recent endovascular repair and stent in L renal artery. CR at base line 1.1-1.2. Went to OSH for dizziness and underwent contrast imaging that did not show any endoleak. Eventually transferred to [facility]. Here noted to have CR of 2.6 for which we are consulted.     Problem list  -> ANKITA on CKD: CR peaked at 3.8. Active urinary sediment let to autoimmune work up which is pending.   -> Nephrotic range proteinuria. 13g/g. Albumin 3.1  -> Anemia. Elevated retic count. Direct Ram test positive. Elevated LDH and normal Haptoglobin.     #Plan  -> CR improving. Follow up autoimmune work up. Send PLA2-R with next blood draw.   -> Phos mildly elevated. If renal function continues to improve won't need a phos binder.   -> Volume, acid base okay for time being.   -> Please hold aspirin incase if we have to biopsy him.     We will follow .

## 2024-09-27 NOTE — PROGRESS NOTE ADULT - ASSESSMENT
69-year-old male with a PMHx of HTN, HLD, anemia, COPD, BPH (s/p TURP), AVR, ascending aortic arch replacement and recent FEVAR/renal artery stent (8/19/24) who presented with symptomatic anemia and ANKITA.      #AVR and ascending and hemiarch replacement  6/2022  #Recent FEVAR/renal artery stent (8/19/24)  -Continue management as per vascular surgery   -9/25 TTE: Borderline normal left ventricular systolic function. Left ventricular ejection fraction is 50-55%.  Bioprosthetic aortic valve  -Primary team attempting to obtain CTA C/A/P disc from [facility]  -Will continue ASA 81mg PO daily     #ANKITA  -possible multifactorial from poor PO intake vs. NSAID use vs. contrast load at OSH (CTA) vs urinary retention  -nephrology following and will follow up recommendations    -Pt's creatinine is down trending 3.88->3.21    #Symptomatic Anemia    -Hematology consulted; Pt with outpt Hematologist Dr. Wong [phone]/ [phone] Will contact to obtain information regarding previous w/u   -Pt s/p transfusion of 1 UN PRBCs on 9/25 and 9/26; Pt with appropriate response s/p transfusion on 9/26  -Will continue to monitor Hgb   -Pt was seen by Hematology; Will f/u  B12 , folate levels, fibrinogen levels.   - Will f/u billingsley test.  - Will f/u HIV/ hep panel     #Hyponatremia   -Pt's Na is now   -Will continue to monitor BMP      #COPD   -continue with Spiriva and Advair      #GERD  -continue with Pantoprazole 40mg daily      #HTN   -Pt with elevated BP; Pt reports that he was on an additional antihypertensive at home however he could not recall the name or dosage.   -continue with Nifedipine 90mg daily    -Will start Corge 3.125mg BID w/ holding parameters    #Anxiety   -continue with Zoloft 25mg daily      #DVT PPx:   -Continue Heparin SQ q8     #Dispo:  -Once clinically improved      55 minutes spent on total encounter. The necessity of the time spent during the encounter on this date of service was due to:    Review of hospital course, labs, vitals, radiology and medical records.  Direct patient encounter including bedside exam   Discussed plan of care with primary team   Documenting the encounter. 69-year-old male with a PMHx of HTN, HLD, anemia, COPD, BPH (s/p TURP), AVR, ascending aortic arch replacement and recent FEVAR/renal artery stent (8/19/24) who presented with symptomatic anemia and ANKITA.      #AVR and ascending and hemiarch replacement  6/2022  #Recent FEVAR/renal artery stent (8/19/24)  -Continue management as per vascular surgery   -9/25 TTE: Borderline normal left ventricular systolic function. Left ventricular ejection fraction is 50-55%.  Bioprosthetic aortic valve  -Primary team attempting to obtain CTA C/A/P disc from [facility]  -Will continue ASA 81mg PO daily     #ANKITA  -possible multifactorial from poor PO intake vs. NSAID use vs. contrast load at OSH (CTA) vs urinary retention  -nephrology following and will follow up recommendations    -Pt's creatinine is down trending 3.88->3.21    #Symptomatic Anemia    -Hematology consulted; Pt with outpt Hematologist Dr. Wong [phone]/ [phone] Will contact to obtain information regarding previous w/u   -Pt s/p transfusion of 1 UN PRBCs on 9/25 and 9/26; Pt with appropriate response s/p transfusion on 9/26  -Will continue to monitor Hgb   -Pt was seen by Hematology; Will f/u  B12 , folate levels, fibrinogen levels.   - Will f/u billingsley test.  - Will f/u HIV/ hep panel     #Hyponatremia   -Pt's Na is now   -Will continue to monitor BMP if Na continues to down trend then will send serum and urine osm as well as urine Na    #COPD   -continue with Spiriva and Advair      #GERD  -continue with Pantoprazole 40mg daily      #HTN   -Pt with elevated BP; Pt reports that he was on an additional antihypertensive at home however he could not recall the name or dosage.   -continue with Nifedipine 90mg daily    -Will start Corge 3.125mg BID w/ holding parameters    #Anxiety   -continue with Zoloft 25mg daily      #DVT PPx:   -Continue Heparin SQ q8     #Dispo:  -Once clinically improved      55 minutes spent on total encounter. The necessity of the time spent during the encounter on this date of service was due to:    Review of hospital course, labs, vitals, radiology and medical records.  Direct patient encounter including bedside exam   Discussed plan of care with primary team   Documenting the encounter. 69-year-old male with a PMHx of HTN, HLD, anemia, COPD, BPH (s/p TURP), AVR, ascending aortic arch replacement and recent FEVAR/renal artery stent (8/19/24) who presented with symptomatic anemia and ANKITA.      #AVR and ascending and hemiarch replacement  6/2022  #Recent FEVAR/renal artery stent (8/19/24)  -Continue management as per vascular surgery   -9/25 TTE: Borderline normal left ventricular systolic function. Left ventricular ejection fraction is 50-55%.  Bioprosthetic aortic valve  -Primary team attempting to obtain CTA C/A/P disc from [facility]  -Will continue ASA 81mg PO daily   -Will repeat BNP     #ANKITA  -possible multifactorial from poor PO intake vs. NSAID use vs. contrast load at OSH (CTA) vs urinary retention  -nephrology following and will follow up recommendations    -Pt's creatinine is down trending 3.88->3.21    #Symptomatic Anemia    -Hematology consulted; Pt with outpt Hematologist Dr. Wong [phone]/ [phone] Will contact to obtain information regarding previous w/u   -Pt s/p transfusion of 1 UN PRBCs on 9/25 and 9/26; Pt with appropriate response s/p transfusion on 9/26  -Will continue to monitor Hgb   -Pt was seen by Hematology; Will f/u  B12 , folate levels, fibrinogen levels.   - Will f/u billingsley test.  - Will f/u HIV/ hep panel     #Hyponatremia   -Pt's Na is now   -Will continue to monitor BMP if Na continues to down trend then will send serum and urine osm as well as urine Na    #COPD   -continue with Spiriva and Advair      #GERD  -continue with Pantoprazole 40mg daily      #HTN   -Pt with elevated BP; Pt reports that he was on an additional antihypertensive at home however he could not recall the name or dosage.   -continue with Nifedipine 90mg daily    -Will start Corge 3.125mg BID w/ holding parameters    #Anxiety   -continue with Zoloft 25mg daily      #DVT PPx:   -Continue Heparin SQ q8     #Dispo:  -Once clinically improved      55 minutes spent on total encounter. The necessity of the time spent during the encounter on this date of service was due to:    Review of hospital course, labs, vitals, radiology and medical records.  Direct patient encounter including bedside exam   Discussed plan of care with primary team   Documenting the encounter.

## 2024-09-28 LAB
ANION GAP SERPL CALC-SCNC: 11 MMOL/L — SIGNIFICANT CHANGE UP (ref 5–17)
BASOPHILS # BLD AUTO: 0.03 K/UL — SIGNIFICANT CHANGE UP (ref 0–0.2)
BASOPHILS NFR BLD AUTO: 0.5 % — SIGNIFICANT CHANGE UP (ref 0–2)
BUN SERPL-MCNC: 64 MG/DL — HIGH (ref 7–23)
CALCIUM SERPL-MCNC: 8.8 MG/DL — SIGNIFICANT CHANGE UP (ref 8.4–10.5)
CHLORIDE SERPL-SCNC: 100 MMOL/L — SIGNIFICANT CHANGE UP (ref 96–108)
CO2 SERPL-SCNC: 22 MMOL/L — SIGNIFICANT CHANGE UP (ref 22–31)
CREAT SERPL-MCNC: 2.49 MG/DL — HIGH (ref 0.5–1.3)
EGFR: 27 ML/MIN/1.73M2 — LOW
EOSINOPHIL # BLD AUTO: 0.16 K/UL — SIGNIFICANT CHANGE UP (ref 0–0.5)
EOSINOPHIL NFR BLD AUTO: 2.6 % — SIGNIFICANT CHANGE UP (ref 0–6)
GLUCOSE BLDC GLUCOMTR-MCNC: 98 MG/DL — SIGNIFICANT CHANGE UP (ref 70–99)
GLUCOSE SERPL-MCNC: 118 MG/DL — HIGH (ref 70–99)
HCT VFR BLD CALC: 28.2 % — LOW (ref 39–50)
HGB BLD-MCNC: 8.8 G/DL — LOW (ref 13–17)
IMM GRANULOCYTES NFR BLD AUTO: 0.3 % — SIGNIFICANT CHANGE UP (ref 0–0.9)
LYMPHOCYTES # BLD AUTO: 0.7 K/UL — LOW (ref 1–3.3)
LYMPHOCYTES # BLD AUTO: 11.2 % — LOW (ref 13–44)
MAGNESIUM SERPL-MCNC: 1.8 MG/DL — SIGNIFICANT CHANGE UP (ref 1.6–2.6)
MCHC RBC-ENTMCNC: 27.6 PG — SIGNIFICANT CHANGE UP (ref 27–34)
MCHC RBC-ENTMCNC: 31.2 GM/DL — LOW (ref 32–36)
MCV RBC AUTO: 88.4 FL — SIGNIFICANT CHANGE UP (ref 80–100)
MONOCYTES # BLD AUTO: 0.64 K/UL — SIGNIFICANT CHANGE UP (ref 0–0.9)
MONOCYTES NFR BLD AUTO: 10.3 % — SIGNIFICANT CHANGE UP (ref 2–14)
NEUTROPHILS # BLD AUTO: 4.69 K/UL — SIGNIFICANT CHANGE UP (ref 1.8–7.4)
NEUTROPHILS NFR BLD AUTO: 75.1 % — SIGNIFICANT CHANGE UP (ref 43–77)
NRBC # BLD: 0 /100 WBCS — SIGNIFICANT CHANGE UP (ref 0–0)
PHOSPHATE SERPL-MCNC: 4.1 MG/DL — SIGNIFICANT CHANGE UP (ref 2.5–4.5)
PLATELET # BLD AUTO: 138 K/UL — LOW (ref 150–400)
POTASSIUM SERPL-MCNC: 3.4 MMOL/L — LOW (ref 3.5–5.3)
POTASSIUM SERPL-SCNC: 3.4 MMOL/L — LOW (ref 3.5–5.3)
RBC # BLD: 3.19 M/UL — LOW (ref 4.2–5.8)
RBC # FLD: 17.6 % — HIGH (ref 10.3–14.5)
SODIUM SERPL-SCNC: 133 MMOL/L — LOW (ref 135–145)
WBC # BLD: 6.24 K/UL — SIGNIFICANT CHANGE UP (ref 3.8–10.5)
WBC # FLD AUTO: 6.24 K/UL — SIGNIFICANT CHANGE UP (ref 3.8–10.5)

## 2024-09-28 PROCEDURE — 99233 SBSQ HOSP IP/OBS HIGH 50: CPT

## 2024-09-28 RX ORDER — CARVEDILOL 3.125 MG
6.25 TABLET ORAL EVERY 12 HOURS
Refills: 0 | Status: DISCONTINUED | OUTPATIENT
Start: 2024-09-28 | End: 2024-09-30

## 2024-09-28 RX ORDER — PSYLLIUM HUSK 0.4 G
400 CAPSULE ORAL ONCE
Refills: 0 | Status: COMPLETED | OUTPATIENT
Start: 2024-09-28 | End: 2024-09-28

## 2024-09-28 RX ADMIN — Medication 5000 UNIT(S): at 06:48

## 2024-09-28 RX ADMIN — Medication 6.25 MILLIGRAM(S): at 18:04

## 2024-09-28 RX ADMIN — PANTOPRAZOLE SODIUM 40 MILLIGRAM(S): 40 TABLET, DELAYED RELEASE ORAL at 06:48

## 2024-09-28 RX ADMIN — SERTRALINE HYDROCHLORIDE 25 MILLIGRAM(S): 100 TABLET, FILM COATED ORAL at 12:59

## 2024-09-28 RX ADMIN — Medication 50 MILLIEQUIVALENT(S): at 11:14

## 2024-09-28 RX ADMIN — Medication 50 MILLIEQUIVALENT(S): at 09:10

## 2024-09-28 RX ADMIN — Medication 3.12 MILLIGRAM(S): at 06:48

## 2024-09-28 RX ADMIN — Medication 5000 UNIT(S): at 21:37

## 2024-09-28 RX ADMIN — Medication 5000 UNIT(S): at 13:00

## 2024-09-28 RX ADMIN — Medication 650 MILLIGRAM(S): at 19:56

## 2024-09-28 RX ADMIN — TIOTROPIUM BROMIDE INHALATION SPRAY 2 PUFF(S): 3.12 SPRAY, METERED RESPIRATORY (INHALATION) at 08:36

## 2024-09-28 RX ADMIN — Medication 1 DOSE(S): at 23:23

## 2024-09-28 RX ADMIN — Medication 400 MILLIGRAM(S): at 09:11

## 2024-09-28 RX ADMIN — Medication 50 MILLIEQUIVALENT(S): at 13:05

## 2024-09-28 RX ADMIN — Medication 1 DOSE(S): at 08:34

## 2024-09-28 RX ADMIN — ATORVASTATIN CALCIUM 80 MILLIGRAM(S): 10 TABLET, FILM COATED ORAL at 21:36

## 2024-09-28 RX ADMIN — Medication 90 MILLIGRAM(S): at 06:48

## 2024-09-28 RX ADMIN — Medication 81 MILLIGRAM(S): at 12:59

## 2024-09-28 NOTE — PROGRESS NOTE ADULT - SUBJECTIVE AND OBJECTIVE BOX
Nephrology progress note  Subjective  Pt seen at bedside, voiced nil complaints      VITALS:  T(F): 98.2 (09-28-24 @ 08:21), Max: 98.6 (09-27-24 @ 20:46)  HR: 86 (09-28-24 @ 08:21)  BP: 158/88 (09-28-24 @ 08:21)  RR: 19 (09-28-24 @ 08:21)  SpO2: 93% (09-28-24 @ 08:21)  Wt(kg): --    09-26 @ 07:01  -  09-27 @ 07:00  --------------------------------------------------------  IN: 480 mL / OUT: 1645 mL / NET: -1165 mL    09-27 @ 07:01  -  09-28 @ 07:00  --------------------------------------------------------  IN: 780 mL / OUT: 1500 mL / NET: -720 mL    09-28 @ 07:01  -  09-28 @ 14:18  --------------------------------------------------------  IN: 0 mL / OUT: 300 mL / NET: -300 mL        PHYSICAL EXAM:  General:NAD, appears comfortable    HEENT:  PERRL, anicteric sclera  Cardiovascular:  RRR  Respiratory: CTA B/L  Gastrointestinal: soft, NT/ND; +BSx4  Extremities: no edema to LE  Vascular: 2+ radial pulses  Neurological: AAOx3; no focal deficits  Psychiatric: pleasant mood and affect  Dermatologic: no appreciable wounds or damage to the skin    LABS:  09-28    133[L]  |  100  |  64[H]  ----------------------------<  118[H]  3.4[L]   |  22  |  2.49[H]    Ca    8.8      28 Sep 2024 06:53  Phos  4.1     09-28  Mg     1.8     09-28    TPro  8.9[H]  /  Alb      /  TBili      /  DBili      /  AST      /  ALT      /  AlkPhos      09-26                          8.8    6.24  )-----------( 138      ( 28 Sep 2024 06:53 )             28.2       Urine Studies:  Creatinine Trend: 2.49<--, 3.21<--, 3.82<--, 3.89<--, 3.53<--, 2.68<--  Urinalysis Basic - ( 28 Sep 2024 06:53 )    Color:  / Appearance:  / SG:  / pH:   Gluc: 118 mg/dL / Ketone:   / Bili:  / Urobili:    Blood:  / Protein:  / Nitrite:    Leuk Esterase:  / RBC:  / WBC    Sq Epi:  / Non Sq Epi:  / Bacteria:       Creatinine, Random Urine: 90 mg/dL (09-25 @ 20:55)  Protein/Creatinine Ratio Calculation: 13.0 Ratio (09-25 @ 20:55)  Sodium, Random Urine: 30 mmol/L (09-25 @ 20:53)  Creatinine, Random Urine: See Note (09-25 @ 20:53)  Protein/Creatinine Ratio Calculation: see note Ratio (09-25 @ 20:53)  Osmolality, Random Urine: 325 mosm/kg (09-25 @ 20:53)  Potassium, Random Urine: 41 mmol/L (09-25 @ 20:53)  Creatinine, Random Urine: 97 mg/dL (09-25 @ 20:53)  Sodium, Random Urine: 52 mmol/L (09-25 @ 03:33)  Creatinine, Random Urine: See Note (09-25 @ 03:33)  Protein/Creatinine Ratio Calculation: See Note Ratio (09-25 @ 03:33)  Osmolality, Random Urine: 335 mosm/kg (09-25 @ 03:33)  Potassium, Random Urine: 35 mmol/L (09-25 @ 03:33)  Creatinine, Random Urine: 54 mg/dL (09-25 @ 03:33)  Protein/Creatinine Ratio Calculation: 13.7 Ratio (09-25 @ 03:33)    RADIOLOGY & ADDITIONAL STUDIES:

## 2024-09-28 NOTE — PROGRESS NOTE ADULT - ASSESSMENT
70 y/o M with recent endovascular repair and stent in L renal artery. CR at base line 1.1-1.2. Went to OSH for dizziness and underwent contrast imaging that did not show any endoleak. Eventually transferred to [facility]. Here noted to have CR of 2.6 for which we are consulted.     Problem list  -> ANKITA on CKD: CR peaked at 3.8. Active urinary sediment let to autoimmune work up which is pending.   -> Nephrotic range proteinuria. 13g/g. Albumin 3.1  -> Anemia. Elevated retic count. Direct Ram test positive. Elevated LDH and normal Haptoglobin.     #Plan  -> Cr  improving. Follow up autoimmune work up. Send PLA2R levels if it was not already done  -> monitor PO4 levels  -> Volume, acid base okay for time being.   -> Please hold aspirin incase if we have to biopsy him.   -->renal biopsy upcoming this week  -->avoid nephrotoxic drugs  --> strict I/o chart  --->to f/u on rest of GN workup    We will follow .

## 2024-09-28 NOTE — PROGRESS NOTE ADULT - SUBJECTIVE AND OBJECTIVE BOX
O/N: covid exposure, VSS      ------------------------------------------------------------------------  PLEASE CHECK WHEN PRESENT:     [  ]Heart Failure     [  ] Acute     [  ] Acute on Chronic     [  ] Chronic  -------------------------------------------------------------------     [  ]Diastolic [HFpEF]     [  ]Systolic [HFrEF]     [  ]Combined [HFpEF & HFrEF]  .................................................................................     [  ]Other:     [ ] Pulmonary Hypertension     [ ] Chronic A-fib     [ ] Persistet A-fib     [ ] Permanent A-fib     [ ] Paroxysmal A-fib     [x ] Hypertensive Heart Disease  -------------------------------------------------------------------  [ ] Respiratory failure  [ ] Acute cor pulmonale  [ ] Asthma/COPD Exacerbation  [ ]COPD on home O2 (Chronic renal Failure)   [ ] Pleural effusion  [ ] Aspiration pneumonia  [ ] Obstructive Sleep Apnea  [ ]Atelectasis   [ ] Acute PE   -------------------------------------------------------------------  [x  ]Acute Kidney Injury      [  ]Acute Tubular Necrosis      [  ]Reneal Medullary Necrosis     [  ]Renal Cortical Necrosis     [  ]Other Pathological Lesions:    [  ]CKD 1  [  ]CKD 2  [  ]CKD 3  [  ]CKD 4  [  ]CKD 5 (ESRD)  [  ]Other  -------------------------------------------------------------------  [  ]Diabetes  [  ] Diabetic PVD Ulcer  [  ] Neuropathic ulcer to DM  [  ] Diabetes with Nephropathy  [  ] Osteomyelitis due to diabetes  [  ] Hyperglycemia   [  ]hypoglycemia   --------------------------------------------------------------------  [  ]Malnutrition: See Nutrition Note  [  ]Cachexia  [  ]Other:   [  ]Supplement Ordered:  [  ]Morbid Obesity (BMI >=40]  [ ] Ileus  ---------------------------------------------------------------------  [ ] Sepsis/severe sepsis/septic shock  [ ] Noninfectious SIRS  [ ] UTI  [ ] Pneumonia  [ ] Thrombophlebitis     -----------------------------------------------------------------------  [ ] Acidosis/alkalosis  [ ] Fluid overload  [ ] Hypokalemia  [ ] Hyperkalemia  [ ] Hypomagnesemia  [ ] Hypophosphatemia  [ ] Hyperphosphatemia  ------------------------------------------------------------------------  [ ] Acute blood loss anemia  [ ] Post op blood loss anemia  [ ] Iron deficiency anemia  [ ] Anemia due to chronic disease  [ ] Hypercoagulable state  [ ] Thrombocytopenia  ----------------------------------------------------------------------  [ ] Cerebral infarction  [ ] Transient ischemia attack  [ ] Encephalopathy - Toxic or Metabolic    A/P: 69 yoM w/ PMHx HLD, anemia (follows w/ heme, Dr. Wong), arthritis, COPD, BPH (s/p TURP), s/p L THR, AVR and ascending and hemiarch replacement w/ Dr. 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 Patient presents c/o lightheadedness, weakness, loss of appetite, weight loss. CTA at [facility] shows no endoleak. Lab significant for hgb 7.6, Na 130, Cr 2.68, BNP 38,000. Patient s/p 1u PRBC in ED with mild improvement of weakness.    Vascular:  - s/p FEVAR and L Renal artery stent 8/18/24  - renal artery duplex neg for stenosis, 1.1 cm cyst lower pole R kidney  - ECHO 9/25 EF 50-55%, Bioprosthetic valve is seen in the aortic position  - obtain CTA C/A/P disc from [facility]  - c/w aspirin    HTN/HLD:  - c/w nifedipine  - c/w atorvastatin    ANKITA:  - baseline Cr 0.7-1.0  - Cr on admission 2.64, monitor Cr with daily BMP  - multifactorial from poor PO intake vs. NSAID use vs. contrast load at OSH (CTA)  - pending autoimmune work up  - hiv neg, hepatitis panel neg  - nephro consult, appreciate recs    Anemia:   - follows w/ outpt Hematologist, Dr. Retter - will obtain collateral  - s/p 1u PRBC 9/24, 1u PRBC 9/26  - f/u B12/folate, fibrinogen  - heme consulted, appreciate recs    COPD:  - c/w Spiriva and Advair inhaler    GERD:  - c/w PPI    Anxiety:  - c/w zoloft    Diet: renal  Activity: as tolerated  DVTPPx: SQH  Dispo: 5 Uris, pending improvement in Cr     O/N: covid exposure, VSS  Subjective: Patient examined bedside, NAC. Denies abdominal pain/chest pain/SOB/N/V. Aware of plan for endoleak repair monday.    ROS:   Denies Headache, blurred vision, Chest Pain, SOB, Abdominal pain, nausea or vomiting     Social   aspirin  chewable 81  carvedilol 6.25  heparin   Injectable 5000  NIFEdipine XL 90      Allergies    No Known Allergies    Intolerances        Vital Signs Last 24 Hrs  T(C): 36.8 (28 Sep 2024 08:21), Max: 37 (27 Sep 2024 20:46)  T(F): 98.2 (28 Sep 2024 08:21), Max: 98.6 (27 Sep 2024 20:46)  HR: 86 (28 Sep 2024 08:21) (84 - 98)  BP: 158/88 (28 Sep 2024 08:21) (133/77 - 175/81)  BP(mean): 117 (28 Sep 2024 06:16) (99 - 122)  RR: 19 (28 Sep 2024 08:21) (18 - 19)  SpO2: 93% (28 Sep 2024 08:21) (93% - 94%)    Parameters below as of 28 Sep 2024 08:21  Patient On (Oxygen Delivery Method): room air      I&O's Summary    27 Sep 2024 07:01  -  28 Sep 2024 07:00  --------------------------------------------------------  IN: 780 mL / OUT: 1500 mL / NET: -720 mL    28 Sep 2024 07:01  -  28 Sep 2024 11:04  --------------------------------------------------------  IN: 0 mL / OUT: 0 mL / NET: 0 mL        Physical Exam:  General: NAD, pt resting comfortably in bed  Pulm: No respiratory distress, nonlabored breathing, on room air  CVS: NSR, HDS  Abd: Soft, NT, ND  Extremities: WWP        LABS:                        8.8    6.24  )-----------( 138      ( 28 Sep 2024 06:53 )             28.2     09-28    133[L]  |  100  |  64[H]  ----------------------------<  118[H]  3.4[L]   |  22  |  2.49[H]    Ca    8.8      28 Sep 2024 06:53  Phos  4.1     09-28  Mg     1.8     09-28    TPro  8.9[H]  /  Alb  x   /  TBili  x   /  DBili  x   /  AST  x   /  ALT  x   /  AlkPhos  x   09-26        ------------------------------------------------------------------------  PLEASE CHECK WHEN PRESENT:     [  ]Heart Failure     [  ] Acute     [  ] Acute on Chronic     [  ] Chronic  -------------------------------------------------------------------     [  ]Diastolic [HFpEF]     [  ]Systolic [HFrEF]     [  ]Combined [HFpEF & HFrEF]  .................................................................................     [  ]Other:     [ ] Pulmonary Hypertension     [ ] Chronic A-fib     [ ] Persistet A-fib     [ ] Permanent A-fib     [ ] Paroxysmal A-fib     [x ] Hypertensive Heart Disease  -------------------------------------------------------------------  [ ] Respiratory failure  [ ] Acute cor pulmonale  [ ] Asthma/COPD Exacerbation  [ ]COPD on home O2 (Chronic renal Failure)   [ ] Pleural effusion  [ ] Aspiration pneumonia  [ ] Obstructive Sleep Apnea  [ ]Atelectasis   [ ] Acute PE   -------------------------------------------------------------------  [x  ]Acute Kidney Injury      [  ]Acute Tubular Necrosis      [  ]Reneal Medullary Necrosis     [  ]Renal Cortical Necrosis     [  ]Other Pathological Lesions:    [  ]CKD 1  [  ]CKD 2  [  ]CKD 3  [  ]CKD 4  [  ]CKD 5 (ESRD)  [  ]Other  -------------------------------------------------------------------  [  ]Diabetes  [  ] Diabetic PVD Ulcer  [  ] Neuropathic ulcer to DM  [  ] Diabetes with Nephropathy  [  ] Osteomyelitis due to diabetes  [  ] Hyperglycemia   [  ]hypoglycemia   --------------------------------------------------------------------  [  ]Malnutrition: See Nutrition Note  [  ]Cachexia  [  ]Other:   [  ]Supplement Ordered:  [  ]Morbid Obesity (BMI >=40]  [ ] Ileus  ---------------------------------------------------------------------  [ ] Sepsis/severe sepsis/septic shock  [ ] Noninfectious SIRS  [ ] UTI  [ ] Pneumonia  [ ] Thrombophlebitis     -----------------------------------------------------------------------  [ ] Acidosis/alkalosis  [ ] Fluid overload  [ ] Hypokalemia  [ ] Hyperkalemia  [ ] Hypomagnesemia  [ ] Hypophosphatemia  [ ] Hyperphosphatemia  ------------------------------------------------------------------------  [ ] Acute blood loss anemia  [ ] Post op blood loss anemia  [ ] Iron deficiency anemia  [ ] Anemia due to chronic disease  [ ] Hypercoagulable state  [ ] Thrombocytopenia  ----------------------------------------------------------------------  [ ] Cerebral infarction  [ ] Transient ischemia attack  [ ] Encephalopathy - Toxic or Metabolic    A/P: 69 yoM w/ PMHx HLD, anemia (follows w/ heme, Dr. Wong), arthritis, COPD, BPH (s/p TURP), s/p L THR, AVR and ascending and hemiarch replacement w/ Dr. 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 Patient presents c/o lightheadedness, weakness, loss of appetite, weight loss. CTA at [facility] shows no endoleak. Lab significant for hgb 7.6, Na 130, Cr 2.68, BNP 38,000. Patient s/p 1u PRBC in ED with mild improvement of weakness.    Vascular:  - s/p FEVAR and L Renal artery stent 8/18/24  - renal artery duplex neg for stenosis, 1.1 cm cyst lower pole R kidney  - ECHO 9/25 EF 50-55%, Bioprosthetic valve is seen in the aortic position  - obtain CTA C/A/P disc from [facility] showing endoleak  - Endoleak repair 9/30/24. P+C Sunday  - c/w aspirin    HTN/HLD:  - c/w nifedipine  - c/w atorvastatin    ANKITA:  - baseline Cr 0.7-1.0  - Cr on admission 2.64, monitor Cr with daily BMP  - multifactorial from poor PO intake vs. NSAID use vs. contrast load at OSH (CTA)  - pending autoimmune work up  - hiv neg, hepatitis panel neg  - Incr carv 6.25 BID per hospitalist  - nephro consult, appreciate recs    Anemia:   - follows w/ outpt Hematologist, Dr. Wong - will obtain collateral  - s/p 1u PRBC 9/24, 1u PRBC 9/26  - f/u B12/folate, fibrinogen  - heme consulted, appreciate recs    COPD:  - c/w Spiriva and Advair inhaler    GERD:  - c/w PPI    Anxiety:  - c/w zoloft    Diet: renal  Activity: as tolerated  DVTPPx: SQH  Dispo: 5 Uris

## 2024-09-28 NOTE — PROGRESS NOTE ADULT - ASSESSMENT
69-year-old male with a PMHx of HTN, HLD, anemia, COPD, BPH (s/p TURP), AVR, ascending aortic arch replacement and recent FEVAR/renal artery stent (8/19/24) who presented with symptomatic anemia and ANKITA.      #AVR and ascending and hemiarch replacement  6/2022  #Recent FEVAR/renal artery stent (8/19/24)  -Continue management as per vascular surgery   -Primary team attempting to obtain CTA C/A/P disc from [facility]  -Will continue ASA 81mg PO daily   -Pt is for Endoleak repeat on 9/30/24     # Pre-operative evaluation   METS >4 , able to walk up 1 flight of stairs without stopping.   EKG: pending   RCRI Class III Risk  Using the CAMPBELL emily-operative risk index, patient has a <1% risk of myocardial infarction or cardiac arrest, intraoperatively or up to 30 days post-op  -Pt was noted to have markedly elevated BNP on admission 68,008 and pt s/p TTE on 9/25 which showed: Borderline normal left ventricular systolic function. Left ventricular ejection fraction is 50-55%. Bioprosthetic aortic valve; No pericardial effusion. Mildly dilated aortic root. Compared to the previous TTE performed on 9/18/2023, there have been no significant interval changes.  Assuming endoleak repair, patient is at intermediate to high risk for an intermediate to high risk procedure.    Would recommended EKG and cardiology evaluation prior to endoleak repair.     #ANKITA  -Multifactorial from poor PO intake vs. NSAID use vs. contrast load at OSH (CTA) vs urinary retention  -nephrology following and will follow up recommendations    -Pt's creatinine is down trending 3.21->2.49    #Symptomatic Anemia    - Pt with outpatient  Hematologist Dr. Wong [phone]/ [phone] Will contact to obtain information regarding previous w/u   -Pt s/p transfusion of 1U PRBCs on 9/25 and 9/26.   -Will continue to monitor Hgb   -Pt was seen by Hematology  -Hepatitis panel : nonreactive; Vit B12 and folate WNL     #Hyponatremia   -Pt's Na 133  -Will continue to monitor Na    #Hypokalemia  -Pt's K was 3.4  -Will continue to monitor electrolytes and replete prn     #COPD   -continue with Spiriva and Advair      #GERD  -continue with Pantoprazole 40mg daily      #HTN   -Pt with elevated BP; Pt reports that he was on an additional antihypertensive at home however he could not recall the name or dosage.   -Continue with Nifedipine 90mg daily    -Will increase Coreg to 6.25 mg BID w/ holding parameters  -Will continue to monitor BP and adjust antihypertensives as required     #Anxiety   -continue with Zoloft 25mg daily      #DVT PPx  -Continue Heparin SQ q8     #Dispo:  -As per primary team       55 minutes spent on total encounter. The necessity of the time spent during the encounter on this date of service was due to:    Review of hospital course, labs, vitals, radiology and medical records.  Direct patient encounter including bedside exam   Discussed plan of care with primary team   Documenting the encounter.   69-year-old male with a PMHx of HTN, HLD, anemia, COPD, BPH (s/p TURP), AVR, ascending aortic arch replacement and recent FEVAR/renal artery stent (8/19/24) who presented with symptomatic anemia and ANKITA.      #AVR and ascending and hemiarch replacement  6/2022  #Recent FEVAR/renal artery stent (8/19/24)  -Continue management as per vascular surgery   -Primary team attempting to obtain CTA C/A/P disc from [facility]  -Will continue ASA 81mg PO daily   -Pt is for Endoleak repeat on 9/30/24     # Pre-operative evaluation   METS >4 , able to walk up 1 flight of stairs without stopping.   EKG: pending   RCRI Class III Risk  Using the CAMPBELL emily-operative risk index, patient has a >1% risk of myocardial infarction or cardiac arrest, intraoperatively or up to 30 days post-op  -Pt was noted to have markedly elevated BNP on admission 68,008 and pt s/p TTE on 9/25 which showed: Borderline normal left ventricular systolic function. Left ventricular ejection fraction is 50-55%. Bioprosthetic aortic valve; No pericardial effusion. Mildly dilated aortic root. Compared to the previous TTE performed on 9/18/2023, there have been no significant interval changes.  Assuming endoleak repair, patient is at intermediate to high risk for an intermediate to high risk procedure.    Would recommended EKG and cardiology evaluation prior to endoleak repair.     #ANKITA  -Multifactorial from poor PO intake vs. NSAID use vs. contrast load at OSH (CTA) vs urinary retention  -nephrology following and will follow up recommendations    -Pt's creatinine is down trending 3.21->2.49    #Symptomatic Anemia    - Pt with outpatient  Hematologist Dr. Wong [phone]/ [phone] Will contact to obtain information regarding previous w/u   -Pt s/p transfusion of 1U PRBCs on 9/25 and 9/26.   -Will continue to monitor Hgb   -Pt was seen by Hematology  -Hepatitis panel : nonreactive; Vit B12 and folate WNL     #Hyponatremia   -Pt's Na 133  -Will continue to monitor Na    #Hypokalemia  -Pt's K was 3.4  -Will continue to monitor electrolytes and replete prn     #COPD   -continue with Spiriva and Advair      #GERD  -continue with Pantoprazole 40mg daily      #HTN   -Pt with elevated BP; Pt reports that he was on an additional antihypertensive at home however he could not recall the name or dosage.   -Continue with Nifedipine 90mg daily    -Will increase Coreg to 6.25 mg BID w/ holding parameters  -Will continue to monitor BP and adjust antihypertensives as required     #Anxiety   -continue with Zoloft 25mg daily      #DVT PPx  -Continue Heparin SQ q8     #Dispo:  -As per primary team       55 minutes spent on total encounter. The necessity of the time spent during the encounter on this date of service was due to:    Review of hospital course, labs, vitals, radiology and medical records.  Direct patient encounter including bedside exam   Discussed plan of care with primary team   Documenting the encounter.   69-year-old male with a PMHx of HTN, HLD, anemia, COPD, BPH (s/p TURP), AVR, ascending aortic arch replacement and recent FEVAR/renal artery stent (24) who presented with symptomatic anemia and ANKITA.      #AVR and ascending and hemiarch replacement  2022  #Recent FEVAR/renal artery stent (24)  -Continue management as per vascular surgery   -Primary team attempting to obtain [facility]/A/P [facility]  -Will continue ASA 81mg PO daily   -Pt is for Endoleak repeat on 24     # Pre-operative evaluation   METS >4 , able to walk up 1 flight of stairs without stopping.   EKbpm no ST changes   RCRI Class III Risk  Using the CAMPBELL emily-operative risk index, patient has a >1% risk of myocardial infarction or cardiac arrest, intraoperatively or up to 30 days post-op  -Pt was noted to have markedly elevated BNP on admission 68,008 and pt s/p TTE on  which showed: Borderline normal left ventricular systolic function. Left ventricular ejection fraction is 50-55%. Bioprosthetic aortic valve; No pericardial effusion. Mildly dilated aortic root. Compared to the previous TTE performed on 2023, there have been no significant interval changes.  Assuming endoleak repair, patient is at intermediate to high risk for an intermediate to high risk procedure.    Would recommend cardiology evaluation prior to endoleak repair.     #ANKITA  -Multifactorial from poor PO intake vs. NSAID use vs. contrast load at OSH (CTA) vs urinary retention  -nephrology following and will follow up recommendations    -Pt's creatinine is down trending 3.21->2.49    #Symptomatic Anemia    - Pt with outpatient  Hematologist Dr. Wong [phone]/ [phone] Will contact to obtain information regarding previous w/u   -Pt s/p transfusion of 1U PRBCs on  and .   -Will continue to monitor Hgb   -Pt was seen by Hematology  -Hepatitis panel : nonreactive; Vit B12 and folate WNL     #Hyponatremia   -Pt's Na 133  -Will continue to monitor Na    #Hypokalemia  -Pt's K was 3.4  -Will continue to monitor electrolytes and replete prn     #COPD   -continue with Spiriva and Advair      #GERD  -continue with Pantoprazole 40mg daily      #HTN   -Pt with elevated BP; Pt reports that he was on an additional antihypertensive at home however he could not recall the name or dosage.   -Continue with Nifedipine 90mg daily    -Will increase Coreg to 6.25 mg BID w/ holding parameters  -Will continue to monitor BP and adjust antihypertensives as required     #Anxiety   -continue with Zoloft 25mg daily      #DVT PPx  -Continue Heparin SQ q8     #Dispo:  -As per primary team       55 minutes spent on total encounter. The necessity of the time spent during the encounter on this date of service was due to:    Review of hospital course, labs, vitals, radiology and medical records.  Direct patient encounter including bedside exam   Discussed plan of care with primary team   Documenting the encounter.   69-year-old male with a PMHx of HTN, HLD, anemia, COPD, BPH (s/p TURP), AVR, ascending aortic arch replacement and recent FEVAR/renal artery stent (24) who presented with symptomatic anemia and ANKITA.      #AVR and ascending and hemiarch replacement  2022  #Recent FEVAR/renal artery stent (24)  -Continue management as per vascular surgery   -Primary team attempting to obtain CTA C/A/P disc from [facility]  -Will continue ASA 81mg PO daily   -Pt is for Endoleak repeat on 24     # Pre-operative evaluation   METS >4 , able to walk up 1 flight of stairs without stopping.   EKbpm no ST changes   RCRI Class III Risk  Using the CAMPBELL emily-operative risk index, patient has a >1% risk of myocardial infarction or cardiac arrest, intraoperatively or up to 30 days post-op  -Pt was noted to have markedly elevated BNP on admission but euvolemic and pt s/p TTE on  which showed: Borderline normal left ventricular systolic function. Left ventricular ejection fraction is 50-55%. Bioprosthetic aortic valve; No pericardial effusion. Mildly dilated aortic root. Compared to the previous TTE performed on 2023, there have been no significant interval changes.  Assuming endoleak repair, patient is at intermediate to high risk for an intermediate to high risk procedure.        #ANKITA  -Multifactorial from poor PO intake vs. NSAID use vs. contrast load at OSH (CTA) vs urinary retention  -nephrology following and will follow up recommendations    -Pt's creatinine is down trending 3.21->2.49    #Symptomatic Anemia    - Pt with outpatient  Hematologist Dr. Wong [phone]/ [phone] Will contact to obtain information regarding previous w/u   -Pt s/p transfusion of 1U PRBCs on  and .   -Will continue to monitor Hgb   -Pt was seen by Hematology  -Hepatitis panel : nonreactive; Vit B12 and folate WNL     #Hyponatremia   -Pt's Na 133  -Will continue to monitor Na    #Hypokalemia  -Pt's K was 3.4  -Will continue to monitor electrolytes and replete prn     #COPD   -continue with Spiriva and Advair      #GERD  -continue with Pantoprazole 40mg daily      #HTN   -Pt with elevated BP; Pt reports that he was on an additional antihypertensive at home however he could not recall the name or dosage.   -Continue with Nifedipine 90mg daily    -Will increase Coreg to 6.25 mg BID w/ holding parameters  -Will continue to monitor BP and adjust antihypertensives as required     #Anxiety   -continue with Zoloft 25mg daily      #DVT PPx  -Continue Heparin SQ q8     #Dispo:  -As per primary team       55 minutes spent on total encounter. The necessity of the time spent during the encounter on this date of service was due to:    Review of hospital course, labs, vitals, radiology and medical records.  Direct patient encounter including bedside exam   Discussed plan of care with primary team   Documenting the encounter.

## 2024-09-28 NOTE — PROGRESS NOTE ADULT - SUBJECTIVE AND OBJECTIVE BOX
Patient was seen and examined at bedside. Case discuss with the primary team. Pt with burning at IV site. Pt denied cough, SOB or chest pain. Pt w/o BM however pt denied having abdominal pain.     OBJECTIVE:  Vital Signs Last 24 Hrs  T(C): 36.8 (28 Sep 2024 08:21), Max: 37 (27 Sep 2024 20:46)  T(F): 98.2 (28 Sep 2024 08:21), Max: 98.6 (27 Sep 2024 20:46)  HR: 86 (28 Sep 2024 08:21) (84 - 98)  BP: 158/88 (28 Sep 2024 08:21) (133/77 - 175/81)  BP(mean): 117 (28 Sep 2024 06:16) (99 - 122)  RR: 19 (28 Sep 2024 08:21) (18 - 19)  SpO2: 93% (28 Sep 2024 08:21) (93% - 94%)    Parameters below as of 28 Sep 2024 08:21  Patient On (Oxygen Delivery Method): room air    PHYSICAL EXAM:  Gen: NAD laying in bed  CV: RRR, +S1/S2, no mumur  Pulm: CTA b/l no wheezing or crackles   Abd: soft, NTND + BS no rebound or guarding   Neuro: AAOX3; nonfocal     LABS:                        8.8    6.24  )-----------( 138      ( 28 Sep 2024 06:53 )             28.2     09-28    133[L]  |  100  |  64[H]  ----------------------------<  118[H]  3.4[L]   |  22  |  2.49[H]    Ca    8.8      28 Sep 2024 06:53  Phos  4.1     09-28  Mg     1.8     09-28    TPro  8.9[H]  /  Alb  x   /  TBili  x   /  DBili  x   /  AST  x   /  ALT  x   /  AlkPhos  x   09-26    LIVER FUNCTIONS - ( 26 Sep 2024 16:00 )  Alb: x     / Pro: 8.9 g/dL / ALK PHOS: x     / ALT: x     / AST: x     / GGT: x             CAPILLARY BLOOD GLUCOSE  POCT Blood Glucose.: 98 mg/dL (28 Sep 2024 07:49)    Direct Hardik IgG positive   Direct Hardik C3 negative   Phospholipase A2 : pending   vitamin B12 948   Folate 8/2   Fibrinogen 326     acetaminophen     Tablet .. 650 milliGRAM(s) Oral every 6 hours PRN  aspirin  chewable 81 milliGRAM(s) Oral daily  atorvastatin 80 milliGRAM(s) Oral at bedtime  carvedilol 6.25 milliGRAM(s) Oral every 12 hours  fluticasone propionate/ salmeterol 250-50 MICROgram(s) Diskus 1 Dose(s) Inhalation two times a day  heparin   Injectable 5000 Unit(s) SubCutaneous every 8 hours  influenza  Vaccine (HIGH DOSE) 0.5 milliLiter(s) IntraMuscular once  NIFEdipine XL 90 milliGRAM(s) Oral daily  pantoprazole    Tablet 40 milliGRAM(s) Oral before breakfast  potassium chloride  20 mEq/100 mL IVPB 20 milliEquivalent(s) IV Intermittent every 2 hours  senna 2 Tablet(s) Oral at bedtime PRN  sertraline 25 milliGRAM(s) Oral daily  tiotropium 2.5 MICROgram(s) Inhaler 2 Puff(s) Inhalation daily

## 2024-09-29 ENCOUNTER — TRANSCRIPTION ENCOUNTER (OUTPATIENT)
Age: 69
End: 2024-09-29

## 2024-09-29 LAB
ANION GAP SERPL CALC-SCNC: 9 MMOL/L — SIGNIFICANT CHANGE UP (ref 5–17)
AUTO DIFF PNL BLD: NEGATIVE — SIGNIFICANT CHANGE UP
BUN SERPL-MCNC: 57 MG/DL — HIGH (ref 7–23)
C-ANCA SER-ACNC: NEGATIVE — SIGNIFICANT CHANGE UP
CALCIUM SERPL-MCNC: 9.2 MG/DL — SIGNIFICANT CHANGE UP (ref 8.4–10.5)
CHLORIDE SERPL-SCNC: 104 MMOL/L — SIGNIFICANT CHANGE UP (ref 96–108)
CO2 SERPL-SCNC: 20 MMOL/L — LOW (ref 22–31)
CREAT SERPL-MCNC: 2.24 MG/DL — HIGH (ref 0.5–1.3)
EGFR: 31 ML/MIN/1.73M2 — LOW
GLUCOSE SERPL-MCNC: 126 MG/DL — HIGH (ref 70–99)
HCT VFR BLD CALC: 29.7 % — LOW (ref 39–50)
HGB BLD-MCNC: 9.6 G/DL — LOW (ref 13–17)
MAGNESIUM SERPL-MCNC: 1.7 MG/DL — SIGNIFICANT CHANGE UP (ref 1.6–2.6)
MCHC RBC-ENTMCNC: 28.9 PG — SIGNIFICANT CHANGE UP (ref 27–34)
MCHC RBC-ENTMCNC: 32.3 GM/DL — SIGNIFICANT CHANGE UP (ref 32–36)
MCV RBC AUTO: 89.5 FL — SIGNIFICANT CHANGE UP (ref 80–100)
NRBC # BLD: 0 /100 WBCS — SIGNIFICANT CHANGE UP (ref 0–0)
P-ANCA SER-ACNC: NEGATIVE — SIGNIFICANT CHANGE UP
PHOSPHATE SERPL-MCNC: 4 MG/DL — SIGNIFICANT CHANGE UP (ref 2.5–4.5)
PLATELET # BLD AUTO: 143 K/UL — LOW (ref 150–400)
POTASSIUM SERPL-MCNC: 3.5 MMOL/L — SIGNIFICANT CHANGE UP (ref 3.5–5.3)
POTASSIUM SERPL-SCNC: 3.5 MMOL/L — SIGNIFICANT CHANGE UP (ref 3.5–5.3)
RBC # BLD: 3.32 M/UL — LOW (ref 4.2–5.8)
RBC # FLD: 17.7 % — HIGH (ref 10.3–14.5)
SARS-COV-2 RNA SPEC QL NAA+PROBE: SIGNIFICANT CHANGE UP
SODIUM SERPL-SCNC: 133 MMOL/L — LOW (ref 135–145)
WBC # BLD: 5.71 K/UL — SIGNIFICANT CHANGE UP (ref 3.8–10.5)
WBC # FLD AUTO: 5.71 K/UL — SIGNIFICANT CHANGE UP (ref 3.8–10.5)

## 2024-09-29 PROCEDURE — 99233 SBSQ HOSP IP/OBS HIGH 50: CPT

## 2024-09-29 RX ORDER — MAGNESIUM SULFATE 500 MG/ML
1 VIAL (ML) INJECTION ONCE
Refills: 0 | Status: COMPLETED | OUTPATIENT
Start: 2024-09-29 | End: 2024-09-29

## 2024-09-29 RX ADMIN — SERTRALINE HYDROCHLORIDE 25 MILLIGRAM(S): 100 TABLET, FILM COATED ORAL at 11:13

## 2024-09-29 RX ADMIN — Medication 100 GRAM(S): at 11:47

## 2024-09-29 RX ADMIN — TIOTROPIUM BROMIDE INHALATION SPRAY 2 PUFF(S): 3.12 SPRAY, METERED RESPIRATORY (INHALATION) at 11:15

## 2024-09-29 RX ADMIN — Medication 1 DOSE(S): at 22:30

## 2024-09-29 RX ADMIN — Medication 40 MILLIEQUIVALENT(S): at 11:46

## 2024-09-29 RX ADMIN — Medication 1 DOSE(S): at 11:15

## 2024-09-29 RX ADMIN — Medication 5000 UNIT(S): at 13:42

## 2024-09-29 RX ADMIN — PANTOPRAZOLE SODIUM 40 MILLIGRAM(S): 40 TABLET, DELAYED RELEASE ORAL at 07:04

## 2024-09-29 RX ADMIN — Medication 81 MILLIGRAM(S): at 11:14

## 2024-09-29 RX ADMIN — Medication 5000 UNIT(S): at 07:04

## 2024-09-29 RX ADMIN — Medication 6.25 MILLIGRAM(S): at 18:28

## 2024-09-29 RX ADMIN — Medication 6.25 MILLIGRAM(S): at 07:04

## 2024-09-29 RX ADMIN — Medication 650 MILLIGRAM(S): at 11:46

## 2024-09-29 RX ADMIN — Medication 90 MILLIGRAM(S): at 07:04

## 2024-09-29 RX ADMIN — ATORVASTATIN CALCIUM 80 MILLIGRAM(S): 10 TABLET, FILM COATED ORAL at 22:29

## 2024-09-29 RX ADMIN — Medication 5000 UNIT(S): at 22:32

## 2024-09-29 NOTE — PROGRESS NOTE ADULT - SUBJECTIVE AND OBJECTIVE BOX
Nephrology progress note    Subjective  pt seen at bedside, no overnight issues, states he feels fine  VITALS:  T(F): 97.5 (09-29-24 @ 11:22), Max: 98 (09-28-24 @ 21:34)  HR: 77 (09-29-24 @ 11:22)  BP: 162/90 (09-29-24 @ 11:22)  RR: 18 (09-29-24 @ 11:22)  SpO2: 96% (09-29-24 @ 11:22)  Wt(kg): --    09-27 @ 07:01  -  09-28 @ 07:00  --------------------------------------------------------  IN: 780 mL / OUT: 1500 mL / NET: -720 mL    09-28 @ 07:01  -  09-29 @ 07:00  --------------------------------------------------------  IN: 0 mL / OUT: 1900 mL / NET: -1900 mL    09-29 @ 07:01  -  09-29 @ 13:21  --------------------------------------------------------  IN: 200 mL / OUT: 500 mL / NET: -300 mL        PHYSICAL EXAM:  General:NAD, appears comfortable    HEENT:  PERRL, anicteric sclera  Cardiovascular:  RRR  Respiratory: CTA B/L  Gastrointestinal: soft, NT/ND; +BSx4  Extremities: no edema to LE  Vascular: 2+ radial pulses  Neurological: AAOx3; no focal deficits        LABS:  09-29    133[L]  |  104  |  57[H]  ----------------------------<  126[H]  3.5   |  20[L]  |  2.24[H]    Ca    9.2      29 Sep 2024 07:09  Phos  4.0     09-29  Mg     1.7     09-29                            9.6    5.71  )-----------( 143      ( 29 Sep 2024 07:09 )             29.7       Urine Studies:  Creatinine Trend: 2.24<--, 2.49<--, 3.21<--, 3.82<--, 3.89<--, 3.53<--  Urinalysis Basic - ( 29 Sep 2024 07:09 )    Color:  / Appearance:  / SG:  / pH:   Gluc: 126 mg/dL / Ketone:   / Bili:  / Urobili:    Blood:  / Protein:  / Nitrite:    Leuk Esterase:  / RBC:  / WBC    Sq Epi:  / Non Sq Epi:  / Bacteria:       Creatinine, Random Urine: 90 mg/dL (09-25 @ 20:55)  Protein/Creatinine Ratio Calculation: 13.0 Ratio (09-25 @ 20:55)  Sodium, Random Urine: 30 mmol/L (09-25 @ 20:53)  Creatinine, Random Urine: See Note (09-25 @ 20:53)  Protein/Creatinine Ratio Calculation: see note Ratio (09-25 @ 20:53)  Osmolality, Random Urine: 325 mosm/kg (09-25 @ 20:53)  Potassium, Random Urine: 41 mmol/L (09-25 @ 20:53)  Creatinine, Random Urine: 97 mg/dL (09-25 @ 20:53)  Sodium, Random Urine: 52 mmol/L (09-25 @ 03:33)  Creatinine, Random Urine: See Note (09-25 @ 03:33)  Protein/Creatinine Ratio Calculation: See Note Ratio (09-25 @ 03:33)  Osmolality, Random Urine: 335 mosm/kg (09-25 @ 03:33)  Potassium, Random Urine: 35 mmol/L (09-25 @ 03:33)  Creatinine, Random Urine: 54 mg/dL (09-25 @ 03:33)  Protein/Creatinine Ratio Calculation: 13.7 Ratio (09-25 @ 03:33)    RADIOLOGY & ADDITIONAL STUDIES:

## 2024-09-29 NOTE — PROGRESS NOTE ADULT - SUBJECTIVE AND OBJECTIVE BOX
Subjective: Mr. Fried has no complaints this AM. Pt was inquiring as to why he was placed in quarantine - we explained to him that he had covid exposure from a previous roommate and that he should let us know if he becomes symptomatic.      ROS:   Denies Headache, blurred vision, Chest Pain, SOB, Abdominal pain, nausea or vomiting     Social   aspirin  chewable 81  carvedilol 6.25  heparin   Injectable 5000  NIFEdipine XL 90      Allergies    No Known Allergies    Intolerances        Vital Signs Last 24 Hrs  T(C): 36.6 (29 Sep 2024 06:55), Max: 36.7 (28 Sep 2024 21:34)  T(F): 97.9 (29 Sep 2024 06:55), Max: 98 (28 Sep 2024 21:34)  HR: 87 (29 Sep 2024 06:58) (81 - 92)  BP: 134/87 (29 Sep 2024 06:58) (134/87 - 166/90)  BP(mean): 105 (29 Sep 2024 06:58) (105 - 118)  RR: 18 (29 Sep 2024 06:58) (17 - 18)  SpO2: 92% (29 Sep 2024 06:58) (92% - 94%)    Parameters below as of 29 Sep 2024 06:58  Patient On (Oxygen Delivery Method): room air      I&O's Summary    28 Sep 2024 07:01  -  29 Sep 2024 07:00  --------------------------------------------------------  IN: 0 mL / OUT: 1900 mL / NET: -1900 mL        Physical Exam:  General: NAD, pt resting comfortably in bed  Pulm: No respiratory distress, nonlabored breathing, on room air  CVS: NSR, HDS  Abd: Soft, NT, ND  Extremities: WWP      LABS:                        9.6    5.71  )-----------( 143      ( 29 Sep 2024 07:09 )             29.7     09-29    133[L]  |  104  |  57[H]  ----------------------------<  126[H]  3.5   |  20[L]  |  2.24[H]    Ca    9.2      29 Sep 2024 07:09  Phos  4.0     09-29  Mg     1.7     09-29            ------------------------------------------------------------------------  PLEASE CHECK WHEN PRESENT:     [  ]Heart Failure     [  ] Acute     [  ] Acute on Chronic     [  ] Chronic  -------------------------------------------------------------------     [  ]Diastolic [HFpEF]     [  ]Systolic [HFrEF]     [  ]Combined [HFpEF & HFrEF]  .................................................................................     [  ]Other:     [ ] Pulmonary Hypertension     [ ] Chronic A-fib     [ ] Persistet A-fib     [ ] Permanent A-fib     [ ] Paroxysmal A-fib     [x ] Hypertensive Heart Disease  -------------------------------------------------------------------  [ ] Respiratory failure  [ ] Acute cor pulmonale  [ ] Asthma/COPD Exacerbation  [ ]COPD on home O2 (Chronic renal Failure)   [ ] Pleural effusion  [ ] Aspiration pneumonia  [ ] Obstructive Sleep Apnea  [ ]Atelectasis   [ ] Acute PE   -------------------------------------------------------------------  [x  ]Acute Kidney Injury      [  ]Acute Tubular Necrosis      [  ]Reneal Medullary Necrosis     [  ]Renal Cortical Necrosis     [  ]Other Pathological Lesions:    [  ]CKD 1  [  ]CKD 2  [  ]CKD 3  [  ]CKD 4  [  ]CKD 5 (ESRD)  [  ]Other  -------------------------------------------------------------------  [  ]Diabetes  [  ] Diabetic PVD Ulcer  [  ] Neuropathic ulcer to DM  [  ] Diabetes with Nephropathy  [  ] Osteomyelitis due to diabetes  [  ] Hyperglycemia   [  ]hypoglycemia   --------------------------------------------------------------------  [  ]Malnutrition: See Nutrition Note  [  ]Cachexia  [  ]Other:   [  ]Supplement Ordered:  [  ]Morbid Obesity (BMI >=40]  [ ] Ileus  ---------------------------------------------------------------------  [ ] Sepsis/severe sepsis/septic shock  [ ] Noninfectious SIRS  [ ] UTI  [ ] Pneumonia  [ ] Thrombophlebitis     -----------------------------------------------------------------------  [ ] Acidosis/alkalosis  [ ] Fluid overload  [ ] Hypokalemia  [ ] Hyperkalemia  [ ] Hypomagnesemia  [ ] Hypophosphatemia  [ ] Hyperphosphatemia  ------------------------------------------------------------------------  [ ] Acute blood loss anemia  [ ] Post op blood loss anemia  [ ] Iron deficiency anemia  [ ] Anemia due to chronic disease  [ ] Hypercoagulable state  [ ] Thrombocytopenia  ----------------------------------------------------------------------  [ ] Cerebral infarction  [ ] Transient ischemia attack  [ ] Encephalopathy - Toxic or Metabolic    A/P: 69 yoM w/ PMHx HLD, anemia (follows w/ heme, Dr. Wong), arthritis, COPD, BPH (s/p TURP), s/p L THR, AVR and ascending and hemiarch replacement w/ Dr. 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 Patient presented c/o lightheadedness, weakness, loss of appetite, weight loss. CTA at [facility] shows no endoleak. Lab significant for hgb 7.6, Na 130, Cr 2.68, BNP 38,000. Patient s/p 1u PRBC in ED with mild improvement of weakness.    Vascular:  - s/p FEVAR and L Renal artery stent 8/18/24  - renal artery duplex neg for stenosis, 1.1 cm cyst lower pole R kidney  - ECHO 9/25 EF 50-55%, Bioprosthetic valve is seen in the aortic position  - obtain CTA C/A/P disc from [facility] showing endoleak  - Endoleak repair 9/30/24. P+C Sunday  - c/w aspirin    HTN/HLD:  - c/w nifedipine  - c/w atorvastatin    ANKITA:  - baseline Cr 0.7-1.0  - Cr on admission 2.64, monitor Cr with daily BMP  - multifactorial from poor PO intake vs. NSAID use vs. contrast load at OSH (CTA)  - pending autoimmune work up  - hiv neg, hepatitis panel neg  - Incr carv 6.25 BID per hospitalist  - nephro consult, appreciate recs    Anemia:   - follows w/ outpt Hematologist, Dr. Wong - will obtain collateral  - s/p 1u PRBC 9/24, 1u PRBC 9/26  - f/u B12/folate, fibrinogen  - heme consulted, appreciate recs    COPD:  - c/w Spiriva and Advair inhaler    GERD:  - c/w PPI    Anxiety:  - c/w zoloft    Diet: renal  Activity: as tolerated  DVTPPx: SQH  Dispo: 5 Uris

## 2024-09-29 NOTE — PROGRESS NOTE ADULT - SUBJECTIVE AND OBJECTIVE BOX
Pre-op Diagnosis: Endoleak  Procedure: Endoleak Repair  Surgeon: Dr. Rock    Consent: Obtained 9/29                          9.6    5.71  )-----------( 143      ( 29 Sep 2024 07:09 )             29.7     09-29    133[L]  |  104  |  57[H]  ----------------------------<  126[H]  3.5   |  20[L]  |  2.24[H]    Ca    9.2      29 Sep 2024 07:09  Phos  4.0     09-29  Mg     1.7     09-29        Urinalysis Basic - ( 29 Sep 2024 07:09 )    Color: x / Appearance: x / SG: x / pH: x  Gluc: 126 mg/dL / Ketone: x  / Bili: x / Urobili: x   Blood: x / Protein: x / Nitrite: x   Leuk Esterase: x / RBC: x / WBC x   Sq Epi: x / Non Sq Epi: x / Bacteria: x        Type & Screen:  ordered for 9/30a    CXR: < from: Xray Chest 1 View-PORTABLE IMMEDIATE (Xray Chest 1 View-PORTABLE IMMEDIATE .) (09.24.24 @ 21:36) >  CC: [id number] EXAM:  XR CHEST PORTABLE IMMED 1V   ORDERED BY: KATIE OCHOA     PROCEDURE DATE:  09/24/2024          INTERPRETATION:  Clinical history/reason for exam: Weakness.    Frontal chest.    COMPARISON: October 12, 2023.    Findings/  impression: Right upper lobe focal atelectasis. Heart size within normal   limits, status post median sternotomy, aortic valve replacement, thoracic   aortic endovascular stent graft, abdominal aortic stent. Thoracic spine   degenerative changes, dextro scoliosis. Thoracic vertebral body   compression fractures.    --- End of Report ---            JR CONCEPCION CHILDERS MD; Attending Radiologist  This document has been electronically signed. Sep 25 2024  6:03AM    < end of copied text >      EKG: ecg< from: 12 Lead ECG (08.19.24 @ 07:13) >  Ventricular Rate 74 BPM    Atrial Rate 74 BPM    P-R Interval 240 ms    QRS Duration 96 ms    Q-T Interval 394 ms    QTC Calculation(Bazett) 437 ms    P Axis 78 degrees    R Axis -43 degrees    T Axis 25 degrees    Diagnosis Line Sinus rhythm with 1st degree AV block  Left axis deviation  Possible anteroseptal wall MI  Poor R Wave Progression      Confirmed by XUAN GOSS NEIL ([id number]) on 8/22/2024 11:31:09 PM    < end of copied text >        COVID status/date: [ ]Negative/Date:  [ ]Positive/Date:     *With most recent within 48hrs of OR    Is patient on ACE/ARB? [x ]No [ ]Yes   *If yes, please hold any ACE/ARB the day of surgery    Is patient on Lantus at bedtime?  [ x]No [ ]Yes   *If yes, please half the dose the night before OR since patient will be NPO    Does patient have a contrast allergy? [ x]No [ ]Yes  *If yes, please pre-medicate per protocol    Is patient on anticoagulation? [ x]No [ ] Yes  *If yes, please discuss with team when to hold it    Is the patient Female and <54yo [ xx]No [ ] Yes  If yes, pregnancy test must be documented in the chart    Is patient on dialysis? [x ]No [ ]Yes  *If yes, please obtain all labs including K level EARLY the day of surgery   *Also, will NOT require IVF past midnight       Pre-op Diagnosis: Endoleak  Procedure: Endoleak Repair  Surgeon: Dr. Rock    Consent: Obtained 9/29                          9.6    5.71  )-----------( 143      ( 29 Sep 2024 07:09 )             29.7     09-29    133[L]  |  104  |  57[H]  ----------------------------<  126[H]  3.5   |  20[L]  |  2.24[H]    Ca    9.2      29 Sep 2024 07:09  Phos  4.0     09-29  Mg     1.7     09-29        Urinalysis Basic - ( 29 Sep 2024 07:09 )    Color: x / Appearance: x / SG: x / pH: x  Gluc: 126 mg/dL / Ketone: x  / Bili: x / Urobili: x   Blood: x / Protein: x / Nitrite: x   Leuk Esterase: x / RBC: x / WBC x   Sq Epi: x / Non Sq Epi: x / Bacteria: x        Type & Screen:  ordered for 9/30a    CXR: < from: Xray Chest 1 View-PORTABLE IMMEDIATE (Xray Chest 1 View-PORTABLE IMMEDIATE .) (09.24.24 @ 21:36) >  CC: [id number] EXAM:  XR CHEST PORTABLE IMMED 1V   ORDERED BY: KATIE OCHOA     PROCEDURE DATE:  09/24/2024          INTERPRETATION:  Clinical history/reason for exam: Weakness.    Frontal chest.    COMPARISON: October 12, 2023.    Findings/  impression: Right upper lobe focal atelectasis. Heart size within normal   limits, status post median sternotomy, aortic valve replacement, thoracic   aortic endovascular stent graft, abdominal aortic stent. Thoracic spine   degenerative changes, dextro scoliosis. Thoracic vertebral body   compression fractures.    --- End of Report ---            JR CONCEPCION CHILDERS MD; Attending Radiologist  This document has been electronically signed. Sep 25 2024  6:03AM    < end of copied text >      EKG: ecg< from: 12 Lead ECG (08.19.24 @ 07:13) >  Ventricular Rate 74 BPM    Atrial Rate 74 BPM    P-R Interval 240 ms    QRS Duration 96 ms    Q-T Interval 394 ms    QTC Calculation(Bazett) 437 ms    P Axis 78 degrees    R Axis -43 degrees    T Axis 25 degrees    Diagnosis Line Sinus rhythm with 1st degree AV block  Left axis deviation  Possible anteroseptal wall MI  Poor R Wave Progression      Confirmed by XUAN GOSS NEIL ([id number]) on 8/22/2024 11:31:09 PM    < end of copied text >        COVID status/date: [ x]Negative/Date:  9/29 [ ]Positive/Date:     *With most recent within 48hrs of OR    Is patient on ACE/ARB? [x ]No [ ]Yes   *If yes, please hold any ACE/ARB the day of surgery    Is patient on Lantus at bedtime?  [ x]No [ ]Yes   *If yes, please half the dose the night before OR since patient will be NPO    Does patient have a contrast allergy? [ x]No [ ]Yes  *If yes, please pre-medicate per protocol    Is patient on anticoagulation? [ x]No [ ] Yes  *If yes, please discuss with team when to hold it    Is the patient Female and <54yo [ xx]No [ ] Yes  If yes, pregnancy test must be documented in the chart    Is patient on dialysis? [x ]No [ ]Yes  *If yes, please obtain all labs including K level EARLY the day of surgery   *Also, will NOT require IVF past midnight

## 2024-09-29 NOTE — PROGRESS NOTE ADULT - SUBJECTIVE AND OBJECTIVE BOX
Patient was seen and examined at bedside. Case discuss with the primary team. Pt was upset this morning after finding out that he was exposed to COVID by his roommate. Pt denied having cough, SOB or dyspnea.     OBJECTIVE:  Vital Signs Last 24 Hrs  T(C): 36.4 (29 Sep 2024 11:22), Max: 36.7 (28 Sep 2024 21:34)  T(F): 97.5 (29 Sep 2024 11:22), Max: 98 (28 Sep 2024 21:34)  HR: 77 (29 Sep 2024 11:22) (77 - 92)  BP: 162/90 (29 Sep 2024 11:22) (134/87 - 166/90)  BP(mean): 118 (29 Sep 2024 11:22) (105 - 118)  RR: 18 (29 Sep 2024 11:22) (17 - 18)  SpO2: 96% (29 Sep 2024 11:22) (92% - 96%)    Parameters below as of 29 Sep 2024 11:22  Patient On (Oxygen Delivery Method): room air      PHYSICAL EXAM:  Gen: NAD laying in bed  CV: RRR, +S1/S2, no mumur  Pulm: CTA b/l no wheezing or crackles   Abd: soft, NTND + BS no rebound or guarding   Neuro: AAOX3; nonfocal       LABS:                        9.6    5.71  )-----------( 143      ( 29 Sep 2024 07:09 )             29.7     09-29    133[L]  |  104  |  57[H]  ----------------------------<  126[H]  3.5   |  20[L]  |  2.24[H]    Ca    9.2      29 Sep 2024 07:09  Phos  4.0     09-29  Mg     1.7     09-29      acetaminophen     Tablet .. 650 milliGRAM(s) Oral every 6 hours PRN  aspirin  chewable 81 milliGRAM(s) Oral daily  atorvastatin 80 milliGRAM(s) Oral at bedtime  carvedilol 6.25 milliGRAM(s) Oral every 12 hours  fluticasone propionate/ salmeterol 250-50 MICROgram(s) Diskus 1 Dose(s) Inhalation two times a day  heparin   Injectable 5000 Unit(s) SubCutaneous every 8 hours  influenza  Vaccine (HIGH DOSE) 0.5 milliLiter(s) IntraMuscular once  NIFEdipine XL 90 milliGRAM(s) Oral daily  pantoprazole    Tablet 40 milliGRAM(s) Oral before breakfast  senna 2 Tablet(s) Oral at bedtime PRN  sertraline 25 milliGRAM(s) Oral daily  tiotropium 2.5 MICROgram(s) Inhaler 2 Puff(s) Inhalation daily                     Patient was seen and examined at bedside. Case discuss with the primary team. Pt was upset this morning after finding out that he was exposed to COVID by his roommate. Pt denied having cough, SOB or dyspnea.     OBJECTIVE:  Vital Signs Last 24 Hrs  T(C): 36.4 (29 Sep 2024 11:22), Max: 36.7 (28 Sep 2024 21:34)  T(F): 97.5 (29 Sep 2024 11:22), Max: 98 (28 Sep 2024 21:34)  HR: 77 (29 Sep 2024 11:22) (77 - 92)  BP: 162/90 (29 Sep 2024 11:22) (134/87 - 166/90)  BP(mean): 118 (29 Sep 2024 11:22) (105 - 118)  RR: 18 (29 Sep 2024 11:22) (17 - 18)  SpO2: 96% (29 Sep 2024 11:22) (92% - 96%)    Parameters below as of 29 Sep 2024 11:22  Patient On (Oxygen Delivery Method): room air      PHYSICAL EXAM:  Gen: NAD laying in bed  CV: RRR, +S1/S2, no mumur  Pulm: CTA b/l no wheezing or crackles   Abd: soft, NTND + BS no rebound or guarding   Neuro: AAOX3; nonfocal       LABS:                        9.6    5.71  )-----------( 143      ( 29 Sep 2024 07:09 )             29.7     133[L]  |  104  |  57[H]  ----------------------------<  126[H]  3.5   |  20[L]  |  2.24[H]    Ca    9.2      29 Sep 2024 07:09  Phos  4.0     09-29  Mg     1.7     09-29      acetaminophen     Tablet .. 650 milliGRAM(s) Oral every 6 hours PRN  aspirin  chewable 81 milliGRAM(s) Oral daily  atorvastatin 80 milliGRAM(s) Oral at bedtime  carvedilol 6.25 milliGRAM(s) Oral every 12 hours  fluticasone propionate/ salmeterol 250-50 MICROgram(s) Diskus 1 Dose(s) Inhalation two times a day  heparin   Injectable 5000 Unit(s) SubCutaneous every 8 hours  influenza  Vaccine (HIGH DOSE) 0.5 milliLiter(s) IntraMuscular once  NIFEdipine XL 90 milliGRAM(s) Oral daily  pantoprazole    Tablet 40 milliGRAM(s) Oral before breakfast  senna 2 Tablet(s) Oral at bedtime PRN  sertraline 25 milliGRAM(s) Oral daily  tiotropium 2.5 MICROgram(s) Inhaler 2 Puff(s) Inhalation daily

## 2024-09-29 NOTE — PROGRESS NOTE ADULT - ASSESSMENT
68 y/o M with recent endovascular repair and stent in L renal artery. CR at base line 1.1-1.2. Went to OSH for dizziness and underwent contrast imaging that did not show any endoleak. Eventually transferred to [facility]. Here noted to have CR of 2.6 for which we are consulted.     Problem list  -> ANKITA on CKD: CR peaked at 3.8. Active urinary sediment let to autoimmune work up which is pending.   -> Nephrotic range proteinuria. 13g/g. Albumin 3.1  -> Anemia. Elevated retic count. Direct Ram test positive. Elevated LDH and normal Haptoglobin.     #Plan  -> Cr  improving. Follow up autoimmune work up.   -> Volume, acid base okay for time being.   -> Please hold aspirin incase if we have to biopsy him.   -->renal biopsy upcoming this week  -->avoid nephrotoxic drugs  --> strict I/o chart  ---> GN workup negative so far, pending PLA2R and serum protein electrophoresis   will follow  up

## 2024-09-29 NOTE — PROGRESS NOTE ADULT - ASSESSMENT
69-year-old male with a PMHx of HTN, HLD, anemia, COPD, BPH (s/p TURP), AVR, ascending aortic arch replacement and recent FEVAR/renal artery stent (24) who presented with symptomatic anemia and ANKITA.      #AVR and ascending and hemiarch replacement  2022  #Recent FEVAR/renal artery stent (24)  -Continue management as per vascular surgery   -Primary team attempting to obtain CTA C/A/P disc from [facility]  -Will continue ASA 81mg PO daily   -Pt is for Endoleak repeat on 24     # Pre-operative evaluation   METS >4 , able to walk up 1 flight of stairs without stopping.   EKbpm no ST changes   RCRI Class III Risk  Using the CAMPBELL emily-operative risk index, patient has a >1% risk of myocardial infarction or cardiac arrest, intraoperatively or up to 30 days post-op  -Pt was noted to have markedly elevated BNP on admission but euvolemic and pt s/p TTE on  which showed: Borderline normal left ventricular systolic function. Left ventricular ejection fraction is 50-55%. Bioprosthetic aortic valve; No pericardial effusion. Mildly dilated aortic root. Compared to the previous TTE performed on 2023, there have been no significant interval changes.  Assuming endoleak repair, patient is at intermediate to high risk for an intermediate to high risk procedure.      #Reduced EF   -Pt s/p TTE on  which showed: Borderline normal left ventricular systolic function. Left ventricular ejection fraction is 50-55%. Pt's EF on TTE on  showed an EF of 64%  -Pt is currently euvolemic; CXR on admission no evidence of congestion however pt with markedly elevated BNP 38,029->18576   -Will repeat BNP   -Cardiology consult     #ANKITA  -Multifactorial from poor PO intake vs. NSAID use vs. contrast load at OSH (CTA) vs urinary retention  -nephrology following and will follow up recommendations    -Pt's creatinine is down trending 2.49->2.24    #Symptomatic Anemia    - Pt with outpatient  Hematologist Dr. Wong [phone]/ [phone] Will contact to obtain information regarding previous w/u   -Pt s/p transfusion of 1U PRBCs on  and .   -Will continue to monitor Hgb   -Pt was seen by Hematology  -Hepatitis panel : nonreactive; Vit B12 and folate WNL     #Hyponatremia   -Pt's Na 133  -Will continue to monitor Na    #Hypokalemia  -Pt's K was 3.5  -Will continue to monitor electrolytes and replete prn     #COPD   -continue with Spiriva and Advair      #GERD  -continue with Pantoprazole 40mg daily      #HTN   -Pt with elevated BP; Pt reports that he was on an additional antihypertensive at home however he could not recall the name or dosage.   -Continue with Nifedipine 90mg daily    -Will continue Coreg to 6.25 mg BID w/ holding parameters  -Will continue to monitor BP and adjust antihypertensives as required     #Anxiety   -continue with Zoloft 25mg daily      #DVT PPx  -Continue Heparin SQ q8     #Dispo:  -As per primary team       55 minutes spent on total encounter. The necessity of the time spent during the encounter on this date of service was due to:    Review of hospital course, labs, vitals, radiology and medical records.  Direct patient encounter including bedside exam   Discussed plan of care with primary team   Documenting the encounter.   69-year-old male with a PMHx of HTN, HLD, anemia, COPD, BPH (s/p TURP), AVR, ascending aortic arch replacement and recent FEVAR/renal artery stent (24) who presented with symptomatic anemia and ANKITA.      #AVR and ascending and hemiarch replacement  2022  #Recent FEVAR/renal artery stent (24)  -Continue management as per vascular surgery   -Primary team attempting to obtain CTA C/A/P disc from [facility]  -Will continue ASA 81mg PO daily   -Pt is for Endoleak repeat on 24     # Pre-operative evaluation   METS >4 , able to walk up 1 flight of stairs without stopping.   EKbpm no ST changes   RCRI Class III Risk  Using the CAMPBELL emily-operative risk index, patient has a >1% risk of myocardial infarction or cardiac arrest, intraoperatively or up to 30 days post-op  -Pt was noted to have markedly elevated BNP on admission but euvolemic and pt s/p TTE on  which showed: Borderline normal left ventricular systolic function. Left ventricular ejection fraction is 50-55%. Bioprosthetic aortic valve; No pericardial effusion. Mildly dilated aortic root. Compared to the previous TTE performed on 2023, there have been no significant interval changes.  Assuming endoleak repair, patient is at intermediate to high risk for an intermediate to high risk procedure.      #Reduced EF   -Pt s/p TTE on  which showed: Borderline normal left ventricular systolic function. Left ventricular ejection fraction is 50-55%. Pt's EF on TTE on  showed an EF of 64%  -Pt is currently euvolemic; CXR on admission no evidence of congestion however pt with markedly elevated BNP [id number]->97189   -Will repeat BNP   -Cardiology consult     #ANKITA  -Multifactorial from poor PO intake vs. NSAID use vs. contrast load at OSH (CTA) vs urinary retention  -nephrology following and will follow up recommendations    -Pt's creatinine is down trending 2.49->2.24    #Symptomatic Anemia    - Pt with outpatient  Hematologist Dr. Wong [phone]/ [phone] Will contact to obtain information regarding previous w/u   -Pt s/p transfusion of 1U PRBCs on  and .   -Will continue to monitor Hgb   -Pt was seen by Hematology  -Hepatitis panel : nonreactive; Vit B12 and folate WNL     #Hyponatremia   -Pt's Na 133  -Will continue to monitor Na    #Hypokalemia  -Pt's K was 3.5  -Will continue to monitor electrolytes and replete prn     #COPD   -continue with Spiriva and Advair      #GERD  -continue with Pantoprazole 40mg daily      #HTN   -Pt with elevated BP; Pt reports that he was on an additional antihypertensive at home however he could not recall the name or dosage.   -Continue with Nifedipine 90mg daily    -Will continue Coreg to 6.25 mg BID w/ holding parameters  -Will continue to monitor BP and adjust antihypertensives as required     #Anxiety   -continue with Zoloft 25mg daily      #COVID exposure  -Will continue droplet precaution  -Will check COVID    #DVT PPx  -Continue Heparin SQ q8     #Dispo:  -As per primary team       55 minutes spent on total encounter. The necessity of the time spent during the encounter on this date of service was due to:    Review of hospital course, labs, vitals, radiology and medical records.  Direct patient encounter including bedside exam   Discussed plan of care with primary team   Documenting the encounter.   69-year-old male with a PMHx of HTN, HLD, anemia, COPD, BPH (s/p TURP), AVR, ascending aortic arch replacement and recent FEVAR/renal artery stent (24) who presented with symptomatic anemia and ANKITA.      #AVR and ascending and hemiarch replacement  2022  #Recent FEVAR/renal artery stent (24)  -Continue management as per vascular surgery   -Primary team attempting to obtain CTA C/A/P disc from [facility]  -Will continue ASA 81mg PO daily   -Pt is for Endoleak repeat on 24     # Pre-operative evaluation   METS >4 , able to walk up 1 flight of stairs without stopping.   EKbpm no ST changes   RCRI Class III Risk  Using the CAMPBELL emily-operative risk index, patient has a >1% risk of myocardial infarction or cardiac arrest, intraoperatively or up to 30 days post-op  -Pt was noted to have markedly elevated BNP on admission but euvolemic and pt s/p TTE on  which showed: Borderline normal left ventricular systolic function. Left ventricular ejection fraction is 50-55%. Bioprosthetic aortic valve; No pericardial effusion. Mildly dilated aortic root. Compared to the previous TTE performed on 2023, there have been no significant interval changes.  Assuming endoleak repair, patient is at intermediate to high risk for an intermediate to high risk procedure.      #Reduced EF   -Pt s/p TTE on  which showed: Borderline normal left ventricular systolic function. Left ventricular ejection fraction is 50-55%. Pt's EF on TTE on  showed an EF of 64%  -Pt is currently euvolemic; CXR on admission no evidence of congestion however pt with markedly elevated BNP [id number]->92107   -Will repeat BNP   -Cardiology consult     #ANKITA  -Multifactorial from poor PO intake vs. NSAID use vs. contrast load at OSH (CTA) vs urinary retention  -nephrology following and will follow up recommendations    -Pt's creatinine is down trending 2.49->2.24    #Symptomatic Anemia    - Pt with outpatient  Hematologist Dr. Wong [phone]/ [phone] Will contact to obtain information regarding previous w/u   -Pt s/p transfusion of 1U PRBCs on  and .   -Will continue to monitor Hgb   -Pt was seen by Hematology  -Hepatitis panel : nonreactive; Vit B12 and folate WNL     #Hyponatremia   -Pt's Na 133  -Will continue to monitor Na    #Hypokalemia  -Pt's K was 3.5  -Will continue to monitor electrolytes and replete prn     #COPD   -continue with Spiriva and Advair      #GERD  -continue with Pantoprazole 40mg daily      #HTN   -Pt with elevated BP; Pt reports that he was on an additional antihypertensive at home however he could not recall the name or dosage.   -Continue with Nifedipine 90mg daily    -Will continue Coreg to 6.25 mg BID w/ holding parameters  -Will continue to monitor BP and adjust antihypertensives as required     #Anxiety   -continue with Zoloft 25mg daily      #COVID exposure  -Will continue droplet precaution      #DVT PPx  -Continue Heparin SQ q8     #Dispo:  -As per primary team       55 minutes spent on total encounter. The necessity of the time spent during the encounter on this date of service was due to:    Review of hospital course, labs, vitals, radiology and medical records.  Direct patient encounter including bedside exam   Discussed plan of care with primary team   Documenting the encounter.

## 2024-09-30 LAB
ANION GAP SERPL CALC-SCNC: 11 MMOL/L — SIGNIFICANT CHANGE UP (ref 5–17)
ANION GAP SERPL CALC-SCNC: 11 MMOL/L — SIGNIFICANT CHANGE UP (ref 5–17)
APTT BLD: 32.9 SEC — SIGNIFICANT CHANGE UP (ref 24.5–35.6)
APTT BLD: 33.8 SEC — SIGNIFICANT CHANGE UP (ref 24.5–35.6)
BLD GP AB SCN SERPL QL: NEGATIVE — SIGNIFICANT CHANGE UP
BUN SERPL-MCNC: 49 MG/DL — HIGH (ref 7–23)
BUN SERPL-MCNC: 50 MG/DL — HIGH (ref 7–23)
CALCIUM SERPL-MCNC: 9 MG/DL — SIGNIFICANT CHANGE UP (ref 8.4–10.5)
CALCIUM SERPL-MCNC: 9.1 MG/DL — SIGNIFICANT CHANGE UP (ref 8.4–10.5)
CHLORIDE SERPL-SCNC: 100 MMOL/L — SIGNIFICANT CHANGE UP (ref 96–108)
CHLORIDE SERPL-SCNC: 107 MMOL/L — SIGNIFICANT CHANGE UP (ref 96–108)
CO2 SERPL-SCNC: 20 MMOL/L — LOW (ref 22–31)
CO2 SERPL-SCNC: 20 MMOL/L — LOW (ref 22–31)
CREAT SERPL-MCNC: 1.92 MG/DL — HIGH (ref 0.5–1.3)
CREAT SERPL-MCNC: 2.16 MG/DL — HIGH (ref 0.5–1.3)
EGFR: 32 ML/MIN/1.73M2 — LOW
EGFR: 37 ML/MIN/1.73M2 — LOW
GLUCOSE SERPL-MCNC: 128 MG/DL — HIGH (ref 70–99)
GLUCOSE SERPL-MCNC: 90 MG/DL — SIGNIFICANT CHANGE UP (ref 70–99)
HCT VFR BLD CALC: 29.3 % — LOW (ref 39–50)
HCT VFR BLD CALC: 30.1 % — LOW (ref 39–50)
HGB BLD-MCNC: 9.3 G/DL — LOW (ref 13–17)
HGB BLD-MCNC: 9.4 G/DL — LOW (ref 13–17)
INR BLD: 1.15 — SIGNIFICANT CHANGE UP (ref 0.85–1.16)
INR BLD: 1.15 — SIGNIFICANT CHANGE UP (ref 0.85–1.16)
ISTAT ACTK (ACTIVATED CLOTTING TIME KAOLIN): 183 SEC — HIGH (ref 74–137)
MAGNESIUM SERPL-MCNC: 1.9 MG/DL — SIGNIFICANT CHANGE UP (ref 1.6–2.6)
MAGNESIUM SERPL-MCNC: 2 MG/DL — SIGNIFICANT CHANGE UP (ref 1.6–2.6)
MCHC RBC-ENTMCNC: 28 PG — SIGNIFICANT CHANGE UP (ref 27–34)
MCHC RBC-ENTMCNC: 28.7 PG — SIGNIFICANT CHANGE UP (ref 27–34)
MCHC RBC-ENTMCNC: 30.9 GM/DL — LOW (ref 32–36)
MCHC RBC-ENTMCNC: 32.1 GM/DL — SIGNIFICANT CHANGE UP (ref 32–36)
MCV RBC AUTO: 89.6 FL — SIGNIFICANT CHANGE UP (ref 80–100)
MCV RBC AUTO: 90.7 FL — SIGNIFICANT CHANGE UP (ref 80–100)
NRBC # BLD: 0 /100 WBCS — SIGNIFICANT CHANGE UP (ref 0–0)
NRBC # BLD: 0 /100 WBCS — SIGNIFICANT CHANGE UP (ref 0–0)
PHOSPHATE SERPL-MCNC: 4.1 MG/DL — SIGNIFICANT CHANGE UP (ref 2.5–4.5)
PHOSPHATE SERPL-MCNC: 4.3 MG/DL — SIGNIFICANT CHANGE UP (ref 2.5–4.5)
PHOSPHOLIPASE A2 RECEPTOR ELISA: <1.8 RU/ML — SIGNIFICANT CHANGE UP (ref 0–19.9)
PLATELET # BLD AUTO: 136 K/UL — LOW (ref 150–400)
PLATELET # BLD AUTO: 142 K/UL — LOW (ref 150–400)
POTASSIUM SERPL-MCNC: 3.8 MMOL/L — SIGNIFICANT CHANGE UP (ref 3.5–5.3)
POTASSIUM SERPL-MCNC: 4.2 MMOL/L — SIGNIFICANT CHANGE UP (ref 3.5–5.3)
POTASSIUM SERPL-SCNC: 3.8 MMOL/L — SIGNIFICANT CHANGE UP (ref 3.5–5.3)
POTASSIUM SERPL-SCNC: 4.2 MMOL/L — SIGNIFICANT CHANGE UP (ref 3.5–5.3)
PROTHROM AB SERPL-ACNC: 13.4 SEC — SIGNIFICANT CHANGE UP (ref 9.9–13.4)
PROTHROM AB SERPL-ACNC: 13.4 SEC — SIGNIFICANT CHANGE UP (ref 9.9–13.4)
RBC # BLD: 3.27 M/UL — LOW (ref 4.2–5.8)
RBC # BLD: 3.32 M/UL — LOW (ref 4.2–5.8)
RBC # FLD: 17.5 % — HIGH (ref 10.3–14.5)
RBC # FLD: 17.5 % — HIGH (ref 10.3–14.5)
RH IG SCN BLD-IMP: POSITIVE — SIGNIFICANT CHANGE UP
SODIUM SERPL-SCNC: 131 MMOL/L — LOW (ref 135–145)
SODIUM SERPL-SCNC: 138 MMOL/L — SIGNIFICANT CHANGE UP (ref 135–145)
WBC # BLD: 4.95 K/UL — SIGNIFICANT CHANGE UP (ref 3.8–10.5)
WBC # BLD: 4.99 K/UL — SIGNIFICANT CHANGE UP (ref 3.8–10.5)
WBC # FLD AUTO: 4.95 K/UL — SIGNIFICANT CHANGE UP (ref 3.8–10.5)
WBC # FLD AUTO: 4.99 K/UL — SIGNIFICANT CHANGE UP (ref 3.8–10.5)

## 2024-09-30 PROCEDURE — 93010 ELECTROCARDIOGRAM REPORT: CPT

## 2024-09-30 PROCEDURE — 76937 US GUIDE VASCULAR ACCESS: CPT | Mod: 26,GC

## 2024-09-30 PROCEDURE — 36245 INS CATH ABD/L-EXT ART 1ST: CPT | Mod: GC,LT

## 2024-09-30 PROCEDURE — 37246 TRLUML BALO ANGIOP 1ST ART: CPT | Mod: GC

## 2024-09-30 PROCEDURE — 75625 CONTRAST EXAM ABDOMINL AORTA: CPT | Mod: 26,GC,59

## 2024-09-30 PROCEDURE — 99233 SBSQ HOSP IP/OBS HIGH 50: CPT

## 2024-09-30 RX ORDER — LABETALOL HYDROCHLORIDE 200 MG/1
10 TABLET, FILM COATED ORAL ONCE
Refills: 0 | Status: DISCONTINUED | OUTPATIENT
Start: 2024-09-30 | End: 2024-09-30

## 2024-09-30 RX ORDER — CARVEDILOL 3.125 MG
6.25 TABLET ORAL ONCE
Refills: 0 | Status: COMPLETED | OUTPATIENT
Start: 2024-09-30 | End: 2024-09-30

## 2024-09-30 RX ORDER — SODIUM CHLORIDE 0.9 % (FLUSH) 0.9 %
1000 SYRINGE (ML) INJECTION
Refills: 0 | Status: DISCONTINUED | OUTPATIENT
Start: 2024-09-30 | End: 2024-10-01

## 2024-09-30 RX ORDER — CARVEDILOL 3.125 MG
12.5 TABLET ORAL EVERY 12 HOURS
Refills: 0 | Status: DISCONTINUED | OUTPATIENT
Start: 2024-10-01 | End: 2024-10-03

## 2024-09-30 RX ORDER — MAGNESIUM SULFATE 500 MG/ML
1 VIAL (ML) INJECTION ONCE
Refills: 0 | Status: COMPLETED | OUTPATIENT
Start: 2024-09-30 | End: 2024-09-30

## 2024-09-30 RX ADMIN — TIOTROPIUM BROMIDE INHALATION SPRAY 2 PUFF(S): 3.12 SPRAY, METERED RESPIRATORY (INHALATION) at 09:59

## 2024-09-30 RX ADMIN — Medication 6.25 MILLIGRAM(S): at 18:06

## 2024-09-30 RX ADMIN — Medication 80 MILLILITER(S): at 18:06

## 2024-09-30 RX ADMIN — Medication 90 MILLIGRAM(S): at 05:04

## 2024-09-30 RX ADMIN — Medication 20 MILLIEQUIVALENT(S): at 18:06

## 2024-09-30 RX ADMIN — SERTRALINE HYDROCHLORIDE 25 MILLIGRAM(S): 100 TABLET, FILM COATED ORAL at 18:06

## 2024-09-30 RX ADMIN — Medication 1 DOSE(S): at 08:50

## 2024-09-30 RX ADMIN — Medication 5000 UNIT(S): at 05:04

## 2024-09-30 RX ADMIN — Medication 1 DOSE(S): at 21:01

## 2024-09-30 RX ADMIN — Medication 5000 UNIT(S): at 21:01

## 2024-09-30 RX ADMIN — ATORVASTATIN CALCIUM 80 MILLIGRAM(S): 10 TABLET, FILM COATED ORAL at 21:01

## 2024-09-30 RX ADMIN — Medication 81 MILLIGRAM(S): at 10:11

## 2024-09-30 RX ADMIN — Medication 6.25 MILLIGRAM(S): at 05:04

## 2024-09-30 RX ADMIN — Medication 5000 UNIT(S): at 15:24

## 2024-09-30 RX ADMIN — PANTOPRAZOLE SODIUM 40 MILLIGRAM(S): 40 TABLET, DELAYED RELEASE ORAL at 05:04

## 2024-09-30 RX ADMIN — Medication 100 GRAM(S): at 09:58

## 2024-09-30 NOTE — PROGRESS NOTE ADULT - ASSESSMENT
69-year-old male with a PMHx of HTN, HLD, anemia, COPD, BPH (s/p TURP), AVR, ascending aortic arch replacement and recent FEVAR/renal artery stent (24) who presented with symptomatic anemia and ANKITA.      #AVR and ascending and hemiarch replacement  2022  #Recent FEVAR/renal artery stent (24)  -Continue management as per vascular surgery   -Primary team attempting to obtain CTA C/A/P disc from [facility]  -Will continue ASA 81mg PO daily   -Pt is for Endoleak repair on 24     # Pre-operative evaluation   METS >4 , able to walk up 1 flight of stairs without stopping.   EKbpm no ST changes   RCRI Class III Risk  Using the CAMPBELL emily-operative risk index, patient has a >1% risk of myocardial infarction or cardiac arrest, intraoperatively or up to 30 days post-op  -Pt was noted to have markedly elevated BNP on admission but euvolemic and pt s/p TTE on  which showed: Borderline normal left ventricular systolic function. Left ventricular ejection fraction is 50-55%. Bioprosthetic aortic valve; No pericardial effusion. Mildly dilated aortic root. Compared to the previous TTE performed on 2023, there have been no significant interval changes.  Assuming endoleak repair, patient is at intermediate to high risk for an intermediate to high risk procedure.      #Reduced EF   -Pt s/p TTE on  which showed: Borderline normal left ventricular systolic function. Left ventricular ejection fraction is 50-55%. Pt's EF on TTE on  showed an EF of 64%  -Pt is currently euvolemic; CXR on admission no evidence of congestion however pt with markedly elevated BNP [id number]->68414   -Cardiology consult     #ANKITA (resolving)   -Multifactorial from poor PO intake vs. NSAID use vs. contrast load at OSH (CTA) vs urinary retention  -nephrology following and will follow up recommendations    -Pt's creatinine is down trending 2.49->2.24->1.92, continue to monitor     #Symptomatic Anemia    - Pt with outpatient  Hematologist Dr. Wong [phone]/ [phone] Will contact to obtain information regarding previous w/u   -Pt s/p transfusion of 1U PRBCs on  and .   -Will continue to monitor Hgb   -Pt was seen by Hematology  -Hepatitis panel : nonreactive; Vit B12 and folate WNL     #Hyponatremia (resolved)  -Pt's Na 138 this AM   -Will continue to monitor Na    #COPD   -continue with Spiriva and Advair      #GERD  -continue with Pantoprazole 40mg daily      #HTN   -Pt with elevated BP; Pt reports that he was on an additional antihypertensive at home however he could not recall the name or dosage.   -Continue with Nifedipine 90mg daily    -Will continue Coreg to 6.25 mg BID w/ holding parameters->plan to INCREASE coreg to 12.5 mg BID starting 10/1  -Will continue to monitor BP and adjust antihypertensives as required     #Anxiety   -continue with Zoloft 25mg daily      #COVID exposure  -Will continue droplet precaution    #DVT PPx  -Continue Heparin SQ q8     #Dispo:  -As per primary team       55 minutes spent on total encounter. The necessity of the time spent during the encounter on this date of service was due to:    Review of hospital course, labs, vitals, radiology and medical records.  Direct patient encounter including bedside exam   Discussed plan of care with primary team   Documenting the encounter.

## 2024-09-30 NOTE — DIETITIAN INITIAL EVALUATION ADULT - PERTINENT MEDS FT
MEDICATIONS  (STANDING):  aspirin  chewable 81 milliGRAM(s) Oral daily  atorvastatin 80 milliGRAM(s) Oral at bedtime  carvedilol 6.25 milliGRAM(s) Oral once  fluticasone propionate/ salmeterol 250-50 MICROgram(s) Diskus 1 Dose(s) Inhalation two times a day  heparin   Injectable 5000 Unit(s) SubCutaneous every 8 hours  influenza  Vaccine (HIGH DOSE) 0.5 milliLiter(s) IntraMuscular once  NIFEdipine XL 90 milliGRAM(s) Oral daily  pantoprazole    Tablet 40 milliGRAM(s) Oral before breakfast  potassium chloride  10 mEq/100 mL IVPB 10 milliEquivalent(s) IV Intermittent once  sertraline 25 milliGRAM(s) Oral daily  tiotropium 2.5 MICROgram(s) Inhaler 2 Puff(s) Inhalation daily    MEDICATIONS  (PRN):  acetaminophen     Tablet .. 650 milliGRAM(s) Oral every 6 hours PRN Mild Pain (1 - 3)  senna 2 Tablet(s) Oral at bedtime PRN for constipation

## 2024-09-30 NOTE — DIETITIAN INITIAL EVALUATION ADULT - DIET TYPE
Diet to be advanced in 24-48hrs as medically feasible; consider use of no concentrated phosphorus diet - Pending %PO intake add oral nutrition supplements, consider ensure use vs nepro use Pending Lytes.

## 2024-09-30 NOTE — DIETITIAN INITIAL EVALUATION ADULT - PERTINENT LABORATORY DATA
09-30    138  |  107  |  50[H]  ----------------------------<  90  3.8   |  20[L]  |  1.92[H]    Ca    9.0      30 Sep 2024 08:15  Phos  4.1     09-30  Mg     1.9     09-30    A1C with Estimated Average Glucose Result: 5.5 % (10-04-23 @ 18:06)

## 2024-09-30 NOTE — PRE-ANESTHESIA EVALUATION ADULT - NSANTHADDINFOFT_GEN_ALL_CORE
Discussed monitored anesthesia care with GA backup with patient. Patient declined to discuss risk of anesthesia. Patient will accept blood in case of emergency. All questions answered and patient is in agreement

## 2024-09-30 NOTE — PROGRESS NOTE ADULT - SUBJECTIVE AND OBJECTIVE BOX
Medicine Progress Note    Patient is a 69y old  Male who presents with a chief complaint of ANKITA, symptomatic anemia (30 Sep 2024 02:20)      SUBJECTIVE / OVERNIGHT EVENTS:  Feels well this morning, awaiting procedure this morning. Not in any pain, no new symptoms.     ADDITIONAL REVIEW OF SYSTEMS:    MEDICATIONS  (STANDING):  aspirin  chewable 81 milliGRAM(s) Oral daily  atorvastatin 80 milliGRAM(s) Oral at bedtime  carvedilol 6.25 milliGRAM(s) Oral every 12 hours  fluticasone propionate/ salmeterol 250-50 MICROgram(s) Diskus 1 Dose(s) Inhalation two times a day  heparin   Injectable 5000 Unit(s) SubCutaneous every 8 hours  influenza  Vaccine (HIGH DOSE) 0.5 milliLiter(s) IntraMuscular once  NIFEdipine XL 90 milliGRAM(s) Oral daily  pantoprazole    Tablet 40 milliGRAM(s) Oral before breakfast  potassium chloride  10 mEq/100 mL IVPB 10 milliEquivalent(s) IV Intermittent once  sertraline 25 milliGRAM(s) Oral daily  tiotropium 2.5 MICROgram(s) Inhaler 2 Puff(s) Inhalation daily    MEDICATIONS  (PRN):  acetaminophen     Tablet .. 650 milliGRAM(s) Oral every 6 hours PRN Mild Pain (1 - 3)  senna 2 Tablet(s) Oral at bedtime PRN for constipation    CAPILLARY BLOOD GLUCOSE        I&O's Summary    29 Sep 2024 07:01  -  30 Sep 2024 07:00  --------------------------------------------------------  IN: 450 mL / OUT: 1400 mL / NET: -950 mL    30 Sep 2024 07:01  -  30 Sep 2024 11:58  --------------------------------------------------------  IN: 0 mL / OUT: 600 mL / NET: -600 mL        PHYSICAL EXAM:  Vital Signs Last 24 Hrs  T(C): 36.5 (30 Sep 2024 08:46), Max: 36.5 (29 Sep 2024 20:40)  T(F): 97.7 (30 Sep 2024 08:46), Max: 97.7 (29 Sep 2024 20:40)  HR: 82 (30 Sep 2024 09:53) (77 - 86)  BP: 173/86 (30 Sep 2024 09:53) (156/78 - 180/81)  BP(mean): 121 (30 Sep 2024 09:53) (110 - 126)  RR: 16 (30 Sep 2024 09:53) (16 - 18)  SpO2: 95% (30 Sep 2024 09:53) (95% - 98%)    Parameters below as of 30 Sep 2024 09:53  Patient On (Oxygen Delivery Method): room air    EXAM:   Gen: NAD laying in bed  CV: RRR, +S1/S2, no mumur  Pulm: CTA b/l no wheezing or crackles   Abd: soft, NTND + BS no rebound or guarding   Neuro: AAOX3; nonfocal     LABS:                        9.3    4.95  )-----------( 142      ( 30 Sep 2024 08:15 )             30.1     09-30    138  |  107  |  50[H]  ----------------------------<  90  3.8   |  20[L]  |  1.92[H]    Ca    9.0      30 Sep 2024 08:15  Phos  4.1     09-30  Mg     1.9     09-30      PT/INR - ( 30 Sep 2024 08:15 )   PT: 13.4 sec;   INR: 1.15          PTT - ( 30 Sep 2024 08:15 )  PTT:32.9 sec      Urinalysis Basic - ( 30 Sep 2024 08:15 )    Color: x / Appearance: x / SG: x / pH: x  Gluc: 90 mg/dL / Ketone: x  / Bili: x / Urobili: x   Blood: x / Protein: x / Nitrite: x   Leuk Esterase: x / RBC: x / WBC x   Sq Epi: x / Non Sq Epi: x / Bacteria: x        COVID-19 PCR: NotDetec (29 Sep 2024 12:57)    Electrocardiogram/QTc Interval:  NSR, 1st degree AV block

## 2024-09-30 NOTE — DIETITIAN INITIAL EVALUATION ADULT - OTHER CALCULATIONS
6'3''  pounds +-10%  Wt 155 pounds BMI 19.3 %IBW=79  Current body wt for EER based on RD judgment   Adjust for age, risk for malnutrition, OR, renal  Fluids per team

## 2024-09-30 NOTE — PROGRESS NOTE ADULT - SUBJECTIVE AND OBJECTIVE BOX
Vascular Surgery Post-Op Note    Procedure: Aortogram, Balloon angioplasty of L renal stent.    Diagnosis/Indication: AAA    Surgeon: Dr. Rock    S: Pt has no complaints. Denies CP, SOB, KAUFMAN, calf tenderness. Pain controlled with medication. No pain at groin site.    O:  T(C): --  T(F): --  HR: 86 (09-30-24 @ 15:39) (86 - 94)  BP: 151/77 (09-30-24 @ 15:39) (137/75 - 151/77)  RR: 16 (09-30-24 @ 15:39) (16 - 16)  SpO2: 94% (09-30-24 @ 15:39) (94% - 94%)  Wt(kg): --                        9.3    4.95  )-----------( 142      ( 30 Sep 2024 08:15 )             30.1     09-30    138  |  107  |  50[H]  ----------------------------<  90  3.8   |  20[L]  |  1.92[H]    Ca    9.0      30 Sep 2024 08:15  Phos  4.1     09-30  Mg     1.9     09-30        Gen: NAD, resting comfortably in bed  C/V: NSR  Pulm: Nonlabored breathing, no respiratory distress  Abd: soft, NT/ND  Extrem: WWP, no calf edema, SCDs in place. Groin incision soft, flat, no evidence of hematoma or infx      A/P: 69yMale s/p above procedure, doing well.   Diet: Reg, renal restrictions  IVF: NS 80cc/hr for 6hr  Pain/nausea control  DVT ppx: SQH  Dispo plan: 5uris

## 2024-09-30 NOTE — DIETITIAN INITIAL EVALUATION ADULT - OTHER INFO
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 Pt presents c/o lightheadedness, weakness, loss of appetite, weight loss. CTA at [facility] shows no endoleak. Lab significant for hgb 7.6, Na 130, Cr 2.68, BNP 38,000. Pt s/p 1u PRBC in ED with mild improvement of weakness. Pt is now for Endoleak repair 9/30.    Pt seen this AM on 5UR with transport. Pt Pending OR - RD unable to speak with pt, thus information obtained from chart/RN. Pt noted with mild wasting/loss to temples - per H&P pt reported 40 pound wt loss x1 year and loss of appetite. Per RD note 10/2023 pt wt noted at 164.9 pounds. Based on current admit at this suggests wt loss of just 10 pounds x 1 year period (6% body wt loss-  not sinfigncat) rather then 40 pounds. During prior admit pt noted with fair PO intake, however had been consuming PO supplements (Ensure Enlive). Prior to NPO, was ordered for Renal diet - RN reports that pt expressed some what better intake since admit.   Labs: Na 133, K WNL (low various times during admit), Phos WNL (elevated various times during admit), BUN/Cr 57/2.24 (on down trend).

## 2024-09-30 NOTE — PRE-ANESTHESIA EVALUATION ADULT - NSANTHPEFT_GEN_ALL_CORE
General: Appearance is consistent with chronological age. No abnormal facies.  EENT: Anicteric sclera; oropharynx clear, moist mucus membranes  Cardiovascular:  Rate and rhythm evaluated  Respiratory: Unlabored breathing  Neurological: Awake and alert, moves all extremities  Constitutional: MET<4

## 2024-09-30 NOTE — PRE-ANESTHESIA EVALUATION ADULT - NSANTHPMHFT_GEN_ALL_CORE
Hx of AVR, hemiarch repair, and s/p FEVAR (8/2024), HTN, HLD, COPD  ANKITA, elevated  BNP on admission

## 2024-09-30 NOTE — DIETITIAN INITIAL EVALUATION ADULT - ADD RECOMMEND
1. Monitor PO intake/appetite, GI distress, diet tolerance, labs, weights.  2. Honor pt food preferences as able.  3. RD to remain available for additional nutrition interventions as needed.   --- High Nutrition Risk.

## 2024-09-30 NOTE — BRIEF OPERATIVE NOTE - OPERATION/FINDINGS
Aortogram, Balloon angioplasty of L renal stent.  R groin access, Max 6Fr Sheath   Angiogram showing possible leak near L renal stent, and crimped stent. Ballooned with 10 x 40mm Ty Ty balloon. With good resolution of stenosis.   Heparin not reversed. Manual pressure for hemostasis.

## 2024-09-30 NOTE — DIETITIAN INITIAL EVALUATION ADULT - NS FNS DIET ORDER
Diet, NPO after Midnight:      NPO Start Date: 29-Sep-2024,   NPO Start Time: 23:59  Except Medications (09-29-24 @ 23:11)

## 2024-09-30 NOTE — DIETITIAN INITIAL EVALUATION ADULT - LAB (SPECIFY)
monitor BMP, CBC, glucose, lytes - Replete PRN, trend renal indices, LFTs  Metronidazole Pregnancy And Lactation Text: This medication is Pregnancy Category B and considered safe during pregnancy.  It is also excreted in breast milk.

## 2024-09-30 NOTE — BRIEF OPERATIVE NOTE - NSICDXBRIEFPREOP_GEN_ALL_CORE_FT
PRE-OP DIAGNOSIS:  History of abdominal aortic aneurysm (AAA) 30-Sep-2024 14:28:28  Cristhian Blackburn

## 2024-09-30 NOTE — PROGRESS NOTE ADULT - SUBJECTIVE AND OBJECTIVE BOX
------------------------------------------------------------------------  PLEASE CHECK WHEN PRESENT:     [  ]Heart Failure     [  ] Acute     [  ] Acute on Chronic     [  ] Chronic  -------------------------------------------------------------------     [  ]Diastolic [HFpEF]     [  ]Systolic [HFrEF]     [  ]Combined [HFpEF & HFrEF]  .................................................................................     [  ]Other:     [ ] Pulmonary Hypertension     [ ] Chronic A-fib     [ ] Persistet A-fib     [ ] Permanent A-fib     [ ] Paroxysmal A-fib     [x ] Hypertensive Heart Disease  -------------------------------------------------------------------  [ ] Respiratory failure  [ ] Acute cor pulmonale  [ ] Asthma/COPD Exacerbation  [ ]COPD on home O2 (Chronic renal Failure)   [ ] Pleural effusion  [ ] Aspiration pneumonia  [ ] Obstructive Sleep Apnea  [ ]Atelectasis   [ ] Acute PE   -------------------------------------------------------------------  [x  ]Acute Kidney Injury      [  ]Acute Tubular Necrosis      [  ]Reneal Medullary Necrosis     [  ]Renal Cortical Necrosis     [  ]Other Pathological Lesions:    [  ]CKD 1  [  ]CKD 2  [  ]CKD 3  [  ]CKD 4  [  ]CKD 5 (ESRD)  [  ]Other  -------------------------------------------------------------------  [  ]Diabetes  [  ] Diabetic PVD Ulcer  [  ] Neuropathic ulcer to DM  [  ] Diabetes with Nephropathy  [  ] Osteomyelitis due to diabetes  [  ] Hyperglycemia   [  ]hypoglycemia   --------------------------------------------------------------------  [  ]Malnutrition: See Nutrition Note  [  ]Cachexia  [  ]Other:   [  ]Supplement Ordered:  [  ]Morbid Obesity (BMI >=40]  [ ] Ileus  ---------------------------------------------------------------------  [ ] Sepsis/severe sepsis/septic shock  [ ] Noninfectious SIRS  [ ] UTI  [ ] Pneumonia  [ ] Thrombophlebitis     -----------------------------------------------------------------------  [ ] Acidosis/alkalosis  [ ] Fluid overload  [ ] Hypokalemia  [ ] Hyperkalemia  [ ] Hypomagnesemia  [ ] Hypophosphatemia  [ ] Hyperphosphatemia  ------------------------------------------------------------------------  [ ] Acute blood loss anemia  [ ] Post op blood loss anemia  [ ] Iron deficiency anemia  [ ] Anemia due to chronic disease  [ ] Hypercoagulable state  [ ] Thrombocytopenia  ----------------------------------------------------------------------  [ ] Cerebral infarction  [ ] Transient ischemia attack  [ ] Encephalopathy - Toxic or Metabolic    A/P: 69 yoM w/ PMHx HLD, anemia (follows w/ hemeDr. Wong), arthritis, COPD, BPH (s/p TURP), s/p L THR, AVR and ascending and hemiarch replacement w/ Dr. 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 Patient presented c/o lightheadedness, weakness, loss of appetite, weight loss. CTA at [facility] shows no endoleak. Lab significant for hgb 7.6, Na 130, Cr 2.68, BNP 38,000. Patient s/p 1u PRBC in ED with mild improvement of weakness.    Vascular:  - Plan for Endoleak repair 9/30/24  - s/p FEVAR and L Renal artery stent 8/18/24  - renal artery duplex neg for stenosis, 1.1 cm cyst lower pole R kidney  - c/w aspirin and statin    HTN/HLD:  - c/w nifedipine  - c/w atorvastatin  - Incr coreg to 6.25 BID per hospitalist  - ECHO 9/25 EF 50-55%, Bioprosthetic valve is seen in the aortic position    ANKITA:  - baseline Cr 0.7-1.0  - Cr on admission 2.64, monitor Cr with daily BMP  - multifactorial from poor PO intake vs. NSAID use vs. contrast load at OSH (CTA)  - pending autoimmune work up  - hiv neg, hepatitis panel neg  - nephro consult, appreciate recs    Anemia:   - follows w/ outpt Hematologist, Dr. Wong - will obtain collateral  - s/p 1u PRBC 9/24, 1u PRBC 9/26  - f/u B12/folate, fibrinogen  - heme consulted, appreciate recs    COPD:  - c/w Spiriva and Advair inhaler    GERD:  - c/w PPI    Anxiety:  - c/w zoloft    Diet: renal  Activity: as tolerated  DVTPPx: SQH  Dispo: 5 Uris, pending procedure   Subjective: Pt feeling well this morning, continues to be on droplet precautions s/p exposure to covid 9/27. Despite pt being asymptomatic and having neg covid test, per epidemiology will need isolation until 5 days after exposure (10/2). Pt has no complaints at this time. NPO for OR today R CEA with SICU admission planned postop. SICU aware.     ROS:   Denies Headache, blurred vision, Chest Pain, SOB, Abdominal pain, nausea or vomiting     Social   aspirin  chewable 81  carvedilol 6.25  heparin   Injectable 5000  NIFEdipine XL 90      Allergies    No Known Allergies    Intolerances        Vital Signs Last 24 Hrs  T(C): 36.4 (30 Sep 2024 05:00), Max: 36.5 (29 Sep 2024 09:09)  T(F): 97.6 (30 Sep 2024 05:00), Max: 97.7 (29 Sep 2024 09:09)  HR: 80 (30 Sep 2024 04:14) (77 - 88)  BP: 156/78 (30 Sep 2024 04:14) (139/96 - 180/81)  BP(mean): 110 (30 Sep 2024 04:14) (110 - 124)  RR: 16 (30 Sep 2024 04:14) (16 - 18)  SpO2: 96% (30 Sep 2024 04:14) (92% - 98%)    Parameters below as of 30 Sep 2024 04:14  Patient On (Oxygen Delivery Method): room air      I&O's Summary    29 Sep 2024 07:01  -  30 Sep 2024 07:00  --------------------------------------------------------  IN: 450 mL / OUT: 1400 mL / NET: -950 mL        Physical Exam:  General: NAD, pt resting comfortably in bed  Pulm: No respiratory distress, nonlabored breathing, on room air  CVS: NSR, HDS  Abd: Soft, NT, ND  Extremities: WWP      LABS:                        9.6    5.71  )-----------( 143      ( 29 Sep 2024 07:09 )             29.7     09-29    133[L]  |  104  |  57[H]  ----------------------------<  126[H]  3.5   |  20[L]  |  2.24[H]    Ca    9.2      29 Sep 2024 07:09  Phos  4.0     09-29  Mg     1.7     09-29          Radiology and Additional Studies:      ------------------------------------------------------------------------  PLEASE CHECK WHEN PRESENT:     [  ]Heart Failure     [  ] Acute     [  ] Acute on Chronic     [  ] Chronic  -------------------------------------------------------------------     [  ]Diastolic [HFpEF]     [  ]Systolic [HFrEF]     [  ]Combined [HFpEF & HFrEF]  .................................................................................     [  ]Other:     [ ] Pulmonary Hypertension     [ ] Chronic A-fib     [ ] Persistet A-fib     [ ] Permanent A-fib     [ ] Paroxysmal A-fib     [x ] Hypertensive Heart Disease  -------------------------------------------------------------------  [ ] Respiratory failure  [ ] Acute cor pulmonale  [ ] Asthma/COPD Exacerbation  [ ]COPD on home O2 (Chronic renal Failure)   [ ] Pleural effusion  [ ] Aspiration pneumonia  [ ] Obstructive Sleep Apnea  [ ]Atelectasis   [ ] Acute PE   -------------------------------------------------------------------  [x  ]Acute Kidney Injury      [  ]Acute Tubular Necrosis      [  ]Reneal Medullary Necrosis     [  ]Renal Cortical Necrosis     [  ]Other Pathological Lesions:    [  ]CKD 1  [  ]CKD 2  [  ]CKD 3  [  ]CKD 4  [  ]CKD 5 (ESRD)  [  ]Other  -------------------------------------------------------------------  [  ]Diabetes  [  ] Diabetic PVD Ulcer  [  ] Neuropathic ulcer to DM  [  ] Diabetes with Nephropathy  [  ] Osteomyelitis due to diabetes  [  ] Hyperglycemia   [  ]hypoglycemia   --------------------------------------------------------------------  [  ]Malnutrition: See Nutrition Note  [  ]Cachexia  [  ]Other:   [  ]Supplement Ordered:  [  ]Morbid Obesity (BMI >=40]  [ ] Ileus  ---------------------------------------------------------------------  [ ] Sepsis/severe sepsis/septic shock  [ ] Noninfectious SIRS  [ ] UTI  [ ] Pneumonia  [ ] Thrombophlebitis     -----------------------------------------------------------------------  [ ] Acidosis/alkalosis  [ ] Fluid overload  [ ] Hypokalemia  [ ] Hyperkalemia  [ ] Hypomagnesemia  [ ] Hypophosphatemia  [ ] Hyperphosphatemia  ------------------------------------------------------------------------  [ ] Acute blood loss anemia  [ ] Post op blood loss anemia  [ ] Iron deficiency anemia  [ ] Anemia due to chronic disease  [ ] Hypercoagulable state  [ ] Thrombocytopenia  ----------------------------------------------------------------------  [ ] Cerebral infarction  [ ] Transient ischemia attack  [ ] Encephalopathy - Toxic or Metabolic    A/P: 69 yoM w/ PMHx HLD, anemia (follows w/ heme, Dr. Wong), arthritis, COPD, BPH (s/p TURP), s/p L THR, AVR and ascending and hemiarch replacement w/ Dr. 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 Patient presented c/o lightheadedness, weakness, loss of appetite, weight loss. CTA at [facility] shows no endoleak. Lab significant for hgb 7.6, Na 130, Cr 2.68, BNP 38,000. Patient s/p 1u PRBC in ED with mild improvement of weakness.    Vascular:  - Plan for Endoleak repair 9/30/24, admit to SICU  - s/p FEVAR and L Renal artery stent 8/18/24  - renal artery duplex neg for stenosis, 1.1 cm cyst lower pole R kidney  - c/w aspirin and statin    HTN/HLD:  - c/w nifedipine  - c/w atorvastatin  - Incr coreg to 6.25 BID per hospitalist  - ECHO 9/25 EF 50-55%, Bioprosthetic valve is seen in the aortic position    ANKITA:  - baseline Cr 0.7-1.0  - Cr on admission 2.64, peaked at 3.89, now continuing to downtrend, monitor Cr with daily BMP  - multifactorial from poor PO intake vs. NSAID use vs. contrast load at OSH (CTA)  - pending autoimmune work up  - hiv neg, hepatitis panel neg  - nephro consult, appreciate recs    Anemia:   - follows w/ outpt Hematologist, Dr. Wong - rec liver US to r/o cirrhosis for hx of alcoholism   - s/p 1u PRBC 9/24, 1u PRBC 9/26  - f/u B12/folate, fibrinogen  - heme consulted, appreciate recs    COPD:  - c/w Spiriva and Advair inhaler    GERD:  - c/w PPI    Anxiety:  - c/w zoloft    Diet: renal  Activity: as tolerated  DVTPPx: SQH  Dispo: 5 Uris, pending procedure   Subjective: Pt feeling well this morning, continues to be on droplet precautions s/p exposure to covid 9/27. Despite pt being asymptomatic and having neg covid test, per epidemiology will need isolation until 5 days after exposure (10/2). Pt has no complaints at this time. NPO for OR today endoleak repair.    ROS:   Denies Headache, blurred vision, Chest Pain, SOB, Abdominal pain, nausea or vomiting     Social   aspirin  chewable 81  carvedilol 6.25  heparin   Injectable 5000  NIFEdipine XL 90      Allergies    No Known Allergies    Intolerances        Vital Signs Last 24 Hrs  T(C): 36.4 (30 Sep 2024 05:00), Max: 36.5 (29 Sep 2024 09:09)  T(F): 97.6 (30 Sep 2024 05:00), Max: 97.7 (29 Sep 2024 09:09)  HR: 80 (30 Sep 2024 04:14) (77 - 88)  BP: 156/78 (30 Sep 2024 04:14) (139/96 - 180/81)  BP(mean): 110 (30 Sep 2024 04:14) (110 - 124)  RR: 16 (30 Sep 2024 04:14) (16 - 18)  SpO2: 96% (30 Sep 2024 04:14) (92% - 98%)    Parameters below as of 30 Sep 2024 04:14  Patient On (Oxygen Delivery Method): room air      I&O's Summary    29 Sep 2024 07:01  -  30 Sep 2024 07:00  --------------------------------------------------------  IN: 450 mL / OUT: 1400 mL / NET: -950 mL        Physical Exam:  General: NAD, pt resting comfortably in bed  Pulm: No respiratory distress, nonlabored breathing, on room air  CVS: NSR, HDS  Abd: Soft, NT, ND  Extremities: WWP      LABS:                        9.6    5.71  )-----------( 143      ( 29 Sep 2024 07:09 )             29.7     09-29    133[L]  |  104  |  57[H]  ----------------------------<  126[H]  3.5   |  20[L]  |  2.24[H]    Ca    9.2      29 Sep 2024 07:09  Phos  4.0     09-29  Mg     1.7     09-29          Radiology and Additional Studies:      ------------------------------------------------------------------------  PLEASE CHECK WHEN PRESENT:     [  ]Heart Failure     [  ] Acute     [  ] Acute on Chronic     [  ] Chronic  -------------------------------------------------------------------     [  ]Diastolic [HFpEF]     [  ]Systolic [HFrEF]     [  ]Combined [HFpEF & HFrEF]  .................................................................................     [  ]Other:     [ ] Pulmonary Hypertension     [ ] Chronic A-fib     [ ] Persistet A-fib     [ ] Permanent A-fib     [ ] Paroxysmal A-fib     [x ] Hypertensive Heart Disease  -------------------------------------------------------------------  [ ] Respiratory failure  [ ] Acute cor pulmonale  [ ] Asthma/COPD Exacerbation  [ ]COPD on home O2 (Chronic renal Failure)   [ ] Pleural effusion  [ ] Aspiration pneumonia  [ ] Obstructive Sleep Apnea  [ ]Atelectasis   [ ] Acute PE   -------------------------------------------------------------------  [x  ]Acute Kidney Injury      [  ]Acute Tubular Necrosis      [  ]Reneal Medullary Necrosis     [  ]Renal Cortical Necrosis     [  ]Other Pathological Lesions:    [  ]CKD 1  [  ]CKD 2  [  ]CKD 3  [  ]CKD 4  [  ]CKD 5 (ESRD)  [  ]Other  -------------------------------------------------------------------  [  ]Diabetes  [  ] Diabetic PVD Ulcer  [  ] Neuropathic ulcer to DM  [  ] Diabetes with Nephropathy  [  ] Osteomyelitis due to diabetes  [  ] Hyperglycemia   [  ]hypoglycemia   --------------------------------------------------------------------  [  ]Malnutrition: See Nutrition Note  [  ]Cachexia  [  ]Other:   [  ]Supplement Ordered:  [  ]Morbid Obesity (BMI >=40]  [ ] Ileus  ---------------------------------------------------------------------  [ ] Sepsis/severe sepsis/septic shock  [ ] Noninfectious SIRS  [ ] UTI  [ ] Pneumonia  [ ] Thrombophlebitis     -----------------------------------------------------------------------  [ ] Acidosis/alkalosis  [ ] Fluid overload  [ ] Hypokalemia  [ ] Hyperkalemia  [ ] Hypomagnesemia  [ ] Hypophosphatemia  [ ] Hyperphosphatemia  ------------------------------------------------------------------------  [ ] Acute blood loss anemia  [ ] Post op blood loss anemia  [ ] Iron deficiency anemia  [ ] Anemia due to chronic disease  [ ] Hypercoagulable state  [ ] Thrombocytopenia  ----------------------------------------------------------------------  [ ] Cerebral infarction  [ ] Transient ischemia attack  [ ] Encephalopathy - Toxic or Metabolic    A/P: 69 yoM w/ PMHx HLD, anemia (follows w/ heme, Dr. Wong), arthritis, COPD, BPH (s/p TURP), s/p L THR, AVR and ascending and hemiarch replacement w/ Dr. 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 Patient presented c/o lightheadedness, weakness, loss of appetite, weight loss. CTA at [facility] shows no endoleak. Lab significant for hgb 7.6, Na 130, Cr 2.68, BNP 38,000. Patient s/p 1u PRBC in ED with mild improvement of weakness.    Vascular:  - Plan for Endoleak repair 9/30/24  - s/p FEVAR and L Renal artery stent 8/18/24  - renal artery duplex neg for stenosis, 1.1 cm cyst lower pole R kidney  - c/w aspirin and statin    HTN/HLD:  - c/w nifedipine  - c/w atorvastatin  - Incr coreg to 6.25 BID per hospitalist  - ECHO 9/25 EF 50-55%, Bioprosthetic valve is seen in the aortic position    ANKITA:  - baseline Cr 0.7-1.0  - Cr on admission 2.64, peaked at 3.89, now continuing to downtrend, monitor Cr with daily BMP  - multifactorial from poor PO intake vs. NSAID use vs. contrast load at OSH (CTA)  - pending autoimmune work up  - hiv neg, hepatitis panel neg  - nephro consult, appreciate recs    Anemia:   - follows w/ outpt Hematologist, Dr. Wong - rec liver US to r/o cirrhosis for hx of alcoholism   - s/p 1u PRBC 9/24, 1u PRBC 9/26  - f/u B12/folate, fibrinogen  - heme consulted, appreciate recs    COPD:  - c/w Spiriva and Advair inhaler    GERD:  - c/w PPI    Anxiety:  - c/w zoloft    Diet: renal  Activity: as tolerated  DVTPPx: SQH  Dispo: 5 Uris, pending procedure

## 2024-10-01 ENCOUNTER — TRANSCRIPTION ENCOUNTER (OUTPATIENT)
Age: 69
End: 2024-10-01

## 2024-10-01 LAB
% ALBUMIN: 37.6 % — SIGNIFICANT CHANGE UP
% ALPHA 1: 5.9 % — SIGNIFICANT CHANGE UP
% ALPHA 2: 8.5 % — SIGNIFICANT CHANGE UP
% BETA: 7.4 % — SIGNIFICANT CHANGE UP
% GAMMA: 40.6 % — SIGNIFICANT CHANGE UP
% M SPIKE: 38.3 % — SIGNIFICANT CHANGE UP
ALBUMIN SERPL ELPH-MCNC: 3.3 G/DL — LOW (ref 3.6–5.5)
ALBUMIN/GLOB SERPL ELPH: 0.6 RATIO — SIGNIFICANT CHANGE UP
ALPHA1 GLOB SERPL ELPH-MCNC: 0.5 G/DL — HIGH (ref 0.1–0.4)
ALPHA2 GLOB SERPL ELPH-MCNC: 0.8 G/DL — SIGNIFICANT CHANGE UP (ref 0.5–1)
ANA TITR SER: NEGATIVE — SIGNIFICANT CHANGE UP
ANION GAP SERPL CALC-SCNC: 12 MMOL/L — SIGNIFICANT CHANGE UP (ref 5–17)
APPEARANCE UR: CLEAR — SIGNIFICANT CHANGE UP
B-GLOBULIN SERPL ELPH-MCNC: 0.7 G/DL — SIGNIFICANT CHANGE UP (ref 0.5–1)
BACTERIA # UR AUTO: ABNORMAL /HPF
BILIRUB UR-MCNC: NEGATIVE — SIGNIFICANT CHANGE UP
BUN SERPL-MCNC: 47 MG/DL — HIGH (ref 7–23)
CALCIUM SERPL-MCNC: 9 MG/DL — SIGNIFICANT CHANGE UP (ref 8.4–10.5)
CAST: 4 /LPF — SIGNIFICANT CHANGE UP (ref 0–4)
CHLORIDE SERPL-SCNC: 100 MMOL/L — SIGNIFICANT CHANGE UP (ref 96–108)
CO2 SERPL-SCNC: 19 MMOL/L — LOW (ref 22–31)
COLOR SPEC: YELLOW — SIGNIFICANT CHANGE UP
CREAT ?TM UR-MCNC: 73 MG/DL — SIGNIFICANT CHANGE UP
CREAT SERPL-MCNC: 2.1 MG/DL — HIGH (ref 0.5–1.3)
CRYOGLOB SERPL-MCNC: NEGATIVE — SIGNIFICANT CHANGE UP
DIFF PNL FLD: ABNORMAL
EGFR: 33 ML/MIN/1.73M2 — LOW
GAMMA GLOBULIN: 3.6 G/DL — HIGH (ref 0.6–1.6)
GLUCOSE SERPL-MCNC: 92 MG/DL — SIGNIFICANT CHANGE UP (ref 70–99)
GLUCOSE UR QL: NEGATIVE MG/DL — SIGNIFICANT CHANGE UP
HCT VFR BLD CALC: 26.5 % — LOW (ref 39–50)
HGB BLD-MCNC: 8.3 G/DL — LOW (ref 13–17)
INTERPRETATION SERPL IFE-IMP: SIGNIFICANT CHANGE UP
KAPPA LC SER QL IFE: 2.98 MG/DL — HIGH (ref 0.33–1.94)
KAPPA/LAMBDA FREE LIGHT CHAIN RATIO, SERUM: 0.1 RATIO — LOW (ref 0.26–1.65)
KETONES UR-MCNC: ABNORMAL MG/DL
LAMBDA LC SER QL IFE: 29.25 MG/DL — HIGH (ref 0.57–2.63)
LEUKOCYTE ESTERASE UR-ACNC: NEGATIVE — SIGNIFICANT CHANGE UP
M-SPIKE: 3.4 G/DL — HIGH (ref 0–0)
MAGNESIUM SERPL-MCNC: 2 MG/DL — SIGNIFICANT CHANGE UP (ref 1.6–2.6)
MCHC RBC-ENTMCNC: 28.7 PG — SIGNIFICANT CHANGE UP (ref 27–34)
MCHC RBC-ENTMCNC: 31.3 GM/DL — LOW (ref 32–36)
MCV RBC AUTO: 91.7 FL — SIGNIFICANT CHANGE UP (ref 80–100)
NITRITE UR-MCNC: NEGATIVE — SIGNIFICANT CHANGE UP
NRBC # BLD: 0 /100 WBCS — SIGNIFICANT CHANGE UP (ref 0–0)
OSMOLALITY UR: 404 MOSM/KG — SIGNIFICANT CHANGE UP (ref 300–900)
PH UR: 5.5 — SIGNIFICANT CHANGE UP (ref 5–8)
PHOSPHATE SERPL-MCNC: 4.7 MG/DL — HIGH (ref 2.5–4.5)
PLATELET # BLD AUTO: 118 K/UL — LOW (ref 150–400)
POTASSIUM SERPL-MCNC: 3.7 MMOL/L — SIGNIFICANT CHANGE UP (ref 3.5–5.3)
POTASSIUM SERPL-SCNC: 3.7 MMOL/L — SIGNIFICANT CHANGE UP (ref 3.5–5.3)
POTASSIUM UR-SCNC: 43 MMOL/L — SIGNIFICANT CHANGE UP
PROT ?TM UR-MCNC: 501 MG/DL — SIGNIFICANT CHANGE UP (ref 0–12)
PROT PATTERN SERPL ELPH-IMP: SIGNIFICANT CHANGE UP
PROT SERPL-MCNC: 8.9 G/DL — HIGH (ref 6–8.3)
PROT UR-MCNC: 300 MG/DL
PROT/CREAT UR-RTO: 6.9 RATIO — SIGNIFICANT CHANGE UP (ref 0–0.2)
RBC # BLD: 2.89 M/UL — LOW (ref 4.2–5.8)
RBC # FLD: 17.6 % — HIGH (ref 10.3–14.5)
RBC CASTS # UR COMP ASSIST: 4 /HPF — SIGNIFICANT CHANGE UP (ref 0–4)
SODIUM SERPL-SCNC: 131 MMOL/L — LOW (ref 135–145)
SODIUM UR-SCNC: 32 MMOL/L — SIGNIFICANT CHANGE UP
SP GR SPEC: >1.03 — HIGH (ref 1–1.03)
SQUAMOUS # UR AUTO: 1 /HPF — SIGNIFICANT CHANGE UP (ref 0–5)
UROBILINOGEN FLD QL: 0.2 MG/DL — SIGNIFICANT CHANGE UP (ref 0.2–1)
UUN UR-MCNC: 565 MG/DL — SIGNIFICANT CHANGE UP
WBC # BLD: 4.22 K/UL — SIGNIFICANT CHANGE UP (ref 3.8–10.5)
WBC # FLD AUTO: 4.22 K/UL — SIGNIFICANT CHANGE UP (ref 3.8–10.5)
WBC UR QL: 7 /HPF — HIGH (ref 0–5)

## 2024-10-01 PROCEDURE — 99233 SBSQ HOSP IP/OBS HIGH 50: CPT

## 2024-10-01 RX ORDER — ALBUTEROL 90 MCG
2.5 AEROSOL (GRAM) INHALATION EVERY 8 HOURS
Refills: 0 | Status: DISCONTINUED | OUTPATIENT
Start: 2024-10-01 | End: 2024-10-03

## 2024-10-01 RX ADMIN — ATORVASTATIN CALCIUM 80 MILLIGRAM(S): 10 TABLET, FILM COATED ORAL at 22:46

## 2024-10-01 RX ADMIN — Medication 12.5 MILLIGRAM(S): at 17:08

## 2024-10-01 RX ADMIN — Medication 1 DOSE(S): at 22:47

## 2024-10-01 RX ADMIN — Medication 1 DOSE(S): at 08:54

## 2024-10-01 RX ADMIN — Medication 81 MILLIGRAM(S): at 12:20

## 2024-10-01 RX ADMIN — Medication 12.5 MILLIGRAM(S): at 05:17

## 2024-10-01 RX ADMIN — TIOTROPIUM BROMIDE INHALATION SPRAY 2 PUFF(S): 3.12 SPRAY, METERED RESPIRATORY (INHALATION) at 12:19

## 2024-10-01 RX ADMIN — Medication 90 MILLIGRAM(S): at 05:17

## 2024-10-01 RX ADMIN — SERTRALINE HYDROCHLORIDE 25 MILLIGRAM(S): 100 TABLET, FILM COATED ORAL at 12:20

## 2024-10-01 RX ADMIN — Medication 5000 UNIT(S): at 05:17

## 2024-10-01 RX ADMIN — PANTOPRAZOLE SODIUM 40 MILLIGRAM(S): 40 TABLET, DELAYED RELEASE ORAL at 05:17

## 2024-10-01 RX ADMIN — Medication 5000 UNIT(S): at 14:50

## 2024-10-01 RX ADMIN — Medication 5000 UNIT(S): at 22:46

## 2024-10-01 NOTE — PROGRESS NOTE ADULT - ATTENDING COMMENTS
events noted, chart reviewed  70 yo M with ANKITA, s/p IV radiocontrast; baseline 1.2 peak 2.6 on admission  also with nephrotic range proteinuria- pt reports that he did not know he had protein in the urine- then offers that he gets protein in urine with exercise-   also from EHR u/a without proteinuria in 2022  PMH sign. for s/p recent endovascular repair and stent in L renal artery.  Pt s/p balloon angioplasty of left renal stent yesterday with good resolution of stenosis.     creat Peak 2.24,  now starting to downtrend  P/C ratio 6.9 (from 13)- serologies neg to date- needs SPEP and serum free light chains

## 2024-10-01 NOTE — PROGRESS NOTE ADULT - SUBJECTIVE AND OBJECTIVE BOX
------------------------------------------------------------------------  PLEASE CHECK WHEN PRESENT:     [  ]Heart Failure     [  ] Acute     [  ] Acute on Chronic     [  ] Chronic  -------------------------------------------------------------------     [  ]Diastolic [HFpEF]     [  ]Systolic [HFrEF]     [  ]Combined [HFpEF & HFrEF]  .................................................................................     [  ]Other:     [ ] Pulmonary Hypertension     [ ] Chronic A-fib     [ ] Persistet A-fib     [ ] Permanent A-fib     [ ] Paroxysmal A-fib     [x ] Hypertensive Heart Disease  -------------------------------------------------------------------  [ ] Respiratory failure  [ ] Acute cor pulmonale  [ ] Asthma/COPD Exacerbation  [ ]COPD on home O2 (Chronic renal Failure)   [ ] Pleural effusion  [ ] Aspiration pneumonia  [ ] Obstructive Sleep Apnea  [ ]Atelectasis   [ ] Acute PE   -------------------------------------------------------------------  [x  ]Acute Kidney Injury      [  ]Acute Tubular Necrosis      [  ]Reneal Medullary Necrosis     [  ]Renal Cortical Necrosis     [  ]Other Pathological Lesions:    [  ]CKD 1  [  ]CKD 2  [  ]CKD 3  [  ]CKD 4  [  ]CKD 5 (ESRD)  [  ]Other  -------------------------------------------------------------------  [  ]Diabetes  [  ] Diabetic PVD Ulcer  [  ] Neuropathic ulcer to DM  [  ] Diabetes with Nephropathy  [  ] Osteomyelitis due to diabetes  [  ] Hyperglycemia   [  ]hypoglycemia   --------------------------------------------------------------------  [  ]Malnutrition: See Nutrition Note  [  ]Cachexia  [  ]Other:   [  ]Supplement Ordered:  [  ]Morbid Obesity (BMI >=40]  [ ] Ileus  ---------------------------------------------------------------------  [ ] Sepsis/severe sepsis/septic shock  [ ] Noninfectious SIRS  [ ] UTI  [ ] Pneumonia  [ ] Thrombophlebitis     -----------------------------------------------------------------------  [ ] Acidosis/alkalosis  [ ] Fluid overload  [ ] Hypokalemia  [ ] Hyperkalemia  [ ] Hypomagnesemia  [ ] Hypophosphatemia  [ ] Hyperphosphatemia  ------------------------------------------------------------------------  [ ] Acute blood loss anemia  [ ] Post op blood loss anemia  [ ] Iron deficiency anemia  [ ] Anemia due to chronic disease  [ ] Hypercoagulable state  [ ] Thrombocytopenia  ----------------------------------------------------------------------  [ ] Cerebral infarction  [ ] Transient ischemia attack  [ ] Encephalopathy - Toxic or Metabolic    A/P: 69 yoM w/ PMHx HLD, anemia (follows w/ heme, Dr. Wong), arthritis, COPD, BPH (s/p TURP), s/p L THR, AVR and ascending and hemiarch replacement w/ Dr. 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 Patient presented c/o lightheadedness, weakness, loss of appetite, weight loss. [facility]A at [facility] shows no endoleak. Lab significant for hgb 7.6, Na 130, Cr 2.68, BNP 38,000. Patient s/p 1u PRBC in ED with mild improvement of weakness.    Vascular:  - Plan for Endoleak repair 9/30/24  - s/p FEVAR and L Renal artery stent 8/18/24  - renal artery duplex neg for stenosis, 1.1 cm cyst lower pole R kidney  - c/w aspirin and statin    HTN/HLD:  - c/w nifedipine  - c/w atorvastatin  - Incr coreg to 12.5 BID per hospitalist  - ECHO 9/25 EF 50-55%, Bioprosthetic valve is seen in the aortic position    ANKITA:  - baseline Cr 0.7-1.0  - Cr on admission 2.64, peaked at 3.89, now continuing to downtrend, monitor Cr with daily BMP  - multifactorial from poor PO intake vs. NSAID use vs. contrast load at OSH (CTA)  - pending autoimmune work up  - hiv neg, hepatitis panel neg  - nephro consult, appreciate recs    Anemia:   - follows w/ outpt Hematologist, Dr. Wong - rec liver US to r/o cirrhosis for hx of alcoholism   - s/p 1u PRBC 9/24, 1u PRBC 9/26  - b12/folate wnl  - heme consulted, appreciate recs    COPD:  - c/w Spiriva and Advair inhaler    GERD:  - c/w PPI    Anxiety:  - c/w zoloft    Diet: renal  Activity: as tolerated  DVTPPx: SQH  PT eval: ordered  Dispo: 5 Uris     Subjective: Pt feeling well this morning, continues to be on droplet precautions s/p exposure to covid 9/27. Despite pt being asymptomatic and having neg covid test, per epidemiology will need isolation until 5 days after exposure (10/2). Doing well s/p endoleak repair, pain controlled at incision site. No new complaints.     ROS:   Denies Headache, blurred vision, Chest Pain, SOB, Abdominal pain, nausea or vomiting     Social   aspirin  chewable 81  carvedilol 12.5  heparin   Injectable 5000  NIFEdipine XL 90      Allergies    No Known Allergies    Intolerances        Vital Signs Last 24 Hrs  T(C): 36.7 (01 Oct 2024 05:16), Max: 36.8 (30 Sep 2024 20:22)  T(F): 98 (01 Oct 2024 05:16), Max: 98.2 (30 Sep 2024 20:22)  HR: 82 (01 Oct 2024 05:16) (77 - 98)  BP: 146/69 (01 Oct 2024 05:16) (137/75 - 173/86)  BP(mean): 97 (01 Oct 2024 05:16) (97 - 126)  RR: 17 (01 Oct 2024 05:16) (16 - 18)  SpO2: 94% (01 Oct 2024 05:16) (92% - 96%)    Parameters below as of 01 Oct 2024 05:16  Patient On (Oxygen Delivery Method): room air      I&O's Summary    30 Sep 2024 07:01  -  01 Oct 2024 07:00  --------------------------------------------------------  IN: 400 mL / OUT: 1800 mL / NET: -1400 mL        Physical Exam:  Gen: NAD, resting comfortably in bed  C/V: NSR  Pulm: Nonlabored breathing, no respiratory distress  Abd: soft, NT/ND  Extrem: WWP, no calf edema, SCDs in place. Groin incision soft, flat, no evidence of hematoma or infx    LABS:                        9.4    4.99  )-----------( 136      ( 30 Sep 2024 20:38 )             29.3     09-30    131[L]  |  100  |  49[H]  ----------------------------<  128[H]  4.2   |  20[L]  |  2.16[H]    Ca    9.1      30 Sep 2024 20:38  Phos  4.3     09-30  Mg     2.0     09-30      PT/INR - ( 30 Sep 2024 20:38 )   PT: 13.4 sec;   INR: 1.15          PTT - ( 30 Sep 2024 20:38 )  PTT:33.8 sec    Radiology and Additional Studies:    ------------------------------------------------------------------------  PLEASE CHECK WHEN PRESENT:     [  ]Heart Failure     [  ] Acute     [  ] Acute on Chronic     [  ] Chronic  -------------------------------------------------------------------     [  ]Diastolic [HFpEF]     [  ]Systolic [HFrEF]     [  ]Combined [HFpEF & HFrEF]  .................................................................................     [  ]Other:     [ ] Pulmonary Hypertension     [ ] Chronic A-fib     [ ] Persistet A-fib     [ ] Permanent A-fib     [ ] Paroxysmal A-fib     [x ] Hypertensive Heart Disease  -------------------------------------------------------------------  [ ] Respiratory failure  [ ] Acute cor pulmonale  [ ] Asthma/COPD Exacerbation  [ ]COPD on home O2 (Chronic renal Failure)   [ ] Pleural effusion  [ ] Aspiration pneumonia  [ ] Obstructive Sleep Apnea  [ ]Atelectasis   [ ] Acute PE   -------------------------------------------------------------------  [x  ]Acute Kidney Injury      [  ]Acute Tubular Necrosis      [  ]Reneal Medullary Necrosis     [  ]Renal Cortical Necrosis     [  ]Other Pathological Lesions:    [  ]CKD 1  [  ]CKD 2  [  ]CKD 3  [  ]CKD 4  [  ]CKD 5 (ESRD)  [  ]Other  -------------------------------------------------------------------  [  ]Diabetes  [  ] Diabetic PVD Ulcer  [  ] Neuropathic ulcer to DM  [  ] Diabetes with Nephropathy  [  ] Osteomyelitis due to diabetes  [  ] Hyperglycemia   [  ]hypoglycemia   --------------------------------------------------------------------  [  ]Malnutrition: See Nutrition Note  [  ]Cachexia  [  ]Other:   [  ]Supplement Ordered:  [  ]Morbid Obesity (BMI >=40]  [ ] Ileus  ---------------------------------------------------------------------  [ ] Sepsis/severe sepsis/septic shock  [ ] Noninfectious SIRS  [ ] UTI  [ ] Pneumonia  [ ] Thrombophlebitis     -----------------------------------------------------------------------  [ ] Acidosis/alkalosis  [ ] Fluid overload  [ ] Hypokalemia  [ ] Hyperkalemia  [ ] Hypomagnesemia  [ ] Hypophosphatemia  [ ] Hyperphosphatemia  ------------------------------------------------------------------------  [ ] Acute blood loss anemia  [ ] Post op blood loss anemia  [ ] Iron deficiency anemia  [ ] Anemia due to chronic disease  [ ] Hypercoagulable state  [ ] Thrombocytopenia  ----------------------------------------------------------------------  [ ] Cerebral infarction  [ ] Transient ischemia attack  [ ] Encephalopathy - Toxic or Metabolic    A/P: 69 yoM w/ PMHx HLD, anemia (follows w/ heme, Dr. Wong), arthritis, COPD, BPH (s/p TURP), s/p L THR, AVR and ascending and hemiarch replacement w/ Dr. 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 Patient presented c/o lightheadedness, weakness, loss of appetite, weight loss. CTA at [facility] shows no endoleak. Lab significant for hgb 7.6, Na 130, Cr 2.68, BNP 38,000. Patient s/p 1u PRBC in ED with mild improvement of weakness. Now s/p endoleak repair 9/30/24, progressing well postoperatively.     Vascular:  - s/p Endoleak repair 9/30/24  - s/p FEVAR and L Renal artery stent 8/18/24  - renal artery duplex neg for stenosis, 1.1 cm cyst lower pole R kidney  - c/w aspirin and statin    HTN/HLD:  - c/w nifedipine  - c/w atorvastatin  - c/w coreg 12.5 BID  - ECHO 9/25 EF 50-55%, Bioprosthetic valve is seen in the aortic position    ANKITA:  - baseline Cr 0.7-1.0  - Cr on admission 2.64, peaked at 3.89, now continuing to downtrend, monitor Cr with daily BMP  - multifactorial from poor PO intake vs. NSAID use vs. contrast load at OSH (CTA)  - pending autoimmune work up  - hiv neg, hepatitis panel neg  - nephro consult, appreciate recs    Anemia:   - follows w/ outpt Hematologist, Dr. Wong - rec liver US to r/o cirrhosis for hx of alcoholism   - s/p 1u PRBC 9/24, 1u PRBC 9/26  - b12/folate wnl  - heme consulted, appreciate recs    COPD:  - c/w Spiriva and Advair inhaler    GERD:  - c/w PPI    Anxiety:  - c/w zoloft    Diet: renal  Activity: as tolerated  DVTPPx: SQH  PT eval: ordered  Dispo: 5 Uris     Subjective: Pt feeling well this morning, continues to be on droplet precautions s/p exposure to covid 9/27. Despite pt being asymptomatic and having neg covid test, per epidemiology will need isolation until 5 days after exposure (10/2). Doing well POD1 s/p endoleak repair, pain controlled at incision site. No new complaints.     ROS:   Denies Headache, blurred vision, Chest Pain, SOB, Abdominal pain, nausea or vomiting     Social   aspirin  chewable 81  carvedilol 12.5  heparin   Injectable 5000  NIFEdipine XL 90      Allergies    No Known Allergies    Intolerances        Vital Signs Last 24 Hrs  T(C): 36.7 (01 Oct 2024 05:16), Max: 36.8 (30 Sep 2024 20:22)  T(F): 98 (01 Oct 2024 05:16), Max: 98.2 (30 Sep 2024 20:22)  HR: 82 (01 Oct 2024 05:16) (77 - 98)  BP: 146/69 (01 Oct 2024 05:16) (137/75 - 173/86)  BP(mean): 97 (01 Oct 2024 05:16) (97 - 126)  RR: 17 (01 Oct 2024 05:16) (16 - 18)  SpO2: 94% (01 Oct 2024 05:16) (92% - 96%)    Parameters below as of 01 Oct 2024 05:16  Patient On (Oxygen Delivery Method): room air      I&O's Summary    30 Sep 2024 07:01  -  01 Oct 2024 07:00  --------------------------------------------------------  IN: 400 mL / OUT: 1800 mL / NET: -1400 mL        Physical Exam:  Gen: NAD, resting comfortably in bed  C/V: NSR  Pulm: Nonlabored breathing, no respiratory distress  Abd: soft, NT/ND  Extrem: WWP, no calf edema, SCDs in place. Groin incision soft, flat, no evidence of hematoma or infx    LABS:                        9.4    4.99  )-----------( 136      ( 30 Sep 2024 20:38 )             29.3     09-30    131[L]  |  100  |  49[H]  ----------------------------<  128[H]  4.2   |  20[L]  |  2.16[H]    Ca    9.1      30 Sep 2024 20:38  Phos  4.3     09-30  Mg     2.0     09-30      PT/INR - ( 30 Sep 2024 20:38 )   PT: 13.4 sec;   INR: 1.15          PTT - ( 30 Sep 2024 20:38 )  PTT:33.8 sec    Radiology and Additional Studies:    ------------------------------------------------------------------------  PLEASE CHECK WHEN PRESENT:     [  ]Heart Failure     [  ] Acute     [  ] Acute on Chronic     [  ] Chronic  -------------------------------------------------------------------     [  ]Diastolic [HFpEF]     [  ]Systolic [HFrEF]     [  ]Combined [HFpEF & HFrEF]  .................................................................................     [  ]Other:     [ ] Pulmonary Hypertension     [ ] Chronic A-fib     [ ] Persistet A-fib     [ ] Permanent A-fib     [ ] Paroxysmal A-fib     [x ] Hypertensive Heart Disease  -------------------------------------------------------------------  [ ] Respiratory failure  [ ] Acute cor pulmonale  [ ] Asthma/COPD Exacerbation  [ ]COPD on home O2 (Chronic renal Failure)   [ ] Pleural effusion  [ ] Aspiration pneumonia  [ ] Obstructive Sleep Apnea  [ ]Atelectasis   [ ] Acute PE   -------------------------------------------------------------------  [x  ]Acute Kidney Injury      [  ]Acute Tubular Necrosis      [  ]Reneal Medullary Necrosis     [  ]Renal Cortical Necrosis     [  ]Other Pathological Lesions:    [  ]CKD 1  [  ]CKD 2  [  ]CKD 3  [  ]CKD 4  [  ]CKD 5 (ESRD)  [  ]Other  -------------------------------------------------------------------  [  ]Diabetes  [  ] Diabetic PVD Ulcer  [  ] Neuropathic ulcer to DM  [  ] Diabetes with Nephropathy  [  ] Osteomyelitis due to diabetes  [  ] Hyperglycemia   [  ]hypoglycemia   --------------------------------------------------------------------  [  ]Malnutrition: See Nutrition Note  [  ]Cachexia  [  ]Other:   [  ]Supplement Ordered:  [  ]Morbid Obesity (BMI >=40]  [ ] Ileus  ---------------------------------------------------------------------  [ ] Sepsis/severe sepsis/septic shock  [ ] Noninfectious SIRS  [ ] UTI  [ ] Pneumonia  [ ] Thrombophlebitis     -----------------------------------------------------------------------  [ ] Acidosis/alkalosis  [ ] Fluid overload  [ ] Hypokalemia  [ ] Hyperkalemia  [ ] Hypomagnesemia  [ ] Hypophosphatemia  [ ] Hyperphosphatemia  ------------------------------------------------------------------------  [ ] Acute blood loss anemia  [ ] Post op blood loss anemia  [ ] Iron deficiency anemia  [ ] Anemia due to chronic disease  [ ] Hypercoagulable state  [ ] Thrombocytopenia  ----------------------------------------------------------------------  [ ] Cerebral infarction  [ ] Transient ischemia attack  [ ] Encephalopathy - Toxic or Metabolic    A/P: 69 yoM w/ PMHx HLD, anemia (follows w/ heme, Dr. Wong), arthritis, COPD, BPH (s/p TURP), s/p L THR, AVR and ascending and hemiarch replacement w/ Dr. 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 Patient presented c/o lightheadedness, weakness, loss of appetite, weight loss. CTA at [facility] shows no endoleak. Lab significant for hgb 7.6, Na 130, Cr 2.68, BNP 38,000. Patient s/p 1u PRBC in ED with mild improvement of weakness. Now POD1 s/p endoleak repair with renal balloon stenting 9/30/24, progressing well postoperatively.     Vascular:  - s/p Endoleak repair 9/30/24  - s/p FEVAR and L Renal artery stent 8/18/24  - renal artery duplex neg for stenosis, 1.1 cm cyst lower pole R kidney  - c/w aspirin and statin    HTN/HLD:  - c/w nifedipine  - c/w atorvastatin  - c/w coreg 12.5 BID  - ECHO 9/25 EF 50-55%, Bioprosthetic valve is seen in the aortic position    ANKITA:  - baseline Cr 0.7-1.0  - Cr on admission 2.64, peaked at 3.89, now continuing to downtrend, monitor Cr with daily BMP  - multifactorial from poor PO intake vs. NSAID use vs. contrast load at OSH (CTA)  - pending autoimmune work up  - hiv neg, hepatitis panel neg  - nephro consult, appreciate recs    Anemia:   - follows w/ outpt Hematologist, Dr. Wong  - s/p 1u PRBC 9/24, 1u PRBC 9/26  - b12/folate wnl  - heme consulted, appreciate recs    COPD:  - c/w Spiriva and Advair inhaler    GERD:  - c/w PPI    Anxiety:  - c/w zoloft    Diet: renal  Activity: as tolerated  DVTPPx: SQH  PT eval: ordered  Dispo: 5 Uris

## 2024-10-01 NOTE — PHYSICAL THERAPY INITIAL EVALUATION ADULT - PHYSICAL ASSIST/NONPHYSICAL ASSIST: STAND/SIT, REHAB EVAL
cues for inc eccentric control with sitting, making sure b/l LE are against back of chair for safety/verbal cues/nonverbal cues (demo/gestures)/1 person assist

## 2024-10-01 NOTE — DISCHARGE NOTE PROVIDER - DETAILS OF MALNUTRITION DIAGNOSIS/DIAGNOSES
This patient has been assessed with a concern for Malnutrition and was treated during this hospitalization for the following Nutrition diagnosis/diagnoses:     -  10/02/2024: Moderate protein-calorie malnutrition

## 2024-10-01 NOTE — DISCHARGE NOTE PROVIDER - NSDCMRMEDTOKEN_GEN_ALL_CORE_FT
acetaminophen 325 mg oral capsule: 2 cap(s) orally every 6 hours as needed for  mild pain Take 2-3 tablets every 6 hours, as needed, for pain  aspirin 81 mg oral tablet: 1 tab(s) orally once a day  atorvastatin 80 mg oral tablet: 1 tab(s) orally once a day (at bedtime)  NIFEdipine 90 mg oral tablet, extended release: 1 tab(s) orally once a day  pantoprazole 40 mg oral delayed release tablet: 1 tab(s) orally once a day (before a meal)  senna leaf extract oral tablet: 2 tab(s) orally once a day (at bedtime) as needed for  constipation  Spiriva 18 mcg inhalation capsule: 1 cap(s) inhaled once a day  Wixela Inhub 250 mcg-50 mcg inhalation powder: 1 inhaled once a day  Zoloft 25 mg oral tablet: 1 orally once a day   acetaminophen 325 mg oral capsule: 2 cap(s) orally every 6 hours as needed for  mild pain Take 2-3 tablets every 6 hours, as needed, for pain  aspirin 81 mg oral tablet: 1 tab(s) orally once a day  atorvastatin 80 mg oral tablet: 1 tab(s) orally once a day (at bedtime)  carvedilol 12.5 mg oral tablet: 1 tab(s) orally every 12 hours  NIFEdipine 90 mg oral tablet, extended release: 1 tab(s) orally once a day  pantoprazole 40 mg oral delayed release tablet: 1 tab(s) orally once a day (before a meal)  Paxlovid 300 mg-100 mg Dose Pack oral tablet: 1 tab(s) orally 2 times a day Please take 1 tab of each kind together, 2 times per day, for 5 days.  senna leaf extract oral tablet: 2 tab(s) orally once a day (at bedtime) as needed for  constipation  Spiriva 18 mcg inhalation capsule: 1 cap(s) inhaled once a day  Wixela Inhub 250 mcg-50 mcg inhalation powder: 1 inhaled once a day  Zoloft 25 mg oral tablet: 1 orally once a day   acetaminophen 325 mg oral capsule: 2 cap(s) orally every 6 hours as needed for  mild pain Take 2-3 tablets every 6 hours, as needed, for pain  aspirin 81 mg oral tablet: 1 tab(s) orally once a day  atorvastatin 80 mg oral tablet: 1 tab(s) orally once a day (at bedtime)  carvedilol 12.5 mg oral tablet: 1 tab(s) orally every 12 hours  NIFEdipine 90 mg oral tablet, extended release: 1 tab(s) orally once a day  pantoprazole 40 mg oral delayed release tablet: 1 tab(s) orally once a day (before a meal)  senna leaf extract oral tablet: 2 tab(s) orally once a day (at bedtime) as needed for  constipation  Spiriva 18 mcg inhalation capsule: 1 cap(s) inhaled once a day  Wixela Inhub 250 mcg-50 mcg inhalation powder: 1 inhaled once a day  Zoloft 25 mg oral tablet: 1 orally once a day

## 2024-10-01 NOTE — DISCHARGE NOTE PROVIDER - NSDCFUSCHEDAPPT_GEN_ALL_CORE_FT
Has had slept on answering machine for rehab to schedule a follow-up appointment to discuss her lab work  [facility]  VASCULAR [address]  Scheduled Appointment: 10/08/2024

## 2024-10-01 NOTE — PROGRESS NOTE ADULT - ASSESSMENT
68 y/o M with recent endovascular repair and stent in L renal artery. CR at base line 1.1-1.2. Went to OSH for dizziness and underwent contrast imaging that did not show any endoleak. Eventually transferred to [facility]. Here noted to have CR of 2.6 for which we are consulted. Pt s/p balloon angioplasty of left renal stent yesterday with good resolution of stenosis. SCr overall downtrending, mildly elevated from Sunday (2.24-> 1.92 -> 2.16-> 2.1), likely iso procedural contrast. Nephrotic range proteinuria improving, P/C ratio 6.9 (from 13).    Problem list    -> Nephrotic range proteinuria. 6.9g/g. Albumin 3.1  -> ANKITA on CKD: CR peaked at 3.8. SCr overall improving, today 2.1  -> Anemia. Elevated retic count. Direct Ram test positive. Elevated LDH and normal Haptoglobin.     #Plan  -> Repeat Serum albumin, Urine P/C and A/C  -> GN workup for nephrotic range proteinuria negative so far (including PLA2R), F/U SPEP (sent)  -> SCr improving  -> Volume, acid base okay for time being.   -> Consider renal biopsy this week, hold Aspirin  -> Avoid nephrotoxic drugs  -> strict I/o chart

## 2024-10-01 NOTE — PHYSICAL THERAPY INITIAL EVALUATION ADULT - GAIT DISTANCE, PT EVAL
~30ft within room (d/t covid exposure precautions) ; patient req sitting rest break, further amb deferred d/t inc fatigue

## 2024-10-01 NOTE — PHYSICAL THERAPY INITIAL EVALUATION ADULT - LEVEL OF INDEPENDENCE: STAIR NEGOTIATION, REHAB EVAL
attempted ~8in step up onto lip of shower, patient able to place LLE on step, however unable to complete step up despite modA , deferred further d/t safety/moderate assist (50% patients effort)

## 2024-10-01 NOTE — PHYSICAL THERAPY INITIAL EVALUATION ADULT - IMPAIRED TRANSFERS: SIT/STAND, REHAB EVAL
dec eccentric control, slightly unsteady with no ep of LOB, decreased endurance, crouched posture in standing/impaired balance/impaired postural control/decreased strength

## 2024-10-01 NOTE — PHYSICAL THERAPY INITIAL EVALUATION ADULT - ADDITIONAL COMMENTS
Patient lives with wife in private home +6STE +15 steps within to bedroom. PTA, patient was independent with functional mobility and ADLs, with the use of a straight cane. Recently, patient req assist for stair negotiation. Patient req assist for some IADLs that req fine motor skills, such as opening jars, d/t residual LUE>RUE sided weakness from previous stroke. Patient has a tub shower. Patient has a standard walker.

## 2024-10-01 NOTE — DISCHARGE NOTE PROVIDER - HOSPITAL COURSE
69 yoM w/ PMHx HLD, anemia (follows w/ heme, Dr. Wong), arthritis, COPD, BPH (s/p TURP), s/p L THR, AVR and ascending and hemiarch replacement w/ Dr. 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 Patient presented c/o lightheadedness, weakness, loss of appetite, weight loss. CTA at [facility] shows no endoleak. Lab significant for hgb 7.6, Na 130, Cr 2.68, BNP 38,000. Patient s/p 1u PRBC in ED with mild improvement of weakness. Now s/p endoleak repair 9/30/24, progressing well postoperatively.     ***incomplete 69 yoM w/ PMHx HLD, anemia (follows w/ heme, Dr. Wong), arthritis, COPD, BPH (s/p TURP), aneurysm s/p AVR and ascending and hemiarch replacement w/ Dr. 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   Patient presented to [facility] 9/23/24 c/o lightheadedness, weakness, loss of appetite, weight loss. CTA at [facility] showed no endoleak. He was discharged home and called his PCP in the [facility] who told him to come to [facility] ED.   In the ED, he was lightheaded and had not taken antihypertensives in 2 days. Labs significant for hgb 7.6, Na 130, Cr 2.68, BNP 38,000. Patient rec'd 1u PRBC in ED with mild improvement of weakness. He was admitted to vascular surgery 9/24/24 and medically optimized for the OR. He is now s/p endoleak repair and renal artery balloon stenting 9/30/24 with Dr. Rock. Today patient is feeling well, all the VS and blood work is within normal limits, the pain is well controlled on oral pain medication, tolerating diet without nausea, and ambulating independently - patient is stable and ready for discharge today. 69 yoM w/ PMHx HLD, anemia (follows w/ heme, Dr. Wong), arthritis, COPD, BPH (s/p TURP), aneurysm s/p AVR and ascending and hemiarch replacement w/ Dr. 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   Patient presented to [facility] 9/23/24 c/o lightheadedness, weakness, loss of appetite, weight loss. CTA at [facility] showed no endoleak. He was discharged home and called his PCP in the [place] who told him to come to [facility] ED.   In the ED, he was lightheaded and had not taken antihypertensives in 2 days. Labs significant for hgb 7.6, Na 130, Cr 2.68, BNP 38,000. Patient rec'd 1u PRBC in ED with mild improvement of weakness. He was admitted to vascular surgery 9/24/24 and medically optimized for the OR. He is now s/p endoleak repair and renal artery balloon stenting 9/30/24 with Dr. Rock. Postoperative course was complicated by diagnosis of Covid-19, for which pt was only mildly symptomatic and rec'd remdesivir inpatient. He was also found to be anemic to Hgb 7.7 with symptoms of fatigue, lightheadedness, which improved s/p 1u PRBC.  Today patient is feeling well, all the VS and blood work is within normal limits, the pain is well controlled on oral pain medication, tolerating diet without nausea, and ambulating independently - patient is stable and ready for discharge today.

## 2024-10-01 NOTE — PROGRESS NOTE ADULT - SUBJECTIVE AND OBJECTIVE BOX
***Note in progress***    OVERNIGHT EVENTS: NAEO    SUBJECTIVE / INTERVAL HPI: Patient seen and examined at bedside. Patient denying chest pain, SOB, palpitations, cough.     Remaining ROS negative       PHYSICAL EXAM:  General:NAD, appears comfortable    HEENT:  PERRL, anicteric sclera  Cardiovascular:  RRR  Respiratory: CTA B/L  Gastrointestinal: soft, NT/ND; +BSx4  Extremities: no edema to LE  Vascular: 2+ radial pulses  Neurological: AAOx3; no focal deficits  Psychiatric: pleasant mood and affect  Dermatologic: no appreciable wounds or damage to the skin    VITAL SIGNS:  Vital Signs Last 24 Hrs  T(C): 36.3 (01 Oct 2024 08:50), Max: 36.8 (30 Sep 2024 20:22)  T(F): 97.4 (01 Oct 2024 08:50), Max: 98.2 (30 Sep 2024 20:22)  HR: 72 (01 Oct 2024 08:50) (72 - 98)  BP: 158/76 (01 Oct 2024 08:50) (137/75 - 167/80)  BP(mean): 109 (01 Oct 2024 08:50) (97 - 113)  RR: 16 (01 Oct 2024 08:50) (16 - 18)  SpO2: 96% (01 Oct 2024 08:50) (92% - 96%)    Parameters below as of 01 Oct 2024 08:50  Patient On (Oxygen Delivery Method): room air          MEDICATIONS:  MEDICATIONS  (STANDING):  aspirin  chewable 81 milliGRAM(s) Oral daily  atorvastatin 80 milliGRAM(s) Oral at bedtime  carvedilol 12.5 milliGRAM(s) Oral every 12 hours  fluticasone propionate/ salmeterol 250-50 MICROgram(s) Diskus 1 Dose(s) Inhalation two times a day  heparin   Injectable 5000 Unit(s) SubCutaneous every 8 hours  influenza  Vaccine (HIGH DOSE) 0.5 milliLiter(s) IntraMuscular once  NIFEdipine XL 90 milliGRAM(s) Oral daily  pantoprazole    Tablet 40 milliGRAM(s) Oral before breakfast  sertraline 25 milliGRAM(s) Oral daily  tiotropium 2.5 MICROgram(s) Inhaler 2 Puff(s) Inhalation daily    MEDICATIONS  (PRN):  acetaminophen     Tablet .. 650 milliGRAM(s) Oral every 6 hours PRN Mild Pain (1 - 3)  albuterol   0.5% 2.5 milliGRAM(s) Nebulizer every 8 hours PRN Shortness of Breath and/or Wheezing  senna 2 Tablet(s) Oral at bedtime PRN for constipation      ALLERGIES:  Allergies    No Known Allergies    Intolerances        LABS:                        8.3    4.22  )-----------( 118      ( 01 Oct 2024 05:30 )             26.5     10-01    131[L]  |  100  |  47[H]  ----------------------------<  92  3.7   |  19[L]  |  2.10[H]    Ca    9.0      01 Oct 2024 05:30  Phos  4.7     10-01  Mg     2.0     10-01      PT/INR - ( 30 Sep 2024 20:38 )   PT: 13.4 sec;   INR: 1.15          PTT - ( 30 Sep 2024 20:38 )  PTT:33.8 sec  Urinalysis Basic - ( 01 Oct 2024 05:30 )    Color: Yellow / Appearance: Clear / SG: >1.030 / pH: x  Gluc: 92 mg/dL / Ketone: Trace mg/dL  / Bili: Negative / Urobili: 0.2 mg/dL   Blood: x / Protein: 300 mg/dL / Nitrite: Negative   Leuk Esterase: Negative / RBC: 4 /HPF / WBC 7 /HPF   Sq Epi: x / Non Sq Epi: 1 /HPF / Bacteria: Many /HPF      CAPILLARY BLOOD GLUCOSE          RADIOLOGY & ADDITIONAL TESTS: Reviewed. OVERNIGHT EVENTS: NAEO    SUBJECTIVE / INTERVAL HPI: Patient seen and examined at bedside. Patient denying chest pain, SOB, palpitations, cough.     Remaining ROS negative       PHYSICAL EXAM:  General:NAD, appears comfortable    HEENT:  PERRL, anicteric sclera  Cardiovascular:  RRR  Respiratory: CTA B/L  Gastrointestinal: soft, NT/ND; +BSx4  Extremities: no edema to LE  Vascular: 2+ radial pulses  Neurological: AAOx3; no focal deficits  Psychiatric: pleasant mood and affect    VITAL SIGNS:  Vital Signs Last 24 Hrs  T(C): 36.3 (01 Oct 2024 08:50), Max: 36.8 (30 Sep 2024 20:22)  T(F): 97.4 (01 Oct 2024 08:50), Max: 98.2 (30 Sep 2024 20:22)  HR: 72 (01 Oct 2024 08:50) (72 - 98)  BP: 158/76 (01 Oct 2024 08:50) (137/75 - 167/80)  BP(mean): 109 (01 Oct 2024 08:50) (97 - 113)  RR: 16 (01 Oct 2024 08:50) (16 - 18)  SpO2: 96% (01 Oct 2024 08:50) (92% - 96%)    Parameters below as of 01 Oct 2024 08:50  Patient On (Oxygen Delivery Method): room air          MEDICATIONS:  MEDICATIONS  (STANDING):  aspirin  chewable 81 milliGRAM(s) Oral daily  atorvastatin 80 milliGRAM(s) Oral at bedtime  carvedilol 12.5 milliGRAM(s) Oral every 12 hours  fluticasone propionate/ salmeterol 250-50 MICROgram(s) Diskus 1 Dose(s) Inhalation two times a day  heparin   Injectable 5000 Unit(s) SubCutaneous every 8 hours  influenza  Vaccine (HIGH DOSE) 0.5 milliLiter(s) IntraMuscular once  NIFEdipine XL 90 milliGRAM(s) Oral daily  pantoprazole    Tablet 40 milliGRAM(s) Oral before breakfast  sertraline 25 milliGRAM(s) Oral daily  tiotropium 2.5 MICROgram(s) Inhaler 2 Puff(s) Inhalation daily    MEDICATIONS  (PRN):  acetaminophen     Tablet .. 650 milliGRAM(s) Oral every 6 hours PRN Mild Pain (1 - 3)  albuterol   0.5% 2.5 milliGRAM(s) Nebulizer every 8 hours PRN Shortness of Breath and/or Wheezing  senna 2 Tablet(s) Oral at bedtime PRN for constipation      ALLERGIES:  Allergies    No Known Allergies    Intolerances        LABS:                        8.3    4.22  )-----------( 118      ( 01 Oct 2024 05:30 )             26.5     10-01    131[L]  |  100  |  47[H]  ----------------------------<  92  3.7   |  19[L]  |  2.10[H]    Ca    9.0      01 Oct 2024 05:30  Phos  4.7     10-01  Mg     2.0     10-01      PT/INR - ( 30 Sep 2024 20:38 )   PT: 13.4 sec;   INR: 1.15          PTT - ( 30 Sep 2024 20:38 )  PTT:33.8 sec  Urinalysis Basic - ( 01 Oct 2024 05:30 )    Color: Yellow / Appearance: Clear / SG: >1.030 / pH: x  Gluc: 92 mg/dL / Ketone: Trace mg/dL  / Bili: Negative / Urobili: 0.2 mg/dL   Blood: x / Protein: 300 mg/dL / Nitrite: Negative   Leuk Esterase: Negative / RBC: 4 /HPF / WBC 7 /HPF   Sq Epi: x / Non Sq Epi: 1 /HPF / Bacteria: Many /HPF      CAPILLARY BLOOD GLUCOSE          RADIOLOGY & ADDITIONAL TESTS: Reviewed. OVERNIGHT EVENTS: NAEO    SUBJECTIVE / INTERVAL HPI: Patient seen and examined at bedside. Patient denies SOB, cough, droplet precautions only for sick contact.     Remaining ROS negative       PHYSICAL EXAM:  General:NAD, appears comfortable    HEENT:  PERRL, anicteric sclera  Cardiovascular:  RRR  Respiratory: CTA B/L  Gastrointestinal: soft, NT/ND; +BSx4  Extremities: No edema to the lower extremity appreciated  Vascular: 2+ radial pulses  Neurological: AAOx3; no focal deficits  Psychiatric: pleasant mood and affect    VITAL SIGNS:  Vital Signs Last 24 Hrs  T(C): 36.3 (01 Oct 2024 08:50), Max: 36.8 (30 Sep 2024 20:22)  T(F): 97.4 (01 Oct 2024 08:50), Max: 98.2 (30 Sep 2024 20:22)  HR: 72 (01 Oct 2024 08:50) (72 - 98)  BP: 158/76 (01 Oct 2024 08:50) (137/75 - 167/80)  BP(mean): 109 (01 Oct 2024 08:50) (97 - 113)  RR: 16 (01 Oct 2024 08:50) (16 - 18)  SpO2: 96% (01 Oct 2024 08:50) (92% - 96%)    Parameters below as of 01 Oct 2024 08:50  Patient On (Oxygen Delivery Method): room air          MEDICATIONS:  MEDICATIONS  (STANDING):  aspirin  chewable 81 milliGRAM(s) Oral daily  atorvastatin 80 milliGRAM(s) Oral at bedtime  carvedilol 12.5 milliGRAM(s) Oral every 12 hours  fluticasone propionate/ salmeterol 250-50 MICROgram(s) Diskus 1 Dose(s) Inhalation two times a day  heparin   Injectable 5000 Unit(s) SubCutaneous every 8 hours  influenza  Vaccine (HIGH DOSE) 0.5 milliLiter(s) IntraMuscular once  NIFEdipine XL 90 milliGRAM(s) Oral daily  pantoprazole    Tablet 40 milliGRAM(s) Oral before breakfast  sertraline 25 milliGRAM(s) Oral daily  tiotropium 2.5 MICROgram(s) Inhaler 2 Puff(s) Inhalation daily    MEDICATIONS  (PRN):  acetaminophen     Tablet .. 650 milliGRAM(s) Oral every 6 hours PRN Mild Pain (1 - 3)  albuterol   0.5% 2.5 milliGRAM(s) Nebulizer every 8 hours PRN Shortness of Breath and/or Wheezing  senna 2 Tablet(s) Oral at bedtime PRN for constipation      ALLERGIES:  Allergies    No Known Allergies    Intolerances        LABS:                        8.3    4.22  )-----------( 118      ( 01 Oct 2024 05:30 )             26.5     10-01    131[L]  |  100  |  47[H]  ----------------------------<  92  3.7   |  19[L]  |  2.10[H]    Ca    9.0      01 Oct 2024 05:30  Phos  4.7     10-01  Mg     2.0     10-01      PT/INR - ( 30 Sep 2024 20:38 )   PT: 13.4 sec;   INR: 1.15          PTT - ( 30 Sep 2024 20:38 )  PTT:33.8 sec  Urinalysis Basic - ( 01 Oct 2024 05:30 )    Color: Yellow / Appearance: Clear / SG: >1.030 / pH: x  Gluc: 92 mg/dL / Ketone: Trace mg/dL  / Bili: Negative / Urobili: 0.2 mg/dL   Blood: x / Protein: 300 mg/dL / Nitrite: Negative   Leuk Esterase: Negative / RBC: 4 /HPF / WBC 7 /HPF   Sq Epi: x / Non Sq Epi: 1 /HPF / Bacteria: Many /HPF      CAPILLARY BLOOD GLUCOSE          RADIOLOGY & ADDITIONAL TESTS: Reviewed.

## 2024-10-01 NOTE — PHYSICAL THERAPY INITIAL EVALUATION ADULT - LEVEL OF INDEPENDENCE: GAIT, REHAB EVAL
patient amb with crouch posture with shuffling steps, c/o inc LE weakness/minimum assist (75% patients effort)

## 2024-10-01 NOTE — DISCHARGE NOTE PROVIDER - NSDCFUADDINST_GEN_ALL_CORE_FT
FOLLOW UP: Dr. Rock in 1 week. Your appointment has been made for _______. Call the office at  with any questions.  WOUND CARE: You may shower; soap and water over incision sites. Do not scrub. Pat dry when done.   ACTIVITY: Ambulate as tolerated, but no heavy lifting (>10lbs) or strenuous exercise. NO sharp neck turns. NO driving until you see surgeon in office.  If you have a persistent headache that is not improved with Tylenol, please call MD.  DIET: You may resume regular diet.   Call the office if you experience increasing pain, redness, swelling or drainage from incision sites/wounds, or temperature >101.4F.   NEW MEDICATIONS:  ADDITIONAL FOLLOW UP AFTER DISCHARGE: Please follow up with the kidney doctor ______ .   DISCHARGE DESTINATION: Home  Discharge Education provided: FOLLOW UP: Dr. Rock in 1 week. Your appointment has been made for _______. Call the office at  with any questions.  WOUND CARE: You may shower; soap and water over incision sites. Do not scrub. Pat dry when done.   ACTIVITY: Ambulate as tolerated, but no heavy lifting (>10lbs) or strenuous exercise. NO sharp neck turns. NO driving until you see surgeon in office.  If you have a persistent headache that is not improved with Tylenol, please call MD.  DIET: You may resume regular diet.   Call the office if you experience increasing pain, redness, swelling or drainage from incision sites/wounds, or temperature >101.4F.   NEW MEDICATIONS:  ADDITIONAL FOLLOW UP AFTER DISCHARGE: Please follow up with the nephrologist (kidney doctor) ______ . Please also follow up with your hematologist Dr. Solis in 1-2 weeks. Please call the office [phone] to make an appointment.   DISCHARGE DESTINATION: Home  Discharge Education provided: FOLLOW UP: Dr. Rock in 1 week. Your appointment has been made for October 8 at 11:15am. Call the office at  with any questions.  WOUND CARE: You may shower; soap and water over incision sites. Do not scrub. Pat dry when done.   ACTIVITY: Ambulate as tolerated, but no heavy lifting (>10lbs) or strenuous exercise. NO sharp neck turns. NO driving until you see surgeon in office.  If you have a persistent headache that is not improved with Tylenol, please call MD.  DIET: You may resume regular diet.   Call the office if you experience increasing pain, redness, swelling or drainage from incision sites/wounds, or temperature >101.4F.   NEW MEDICATIONS: Coreg 12.5 two times per day. Paxlovid dose pack for Covid-19. Use as directed.  ADDITIONAL FOLLOW UP AFTER DISCHARGE: Please follow up with the nephrologist (kidney doctor) Dr. Real. Please call [phone] to make an appointment. Please also follow up with your hematologist Dr. Solis in 1-2 weeks. Please call the office [phone] to make an appointment.   DISCHARGE DESTINATION: Home with family FOLLOW UP: Dr. Rock in 1 week. Your appointment has been made for October 8 at 11:15am. Call the office at  with any questions.  WOUND CARE: You may shower; soap and water over incision sites. Do not scrub. Pat dry when done.   ACTIVITY: Ambulate as tolerated, but no heavy lifting (>10lbs) or strenuous exercise. NO sharp neck turns. NO driving until you see surgeon in office.  If you have a persistent headache that is not improved with Tylenol, please call MD.  DIET: You may resume regular diet.   Call the office if you experience increasing pain, redness, swelling or drainage from incision sites/wounds, or temperature >101.4F.   NEW MEDICATIONS: Coreg 12.5 two times per day. Use as directed.  ADDITIONAL FOLLOW UP AFTER DISCHARGE: Please follow up with the nephrologist (kidney doctor) Dr. Real. Please call [phone] to make an appointment. Please also follow up with your hematologist Dr. Solis in 1-2 weeks. Please call the office [phone] to make an appointment.   DISCHARGE DESTINATION: Home with family

## 2024-10-01 NOTE — DISCHARGE NOTE PROVIDER - NSDCCPCAREPLAN_GEN_ALL_CORE_FT
PRINCIPAL DISCHARGE DIAGNOSIS  Diagnosis: ANKITA (acute kidney injury)  Assessment and Plan of Treatment:       SECONDARY DISCHARGE DIAGNOSES  Diagnosis: Symptomatic anemia  Assessment and Plan of Treatment:     Diagnosis: Hyponatremia  Assessment and Plan of Treatment:     Diagnosis: Hypertension  Assessment and Plan of Treatment:

## 2024-10-01 NOTE — PROGRESS NOTE ADULT - SUBJECTIVE AND OBJECTIVE BOX
SUBJECTIVE:  Patient was seen and examined at bedside. Feeling fine, denies SOB, no chest pain, having BMs. No other complaints or events reported. No other complaints or events reported.    Review of systems: No fever, chills, dizziness, HA, Changes in vision, CP, dyspnea, nausea or vomiting, dysuria, changes in bowel movements, LE edema. Rest of 12 point Review of systems negative unless otherwise documented elsewhere in note.     Diet, Renal Restrictions:   For patients receiving Renal Replacement - No Protein Restr, No Conc K, No Conc Phos, Low Sodium  1000mL Fluid Restriction ([id number]) (09-30-24 @ 21:46) [Active]      MEDICATIONS:  MEDICATIONS  (STANDING):  aspirin  chewable 81 milliGRAM(s) Oral daily  atorvastatin 80 milliGRAM(s) Oral at bedtime  carvedilol 12.5 milliGRAM(s) Oral every 12 hours  fluticasone propionate/ salmeterol 250-50 MICROgram(s) Diskus 1 Dose(s) Inhalation two times a day  heparin   Injectable 5000 Unit(s) SubCutaneous every 8 hours  influenza  Vaccine (HIGH DOSE) 0.5 milliLiter(s) IntraMuscular once  NIFEdipine XL 90 milliGRAM(s) Oral daily  pantoprazole    Tablet 40 milliGRAM(s) Oral before breakfast  sertraline 25 milliGRAM(s) Oral daily  tiotropium 2.5 MICROgram(s) Inhaler 2 Puff(s) Inhalation daily    MEDICATIONS  (PRN):  acetaminophen     Tablet .. 650 milliGRAM(s) Oral every 6 hours PRN Mild Pain (1 - 3)  albuterol   0.5% 2.5 milliGRAM(s) Nebulizer every 8 hours PRN Shortness of Breath and/or Wheezing  senna 2 Tablet(s) Oral at bedtime PRN for constipation      Allergies    No Known Allergies    Intolerances        OBJECTIVE:  Vital Signs Last 24 Hrs  T(C): 36.3 (01 Oct 2024 08:50), Max: 36.8 (30 Sep 2024 20:22)  T(F): 97.4 (01 Oct 2024 08:50), Max: 98.2 (30 Sep 2024 20:22)  HR: 72 (01 Oct 2024 08:50) (72 - 98)  BP: 158/76 (01 Oct 2024 08:50) (146/69 - 167/80)  BP(mean): 109 (01 Oct 2024 08:50) (97 - 113)  RR: 16 (01 Oct 2024 08:50) (16 - 18)  SpO2: 96% (01 Oct 2024 08:50) (92% - 96%)    Parameters below as of 01 Oct 2024 08:50  Patient On (Oxygen Delivery Method): room air      I&O's Summary    30 Sep 2024 07:01  -  01 Oct 2024 07:00  --------------------------------------------------------  IN: 400 mL / OUT: 1800 mL / NET: -1400 mL    01 Oct 2024 07:01  -  01 Oct 2024 15:34  --------------------------------------------------------  IN: 480 mL / OUT: 400 mL / NET: 80 mL        PHYSICAL EXAM:  General: AOX3, NAD, sitting in bed, speaking in full sentences, no labored breathing on RA  HEENT: AT/NC, no facial asymmetry   Lungs: no crackles, no wheezes  Heart: regular  Abdomen: soft, no tenderness  Extremities:  Warm, no     LABS:                        8.3    4.22  )-----------( 118      ( 01 Oct 2024 05:30 )             26.5     10-01    131[L]  |  100  |  47[H]  ----------------------------<  92  3.7   |  19[L]  |  2.10[H]    Ca    9.0      01 Oct 2024 05:30  Phos  4.7     10-01  Mg     2.0     10-01        PT/INR - ( 30 Sep 2024 20:38 )   PT: 13.4 sec;   INR: 1.15          PTT - ( 30 Sep 2024 20:38 )  PTT:33.8 sec  CAPILLARY BLOOD GLUCOSE        Urinalysis Basic - ( 01 Oct 2024 05:30 )    Color: Yellow / Appearance: Clear / SG: >1.030 / pH: x  Gluc: 92 mg/dL / Ketone: Trace mg/dL  / Bili: Negative / Urobili: 0.2 mg/dL   Blood: x / Protein: 300 mg/dL / Nitrite: Negative   Leuk Esterase: Negative / RBC: 4 /HPF / WBC 7 /HPF   Sq Epi: x / Non Sq Epi: 1 /HPF / Bacteria: Many /HPF        MICRODATA:      RADIOLOGY/OTHER STUDIES:

## 2024-10-01 NOTE — PHYSICAL THERAPY INITIAL EVALUATION ADULT - GAIT DEVIATIONS NOTED, PT EVAL
b/l dec heel-toe off/decreased glenna/decreased step length/decreased stride length/decreased weight-shifting ability

## 2024-10-01 NOTE — PROGRESS NOTE ADULT - ASSESSMENT
69-year-old male with a PMHx of HTN, HLD, anemia, COPD, BPH (s/p TURP), AVR, ascending aortic arch replacement and recent FEVAR/renal artery stent (8/19/24) who presented with symptomatic anemia and ANKITA.      #AVR and ascending and hemiarch replacement  6/2022  #Recent FEVAR/renal artery stent (8/19/24)  - s/p endo leak repair   -Continue management as per vascular surgery     #Reduced EF   -Pt s/p TTE on 9/25 which showed: Borderline normal left ventricular systolic function. Left ventricular ejection fraction is 50-55%. Pt's EF on TTE on 9/23 showed an EF of 64%  -Pt is currently euvolemic; CXR on admission no evidence of congestion however pt with markedly elevated BNP [id number]   -Cardiology consult     #ANKITA (resolving)   Hyponatremia   -Multifactorial from poor PO intake vs. NSAID use vs. contrast load at OSH (CTA) vs urinary retention  - nephrology following  - Creatinine improving, hold nephrotoxics, monitor UOP    #Symptomatic Anemia    # Thrombocytopenia   - Pt with outpatient  Hematologist Dr. Wong  -Pt s/p transfusion of 1U PRBCs on 9/25 and 9/26.   - Appreciate Hematology, thought to be multifactorial. continue to monitor CBC. If worsening will get Hematology follow-up     #COPD   -continue with Spiriva and Advair    - add Albuterol prn, start on IS. Patient educated about IS use. Continue to monitor respiratory status     #GERD  -continue with Pantoprazole 40mg daily      #HTN   -Continue with Nifedipine 90mg daily . Per surescript patient on HCTZ at home, ok to hold in setting of ANKITA.   - Carvedilol was increased, monitor HR/BP    #Anxiety   -continue with Zoloft 25mg daily      #COVID exposure  -Continue droplet precaution, monitor for signs of infection    DVT ppx: SQH  Discussed with primary team

## 2024-10-01 NOTE — PHYSICAL THERAPY INITIAL EVALUATION ADULT - IMPAIRMENTS CONTRIBUTING TO GAIT DEVIATIONS, PT EVAL
slightly unsteady with no ep of LOB, decreased endurance, crouched posture, decreased endurance/impaired balance/impaired postural control/decreased strength

## 2024-10-01 NOTE — PHYSICAL THERAPY INITIAL EVALUATION ADULT - PERTINENT HX OF CURRENT PROBLEM, REHAB EVAL
Patient is 69 yoM w/ PMHx HLD, anemia (follows w/ heme, Dr. Wong), arthritis, COPD, BPH (s/p TURP), s/p L THR, AVR and ascending and hemiarch replacement w/ Dr. 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 Patient presented c/o lightheadedness, weakness, loss of appetite, weight loss. CTA at [facility] shows no endoleak. Lab significant for hgb 7.6, Na 130, Cr 2.68, BNP 38,000. Patient s/p 1u PRBC in ED with mild improvement of weakness. Now POD1 s/p endoleak repair with renal balloon stenting 9/30/24, progressing well postoperatively.

## 2024-10-01 NOTE — PHYSICAL THERAPY INITIAL EVALUATION ADULT - GENERAL OBSERVATIONS, REHAB EVAL
Patient encountered out of bed sitting in bedside chair in no apparent distress, +heplock +tele +pulseOx +[place] cath. Agreeable to PT edi.

## 2024-10-01 NOTE — DISCHARGE NOTE PROVIDER - CARE PROVIDER_API CALL
Richard Rock  Vascular Surgery  [address]  [place] [address]  Phone: [phone]  Fax: [phone]  Follow Up Time:

## 2024-10-01 NOTE — DISCHARGE NOTE PROVIDER - NSDCCPTREATMENT_GEN_ALL_CORE_FT
PRINCIPAL PROCEDURE  Procedure: Repair, endoleak, AAA  Findings and Treatment:       SECONDARY PROCEDURE  Procedure: Percutaneous transluminal balloon angioplasty of renal artery  Findings and Treatment:

## 2024-10-02 LAB
ALBUMIN SERPL ELPH-MCNC: 2.9 G/DL — LOW (ref 3.3–5)
ALBUMIN, RANDOM URINE: 170.6 MG/DL — SIGNIFICANT CHANGE UP
ALBUMIN/CREATININE RATIO (ACR): 2941 MG/G — HIGH (ref 0–30)
AMORPH URATE CRY # URNS: PRESENT
ANION GAP SERPL CALC-SCNC: 10 MMOL/L — SIGNIFICANT CHANGE UP (ref 5–17)
APPEARANCE UR: ABNORMAL
BACTERIA # UR AUTO: ABNORMAL /HPF
BILIRUB UR-MCNC: NEGATIVE — SIGNIFICANT CHANGE UP
BUN SERPL-MCNC: 42 MG/DL — HIGH (ref 7–23)
CALCIUM SERPL-MCNC: 8.7 MG/DL — SIGNIFICANT CHANGE UP (ref 8.4–10.5)
CAST: 2 /LPF — SIGNIFICANT CHANGE UP (ref 0–4)
CHLORIDE SERPL-SCNC: 102 MMOL/L — SIGNIFICANT CHANGE UP (ref 96–108)
CO2 SERPL-SCNC: 19 MMOL/L — LOW (ref 22–31)
COLOR SPEC: YELLOW — SIGNIFICANT CHANGE UP
CREAT ?TM UR-MCNC: 58 MG/DL — SIGNIFICANT CHANGE UP
CREAT ?TM UR-MCNC: 58 MG/DL — SIGNIFICANT CHANGE UP
CREAT SERPL-MCNC: 1.92 MG/DL — HIGH (ref 0.5–1.3)
DIFF PNL FLD: ABNORMAL
EGFR: 37 ML/MIN/1.73M2 — LOW
FINE GRAN CASTS #/AREA URNS AUTO: PRESENT
GLUCOSE SERPL-MCNC: 85 MG/DL — SIGNIFICANT CHANGE UP (ref 70–99)
GLUCOSE UR QL: NEGATIVE MG/DL — SIGNIFICANT CHANGE UP
HCT VFR BLD CALC: 24.7 % — LOW (ref 39–50)
HGB BLD-MCNC: 7.7 G/DL — LOW (ref 13–17)
KETONES UR-MCNC: ABNORMAL MG/DL
LEUKOCYTE ESTERASE UR-ACNC: ABNORMAL
MAGNESIUM SERPL-MCNC: 1.8 MG/DL — SIGNIFICANT CHANGE UP (ref 1.6–2.6)
MCHC RBC-ENTMCNC: 27.9 PG — SIGNIFICANT CHANGE UP (ref 27–34)
MCHC RBC-ENTMCNC: 31.2 GM/DL — LOW (ref 32–36)
MCV RBC AUTO: 89.5 FL — SIGNIFICANT CHANGE UP (ref 80–100)
NITRITE UR-MCNC: POSITIVE
NRBC # BLD: 0 /100 WBCS — SIGNIFICANT CHANGE UP (ref 0–0)
OSMOLALITY UR: 407 MOSM/KG — SIGNIFICANT CHANGE UP (ref 300–900)
PH UR: 6 — SIGNIFICANT CHANGE UP (ref 5–8)
PHOSPHATE SERPL-MCNC: 4.6 MG/DL — HIGH (ref 2.5–4.5)
PLATELET # BLD AUTO: 113 K/UL — LOW (ref 150–400)
POTASSIUM SERPL-MCNC: 3.6 MMOL/L — SIGNIFICANT CHANGE UP (ref 3.5–5.3)
POTASSIUM SERPL-SCNC: 3.6 MMOL/L — SIGNIFICANT CHANGE UP (ref 3.5–5.3)
POTASSIUM UR-SCNC: 32 MMOL/L — SIGNIFICANT CHANGE UP
PROT ?TM UR-MCNC: 385 MG/DL — HIGH (ref 0–12)
PROT UR-MCNC: 300 MG/DL
PROT/CREAT UR-RTO: 6.6 RATIO — HIGH (ref 0–0.2)
RBC # BLD: 2.76 M/UL — LOW (ref 4.2–5.8)
RBC # FLD: 17.4 % — HIGH (ref 10.3–14.5)
RBC CASTS # UR COMP ASSIST: 3 /HPF — SIGNIFICANT CHANGE UP (ref 0–4)
SARS-COV-2 RNA SPEC QL NAA+PROBE: DETECTED
SODIUM SERPL-SCNC: 131 MMOL/L — LOW (ref 135–145)
SODIUM UR-SCNC: 56 MMOL/L — SIGNIFICANT CHANGE UP
SP GR SPEC: 1.02 — SIGNIFICANT CHANGE UP (ref 1–1.03)
SQUAMOUS # UR AUTO: 1 /HPF — SIGNIFICANT CHANGE UP (ref 0–5)
UROBILINOGEN FLD QL: 0.2 MG/DL — SIGNIFICANT CHANGE UP (ref 0.2–1)
UUN UR-MCNC: 559 MG/DL — SIGNIFICANT CHANGE UP
WBC # BLD: 3.92 K/UL — SIGNIFICANT CHANGE UP (ref 3.8–10.5)
WBC # FLD AUTO: 3.92 K/UL — SIGNIFICANT CHANGE UP (ref 3.8–10.5)
WBC UR QL: 4 /HPF — SIGNIFICANT CHANGE UP (ref 0–5)

## 2024-10-02 PROCEDURE — 99232 SBSQ HOSP IP/OBS MODERATE 35: CPT

## 2024-10-02 PROCEDURE — 99233 SBSQ HOSP IP/OBS HIGH 50: CPT

## 2024-10-02 RX ORDER — REMDESIVIR 5 MG/ML
100 INJECTION INTRAVENOUS EVERY 24 HOURS
Refills: 0 | Status: DISCONTINUED | OUTPATIENT
Start: 2024-10-03 | End: 2024-10-03

## 2024-10-02 RX ORDER — REMDESIVIR 5 MG/ML
INJECTION INTRAVENOUS
Refills: 0 | Status: DISCONTINUED | OUTPATIENT
Start: 2024-10-02 | End: 2024-10-03

## 2024-10-02 RX ORDER — REMDESIVIR 5 MG/ML
200 INJECTION INTRAVENOUS EVERY 24 HOURS
Refills: 0 | Status: COMPLETED | OUTPATIENT
Start: 2024-10-02 | End: 2024-10-02

## 2024-10-02 RX ORDER — MULTI VITAMIN/MINERAL SUPPLEMENT WITH ASCORBIC ACID, NIACIN, PYRIDOXINE, PANTOTHENIC ACID, FOLIC ACID, RIBOFLAVIN, THIAMIN, BIOTIN, COBALAMIN AND ZINC. 60; 20; 12.5; 10; 10; 1.7; 1.5; 1; .3; .006 MG/1; MG/1; MG/1; MG/1; MG/1; MG/1; MG/1; MG/1; MG/1; MG/1
1 TABLET, COATED ORAL DAILY
Refills: 0 | Status: DISCONTINUED | OUTPATIENT
Start: 2024-10-02 | End: 2024-10-03

## 2024-10-02 RX ADMIN — Medication 5000 UNIT(S): at 06:21

## 2024-10-02 RX ADMIN — Medication 5000 UNIT(S): at 21:54

## 2024-10-02 RX ADMIN — PANTOPRAZOLE SODIUM 40 MILLIGRAM(S): 40 TABLET, DELAYED RELEASE ORAL at 06:20

## 2024-10-02 RX ADMIN — REMDESIVIR 200 MILLIGRAM(S): 5 INJECTION INTRAVENOUS at 18:07

## 2024-10-02 RX ADMIN — Medication 5000 UNIT(S): at 15:36

## 2024-10-02 RX ADMIN — Medication 1 DOSE(S): at 21:55

## 2024-10-02 RX ADMIN — Medication 1 DOSE(S): at 09:41

## 2024-10-02 RX ADMIN — MULTI VITAMIN/MINERAL SUPPLEMENT WITH ASCORBIC ACID, NIACIN, PYRIDOXINE, PANTOTHENIC ACID, FOLIC ACID, RIBOFLAVIN, THIAMIN, BIOTIN, COBALAMIN AND ZINC. 1 TABLET(S): 60; 20; 12.5; 10; 10; 1.7; 1.5; 1; .3; .006 TABLET, COATED ORAL at 15:36

## 2024-10-02 RX ADMIN — ATORVASTATIN CALCIUM 80 MILLIGRAM(S): 10 TABLET, FILM COATED ORAL at 21:55

## 2024-10-02 RX ADMIN — Medication 81 MILLIGRAM(S): at 12:11

## 2024-10-02 RX ADMIN — Medication 12.5 MILLIGRAM(S): at 18:07

## 2024-10-02 RX ADMIN — Medication 90 MILLIGRAM(S): at 06:21

## 2024-10-02 RX ADMIN — SERTRALINE HYDROCHLORIDE 25 MILLIGRAM(S): 100 TABLET, FILM COATED ORAL at 12:11

## 2024-10-02 RX ADMIN — Medication 12.5 MILLIGRAM(S): at 06:21

## 2024-10-02 RX ADMIN — TIOTROPIUM BROMIDE INHALATION SPRAY 2 PUFF(S): 3.12 SPRAY, METERED RESPIRATORY (INHALATION) at 09:41

## 2024-10-02 NOTE — PROGRESS NOTE ADULT - ATTENDING COMMENTS
70 yo M with ANKITA, s/p IV radiocontrast;   creat down to 1.92  nephrotic range proteinuria- + elevation IgG Lambda- could be contributing to GN  creat stable.  Hgb dropping and pt on ASA  defer kidney biopsy until medically stable  will review with med team    from EHR u/a without proteinuria in 2022  PMH sign. for s/p recent endovascular repair and stent in L renal artery.  Pt s/p balloon angioplasty of left renal stent 9/30      UPC 6.9, Peak 13

## 2024-10-02 NOTE — CHART NOTE - NSCHARTNOTEFT_GEN_A_CORE
Admitting Diagnosis:   Patient is a 69y old  Male who presents with a chief complaint of ANKITA, symptomatic anemia (02 Oct 2024 12:18)      PAST MEDICAL & SURGICAL HISTORY:  HTN (hypertension)      Aortic regurgitation      Anemia      Arthritis      COPD, mild      Bladder distention      BPH (benign prostatic hyperplasia)      H/O cataract extraction  B/L      Aortic valve replaced          Current Nutrition Order:  Renal, 1000ml fluid restriction      PO Intake: Good (%) [   ]  Fair (50-75%) [   ] Poor (<25%) [   ]--See Below     GI Issues:   Limited complaints this AM, however does express constipation during admit/ PTA  PTA was taking DULDOLAX + Fleet (? - assume enema) - currently ordered for Senna  LBM+10/1     Pain: none per flow sheets - pain medications are ordered     Skin: no edema/pressure ulcers noted at this time; marco antonio Dorman         10-01-24 @ 07:01  -  10-02-24 @ 07:00  --------------------------------------------------------  IN: 480 mL / OUT: 900 mL / NET: -420 mL    10-02-24 @ 07:01  -  10-02-24 @ 13:52  --------------------------------------------------------  IN: 240 mL / OUT: 250 mL / NET: -10 mL        Labs:   10-02    131[L]  |  102  |  42[H]  ----------------------------<  85  3.6   |  19[L]  |  1.92[H]    Ca    8.7      02 Oct 2024 05:30  Phos  4.6     10-02  Mg     1.8     10-02    TPro  x   /  Alb  2.9[L]  /  TBili  x   /  DBili  x   /  AST  x   /  ALT  x   /  AlkPhos  x   10-02    CAPILLARY BLOOD GLUCOSE          Medications:  MEDICATIONS  (STANDING):  aspirin  chewable 81 milliGRAM(s) Oral daily  atorvastatin 80 milliGRAM(s) Oral at bedtime  carvedilol 12.5 milliGRAM(s) Oral every 12 hours  fluticasone propionate/ salmeterol 250-50 MICROgram(s) Diskus 1 Dose(s) Inhalation two times a day  heparin   Injectable 5000 Unit(s) SubCutaneous every 8 hours  influenza  Vaccine (HIGH DOSE) 0.5 milliLiter(s) IntraMuscular once  NIFEdipine XL 90 milliGRAM(s) Oral daily  pantoprazole    Tablet 40 milliGRAM(s) Oral before breakfast  sertraline 25 milliGRAM(s) Oral daily  tiotropium 2.5 MICROgram(s) Inhaler 2 Puff(s) Inhalation daily    MEDICATIONS  (PRN):  acetaminophen     Tablet .. 650 milliGRAM(s) Oral every 6 hours PRN Mild Pain (1 - 3)  albuterol   0.5% 2.5 milliGRAM(s) Nebulizer every 8 hours PRN Shortness of Breath and/or Wheezing  senna 2 Tablet(s) Oral at bedtime PRN for constipation      6'3''  pounds +-10%  Wt 155 pounds BMI 19.3 %IBW=79    Weight Change:   9/28 157.1 pounds   9/25 147.7 pounds     ** MALNUTRITION 10/2:   -- Assume meeting<75% EER PTA: Pt reports decreased PO intake PTA. Feels appetite has been decreased since 2022. Could not provide extensive details aside from liking ensure (reports has taken anywhere from 2-4/day however expresses being costly - RD provided coupons 10/2; pt also reported recently being put on leave from work - assume making cost of food an issue as well) as well as blanca eugene. No Know Food Allergies.   -- Noted wasting/loss: Temples/ Clavicles MILD, orbitals/ interosseous MODERATE.  -- Pt reports weighing ~230-260 pounds in 2022 with reported wt loss. However, Per RD note 10/2023 pt wt noted at 164.9 pounds. Based on current admit at this suggests wt loss of just 10 pounds x 1 year period (6% body wt loss-  not singfigncat) .     Estimated Energy Needs:  Adjust for age, risk for malnutrition, OR, renal, +COVID19   Estimated Energy Needs From (robson/kg)	25  Estimated Energy Needs To (robson/kg)	30  Estimated Energy Needs Calculated From (robson/kg)	1757  Estimated Energy Needs Calculated To (robson/kg)	2109  Estimated Protein Needs From (g/kg)	1.1  Estimated Protein Needs To (g/kg)	1.3  Estimated Protein Needs Calculated From (g/kg)	77.33  Estimated Protein Needs Calculated To (g/kg)	91.39  ** Fluids per team    Subjective: 69 yoM PMHx HLD, anemia, arthritis, COPD, BPH (s/p TURP), s/p L THR, AVR and ascending and hemiarch replacement 6/2022 (course complicated by R ICA), 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 Pt presents c/o lightheadedness, weakness, loss of appetite, weight loss. [facility]A at [facility] shows no endoleak. Lab significant for hgb 7.6, Na 130, Cr 2.68, BNP 38,000. Pt s/p 1u PRBC in ED with mild improvement of weakness - is also s/p 1u PRBC 9/26. Pt is now for Endoleak repair and Renal Artery Stent placement 9/30. Found to be +COVID 10/21.     Pt seen this AM on 5UR with RN at bedside. Ordered for Renal Diet. Pt expresses appetite during admission is better then PO intake PTA. Reports eating well without issues chewing/swallowing. Limited complaints this AM, however does express constipation during admit/ PTA. PTA was taking DULDOLAX + Fleet (? - assume enema) - currently ordered for Senna, LBM+10/1.   Labs: BUN/Cr 42/1.92, Na 131, K WNL - low prior, Phos 4.6.   Please see below for nutritions recommendations. Recommendations Provided to team.     Previous Nutrition Diagnosis: Inadeqate Energy Intake related to intake<EER as evidenced by: NPO  Active [   ]  Resolved [  XX ]  If resolved, new PES: MALNUTRITION related to presumed intake<EER  as evidenced by Wasting/loss, PO intake<75% EER, Wt loss   Goal: Pt will meet at least 75% of nutrient needs / Show no further s/s of malnutrition     Recommendations:  1. Current Diet: Renal, 1000ml fluid restriction.  - Consider d/c of renal 2/2 noted low potassium during admit. Consider use of no concentrated phosphorus.   - Monitor %PO intake, diet tolerance.   2. Pain/GI per team; Optimize regime PRN.   3. Labs: monitor BMP, CBC, glucose, lytes - Replete PRN, trend renal indices, LFTs.  4. Pt with interest in [facility] - RD submitted application using [facility] Referral to [facility] online portal (using information obtained from patient/chart review). D/w social work.   5. Neprovite daily.   6. RD to remain available for additional nutrition interventions as needed.     Education: Provided today. Tips for GI distress provided. Spoke about [facility], currently ordered fluid restriction, oral nutrition supplements, Tips for Shopping on a diet. Name/Contact info provided. Understanding stated, provide additional motivation as needed.     Risk Level: High [ XX  ] Moderate [   ] Low [   ] Admitting Diagnosis:   Patient is a 69y old  Male who presents with a chief complaint of ANKITA, symptomatic anemia (02 Oct 2024 12:18)      PAST MEDICAL & SURGICAL HISTORY:  HTN (hypertension)      Aortic regurgitation      Anemia      Arthritis      COPD, mild      Bladder distention      BPH (benign prostatic hyperplasia)      H/O cataract extraction  B/L      Aortic valve replaced          Current Nutrition Order:  Renal, 1000ml fluid restriction      PO Intake: Good (%) [   ]  Fair (50-75%) [   ] Poor (<25%) [   ]--See Below     GI Issues:   Limited complaints this AM, however does express constipation during admit/ PTA  PTA was taking DULDOLAX + Fleet (? - assume enema) - currently ordered for Senna  LBM+10/1     Pain: none per flow sheets - pain medications are ordered     Skin: no edema/pressure ulcers noted at this time; marco antonio Dorman         10-01-24 @ 07:01  -  10-02-24 @ 07:00  --------------------------------------------------------  IN: 480 mL / OUT: 900 mL / NET: -420 mL    10-02-24 @ 07:01  -  10-02-24 @ 13:52  --------------------------------------------------------  IN: 240 mL / OUT: 250 mL / NET: -10 mL        Labs:   10-02    131[L]  |  102  |  42[H]  ----------------------------<  85  3.6   |  19[L]  |  1.92[H]    Ca    8.7      02 Oct 2024 05:30  Phos  4.6     10-02  Mg     1.8     10-02    TPro  x   /  Alb  2.9[L]  /  TBili  x   /  DBili  x   /  AST  x   /  ALT  x   /  AlkPhos  x   10-02    CAPILLARY BLOOD GLUCOSE          Medications:  MEDICATIONS  (STANDING):  aspirin  chewable 81 milliGRAM(s) Oral daily  atorvastatin 80 milliGRAM(s) Oral at bedtime  carvedilol 12.5 milliGRAM(s) Oral every 12 hours  fluticasone propionate/ salmeterol 250-50 MICROgram(s) Diskus 1 Dose(s) Inhalation two times a day  heparin   Injectable 5000 Unit(s) SubCutaneous every 8 hours  influenza  Vaccine (HIGH DOSE) 0.5 milliLiter(s) IntraMuscular once  NIFEdipine XL 90 milliGRAM(s) Oral daily  pantoprazole    Tablet 40 milliGRAM(s) Oral before breakfast  sertraline 25 milliGRAM(s) Oral daily  tiotropium 2.5 MICROgram(s) Inhaler 2 Puff(s) Inhalation daily    MEDICATIONS  (PRN):  acetaminophen     Tablet .. 650 milliGRAM(s) Oral every 6 hours PRN Mild Pain (1 - 3)  albuterol   0.5% 2.5 milliGRAM(s) Nebulizer every 8 hours PRN Shortness of Breath and/or Wheezing  senna 2 Tablet(s) Oral at bedtime PRN for constipation      6'3''  pounds +-10%  Wt 155 pounds BMI 19.3 %IBW=79    Weight Change:   9/28 157.1 pounds   9/25 147.7 pounds     ** MALNUTRITION Moderate in Chronic Setting 10/2:   -- Assume meeting<75% EER PTA: Pt reports decreased PO intake PTA. Feels appetite has been decreased since 2022. Could not provide extensive details aside from liking ensure (reports has taken anywhere from 2-4/day however expresses being costly - RD provided coupons 10/2; pt also reported recently being put on leave from work - assume making cost of food an issue as well) as well as blanca eugene. No Know Food Allergies.   -- Noted wasting/loss: Temples/ Clavicles MILD, orbitals/ interosseous MODERATE.  -- Pt reports weighing ~230-260 pounds in 2022 with reported wt loss. However, Per RD note 10/2023 pt wt noted at 164.9 pounds. Based on current admit at this suggests wt loss of just 10 pounds x 1 year period (6% body wt loss-  not singfigncat) .     Estimated Energy Needs:  Adjust for age, risk for malnutrition, OR, renal, +COVID19   Estimated Energy Needs From (robson/kg)	25  Estimated Energy Needs To (robson/kg)	30  Estimated Energy Needs Calculated From (robson/kg)	1757  Estimated Energy Needs Calculated To (robson/kg)	2109  Estimated Protein Needs From (g/kg)	1.25  Estimated Protein Needs To (g/kg)	1.5  Estimated Protein Needs Calculated From (g/kg)	77.33  Estimated Protein Needs Calculated To (g/kg)	91.39  ** Fluids Per Team **     Subjective: 69 yoM PMHx HLD, anemia, arthritis, COPD, BPH (s/p TURP), s/p L THR, AVR and ascending and hemiarch replacement 6/2022 (course complicated by R ICA), 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 Pt presents c/o lightheadedness, weakness, loss of appetite, weight loss. CTA at [facility] shows no endoleak. Lab significant for hgb 7.6, Na 130, Cr 2.68, BNP 38,000. Pt s/p 1u PRBC in ED with mild improvement of weakness - is also s/p 1u PRBC 9/26. Pt is now for Endoleak repair and Renal Artery Stent placement 9/30. Nephrology following as patient initially with nephrotic range proteinuria-Pt is currently euvolemic; CXR on admission no evidence of congestion. Found to be +COVID 10/21.     Pt seen this AM on 5UR with RN at bedside. Ordered for Renal Diet. Pt expresses appetite during admission is better then PO intake PTA. Reports eating well without issues chewing/swallowing. Limited complaints this AM, however does express constipation during admit/ PTA. PTA was taking DULDOLAX + Fleet (? - assume enema) - currently ordered for Senna, LBM+10/1.   Labs: BUN/Cr 42/1.92, Na 131, K WNL - low prior, Phos 4.6.   Please see below for nutritions recommendations- d/w team.     Previous Nutrition Diagnosis: Inadeqate Energy Intake related to intake<EER as evidenced by: NPO  Active [   ]  Resolved [  XX ]  If resolved, new PES: MALNUTRITION related to presumed intake<EER  as evidenced by Wasting/loss, PO intake<75% EER, Wt loss   Goal: Pt will meet at least 75% of nutrient needs / Show no further s/s of malnutrition     Recommendations:  1. Current Diet: Renal, 1000ml fluid restriction.  - Consider d/c of renal 2/2 noted low potassium during admit. Consider use of no concentrated phosphorus.   - Monitor %PO intake, diet tolerance. Pending %PO intake, add oral nutrition supplements.   2. Pain/GI per team; Optimize regime PRN.   3. Labs: monitor BMP, CBC, glucose, lytes - Replete PRN, trend renal indices, LFTs.  4. Pt with interest in [facility] - RD submitted application using [facility] Referral to [facility] online portal (using information obtained from patient/chart review). D/w social work.   5. Neprovite daily.   6. RD to remain available for additional nutrition interventions as needed.     Education: Provided today. Tips for GI distress provided. Spoke about [facility], currently ordered fluid restriction, oral nutrition supplements, Tips for Shopping on a diet. Name/Contact info provided. Understanding stated, provide additional motivation as needed.     Risk Level: High [ XX  ] Moderate [   ] Low [   ]

## 2024-10-02 NOTE — DIETITIAN NUTRITION RISK NOTIFICATION - TREATMENT: THE FOLLOWING DIET HAS BEEN RECOMMENDED
** MALNUTRITION Moderate in Chronic Setting 10/2:   -- Assume meeting<75% EER PTA: Pt reports decreased PO intake PTA. Feels appetite has been decreased since 2022. Could not provide extensive details aside from liking ensure (reports has taken anywhere from 2-4/day however expresses being costly - RD provided coupons 10/2; pt also reported recently being put on leave from work - assume making cost of food an issue as well) as well as blanca eugene. No Know Food Allergies.   -- Noted wasting/loss: Temples/ Clavicles MILD, orbitals/ interosseous MODERATE.  -- Pt reports weighing ~230-260 pounds in 2022 with reported wt loss. However, Per RD note 10/2023 pt wt noted at 164.9 pounds. Based on current admit at this suggests wt loss of just 10 pounds x 1 year period (6% body wt loss-  not singfigncat) .     Recommendations:  1. Current Diet: Renal, 1000ml fluid restriction.  - Consider d/c of renal 2/2 noted low potassium during admit. Consider use of no concentrated phosphorus.   - Monitor %PO intake, diet tolerance. Pending %PO intake, add oral nutrition supplements.   2. Pain/GI per team; Optimize regime PRN.   3. Labs: monitor BMP, CBC, glucose, lytes - Replete PRN, trend renal indices, LFTs.  4. Pt with interest in [facility] - RD submitted application using [facility] Referral to [facility] online portal (using information obtained from patient/chart review). D/w social work.   5. Neprovite daily.   6. RD to remain available for additional nutrition interventions as needed.     Education: Provided today. Tips for GI distress provided. Spoke about [facility], currently ordered fluid restriction, oral nutrition supplements, Tips for Shopping on a diet. Name/Contact info provided. Understanding stated, provide additional motivation as needed.     Risk Level: High [ XX  ] Moderate [   ] Low [   ]

## 2024-10-02 NOTE — PROGRESS NOTE ADULT - ASSESSMENT
69-year-old male with a PMHx of HTN, HLD, anemia, COPD, BPH (s/p TURP), AVR, ascending aortic arch replacement and recent FEVAR/renal artery stent (8/19/24) who presented with symptomatic anemia and ANKITA, now s/p balloon angioplasty of the L renal stent. Nephrology following as patient initially with nephrotic range proteinuria.     #AVR and ascending and hemiarch replacement  6/2022  #Recent FEVAR/renal artery stent (8/19/24)  - s/p endo leak repair   -Continue management as per vascular surgery, pain and bowel regimen      #Reduced EF   -Pt s/p TTE on 9/25 which showed: Borderline normal left ventricular systolic function. Left ventricular ejection fraction is 50-55%. Pt's EF on TTE on 9/23 showed an EF of 64%  -Pt is currently euvolemic; CXR on admission no evidence of congestion however pt with markedly elevated BNP [id number]     #COVID-19 Infection   -Exposed by roommate, asymptomatic with exception of feeling of congestion, not hypoxic.   -Given high risk with hx of COPD and PAD, HFrEF, would recommend giving remdesivir while admitted for up to 3d course, when ready for discharge can be discharged with a course of paxlovid.     #ANKITA (resolving)   #Proteinuria   -Multifactorial from poor PO intake vs. NSAID use vs. contrast load at OSH (CTA) vs urinary retention  - nephrology following  - Creatinine improving, hold nephrotoxics, monitor UOP  - Follow up SPEP, urine studies per renal     #Symptomatic Anemia    # Thrombocytopenia   - Pt with outpatient  Hematologist Dr. Wong  -Pt s/p transfusion of 1U PRBCs on 9/25 and 9/26.   - Appreciate Hematology, thought to be multifactorial. continue to monitor CBC, transfuse for hgb <7  - Ensure patient has good outpatient hematology follow up     #COPD   -continue with Spiriva and Advair    - add Albuterol prn, start on IS. Patient educated about IS use. Continue to monitor respiratory status     #GERD  -continue with Pantoprazole 40mg daily      #HTN   -Continue with Nifedipine 90mg daily . Per surescript patient on HCTZ at home, ok to hold in setting of ANKITA.   - Carvedilol was increased, monitor HR/BP    #Anxiety   -continue with Zoloft 25mg daily      DVT ppx: SQH  Discussed with primary team

## 2024-10-02 NOTE — PROGRESS NOTE ADULT - SUBJECTIVE AND OBJECTIVE BOX
O/N: BM x1, SPEP gamma migrating paraprotein, serum free light chains resulted      ------------------------------------------------------------------------  PLEASE CHECK WHEN PRESENT:     [  ]Heart Failure     [  ] Acute     [  ] Acute on Chronic     [  ] Chronic  -------------------------------------------------------------------     [  ]Diastolic [HFpEF]     [  ]Systolic [HFrEF]     [  ]Combined [HFpEF & HFrEF]  .................................................................................     [  ]Other:     [ ] Pulmonary Hypertension     [ ] Chronic A-fib     [ ] Persistet A-fib     [ ] Permanent A-fib     [ ] Paroxysmal A-fib     [x ] Hypertensive Heart Disease  -------------------------------------------------------------------  [ ] Respiratory failure  [ ] Acute cor pulmonale  [ ] Asthma/COPD Exacerbation  [ ]COPD on home O2 (Chronic renal Failure)   [ ] Pleural effusion  [ ] Aspiration pneumonia  [ ] Obstructive Sleep Apnea  [ ]Atelectasis   [ ] Acute PE   -------------------------------------------------------------------  [x  ]Acute Kidney Injury      [  ]Acute Tubular Necrosis      [  ]Reneal Medullary Necrosis     [  ]Renal Cortical Necrosis     [  ]Other Pathological Lesions:    [  ]CKD 1  [  ]CKD 2  [  ]CKD 3  [  ]CKD 4  [  ]CKD 5 (ESRD)  [  ]Other  -------------------------------------------------------------------  [  ]Diabetes  [  ] Diabetic PVD Ulcer  [  ] Neuropathic ulcer to DM  [  ] Diabetes with Nephropathy  [  ] Osteomyelitis due to diabetes  [  ] Hyperglycemia   [  ]hypoglycemia   --------------------------------------------------------------------  [  ]Malnutrition: See Nutrition Note  [  ]Cachexia  [  ]Other:   [  ]Supplement Ordered:  [  ]Morbid Obesity (BMI >=40]  [ ] Ileus  ---------------------------------------------------------------------  [ ] Sepsis/severe sepsis/septic shock  [ ] Noninfectious SIRS  [ ] UTI  [ ] Pneumonia  [ ] Thrombophlebitis     -----------------------------------------------------------------------  [ ] Acidosis/alkalosis  [ ] Fluid overload  [ ] Hypokalemia  [ ] Hyperkalemia  [ ] Hypomagnesemia  [ ] Hypophosphatemia  [ ] Hyperphosphatemia  ------------------------------------------------------------------------  [ ] Acute blood loss anemia  [ ] Post op blood loss anemia  [ ] Iron deficiency anemia  [ ] Anemia due to chronic disease  [ ] Hypercoagulable state  [ ] Thrombocytopenia  ----------------------------------------------------------------------  [ ] Cerebral infarction  [ ] Transient ischemia attack  [ ] Encephalopathy - Toxic or Metabolic    A/P: 69 yoM w/ PMHx HLD, anemia (follows w/ heme, Dr. Wong), arthritis, COPD, BPH (s/p TURP), s/p L THR, AVR and ascending and hemiarch replacement w/ Dr. 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 Patient presented c/o lightheadedness, weakness, loss of appetite, weight loss. CTA at [facility] shows no endoleak. Lab significant for hgb 7.6, Na 130, Cr 2.68, BNP 38,000. Patient s/p 1u PRBC in ED with mild improvement of weakness. Now POD1 s/p endoleak repair with renal balloon stenting 9/30/24, progressing well postoperatively.     Vascular:  - s/p Endoleak repair 9/30/24  - s/p FEVAR and L Renal artery stent 8/18/24  - renal artery duplex neg for stenosis, 1.1 cm cyst lower pole R kidney  - c/w aspirin and statin    HTN/HLD:  - c/w nifedipine  - c/w atorvastatin  - c/w coreg 12.5 BID  - ECHO 9/25 EF 50-55%, Bioprosthetic valve is seen in the aortic position    ANKITA:  - baseline Cr 0.7-1.0  - Cr on admission 2.64, peaked at 3.89, now continuing to downtrend, monitor Cr with daily BMP  - multifactorial from poor PO intake vs. NSAID use vs. contrast load at OSH (CTA)  - pending autoimmune work up  - hiv neg, hepatitis panel neg  - nephro consult, appreciate recs    Anemia:   - follows w/ outpt Hematologist, Dr. Wong  - s/p 1u PRBC 9/24, 1u PRBC 9/26  - b12/folate wnl  - heme consulted, appreciate recs    COPD:  - c/w Spiriva and Advair inhaler    GERD:  - c/w PPI    Anxiety:  - c/w zoloft    Diet: renal  Activity: as tolerated  DVTPPx: SQH  PT eval: BHARATH  Dispo: 5 Uris   O/N: BM x1, SPEP gamma migrating paraprotein, serum free light chains resulted      S: Patient does not have any complaints    O: Examined in bed resting comfortably     ROS: Denies headache, blurred vision, chest pain, SOB, abdominal pain, nausea or vomiting.         aspirin  chewable 81  carvedilol 12.5  heparin   Injectable 5000  NIFEdipine XL 90      Allergies    No Known Allergies    Intolerances        Vital Signs Last 24 Hrs  T(C): 36.7 (01 Oct 2024 16:45), Max: 36.7 (01 Oct 2024 16:45)  T(F): 98 (01 Oct 2024 16:45), Max: 98 (01 Oct 2024 16:45)  HR: 75 (01 Oct 2024 21:05) (69 - 82)  BP: 161/83 (01 Oct 2024 21:05) (161/83 - 168/77)  BP(mean): 115 (01 Oct 2024 21:05) (110 - 115)  RR: 17 (01 Oct 2024 21:05) (16 - 17)  SpO2: 93% (01 Oct 2024 21:05) (93% - 96%)    Parameters below as of 01 Oct 2024 21:05  Patient On (Oxygen Delivery Method): room air      I&O's Summary    01 Oct 2024 07:01  -  02 Oct 2024 07:00  --------------------------------------------------------  IN: 480 mL / OUT: 900 mL / NET: -420 mL        Physical Exam:  General: alert and awake, NAD  Pulmonary: no respiratory distress  Cardiovascular: RRR  Abdominal: soft. non tender, groins soft  Extremities: warm, well perfused      LABS:                        7.7    3.92  )-----------( 113      ( 02 Oct 2024 05:30 )             24.7     10-02    131[L]  |  102  |  42[H]  ----------------------------<  85  3.6   |  19[L]  |  1.92[H]    Ca    8.7      02 Oct 2024 05:30  Phos  4.6     10-02  Mg     1.8     10-02    TPro  x   /  Alb  2.9[L]  /  TBili  x   /  DBili  x   /  AST  x   /  ALT  x   /  AlkPhos  x   10-02    PT/INR - ( 30 Sep 2024 20:38 )   PT: 13.4 sec;   INR: 1.15          PTT - ( 30 Sep 2024 20:38 )  PTT:33.8 sec    Radiology and Additional Studies:    ------------------------------------------------------------------------  PLEASE CHECK WHEN PRESENT:     [  ]Heart Failure     [  ] Acute     [  ] Acute on Chronic     [  ] Chronic  -------------------------------------------------------------------     [  ]Diastolic [HFpEF]     [  ]Systolic [HFrEF]     [  ]Combined [HFpEF & HFrEF]  .................................................................................     [  ]Other:     [ ] Pulmonary Hypertension     [ ] Chronic A-fib     [ ] Persistet A-fib     [ ] Permanent A-fib     [ ] Paroxysmal A-fib     [x ] Hypertensive Heart Disease  -------------------------------------------------------------------  [ ] Respiratory failure  [ ] Acute cor pulmonale  [ ] Asthma/COPD Exacerbation  [ ]COPD on home O2 (Chronic renal Failure)   [ ] Pleural effusion  [ ] Aspiration pneumonia  [ ] Obstructive Sleep Apnea  [ ]Atelectasis   [ ] Acute PE   -------------------------------------------------------------------  [x  ]Acute Kidney Injury      [  ]Acute Tubular Necrosis      [  ]Reneal Medullary Necrosis     [  ]Renal Cortical Necrosis     [  ]Other Pathological Lesions:    [  ]CKD 1  [  ]CKD 2  [  ]CKD 3  [  ]CKD 4  [  ]CKD 5 (ESRD)  [  ]Other  -------------------------------------------------------------------  [  ]Diabetes  [  ] Diabetic PVD Ulcer  [  ] Neuropathic ulcer to DM  [  ] Diabetes with Nephropathy  [  ] Osteomyelitis due to diabetes  [  ] Hyperglycemia   [  ]hypoglycemia   --------------------------------------------------------------------  [  ]Malnutrition: See Nutrition Note  [  ]Cachexia  [  ]Other:   [  ]Supplement Ordered:  [  ]Morbid Obesity (BMI >=40]  [ ] Ileus  ---------------------------------------------------------------------  [ ] Sepsis/severe sepsis/septic shock  [ ] Noninfectious SIRS  [ ] UTI  [ ] Pneumonia  [ ] Thrombophlebitis     -----------------------------------------------------------------------  [ ] Acidosis/alkalosis  [ ] Fluid overload  [ ] Hypokalemia  [ ] Hyperkalemia  [ ] Hypomagnesemia  [ ] Hypophosphatemia  [ ] Hyperphosphatemia  ------------------------------------------------------------------------  [ ] Acute blood loss anemia  [ ] Post op blood loss anemia  [ ] Iron deficiency anemia  [ ] Anemia due to chronic disease  [ ] Hypercoagulable state  [ ] Thrombocytopenia  ----------------------------------------------------------------------  [ ] Cerebral infarction  [ ] Transient ischemia attack  [ ] Encephalopathy - Toxic or Metabolic    A/P: 69 yoM w/ PMHx HLD, anemia (follows w/ heme, Dr. Wong), arthritis, COPD, BPH (s/p TURP), s/p L THR, AVR and ascending and hemiarch replacement w/ Dr. 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 Patient presented c/o lightheadedness, weakness, loss of appetite, weight loss. CTA at [facility] shows no endoleak. Lab significant for hgb 7.6, Na 130, Cr 2.68, BNP 38,000. Patient s/p 1u PRBC in ED with mild improvement of weakness. Now  s/p endoleak repair with renal balloon stenting 9/30/24, progressing well postoperatively.     Vascular:  - s/p Endoleak repair 9/30/24  - s/p FEVAR and L Renal artery stent 8/18/24  - renal artery duplex neg for stenosis, 1.1 cm cyst lower pole R kidney  - c/w aspirin and statin    HTN/HLD:  - c/w nifedipine  - c/w atorvastatin  - c/w coreg 12.5 BID  - ECHO 9/25 EF 50-55%, Bioprosthetic valve is seen in the aortic position    ANKITA:  - baseline Cr 0.7-1.0  - Cr on admission 2.64, peaked at 3.89, now continuing to downtrend, monitor Cr with daily BMP  - multifactorial from poor PO intake vs. NSAID use vs. contrast load at OSH (CTA)  - pending autoimmune work up  - hiv neg, hepatitis panel neg  - nephro consult, appreciate recs    Anemia:   - follows w/ outpt Hematologist, Dr. Wong  - s/p 1u PRBC 9/24, 1u PRBC 9/26  - b12/folate wnl  - heme consulted, appreciate recs    COPD:  - c/w Spiriva and Advair inhaler    GERD:  - c/w PPI    Anxiety:  - c/w zoloft    Diet: renal  Activity: as tolerated  DVTPPx: SQH  PT eval: BHARATH vs Home w/assistance  Dispo: 5 Uris

## 2024-10-02 NOTE — PROGRESS NOTE ADULT - ASSESSMENT
70 y/o M with recent endovascular repair and stent in L renal artery. CR at base line 1.1-1.2. Went to OSH for dizziness and underwent contrast imaging that did not show any endoleak. Eventually transferred to [facility]. Here noted to have CR of 2.6 for which we are consulted. Pt s/p balloon angioplasty of left renal stent yesterday with good resolution of stenosis. SCr downtrending), likely iso procedural contrast. Nephrotic range proteinuria improving, P/C ratio 6.9 (from 13). Found to have M-Justin of 3.6 and k/l ratio of 0.10.    Problem list    -> Nephrotic range proteinuria. 6.9g/g. Albumin 3.1  -> Hyperglobulinemia, M-Justin 3.4, k/l ratio 0.10  -> ANKITA on CKD: CR peaked at 3.8. SCr overall improving, today 1.92  -> Anemia. Elevated retic count. Direct Ram test positive. Elevated LDH and normal Haptoglobin.     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 CR at base line 1.1-1.2. Went to OSH for dizziness and underwent contrast imaging that did not show any endoleak. Eventually transferred to [facility]. Here noted to have CR of 2.6 for which we are consulted. Pt s/p balloon angioplasty of left renal stent yesterday with good resolution of stenosis. SCr downtrending), likely iso procedural contrast. Nephrotic range proteinuria improving, P/C ratio 6.9 (from 13). Found to have M-Justin of 3.6 and k/l ratio of 0.10.    Problem list    -> Nephrotic range proteinuria. 6.9g/g. Albumin 3.1  -> Hyperglobulinemia, M-Justin 3.4, k/l ratio 0.10  -> ANKITA on CKD: CR peaked at 3.8. SCr overall improving, today 1.92  -> Anemia. Elevated retic count. Direct Ram test positive. Elevated LDH and normal Haptoglobin.     #Plan    -> GN workup for nephrotic range proteinuria significant for M-Justin  -> Consult Heme Onc for MM workup  -> Consider kidney biopsy once other medical issues stabilized  -> SCr improving  -> Hyponatremia likely iso of SIADH  -> Avoid nephrotoxic drugs  -> strict I/o chart

## 2024-10-02 NOTE — PROGRESS NOTE ADULT - SUBJECTIVE AND OBJECTIVE BOX
Medicine Progress Note    Patient is a 69y old  Male who presents with a chief complaint of ANKITA, symptomatic anemia (02 Oct 2024 04:43)    SUBJECTIVE / OVERNIGHT EVENTS:  Feels well overall this morning besides some mild congestion. Eager to be able to return home as soon as possible. Not reporting pain or other symptoms.     MEDICATIONS  (STANDING):  aspirin  chewable 81 milliGRAM(s) Oral daily  atorvastatin 80 milliGRAM(s) Oral at bedtime  carvedilol 12.5 milliGRAM(s) Oral every 12 hours  fluticasone propionate/ salmeterol 250-50 MICROgram(s) Diskus 1 Dose(s) Inhalation two times a day  heparin   Injectable 5000 Unit(s) SubCutaneous every 8 hours  influenza  Vaccine (HIGH DOSE) 0.5 milliLiter(s) IntraMuscular once  NIFEdipine XL 90 milliGRAM(s) Oral daily  pantoprazole    Tablet 40 milliGRAM(s) Oral before breakfast  sertraline 25 milliGRAM(s) Oral daily  tiotropium 2.5 MICROgram(s) Inhaler 2 Puff(s) Inhalation daily    MEDICATIONS  (PRN):  acetaminophen     Tablet .. 650 milliGRAM(s) Oral every 6 hours PRN Mild Pain (1 - 3)  albuterol   0.5% 2.5 milliGRAM(s) Nebulizer every 8 hours PRN Shortness of Breath and/or Wheezing  senna 2 Tablet(s) Oral at bedtime PRN for constipation    CAPILLARY BLOOD GLUCOSE        I&O's Summary    01 Oct 2024 07:01  -  02 Oct 2024 07:00  --------------------------------------------------------  IN: 480 mL / OUT: 900 mL / NET: -420 mL    02 Oct 2024 07:01  -  02 Oct 2024 12:19  --------------------------------------------------------  IN: 240 mL / OUT: 250 mL / NET: -10 mL        PHYSICAL EXAM:  Vital Signs Last 24 Hrs  T(C): 36.6 (02 Oct 2024 09:34), Max: 36.7 (01 Oct 2024 16:45)  T(F): 97.9 (02 Oct 2024 09:34), Max: 98 (01 Oct 2024 16:45)  HR: 70 (02 Oct 2024 09:34) (69 - 79)  BP: 147/86 (02 Oct 2024 09:34) (147/86 - 170/86)  BP(mean): 121 (02 Oct 2024 06:19) (110 - 121)  RR: 18 (02 Oct 2024 09:34) (16 - 18)  SpO2: 93% (02 Oct 2024 09:34) (93% - 96%)    Parameters below as of 02 Oct 2024 09:34  Patient On (Oxygen Delivery Method): room air      EXAM:   Gen: NAD laying in bed  CV: RRR, +S1/S2, no mumur  Pulm: CTA b/l no wheezing or crackles   Abd: soft, NTND + BS no rebound or guarding, no tenderness at surgical site  Neuro: AAOX3; nonfocal     LABS:                        7.7    3.92  )-----------( 113      ( 02 Oct 2024 05:30 )             24.7     10-02    131[L]  |  102  |  42[H]  ----------------------------<  85  3.6   |  19[L]  |  1.92[H]    Ca    8.7      02 Oct 2024 05:30  Phos  4.6     10-02  Mg     1.8     10-02    TPro  x   /  Alb  2.9[L]  /  TBili  x   /  DBili  x   /  AST  x   /  ALT  x   /  AlkPhos  x   10-02    PT/INR - ( 30 Sep 2024 20:38 )   PT: 13.4 sec;   INR: 1.15          PTT - ( 30 Sep 2024 20:38 )  PTT:33.8 sec      Urinalysis Basic - ( 02 Oct 2024 05:30 )    Color: x / Appearance: x / SG: x / pH: x  Gluc: 85 mg/dL / Ketone: x  / Bili: x / Urobili: x   Blood: x / Protein: x / Nitrite: x   Leuk Esterase: x / RBC: x / WBC x   Sq Epi: x / Non Sq Epi: x / Bacteria: x        COVID-19 PCR: Detected (02 Oct 2024 08:32)  COVID-19 PCR: NotDetec (29 Sep 2024 12:57)      RADIOLOGY & ADDITIONAL TESTS:  Imaging from Last 24 Hours:  None new

## 2024-10-02 NOTE — PROGRESS NOTE ADULT - SUBJECTIVE AND OBJECTIVE BOX
OVERNIGHT EVENTS: NAEO    SUBJECTIVE / INTERVAL HPI: Patient seen and examined at bedside. Patient questioned about proteinuria in the past, recalls single positive preemployment exam in 1970. States that he followed up with PCP at the time who did not find proteinuria in repeat test. Patient denying chest pain, SOB, palpitations, cough.     Remaining ROS negative       PHYSICAL EXAM:  General:NAD, appears comfortable    HEENT:  PERRL, anicteric sclera  Cardiovascular:  RRR  Respiratory: CTA B/L  Gastrointestinal: soft, NT/ND; +BSx4  Extremities: no edema to LE  Vascular: 2+ radial pulses  Neurological: AAOx3; no focal deficits  Psychiatric: pleasant mood and affect      VITAL SIGNS:  Vital Signs Last 24 Hrs  T(C): 36.2 (02 Oct 2024 15:44), Max: 36.7 (02 Oct 2024 12:04)  T(F): 97.1 (02 Oct 2024 15:44), Max: 98.1 (02 Oct 2024 12:04)  HR: 79 (02 Oct 2024 15:44) (70 - 84)  BP: 124/80 (02 Oct 2024 15:44) (124/80 - 170/86)  BP(mean): 121 (02 Oct 2024 06:19) (115 - 121)  RR: 17 (02 Oct 2024 15:44) (17 - 18)  SpO2: 96% (02 Oct 2024 15:44) (92% - 96%)    Parameters below as of 02 Oct 2024 15:44  Patient On (Oxygen Delivery Method): room air          MEDICATIONS:  MEDICATIONS  (STANDING):  aspirin  chewable 81 milliGRAM(s) Oral daily  atorvastatin 80 milliGRAM(s) Oral at bedtime  carvedilol 12.5 milliGRAM(s) Oral every 12 hours  fluticasone propionate/ salmeterol 250-50 MICROgram(s) Diskus 1 Dose(s) Inhalation two times a day  heparin   Injectable 5000 Unit(s) SubCutaneous every 8 hours  influenza  Vaccine (HIGH DOSE) 0.5 milliLiter(s) IntraMuscular once  Nephro-ashley 1 Tablet(s) Oral daily  NIFEdipine XL 90 milliGRAM(s) Oral daily  pantoprazole    Tablet 40 milliGRAM(s) Oral before breakfast  remdesivir  IVPB   IV Intermittent   remdesivir  IVPB 200 milliGRAM(s) IV Intermittent every 24 hours  sertraline 25 milliGRAM(s) Oral daily  tiotropium 2.5 MICROgram(s) Inhaler 2 Puff(s) Inhalation daily    MEDICATIONS  (PRN):  acetaminophen     Tablet .. 650 milliGRAM(s) Oral every 6 hours PRN Mild Pain (1 - 3)  albuterol   0.5% 2.5 milliGRAM(s) Nebulizer every 8 hours PRN Shortness of Breath and/or Wheezing  senna 2 Tablet(s) Oral at bedtime PRN for constipation      ALLERGIES:  Allergies    No Known Allergies    Intolerances        LABS:                        7.7    3.92  )-----------( 113      ( 02 Oct 2024 05:30 )             24.7     10-02    131[L]  |  102  |  42[H]  ----------------------------<  85  3.6   |  19[L]  |  1.92[H]    Ca    8.7      02 Oct 2024 05:30  Phos  4.6     10-02  Mg     1.8     10-02    TPro  x   /  Alb  2.9[L]  /  TBili  x   /  DBili  x   /  AST  x   /  ALT  x   /  AlkPhos  x   10-02    PT/INR - ( 30 Sep 2024 20:38 )   PT: 13.4 sec;   INR: 1.15          PTT - ( 30 Sep 2024 20:38 )  PTT:33.8 sec  Urinalysis Basic - ( 02 Oct 2024 05:30 )    Color: x / Appearance: x / SG: x / pH: x  Gluc: 85 mg/dL / Ketone: x  / Bili: x / Urobili: x   Blood: x / Protein: x / Nitrite: x   Leuk Esterase: x / RBC: x / WBC x   Sq Epi: x / Non Sq Epi: x / Bacteria: x      CAPILLARY BLOOD GLUCOSE          RADIOLOGY & ADDITIONAL TESTS: Reviewed.

## 2024-10-03 ENCOUNTER — TRANSCRIPTION ENCOUNTER (OUTPATIENT)
Age: 69
End: 2024-10-03

## 2024-10-03 VITALS
HEART RATE: 70 BPM | DIASTOLIC BLOOD PRESSURE: 53 MMHG | RESPIRATION RATE: 18 BRPM | TEMPERATURE: 98 F | SYSTOLIC BLOOD PRESSURE: 95 MMHG | OXYGEN SATURATION: 95 %

## 2024-10-03 DIAGNOSIS — D64.9 ANEMIA, UNSPECIFIED: ICD-10-CM

## 2024-10-03 DIAGNOSIS — U07.1 COVID-19: ICD-10-CM

## 2024-10-03 DIAGNOSIS — K21.9 GASTRO-ESOPHAGEAL REFLUX DISEASE WITHOUT ESOPHAGITIS: ICD-10-CM

## 2024-10-03 DIAGNOSIS — Z86.79 PERSONAL HISTORY OF OTHER DISEASES OF THE CIRCULATORY SYSTEM: ICD-10-CM

## 2024-10-03 DIAGNOSIS — J44.1 CHRONIC OBSTRUCTIVE PULMONARY DISEASE WITH (ACUTE) EXACERBATION: ICD-10-CM

## 2024-10-03 LAB
% GAMMA, URINE: 30 % — SIGNIFICANT CHANGE UP
% M SPIKE, URINE: 2.8 % — SIGNIFICANT CHANGE UP
ALBUMIN 24H MFR UR ELPH: 46.7 % — SIGNIFICANT CHANGE UP
ALPHA1 GLOB 24H MFR UR ELPH: 9.9 % — SIGNIFICANT CHANGE UP
ALPHA2 GLOB 24H MFR UR ELPH: 4.5 % — SIGNIFICANT CHANGE UP
ANION GAP SERPL CALC-SCNC: 12 MMOL/L — SIGNIFICANT CHANGE UP (ref 5–17)
B-GLOBULIN 24H MFR UR ELPH: 8.9 % — SIGNIFICANT CHANGE UP
BLD GP AB SCN SERPL QL: NEGATIVE — SIGNIFICANT CHANGE UP
BUN SERPL-MCNC: 44 MG/DL — HIGH (ref 7–23)
CALCIUM SERPL-MCNC: 8.8 MG/DL — SIGNIFICANT CHANGE UP (ref 8.4–10.5)
CHLORIDE SERPL-SCNC: 103 MMOL/L — SIGNIFICANT CHANGE UP (ref 96–108)
CO2 SERPL-SCNC: 19 MMOL/L — LOW (ref 22–31)
CREAT SERPL-MCNC: 1.78 MG/DL — HIGH (ref 0.5–1.3)
EGFR: 41 ML/MIN/1.73M2 — LOW
GLUCOSE SERPL-MCNC: 92 MG/DL — SIGNIFICANT CHANGE UP (ref 70–99)
HCT VFR BLD CALC: 30.8 % — LOW (ref 39–50)
HGB BLD-MCNC: 9.6 G/DL — LOW (ref 13–17)
INTERPRETATION 24H UR IFE-IMP: SIGNIFICANT CHANGE UP
M PROTEIN 24H UR ELPH-MRATE: PRESENT
MAGNESIUM SERPL-MCNC: 1.7 MG/DL — SIGNIFICANT CHANGE UP (ref 1.6–2.6)
MCHC RBC-ENTMCNC: 28.5 PG — SIGNIFICANT CHANGE UP (ref 27–34)
MCHC RBC-ENTMCNC: 31.2 GM/DL — LOW (ref 32–36)
MCV RBC AUTO: 91.4 FL — SIGNIFICANT CHANGE UP (ref 80–100)
NRBC # BLD: 0 /100 WBCS — SIGNIFICANT CHANGE UP (ref 0–0)
PHOSPHATE SERPL-MCNC: 4.2 MG/DL — SIGNIFICANT CHANGE UP (ref 2.5–4.5)
PLATELET # BLD AUTO: 117 K/UL — LOW (ref 150–400)
POTASSIUM SERPL-MCNC: 3.2 MMOL/L — LOW (ref 3.5–5.3)
POTASSIUM SERPL-SCNC: 3.2 MMOL/L — LOW (ref 3.5–5.3)
PROT ?TM UR-MCNC: 514 MG/DL — HIGH (ref 0–12)
PROT PATTERN 24H UR ELPH-IMP: SIGNIFICANT CHANGE UP
RBC # BLD: 3.37 M/UL — LOW (ref 4.2–5.8)
RBC # FLD: 17.9 % — HIGH (ref 10.3–14.5)
RH IG SCN BLD-IMP: POSITIVE — SIGNIFICANT CHANGE UP
SODIUM SERPL-SCNC: 134 MMOL/L — LOW (ref 135–145)
WBC # BLD: 3.12 K/UL — LOW (ref 3.8–10.5)
WBC # FLD AUTO: 3.12 K/UL — LOW (ref 3.8–10.5)

## 2024-10-03 PROCEDURE — 86160 COMPLEMENT ANTIGEN: CPT

## 2024-10-03 PROCEDURE — 87635 SARS-COV-2 COVID-19 AMP PRB: CPT

## 2024-10-03 PROCEDURE — 82607 VITAMIN B-12: CPT

## 2024-10-03 PROCEDURE — 82746 ASSAY OF FOLIC ACID SERUM: CPT

## 2024-10-03 PROCEDURE — 83516 IMMUNOASSAY NONANTIBODY: CPT

## 2024-10-03 PROCEDURE — 81001 URINALYSIS AUTO W/SCOPE: CPT

## 2024-10-03 PROCEDURE — 94640 AIRWAY INHALATION TREATMENT: CPT

## 2024-10-03 PROCEDURE — 85730 THROMBOPLASTIN TIME PARTIAL: CPT

## 2024-10-03 PROCEDURE — 82040 ASSAY OF SERUM ALBUMIN: CPT

## 2024-10-03 PROCEDURE — 83615 LACTATE (LD) (LDH) ENZYME: CPT

## 2024-10-03 PROCEDURE — 86803 HEPATITIS C AB TEST: CPT

## 2024-10-03 PROCEDURE — 84165 PROTEIN E-PHORESIS SERUM: CPT

## 2024-10-03 PROCEDURE — 76000 FLUOROSCOPY <1 HR PHYS/QHP: CPT

## 2024-10-03 PROCEDURE — 83550 IRON BINDING TEST: CPT

## 2024-10-03 PROCEDURE — 83540 ASSAY OF IRON: CPT

## 2024-10-03 PROCEDURE — 86705 HEP B CORE ANTIBODY IGM: CPT

## 2024-10-03 PROCEDURE — 80048 BASIC METABOLIC PNL TOTAL CA: CPT

## 2024-10-03 PROCEDURE — 82962 GLUCOSE BLOOD TEST: CPT

## 2024-10-03 PROCEDURE — 86880 COOMBS TEST DIRECT: CPT

## 2024-10-03 PROCEDURE — C1725: CPT

## 2024-10-03 PROCEDURE — 86923 COMPATIBILITY TEST ELECTRIC: CPT

## 2024-10-03 PROCEDURE — 85347 COAGULATION TIME ACTIVATED: CPT

## 2024-10-03 PROCEDURE — 84540 ASSAY OF URINE/UREA-N: CPT

## 2024-10-03 PROCEDURE — 86335 IMMUNFIX E-PHORSIS/URINE/CSF: CPT

## 2024-10-03 PROCEDURE — 85027 COMPLETE CBC AUTOMATED: CPT

## 2024-10-03 PROCEDURE — 36430 TRANSFUSION BLD/BLD COMPNT: CPT

## 2024-10-03 PROCEDURE — C1894: CPT

## 2024-10-03 PROCEDURE — 86706 HEP B SURFACE ANTIBODY: CPT

## 2024-10-03 PROCEDURE — 86038 ANTINUCLEAR ANTIBODIES: CPT

## 2024-10-03 PROCEDURE — 86900 BLOOD TYPING SEROLOGIC ABO: CPT

## 2024-10-03 PROCEDURE — 82595 ASSAY OF CRYOGLOBULIN: CPT

## 2024-10-03 PROCEDURE — 84133 ASSAY OF URINE POTASSIUM: CPT

## 2024-10-03 PROCEDURE — 97161 PT EVAL LOW COMPLEX 20 MIN: CPT

## 2024-10-03 PROCEDURE — 71045 X-RAY EXAM CHEST 1 VIEW: CPT

## 2024-10-03 PROCEDURE — 83930 ASSAY OF BLOOD OSMOLALITY: CPT

## 2024-10-03 PROCEDURE — 86704 HEP B CORE ANTIBODY TOTAL: CPT

## 2024-10-03 PROCEDURE — 83935 ASSAY OF URINE OSMOLALITY: CPT

## 2024-10-03 PROCEDURE — 87389 HIV-1 AG W/HIV-1&-2 AB AG IA: CPT

## 2024-10-03 PROCEDURE — 80076 HEPATIC FUNCTION PANEL: CPT

## 2024-10-03 PROCEDURE — 82570 ASSAY OF URINE CREATININE: CPT

## 2024-10-03 PROCEDURE — 85610 PROTHROMBIN TIME: CPT

## 2024-10-03 PROCEDURE — 85025 COMPLETE CBC W/AUTO DIFF WBC: CPT

## 2024-10-03 PROCEDURE — 83880 ASSAY OF NATRIURETIC PEPTIDE: CPT

## 2024-10-03 PROCEDURE — 82610 CYSTATIN C: CPT

## 2024-10-03 PROCEDURE — 86334 IMMUNOFIX E-PHORESIS SERUM: CPT

## 2024-10-03 PROCEDURE — 85045 AUTOMATED RETICULOCYTE COUNT: CPT

## 2024-10-03 PROCEDURE — 36415 COLL VENOUS BLD VENIPUNCTURE: CPT

## 2024-10-03 PROCEDURE — 99232 SBSQ HOSP IP/OBS MODERATE 35: CPT

## 2024-10-03 PROCEDURE — 84166 PROTEIN E-PHORESIS/URINE/CSF: CPT

## 2024-10-03 PROCEDURE — 84300 ASSAY OF URINE SODIUM: CPT

## 2024-10-03 PROCEDURE — 93976 VASCULAR STUDY: CPT

## 2024-10-03 PROCEDURE — 84155 ASSAY OF PROTEIN SERUM: CPT

## 2024-10-03 PROCEDURE — 76775 US EXAM ABDO BACK WALL LIM: CPT

## 2024-10-03 PROCEDURE — 86036 ANCA SCREEN EACH ANTIBODY: CPT

## 2024-10-03 PROCEDURE — 87340 HEPATITIS B SURFACE AG IA: CPT

## 2024-10-03 PROCEDURE — 99233 SBSQ HOSP IP/OBS HIGH 50: CPT

## 2024-10-03 PROCEDURE — 93005 ELECTROCARDIOGRAM TRACING: CPT

## 2024-10-03 PROCEDURE — 86901 BLOOD TYPING SEROLOGIC RH(D): CPT

## 2024-10-03 PROCEDURE — 82728 ASSAY OF FERRITIN: CPT

## 2024-10-03 PROCEDURE — 99285 EMERGENCY DEPT VISIT HI MDM: CPT

## 2024-10-03 PROCEDURE — 93306 TTE W/DOPPLER COMPLETE: CPT

## 2024-10-03 PROCEDURE — 84100 ASSAY OF PHOSPHORUS: CPT

## 2024-10-03 PROCEDURE — 86709 HEPATITIS A IGM ANTIBODY: CPT

## 2024-10-03 PROCEDURE — 82043 UR ALBUMIN QUANTITATIVE: CPT

## 2024-10-03 PROCEDURE — 86860 RBC ANTIBODY ELUTION: CPT

## 2024-10-03 PROCEDURE — 83010 ASSAY OF HAPTOGLOBIN QUANT: CPT

## 2024-10-03 PROCEDURE — 86850 RBC ANTIBODY SCREEN: CPT

## 2024-10-03 PROCEDURE — C1887: CPT

## 2024-10-03 PROCEDURE — 84156 ASSAY OF PROTEIN URINE: CPT

## 2024-10-03 PROCEDURE — 85384 FIBRINOGEN ACTIVITY: CPT

## 2024-10-03 PROCEDURE — 97116 GAIT TRAINING THERAPY: CPT

## 2024-10-03 PROCEDURE — P9016: CPT

## 2024-10-03 PROCEDURE — 83735 ASSAY OF MAGNESIUM: CPT

## 2024-10-03 PROCEDURE — 86225 DNA ANTIBODY NATIVE: CPT

## 2024-10-03 PROCEDURE — 83521 IG LIGHT CHAINS FREE EACH: CPT

## 2024-10-03 PROCEDURE — C1769: CPT

## 2024-10-03 PROCEDURE — 97165 OT EVAL LOW COMPLEX 30 MIN: CPT

## 2024-10-03 RX ORDER — NIRMATRELVIR AND RITONAVIR 300-100 MG
1 KIT ORAL
Qty: 1 | Refills: 0
Start: 2024-10-03 | End: 2024-10-07

## 2024-10-03 RX ORDER — CARVEDILOL 3.125 MG
1 TABLET ORAL
Qty: 60 | Refills: 0
Start: 2024-10-03 | End: 2024-11-01

## 2024-10-03 RX ORDER — MAGNESIUM SULFATE 500 MG/ML
2 VIAL (ML) INJECTION ONCE
Refills: 0 | Status: COMPLETED | OUTPATIENT
Start: 2024-10-03 | End: 2024-10-03

## 2024-10-03 RX ADMIN — Medication 90 MILLIGRAM(S): at 05:43

## 2024-10-03 RX ADMIN — TIOTROPIUM BROMIDE INHALATION SPRAY 2 PUFF(S): 3.12 SPRAY, METERED RESPIRATORY (INHALATION) at 12:04

## 2024-10-03 RX ADMIN — Medication 40 MILLIEQUIVALENT(S): at 11:46

## 2024-10-03 RX ADMIN — Medication 25 GRAM(S): at 11:51

## 2024-10-03 RX ADMIN — Medication 12.5 MILLIGRAM(S): at 05:44

## 2024-10-03 RX ADMIN — PANTOPRAZOLE SODIUM 40 MILLIGRAM(S): 40 TABLET, DELAYED RELEASE ORAL at 05:44

## 2024-10-03 RX ADMIN — Medication 5000 UNIT(S): at 05:43

## 2024-10-03 NOTE — PROGRESS NOTE ADULT - SUBJECTIVE AND OBJECTIVE BOX
O/N: GURINDER KAN      ------------------------------------------------------------------------  PLEASE CHECK WHEN PRESENT:     [  ]Heart Failure     [  ] Acute     [  ] Acute on Chronic     [  ] Chronic  -------------------------------------------------------------------     [  ]Diastolic [HFpEF]     [  ]Systolic [HFrEF]     [  ]Combined [HFpEF & HFrEF]  .................................................................................     [  ]Other:     [ ] Pulmonary Hypertension     [ ] Chronic A-fib     [ ] Persistet A-fib     [ ] Permanent A-fib     [ ] Paroxysmal A-fib     [x ] Hypertensive Heart Disease  -------------------------------------------------------------------  [ ] Respiratory failure  [ ] Acute cor pulmonale  [ ] Asthma/COPD Exacerbation  [ ]COPD on home O2 (Chronic renal Failure)   [ ] Pleural effusion  [ ] Aspiration pneumonia  [ ] Obstructive Sleep Apnea  [ ]Atelectasis   [ ] Acute PE   -------------------------------------------------------------------  [x  ]Acute Kidney Injury      [  ]Acute Tubular Necrosis      [  ]Reneal Medullary Necrosis     [  ]Renal Cortical Necrosis     [  ]Other Pathological Lesions:    [  ]CKD 1  [  ]CKD 2  [  ]CKD 3  [  ]CKD 4  [  ]CKD 5 (ESRD)  [  ]Other  -------------------------------------------------------------------  [  ]Diabetes  [  ] Diabetic PVD Ulcer  [  ] Neuropathic ulcer to DM  [  ] Diabetes with Nephropathy  [  ] Osteomyelitis due to diabetes  [  ] Hyperglycemia   [  ]hypoglycemia   --------------------------------------------------------------------  [  ]Malnutrition: See Nutrition Note  [  ]Cachexia  [  ]Other:   [  ]Supplement Ordered:  [  ]Morbid Obesity (BMI >=40]  [ ] Ileus  ---------------------------------------------------------------------  [ ] Sepsis/severe sepsis/septic shock  [ ] Noninfectious SIRS  [ ] UTI  [ ] Pneumonia  [ ] Thrombophlebitis     -----------------------------------------------------------------------  [ ] Acidosis/alkalosis  [ ] Fluid overload  [ ] Hypokalemia  [ ] Hyperkalemia  [ ] Hypomagnesemia  [ ] Hypophosphatemia  [ ] Hyperphosphatemia  ------------------------------------------------------------------------  [ ] Acute blood loss anemia  [ ] Post op blood loss anemia  [ ] Iron deficiency anemia  [ ] Anemia due to chronic disease  [ ] Hypercoagulable state  [ ] Thrombocytopenia  ----------------------------------------------------------------------  [ ] Cerebral infarction  [ ] Transient ischemia attack  [ ] Encephalopathy - Toxic or Metabolic    A/P: 69 yoM w/ PMHx HLD, anemia (follows w/ heme, Dr. Wong), arthritis, COPD, BPH (s/p TURP), s/p L THR, AVR and ascending and hemiarch replacement w/ Dr. 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 Patient presented c/o lightheadedness, weakness, loss of appetite, weight loss. CTA at [facility] shows no endoleak. Lab significant for hgb 7.6, Na 130, Cr 2.68, BNP 38,000. Patient s/p 1u PRBC in ED with mild improvement of weakness. Now  s/p endoleak repair with renal balloon stenting 9/30/24, progressing well postoperatively.     Vascular:  - s/p Endoleak repair 9/30/24  - s/p FEVAR and L Renal artery stent 8/18/24  - renal artery duplex neg for stenosis, 1.1 cm cyst lower pole R kidney  - c/w aspirin and statin    HTN/HLD:  - c/w nifedipine  - c/w atorvastatin  - c/w coreg 12.5 BID  - ECHO 9/25 EF 50-55%, Bioprosthetic valve is seen in the aortic position    ANKITA:  - baseline Cr 0.7-1.0  - Cr on admission 2.64, peaked at 3.89, now continuing to downtrend, monitor Cr with daily BMP  - multifactorial from poor PO intake vs. NSAID use vs. contrast load at OSH (CTA)  - pending autoimmune work up  - hiv neg, hepatitis panel neg  - nephro consult, appreciate recs    Anemia:   - follows w/ outpt Hematologist, Dr. Wong  - s/p 1u PRBC 9/24, 1u PRBC 9/26  - b12/folate wnl  - heme consulted, appreciate recs    COPD:  - c/w Spiriva and Advair inhaler    COVID: (positive 10/2/24)  - c/w remdesivir inpatient, can switch to paxlovid on dc    GERD:  - c/w PPI    Anxiety:  - c/w zoloft    Diet: renal  Activity: as tolerated  DVTPPx: SQH  PT eval: BHARATH vs Home w/assistance  Dispo: 5 Uris   O/N: LORETA, VSS    Subjective: Pt fatigue is improved s/p 1u PRBC yesterday afternoon. Seen at bedside, feeling well with no complaints. Danie diet, ambulated with PT, feeling amenable to going home today.    ROS:   Denies Headache, blurred vision, Chest Pain, SOB, Abdominal pain, nausea or vomiting     Social   remdesivir  IVPB   remdesivir  IVPB 100  aspirin  chewable 81  carvedilol 12.5  heparin   Injectable 5000  NIFEdipine XL 90  remdesivir  IVPB   remdesivir  IVPB 100      Allergies    No Known Allergies    Intolerances        Vital Signs Last 24 Hrs  T(C): 36.7 (03 Oct 2024 05:39), Max: 36.7 (02 Oct 2024 12:04)  T(F): 98 (03 Oct 2024 05:39), Max: 98.1 (02 Oct 2024 12:04)  HR: 69 (03 Oct 2024 05:39) (69 - 90)  BP: 161/77 (03 Oct 2024 05:39) (118/78 - 166/78)  BP(mean): 110 (03 Oct 2024 05:39) (110 - 111)  RR: 17 (03 Oct 2024 05:39) (16 - 19)  SpO2: 95% (03 Oct 2024 05:39) (92% - 96%)    Parameters below as of 03 Oct 2024 05:39  Patient On (Oxygen Delivery Method): room air      I&O's Summary    02 Oct 2024 07:01  -  03 Oct 2024 07:00  --------------------------------------------------------  IN: 570 mL / OUT: 1175 mL / NET: -605 mL        Physical Exam:  General: alert and awake, NAD  Pulmonary: no respiratory distress  Cardiovascular: RRR  Abdominal: soft. non tender, groins soft  Extremities: warm, well perfused    LABS:                        7.7    3.92  )-----------( 113      ( 02 Oct 2024 05:30 )             24.7     10-02    131[L]  |  102  |  42[H]  ----------------------------<  85  3.6   |  19[L]  |  1.92[H]    Ca    8.7      02 Oct 2024 05:30  Phos  4.6     10-02  Mg     1.8     10-02    TPro  x   /  Alb  2.9[L]  /  TBili  x   /  DBili  x   /  AST  x   /  ALT  x   /  AlkPhos  x   10-02        Radiology and Additional Studies:    ------------------------------------------------------------------------  PLEASE CHECK WHEN PRESENT:     [  ]Heart Failure     [  ] Acute     [  ] Acute on Chronic     [  ] Chronic  -------------------------------------------------------------------     [  ]Diastolic [HFpEF]     [  ]Systolic [HFrEF]     [  ]Combined [HFpEF & HFrEF]  .................................................................................     [  ]Other:     [ ] Pulmonary Hypertension     [ ] Chronic A-fib     [ ] Persistet A-fib     [ ] Permanent A-fib     [ ] Paroxysmal A-fib     [x ] Hypertensive Heart Disease  -------------------------------------------------------------------  [ ] Respiratory failure  [ ] Acute cor pulmonale  [ ] Asthma/COPD Exacerbation  [ ]COPD on home O2 (Chronic renal Failure)   [ ] Pleural effusion  [ ] Aspiration pneumonia  [ ] Obstructive Sleep Apnea  [ ]Atelectasis   [ ] Acute PE   -------------------------------------------------------------------  [x  ]Acute Kidney Injury      [  ]Acute Tubular Necrosis      [  ]Reneal Medullary Necrosis     [  ]Renal Cortical Necrosis     [  ]Other Pathological Lesions:    [  ]CKD 1  [  ]CKD 2  [  ]CKD 3  [  ]CKD 4  [  ]CKD 5 (ESRD)  [  ]Other  -------------------------------------------------------------------  [  ]Diabetes  [  ] Diabetic PVD Ulcer  [  ] Neuropathic ulcer to DM  [  ] Diabetes with Nephropathy  [  ] Osteomyelitis due to diabetes  [  ] Hyperglycemia   [  ]hypoglycemia   --------------------------------------------------------------------  [  ]Malnutrition: See Nutrition Note  [  ]Cachexia  [  ]Other:   [  ]Supplement Ordered:  [  ]Morbid Obesity (BMI >=40]  [ ] Ileus  ---------------------------------------------------------------------  [ ] Sepsis/severe sepsis/septic shock  [ ] Noninfectious SIRS  [ ] UTI  [ ] Pneumonia  [ ] Thrombophlebitis     -----------------------------------------------------------------------  [ ] Acidosis/alkalosis  [ ] Fluid overload  [ ] Hypokalemia  [ ] Hyperkalemia  [ ] Hypomagnesemia  [ ] Hypophosphatemia  [ ] Hyperphosphatemia  ------------------------------------------------------------------------  [ ] Acute blood loss anemia  [ ] Post op blood loss anemia  [ ] Iron deficiency anemia  [ ] Anemia due to chronic disease  [ ] Hypercoagulable state  [ ] Thrombocytopenia  ----------------------------------------------------------------------  [ ] Cerebral infarction  [ ] Transient ischemia attack  [ ] Encephalopathy - Toxic or Metabolic    A/P: 69 yoM w/ PMHx HLD, anemia (follows w/ heme, Dr. Wong), arthritis, COPD, BPH (s/p TURP), s/p L THR, AVR and ascending and hemiarch replacement w/ Dr. 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 Patient presented c/o lightheadedness, weakness, loss of appetite, weight loss. CTA at [facility] shows no endoleak. Lab significant for hgb 7.6, Na 130, Cr 2.68, BNP 38,000. Patient s/p 1u PRBC in ED with mild improvement of weakness. Now  s/p endoleak repair with renal balloon stenting 9/30/24, progressing well postoperatively.     Vascular:  - s/p Endoleak repair 9/30/24  - s/p FEVAR and L Renal artery stent 8/18/24  - renal artery duplex neg for stenosis, 1.1 cm cyst lower pole R kidney  - c/w aspirin and statin    HTN/HLD:  - c/w nifedipine  - c/w atorvastatin  - c/w coreg 12.5 BID  - ECHO 9/25 EF 50-55%, Bioprosthetic valve is seen in the aortic position    ANKITA:  - baseline Cr 0.7-1.0  - Cr on admission 2.64, peaked at 3.89, now continuing to downtrend, monitor Cr with daily BMP  - multifactorial from poor PO intake vs. NSAID use vs. contrast load at OSH (CTA)  - pending autoimmune work up  - hiv neg, hepatitis panel neg  - nephro consult, appreciate recs    Anemia:   - follows w/ outpt Hematologist, Dr. Wong  - s/p 1u PRBC 9/24, 1u PRBC 9/26, 1u PRBC 10/2  - b12/folate wnl  - heme consulted, appreciate recs    COPD:  - c/w Spiriva and Advair inhaler    COVID: (positive 10/2/24)  - c/w remdesivir inpatient, can switch to paxlovid on dc    GERD:  - c/w PPI    Anxiety:  - c/w zoloft    Diet: renal  Activity: as tolerated  DVTPPx: SQH  PT eval: BHARATH vs Home w/assistance  Dispo: Home w assistance today

## 2024-10-03 NOTE — DISCHARGE NOTE NURSING/CASE MANAGEMENT/SOCIAL WORK - PATIENT PORTAL LINK FT
You can access the FollowMyHealth Patient Portal offered by [facility] by registering at the following website: [email or web address]. By joining [facility]’s FollowMyHealth portal, you will also be able to view your health information using other applications (apps) compatible with our system.

## 2024-10-03 NOTE — PROGRESS NOTE ADULT - REASON FOR ADMISSION
ANKITA, symptomatic anemia

## 2024-10-03 NOTE — PROGRESS NOTE ADULT - ASSESSMENT
69-year-old male with a PMHx of HTN, HLD, anemia, COPD, BPH (s/p TURP), AVR, ascending aortic arch replacement and recent FEVAR/renal artery stent (8/19/24) who presented with symptomatic anemia and ANKITA, now s/p balloon angioplasty of the L renal stent. Nephrology following as patient initially with nephrotic range proteinuria.     #AVR and ascending and hemiarch replacement  6/2022  #Recent FEVAR/renal artery stent (8/19/24)  - s/p endo leak repair   -Continue management as per vascular surgery, pain and bowel regimen      #Reduced EF   -Pt s/p TTE on 9/25 which showed: Borderline normal left ventricular systolic function. Left ventricular ejection fraction is 50-55%. Pt's EF on TTE on 9/23 showed an EF of 64%  -Pt is currently euvolemic; CXR on admission no evidence of congestion however pt with markedly elevated BNP [phone]     #COVID-19 Infection   -Exposed by roommate, asymptomatic with exception of mild congestion----improving as of today , not hypoxic.   -Received remdesivir while inpatient. Given mild symptoms, no hypoxia, and interaction between paxlovid and nifedipine, risk-benefit favors conservative management on discharge   -patient to follow up with PCP    #ANKITA (resolving)   #Proteinuria   -Multifactorial from poor PO intake vs. NSAID use vs. contrast load at OSH (CTA) vs urinary retention  - nephrology following  - Creatinine improving, hold nephrotoxics, monitor UOP  - follow up SPEP, urine studies per renal     #Symptomatic Anemia    # Thrombocytopenia   - Pt with outpatient  Hematologist Dr. Wong  - Pt s/p transfusion of 1U PRBCs on 9/25 and 9/26.   - Ensure patient has good outpatient hematology follow up     #COPD   -continue with Spiriva and Advair    - add Albuterol prn, start on IS. Patient educated about IS use. Continue to monitor respiratory status     #GERD  -continue with Pantoprazole 40mg daily      #HTN   -Continue with Nifedipine 90mg daily . Per surescript patient on HCTZ at home, ok to hold in setting of ANKITA.   - Carvedilol was increased, monitor HR/BP    #Anxiety   -continue with Zoloft 25mg daily      # Hypokalemia   Potassium: 3.2 mmol/L (10-03-24 @ 05:30)  Potassium: 3.6 mmol/L (10-02-24 @ 05:30)   ; Likely 2/2 poor PO intake; Replete    DVT ppx: SQH  Discussed with primary team

## 2024-10-03 NOTE — OCCUPATIONAL THERAPY INITIAL EVALUATION ADULT - PERTINENT HX OF CURRENT PROBLEM, REHAB EVAL
Patient is 69 yoM w/ PMHx HLD, anemia (follows w/ heme, Dr. Wong), arthritis, COPD, BPH (s/p TURP), s/p L THR, AVR and ascending and hemiarch replacement w/ Dr. 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 Patient presented c/o lightheadedness, weakness, loss of appetite, weight loss. CTA at [facility] shows no endoleak

## 2024-10-03 NOTE — PROGRESS NOTE ADULT - SUBJECTIVE AND OBJECTIVE BOX
OVERNIGHT EVENTS: NAEO    SUBJECTIVE / INTERVAL HPI: Patient seen and examined at bedside. Patient endorses mildly productive cough since yesterday, which is overall improving. No other complaints.     Remaining ROS negative       PHYSICAL EXAM:  General:NAD, appears comfortable    HEENT:  PERRL, anicteric sclera  Cardiovascular:  RRR  Respiratory: CTA B/L  Gastrointestinal: soft, NT/ND; +BSx4  Extremities: no edema to LE  Vascular: 2+ radial pulses  Neurological: AAOx3; no focal deficits  Psychiatric: pleasant mood and affect      VITAL SIGNS:  Vital Signs Last 24 Hrs  T(C): 36.7 (03 Oct 2024 09:16), Max: 36.7 (02 Oct 2024 20:36)  T(F): 98.1 (03 Oct 2024 09:16), Max: 98.1 (02 Oct 2024 20:36)  HR: 63 (03 Oct 2024 09:16) (63 - 90)  BP: 125/77 (03 Oct 2024 09:16) (118/78 - 162/77)  BP(mean): 96 (03 Oct 2024 09:16) (96 - 111)  RR: 18 (03 Oct 2024 09:16) (16 - 19)  SpO2: 95% (03 Oct 2024 09:16) (95% - 96%)    Parameters below as of 03 Oct 2024 09:16  Patient On (Oxygen Delivery Method): room air          MEDICATIONS:  MEDICATIONS  (STANDING):  aspirin  chewable 81 milliGRAM(s) Oral daily  atorvastatin 80 milliGRAM(s) Oral at bedtime  carvedilol 12.5 milliGRAM(s) Oral every 12 hours  fluticasone propionate/ salmeterol 250-50 MICROgram(s) Diskus 1 Dose(s) Inhalation two times a day  heparin   Injectable 5000 Unit(s) SubCutaneous every 8 hours  influenza  Vaccine (HIGH DOSE) 0.5 milliLiter(s) IntraMuscular once  Nephro-ashley 1 Tablet(s) Oral daily  NIFEdipine XL 90 milliGRAM(s) Oral daily  pantoprazole    Tablet 40 milliGRAM(s) Oral before breakfast  remdesivir  IVPB   IV Intermittent   remdesivir  IVPB 100 milliGRAM(s) IV Intermittent every 24 hours  sertraline 25 milliGRAM(s) Oral daily  tiotropium 2.5 MICROgram(s) Inhaler 2 Puff(s) Inhalation daily    MEDICATIONS  (PRN):  acetaminophen     Tablet .. 650 milliGRAM(s) Oral every 6 hours PRN Mild Pain (1 - 3)  albuterol   0.5% 2.5 milliGRAM(s) Nebulizer every 8 hours PRN Shortness of Breath and/or Wheezing  senna 2 Tablet(s) Oral at bedtime PRN for constipation      ALLERGIES:  Allergies    No Known Allergies    Intolerances        LABS:                        9.6    3.12  )-----------( 117      ( 03 Oct 2024 05:30 )             30.8     10-03    134[L]  |  103  |  44[H]  ----------------------------<  92  3.2[L]   |  19[L]  |  1.78[H]    Ca    8.8      03 Oct 2024 05:30  Phos  4.2     10-03  Mg     1.7     10-03    TPro  x   /  Alb  2.9[L]  /  TBili  x   /  DBili  x   /  AST  x   /  ALT  x   /  AlkPhos  x   10-02      Urinalysis Basic - ( 03 Oct 2024 05:30 )    Color: x / Appearance: x / SG: x / pH: x  Gluc: 92 mg/dL / Ketone: x  / Bili: x / Urobili: x   Blood: x / Protein: x / Nitrite: x   Leuk Esterase: x / RBC: x / WBC x   Sq Epi: x / Non Sq Epi: x / Bacteria: x      CAPILLARY BLOOD GLUCOSE          RADIOLOGY & ADDITIONAL TESTS: Reviewed.

## 2024-10-03 NOTE — OCCUPATIONAL THERAPY INITIAL EVALUATION ADULT - DIAGNOSIS, OT EVAL
Pt. is POD#4 (9/30) s/p endoleak repair. Upon assessment, pt. demo generalized deconditioning impacting engagement in ADLs and mobility.

## 2024-10-03 NOTE — PROGRESS NOTE ADULT - SUBJECTIVE AND OBJECTIVE BOX
Patient is a 69y old  Male who presents with a chief complaint of ANKITA, symptomatic anemia (03 Oct 2024 13:19)    INTERVAL EVENTS:  tolerating regular texture diet, no nausea   no dysuria, no constipation    SUBJECTIVE:  Patient was seen and examined at bedside.  Review of systems: No CP, dyspnea, nausea or vomiting, dysuria, LE edema.     Diet, Regular:   1000mL Fluid Restriction ([id number])  No Concentrated Phosphorus  Supplement Feeding Modality:  Oral  Nepro Cans or Servings Per Day:  1       Frequency:  Daily (10-02-24 @ 14:11) [Active]      MEDICATIONS:  MEDICATIONS  (STANDING):  aspirin  chewable 81 milliGRAM(s) Oral daily  atorvastatin 80 milliGRAM(s) Oral at bedtime  carvedilol 12.5 milliGRAM(s) Oral every 12 hours  fluticasone propionate/ salmeterol 250-50 MICROgram(s) Diskus 1 Dose(s) Inhalation two times a day  heparin   Injectable 5000 Unit(s) SubCutaneous every 8 hours  influenza  Vaccine (HIGH DOSE) 0.5 milliLiter(s) IntraMuscular once  Nephro-ashley 1 Tablet(s) Oral daily  NIFEdipine XL 90 milliGRAM(s) Oral daily  pantoprazole    Tablet 40 milliGRAM(s) Oral before breakfast  remdesivir  IVPB   IV Intermittent   remdesivir  IVPB 100 milliGRAM(s) IV Intermittent every 24 hours  sertraline 25 milliGRAM(s) Oral daily  tiotropium 2.5 MICROgram(s) Inhaler 2 Puff(s) Inhalation daily    MEDICATIONS  (PRN):  acetaminophen     Tablet .. 650 milliGRAM(s) Oral every 6 hours PRN Mild Pain (1 - 3)  albuterol   0.5% 2.5 milliGRAM(s) Nebulizer every 8 hours PRN Shortness of Breath and/or Wheezing  senna 2 Tablet(s) Oral at bedtime PRN for constipation      Allergies    No Known Allergies    Intolerances        OBJECTIVE:  Vital Signs Last 24 Hrs  T(C): 36.8 (03 Oct 2024 13:30), Max: 36.8 (03 Oct 2024 13:30)  T(F): 98.3 (03 Oct 2024 13:30), Max: 98.3 (03 Oct 2024 13:30)  HR: 70 (03 Oct 2024 13:30) (63 - 73)  BP: 95/53 (03 Oct 2024 13:30) (95/53 - 162/77)  BP(mean): 96 (03 Oct 2024 09:16) (96 - 110)  RR: 18 (03 Oct 2024 13:30) (17 - 19)  SpO2: 95% (03 Oct 2024 13:30) (95% - 95%)    Parameters below as of 03 Oct 2024 13:30  Patient On (Oxygen Delivery Method): room air      I&O's Summary    02 Oct 2024 07:01  -  03 Oct 2024 07:00  --------------------------------------------------------  IN: 570 mL / OUT: 1175 mL / NET: -605 mL    03 Oct 2024 07:01  -  04 Oct 2024 00:00  --------------------------------------------------------  IN: 180 mL / OUT: 0 mL / NET: 180 mL    PHYSICAL EXAM:  Gen: sitting in chair at time of exam, appears stated age  HEENT: MMM, clear OP  Neck: supple, trachea at midline  CV: RRR, +S1/S2  Pulm: adequate respiratory effort, no increase in work of breathing  Abd: soft, ND  Skin: warm and dry,   Ext: no LE edema   Neuro: AOx3, speaking in full sentences  Psych: affect and behavior appropriate, pleasant at time of interview    LABS:                        9.6    3.12  )-----------( 117      ( 03 Oct 2024 05:30 )             30.8     10-03    134[L]  |  103  |  44[H]  ----------------------------<  92  3.2[L]   |  19[L]  |  1.78[H]    Ca    8.8      03 Oct 2024 05:30  Phos  4.2     10-03  Mg     1.7     10-03    TPro  x   /  Alb  2.9[L]  /  TBili  x   /  DBili  x   /  AST  x   /  ALT  x   /  AlkPhos  x   10-02    LIVER FUNCTIONS - ( 02 Oct 2024 05:30 )  Alb: 2.9 g/dL / Pro: x     / ALK PHOS: x     / ALT: x     / AST: x     / GGT: x             CAPILLARY BLOOD GLUCOSE        Urinalysis Basic - ( 03 Oct 2024 05:30 )    Color: x / Appearance: x / SG: x / pH: x  Gluc: 92 mg/dL / Ketone: x  / Bili: x / Urobili: x   Blood: x / Protein: x / Nitrite: x   Leuk Esterase: x / RBC: x / WBC x   Sq Epi: x / Non Sq Epi: x / Bacteria: x        MICRODATA:      RADIOLOGY/OTHER STUDIES:

## 2024-10-03 NOTE — PROGRESS NOTE ADULT - ATTENDING COMMENTS
now COVID + - exposed in hospital  68 yo M with ANKITA, s/p IV radiocontrast; also  found to have nephrotic range proteinuria anf low K/L ratio  and +  M  spike  creat 1.78- continues to down trend  Na 134; K 3.2; CO2 19  limit free water; supplement K with KCl  if bicarb drops further add NaHCO3 tabs  defer kidney biopsy until medically stable    from EHR u/a without proteinuria in 2022  PMH sign. for s/p recent endovascular repair and stent in L renal artery.  Pt s/p balloon angioplasty of left renal stent 9/30      UPC 6.9, Peak 13

## 2024-10-03 NOTE — OCCUPATIONAL THERAPY INITIAL EVALUATION ADULT - ADDITIONAL COMMENTS
Patient lives with wife in private home +6STE +15 steps within to bedroom. PTA, patient was independent with ADLs (primarily) functional mobility and, with the use of a straight cane.   Recently, patient req assist for stair negotiation. Patient req assist for some IADLs. Pt. has a tub/shower and owns a RW

## 2024-10-03 NOTE — PROGRESS NOTE ADULT - TIME BILLING
Review of hospital course, labs, vitals, medical records.   Bedside exam and interview   Discussed plan of care with primary team ACP and housestaff   Documenting the encounter  Excludes teaching and separately reported services
Review of hospital course, labs, vitals, medical records.   Bedside exam and interview   Discussed plan of care with primary team housestaff   Documenting the encounter  Excludes teaching and separately reported services
Bedside exam and interview   Reviewed hospital course, vitals, labs   Discussed patient's plan of care with primary team   Documentation of encounter

## 2024-10-03 NOTE — OCCUPATIONAL THERAPY INITIAL EVALUATION ADULT - STANDING BALANCE: STATIC
Called patient back and advised him order has been placed and he can go to the clinic at any time.  He verbalized understanding.     fair balance

## 2024-10-03 NOTE — PROGRESS NOTE ADULT - ASSESSMENT
70 y/o M with recent endovascular repair and stent in L renal artery. CR at base line 1.1-1.2. Went to OSH for dizziness and underwent contrast imaging that did not show any endoleak. Eventually transferred to [facility]. Here noted to have CR of 2.6 for which we are consulted. Pt s/p balloon angioplasty of left renal stent yesterday with good resolution of stenosis. SCr downtrending), likely iso procedural contrast. Nephrotic range proteinuria improving, P/C ratio 6.9 (from 13). Found to have M-Justin of 3.6 and k/l ratio of 0.10.    Problem list    -> Nephrotic range proteinuria. 6.9g/g. Albumin 3.1  -> Hyperglobulinemia, M-Justin 3.4, k/l ratio 0.10  -> ANKITA on CKD: CR peaked at 3.8. SCr overall improving, today 1.78  -> Hypokalemia of 3.2, likely iso Albuterol use, repleted  -> Anemia. Elevated retic count. Direct Ram test positive. Elevated LDH and normal Haptoglobin.     #Plan    -> GN workup for nephrotic range proteinuria significant for M-Justin  -> Consult Heme Onc for MM workup  -> Close outpatient follow up with Nephrology, consider kidney biopsy once other medical issues stabilized  -> Hypokalemia repleted, confirm with BMP  -> SCr improving  -> Hyponatremia likely iso of SIADH, resolved  -> Avoid nephrotoxic drugs  -> strict I/o chart    RESIDENT NOTE, TO BE CONSIDERED INCOMPLETE UNTIL CO-SIGNED BY ATTENDING.   70 y/o M with recent endovascular repair and stent in L renal artery. CR at base line 1.1-1.2. Went to OSH for dizziness and underwent contrast imaging that did not show any endoleak. Eventually transferred to [facility]. Here noted to have CR of 2.6 for which we are consulted. Pt s/p balloon angioplasty of left renal stent yesterday with good resolution of stenosis. SCr downtrending), likely iso procedural contrast. Nephrotic range proteinuria improving, P/C ratio 6.9 (from 13). Found to have M-Justin of 3.6 and k/l ratio of 0.10. As per note, primary team considering discharge.    Problem list    -> Nephrotic range proteinuria. 6.9g/g. Albumin 3.1  -> Hyperglobulinemia, M-Justin 3.4, k/l ratio 0.10  -> ANKITA on CKD: CR peaked at 3.8. SCr overall improving, today 1.78  -> Hypokalemia of 3.2, likely iso Albuterol use, repleted  -> Anemia. Elevated retic count. Direct Ram test positive. Elevated LDH and normal Haptoglobin.     #Plan    -> GN workup for nephrotic range proteinuria significant for M-Justin  -> Consult Heme Onc for MM workup  -> Close outpatient follow up with Nephrology, consider kidney biopsy once other medical issues stabilized  -> Hypokalemia repleted, confirm with BMP  -> SCr improving  -> Hyponatremia likely iso of SIADH, resolved  -> Avoid nephrotoxic drugs  -> strict I/o chart    RESIDENT NOTE, TO BE CONSIDERED INCOMPLETE UNTIL CO-SIGNED BY ATTENDING.

## 2024-10-03 NOTE — OCCUPATIONAL THERAPY INITIAL EVALUATION ADULT - EATING, PREVIOUS LEVEL OF FUNCTION, OT EVAL
Clinic Care Coordination Contact    Follow Up Progress Note      Assessment: The pt was recently in the ED, I called to check up on the pt and help the pt setup a ED follow up.The pt was at [facility] for ankle pain, and a fall. I called and talked to the pt, pt stated that she is doing better. She did want to come in, so I was able to get the pt in tomorrow at 2:00pm with .    Care Gaps:    Health Maintenance Due   Topic Date Due     MAMMO SCREENING  Never done     COVID-19 Vaccine (1) Never done     Pneumococcal Vaccine: Pediatrics (0 to 5 Years) and At-Risk Patients (6 to 64 Years) (1 - PCV) Never done     EYE EXAM  02/01/2022     DTAP/TDAP/TD IMMUNIZATION (2 - Td or Tdap) 04/19/2022     DIABETIC FOOT EXAM  05/10/2022     PHQ-2 (once per calendar year)  01/01/2023           Care Plans      Intervention/Education provided during outreach:               Plan:     Care Coordinator will follow up in   
independent

## 2024-10-03 NOTE — OCCUPATIONAL THERAPY INITIAL EVALUATION ADULT - GENERAL OBSERVATIONS, REHAB EVAL
OT IE complete. HECTOR [facility] clearing pt. for session. Pt. received semi-supine in bed, +tele, +heplock in NAD, agreeable to session.

## 2024-10-03 NOTE — PROGRESS NOTE ADULT - PROVIDER SPECIALTY LIST ADULT
Hospitalist
Hospitalist
Internal Medicine
Nephrology
Vascular Surgery
Hospitalist
Hospitalist
Nephrology
Nephrology
Vascular Surgery
Hospitalist
Nephrology
Vascular Surgery
Vascular Surgery
Nephrology

## 2024-10-03 NOTE — OCCUPATIONAL THERAPY INITIAL EVALUATION ADULT - MODIFIED CLINICAL TEST OF SENSORY INTEGRATION IN BALANCE TEST
pt. performed functional mob in room with CGA and Hand Held Assist, deferring use of AE at this time, crouched posture noted

## 2024-10-09 DIAGNOSIS — E22.2 SYNDROME OF INAPPROPRIATE SECRETION OF ANTIDIURETIC HORMONE: ICD-10-CM

## 2024-10-09 DIAGNOSIS — Z87.891 PERSONAL HISTORY OF NICOTINE DEPENDENCE: ICD-10-CM

## 2024-10-09 DIAGNOSIS — I11.9 HYPERTENSIVE HEART DISEASE WITHOUT HEART FAILURE: ICD-10-CM

## 2024-10-09 DIAGNOSIS — N17.9 ACUTE KIDNEY FAILURE, UNSPECIFIED: ICD-10-CM

## 2024-10-09 DIAGNOSIS — R77.1 ABNORMALITY OF GLOBULIN: ICD-10-CM

## 2024-10-09 DIAGNOSIS — Z79.51 LONG TERM (CURRENT) USE OF INHALED STEROIDS: ICD-10-CM

## 2024-10-09 DIAGNOSIS — Z84.89 FAMILY HISTORY OF OTHER SPECIFIED CONDITIONS: ICD-10-CM

## 2024-10-09 DIAGNOSIS — Z86.73 PERSONAL HISTORY OF TRANSIENT ISCHEMIC ATTACK (TIA), AND CEREBRAL INFARCTION WITHOUT RESIDUAL DEFICITS: ICD-10-CM

## 2024-10-09 DIAGNOSIS — R09.81 NASAL CONGESTION: ICD-10-CM

## 2024-10-09 DIAGNOSIS — N28.1 CYST OF KIDNEY, ACQUIRED: ICD-10-CM
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
## 2024-10-09 DIAGNOSIS — Z98.42 CATARACT EXTRACTION STATUS, LEFT EYE: ICD-10-CM

## 2024-10-09 DIAGNOSIS — E87.6 HYPOKALEMIA: ICD-10-CM

## 2024-10-09 DIAGNOSIS — T82.310A BREAKDOWN (MECHANICAL) OF AORTIC (BIFURCATION) GRAFT (REPLACEMENT), INITIAL ENCOUNTER: ICD-10-CM

## 2024-10-09 DIAGNOSIS — Z95.3 PRESENCE OF XENOGENIC HEART VALVE: ICD-10-CM

## 2024-10-09 DIAGNOSIS — R91.1 SOLITARY PULMONARY NODULE: ICD-10-CM

## 2024-10-09 DIAGNOSIS — I10 ESSENTIAL (PRIMARY) HYPERTENSION: ICD-10-CM
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
## 2024-10-09 DIAGNOSIS — D69.6 THROMBOCYTOPENIA, UNSPECIFIED: ICD-10-CM

## 2024-10-09 DIAGNOSIS — N40.0 BENIGN PROSTATIC HYPERPLASIA WITHOUT LOWER URINARY TRACT SYMPTOMS: ICD-10-CM

## 2024-10-09 DIAGNOSIS — E78.5 HYPERLIPIDEMIA, UNSPECIFIED: ICD-10-CM

## 2024-10-09 DIAGNOSIS — I70.1 ATHEROSCLEROSIS OF RENAL ARTERY: ICD-10-CM

## 2024-10-09 DIAGNOSIS — U07.1 COVID-19: ICD-10-CM

## 2024-10-09 DIAGNOSIS — K21.9 GASTRO-ESOPHAGEAL REFLUX DISEASE WITHOUT ESOPHAGITIS: ICD-10-CM

## 2024-10-09 DIAGNOSIS — D64.9 ANEMIA, UNSPECIFIED: ICD-10-CM

## 2024-10-09 DIAGNOSIS — J44.9 CHRONIC OBSTRUCTIVE PULMONARY DISEASE, UNSPECIFIED: ICD-10-CM

## 2024-10-09 DIAGNOSIS — M19.90 UNSPECIFIED OSTEOARTHRITIS, UNSPECIFIED SITE: ICD-10-CM

## 2024-10-09 DIAGNOSIS — Z98.41 CATARACT EXTRACTION STATUS, RIGHT EYE: ICD-10-CM
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
## 2024-10-09 DIAGNOSIS — Z79.82 LONG TERM (CURRENT) USE OF ASPIRIN: ICD-10-CM

## 2024-10-09 DIAGNOSIS — E44.0 MODERATE PROTEIN-CALORIE MALNUTRITION: ICD-10-CM

## 2024-10-22 ENCOUNTER — APPOINTMENT (OUTPATIENT)
Dept: VASCULAR SURGERY | Facility: CLINIC | Age: 69
End: 2024-10-22

## 2024-11-15 NOTE — ED ADULT NURSE NOTE - SUICIDE SCREENING QUESTION 1
amlodipine-benazepril 2.5 mg-10 mg oral capsule: 1 cap(s) orally once a day  atenolol 25 mg oral tablet: 1 tab(s) orally once a day  atorvastatin 10 mg oral tablet: 1 tab(s) orally once a day (at bedtime)  montelukast 10 mg oral tablet: 1 tab(s) orally once a day  PriLOSEC 10 mg oral delayed release capsule: 1 cap(s) orally once a day   amlodipine-benazepril 2.5 mg-10 mg oral capsule: 1 cap(s) orally once a day  atenolol 25 mg oral tablet: 1 tab(s) orally once a day  atorvastatin 10 mg oral tablet: 1 tab(s) orally once a day (at bedtime)  cefpodoxime 200 mg oral tablet: 1 tab(s) orally 2 times a day Please take on 11/19/24 and 11/20/24.  metroNIDAZOLE 500 mg oral tablet: 1 tab(s) orally 3 times a day Please take on 11/19/24 and 11/20/24.  montelukast 10 mg oral tablet: 1 tab(s) orally once a day  PriLOSEC 10 mg oral delayed release capsule: 1 cap(s) orally once a day   No

## 2024-12-05 ENCOUNTER — NON-APPOINTMENT (OUTPATIENT)
Age: 69
End: 2024-12-05

## 2024-12-10 NOTE — ED PROVIDER NOTE - WET READ LAUNCH FT
FAMILY MEDICINE OFFICE VISIT      Patient: Ede Rodríguez Date of Service:  24   : 1970 MRN: [id number]       SUBJECTIVE:   CHIEF COMPLAINT:   Chief Complaint   Patient presents with    Office Visit     Establish care      HPI:    Ede Rodríguez is a 54 year old who presents today for establishment of care.       Patient denies any recent nausea, vomiting, diarrhea, fever, chills/malaise.    Patient Active Problem List   Diagnosis    Essential hypertension    Lung nodules    MISAEL on CPAP    Prediabetes    Class 2 severe obesity due to excess calories with serious comorbidity and body mass index (BMI) of 36.0 to 36.9 in adult (CMD)       Current Outpatient Medications   Medication Sig Dispense Refill    irbesartan (AVAPRO) 300 MG tablet Take 1 tablet by mouth nightly. Indications: High Blood Pressure 90 tablet 3    gabapentin (NEURONTIN) 300 MG capsule Take 1 capsule by mouth in the morning and 1 capsule at noon and 1 capsule in the evening. 90 capsule 2     No current facility-administered medications for this visit.       Review of Systems   Constitutional:  Negative for activity change, chills, fatigue and fever.   HENT: Negative.  Negative for congestion.    Eyes: Negative.  Negative for visual disturbance.   Respiratory: Negative.  Negative for chest tightness and shortness of breath.    Cardiovascular: Negative.  Negative for chest pain, palpitations and leg swelling.   Gastrointestinal: Negative.  Negative for diarrhea, nausea and vomiting.   Endocrine: Negative.    Genitourinary: Negative.  Negative for difficulty urinating, dysuria and hematuria.   Allergic/Immunologic: Negative.    Neurological:  Negative for dizziness and weakness.   Hematological: Negative.    Psychiatric/Behavioral: Negative.          Visit Vitals  /70 (BP Location: LUE - Left upper extremity, Patient Position: Sitting, Cuff Size: Large Adult)   Pulse 92   Temp 97.9 °F (36.6 °C) (Temporal)   Wt 116.3 kg (256 lb 8 oz)   SpO2  95%   BMI 36.80 kg/m²         Physical Exam  Vitals reviewed.   Constitutional:       General: He is not in acute distress.     Appearance: Normal appearance. He is obese. He is not ill-appearing, toxic-appearing or diaphoretic.   HENT:      Head: Normocephalic and atraumatic.      Right Ear: External ear normal.      Left Ear: External ear normal.      Nose: Nose normal. No congestion or rhinorrhea.   Eyes:      General: No scleral icterus.        Right eye: No discharge.         Left eye: No discharge.      Extraocular Movements: Extraocular movements intact.      Conjunctiva/sclera: Conjunctivae normal.      Pupils: Pupils are equal, round, and reactive to light.   Cardiovascular:      Rate and Rhythm: Normal rate and regular rhythm.      Pulses: Normal pulses.      Heart sounds: Normal heart sounds. No murmur heard.     No friction rub. No gallop.   Pulmonary:      Effort: Pulmonary effort is normal. No respiratory distress.      Breath sounds: Normal breath sounds. No stridor. No wheezing, rhonchi or rales.   Chest:      Chest wall: No tenderness.   Abdominal:      General: There is no distension.   Musculoskeletal:      Cervical back: Normal range of motion. No rigidity or tenderness.   Skin:     Coloration: Skin is not pale.      Findings: No erythema or rash.   Neurological:      General: No focal deficit present.      Mental Status: He is alert and oriented to person, place, and time. Mental status is at baseline.   Psychiatric:         Mood and Affect: Mood normal.         Behavior: Behavior normal.         Thought Content: Thought content normal.         Judgment: Judgment normal.              ASSESSMENT AND PLAN:   The following was ordered/assessed during today's call:   Problem List Items Addressed This Visit       Prediabetes - Primary    Relevant Orders    Comprehensive Metabolic Panel (Completed)    Glycohemoglobin (Completed)    Class 2 severe obesity due to excess calories with serious comorbidity  and body mass index (BMI) of 36.0 to 36.9 in adult (CMD)    Relevant Orders    Lipid Panel With Reflex (Completed)     Other Visit Diagnoses       Hypertension, unspecified type        Prostate cancer screening        Relevant Orders    PSA (Completed)            Medical compliance with plan discussed and risk of noncompliance reviewed.  Patient education completed on disease process, etiology and prognosis.  Return to clinic as clinically indicated was discussed with patient who verbalized understanding of and agreement with the plan.          Mo Valero MD  12/27/2024                     There are no Wet Read(s) to document.

## 2024-12-25 PROBLEM — F10.90 ALCOHOL USE: Status: ACTIVE | Noted: 2022-03-17

## 2025-01-20 NOTE — ED ADULT NURSE NOTE - NS ED NURSE RECORD ANOTHER VITAL SIGN
The patient has been examined and the H&P has been reviewed:    I concur with the findings and changes have been noted since the H&P was written:  Respiratory status improved.  We will plan for left heart catheterization today    Anesthesia/Surgery risks, benefits and alternative options discussed and understood by patient/family.          Active Hospital Problems    Diagnosis  POA    *NSTEMI (non-ST elevation myocardial infarction) [I21.4]  Yes    Acute coronary syndrome [I24.9]  Yes    Shortness of breath [R06.02]  Yes    Acute decompensated heart failure [I50.9]  Yes    Acute hypoxic respiratory failure [J96.01]  Yes    Primary hypertension [I10]  Yes    Hyponatremia [E87.1]  Yes      Resolved Hospital Problems   No resolved problems to display.     
Yes

## 2025-02-25 ENCOUNTER — APPOINTMENT (OUTPATIENT)
Dept: VASCULAR SURGERY | Facility: CLINIC | Age: 70
End: 2025-02-25

## (undated) DEVICE — PACK CYSTO

## (undated) DEVICE — SUT MONOCRYL 4-0 27" PS-2 UNDYED

## (undated) DEVICE — DRAINAGE BAG URINARY 4L

## (undated) DEVICE — TUBING BRAT 2 SUCTION ASSEMBLY TWIST LOCK

## (undated) DEVICE — PACK HYBRID LHH

## (undated) DEVICE — SUT SILK 2-0 18" SH (POP-OFF)

## (undated) DEVICE — DVC TORQ PERIF 0.035

## (undated) DEVICE — TUBING TUR 2 PRONG

## (undated) DEVICE — DRSG DERMABOND 0.7ML

## (undated) DEVICE — STAPLER SKIN MULTI DIRECTION W35

## (undated) DEVICE — SUT PROLENE 4-0 36" RB-1

## (undated) DEVICE — TUBING RANGER FLUID IRRIGATION SET DISP

## (undated) DEVICE — SUT NUROLON 1 18" OS-8 (POP-OFF)

## (undated) DEVICE — MEDELA OVERFLOW FILTER DISPOSABLE

## (undated) DEVICE — ELCTR STRYKER NEPTUNE SMOKE EVACUATION PENCIL (GREEN)

## (undated) DEVICE — TUBING SET FOR SUCTION PUMP

## (undated) DEVICE — TUBING SMOKE EVAC 3/8" X 10FT FOR NEPTUNE

## (undated) DEVICE — SOL IRR BAG NS 0.9% 3000ML

## (undated) DEVICE — KIT DRSG CVC CHG 40/CA

## (undated) DEVICE — TAPE SILK 3"

## (undated) DEVICE — SUT PROLENE 5-0 30" RB-2

## (undated) DEVICE — ELCTR BIVAP BIPOLAR VAPORIZATION 26FR

## (undated) DEVICE — DRAPE IOBAN 33" X 23"

## (undated) DEVICE — GEL AQUSNC PACKET 20GR

## (undated) DEVICE — VASCULAR DILATOR KIT 8,12,16,20, 24FR

## (undated) DEVICE — KIT APPLICATOR SPRAY FOR HARVEST SYSTEM

## (undated) DEVICE — DRSG BIOPATCH DISK W CHG 1" W 4.0MM HOLE

## (undated) DEVICE — CONTAINER TISSUE COLLECTING DISP

## (undated) DEVICE — SUT VICRYL 2-0 27" CT-1

## (undated) DEVICE — CATH TRIOX OXIMETRY 8F 3 LUMENS

## (undated) DEVICE — UROVAC

## (undated) DEVICE — MANIFOLD ANGIO AUTOMD TRNDCR

## (undated) DEVICE — NDL COUNTER FOAM AND MAGNET 40-70

## (undated) DEVICE — GLV 7.5 PROTEXIS (WHITE)

## (undated) DEVICE — PACK PROCEDURE HARVEST SMARTPREP APC-60

## (undated) DEVICE — DRAPE PROBE COVER 5" X 96"

## (undated) DEVICE — FOLEY CATH 3-WAY 22FR 30CC LATEX HEMATURIA COUDE

## (undated) DEVICE — DRAPE FLUID WARMER 44 X 66"

## (undated) DEVICE — SYS DEL CONTROLLER ANGIOTOUCH

## (undated) DEVICE — ELCTR CUTTING 24/26FR

## (undated) DEVICE — SUT BOOT STANDARD (ORIGINAL YELLOW) 5 PAIR